# Patient Record
Sex: MALE | Race: WHITE | NOT HISPANIC OR LATINO | URBAN - METROPOLITAN AREA
[De-identification: names, ages, dates, MRNs, and addresses within clinical notes are randomized per-mention and may not be internally consistent; named-entity substitution may affect disease eponyms.]

---

## 2023-10-10 ENCOUNTER — INPATIENT (INPATIENT)
Facility: HOSPITAL | Age: 78
LOS: 17 days | Discharge: NURSING, REHAB OR LONG-TERM CARE | DRG: 853 | End: 2023-10-28
Attending: INTERNAL MEDICINE | Admitting: INTERNAL MEDICINE
Payer: MEDICARE

## 2023-10-10 VITALS
OXYGEN SATURATION: 95 % | WEIGHT: 169.98 LBS | DIASTOLIC BLOOD PRESSURE: 53 MMHG | SYSTOLIC BLOOD PRESSURE: 104 MMHG | RESPIRATION RATE: 16 BRPM | HEIGHT: 73 IN | HEART RATE: 56 BPM | TEMPERATURE: 101 F

## 2023-10-10 DIAGNOSIS — N39.0 URINARY TRACT INFECTION, SITE NOT SPECIFIED: ICD-10-CM

## 2023-10-10 LAB
ABO RH CONFIRMATION: SIGNIFICANT CHANGE UP
ALBUMIN SERPL ELPH-MCNC: 1.9 G/DL — LOW (ref 3.3–5)
ALP SERPL-CCNC: 88 U/L — SIGNIFICANT CHANGE UP (ref 40–120)
ALT FLD-CCNC: 12 U/L — SIGNIFICANT CHANGE UP (ref 12–78)
ANION GAP SERPL CALC-SCNC: 9 MMOL/L — SIGNIFICANT CHANGE UP (ref 5–17)
ANISOCYTOSIS BLD QL: SIGNIFICANT CHANGE UP
APPEARANCE UR: ABNORMAL
APTT BLD: 30 SEC — SIGNIFICANT CHANGE UP (ref 24.5–35.6)
AST SERPL-CCNC: 18 U/L — SIGNIFICANT CHANGE UP (ref 15–37)
BACTERIA # UR AUTO: ABNORMAL /HPF
BASOPHILS # BLD AUTO: 0 K/UL — SIGNIFICANT CHANGE UP (ref 0–0.2)
BASOPHILS NFR BLD AUTO: 0 % — SIGNIFICANT CHANGE UP (ref 0–2)
BILIRUB SERPL-MCNC: 0.4 MG/DL — SIGNIFICANT CHANGE UP (ref 0.2–1.2)
BILIRUB UR-MCNC: NEGATIVE — SIGNIFICANT CHANGE UP
BLD GP AB SCN SERPL QL: SIGNIFICANT CHANGE UP
BUN SERPL-MCNC: 42 MG/DL — HIGH (ref 7–23)
CALCIUM SERPL-MCNC: 8.5 MG/DL — SIGNIFICANT CHANGE UP (ref 8.5–10.1)
CHLORIDE SERPL-SCNC: 109 MMOL/L — HIGH (ref 96–108)
CK MB CFR SERPL CALC: <1 NG/ML — SIGNIFICANT CHANGE UP (ref 0–3.6)
CO2 SERPL-SCNC: 20 MMOL/L — LOW (ref 22–31)
COLOR SPEC: YELLOW — SIGNIFICANT CHANGE UP
CREAT SERPL-MCNC: 1.2 MG/DL — SIGNIFICANT CHANGE UP (ref 0.5–1.3)
DIFF PNL FLD: ABNORMAL
EGFR: 62 ML/MIN/1.73M2 — SIGNIFICANT CHANGE UP
EOSINOPHIL # BLD AUTO: 0.11 K/UL — SIGNIFICANT CHANGE UP (ref 0–0.5)
EOSINOPHIL NFR BLD AUTO: 1 % — SIGNIFICANT CHANGE UP (ref 0–6)
EPI CELLS # UR: PRESENT
ERYTHROCYTE [SEDIMENTATION RATE] IN BLOOD: 101 MM/HR — HIGH (ref 0–20)
FLUAV AG NPH QL: SIGNIFICANT CHANGE UP
FLUBV AG NPH QL: SIGNIFICANT CHANGE UP
GLUCOSE SERPL-MCNC: 159 MG/DL — HIGH (ref 70–99)
GLUCOSE UR QL: NEGATIVE MG/DL — SIGNIFICANT CHANGE UP
HCT VFR BLD CALC: 21 % — CRITICAL LOW (ref 39–50)
HGB BLD-MCNC: 6.6 G/DL — CRITICAL LOW (ref 13–17)
HYPOCHROMIA BLD QL: SIGNIFICANT CHANGE UP
INR BLD: 1.61 RATIO — HIGH (ref 0.85–1.18)
KETONES UR-MCNC: NEGATIVE MG/DL — SIGNIFICANT CHANGE UP
LACTATE SERPL-SCNC: 1.1 MMOL/L — SIGNIFICANT CHANGE UP (ref 0.7–2)
LEUKOCYTE ESTERASE UR-ACNC: ABNORMAL
LG PLATELETS BLD QL AUTO: SLIGHT — SIGNIFICANT CHANGE UP
LIDOCAIN IGE QN: 8 U/L — LOW (ref 13–75)
LYMPHOCYTES # BLD AUTO: 0.44 K/UL — LOW (ref 1–3.3)
LYMPHOCYTES # BLD AUTO: 4 % — LOW (ref 13–44)
MAGNESIUM SERPL-MCNC: 1.8 MG/DL — SIGNIFICANT CHANGE UP (ref 1.6–2.6)
MANUAL SMEAR VERIFICATION: SIGNIFICANT CHANGE UP
MCHC RBC-ENTMCNC: 25.8 PG — LOW (ref 27–34)
MCHC RBC-ENTMCNC: 31.4 GM/DL — LOW (ref 32–36)
MCV RBC AUTO: 82 FL — SIGNIFICANT CHANGE UP (ref 80–100)
MICROCYTES BLD QL: SIGNIFICANT CHANGE UP
MONOCYTES # BLD AUTO: 0.55 K/UL — SIGNIFICANT CHANGE UP (ref 0–0.9)
MONOCYTES NFR BLD AUTO: 5 % — SIGNIFICANT CHANGE UP (ref 2–14)
NEUTROPHILS # BLD AUTO: 9.97 K/UL — HIGH (ref 1.8–7.4)
NEUTROPHILS NFR BLD AUTO: 88 % — HIGH (ref 43–77)
NEUTS BAND # BLD: 2 % — SIGNIFICANT CHANGE UP (ref 0–8)
NITRITE UR-MCNC: NEGATIVE — SIGNIFICANT CHANGE UP
NRBC # BLD: 0 /100 — SIGNIFICANT CHANGE UP (ref 0–0)
NRBC # BLD: SIGNIFICANT CHANGE UP /100 WBCS (ref 0–0)
NT-PROBNP SERPL-SCNC: 6932 PG/ML — HIGH (ref 0–450)
OB PNL STL: NEGATIVE — SIGNIFICANT CHANGE UP
PH UR: 5.5 — SIGNIFICANT CHANGE UP (ref 5–8)
PLAT MORPH BLD: NORMAL — SIGNIFICANT CHANGE UP
PLATELET # BLD AUTO: 228 K/UL — SIGNIFICANT CHANGE UP (ref 150–400)
POIKILOCYTOSIS BLD QL AUTO: SLIGHT — SIGNIFICANT CHANGE UP
POTASSIUM SERPL-MCNC: 4.5 MMOL/L — SIGNIFICANT CHANGE UP (ref 3.5–5.3)
POTASSIUM SERPL-SCNC: 4.5 MMOL/L — SIGNIFICANT CHANGE UP (ref 3.5–5.3)
PROT SERPL-MCNC: 6.1 G/DL — SIGNIFICANT CHANGE UP (ref 6–8.3)
PROT UR-MCNC: 30 MG/DL
PROTHROM AB SERPL-ACNC: 18.6 SEC — HIGH (ref 9.5–13)
RBC # BLD: 2.56 M/UL — LOW (ref 4.2–5.8)
RBC # FLD: 16.4 % — HIGH (ref 10.3–14.5)
RBC BLD AUTO: ABNORMAL
RBC CASTS # UR COMP ASSIST: 12 /HPF — HIGH (ref 0–4)
RSV RNA NPH QL NAA+NON-PROBE: SIGNIFICANT CHANGE UP
SARS-COV-2 RNA SPEC QL NAA+PROBE: SIGNIFICANT CHANGE UP
SCHISTOCYTES BLD QL AUTO: SLIGHT — SIGNIFICANT CHANGE UP
SODIUM SERPL-SCNC: 138 MMOL/L — SIGNIFICANT CHANGE UP (ref 135–145)
SP GR SPEC: 1.01 — SIGNIFICANT CHANGE UP (ref 1–1.03)
SPHEROCYTES BLD QL SMEAR: SLIGHT — SIGNIFICANT CHANGE UP
TROPONIN I, HIGH SENSITIVITY RESULT: 23.7 NG/L — SIGNIFICANT CHANGE UP
TSH SERPL-MCNC: 1.74 UIU/ML — SIGNIFICANT CHANGE UP (ref 0.36–3.74)
UROBILINOGEN FLD QL: 0.2 MG/DL — SIGNIFICANT CHANGE UP (ref 0.2–1)
WBC # BLD: 11.08 K/UL — HIGH (ref 3.8–10.5)
WBC # FLD AUTO: 11.08 K/UL — HIGH (ref 3.8–10.5)
WBC UR QL: 85 /HPF — HIGH (ref 0–5)

## 2023-10-10 PROCEDURE — 70450 CT HEAD/BRAIN W/O DYE: CPT | Mod: 26,MA

## 2023-10-10 PROCEDURE — 99285 EMERGENCY DEPT VISIT HI MDM: CPT

## 2023-10-10 PROCEDURE — 71250 CT THORAX DX C-: CPT | Mod: 26,MA

## 2023-10-10 PROCEDURE — 71045 X-RAY EXAM CHEST 1 VIEW: CPT | Mod: 26

## 2023-10-10 PROCEDURE — 74176 CT ABD & PELVIS W/O CONTRAST: CPT | Mod: 26,MA

## 2023-10-10 PROCEDURE — 93010 ELECTROCARDIOGRAM REPORT: CPT

## 2023-10-10 PROCEDURE — 93010 ELECTROCARDIOGRAM REPORT: CPT | Mod: 77

## 2023-10-10 PROCEDURE — 73630 X-RAY EXAM OF FOOT: CPT | Mod: 26,50

## 2023-10-10 RX ORDER — SODIUM CHLORIDE 9 MG/ML
1000 INJECTION INTRAMUSCULAR; INTRAVENOUS; SUBCUTANEOUS ONCE
Refills: 0 | Status: COMPLETED | OUTPATIENT
Start: 2023-10-10 | End: 2023-10-10

## 2023-10-10 RX ORDER — PIPERACILLIN AND TAZOBACTAM 4; .5 G/20ML; G/20ML
3.38 INJECTION, POWDER, LYOPHILIZED, FOR SOLUTION INTRAVENOUS ONCE
Refills: 0 | Status: COMPLETED | OUTPATIENT
Start: 2023-10-10 | End: 2023-10-10

## 2023-10-10 RX ORDER — VANCOMYCIN HCL 1 G
1000 VIAL (EA) INTRAVENOUS ONCE
Refills: 0 | Status: COMPLETED | OUTPATIENT
Start: 2023-10-10 | End: 2023-10-10

## 2023-10-10 RX ORDER — PIPERACILLIN AND TAZOBACTAM 4; .5 G/20ML; G/20ML
3.38 INJECTION, POWDER, LYOPHILIZED, FOR SOLUTION INTRAVENOUS ONCE
Refills: 0 | Status: COMPLETED | OUTPATIENT
Start: 2023-10-11 | End: 2023-10-11

## 2023-10-10 RX ORDER — PANTOPRAZOLE SODIUM 20 MG/1
40 TABLET, DELAYED RELEASE ORAL
Refills: 0 | Status: DISCONTINUED | OUTPATIENT
Start: 2023-10-10 | End: 2023-10-19

## 2023-10-10 RX ORDER — ACETAMINOPHEN 500 MG
650 TABLET ORAL EVERY 6 HOURS
Refills: 0 | Status: DISCONTINUED | OUTPATIENT
Start: 2023-10-10 | End: 2023-10-19

## 2023-10-10 RX ORDER — ACETAMINOPHEN 500 MG
1000 TABLET ORAL ONCE
Refills: 0 | Status: COMPLETED | OUTPATIENT
Start: 2023-10-10 | End: 2023-10-10

## 2023-10-10 RX ORDER — PIPERACILLIN AND TAZOBACTAM 4; .5 G/20ML; G/20ML
3.38 INJECTION, POWDER, LYOPHILIZED, FOR SOLUTION INTRAVENOUS EVERY 8 HOURS
Refills: 0 | Status: DISCONTINUED | OUTPATIENT
Start: 2023-10-11 | End: 2023-10-12

## 2023-10-10 RX ADMIN — Medication 1000 MILLIGRAM(S): at 19:13

## 2023-10-10 RX ADMIN — Medication 400 MILLIGRAM(S): at 18:43

## 2023-10-10 RX ADMIN — SODIUM CHLORIDE 1000 MILLILITER(S): 9 INJECTION INTRAMUSCULAR; INTRAVENOUS; SUBCUTANEOUS at 18:45

## 2023-10-10 RX ADMIN — PIPERACILLIN AND TAZOBACTAM 200 GRAM(S): 4; .5 INJECTION, POWDER, LYOPHILIZED, FOR SOLUTION INTRAVENOUS at 20:39

## 2023-10-10 RX ADMIN — Medication 250 MILLIGRAM(S): at 23:08

## 2023-10-10 NOTE — ED ADULT NURSE NOTE - OBJECTIVE STATEMENT
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 son states pt lost about 30 lbs this year. has been incontinent of urine and stool. +nausea/vomiting/diarrhea and loss of appetite for months. reports fever since wednesday

## 2023-10-10 NOTE — ED PROVIDER NOTE - CONSTITUTIONAL, MLM
Patient needs a standing H Pylori order, I see a standing c diff is in there, she also needs a standing h pylori to be retested in a few weeks once she is finished with antibiotics - - -

## 2023-10-10 NOTE — ED PROVIDER NOTE - OBJECTIVE STATEMENT
78-year-old male presents to the hospital by ambulance from home.  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

## 2023-10-10 NOTE — ED ADULT TRIAGE NOTE - TEMPERATURE IN FAHRENHEIT (DEGREES F)
No Response to external Stimuli  No spontaneous respirations  No apical Heart rate  Negative papillary response Pt A/Ox4, no acute distress noted. VSS VSS Pt A/Ox4, c/o wrigors, no acute distress noted nad, vss Pt A/Ox4, wheezing. Pt is "tired"  NAD, VSS, flautus noted, soft bm Pt A/Ox4, lethargic. constipation, lactulose q1h ordered bp = 146/80 101.1

## 2023-10-10 NOTE — ED ADULT TRIAGE NOTE - NS ED NURSE AMBULANCES
[facility]

## 2023-10-10 NOTE — ED ADULT NURSE NOTE - CAS EDP DISCH DISPOSITION ADMI
[facility] [place] [facility]

## 2023-10-10 NOTE — ED PROVIDER NOTE - CONSTITUTIONAL NOURISHMENT, MLM
Took 1800 mL from RMQ of abdomen.  Dark yellow ascites was removed.  No labs requested, therapeutic only.  Patient tolerated well.   THIN

## 2023-10-10 NOTE — ED PROVIDER NOTE - CLINICAL SUMMARY MEDICAL DECISION MAKING FREE TEXT BOX
78-year-old male with fever, generalized weakness, episode of vomiting at home, not eating, send CBC, CMP, lactate level, blood cultures, urine analysis with urine culture, CT head, CT chest, CT abdomen and pelvis, chest x-ray, x-ray bilateral feet, start IV fluids, start broad-spectrum antibiotics, send flu/COVID/RSV swab, admit.

## 2023-10-10 NOTE — ED PROVIDER NOTE - PHYSICAL EXAMINATION
Left lateral foot, unstageable ulcer, 3 cm, blackened base, surrounding erythema    Right heel, stage II ulcer, erythematous base

## 2023-10-10 NOTE — ED ADULT TRIAGE NOTE - CHIEF COMPLAINT QUOTE
c/o not eating, bodyaches, weakness *3 months- also c/o failure to thrive. fever of 101.1 today, patient states he has a wound on the left heel

## 2023-10-10 NOTE — ED ADULT NURSE NOTE - NSFALLRISKINTERV_ED_ALL_ED
Assistance OOB with selected safe patient handling equipment if applicable/Assistance with ambulation/Communicate fall risk and risk factors to all staff, patient, and family/Monitor gait and stability/Provide visual cue: yellow wristband, yellow gown, etc/Reinforce activity limits and safety measures with patient and family/Call bell, personal items and telephone in reach/Instruct patient to call for assistance before getting out of bed/chair/stretcher/Non-slip footwear applied when patient is off stretcher/[place] to call system/Physically safe environment - no spills, clutter or unnecessary equipment/Purposeful Proactive Rounding/Room/bathroom lighting operational, light cord in reach Assistance OOB with selected safe patient handling equipment if applicable/Assistance with ambulation/Communicate fall risk and risk factors to all staff, patient, and family/Monitor gait and stability/Provide visual cue: yellow wristband, yellow gown, etc/Reinforce activity limits and safety measures with patient and family/Call bell, personal items and telephone in reach/Instruct patient to call for assistance before getting out of bed/chair/stretcher/Non-slip footwear applied when patient is off stretcher/[place] to call system/Physically safe environment - no spills, clutter or unnecessary equipment/Purposeful Proactive Rounding/Room/bathroom lighting operational, light cord in reach Assistance OOB with selected safe patient handling equipment if applicable/Assistance with ambulation/Communicate fall risk and risk factors to all staff, patient, and family/Monitor gait and stability/Provide visual cue: yellow wristband, yellow gown, etc/Reinforce activity limits and safety measures with patient and family/Call bell, personal items and telephone in reach/Instruct patient to call for assistance before getting out of bed/chair/stretcher/Non-slip footwear applied when patient is off stretcher/[place] to call system/Physically safe environment - no spills, clutter or unnecessary equipment/Purposeful Proactive Rounding/Room/bathroom lighting operational, light cord in reach

## 2023-10-11 DIAGNOSIS — N39.0 URINARY TRACT INFECTION, SITE NOT SPECIFIED: ICD-10-CM

## 2023-10-11 DIAGNOSIS — D64.9 ANEMIA, UNSPECIFIED: ICD-10-CM

## 2023-10-11 DIAGNOSIS — S91.301A UNSPECIFIED OPEN WOUND, RIGHT FOOT, INITIAL ENCOUNTER: ICD-10-CM

## 2023-10-11 DIAGNOSIS — E11.9 TYPE 2 DIABETES MELLITUS WITHOUT COMPLICATIONS: ICD-10-CM

## 2023-10-11 DIAGNOSIS — I10 ESSENTIAL (PRIMARY) HYPERTENSION: ICD-10-CM

## 2023-10-11 DIAGNOSIS — E11.621 TYPE 2 DIABETES MELLITUS WITH FOOT ULCER: ICD-10-CM

## 2023-10-11 LAB
A1C WITH ESTIMATED AVERAGE GLUCOSE RESULT: 6 % — HIGH (ref 4–5.6)
ANION GAP SERPL CALC-SCNC: 9 MMOL/L — SIGNIFICANT CHANGE UP (ref 5–17)
APPEARANCE UR: ABNORMAL
BILIRUB UR-MCNC: NEGATIVE — SIGNIFICANT CHANGE UP
BLD GP AB SCN SERPL QL: SIGNIFICANT CHANGE UP
BUN SERPL-MCNC: 43 MG/DL — HIGH (ref 7–23)
CALCIUM SERPL-MCNC: 8.4 MG/DL — LOW (ref 8.5–10.1)
CHLORIDE SERPL-SCNC: 109 MMOL/L — HIGH (ref 96–108)
CO2 SERPL-SCNC: 20 MMOL/L — LOW (ref 22–31)
COLOR SPEC: YELLOW — SIGNIFICANT CHANGE UP
CREAT SERPL-MCNC: 1.2 MG/DL — SIGNIFICANT CHANGE UP (ref 0.5–1.3)
CRP SERPL-MCNC: 105 MG/L — HIGH
DIFF PNL FLD: ABNORMAL
EGFR: 62 ML/MIN/1.73M2 — SIGNIFICANT CHANGE UP
ESTIMATED AVERAGE GLUCOSE: 126 MG/DL — HIGH (ref 68–114)
GLUCOSE BLDC GLUCOMTR-MCNC: 192 MG/DL — HIGH (ref 70–99)
GLUCOSE BLDC GLUCOMTR-MCNC: 243 MG/DL — HIGH (ref 70–99)
GLUCOSE BLDC GLUCOMTR-MCNC: 270 MG/DL — HIGH (ref 70–99)
GLUCOSE BLDC GLUCOMTR-MCNC: 276 MG/DL — HIGH (ref 70–99)
GLUCOSE SERPL-MCNC: 186 MG/DL — HIGH (ref 70–99)
GLUCOSE UR QL: NEGATIVE MG/DL — SIGNIFICANT CHANGE UP
GRAM STN FLD: SIGNIFICANT CHANGE UP
HCT VFR BLD CALC: 25.5 % — LOW (ref 39–50)
HGB BLD-MCNC: 7.9 G/DL — LOW (ref 13–17)
KETONES UR-MCNC: ABNORMAL MG/DL
LEUKOCYTE ESTERASE UR-ACNC: ABNORMAL
MCHC RBC-ENTMCNC: 25.6 PG — LOW (ref 27–34)
MCHC RBC-ENTMCNC: 31 GM/DL — LOW (ref 32–36)
MCV RBC AUTO: 82.8 FL — SIGNIFICANT CHANGE UP (ref 80–100)
METHOD TYPE: SIGNIFICANT CHANGE UP
MRSA SPEC QL CULT: SIGNIFICANT CHANGE UP
NITRITE UR-MCNC: NEGATIVE — SIGNIFICANT CHANGE UP
NRBC # BLD: 0 /100 WBCS — SIGNIFICANT CHANGE UP (ref 0–0)
PH UR: 5 — SIGNIFICANT CHANGE UP (ref 5–8)
PLATELET # BLD AUTO: 233 K/UL — SIGNIFICANT CHANGE UP (ref 150–400)
POST UNIT NUMBER: SIGNIFICANT CHANGE UP
POTASSIUM SERPL-MCNC: 4.4 MMOL/L — SIGNIFICANT CHANGE UP (ref 3.5–5.3)
POTASSIUM SERPL-SCNC: 4.4 MMOL/L — SIGNIFICANT CHANGE UP (ref 3.5–5.3)
PROT UR-MCNC: 100 MG/DL
RBC # BLD: 3.08 M/UL — LOW (ref 4.2–5.8)
RBC # FLD: 16.1 % — HIGH (ref 10.3–14.5)
SODIUM SERPL-SCNC: 138 MMOL/L — SIGNIFICANT CHANGE UP (ref 135–145)
SP GR SPEC: 1.02 — SIGNIFICANT CHANGE UP (ref 1–1.03)
SPECIMEN SOURCE: SIGNIFICANT CHANGE UP
UROBILINOGEN FLD QL: 0.2 MG/DL — SIGNIFICANT CHANGE UP (ref 0.2–1)
WBC # BLD: 9.99 K/UL — SIGNIFICANT CHANGE UP (ref 3.8–10.5)
WBC # FLD AUTO: 9.99 K/UL — SIGNIFICANT CHANGE UP (ref 3.8–10.5)

## 2023-10-11 PROCEDURE — 99222 1ST HOSP IP/OBS MODERATE 55: CPT

## 2023-10-11 PROCEDURE — 99221 1ST HOSP IP/OBS SF/LOW 40: CPT

## 2023-10-11 PROCEDURE — 86078 PHYS BLOOD BANK SERV REACTJ: CPT

## 2023-10-11 RX ORDER — INSULIN LISPRO 100/ML
VIAL (ML) SUBCUTANEOUS AT BEDTIME
Refills: 0 | Status: DISCONTINUED | OUTPATIENT
Start: 2023-10-11 | End: 2023-10-15

## 2023-10-11 RX ORDER — INSULIN LISPRO 100/ML
3 VIAL (ML) SUBCUTANEOUS
Refills: 0 | Status: DISCONTINUED | OUTPATIENT
Start: 2023-10-11 | End: 2023-10-15

## 2023-10-11 RX ORDER — INSULIN LISPRO 100/ML
VIAL (ML) SUBCUTANEOUS
Refills: 0 | Status: DISCONTINUED | OUTPATIENT
Start: 2023-10-11 | End: 2023-10-11

## 2023-10-11 RX ORDER — INSULIN GLARGINE 100 [IU]/ML
18 INJECTION, SOLUTION SUBCUTANEOUS AT BEDTIME
Refills: 0 | Status: DISCONTINUED | OUTPATIENT
Start: 2023-10-11 | End: 2023-10-19

## 2023-10-11 RX ORDER — CARVEDILOL PHOSPHATE 80 MG/1
3.12 CAPSULE, EXTENDED RELEASE ORAL EVERY 12 HOURS
Refills: 0 | Status: DISCONTINUED | OUTPATIENT
Start: 2023-10-11 | End: 2023-10-11

## 2023-10-11 RX ORDER — TAMSULOSIN HYDROCHLORIDE 0.4 MG/1
0.4 CAPSULE ORAL AT BEDTIME
Refills: 0 | Status: DISCONTINUED | OUTPATIENT
Start: 2023-10-11 | End: 2023-10-19

## 2023-10-11 RX ORDER — GABAPENTIN 400 MG/1
300 CAPSULE ORAL
Refills: 0 | Status: DISCONTINUED | OUTPATIENT
Start: 2023-10-11 | End: 2023-10-19

## 2023-10-11 RX ORDER — MULTIVIT-MIN/FERROUS GLUCONATE 9 MG/15 ML
1 LIQUID (ML) ORAL DAILY
Refills: 0 | Status: CANCELLED | OUTPATIENT
Start: 2023-10-11 | End: 2023-10-19

## 2023-10-11 RX ORDER — LACTOBACILLUS ACIDOPHILUS 100MM CELL
1 CAPSULE ORAL
Refills: 0 | Status: DISCONTINUED | OUTPATIENT
Start: 2023-10-11 | End: 2023-10-19

## 2023-10-11 RX ORDER — DRONABINOL 2.5 MG
2.5 CAPSULE ORAL
Refills: 0 | Status: DISCONTINUED | OUTPATIENT
Start: 2023-10-11 | End: 2023-10-18

## 2023-10-11 RX ORDER — ASCORBIC ACID 60 MG
500 TABLET,CHEWABLE ORAL
Refills: 0 | Status: CANCELLED | OUTPATIENT
Start: 2023-10-11 | End: 2023-10-19

## 2023-10-11 RX ORDER — SODIUM CHLORIDE 9 MG/ML
1000 INJECTION INTRAMUSCULAR; INTRAVENOUS; SUBCUTANEOUS
Refills: 0 | Status: DISCONTINUED | OUTPATIENT
Start: 2023-10-11 | End: 2023-10-12

## 2023-10-11 RX ORDER — GLUCAGON INJECTION, SOLUTION 0.5 MG/.1ML
1 INJECTION, SOLUTION SUBCUTANEOUS ONCE
Refills: 0 | Status: DISCONTINUED | OUTPATIENT
Start: 2023-10-11 | End: 2023-10-19

## 2023-10-11 RX ORDER — DEXTROSE 50 % IN WATER 50 %
25 SYRINGE (ML) INTRAVENOUS ONCE
Refills: 0 | Status: DISCONTINUED | OUTPATIENT
Start: 2023-10-11 | End: 2023-10-17

## 2023-10-11 RX ORDER — SODIUM CHLORIDE 9 MG/ML
1000 INJECTION INTRAMUSCULAR; INTRAVENOUS; SUBCUTANEOUS ONCE
Refills: 0 | Status: COMPLETED | OUTPATIENT
Start: 2023-10-11 | End: 2023-10-11

## 2023-10-11 RX ORDER — LISINOPRIL 2.5 MG/1
10 TABLET ORAL DAILY
Refills: 0 | Status: DISCONTINUED | OUTPATIENT
Start: 2023-10-11 | End: 2023-10-11

## 2023-10-11 RX ORDER — DEXTROSE 50 % IN WATER 50 %
15 SYRINGE (ML) INTRAVENOUS ONCE
Refills: 0 | Status: DISCONTINUED | OUTPATIENT
Start: 2023-10-11 | End: 2023-10-17

## 2023-10-11 RX ORDER — ASPIRIN/CALCIUM CARB/MAGNESIUM 324 MG
81 TABLET ORAL DAILY
Refills: 0 | Status: DISCONTINUED | OUTPATIENT
Start: 2023-10-11 | End: 2023-10-19

## 2023-10-11 RX ORDER — VANCOMYCIN HCL 1 G
1250 VIAL (EA) INTRAVENOUS EVERY 24 HOURS
Refills: 0 | Status: DISCONTINUED | OUTPATIENT
Start: 2023-10-11 | End: 2023-10-12

## 2023-10-11 RX ORDER — SODIUM CHLORIDE 9 MG/ML
1000 INJECTION, SOLUTION INTRAVENOUS
Refills: 0 | Status: DISCONTINUED | OUTPATIENT
Start: 2023-10-11 | End: 2023-10-19

## 2023-10-11 RX ORDER — DEXTROSE 50 % IN WATER 50 %
12.5 SYRINGE (ML) INTRAVENOUS ONCE
Refills: 0 | Status: DISCONTINUED | OUTPATIENT
Start: 2023-10-11 | End: 2023-10-17

## 2023-10-11 RX ORDER — LOPERAMIDE HCL 2 MG
2 TABLET ORAL THREE TIMES A DAY
Refills: 0 | Status: DISCONTINUED | OUTPATIENT
Start: 2023-10-11 | End: 2023-10-19

## 2023-10-11 RX ORDER — INSULIN LISPRO 100/ML
VIAL (ML) SUBCUTANEOUS
Refills: 0 | Status: DISCONTINUED | OUTPATIENT
Start: 2023-10-11 | End: 2023-10-15

## 2023-10-11 RX ORDER — BETHANECHOL CHLORIDE 25 MG
25 TABLET ORAL
Refills: 0 | Status: DISCONTINUED | OUTPATIENT
Start: 2023-10-11 | End: 2023-10-19

## 2023-10-11 RX ADMIN — SODIUM CHLORIDE 1000 MILLILITER(S): 9 INJECTION INTRAMUSCULAR; INTRAVENOUS; SUBCUTANEOUS at 21:37

## 2023-10-11 RX ADMIN — SODIUM CHLORIDE 60 MILLILITER(S): 9 INJECTION INTRAMUSCULAR; INTRAVENOUS; SUBCUTANEOUS at 22:41

## 2023-10-11 RX ADMIN — TAMSULOSIN HYDROCHLORIDE 0.4 MILLIGRAM(S): 0.4 CAPSULE ORAL at 21:37

## 2023-10-11 RX ADMIN — PANTOPRAZOLE SODIUM 40 MILLIGRAM(S): 20 TABLET, DELAYED RELEASE ORAL at 05:00

## 2023-10-11 RX ADMIN — CARVEDILOL PHOSPHATE 3.12 MILLIGRAM(S): 80 CAPSULE, EXTENDED RELEASE ORAL at 17:09

## 2023-10-11 RX ADMIN — Medication 650 MILLIGRAM(S): at 17:09

## 2023-10-11 RX ADMIN — Medication 2.5 MILLIGRAM(S): at 17:08

## 2023-10-11 RX ADMIN — Medication 1 TABLET(S): at 08:52

## 2023-10-11 RX ADMIN — Medication 650 MILLIGRAM(S): at 04:55

## 2023-10-11 RX ADMIN — Medication 650 MILLIGRAM(S): at 18:09

## 2023-10-11 RX ADMIN — INSULIN GLARGINE 18 UNIT(S): 100 INJECTION, SOLUTION SUBCUTANEOUS at 21:37

## 2023-10-11 RX ADMIN — Medication 6: at 17:54

## 2023-10-11 RX ADMIN — Medication 81 MILLIGRAM(S): at 11:43

## 2023-10-11 RX ADMIN — Medication 1 TABLET(S): at 17:08

## 2023-10-11 RX ADMIN — PIPERACILLIN AND TAZOBACTAM 25 GRAM(S): 4; .5 INJECTION, POWDER, LYOPHILIZED, FOR SOLUTION INTRAVENOUS at 13:44

## 2023-10-11 RX ADMIN — Medication 166.67 MILLIGRAM(S): at 21:37

## 2023-10-11 RX ADMIN — Medication 3 UNIT(S): at 17:53

## 2023-10-11 RX ADMIN — LISINOPRIL 10 MILLIGRAM(S): 2.5 TABLET ORAL at 11:43

## 2023-10-11 RX ADMIN — PIPERACILLIN AND TAZOBACTAM 200 GRAM(S): 4; .5 INJECTION, POWDER, LYOPHILIZED, FOR SOLUTION INTRAVENOUS at 00:30

## 2023-10-11 RX ADMIN — Medication 650 MILLIGRAM(S): at 03:27

## 2023-10-11 RX ADMIN — GABAPENTIN 300 MILLIGRAM(S): 400 CAPSULE ORAL at 17:09

## 2023-10-11 RX ADMIN — PIPERACILLIN AND TAZOBACTAM 25 GRAM(S): 4; .5 INJECTION, POWDER, LYOPHILIZED, FOR SOLUTION INTRAVENOUS at 08:51

## 2023-10-11 RX ADMIN — PIPERACILLIN AND TAZOBACTAM 25 GRAM(S): 4; .5 INJECTION, POWDER, LYOPHILIZED, FOR SOLUTION INTRAVENOUS at 21:37

## 2023-10-11 RX ADMIN — Medication 25 MILLIGRAM(S): at 17:08

## 2023-10-11 NOTE — DIETITIAN INITIAL EVALUATION ADULT - NSFNSPHYEXAMSKINFT_GEN_A_CORE
Pressure Injury 1: Right:, heel, Stage II  Pressure Injury 2: Left:, outer foot, Unstageable  Pressure Injury 3: sacrum, Stage II  Pressure Injury 4: none, none  Pressure Injury 5: none, none  Pressure Injury 6: none, none  Pressure Injury 7: none, none  Pressure Injury 8: none, none  Pressure Injury 9: none, none  Pressure Injury 10: none, none  Pressure Injury 11: none, none Pressure Injury 1: Right:, heel, Stage II  Pressure Injury 2: Left:, outer foot, Unstageable  Pressure Injury 3: sacrum, Stage II

## 2023-10-11 NOTE — CONSULT NOTE ADULT - ASSESSMENT
====================ASSESSMENT ==============    78 M with HTN, HLD, uncontrolled DM, afib s/p pacemaker admitted for UTI.  ICU consulted for hypotension     sepsis   hypotension   anemia     Plan:  ====================== NEUROLOGY=====================  acetaminophen     Tablet .. 650 milliGRAM(s) Oral every 6 hours PRN Temp greater or equal to 38C (100.4F), Moderate Pain (4 - 6)  dronabinol 2.5 milliGRAM(s) Oral two times a day  gabapentin 300 milliGRAM(s) Oral two times a day    neuro decline most likely due to sepsis   cont serial neuro exam   ct head noted     ==================== RESPIRATORY======================    on R/A at present ,  chest xray reviewed  being xray clear , and lung sounds clear .  would hydrate pt for vasodilation due to sepsis     ====================CARDIOVASCULAR==================    hold coreg   ECG reviewed   bp q 4 hr   Vascular follow up - SABRINA ordered   pod follow  up for debridement , and bx.     ===================HEMATOLOGIC/ONC ===================  Monitor H&H   aspirin enteric coated 81 milliGRAM(s) Oral daily    transfuse one more unit prbc now ,   dvt prophylaxis with pas stocking   hold chemical dvt at present   trend h/H   ===================== RENAL =========================  Continue monitoring urine output, I&OS, BUN/Cr   bethanechol 25 milliGRAM(s) Oral two times a day  tamsulosin 0.4 milliGRAM(s) Oral at bedtime    pt diaper soaked on assessment,   trend creatine   correct lytes     ==================== GASTROINTESTINAL===================  dextrose 5%. 1000 milliLiter(s) (50 mL/Hr) IV Continuous <Continuous>  dextrose 5%. 1000 milliLiter(s) (100 mL/Hr) IV Continuous <Continuous>  loperamide 2 milliGRAM(s) Oral three times a day PRN Diarrhea  pantoprazole    Tablet 40 milliGRAM(s) Oral before breakfast  sodium chloride 0.9%. 1000 milliLiter(s) (60 mL/Hr) IV Continuous <Continuous>    GI follow up   cont PPI   would guiacc all stools for anemia   ct abd noted     =======================    ENDOCRINE  =====================  dextrose 50% Injectable 25 Gram(s) IV Push once  dextrose 50% Injectable 12.5 Gram(s) IV Push once  dextrose 50% Injectable 25 Gram(s) IV Push once  dextrose Oral Gel 15 Gram(s) Oral once PRN Blood Glucose LESS THAN 70 milliGRAM(s)/deciliter  glucagon  Injectable 1 milliGRAM(s) IntraMuscular once  insulin glargine Injectable (LANTUS) 18 Unit(s) SubCutaneous at bedtime  insulin lispro (ADMELOG) corrective regimen sliding scale   SubCutaneous three times a day before meals  insulin lispro (ADMELOG) corrective regimen sliding scale   SubCutaneous at bedtime  insulin lispro Injectable (ADMELOG) 3 Unit(s) SubCutaneous three times a day before meals    endo follow up   GLycemic control     ========================INFECTIOUS DISEASE================  piperacillin/tazobactam IVPB.. 3.375 Gram(s) IV Intermittent every 8 hours  vancomycin  IVPB 1250 milliGRAM(s) IV Intermittent every 24 hours    wound care.  pod follow up   ID follow up , cont ABX              Patient requires continuous monitoring with bedside rhythm monitoring, pulse oximetry, ventilator/ bipap  monitoring and intermittent blood gas analysis.    Care plan discussed with ICU care team.    Patient remained critical; required more than usual care; I have spent 35 minutes providing non-routine care, revaluated multiple times during the day.                      ====================ASSESSMENT ==============    78 M with HTN, HLD, uncontrolled DM, afib s/p pacemaker admitted for UTI.  ICU consulted for hypotension     sepsis   hypotension   anemia     Plan:  ====================== NEUROLOGY=====================  acetaminophen     Tablet .. 650 milliGRAM(s) Oral every 6 hours PRN Temp greater or equal to 38C (100.4F), Moderate Pain (4 - 6)  dronabinol 2.5 milliGRAM(s) Oral two times a day  gabapentin 300 milliGRAM(s) Oral two times a day    neuro decline most likely due to sepsis   cont serial neuro exam   ct head noted     ==================== RESPIRATORY======================    on R/A at present ,  chest xray reviewed  being xray clear , and lung sounds clear .  would hydrate pt for vasodilation due to sepsis     ====================CARDIOVASCULAR==================    hold coreg   ECG reviewed   bp q 4 hr   Vascular follow up - SABRINA ordered   pod follow  up for debridement , and bx.     ===================HEMATOLOGIC/ONC ===================  Monitor H&H   aspirin enteric coated 81 milliGRAM(s) Oral daily    transfuse one more unit prbc now ,   dvt prophylaxis with pas stocking   hold chemical dvt at present   trend h/H   ===================== RENAL =========================  Continue monitoring urine output, I&OS, BUN/Cr   bethanechol 25 milliGRAM(s) Oral two times a day  tamsulosin 0.4 milliGRAM(s) Oral at bedtime    pt diaper soaked on assessment,   trend creatine   correct lytes     ==================== GASTROINTESTINAL===================  dextrose 5%. 1000 milliLiter(s) (50 mL/Hr) IV Continuous <Continuous>  dextrose 5%. 1000 milliLiter(s) (100 mL/Hr) IV Continuous <Continuous>  loperamide 2 milliGRAM(s) Oral three times a day PRN Diarrhea  pantoprazole    Tablet 40 milliGRAM(s) Oral before breakfast  sodium chloride 0.9%. 1000 milliLiter(s) (60 mL/Hr) IV Continuous <Continuous>    GI follow up   cont PPI   would guiacc all stools for anemia   ct abd noted     =======================    ENDOCRINE  =====================  dextrose 50% Injectable 25 Gram(s) IV Push once  dextrose 50% Injectable 12.5 Gram(s) IV Push once  dextrose 50% Injectable 25 Gram(s) IV Push once  dextrose Oral Gel 15 Gram(s) Oral once PRN Blood Glucose LESS THAN 70 milliGRAM(s)/deciliter  glucagon  Injectable 1 milliGRAM(s) IntraMuscular once  insulin glargine Injectable (LANTUS) 18 Unit(s) SubCutaneous at bedtime  insulin lispro (ADMELOG) corrective regimen sliding scale   SubCutaneous three times a day before meals  insulin lispro (ADMELOG) corrective regimen sliding scale   SubCutaneous at bedtime  insulin lispro Injectable (ADMELOG) 3 Unit(s) SubCutaneous three times a day before meals    endo follow up   GLycemic control     ========================INFECTIOUS DISEASE================  piperacillin/tazobactam IVPB.. 3.375 Gram(s) IV Intermittent every 8 hours  vancomycin  IVPB 1250 milliGRAM(s) IV Intermittent every 24 hours    wound care.  pod follow up   ID follow up , cont ABX          pt at present does not require ICU upgrade  D/W EICU attending     Patient requires continuous monitoring with bedside rhythm monitoring, pulse oximetry, ventilator/ bipap  monitoring and intermittent blood gas analysis.    Care plan discussed with ICU care team.    Patient remained critical; required more than usual care; I have spent 35 minutes providing non-routine care, revaluated multiple times during the day.

## 2023-10-11 NOTE — CONSULT NOTE ADULT - ASSESSMENT
Physical Exam:   Vital Signs Last 24 Hrs  T(C): 37.8 (11 Oct 2023 11:17), Max: 38.4 (10 Oct 2023 17:47)  T(F): 100 (11 Oct 2023 11:17), Max: 101.1 (10 Oct 2023 17:47)  HR: 79 (11 Oct 2023 11:17) (56 - 79)  BP: 121/59 (11 Oct 2023 11:17) (87/34 - 121/59)  BP(mean): --  RR: 18 (11 Oct 2023 11:17) (16 - 18)  SpO2: 93% (11 Oct 2023 11:17) (93% - 98%)    Parameters below as of 11 Oct 2023 11:17  Patient On (Oxygen Delivery Method): room air             CAPILLARY BLOOD GLUCOSE      POCT Blood Glucose.: 270 mg/dL (11 Oct 2023 11:41)  POCT Blood Glucose.: 192 mg/dL (11 Oct 2023 08:32)  POCT Blood Glucose.: 165 mg/dL (10 Oct 2023 18:06)      Cholesterol, Serum: 113 mg/dL (05.19.21 @ 08:36)     HDL Cholesterol, Serum: 22 mg/dL (05.19.21 @ 08:36)     LDL Cholesterol Calculated: 66 mg/dL (05.19.21 @ 08:36)     DIET: CC  >50%

## 2023-10-11 NOTE — CONSULT NOTE ADULT - SUBJECTIVE AND OBJECTIVE BOX
[facility]  Les Mccray PA-C  [address] [place] [address] [phone]      Chief Complaint:  Patient is a 78y old  Male who presents with a chief complaint of fever and weakness (11 Oct 2023 14:24)    78-year-old male presents to the hospital by ambulance from home.  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 Pt does not go to doctor on regualar basis per son and does phone visits.    Allergies:  No Known Allergies      Medications:  acetaminophen     Tablet .. 650 milliGRAM(s) Oral every 6 hours PRN  aspirin enteric coated 81 milliGRAM(s) Oral daily  bethanechol 25 milliGRAM(s) Oral two times a day  carvedilol 3.125 milliGRAM(s) Oral every 12 hours  dextrose 5%. 1000 milliLiter(s) IV Continuous <Continuous>  dextrose 5%. 1000 milliLiter(s) IV Continuous <Continuous>  dextrose 50% Injectable 25 Gram(s) IV Push once  dextrose 50% Injectable 12.5 Gram(s) IV Push once  dextrose 50% Injectable 25 Gram(s) IV Push once  dextrose Oral Gel 15 Gram(s) Oral once PRN  gabapentin 300 milliGRAM(s) Oral two times a day  glucagon  Injectable 1 milliGRAM(s) IntraMuscular once  insulin glargine Injectable (LANTUS) 18 Unit(s) SubCutaneous at bedtime  insulin lispro (ADMELOG) corrective regimen sliding scale   SubCutaneous three times a day before meals  insulin lispro Injectable (ADMELOG) 3 Unit(s) SubCutaneous three times a day before meals  lactobacillus acidophilus 1 Tablet(s) Oral two times a day with meals  lisinopril 10 milliGRAM(s) Oral daily  pantoprazole    Tablet 40 milliGRAM(s) Oral before breakfast  piperacillin/tazobactam IVPB.. 3.375 Gram(s) IV Intermittent every 8 hours  tamsulosin 0.4 milliGRAM(s) Oral at bedtime      PMHX/PSHX:  Diabetes    Benign essential HTN    Pacemaker    History of BPH    Diabetic foot ulcers        Family history:      Social History:     ROS:     General:  no fevers, chills, night sweats, fatigue,   Eyes:  Good vision, no reported pain  ENT:  No sore throat, pain, runny nose, dysphagia  CV:  No pain, palpitations, hypo/hypertension  Resp:  No dyspnea, cough, tachypnea, wheezing  GI:  No pain, No nausea, No vomiting, No diarrhea, No constipation, No weight loss, No fever, No pruritis, No rectal bleeding, No tarry stools, No dysphagia,  :  No pain, bleeding, incontinence, nocturia  Muscle:  No pain, weakness  Neuro:  No weakness, tingling, memory problems  Psych:  No fatigue, insomnia, mood problems, depression  Endocrine:  No polyuria, polydipsia, cold/heat intolerance  Heme:  No petechiae, ecchymosis, easy bruisability  Skin:  No rash, tattoos, scars, edema      PHYSICAL EXAM:   Vital Signs:  Vital Signs Last 24 Hrs  T(C): 37.8 (11 Oct 2023 11:17), Max: 38.4 (10 Oct 2023 17:47)  T(F): 100 (11 Oct 2023 11:17), Max: 101.1 (10 Oct 2023 17:47)  HR: 79 (11 Oct 2023 11:17) (56 - 79)  BP: 121/59 (11 Oct 2023 11:17) (87/34 - 121/59)  BP(mean): --  RR: 18 (11 Oct 2023 11:17) (16 - 18)  SpO2: 93% (11 Oct 2023 11:17) (93% - 98%)    Parameters below as of 11 Oct 2023 11:17  Patient On (Oxygen Delivery Method): room air      Daily Height in cm: 185.42 (10 Oct 2023 17:47)    Daily Weight in k.2 (11 Oct 2023 02:25)    GENERAL:  Appears stated age,   HEENT:  NC/AT,    CHEST:  Full & symmetric excursion,   HEART:  Regular rhythm  ABDOMEN:  Soft, non-tender, non-distended,   EXTEREMITIES:  no cyanosis,clubbing or edema  SKIN:  No rash  NEURO:  Alert,    LABS:                        7.9    9.99  )-----------( 233      ( 11 Oct 2023 04:09 )             25.5     10-11    138  |  109<H>  |  43<H>  ----------------------------<  186<H>  4.4   |  20<L>  |  1.20    Ca    8.4<L>      11 Oct 2023 04:09  Mg     1.8     10-10    TPro  6.1  /  Alb  1.9<L>  /  TBili  0.4  /  DBili  x   /  AST  18  /  ALT  12  /  AlkPhos  88  10-10    LIVER FUNCTIONS - ( 10 Oct 2023 18:25 )  Alb: 1.9 g/dL / Pro: 6.1 g/dL / ALK PHOS: 88 U/L / ALT: 12 U/L / AST: 18 U/L / GGT: x           PT/INR - ( 10 Oct 2023 18:25 )   PT: 18.6 sec;   INR: 1.61 ratio         PTT - ( 10 Oct 2023 18:25 )  PTT:30.0 sec  Urinalysis Basic - ( 11 Oct 2023 06:00 )    Color: Yellow / Appearance: Turbid / S.018 / pH: x  Gluc: x / Ketone: Trace mg/dL  / Bili: Negative / Urobili: 0.2 mg/dL   Blood: x / Protein: 100 mg/dL / Nitrite: Negative   Leuk Esterase: Large / RBC: Too Numerous to count /HPF / WBC Too Numerous to count /HPF   Sq Epi: x / Non Sq Epi: x / Bacteria: Few /HPF      Amylase Serum--      Lipase serum8       Ammonia--      Imaging:           [facility]  Les cMcray PA-C  [address] [place] [address] [phone]      Chief Complaint:  Patient is a 78y old  Male who presents with a chief complaint of fever and weakness (11 Oct 2023 14:24)    78-year-old male presents to the hospital by ambulance from home.  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 Pt does not go to doctor on regualar basis per son and does phone visits.    Allergies:  No Known Allergies      Medications:  acetaminophen     Tablet .. 650 milliGRAM(s) Oral every 6 hours PRN  aspirin enteric coated 81 milliGRAM(s) Oral daily  bethanechol 25 milliGRAM(s) Oral two times a day  carvedilol 3.125 milliGRAM(s) Oral every 12 hours  dextrose 5%. 1000 milliLiter(s) IV Continuous <Continuous>  dextrose 5%. 1000 milliLiter(s) IV Continuous <Continuous>  dextrose 50% Injectable 25 Gram(s) IV Push once  dextrose 50% Injectable 12.5 Gram(s) IV Push once  dextrose 50% Injectable 25 Gram(s) IV Push once  dextrose Oral Gel 15 Gram(s) Oral once PRN  gabapentin 300 milliGRAM(s) Oral two times a day  glucagon  Injectable 1 milliGRAM(s) IntraMuscular once  insulin glargine Injectable (LANTUS) 18 Unit(s) SubCutaneous at bedtime  insulin lispro (ADMELOG) corrective regimen sliding scale   SubCutaneous three times a day before meals  insulin lispro Injectable (ADMELOG) 3 Unit(s) SubCutaneous three times a day before meals  lactobacillus acidophilus 1 Tablet(s) Oral two times a day with meals  lisinopril 10 milliGRAM(s) Oral daily  pantoprazole    Tablet 40 milliGRAM(s) Oral before breakfast  piperacillin/tazobactam IVPB.. 3.375 Gram(s) IV Intermittent every 8 hours  tamsulosin 0.4 milliGRAM(s) Oral at bedtime      PMHX/PSHX:  Diabetes    Benign essential HTN    Pacemaker    History of BPH    Diabetic foot ulcers        Family history:      Social History:     ROS:     General:  no fevers, chills, night sweats, fatigue,   Eyes:  Good vision, no reported pain  ENT:  No sore throat, pain, runny nose, dysphagia  CV:  No pain, palpitations, hypo/hypertension  Resp:  No dyspnea, cough, tachypnea, wheezing  GI:  No pain, No nausea, No vomiting, No diarrhea, No constipation, No weight loss, No fever, No pruritis, No rectal bleeding, No tarry stools, No dysphagia,  :  No pain, bleeding, incontinence, nocturia  Muscle:  No pain, weakness  Neuro:  No weakness, tingling, memory problems  Psych:  No fatigue, insomnia, mood problems, depression  Endocrine:  No polyuria, polydipsia, cold/heat intolerance  Heme:  No petechiae, ecchymosis, easy bruisability  Skin:  No rash, tattoos, scars, edema      PHYSICAL EXAM:   Vital Signs:  Vital Signs Last 24 Hrs  T(C): 37.8 (11 Oct 2023 11:17), Max: 38.4 (10 Oct 2023 17:47)  T(F): 100 (11 Oct 2023 11:17), Max: 101.1 (10 Oct 2023 17:47)  HR: 79 (11 Oct 2023 11:17) (56 - 79)  BP: 121/59 (11 Oct 2023 11:17) (87/34 - 121/59)  BP(mean): --  RR: 18 (11 Oct 2023 11:17) (16 - 18)  SpO2: 93% (11 Oct 2023 11:17) (93% - 98%)    Parameters below as of 11 Oct 2023 11:17  Patient On (Oxygen Delivery Method): room air      Daily Height in cm: 185.42 (10 Oct 2023 17:47)    Daily Weight in k.2 (11 Oct 2023 02:25)    GENERAL:  Appears stated age,   HEENT:  NC/AT,    CHEST:  Full & symmetric excursion,   HEART:  Regular rhythm  ABDOMEN:  Soft, non-tender, non-distended,   EXTEREMITIES:  no cyanosis,clubbing or edema  SKIN:  No rash  NEURO:  Alert,    LABS:                        7.9    9.99  )-----------( 233      ( 11 Oct 2023 04:09 )             25.5     10-11    138  |  109<H>  |  43<H>  ----------------------------<  186<H>  4.4   |  20<L>  |  1.20    Ca    8.4<L>      11 Oct 2023 04:09  Mg     1.8     10-10    TPro  6.1  /  Alb  1.9<L>  /  TBili  0.4  /  DBili  x   /  AST  18  /  ALT  12  /  AlkPhos  88  10-10    LIVER FUNCTIONS - ( 10 Oct 2023 18:25 )  Alb: 1.9 g/dL / Pro: 6.1 g/dL / ALK PHOS: 88 U/L / ALT: 12 U/L / AST: 18 U/L / GGT: x           PT/INR - ( 10 Oct 2023 18:25 )   PT: 18.6 sec;   INR: 1.61 ratio         PTT - ( 10 Oct 2023 18:25 )  PTT:30.0 sec  Urinalysis Basic - ( 11 Oct 2023 06:00 )    Color: Yellow / Appearance: Turbid / S.018 / pH: x  Gluc: x / Ketone: Trace mg/dL  / Bili: Negative / Urobili: 0.2 mg/dL   Blood: x / Protein: 100 mg/dL / Nitrite: Negative   Leuk Esterase: Large / RBC: Too Numerous to count /HPF / WBC Too Numerous to count /HPF   Sq Epi: x / Non Sq Epi: x / Bacteria: Few /HPF      Amylase Serum--      Lipase serum8       Ammonia--      Imaging:           [facility]  Les Mccray PA-C  [address] [place] [address] [phone]      Chief Complaint:  Patient is a 78y old  Male who presents with a chief complaint of fever and weakness (11 Oct 2023 14:24)    78-year-old male presents to the hospital by ambulance from home.  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 Pt does not go to doctor on regualar basis per son and does phone visits.    Allergies:  No Known Allergies      Medications:  acetaminophen     Tablet .. 650 milliGRAM(s) Oral every 6 hours PRN  aspirin enteric coated 81 milliGRAM(s) Oral daily  bethanechol 25 milliGRAM(s) Oral two times a day  carvedilol 3.125 milliGRAM(s) Oral every 12 hours  dextrose 5%. 1000 milliLiter(s) IV Continuous <Continuous>  dextrose 5%. 1000 milliLiter(s) IV Continuous <Continuous>  dextrose 50% Injectable 25 Gram(s) IV Push once  dextrose 50% Injectable 12.5 Gram(s) IV Push once  dextrose 50% Injectable 25 Gram(s) IV Push once  dextrose Oral Gel 15 Gram(s) Oral once PRN  gabapentin 300 milliGRAM(s) Oral two times a day  glucagon  Injectable 1 milliGRAM(s) IntraMuscular once  insulin glargine Injectable (LANTUS) 18 Unit(s) SubCutaneous at bedtime  insulin lispro (ADMELOG) corrective regimen sliding scale   SubCutaneous three times a day before meals  insulin lispro Injectable (ADMELOG) 3 Unit(s) SubCutaneous three times a day before meals  lactobacillus acidophilus 1 Tablet(s) Oral two times a day with meals  lisinopril 10 milliGRAM(s) Oral daily  pantoprazole    Tablet 40 milliGRAM(s) Oral before breakfast  piperacillin/tazobactam IVPB.. 3.375 Gram(s) IV Intermittent every 8 hours  tamsulosin 0.4 milliGRAM(s) Oral at bedtime      PMHX/PSHX:  Diabetes    Benign essential HTN    Pacemaker    History of BPH    Diabetic foot ulcers        Family history:      Social History:     ROS:     General:  no fevers, chills, night sweats, fatigue,   Eyes:  Good vision, no reported pain  ENT:  No sore throat, pain, runny nose, dysphagia  CV:  No pain, palpitations, hypo/hypertension  Resp:  No dyspnea, cough, tachypnea, wheezing  GI:  No pain, No nausea, No vomiting, No diarrhea, No constipation, No weight loss, No fever, No pruritis, No rectal bleeding, No tarry stools, No dysphagia,  :  No pain, bleeding, incontinence, nocturia  Muscle:  No pain, weakness  Neuro:  No weakness, tingling, memory problems  Psych:  No fatigue, insomnia, mood problems, depression  Endocrine:  No polyuria, polydipsia, cold/heat intolerance  Heme:  No petechiae, ecchymosis, easy bruisability  Skin:  No rash, tattoos, scars, edema      PHYSICAL EXAM:   Vital Signs:  Vital Signs Last 24 Hrs  T(C): 37.8 (11 Oct 2023 11:17), Max: 38.4 (10 Oct 2023 17:47)  T(F): 100 (11 Oct 2023 11:17), Max: 101.1 (10 Oct 2023 17:47)  HR: 79 (11 Oct 2023 11:17) (56 - 79)  BP: 121/59 (11 Oct 2023 11:17) (87/34 - 121/59)  BP(mean): --  RR: 18 (11 Oct 2023 11:17) (16 - 18)  SpO2: 93% (11 Oct 2023 11:17) (93% - 98%)    Parameters below as of 11 Oct 2023 11:17  Patient On (Oxygen Delivery Method): room air      Daily Height in cm: 185.42 (10 Oct 2023 17:47)    Daily Weight in k.2 (11 Oct 2023 02:25)    GENERAL:  Appears stated age,   HEENT:  NC/AT,    CHEST:  Full & symmetric excursion,   HEART:  Regular rhythm  ABDOMEN:  Soft, non-tender, non-distended,   EXTEREMITIES:  no cyanosis,clubbing or edema  SKIN:  No rash  NEURO:  Alert,    LABS:                        7.9    9.99  )-----------( 233      ( 11 Oct 2023 04:09 )             25.5     10-11    138  |  109<H>  |  43<H>  ----------------------------<  186<H>  4.4   |  20<L>  |  1.20    Ca    8.4<L>      11 Oct 2023 04:09  Mg     1.8     10-10    TPro  6.1  /  Alb  1.9<L>  /  TBili  0.4  /  DBili  x   /  AST  18  /  ALT  12  /  AlkPhos  88  10-10    LIVER FUNCTIONS - ( 10 Oct 2023 18:25 )  Alb: 1.9 g/dL / Pro: 6.1 g/dL / ALK PHOS: 88 U/L / ALT: 12 U/L / AST: 18 U/L / GGT: x           PT/INR - ( 10 Oct 2023 18:25 )   PT: 18.6 sec;   INR: 1.61 ratio         PTT - ( 10 Oct 2023 18:25 )  PTT:30.0 sec  Urinalysis Basic - ( 11 Oct 2023 06:00 )    Color: Yellow / Appearance: Turbid / S.018 / pH: x  Gluc: x / Ketone: Trace mg/dL  / Bili: Negative / Urobili: 0.2 mg/dL   Blood: x / Protein: 100 mg/dL / Nitrite: Negative   Leuk Esterase: Large / RBC: Too Numerous to count /HPF / WBC Too Numerous to count /HPF   Sq Epi: x / Non Sq Epi: x / Bacteria: Few /HPF      Amylase Serum--      Lipase serum8       Ammonia--      Imaging:

## 2023-10-11 NOTE — CONSULT NOTE ADULT - SUBJECTIVE AND OBJECTIVE BOX
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 No Endocrine/ PCP at this time looking to find someone to see- there was someone did house calls- son/patient at bedside did not recall his name. Patient was refusing insulin- at home rx humalog 18 units AC and added with 2-4-6 corrective scale and Lantus 24-30 units HS. discussed insulin needs can change with weight loss; past year has lost approx 40 lbs. decrease in appetite. Uses meter at home (refused a CGM) has all supplies needed. States has bleow 70 mg/dL occasionally but not often. diabetes education provided verbally.  Hx ASCVD, CKD, HF    HPI:  78-year-old male presents to the hospital by ambulance from home.  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 Pt does not go to doctor on regualar basis per son and does phone visits.   (11 Oct 2023 07:02)      PAST MEDICAL & SURGICAL HISTORY:  Diabetes      Benign essential HTN      Pacemaker      History of BPH      Diabetic foot ulcers          Allergies    No Known Allergies    Intolerances        MEDICATIONS  (STANDING):  aspirin enteric coated 81 milliGRAM(s) Oral daily  bethanechol 25 milliGRAM(s) Oral two times a day  carvedilol 3.125 milliGRAM(s) Oral every 12 hours  dextrose 5%. 1000 milliLiter(s) (50 mL/Hr) IV Continuous <Continuous>  dextrose 5%. 1000 milliLiter(s) (100 mL/Hr) IV Continuous <Continuous>  dextrose 50% Injectable 25 Gram(s) IV Push once  dextrose 50% Injectable 12.5 Gram(s) IV Push once  dextrose 50% Injectable 25 Gram(s) IV Push once  dronabinol 2.5 milliGRAM(s) Oral two times a day  gabapentin 300 milliGRAM(s) Oral two times a day  glucagon  Injectable 1 milliGRAM(s) IntraMuscular once  insulin glargine Injectable (LANTUS) 18 Unit(s) SubCutaneous at bedtime  insulin lispro (ADMELOG) corrective regimen sliding scale   SubCutaneous three times a day before meals  insulin lispro Injectable (ADMELOG) 3 Unit(s) SubCutaneous three times a day before meals  lactobacillus acidophilus 1 Tablet(s) Oral two times a day with meals  lisinopril 10 milliGRAM(s) Oral daily  pantoprazole    Tablet 40 milliGRAM(s) Oral before breakfast  piperacillin/tazobactam IVPB.. 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 No Endocrine/ PCP at this time looking to find someone to see- there was someone did house calls saw patient at home- son/patient at bedside did not recall his name. Patient was refusing insulin- at home rx humalog 18 units AC and added with 2-4-6 corrective scale and if was not receiving that full dose- refused insulin. also, home regimen Lantus 24-30 units HS. discussed insulin needs can change with weight loss; past year has lost approx 40 lbs. decrease in appetite. Uses meter at home (refused a CGM) has all supplies needed. States has below 70 mg/dL occasionally but not often. diabetes education provided verbally.  Hx ASCVD, CKD, HF    HPI:  78-year-old male presents to the hospital by ambulance from home.  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 Pt does not go to doctor on regualar basis per son and does phone visits.   (11 Oct 2023 07:02)      PAST MEDICAL & SURGICAL HISTORY:  Diabetes      Benign essential HTN      Pacemaker      History of BPH      Diabetic foot ulcers          Allergies    No Known Allergies    Intolerances        MEDICATIONS  (STANDING):  aspirin enteric coated 81 milliGRAM(s) Oral daily  bethanechol 25 milliGRAM(s) Oral two times a day  carvedilol 3.125 milliGRAM(s) Oral every 12 hours  dextrose 5%. 1000 milliLiter(s) (50 mL/Hr) IV Continuous <Continuous>  dextrose 5%. 1000 milliLiter(s) (100 mL/Hr) IV Continuous <Continuous>  dextrose 50% Injectable 25 Gram(s) IV Push once  dextrose 50% Injectable 12.5 Gram(s) IV Push once  dextrose 50% Injectable 25 Gram(s) IV Push once  dronabinol 2.5 milliGRAM(s) Oral two times a day  gabapentin 300 milliGRAM(s) Oral two times a day  glucagon  Injectable 1 milliGRAM(s) IntraMuscular once  insulin glargine Injectable (LANTUS) 18 Unit(s) SubCutaneous at bedtime  insulin lispro (ADMELOG) corrective regimen sliding scale   SubCutaneous three times a day before meals  insulin lispro Injectable (ADMELOG) 3 Unit(s) SubCutaneous three times a day before meals  lactobacillus acidophilus 1 Tablet(s) Oral two times a day with meals  lisinopril 10 milliGRAM(s) Oral daily  pantoprazole    Tablet 40 milliGRAM(s) Oral before breakfast  piperacillin/tazobactam IVPB.. 3.375 Gram(s) IV Intermittent every 8 hours  tamsulosin 0.4 milliGRAM(s) Oral at bedtime

## 2023-10-11 NOTE — CONSULT NOTE ADULT - PROBLEM SELECTOR RECOMMENDATION 9
Type 2 A1c 6.0% adm UTI  Recommend endocrine-[facility] on consult  FU appt: TBA  DSC recommendations: return to home regimen and glucose monitoring  diabetes education provided  Diabetes support info and cell # [phone] given   Goal 100-180 mg/dL; 140-180 mg/dL in critical care areas Type 2 A1c 6.0% adm UTI  Recommend endocrine-[facility] on consult  FU appt: TBA  DSC recommendations: return to home regimen and glucose monitoring  diabetes education provided  Diabetes support info and cell # [phone] given   Goal 100-180 mg/dL; 140-180 mg/dL in critical care areas Type 2 A1c 6.0% adm UTI  Recommend endocrine-[facility] on consult  FU appt: TBA  DSC recommendations: return to home regimen and glucose monitoring  diabetes education provided  Diabetes support info and cell # [phone] given   Goal 100-180 mg/dL; 140-180 mg/dL in critical care areas Type 2 A1c 6.0% adm UTI  Recommend endocrine-[facility] on consult  cont lantus 18 units Hs, Lispro 3 units AC x3 add moderate correction AC and mod HS  FU appt: TBA  DSC recommendations: return to home regimen and glucose monitoring  diabetes education provided  Diabetes support info and cell # [phone] given   Goal 100-180 mg/dL; 140-180 mg/dL in critical care areas Type 2 A1c 6.0% adm UTI  Recommend endocrine-[facility] on consult  cont lantus 18 units Hs, Lispro 3 units AC x3 add moderate correction AC and mod HS  FU appt: TBA  DSC recommendations: return to home regimen and glucose monitoring  diabetes education provided  Diabetes support info and cell # [phone] given   Goal 100-180 mg/dL; 140-180 mg/dL in critical care areas Type 2 A1c 6.0% adm UTI  Recommend endocrine-[facility] on consult  cont lantus 18 units Hs, Lispro 3 units AC x3 add moderate correction AC and mod HS  FU appt: TBA  DSC recommendations: return to home regimen and glucose monitoring  diabetes education provided  Diabetes support info and cell # [phone] given   Goal 100-180 mg/dL; 140-180 mg/dL in critical care areas

## 2023-10-11 NOTE — CONSULT NOTE ADULT - ASSESSMENT
INCOMPLETE NOTE.  Documentation in Progress  PT SEEN AND EVALUATED.   FULL/ADDITIONAL RECOMMENDATIONS TO FOLLOW   ***************************************************************      [ASSESSMENT and  PLAN]    Anemia due to chronic disease  Prostate cancer  Failure to thrive  Fever  Chronic wounds    78-year-old Danish male presents to the hospital with failure to thrive, fever and weight loss of 40 to 50 pounds.  Decreased p.o. intake for the last month.  History of chronic wounds.    Anemia likely secondary to chronic disease  May have blood loss from chronic wounds    Transfuse as needed evaluate for cause for anemia.    Evaluate for reversible treatable causes for Weight loss.      RECOMMENDATIONS      Transfuse PRBC as clinically indicated.   Transfuse PRBC if Hgb <7.0 or if symptomatic.   Follow CBC    Check Anemia studies.      Ferritin, Iron studies     B12, Folate     ESR, CRP    DVT Prophylaxis  SQ Lovenox or SQ heparin      Patient with flat affect.  Has limited understanding of situation.  Further recommendations pending clinical course.    Discussed with  xxxxxxx.    Thank you for consulting us.     > xxxxxx minutes spent in direct patient care, examining and counseling patient,  reviewing  the notes, lab data/ imaging , discussion with multidisciplinary team.      INCOMPLETE NOTE.  Documentation in Progress  PT SEEN AND EVALUATED.   FULL/ADDITIONAL RECOMMENDATIONS TO FOLLOW   ***************************************************************      [ASSESSMENT and  PLAN]    Anemia due to chronic disease  Prostate cancer  Failure to thrive  Fever  Chronic wounds    78-year-old Amharic male presents to the hospital with failure to thrive, fever and weight loss of 40 to 50 pounds.  Decreased p.o. intake for the last month.  History of chronic wounds.    Anemia likely secondary to chronic disease  May have blood loss from chronic wounds    Transfuse as needed evaluate for cause for anemia.    Evaluate for reversible treatable causes for Weight loss.      RECOMMENDATIONS      Transfuse PRBC as clinically indicated.   Transfuse PRBC if Hgb <7.0 or if symptomatic.   Follow CBC    Check Anemia studies.      Ferritin, Iron studies     B12, Folate     ESR, CRP    DVT Prophylaxis  SQ Lovenox or SQ heparin      Patient with flat affect.  Has limited understanding of situation.  Further recommendations pending clinical course.    Discussed with  xxxxxxx.    Thank you for consulting us.     > xxxxxx minutes spent in direct patient care, examining and counseling patient,  reviewing  the notes, lab data/ imaging , discussion with multidisciplinary team.      INCOMPLETE NOTE.  Documentation in Progress  PT SEEN AND EVALUATED.   FULL/ADDITIONAL RECOMMENDATIONS TO FOLLOW   ***************************************************************      [ASSESSMENT and  PLAN]    Anemia due to chronic disease  Prostate cancer  Failure to thrive  Fever  Chronic wounds    78-year-old Chinese male presents to the hospital with failure to thrive, fever and weight loss of 40 to 50 pounds.  Decreased p.o. intake for the last month.  History of chronic wounds.    Anemia likely secondary to chronic disease  May have blood loss from chronic wounds    Transfuse as needed evaluate for cause for anemia.    Evaluate for reversible treatable causes for Weight loss.      RECOMMENDATIONS      Transfuse PRBC as clinically indicated.   Transfuse PRBC if Hgb <7.0 or if symptomatic.   Follow CBC    Check Anemia studies.      Ferritin, Iron studies     B12, Folate     ESR, CRP    DVT Prophylaxis  SQ Lovenox or SQ heparin      Patient with flat affect.  Has limited understanding of situation.  Further recommendations pending clinical course.    Discussed with  xxxxxxx.    Thank you for consulting us.     > xxxxxx minutes spent in direct patient care, examining and counseling patient,  reviewing  the notes, lab data/ imaging , discussion with multidisciplinary team.      [ASSESSMENT and  PLAN]  D63.8    Anemia due to chronic disease  C61        Prostate cancer  R62.51    Failure to thrive  R50.9      Fever  L89.609   pressure ulcer of heel, Chronic wounds    78-year-old Occitan male presents to the hospital with failure to thrive, fever and weight loss of 40 to 50 pounds.  Decreased p.o. intake for the last month.  History of chronic wounds.  Additional admitting diagnoses UTI, sepsis, hypotension, diabetic foot ulcer, anemia 6.6 g/dL    Anemia likely secondary to chronic disease  May have blood loss from chronic wounds     Transfuse as needed evaluate for cause for anemia.     Evaluate for reversible treatable causes for Weight loss.  CT Scan CAP: No overt masses.  No adenopathy.  Patient with poor performance status        UTI  History of enlarged prostate  History of prostate cancer.  Unspecified details.    RECOMMENDATIONS    Consider GI evaluation for weight loss.    Transfuse PRBC as clinically indicated.   Transfuse PRBC if Hgb <7.0 or if symptomatic.   Follow CBC    Check Anemia studies.      Ferritin, Iron studies     B12, Folate     ESR, CRP    Check PSA    DVT Prophylaxis  SQ Lovenox or SQ heparin    Patient with flat affect.  Has limited understanding of situation.  Further recommendations pending clinical course.      Thank you for consulting us.      [ASSESSMENT and  PLAN]  D63.8    Anemia due to chronic disease  C61        Prostate cancer  R62.51    Failure to thrive  R50.9      Fever  L89.609   pressure ulcer of heel, Chronic wounds    78-year-old Greek male presents to the hospital with failure to thrive, fever and weight loss of 40 to 50 pounds.  Decreased p.o. intake for the last month.  History of chronic wounds.  Additional admitting diagnoses UTI, sepsis, hypotension, diabetic foot ulcer, anemia 6.6 g/dL    Anemia likely secondary to chronic disease  May have blood loss from chronic wounds     Transfuse as needed evaluate for cause for anemia.     Evaluate for reversible treatable causes for Weight loss.  CT Scan CAP: No overt masses.  No adenopathy.  Patient with poor performance status        UTI  History of enlarged prostate  History of prostate cancer.  Unspecified details.    RECOMMENDATIONS    Consider GI evaluation for weight loss.    Transfuse PRBC as clinically indicated.   Transfuse PRBC if Hgb <7.0 or if symptomatic.   Follow CBC    Check Anemia studies.      Ferritin, Iron studies     B12, Folate     ESR, CRP    Check PSA    DVT Prophylaxis  SQ Lovenox or SQ heparin    Patient with flat affect.  Has limited understanding of situation.  Further recommendations pending clinical course.      Thank you for consulting us.      [ASSESSMENT and  PLAN]  D63.8    Anemia due to chronic disease  C61        Prostate cancer  R62.51    Failure to thrive  R50.9      Fever  L89.609   pressure ulcer of heel, Chronic wounds    78-year-old Turkish male presents to the hospital with failure to thrive, fever and weight loss of 40 to 50 pounds.  Decreased p.o. intake for the last month.  History of chronic wounds.  Additional admitting diagnoses UTI, sepsis, hypotension, diabetic foot ulcer, anemia 6.6 g/dL    Anemia likely secondary to chronic disease  May have blood loss from chronic wounds     Transfuse as needed evaluate for cause for anemia.     Evaluate for reversible treatable causes for Weight loss.  CT Scan CAP: No overt masses.  No adenopathy.  Patient with poor performance status        UTI  History of enlarged prostate  History of prostate cancer.  Unspecified details.    RECOMMENDATIONS    Consider GI evaluation for weight loss.    Transfuse PRBC as clinically indicated.   Transfuse PRBC if Hgb <7.0 or if symptomatic.   Follow CBC    Check Anemia studies.      Ferritin, Iron studies     B12, Folate     ESR, CRP    Check PSA    DVT Prophylaxis  SQ Lovenox or SQ heparin    Patient with flat affect.  Has limited understanding of situation.  Further recommendations pending clinical course.      Thank you for consulting us.

## 2023-10-11 NOTE — PHARMACOTHERAPY INTERVENTION NOTE - COMMENTS
Recommended to order a vancomycin trough before the second dose of the new order in order to assist with vancomycin pharmacokinetic monitoring.    Sohail Ramirez, PharmD, [facility]  Clinical Pharmacy Specialist, Infectious Diseases  Tele-Antimicrobial Stewardship Program (Tele-ASP)  Tele-ASP Phone: [phone]   Recommended to order a vancomycin trough before the second dose of the new order in order to assist with vancomycin pharmacokinetic monitoring.    Sohail Ramirez, PharmD, [facility]  Clinical Pharmacy Specialist, Infectious Diseases  Tele-Antimicrobial Stewardship Program (Tele-ASP)  Tele-ASP Phone: [phone]   Recommended to order a vancomycin trough before the second dose of the new order in order to assist with vancomycin pharmacokinetic monitoring.    Sohail Ramirez, PharmD, [facility]  Clinical Pharmacy Specialist, Infectious Diseases  Tele-Antimicrobial Stewardship Program (Tele-ASP)  Tele-ASP Phone: [phone]

## 2023-10-11 NOTE — CHART NOTE - NSCHARTNOTEFT_GEN_A_CORE
Called by RN as patient with temperature 100.1F and completed 1u PRBC transfusion ~ 5 minutes prior to noted temperature. Other VS acceptable. Pt seen and examined at bedside. Patient is able to provide medical history and states he has had chills for 1 year. Reports that he uses multiple blankets at home with similar chills that he currently endorses. Denies subjectively feeling febrile, headaches, dizziness, chest pain, palpitations or shortness of breath at this time. Exam notable for comfortable appearing male resting in bed, +rigors (at baseline per pt). No acc muscle use. Lungs clear to auscultation throughout. Heart regular rate rhythm. Cannot r/o transfusion rxn given timing of 100.1F temperature immediately following completion of transfusion, though could also be secondary to urinary infection / symptoms which were present on admission. Will send transfusion labs, including UA. Hold off on CXR given no hypoxia or abnormalities on pulm exam at this time. Tylenol for temperature. RN to call with any concerns. Called by RN as patient with temperature 100.1F and completed 1u PRBC transfusion ~ 5 minutes prior to noted temperature. Other VS acceptable. Pt seen and examined at bedside. Patient is able to provide medical history and states he has had chills for 1 year. Also reports recorded oral temperatures 100+ taken by wife twice daily when patient at home. Reports that he uses multiple blankets at home with similar chills that he currently endorses. Denies subjectively feeling febrile, headaches, dizziness, chest pain, palpitations or shortness of breath at this time. Exam notable for comfortable appearing male resting in bed, +rigors (at baseline per pt). No acc muscle use. Lungs clear to auscultation throughout. Heart regular rate rhythm. Cannot r/o transfusion rxn given timing of 100.1F temperature immediately following completion of transfusion, though could also be secondary to urinary infection / symptoms which were present on admission. Will send transfusion labs, including UA. Hold off on CXR given no hypoxia or abnormalities on pulm exam at this time. Tylenol for temperature. RN to call with any concerns.

## 2023-10-11 NOTE — DIETITIAN INITIAL EVALUATION ADULT - ORAL INTAKE PTA/DIET HISTORY
Pt reports decreased appetite/intake. Not eating/drinking x 1 month per H&P. Pt reports consuming 1 meal/day. Reports no taste, no appetite. Has consumed glucerna shake in the past.  Pt reports decreased appetite/intake. Not eating/drinking x 1 month per H&P. Pt reports consuming 1 meal/day. Reports no taste, no appetite. Has consumed glucerna shake in the past. Spoke with son this afternoon, confirms pt has been eating much less over the past 6 months. Some days pt will only consume 1 glucerna shake. Other days will consume up to two meals. Son reports difficulty caring for patient at home.

## 2023-10-11 NOTE — CONSULT NOTE ADULT - SUBJECTIVE AND OBJECTIVE BOX
SURGERY PA CONSULT NOTE:    CHIEF COMPLAINT:  Patient is a 78y old  Male who presents with a chief complaint of fever and weakness (11 Oct 2023 16:55)      HPI FROM ED:  HPI:  78-year-old male presents to the hospital by ambulance from home.  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 Pt does not go to doctor on regualar basis per son and does phone visits.   (11 Oct 2023 07:02)    HPI:   Patient has had  deterioration of health over the past 6-7 years. The right foot wound was seen first failed graft per son. The left wound presented within the past year. Son states that patient has pacemaker for afib and is on ASA 81mg with plavix stopped 3 months ago. Patient is bedridden but not contracted.  Patient denies rest pain, ischemic changes to lower extremities, chest pain, SOB.    PAST MEDICAL HISTORY:  PAST MEDICAL & SURGICAL HISTORY:  Diabetes  Benign essential HTN  Pacemaker  History of BPH  Diabetic foot ulcers      PAST SURGICAL HISTORY:  R heel partial calcanectomy    REVIEW OF SYSTEMS:  General/Constitutional: No acute distress, no headache, weakness, fevers, or chills   Respiratory: No dyspnea on room air  Cardiac: Denies chest pain, palpitations. Pacemaker in place  Abdomen: Soft, nontender  Vascular:  RIGHT- Ulceration noted to the right heel with fibrogranular s/p partial R calcanectomy. Multiphasic PT and AT (mid-shin). No dopperable DP appreciated  LEFT- lateral heel eschar with purulent drainage. Multiphasic DP and PT  Extremities: 2/5 muscle strength. Extremities able to be passively moved without pain.  Genitourinary: Urinary incontinence      MEDICATIONS:  Home Medications:  Aspir 81 oral delayed release tablet: 1 tab(s) orally once a day (11 Oct 2023 07:13)  bethanechol 25 mg oral tablet: 1 tab(s) orally 2 times a day (11 Oct 2023 07:14)  Coreg 3.125 mg oral tablet: 1 tab(s) orally 2 times a day (11 Oct 2023 07:13)  Flomax 0.4 mg oral capsule: 1 cap(s) orally once a day (at bedtime) (11 Oct 2023 07:14)  gabapentin 300 mg oral capsule: 1 cap(s) orally 2 times a day (11 Oct 2023 07:13)  Lantus 100 units/mL subcutaneous solution: 18 unit(s) subcutaneous once a day (at bedtime) (11 Oct 2023 07:15)  ramipril 2.5 mg oral capsule: 1 cap(s) orally once a day (11 Oct 2023 07:12)    MEDICATIONS  (STANDING):  aspirin enteric coated 81 milliGRAM(s) Oral daily  bethanechol 25 milliGRAM(s) Oral two times a day  carvedilol 3.125 milliGRAM(s) Oral every 12 hours  dextrose 5%. 1000 milliLiter(s) (50 mL/Hr) IV Continuous <Continuous>  dextrose 5%. 1000 milliLiter(s) (100 mL/Hr) IV Continuous <Continuous>  dextrose 50% Injectable 25 Gram(s) IV Push once  dextrose 50% Injectable 25 Gram(s) IV Push once  dextrose 50% Injectable 12.5 Gram(s) IV Push once  dronabinol 2.5 milliGRAM(s) Oral two times a day  gabapentin 300 milliGRAM(s) Oral two times a day  glucagon  Injectable 1 milliGRAM(s) IntraMuscular once  insulin glargine Injectable (LANTUS) 18 Unit(s) SubCutaneous at bedtime  insulin lispro (ADMELOG) corrective regimen sliding scale   SubCutaneous three times a day before meals  insulin lispro (ADMELOG) corrective regimen sliding scale   SubCutaneous at bedtime  insulin lispro Injectable (ADMELOG) 3 Unit(s) SubCutaneous three times a day before meals  lactobacillus acidophilus 1 Tablet(s) Oral two times a day with meals  lisinopril 10 milliGRAM(s) Oral daily  pantoprazole    Tablet 40 milliGRAM(s) Oral before breakfast  piperacillin/tazobactam IVPB.. 3.375 Gram(s) IV Intermittent every 8 hours  tamsulosin 0.4 milliGRAM(s) Oral at bedtime  vancomycin  IVPB 1250 milliGRAM(s) IV Intermittent every 24 hours    MEDICATIONS  (PRN):  acetaminophen     Tablet .. 650 milliGRAM(s) Oral every 6 hours PRN Temp greater or equal to 38C (100.4F), Moderate Pain (4 - 6)  dextrose Oral Gel 15 Gram(s) Oral once PRN Blood Glucose LESS THAN 70 milliGRAM(s)/deciliter  loperamide 2 milliGRAM(s) Oral three times a day PRN Diarrhea      ALLERGIES:  Allergies    No Known Allergies    Intolerances        SOCIAL HISTORY:  Social History:  lives at home with wife  bedbound (11 Oct 2023 07:02)    FAMILY HISTORY:  FAMILY HISTORY:      VITAL SIGNS:  Vital Signs Last 24 Hrs  T(C): 39.1 (11 Oct 2023 17:03), Max: 39.1 (11 Oct 2023 17:03)  T(F): 102.4 (11 Oct 2023 17:03), Max: 102.4 (11 Oct 2023 17:03)  HR: 72 (11 Oct 2023 17:) (56 - 79)  BP: 110/60 (11 Oct 2023 17:03) (87/34 - 121/59)  BP(mean): --  RR: 18 (11 Oct 2023 17:) (16 - 18)  SpO2: 94% (11 Oct 2023 17:) (93% - 98%)    Parameters below as of 11 Oct 2023 17:03  Patient On (Oxygen Delivery Method): room air        PHYSICAL EXAM:  General: No acute distress, appears comfortable, well nourished, well-groomed, appears stated age  Head, Eyes, Ears, Nose, Throat: Normal cephalic/atraumatic, anicteric, conjunctiva-non injected and moist, vision grossly intact, hearing grossly intact, no nasal discharge, ears and nose symmetrical and atraumatic.  Nasal, oral, and oropharyngeal mucosa pink moist with no evidence of ulceration  Neck: Supple, carotids have good upstroke, trachea in the midline, without JVD or thyromegaly  Lymphatic: No evidence of masses or lymphadenopathy in the head, neck, trunk, axillary, inguinal, or supraclavicular regions  Chest: Lungs are clear to P&A, no wheezing, no rales, no ronchi, with good inspiratory effort  Heart: Heart rhythm regular, no murmurs  Abdomen: Soft, non tender, good bowel sounds present in all four quadrants.  No guarding, rebound, and no peritoneal signs.  No evidence of hepatosplenomegaly.  No evidence of abdominal wall hernias.  Inguinal regions are unremarkable with no evidence of hernias.   Extremity: No swelling, or open sores, no gross deformities,  good range of motion, 2+ peripheral pulses bilat UE/LE, no edema,  negative Milton's sign, no lymphadenopathy  Neuro: Alert and oriented x3, motor and sensory intact  Psychiatric: Awake , alert, oriented x3 with an appropriate affect.   Skin: Good color, turgor, texture with no gross lesions, no eruptions, no rashes, no subcutaneous nodules and normal temperature.     LABS:                        7.9    9.99  )-----------( 233      ( 11 Oct 2023 04:09 )             25.5     10-11    138  |  109<H>  |  43<H>  ----------------------------<  186<H>  4.4   |  20<L>  |  1.20    Ca    8.4<L>      11 Oct 2023 04:09  Mg     1.8     10-10    TPro  6.1  /  Alb  1.9<L>  /  TBili  0.4  /  DBili  x   /  AST  18  /  ALT  12  /  AlkPhos  88  10-10    PT/INR - ( 10 Oct 2023 18:25 )   PT: 18.6 sec;   INR: 1.61 ratio         PTT - ( 10 Oct 2023 18:25 )  PTT:30.0 sec  Urinalysis Basic - ( 11 Oct 2023 06:00 )    Color: Yellow / Appearance: Turbid / S.018 / pH: x  Gluc: x / Ketone: Trace mg/dL  / Bili: Negative / Urobili: 0.2 mg/dL   Blood: x / Protein: 100 mg/dL / Nitrite: Negative   Leuk Esterase: Large / RBC: Too Numerous to count /HPF / WBC Too Numerous to count /HPF   Sq Epi: x / Non Sq Epi: x / Bacteria: Few /HPF      LIVER FUNCTIONS - ( 10 Oct 2023 18:25 )  Alb: 1.9 g/dL / Pro: 6.1 g/dL / ALK PHOS: 88 U/L / ALT: 12 U/L / AST: 18 U/L / GGT: x             Culture - Blood (collected 10 Oct 2023 18:25)  Source: .Blood Blood-Peripheral  Gram Stain (11 Oct 2023 15:28):    Growth in aerobic bottle: Gram Positive Cocci in Clusters  Preliminary Report (11 Oct 2023 15:28):    Growth in aerobic bottle: Gram Positive Cocci in Clusters    Culture - Blood (collected 10 Oct 2023 18:25)  Source: .Blood Blood-Peripheral  Gram Stain (11 Oct 2023 15:29):    Growth in aerobic bottle: Gram Positive Cocci in Clusters    Growth in anaerobic bottle: Gram Positive Cocci in Clusters  Preliminary Report (11 Oct 2023 15:29):    Growth in aerobic bottle: Gram Positive Cocci in Clusters    Growth in anaerobic bottle: Gram Positive Cocci in Clusters    Direct identification is available within approximately 3-5    hours either by Blood Panel Multiplexed PCR or Direct    MALDI-TOF. Details: [email or web address]  Organism: Blood Culture PCR (11 Oct 2023 16:30)  Organism: Blood Culture PCR (11 Oct 2023 16:30)        RADIOLOGY & ADDITIONAL STUDIES:  XR FOOT COMP MIN 3 VIEWS  PROCEDURE DATE: 10/10/2023    INTERPRETATION: Clinical history: 78-year-old male, failure to thrive.  Three views of the left foot without comparison.    FINDINGS: Diffuse demineralization with no fracture, dislocation, gross cortical destruction or soft tissue emphysema.    IMPRESSION:  No acute radiographic findings, podiatry consultation  ASSESSMENT:  78 M with HTN, HLD, uncontrolled DM, afib s/p pacemaker admitted for UTI. Vascular surgery was consulted for nonpalpable pulses and chronic bilateral foot wounds.   On physical examination, Left pedal pulses were dopplerable and Right AT/PT were dopplerable    PLAN:   SURGERY PA CONSULT NOTE:    CHIEF COMPLAINT:  Patient is a 78y old  Male who presents with a chief complaint of fever and weakness (11 Oct 2023 16:55)      HPI FROM ED:  HPI:  78-year-old male presents to the hospital by ambulance from home.  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 Pt does not go to doctor on regualar basis per son and does phone visits.   (11 Oct 2023 07:02)    HPI:   Patient has had  deterioration of health over the past 6-7 years. The right foot wound was seen first failed graft per son. The left wound presented within the past year. Son states that patient has pacemaker for afib and is on ASA 81mg with plavix stopped 3 months ago. Patient is bedridden but not contracted.  Patient denies rest pain, ischemic changes to lower extremities, chest pain, SOB.    PAST MEDICAL HISTORY:  PAST MEDICAL & SURGICAL HISTORY:  Diabetes  Benign essential HTN  Pacemaker  History of BPH  Diabetic foot ulcers      PAST SURGICAL HISTORY:  R heel partial calcanectomy    REVIEW OF SYSTEMS:  General/Constitutional: No acute distress, no headache, weakness, fevers, or chills   Respiratory: No dyspnea on room air  Cardiac: Denies chest pain, palpitations. Pacemaker in place  Abdomen: Soft, nontender  Vascular:  RIGHT- Ulceration noted to the right heel with fibrogranular s/p partial R calcanectomy. Multiphasic PT and AT (mid-shin). No dopperable DP appreciated  LEFT- lateral heel eschar with purulent drainage. Multiphasic DP and PT  Extremities: 2/5 muscle strength. Extremities able to be passively moved without pain.  Genitourinary: Urinary incontinence      MEDICATIONS:  Home Medications:  Aspir 81 oral delayed release tablet: 1 tab(s) orally once a day (11 Oct 2023 07:13)  bethanechol 25 mg oral tablet: 1 tab(s) orally 2 times a day (11 Oct 2023 07:14)  Coreg 3.125 mg oral tablet: 1 tab(s) orally 2 times a day (11 Oct 2023 07:13)  Flomax 0.4 mg oral capsule: 1 cap(s) orally once a day (at bedtime) (11 Oct 2023 07:14)  gabapentin 300 mg oral capsule: 1 cap(s) orally 2 times a day (11 Oct 2023 07:13)  Lantus 100 units/mL subcutaneous solution: 18 unit(s) subcutaneous once a day (at bedtime) (11 Oct 2023 07:15)  ramipril 2.5 mg oral capsule: 1 cap(s) orally once a day (11 Oct 2023 07:12)    MEDICATIONS  (STANDING):  aspirin enteric coated 81 milliGRAM(s) Oral daily  bethanechol 25 milliGRAM(s) Oral two times a day  carvedilol 3.125 milliGRAM(s) Oral every 12 hours  dextrose 5%. 1000 milliLiter(s) (50 mL/Hr) IV Continuous <Continuous>  dextrose 5%. 1000 milliLiter(s) (100 mL/Hr) IV Continuous <Continuous>  dextrose 50% Injectable 25 Gram(s) IV Push once  dextrose 50% Injectable 25 Gram(s) IV Push once  dextrose 50% Injectable 12.5 Gram(s) IV Push once  dronabinol 2.5 milliGRAM(s) Oral two times a day  gabapentin 300 milliGRAM(s) Oral two times a day  glucagon  Injectable 1 milliGRAM(s) IntraMuscular once  insulin glargine Injectable (LANTUS) 18 Unit(s) SubCutaneous at bedtime  insulin lispro (ADMELOG) corrective regimen sliding scale   SubCutaneous three times a day before meals  insulin lispro (ADMELOG) corrective regimen sliding scale   SubCutaneous at bedtime  insulin lispro Injectable (ADMELOG) 3 Unit(s) SubCutaneous three times a day before meals  lactobacillus acidophilus 1 Tablet(s) Oral two times a day with meals  lisinopril 10 milliGRAM(s) Oral daily  pantoprazole    Tablet 40 milliGRAM(s) Oral before breakfast  piperacillin/tazobactam IVPB.. 3.375 Gram(s) IV Intermittent every 8 hours  tamsulosin 0.4 milliGRAM(s) Oral at bedtime  vancomycin  IVPB 1250 milliGRAM(s) IV Intermittent every 24 hours    MEDICATIONS  (PRN):  acetaminophen     Tablet .. 650 milliGRAM(s) Oral every 6 hours PRN Temp greater or equal to 38C (100.4F), Moderate Pain (4 - 6)  dextrose Oral Gel 15 Gram(s) Oral once PRN Blood Glucose LESS THAN 70 milliGRAM(s)/deciliter  loperamide 2 milliGRAM(s) Oral three times a day PRN Diarrhea      ALLERGIES:  Allergies    No Known Allergies    Intolerances        SOCIAL HISTORY:  Social History:  lives at home with wife  bedbound (11 Oct 2023 07:02)    FAMILY HISTORY:  FAMILY HISTORY:      VITAL SIGNS:  Vital Signs Last 24 Hrs  T(C): 39.1 (11 Oct 2023 17:03), Max: 39.1 (11 Oct 2023 17:03)  T(F): 102.4 (11 Oct 2023 17:03), Max: 102.4 (11 Oct 2023 17:03)  HR: 72 (11 Oct 2023 17:) (56 - 79)  BP: 110/60 (11 Oct 2023 17:03) (87/34 - 121/59)  BP(mean): --  RR: 18 (11 Oct 2023 17:) (16 - 18)  SpO2: 94% (11 Oct 2023 17:) (93% - 98%)    Parameters below as of 11 Oct 2023 17:03  Patient On (Oxygen Delivery Method): room air        PHYSICAL EXAM:  General: No acute distress, appears comfortable, well nourished, well-groomed, appears stated age  Head, Eyes, Ears, Nose, Throat: Normal cephalic/atraumatic, anicteric, conjunctiva-non injected and moist, vision grossly intact, hearing grossly intact, no nasal discharge, ears and nose symmetrical and atraumatic.  Nasal, oral, and oropharyngeal mucosa pink moist with no evidence of ulceration  Neck: Supple, carotids have good upstroke, trachea in the midline, without JVD or thyromegaly  Lymphatic: No evidence of masses or lymphadenopathy in the head, neck, trunk, axillary, inguinal, or supraclavicular regions  Chest: Lungs are clear to P&A, no wheezing, no rales, no ronchi, with good inspiratory effort  Heart: Heart rhythm regular, no murmurs  Abdomen: Soft, non tender, good bowel sounds present in all four quadrants.  No guarding, rebound, and no peritoneal signs.  No evidence of hepatosplenomegaly.  No evidence of abdominal wall hernias.  Inguinal regions are unremarkable with no evidence of hernias.   Extremity: No swelling, or open sores, no gross deformities,  good range of motion, 2+ peripheral pulses bilat UE/LE, no edema,  negative Milton's sign, no lymphadenopathy  Neuro: Alert and oriented x3, motor and sensory intact  Psychiatric: Awake , alert, oriented x3 with an appropriate affect.   Skin: Good color, turgor, texture with no gross lesions, no eruptions, no rashes, no subcutaneous nodules and normal temperature.     LABS:                        7.9    9.99  )-----------( 233      ( 11 Oct 2023 04:09 )             25.5     10-11    138  |  109<H>  |  43<H>  ----------------------------<  186<H>  4.4   |  20<L>  |  1.20    Ca    8.4<L>      11 Oct 2023 04:09  Mg     1.8     10-10    TPro  6.1  /  Alb  1.9<L>  /  TBili  0.4  /  DBili  x   /  AST  18  /  ALT  12  /  AlkPhos  88  10-10    PT/INR - ( 10 Oct 2023 18:25 )   PT: 18.6 sec;   INR: 1.61 ratio         PTT - ( 10 Oct 2023 18:25 )  PTT:30.0 sec  Urinalysis Basic - ( 11 Oct 2023 06:00 )    Color: Yellow / Appearance: Turbid / S.018 / pH: x  Gluc: x / Ketone: Trace mg/dL  / Bili: Negative / Urobili: 0.2 mg/dL   Blood: x / Protein: 100 mg/dL / Nitrite: Negative   Leuk Esterase: Large / RBC: Too Numerous to count /HPF / WBC Too Numerous to count /HPF   Sq Epi: x / Non Sq Epi: x / Bacteria: Few /HPF      LIVER FUNCTIONS - ( 10 Oct 2023 18:25 )  Alb: 1.9 g/dL / Pro: 6.1 g/dL / ALK PHOS: 88 U/L / ALT: 12 U/L / AST: 18 U/L / GGT: x             Culture - Blood (collected 10 Oct 2023 18:25)  Source: .Blood Blood-Peripheral  Gram Stain (11 Oct 2023 15:28):    Growth in aerobic bottle: Gram Positive Cocci in Clusters  Preliminary Report (11 Oct 2023 15:28):    Growth in aerobic bottle: Gram Positive Cocci in Clusters    Culture - Blood (collected 10 Oct 2023 18:25)  Source: .Blood Blood-Peripheral  Gram Stain (11 Oct 2023 15:29):    Growth in aerobic bottle: Gram Positive Cocci in Clusters    Growth in anaerobic bottle: Gram Positive Cocci in Clusters  Preliminary Report (11 Oct 2023 15:29):    Growth in aerobic bottle: Gram Positive Cocci in Clusters    Growth in anaerobic bottle: Gram Positive Cocci in Clusters    Direct identification is available within approximately 3-5    hours either by Blood Panel Multiplexed PCR or Direct    MALDI-TOF. Details: [email or web address]  Organism: Blood Culture PCR (11 Oct 2023 16:30)  Organism: Blood Culture PCR (11 Oct 2023 16:30)        RADIOLOGY & ADDITIONAL STUDIES:  XR FOOT COMP MIN 3 VIEWS  PROCEDURE DATE: 10/10/2023    INTERPRETATION: Clinical history: 78-year-old male, failure to thrive.  Three views of the left foot without comparison.    FINDINGS: Diffuse demineralization with no fracture, dislocation, gross cortical destruction or soft tissue emphysema.    IMPRESSION:  No acute radiographic findings, podiatry consultation  ASSESSMENT:  78 M with HTN, HLD, uncontrolled DM, afib s/p pacemaker admitted for UTI. Vascular surgery was consulted for nonpalpable pulses and chronic bilateral foot wounds.   On physical examination, Left pedal pulses were dopplerable and Right AT/PT were dopplerable    PLAN:   SURGERY PA CONSULT NOTE:    CHIEF COMPLAINT:  Patient is a 78y old  Male who presents with a chief complaint of fever and weakness (11 Oct 2023 16:55)      HPI FROM ED:  HPI:  78-year-old male presents to the hospital by ambulance from home.  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 Pt does not go to doctor on regualar basis per son and does phone visits.   (11 Oct 2023 07:02)    HPI:   Patient has had  deterioration of health over the past 6-7 years. The right foot wound was seen first failed graft per son. The left wound presented within the past year. Son states that patient has pacemaker for afib and is on ASA 81mg with plavix stopped 3 months ago. Patient is bedridden but not contracted.  Patient denies rest pain, ischemic changes to lower extremities, chest pain, SOB.    PAST MEDICAL HISTORY:  PAST MEDICAL & SURGICAL HISTORY:  Diabetes  Benign essential HTN  Pacemaker  History of BPH  Diabetic foot ulcers      PAST SURGICAL HISTORY:  R heel partial calcanectomy    REVIEW OF SYSTEMS:  General/Constitutional: No acute distress, no headache, weakness, fevers, or chills   Respiratory: No dyspnea on room air  Cardiac: Denies chest pain, palpitations. Pacemaker in place  Abdomen: Soft, nontender  Vascular:  RIGHT- Ulceration noted to the right heel with fibrogranular s/p partial R calcanectomy. Multiphasic PT and AT (mid-shin). No dopperable DP appreciated  LEFT- lateral heel eschar with purulent drainage. Multiphasic DP and PT  Extremities: 2/5 muscle strength. Extremities able to be passively moved without pain.  Genitourinary: Urinary incontinence      MEDICATIONS:  Home Medications:  Aspir 81 oral delayed release tablet: 1 tab(s) orally once a day (11 Oct 2023 07:13)  bethanechol 25 mg oral tablet: 1 tab(s) orally 2 times a day (11 Oct 2023 07:14)  Coreg 3.125 mg oral tablet: 1 tab(s) orally 2 times a day (11 Oct 2023 07:13)  Flomax 0.4 mg oral capsule: 1 cap(s) orally once a day (at bedtime) (11 Oct 2023 07:14)  gabapentin 300 mg oral capsule: 1 cap(s) orally 2 times a day (11 Oct 2023 07:13)  Lantus 100 units/mL subcutaneous solution: 18 unit(s) subcutaneous once a day (at bedtime) (11 Oct 2023 07:15)  ramipril 2.5 mg oral capsule: 1 cap(s) orally once a day (11 Oct 2023 07:12)    MEDICATIONS  (STANDING):  aspirin enteric coated 81 milliGRAM(s) Oral daily  bethanechol 25 milliGRAM(s) Oral two times a day  carvedilol 3.125 milliGRAM(s) Oral every 12 hours  dextrose 5%. 1000 milliLiter(s) (50 mL/Hr) IV Continuous <Continuous>  dextrose 5%. 1000 milliLiter(s) (100 mL/Hr) IV Continuous <Continuous>  dextrose 50% Injectable 25 Gram(s) IV Push once  dextrose 50% Injectable 25 Gram(s) IV Push once  dextrose 50% Injectable 12.5 Gram(s) IV Push once  dronabinol 2.5 milliGRAM(s) Oral two times a day  gabapentin 300 milliGRAM(s) Oral two times a day  glucagon  Injectable 1 milliGRAM(s) IntraMuscular once  insulin glargine Injectable (LANTUS) 18 Unit(s) SubCutaneous at bedtime  insulin lispro (ADMELOG) corrective regimen sliding scale   SubCutaneous three times a day before meals  insulin lispro (ADMELOG) corrective regimen sliding scale   SubCutaneous at bedtime  insulin lispro Injectable (ADMELOG) 3 Unit(s) SubCutaneous three times a day before meals  lactobacillus acidophilus 1 Tablet(s) Oral two times a day with meals  lisinopril 10 milliGRAM(s) Oral daily  pantoprazole    Tablet 40 milliGRAM(s) Oral before breakfast  piperacillin/tazobactam IVPB.. 3.375 Gram(s) IV Intermittent every 8 hours  tamsulosin 0.4 milliGRAM(s) Oral at bedtime  vancomycin  IVPB 1250 milliGRAM(s) IV Intermittent every 24 hours    MEDICATIONS  (PRN):  acetaminophen     Tablet .. 650 milliGRAM(s) Oral every 6 hours PRN Temp greater or equal to 38C (100.4F), Moderate Pain (4 - 6)  dextrose Oral Gel 15 Gram(s) Oral once PRN Blood Glucose LESS THAN 70 milliGRAM(s)/deciliter  loperamide 2 milliGRAM(s) Oral three times a day PRN Diarrhea      ALLERGIES:  Allergies    No Known Allergies    Intolerances        SOCIAL HISTORY:  Social History:  lives at home with wife  bedbound (11 Oct 2023 07:02)    FAMILY HISTORY:  FAMILY HISTORY:      VITAL SIGNS:  Vital Signs Last 24 Hrs  T(C): 39.1 (11 Oct 2023 17:03), Max: 39.1 (11 Oct 2023 17:03)  T(F): 102.4 (11 Oct 2023 17:03), Max: 102.4 (11 Oct 2023 17:03)  HR: 72 (11 Oct 2023 17:) (56 - 79)  BP: 110/60 (11 Oct 2023 17:03) (87/34 - 121/59)  BP(mean): --  RR: 18 (11 Oct 2023 17:) (16 - 18)  SpO2: 94% (11 Oct 2023 17:) (93% - 98%)    Parameters below as of 11 Oct 2023 17:03  Patient On (Oxygen Delivery Method): room air        PHYSICAL EXAM:  General: No acute distress, appears comfortable, well nourished, well-groomed, appears stated age  Head, Eyes, Ears, Nose, Throat: Normal cephalic/atraumatic, anicteric, conjunctiva-non injected and moist, vision grossly intact, hearing grossly intact, no nasal discharge, ears and nose symmetrical and atraumatic.  Nasal, oral, and oropharyngeal mucosa pink moist with no evidence of ulceration  Neck: Supple, carotids have good upstroke, trachea in the midline, without JVD or thyromegaly  Lymphatic: No evidence of masses or lymphadenopathy in the head, neck, trunk, axillary, inguinal, or supraclavicular regions  Chest: Lungs are clear to P&A, no wheezing, no rales, no ronchi, with good inspiratory effort  Heart: Heart rhythm regular, no murmurs  Abdomen: Soft, non tender, good bowel sounds present in all four quadrants.  No guarding, rebound, and no peritoneal signs.  No evidence of hepatosplenomegaly.  No evidence of abdominal wall hernias.  Inguinal regions are unremarkable with no evidence of hernias.   Extremity: No swelling, or open sores, no gross deformities,  good range of motion, 2+ peripheral pulses bilat UE/LE, no edema,  negative Milton's sign, no lymphadenopathy  Neuro: Alert and oriented x3, motor and sensory intact  Psychiatric: Awake , alert, oriented x3 with an appropriate affect.   Skin: Good color, turgor, texture with no gross lesions, no eruptions, no rashes, no subcutaneous nodules and normal temperature.     LABS:                        7.9    9.99  )-----------( 233      ( 11 Oct 2023 04:09 )             25.5     10-11    138  |  109<H>  |  43<H>  ----------------------------<  186<H>  4.4   |  20<L>  |  1.20    Ca    8.4<L>      11 Oct 2023 04:09  Mg     1.8     10-10    TPro  6.1  /  Alb  1.9<L>  /  TBili  0.4  /  DBili  x   /  AST  18  /  ALT  12  /  AlkPhos  88  10-10    PT/INR - ( 10 Oct 2023 18:25 )   PT: 18.6 sec;   INR: 1.61 ratio         PTT - ( 10 Oct 2023 18:25 )  PTT:30.0 sec  Urinalysis Basic - ( 11 Oct 2023 06:00 )    Color: Yellow / Appearance: Turbid / S.018 / pH: x  Gluc: x / Ketone: Trace mg/dL  / Bili: Negative / Urobili: 0.2 mg/dL   Blood: x / Protein: 100 mg/dL / Nitrite: Negative   Leuk Esterase: Large / RBC: Too Numerous to count /HPF / WBC Too Numerous to count /HPF   Sq Epi: x / Non Sq Epi: x / Bacteria: Few /HPF      LIVER FUNCTIONS - ( 10 Oct 2023 18:25 )  Alb: 1.9 g/dL / Pro: 6.1 g/dL / ALK PHOS: 88 U/L / ALT: 12 U/L / AST: 18 U/L / GGT: x             Culture - Blood (collected 10 Oct 2023 18:25)  Source: .Blood Blood-Peripheral  Gram Stain (11 Oct 2023 15:28):    Growth in aerobic bottle: Gram Positive Cocci in Clusters  Preliminary Report (11 Oct 2023 15:28):    Growth in aerobic bottle: Gram Positive Cocci in Clusters    Culture - Blood (collected 10 Oct 2023 18:25)  Source: .Blood Blood-Peripheral  Gram Stain (11 Oct 2023 15:29):    Growth in aerobic bottle: Gram Positive Cocci in Clusters    Growth in anaerobic bottle: Gram Positive Cocci in Clusters  Preliminary Report (11 Oct 2023 15:29):    Growth in aerobic bottle: Gram Positive Cocci in Clusters    Growth in anaerobic bottle: Gram Positive Cocci in Clusters    Direct identification is available within approximately 3-5    hours either by Blood Panel Multiplexed PCR or Direct    MALDI-TOF. Details: [email or web address]  Organism: Blood Culture PCR (11 Oct 2023 16:30)  Organism: Blood Culture PCR (11 Oct 2023 16:30)        RADIOLOGY & ADDITIONAL STUDIES:  XR FOOT COMP MIN 3 VIEWS  PROCEDURE DATE: 10/10/2023    INTERPRETATION: Clinical history: 78-year-old male, failure to thrive.  Three views of the left foot without comparison.    FINDINGS: Diffuse demineralization with no fracture, dislocation, gross cortical destruction or soft tissue emphysema.    IMPRESSION:  No acute radiographic findings, podiatry consultation  ASSESSMENT:  78 M with HTN, HLD, uncontrolled DM, afib s/p pacemaker admitted for UTI. Vascular surgery was consulted for nonpalpable pulses and chronic bilateral foot wounds.   On physical examination, Left pedal pulses were dopplerable and Right AT/PT were dopplerable    PLAN:   SURGERY PA CONSULT NOTE:    CHIEF COMPLAINT:  Patient is a 78y old  Male who presents with a chief complaint of fever and weakness (11 Oct 2023 16:55)      HPI FROM ED:  HPI:  78-year-old male presents to the hospital by ambulance from home.  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 Pt does not go to doctor on regualar basis per son and does phone visits.   (11 Oct 2023 07:02)    HPI:   Patient has had  deterioration of health over the past 6-7 years. The right foot wound was seen first failed graft per son. The left wound presented within the past year. Son states that patient has pacemaker for afib and is on ASA 81mg with plavix stopped 3 months ago. Patient is bedridden but not contracted.  Patient denies rest pain, ischemic changes to lower extremities, chest pain, SOB.    PAST MEDICAL HISTORY:  PAST MEDICAL & SURGICAL HISTORY:  Diabetes  Benign essential HTN  Pacemaker  History of BPH  Diabetic foot ulcers      PAST SURGICAL HISTORY:  R heel partial calcanectomy    REVIEW OF SYSTEMS:  General/Constitutional: No acute distress, no headache, weakness, fevers, or chills   Respiratory: No dyspnea on room air  Cardiac: Denies chest pain, palpitations. Pacemaker in place  Abdomen: Soft, nontender  Vascular:  RIGHT- Ulceration noted to the right heel with fibrogranular s/p partial R calcanectomy. Multiphasic PT and AT (mid-shin). No dopperable DP appreciated  LEFT- lateral heel eschar with purulent drainage. Multiphasic DP and PT  Extremities: 2/5 muscle strength. Extremities able to be passively moved without pain.  Genitourinary: Urinary incontinence      MEDICATIONS:  Home Medications:  Aspir 81 oral delayed release tablet: 1 tab(s) orally once a day (11 Oct 2023 07:13)  bethanechol 25 mg oral tablet: 1 tab(s) orally 2 times a day (11 Oct 2023 07:14)  Coreg 3.125 mg oral tablet: 1 tab(s) orally 2 times a day (11 Oct 2023 07:13)  Flomax 0.4 mg oral capsule: 1 cap(s) orally once a day (at bedtime) (11 Oct 2023 07:14)  gabapentin 300 mg oral capsule: 1 cap(s) orally 2 times a day (11 Oct 2023 07:13)  Lantus 100 units/mL subcutaneous solution: 18 unit(s) subcutaneous once a day (at bedtime) (11 Oct 2023 07:15)  ramipril 2.5 mg oral capsule: 1 cap(s) orally once a day (11 Oct 2023 07:12)    MEDICATIONS  (STANDING):  aspirin enteric coated 81 milliGRAM(s) Oral daily  bethanechol 25 milliGRAM(s) Oral two times a day  carvedilol 3.125 milliGRAM(s) Oral every 12 hours  dextrose 5%. 1000 milliLiter(s) (50 mL/Hr) IV Continuous <Continuous>  dextrose 5%. 1000 milliLiter(s) (100 mL/Hr) IV Continuous <Continuous>  dextrose 50% Injectable 25 Gram(s) IV Push once  dextrose 50% Injectable 25 Gram(s) IV Push once  dextrose 50% Injectable 12.5 Gram(s) IV Push once  dronabinol 2.5 milliGRAM(s) Oral two times a day  gabapentin 300 milliGRAM(s) Oral two times a day  glucagon  Injectable 1 milliGRAM(s) IntraMuscular once  insulin glargine Injectable (LANTUS) 18 Unit(s) SubCutaneous at bedtime  insulin lispro (ADMELOG) corrective regimen sliding scale   SubCutaneous three times a day before meals  insulin lispro (ADMELOG) corrective regimen sliding scale   SubCutaneous at bedtime  insulin lispro Injectable (ADMELOG) 3 Unit(s) SubCutaneous three times a day before meals  lactobacillus acidophilus 1 Tablet(s) Oral two times a day with meals  lisinopril 10 milliGRAM(s) Oral daily  pantoprazole    Tablet 40 milliGRAM(s) Oral before breakfast  piperacillin/tazobactam IVPB.. 3.375 Gram(s) IV Intermittent every 8 hours  tamsulosin 0.4 milliGRAM(s) Oral at bedtime  vancomycin  IVPB 1250 milliGRAM(s) IV Intermittent every 24 hours    MEDICATIONS  (PRN):  acetaminophen     Tablet .. 650 milliGRAM(s) Oral every 6 hours PRN Temp greater or equal to 38C (100.4F), Moderate Pain (4 - 6)  dextrose Oral Gel 15 Gram(s) Oral once PRN Blood Glucose LESS THAN 70 milliGRAM(s)/deciliter  loperamide 2 milliGRAM(s) Oral three times a day PRN Diarrhea      ALLERGIES:  Allergies    No Known Allergies    Intolerances        SOCIAL HISTORY:  Social History:  lives at home with wife  bedbound (11 Oct 2023 07:02)    FAMILY HISTORY:  FAMILY HISTORY:      VITAL SIGNS:  Vital Signs Last 24 Hrs  T(C): 39.1 (11 Oct 2023 17:03), Max: 39.1 (11 Oct 2023 17:03)  T(F): 102.4 (11 Oct 2023 17:03), Max: 102.4 (11 Oct 2023 17:03)  HR: 72 (11 Oct 2023 17:) (56 - 79)  BP: 110/60 (11 Oct 2023 17:03) (87/34 - 121/59)  BP(mean): --  RR: 18 (11 Oct 2023 17:) (16 - 18)  SpO2: 94% (11 Oct 2023 17:) (93% - 98%)    Parameters below as of 11 Oct 2023 17:03  Patient On (Oxygen Delivery Method): room air        PHYSICAL EXAM:  General: No acute distress, appears comfortable, well nourished, well-groomed, appears stated age  Head, Eyes, Ears, Nose, Throat: Normal cephalic/atraumatic, anicteric, conjunctiva-non injected and moist, vision grossly intact, hearing grossly intact, no nasal discharge, ears and nose symmetrical and atraumatic.  Nasal, oral, and oropharyngeal mucosa pink moist with no evidence of ulceration  Neck: Supple, carotids have good upstroke, trachea in the midline, without JVD or thyromegaly  Lymphatic: No evidence of masses or lymphadenopathy in the head, neck, trunk, axillary, inguinal, or supraclavicular regions  Chest: Lungs are clear to P&A, no wheezing, no rales, no ronchi, with good inspiratory effort  Heart: Heart rhythm regular, no murmurs  Abdomen: Soft, non tender, good bowel sounds present in all four quadrants.  No guarding, rebound, and no peritoneal signs.  No evidence of hepatosplenomegaly.  No evidence of abdominal wall hernias.  Inguinal regions are unremarkable with no evidence of hernias.   Extremity: No swelling, or open sores, no gross deformities,  good range of motion, 2+ peripheral pulses bilat UE/LE, no edema,  negative Milton's sign, no lymphadenopathy  Neuro: Alert and oriented x3, motor and sensory intact  Psychiatric: Awake , alert, oriented x3 with an appropriate affect.   Skin: Good color, turgor, texture with no gross lesions, no eruptions, no rashes, no subcutaneous nodules and normal temperature.     LABS:                        7.9    9.99  )-----------( 233      ( 11 Oct 2023 04:09 )             25.5     10-11    138  |  109<H>  |  43<H>  ----------------------------<  186<H>  4.4   |  20<L>  |  1.20    Ca    8.4<L>      11 Oct 2023 04:09  Mg     1.8     10-10    TPro  6.1  /  Alb  1.9<L>  /  TBili  0.4  /  DBili  x   /  AST  18  /  ALT  12  /  AlkPhos  88  10-10    PT/INR - ( 10 Oct 2023 18:25 )   PT: 18.6 sec;   INR: 1.61 ratio         PTT - ( 10 Oct 2023 18:25 )  PTT:30.0 sec  Urinalysis Basic - ( 11 Oct 2023 06:00 )    Color: Yellow / Appearance: Turbid / S.018 / pH: x  Gluc: x / Ketone: Trace mg/dL  / Bili: Negative / Urobili: 0.2 mg/dL   Blood: x / Protein: 100 mg/dL / Nitrite: Negative   Leuk Esterase: Large / RBC: Too Numerous to count /HPF / WBC Too Numerous to count /HPF   Sq Epi: x / Non Sq Epi: x / Bacteria: Few /HPF      LIVER FUNCTIONS - ( 10 Oct 2023 18:25 )  Alb: 1.9 g/dL / Pro: 6.1 g/dL / ALK PHOS: 88 U/L / ALT: 12 U/L / AST: 18 U/L / GGT: x             Culture - Blood (collected 10 Oct 2023 18:25)  Source: .Blood Blood-Peripheral  Gram Stain (11 Oct 2023 15:28):    Growth in aerobic bottle: Gram Positive Cocci in Clusters  Preliminary Report (11 Oct 2023 15:28):    Growth in aerobic bottle: Gram Positive Cocci in Clusters    Culture - Blood (collected 10 Oct 2023 18:25)  Source: .Blood Blood-Peripheral  Gram Stain (11 Oct 2023 15:29):    Growth in aerobic bottle: Gram Positive Cocci in Clusters    Growth in anaerobic bottle: Gram Positive Cocci in Clusters  Preliminary Report (11 Oct 2023 15:29):    Growth in aerobic bottle: Gram Positive Cocci in Clusters    Growth in anaerobic bottle: Gram Positive Cocci in Clusters    Direct identification is available within approximately 3-5    hours either by Blood Panel Multiplexed PCR or Direct    MALDI-TOF. Details: [email or web address]  Organism: Blood Culture PCR (11 Oct 2023 16:30)  Organism: Blood Culture PCR (11 Oct 2023 16:30)        RADIOLOGY & ADDITIONAL STUDIES:  XR FOOT COMP MIN 3 VIEWS  PROCEDURE DATE: 10/10/2023    INTERPRETATION: Clinical history: 78-year-old male, failure to thrive.  Three views of the left foot without comparison.    FINDINGS: Diffuse demineralization with no fracture, dislocation, gross cortical destruction or soft tissue emphysema.    IMPRESSION:  No acute radiographic findings, podiatry consultation  ASSESSMENT:  78 M with HTN, HLD, uncontrolled DM, afib s/p pacemaker admitted for UTI. Vascular surgery was consulted for nonpalpable pulses and chronic bilateral foot wounds.   On physical examination, Left pedal pulses were dopplerable and Right AT/PT were dopplerable. Patient is bacteremia with GPC and has UTI treated with vancomycin and zosyn.    PLAN:   SURGERY PA CONSULT NOTE:    CHIEF COMPLAINT:  Patient is a 78y old  Male who presents with a chief complaint of fever and weakness (11 Oct 2023 16:55)      HPI FROM ED:  HPI:  78-year-old male presents to the hospital by ambulance from home.  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 Pt does not go to doctor on regualar basis per son and does phone visits.   (11 Oct 2023 07:02)    HPI:   Patient has had  deterioration of health over the past 6-7 years. The right foot wound was seen first failed graft per son. The left wound presented within the past year. Son states that patient has pacemaker for afib and is on ASA 81mg with plavix stopped 3 months ago. Patient is bedridden but not contracted.  Patient denies rest pain, ischemic changes to lower extremities, chest pain, SOB.    PAST MEDICAL HISTORY:  PAST MEDICAL & SURGICAL HISTORY:  Diabetes  Benign essential HTN  Pacemaker  History of BPH  Diabetic foot ulcers      PAST SURGICAL HISTORY:  R heel partial calcanectomy    REVIEW OF SYSTEMS:  General/Constitutional: No acute distress, no headache, weakness, fevers, or chills   Respiratory: No dyspnea on room air  Cardiac: Denies chest pain, palpitations. Pacemaker in place  Abdomen: Soft, nontender  Vascular:  RIGHT- Ulceration noted to the right heel with fibrogranular s/p partial R calcanectomy. Multiphasic PT and AT (mid-shin). No dopperable DP appreciated  LEFT- lateral heel eschar with purulent drainage. Multiphasic DP and PT  Extremities: 2/5 muscle strength. Extremities able to be passively moved without pain.  Genitourinary: Urinary incontinence      MEDICATIONS:  Home Medications:  Aspir 81 oral delayed release tablet: 1 tab(s) orally once a day (11 Oct 2023 07:13)  bethanechol 25 mg oral tablet: 1 tab(s) orally 2 times a day (11 Oct 2023 07:14)  Coreg 3.125 mg oral tablet: 1 tab(s) orally 2 times a day (11 Oct 2023 07:13)  Flomax 0.4 mg oral capsule: 1 cap(s) orally once a day (at bedtime) (11 Oct 2023 07:14)  gabapentin 300 mg oral capsule: 1 cap(s) orally 2 times a day (11 Oct 2023 07:13)  Lantus 100 units/mL subcutaneous solution: 18 unit(s) subcutaneous once a day (at bedtime) (11 Oct 2023 07:15)  ramipril 2.5 mg oral capsule: 1 cap(s) orally once a day (11 Oct 2023 07:12)    MEDICATIONS  (STANDING):  aspirin enteric coated 81 milliGRAM(s) Oral daily  bethanechol 25 milliGRAM(s) Oral two times a day  carvedilol 3.125 milliGRAM(s) Oral every 12 hours  dextrose 5%. 1000 milliLiter(s) (50 mL/Hr) IV Continuous <Continuous>  dextrose 5%. 1000 milliLiter(s) (100 mL/Hr) IV Continuous <Continuous>  dextrose 50% Injectable 25 Gram(s) IV Push once  dextrose 50% Injectable 25 Gram(s) IV Push once  dextrose 50% Injectable 12.5 Gram(s) IV Push once  dronabinol 2.5 milliGRAM(s) Oral two times a day  gabapentin 300 milliGRAM(s) Oral two times a day  glucagon  Injectable 1 milliGRAM(s) IntraMuscular once  insulin glargine Injectable (LANTUS) 18 Unit(s) SubCutaneous at bedtime  insulin lispro (ADMELOG) corrective regimen sliding scale   SubCutaneous three times a day before meals  insulin lispro (ADMELOG) corrective regimen sliding scale   SubCutaneous at bedtime  insulin lispro Injectable (ADMELOG) 3 Unit(s) SubCutaneous three times a day before meals  lactobacillus acidophilus 1 Tablet(s) Oral two times a day with meals  lisinopril 10 milliGRAM(s) Oral daily  pantoprazole    Tablet 40 milliGRAM(s) Oral before breakfast  piperacillin/tazobactam IVPB.. 3.375 Gram(s) IV Intermittent every 8 hours  tamsulosin 0.4 milliGRAM(s) Oral at bedtime  vancomycin  IVPB 1250 milliGRAM(s) IV Intermittent every 24 hours    MEDICATIONS  (PRN):  acetaminophen     Tablet .. 650 milliGRAM(s) Oral every 6 hours PRN Temp greater or equal to 38C (100.4F), Moderate Pain (4 - 6)  dextrose Oral Gel 15 Gram(s) Oral once PRN Blood Glucose LESS THAN 70 milliGRAM(s)/deciliter  loperamide 2 milliGRAM(s) Oral three times a day PRN Diarrhea      ALLERGIES:  Allergies    No Known Allergies    Intolerances        SOCIAL HISTORY:  Social History:  lives at home with wife  bedbound (11 Oct 2023 07:02)    FAMILY HISTORY:  FAMILY HISTORY:      VITAL SIGNS:  Vital Signs Last 24 Hrs  T(C): 39.1 (11 Oct 2023 17:03), Max: 39.1 (11 Oct 2023 17:03)  T(F): 102.4 (11 Oct 2023 17:03), Max: 102.4 (11 Oct 2023 17:03)  HR: 72 (11 Oct 2023 17:) (56 - 79)  BP: 110/60 (11 Oct 2023 17:03) (87/34 - 121/59)  BP(mean): --  RR: 18 (11 Oct 2023 17:) (16 - 18)  SpO2: 94% (11 Oct 2023 17:) (93% - 98%)    Parameters below as of 11 Oct 2023 17:03  Patient On (Oxygen Delivery Method): room air        PHYSICAL EXAM:  General: No acute distress, appears comfortable, well nourished, well-groomed, appears stated age  Head, Eyes, Ears, Nose, Throat: Normal cephalic/atraumatic, anicteric, conjunctiva-non injected and moist, vision grossly intact, hearing grossly intact, no nasal discharge, ears and nose symmetrical and atraumatic.  Nasal, oral, and oropharyngeal mucosa pink moist with no evidence of ulceration  Neck: Supple, carotids have good upstroke, trachea in the midline, without JVD or thyromegaly  Lymphatic: No evidence of masses or lymphadenopathy in the head, neck, trunk, axillary, inguinal, or supraclavicular regions  Chest: Lungs are clear to P&A, no wheezing, no rales, no ronchi, with good inspiratory effort  Heart: Heart rhythm regular, no murmurs  Abdomen: Soft, non tender, good bowel sounds present in all four quadrants.  No guarding, rebound, and no peritoneal signs.  No evidence of hepatosplenomegaly.  No evidence of abdominal wall hernias.  Inguinal regions are unremarkable with no evidence of hernias.   Extremity: No swelling, or open sores, no gross deformities,  good range of motion, 2+ peripheral pulses bilat UE/LE, no edema,  negative Milton's sign, no lymphadenopathy  Neuro: Alert and oriented x3, motor and sensory intact  Psychiatric: Awake , alert, oriented x3 with an appropriate affect.   Skin: Good color, turgor, texture with no gross lesions, no eruptions, no rashes, no subcutaneous nodules and normal temperature.     LABS:                        7.9    9.99  )-----------( 233      ( 11 Oct 2023 04:09 )             25.5     10-11    138  |  109<H>  |  43<H>  ----------------------------<  186<H>  4.4   |  20<L>  |  1.20    Ca    8.4<L>      11 Oct 2023 04:09  Mg     1.8     10-10    TPro  6.1  /  Alb  1.9<L>  /  TBili  0.4  /  DBili  x   /  AST  18  /  ALT  12  /  AlkPhos  88  10-10    PT/INR - ( 10 Oct 2023 18:25 )   PT: 18.6 sec;   INR: 1.61 ratio         PTT - ( 10 Oct 2023 18:25 )  PTT:30.0 sec  Urinalysis Basic - ( 11 Oct 2023 06:00 )    Color: Yellow / Appearance: Turbid / S.018 / pH: x  Gluc: x / Ketone: Trace mg/dL  / Bili: Negative / Urobili: 0.2 mg/dL   Blood: x / Protein: 100 mg/dL / Nitrite: Negative   Leuk Esterase: Large / RBC: Too Numerous to count /HPF / WBC Too Numerous to count /HPF   Sq Epi: x / Non Sq Epi: x / Bacteria: Few /HPF      LIVER FUNCTIONS - ( 10 Oct 2023 18:25 )  Alb: 1.9 g/dL / Pro: 6.1 g/dL / ALK PHOS: 88 U/L / ALT: 12 U/L / AST: 18 U/L / GGT: x             Culture - Blood (collected 10 Oct 2023 18:25)  Source: .Blood Blood-Peripheral  Gram Stain (11 Oct 2023 15:28):    Growth in aerobic bottle: Gram Positive Cocci in Clusters  Preliminary Report (11 Oct 2023 15:28):    Growth in aerobic bottle: Gram Positive Cocci in Clusters    Culture - Blood (collected 10 Oct 2023 18:25)  Source: .Blood Blood-Peripheral  Gram Stain (11 Oct 2023 15:29):    Growth in aerobic bottle: Gram Positive Cocci in Clusters    Growth in anaerobic bottle: Gram Positive Cocci in Clusters  Preliminary Report (11 Oct 2023 15:29):    Growth in aerobic bottle: Gram Positive Cocci in Clusters    Growth in anaerobic bottle: Gram Positive Cocci in Clusters    Direct identification is available within approximately 3-5    hours either by Blood Panel Multiplexed PCR or Direct    MALDI-TOF. Details: [email or web address]  Organism: Blood Culture PCR (11 Oct 2023 16:30)  Organism: Blood Culture PCR (11 Oct 2023 16:30)        RADIOLOGY & ADDITIONAL STUDIES:  XR FOOT COMP MIN 3 VIEWS  PROCEDURE DATE: 10/10/2023    INTERPRETATION: Clinical history: 78-year-old male, failure to thrive.  Three views of the left foot without comparison.    FINDINGS: Diffuse demineralization with no fracture, dislocation, gross cortical destruction or soft tissue emphysema.    IMPRESSION:  No acute radiographic findings, podiatry consultation  ASSESSMENT:  78 M with HTN, HLD, uncontrolled DM, afib s/p pacemaker admitted for UTI. Vascular surgery was consulted for nonpalpable pulses and chronic bilateral foot wounds.   On physical examination, Left pedal pulses were dopplerable and Right AT/PT were dopplerable. Patient is bacteremia with GPC and has UTI treated with vancomycin and zosyn.    PLAN:   SURGERY PA CONSULT NOTE:    CHIEF COMPLAINT:  Patient is a 78y old  Male who presents with a chief complaint of fever and weakness (11 Oct 2023 16:55)      HPI FROM ED:  HPI:  78-year-old male presents to the hospital by ambulance from home.  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 Pt does not go to doctor on regualar basis per son and does phone visits.   (11 Oct 2023 07:02)    HPI:   Patient has had  deterioration of health over the past 6-7 years. The right foot wound was seen first failed graft per son. The left wound presented within the past year. Son states that patient has pacemaker for afib and is on ASA 81mg with plavix stopped 3 months ago. Patient is bedridden but not contracted.  Patient denies rest pain, ischemic changes to lower extremities, chest pain, SOB.    PAST MEDICAL HISTORY:  PAST MEDICAL & SURGICAL HISTORY:  Diabetes  Benign essential HTN  Pacemaker  History of BPH  Diabetic foot ulcers      PAST SURGICAL HISTORY:  R heel partial calcanectomy    REVIEW OF SYSTEMS:  General/Constitutional: No acute distress, no headache, weakness, fevers, or chills   Respiratory: No dyspnea on room air  Cardiac: Denies chest pain, palpitations. Pacemaker in place  Abdomen: Soft, nontender  Vascular:  RIGHT- Ulceration noted to the right heel with fibrogranular s/p partial R calcanectomy. Multiphasic PT and AT (mid-shin). No dopperable DP appreciated  LEFT- lateral heel eschar with purulent drainage. Multiphasic DP and PT  Extremities: 2/5 muscle strength. Extremities able to be passively moved without pain.  Genitourinary: Urinary incontinence      MEDICATIONS:  Home Medications:  Aspir 81 oral delayed release tablet: 1 tab(s) orally once a day (11 Oct 2023 07:13)  bethanechol 25 mg oral tablet: 1 tab(s) orally 2 times a day (11 Oct 2023 07:14)  Coreg 3.125 mg oral tablet: 1 tab(s) orally 2 times a day (11 Oct 2023 07:13)  Flomax 0.4 mg oral capsule: 1 cap(s) orally once a day (at bedtime) (11 Oct 2023 07:14)  gabapentin 300 mg oral capsule: 1 cap(s) orally 2 times a day (11 Oct 2023 07:13)  Lantus 100 units/mL subcutaneous solution: 18 unit(s) subcutaneous once a day (at bedtime) (11 Oct 2023 07:15)  ramipril 2.5 mg oral capsule: 1 cap(s) orally once a day (11 Oct 2023 07:12)    MEDICATIONS  (STANDING):  aspirin enteric coated 81 milliGRAM(s) Oral daily  bethanechol 25 milliGRAM(s) Oral two times a day  carvedilol 3.125 milliGRAM(s) Oral every 12 hours  dextrose 5%. 1000 milliLiter(s) (50 mL/Hr) IV Continuous <Continuous>  dextrose 5%. 1000 milliLiter(s) (100 mL/Hr) IV Continuous <Continuous>  dextrose 50% Injectable 25 Gram(s) IV Push once  dextrose 50% Injectable 25 Gram(s) IV Push once  dextrose 50% Injectable 12.5 Gram(s) IV Push once  dronabinol 2.5 milliGRAM(s) Oral two times a day  gabapentin 300 milliGRAM(s) Oral two times a day  glucagon  Injectable 1 milliGRAM(s) IntraMuscular once  insulin glargine Injectable (LANTUS) 18 Unit(s) SubCutaneous at bedtime  insulin lispro (ADMELOG) corrective regimen sliding scale   SubCutaneous three times a day before meals  insulin lispro (ADMELOG) corrective regimen sliding scale   SubCutaneous at bedtime  insulin lispro Injectable (ADMELOG) 3 Unit(s) SubCutaneous three times a day before meals  lactobacillus acidophilus 1 Tablet(s) Oral two times a day with meals  lisinopril 10 milliGRAM(s) Oral daily  pantoprazole    Tablet 40 milliGRAM(s) Oral before breakfast  piperacillin/tazobactam IVPB.. 3.375 Gram(s) IV Intermittent every 8 hours  tamsulosin 0.4 milliGRAM(s) Oral at bedtime  vancomycin  IVPB 1250 milliGRAM(s) IV Intermittent every 24 hours    MEDICATIONS  (PRN):  acetaminophen     Tablet .. 650 milliGRAM(s) Oral every 6 hours PRN Temp greater or equal to 38C (100.4F), Moderate Pain (4 - 6)  dextrose Oral Gel 15 Gram(s) Oral once PRN Blood Glucose LESS THAN 70 milliGRAM(s)/deciliter  loperamide 2 milliGRAM(s) Oral three times a day PRN Diarrhea      ALLERGIES:  Allergies    No Known Allergies    Intolerances        SOCIAL HISTORY:  Social History:  lives at home with wife  bedbound (11 Oct 2023 07:02)    FAMILY HISTORY:  FAMILY HISTORY:      VITAL SIGNS:  Vital Signs Last 24 Hrs  T(C): 39.1 (11 Oct 2023 17:03), Max: 39.1 (11 Oct 2023 17:03)  T(F): 102.4 (11 Oct 2023 17:03), Max: 102.4 (11 Oct 2023 17:03)  HR: 72 (11 Oct 2023 17:) (56 - 79)  BP: 110/60 (11 Oct 2023 17:03) (87/34 - 121/59)  BP(mean): --  RR: 18 (11 Oct 2023 17:) (16 - 18)  SpO2: 94% (11 Oct 2023 17:) (93% - 98%)    Parameters below as of 11 Oct 2023 17:03  Patient On (Oxygen Delivery Method): room air        PHYSICAL EXAM:  General: No acute distress, appears comfortable, well nourished, well-groomed, appears stated age  Head, Eyes, Ears, Nose, Throat: Normal cephalic/atraumatic, anicteric, conjunctiva-non injected and moist, vision grossly intact, hearing grossly intact, no nasal discharge, ears and nose symmetrical and atraumatic.  Nasal, oral, and oropharyngeal mucosa pink moist with no evidence of ulceration  Neck: Supple, carotids have good upstroke, trachea in the midline, without JVD or thyromegaly  Lymphatic: No evidence of masses or lymphadenopathy in the head, neck, trunk, axillary, inguinal, or supraclavicular regions  Chest: Lungs are clear to P&A, no wheezing, no rales, no ronchi, with good inspiratory effort  Heart: Heart rhythm regular, no murmurs  Abdomen: Soft, non tender, good bowel sounds present in all four quadrants.  No guarding, rebound, and no peritoneal signs.  No evidence of hepatosplenomegaly.  No evidence of abdominal wall hernias.  Inguinal regions are unremarkable with no evidence of hernias.   Extremity: No swelling, or open sores, no gross deformities,  good range of motion, 2+ peripheral pulses bilat UE/LE, no edema,  negative Milton's sign, no lymphadenopathy  Neuro: Alert and oriented x3, motor and sensory intact  Psychiatric: Awake , alert, oriented x3 with an appropriate affect.   Skin: Good color, turgor, texture with no gross lesions, no eruptions, no rashes, no subcutaneous nodules and normal temperature.     LABS:                        7.9    9.99  )-----------( 233      ( 11 Oct 2023 04:09 )             25.5     10-11    138  |  109<H>  |  43<H>  ----------------------------<  186<H>  4.4   |  20<L>  |  1.20    Ca    8.4<L>      11 Oct 2023 04:09  Mg     1.8     10-10    TPro  6.1  /  Alb  1.9<L>  /  TBili  0.4  /  DBili  x   /  AST  18  /  ALT  12  /  AlkPhos  88  10-10    PT/INR - ( 10 Oct 2023 18:25 )   PT: 18.6 sec;   INR: 1.61 ratio         PTT - ( 10 Oct 2023 18:25 )  PTT:30.0 sec  Urinalysis Basic - ( 11 Oct 2023 06:00 )    Color: Yellow / Appearance: Turbid / S.018 / pH: x  Gluc: x / Ketone: Trace mg/dL  / Bili: Negative / Urobili: 0.2 mg/dL   Blood: x / Protein: 100 mg/dL / Nitrite: Negative   Leuk Esterase: Large / RBC: Too Numerous to count /HPF / WBC Too Numerous to count /HPF   Sq Epi: x / Non Sq Epi: x / Bacteria: Few /HPF      LIVER FUNCTIONS - ( 10 Oct 2023 18:25 )  Alb: 1.9 g/dL / Pro: 6.1 g/dL / ALK PHOS: 88 U/L / ALT: 12 U/L / AST: 18 U/L / GGT: x             Culture - Blood (collected 10 Oct 2023 18:25)  Source: .Blood Blood-Peripheral  Gram Stain (11 Oct 2023 15:28):    Growth in aerobic bottle: Gram Positive Cocci in Clusters  Preliminary Report (11 Oct 2023 15:28):    Growth in aerobic bottle: Gram Positive Cocci in Clusters    Culture - Blood (collected 10 Oct 2023 18:25)  Source: .Blood Blood-Peripheral  Gram Stain (11 Oct 2023 15:29):    Growth in aerobic bottle: Gram Positive Cocci in Clusters    Growth in anaerobic bottle: Gram Positive Cocci in Clusters  Preliminary Report (11 Oct 2023 15:29):    Growth in aerobic bottle: Gram Positive Cocci in Clusters    Growth in anaerobic bottle: Gram Positive Cocci in Clusters    Direct identification is available within approximately 3-5    hours either by Blood Panel Multiplexed PCR or Direct    MALDI-TOF. Details: [email or web address]  Organism: Blood Culture PCR (11 Oct 2023 16:30)  Organism: Blood Culture PCR (11 Oct 2023 16:30)        RADIOLOGY & ADDITIONAL STUDIES:  XR FOOT COMP MIN 3 VIEWS  PROCEDURE DATE: 10/10/2023    INTERPRETATION: Clinical history: 78-year-old male, failure to thrive.  Three views of the left foot without comparison.    FINDINGS: Diffuse demineralization with no fracture, dislocation, gross cortical destruction or soft tissue emphysema.    IMPRESSION:  No acute radiographic findings, podiatry consultation  ASSESSMENT:  78 M with HTN, HLD, uncontrolled DM, afib s/p pacemaker admitted for UTI. Vascular surgery was consulted for nonpalpable pulses and chronic bilateral foot wounds.   On physical examination, Left pedal pulses were dopplerable and Right AT/PT were dopplerable. Patient is bacteremia with GPC and has UTI treated with vancomycin and zosyn.    PLAN:   SURGERY PA CONSULT NOTE:    CHIEF COMPLAINT:  Patient is a 78y old  Male who presents with a chief complaint of fever and weakness (11 Oct 2023 16:55)      HPI FROM ED:  78-year-old male presents to the hospital by ambulance from home.  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 Pt does not go to doctor on regualar basis per son and does phone visits.   (11 Oct 2023 07:02)    HPI:   Patient has had  deterioration of health over the past 6-7 years. The right foot wound appeared first and has failed graft now s/p partial calcanectomy. The left wound presented within the past year and has been producing pustular drainage. Patient is bedridden but not contracted.  Patient denies rest pain, ischemic changes to lower extremities, chest pain, SOB.    PAST MEDICAL HISTORY:  PAST MEDICAL & SURGICAL HISTORY:  Diabetes  Benign essential HTN  Pacemaker  History of BPH  Diabetic foot ulcers      PAST SURGICAL HISTORY:  R heel partial calcanectomy    REVIEW OF SYSTEMS:  General/Constitutional: No acute distress, no headache, weakness, fevers, or chills.  Respiratory: No dyspnea on room air  Cardiac: Denies chest pain, palpitations. Pacemaker in place  Abdomen: Soft, nontender  Vascular:  RIGHT- Ulceration noted to the right heel with fibrogranular s/p partial R calcanectomy. Multiphasic PT and AT (mid-shin). No dopperable DP appreciated  LEFT- lateral heel eschar with purulent drainage. Multiphasic DP and PT  Extremities: 2/5 muscle strength. Extremities able to be passively moved without pain.  Genitourinary: Urinary incontinence      MEDICATIONS:  Home Medications:  Aspir 81 oral delayed release tablet: 1 tab(s) orally once a day (11 Oct 2023 07:13)  bethanechol 25 mg oral tablet: 1 tab(s) orally 2 times a day (11 Oct 2023 07:14)  Coreg 3.125 mg oral tablet: 1 tab(s) orally 2 times a day (11 Oct 2023 07:13)  Flomax 0.4 mg oral capsule: 1 cap(s) orally once a day (at bedtime) (11 Oct 2023 07:14)  gabapentin 300 mg oral capsule: 1 cap(s) orally 2 times a day (11 Oct 2023 07:13)  Lantus 100 units/mL subcutaneous solution: 18 unit(s) subcutaneous once a day (at bedtime) (11 Oct 2023 07:15)  ramipril 2.5 mg oral capsule: 1 cap(s) orally once a day (11 Oct 2023 07:12)    MEDICATIONS  (STANDING):  aspirin enteric coated 81 milliGRAM(s) Oral daily  bethanechol 25 milliGRAM(s) Oral two times a day  carvedilol 3.125 milliGRAM(s) Oral every 12 hours  dextrose 5%. 1000 milliLiter(s) (50 mL/Hr) IV Continuous <Continuous>  dextrose 5%. 1000 milliLiter(s) (100 mL/Hr) IV Continuous <Continuous>  dextrose 50% Injectable 25 Gram(s) IV Push once  dextrose 50% Injectable 25 Gram(s) IV Push once  dextrose 50% Injectable 12.5 Gram(s) IV Push once  dronabinol 2.5 milliGRAM(s) Oral two times a day  gabapentin 300 milliGRAM(s) Oral two times a day  glucagon  Injectable 1 milliGRAM(s) IntraMuscular once  insulin glargine Injectable (LANTUS) 18 Unit(s) SubCutaneous at bedtime  insulin lispro (ADMELOG) corrective regimen sliding scale   SubCutaneous three times a day before meals  insulin lispro (ADMELOG) corrective regimen sliding scale   SubCutaneous at bedtime  insulin lispro Injectable (ADMELOG) 3 Unit(s) SubCutaneous three times a day before meals  lactobacillus acidophilus 1 Tablet(s) Oral two times a day with meals  lisinopril 10 milliGRAM(s) Oral daily  pantoprazole    Tablet 40 milliGRAM(s) Oral before breakfast  piperacillin/tazobactam IVPB.. 3.375 Gram(s) IV Intermittent every 8 hours  tamsulosin 0.4 milliGRAM(s) Oral at bedtime  vancomycin  IVPB 1250 milliGRAM(s) IV Intermittent every 24 hours    MEDICATIONS  (PRN):  acetaminophen     Tablet .. 650 milliGRAM(s) Oral every 6 hours PRN Temp greater or equal to 38C (100.4F), Moderate Pain (4 - 6)  dextrose Oral Gel 15 Gram(s) Oral once PRN Blood Glucose LESS THAN 70 milliGRAM(s)/deciliter  loperamide 2 milliGRAM(s) Oral three times a day PRN Diarrhea      ALLERGIES:  Allergies    No Known Allergies    Intolerances        SOCIAL HISTORY:  Social History:  lives at home with wife  bedbound (11 Oct 2023 07:02)    FAMILY HISTORY:  FAMILY HISTORY:      VITAL SIGNS:  Vital Signs Last 24 Hrs  T(C): 39.1 (11 Oct 2023 17:03), Max: 39.1 (11 Oct 2023 17:03)  T(F): 102.4 (11 Oct 2023 17:03), Max: 102.4 (11 Oct 2023 17:03)  HR: 72 (11 Oct 2023 17:03) (56 - 79)  BP: 110/60 (11 Oct 2023 17:03) (87/34 - 121/59)  BP(mean): --  RR: 18 (11 Oct 2023 17:03) (16 - 18)  SpO2: 94% (11 Oct 2023 17:03) (93% - 98%)    Parameters below as of 11 Oct 2023 17:03  Patient On (Oxygen Delivery Method): room air        PHYSICAL EXAM:  General: No acute distress, appears comfortable, well nourished, well-groomed, appears stated age  Head, Eyes, Ears, Nose, Throat: Normal cephalic/atraumatic, anicteric, conjunctiva-non injected and moist, vision grossly intact, hearing grossly intact, no nasal discharge, ears and nose symmetrical and atraumatic.  Nasal, oral, and oropharyngeal mucosa pink moist with no evidence of ulceration  Neck: Supple, carotids have good upstroke, trachea in the midline, without JVD or thyromegaly  Lymphatic: No evidence of masses or lymphadenopathy in the head, neck, trunk, axillary, inguinal, or supraclavicular regions  Chest: Lungs are clear to P&A, no wheezing, no rales, no ronchi, with good inspiratory effort  Heart: Heart rhythm regular, no murmurs  Abdomen: Soft, non tender, good bowel sounds present in all four quadrants.  No guarding, rebound, and no peritoneal signs.  No evidence of hepatosplenomegaly.  No evidence of abdominal wall hernias.  Inguinal regions are unremarkable with no evidence of hernias.   Extremity: No swelling, or open sores, no gross deformities,  good range of motion, 2+ peripheral pulses bilat UE/LE, no edema,  negative Milton's sign, no lymphadenopathy  Neuro: Alert and oriented x3, motor and sensory intact  Psychiatric: Awake , alert, oriented x3 with an appropriate affect.   Skin: Good color, turgor, texture with no gross lesions, no eruptions, no rashes, no subcutaneous nodules and normal temperature.     LABS:                        7.9    9.99  )-----------( 233      ( 11 Oct 2023 04:09 )             25.5     10-11    138  |  109<H>  |  43<H>  ----------------------------<  186<H>  4.4   |  20<L>  |  1.20    Ca    8.4<L>      11 Oct 2023 04:09  Mg     1.8     10-10    TPro  6.1  /  Alb  1.9<L>  /  TBili  0.4  /  DBili  x   /  AST  18  /  ALT  12  /  AlkPhos  88  10-10    PT/INR - ( 10 Oct 2023 18:25 )   PT: 18.6 sec;   INR: 1.61 ratio         PTT - ( 10 Oct 2023 18:25 )  PTT:30.0 sec  Urinalysis Basic - ( 11 Oct 2023 06:00 )    Color: Yellow / Appearance: Turbid / S.018 / pH: x  Gluc: x / Ketone: Trace mg/dL  / Bili: Negative / Urobili: 0.2 mg/dL   Blood: x / Protein: 100 mg/dL / Nitrite: Negative   Leuk Esterase: Large / RBC: Too Numerous to count /HPF / WBC Too Numerous to count /HPF   Sq Epi: x / Non Sq Epi: x / Bacteria: Few /HPF      LIVER FUNCTIONS - ( 10 Oct 2023 18:25 )  Alb: 1.9 g/dL / Pro: 6.1 g/dL / ALK PHOS: 88 U/L / ALT: 12 U/L / AST: 18 U/L / GGT: x             Culture - Blood (collected 10 Oct 2023 18:25)  Source: .Blood Blood-Peripheral  Gram Stain (11 Oct 2023 15:28):    Growth in aerobic bottle: Gram Positive Cocci in Clusters  Preliminary Report (11 Oct 2023 15:28):    Growth in aerobic bottle: Gram Positive Cocci in Clusters    Culture - Blood (collected 10 Oct 2023 18:25)  Source: .Blood Blood-Peripheral  Gram Stain (11 Oct 2023 15:29):    Growth in aerobic bottle: Gram Positive Cocci in Clusters    Growth in anaerobic bottle: Gram Positive Cocci in Clusters  Preliminary Report (11 Oct 2023 15:29):    Growth in aerobic bottle: Gram Positive Cocci in Clusters    Growth in anaerobic bottle: Gram Positive Cocci in Clusters    Direct identification is available within approximately 3-5    hours either by Blood Panel Multiplexed PCR or Direct    MALDI-TOF. Details: [email or web address]  Organism: Blood Culture PCR (11 Oct 2023 16:30)  Organism: Blood Culture PCR (11 Oct 2023 16:30)        RADIOLOGY & ADDITIONAL STUDIES:  XR FOOT COMP MIN 3 VIEWS  PROCEDURE DATE: 10/10/2023    INTERPRETATION: Clinical history: 78-year-old male, failure to thrive.  Three views of the left foot without comparison.    FINDINGS: Diffuse demineralization with no fracture, dislocation, gross cortical destruction or soft tissue emphysema.    IMPRESSION:  No acute radiographic findings, podiatry consultation    ASSESSMENT:  78 M with HTN, HLD, uncontrolled DM, afib s/p pacemaker admitted for UTI. Vascular surgery was consulted for nonpalpable pulses and chronic bilateral foot wounds.   On physical examination, Left pedal pulses were dopplerable and Right AT/PT were dopplerable. Patient is bacteremia with GPC and has UTI treated with vancomycin and zosyn.   Per podiatry, patient may required surgical intervention with debridement of left foot wound and possible bone biopsy b\vs resection of the bone pending bone scan.    PLAN:  - Please obtain ABIs and DBIs  - Patient is cleared for debridement and bone biopsy but please wait for vascular studies before continuing with debridement  - Remainder of wound care and care per primary team. SURGERY PA CONSULT NOTE:    CHIEF COMPLAINT:  Patient is a 78y old  Male who presents with a chief complaint of fever and weakness (11 Oct 2023 16:55)      HPI FROM ED:  78-year-old male presents to the hospital by ambulance from home.  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 Pt does not go to doctor on regualar basis per son and does phone visits.   (11 Oct 2023 07:02)    HPI:   Patient has had  deterioration of health over the past 6-7 years. The right foot wound appeared first and has failed graft now s/p partial calcanectomy. The left wound presented within the past year and has been producing pustular drainage. Patient is bedridden but not contracted.  Patient denies rest pain, ischemic changes to lower extremities, chest pain, SOB.    PAST MEDICAL HISTORY:  PAST MEDICAL & SURGICAL HISTORY:  Diabetes  Benign essential HTN  Pacemaker  History of BPH  Diabetic foot ulcers      PAST SURGICAL HISTORY:  R heel partial calcanectomy    REVIEW OF SYSTEMS:  General/Constitutional: No acute distress, no headache, weakness, fevers, or chills.  Respiratory: No dyspnea on room air  Cardiac: Denies chest pain, palpitations. Pacemaker in place  Abdomen: Soft, nontender  Vascular:  RIGHT- Ulceration noted to the right heel with fibrogranular s/p partial R calcanectomy. Multiphasic PT and AT (mid-shin). No dopperable DP appreciated  LEFT- lateral heel eschar with purulent drainage. Multiphasic DP and PT  Extremities: 2/5 muscle strength. Extremities able to be passively moved without pain.  Genitourinary: Urinary incontinence      MEDICATIONS:  Home Medications:  Aspir 81 oral delayed release tablet: 1 tab(s) orally once a day (11 Oct 2023 07:13)  bethanechol 25 mg oral tablet: 1 tab(s) orally 2 times a day (11 Oct 2023 07:14)  Coreg 3.125 mg oral tablet: 1 tab(s) orally 2 times a day (11 Oct 2023 07:13)  Flomax 0.4 mg oral capsule: 1 cap(s) orally once a day (at bedtime) (11 Oct 2023 07:14)  gabapentin 300 mg oral capsule: 1 cap(s) orally 2 times a day (11 Oct 2023 07:13)  Lantus 100 units/mL subcutaneous solution: 18 unit(s) subcutaneous once a day (at bedtime) (11 Oct 2023 07:15)  ramipril 2.5 mg oral capsule: 1 cap(s) orally once a day (11 Oct 2023 07:12)    MEDICATIONS  (STANDING):  aspirin enteric coated 81 milliGRAM(s) Oral daily  bethanechol 25 milliGRAM(s) Oral two times a day  carvedilol 3.125 milliGRAM(s) Oral every 12 hours  dextrose 5%. 1000 milliLiter(s) (50 mL/Hr) IV Continuous <Continuous>  dextrose 5%. 1000 milliLiter(s) (100 mL/Hr) IV Continuous <Continuous>  dextrose 50% Injectable 25 Gram(s) IV Push once  dextrose 50% Injectable 25 Gram(s) IV Push once  dextrose 50% Injectable 12.5 Gram(s) IV Push once  dronabinol 2.5 milliGRAM(s) Oral two times a day  gabapentin 300 milliGRAM(s) Oral two times a day  glucagon  Injectable 1 milliGRAM(s) IntraMuscular once  insulin glargine Injectable (LANTUS) 18 Unit(s) SubCutaneous at bedtime  insulin lispro (ADMELOG) corrective regimen sliding scale   SubCutaneous three times a day before meals  insulin lispro (ADMELOG) corrective regimen sliding scale   SubCutaneous at bedtime  insulin lispro Injectable (ADMELOG) 3 Unit(s) SubCutaneous three times a day before meals  lactobacillus acidophilus 1 Tablet(s) Oral two times a day with meals  lisinopril 10 milliGRAM(s) Oral daily  pantoprazole    Tablet 40 milliGRAM(s) Oral before breakfast  piperacillin/tazobactam IVPB.. 3.375 Gram(s) IV Intermittent every 8 hours  tamsulosin 0.4 milliGRAM(s) Oral at bedtime  vancomycin  IVPB 1250 milliGRAM(s) IV Intermittent every 24 hours    MEDICATIONS  (PRN):  acetaminophen     Tablet .. 650 milliGRAM(s) Oral every 6 hours PRN Temp greater or equal to 38C (100.4F), Moderate Pain (4 - 6)  dextrose Oral Gel 15 Gram(s) Oral once PRN Blood Glucose LESS THAN 70 milliGRAM(s)/deciliter  loperamide 2 milliGRAM(s) Oral three times a day PRN Diarrhea      ALLERGIES:  Allergies    No Known Allergies    Intolerances        SOCIAL HISTORY:  Social History:  lives at home with wife  bedbound (11 Oct 2023 07:02)    FAMILY HISTORY:  FAMILY HISTORY:      VITAL SIGNS:  Vital Signs Last 24 Hrs  T(C): 39.1 (11 Oct 2023 17:03), Max: 39.1 (11 Oct 2023 17:03)  T(F): 102.4 (11 Oct 2023 17:03), Max: 102.4 (11 Oct 2023 17:03)  HR: 72 (11 Oct 2023 17:03) (56 - 79)  BP: 110/60 (11 Oct 2023 17:03) (87/34 - 121/59)  BP(mean): --  RR: 18 (11 Oct 2023 17:03) (16 - 18)  SpO2: 94% (11 Oct 2023 17:03) (93% - 98%)    Parameters below as of 11 Oct 2023 17:03  Patient On (Oxygen Delivery Method): room air        PHYSICAL EXAM:  General: No acute distress, appears comfortable, well nourished, well-groomed, appears stated age  Head, Eyes, Ears, Nose, Throat: Normal cephalic/atraumatic, anicteric, conjunctiva-non injected and moist, vision grossly intact, hearing grossly intact, no nasal discharge, ears and nose symmetrical and atraumatic.  Nasal, oral, and oropharyngeal mucosa pink moist with no evidence of ulceration  Neck: Supple, carotids have good upstroke, trachea in the midline, without JVD or thyromegaly  Lymphatic: No evidence of masses or lymphadenopathy in the head, neck, trunk, axillary, inguinal, or supraclavicular regions  Chest: Lungs are clear to P&A, no wheezing, no rales, no ronchi, with good inspiratory effort  Heart: Heart rhythm regular, no murmurs  Abdomen: Soft, non tender, good bowel sounds present in all four quadrants.  No guarding, rebound, and no peritoneal signs.  No evidence of hepatosplenomegaly.  No evidence of abdominal wall hernias.  Inguinal regions are unremarkable with no evidence of hernias.   Extremity: No swelling, or open sores, no gross deformities,  good range of motion, 2+ peripheral pulses bilat UE/LE, no edema,  negative Milton's sign, no lymphadenopathy  Neuro: Alert and oriented x3, motor and sensory intact  Psychiatric: Awake , alert, oriented x3 with an appropriate affect.   Skin: Good color, turgor, texture with no gross lesions, no eruptions, no rashes, no subcutaneous nodules and normal temperature.     LABS:                        7.9    9.99  )-----------( 233      ( 11 Oct 2023 04:09 )             25.5     10-11    138  |  109<H>  |  43<H>  ----------------------------<  186<H>  4.4   |  20<L>  |  1.20    Ca    8.4<L>      11 Oct 2023 04:09  Mg     1.8     10-10    TPro  6.1  /  Alb  1.9<L>  /  TBili  0.4  /  DBili  x   /  AST  18  /  ALT  12  /  AlkPhos  88  10-10    PT/INR - ( 10 Oct 2023 18:25 )   PT: 18.6 sec;   INR: 1.61 ratio         PTT - ( 10 Oct 2023 18:25 )  PTT:30.0 sec  Urinalysis Basic - ( 11 Oct 2023 06:00 )    Color: Yellow / Appearance: Turbid / S.018 / pH: x  Gluc: x / Ketone: Trace mg/dL  / Bili: Negative / Urobili: 0.2 mg/dL   Blood: x / Protein: 100 mg/dL / Nitrite: Negative   Leuk Esterase: Large / RBC: Too Numerous to count /HPF / WBC Too Numerous to count /HPF   Sq Epi: x / Non Sq Epi: x / Bacteria: Few /HPF      LIVER FUNCTIONS - ( 10 Oct 2023 18:25 )  Alb: 1.9 g/dL / Pro: 6.1 g/dL / ALK PHOS: 88 U/L / ALT: 12 U/L / AST: 18 U/L / GGT: x             Culture - Blood (collected 10 Oct 2023 18:25)  Source: .Blood Blood-Peripheral  Gram Stain (11 Oct 2023 15:28):    Growth in aerobic bottle: Gram Positive Cocci in Clusters  Preliminary Report (11 Oct 2023 15:28):    Growth in aerobic bottle: Gram Positive Cocci in Clusters    Culture - Blood (collected 10 Oct 2023 18:25)  Source: .Blood Blood-Peripheral  Gram Stain (11 Oct 2023 15:29):    Growth in aerobic bottle: Gram Positive Cocci in Clusters    Growth in anaerobic bottle: Gram Positive Cocci in Clusters  Preliminary Report (11 Oct 2023 15:29):    Growth in aerobic bottle: Gram Positive Cocci in Clusters    Growth in anaerobic bottle: Gram Positive Cocci in Clusters    Direct identification is available within approximately 3-5    hours either by Blood Panel Multiplexed PCR or Direct    MALDI-TOF. Details: [email or web address]  Organism: Blood Culture PCR (11 Oct 2023 16:30)  Organism: Blood Culture PCR (11 Oct 2023 16:30)        RADIOLOGY & ADDITIONAL STUDIES:  XR FOOT COMP MIN 3 VIEWS  PROCEDURE DATE: 10/10/2023    INTERPRETATION: Clinical history: 78-year-old male, failure to thrive.  Three views of the left foot without comparison.    FINDINGS: Diffuse demineralization with no fracture, dislocation, gross cortical destruction or soft tissue emphysema.    IMPRESSION:  No acute radiographic findings, podiatry consultation    ASSESSMENT:  78 M with HTN, HLD, uncontrolled DM, afib s/p pacemaker admitted for UTI. Vascular surgery was consulted for nonpalpable pulses and chronic bilateral foot wounds.   On physical examination, Left pedal pulses were dopplerable and Right AT/PT were dopplerable. Patient is bacteremia with GPC and has UTI treated with vancomycin and zosyn.   Per podiatry, patient may required surgical intervention with debridement of left foot wound and possible bone biopsy b\vs resection of the bone pending bone scan.    PLAN:  - Please obtain ABIs and DBIs  - Patient is cleared for debridement and bone biopsy but please wait for vascular studies before continuing with debridement  - Remainder of wound care and care per primary team. SURGERY PA CONSULT NOTE:    CHIEF COMPLAINT:  Patient is a 78y old  Male who presents with a chief complaint of fever and weakness (11 Oct 2023 16:55)      HPI FROM ED:  78-year-old male presents to the hospital by ambulance from home.  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 Pt does not go to doctor on regualar basis per son and does phone visits.   (11 Oct 2023 07:02)    HPI:   Patient has had  deterioration of health over the past 6-7 years. The right foot wound appeared first and has failed graft now s/p partial calcanectomy. The left wound presented within the past year and has been producing pustular drainage. Patient is bedridden but not contracted.  Patient denies rest pain, ischemic changes to lower extremities, chest pain, SOB.    PAST MEDICAL HISTORY:  PAST MEDICAL & SURGICAL HISTORY:  Diabetes  Benign essential HTN  Pacemaker  History of BPH  Diabetic foot ulcers      PAST SURGICAL HISTORY:  R heel partial calcanectomy    REVIEW OF SYSTEMS:  General/Constitutional: No acute distress, no headache, weakness, fevers, or chills.  Respiratory: No dyspnea on room air  Cardiac: Denies chest pain, palpitations. Pacemaker in place  Abdomen: Soft, nontender  Vascular:  RIGHT- Ulceration noted to the right heel with fibrogranular s/p partial R calcanectomy. Multiphasic PT and AT (mid-shin). No dopperable DP appreciated  LEFT- lateral heel eschar with purulent drainage. Multiphasic DP and PT  Extremities: 2/5 muscle strength. Extremities able to be passively moved without pain.  Genitourinary: Urinary incontinence      MEDICATIONS:  Home Medications:  Aspir 81 oral delayed release tablet: 1 tab(s) orally once a day (11 Oct 2023 07:13)  bethanechol 25 mg oral tablet: 1 tab(s) orally 2 times a day (11 Oct 2023 07:14)  Coreg 3.125 mg oral tablet: 1 tab(s) orally 2 times a day (11 Oct 2023 07:13)  Flomax 0.4 mg oral capsule: 1 cap(s) orally once a day (at bedtime) (11 Oct 2023 07:14)  gabapentin 300 mg oral capsule: 1 cap(s) orally 2 times a day (11 Oct 2023 07:13)  Lantus 100 units/mL subcutaneous solution: 18 unit(s) subcutaneous once a day (at bedtime) (11 Oct 2023 07:15)  ramipril 2.5 mg oral capsule: 1 cap(s) orally once a day (11 Oct 2023 07:12)    MEDICATIONS  (STANDING):  aspirin enteric coated 81 milliGRAM(s) Oral daily  bethanechol 25 milliGRAM(s) Oral two times a day  carvedilol 3.125 milliGRAM(s) Oral every 12 hours  dextrose 5%. 1000 milliLiter(s) (50 mL/Hr) IV Continuous <Continuous>  dextrose 5%. 1000 milliLiter(s) (100 mL/Hr) IV Continuous <Continuous>  dextrose 50% Injectable 25 Gram(s) IV Push once  dextrose 50% Injectable 25 Gram(s) IV Push once  dextrose 50% Injectable 12.5 Gram(s) IV Push once  dronabinol 2.5 milliGRAM(s) Oral two times a day  gabapentin 300 milliGRAM(s) Oral two times a day  glucagon  Injectable 1 milliGRAM(s) IntraMuscular once  insulin glargine Injectable (LANTUS) 18 Unit(s) SubCutaneous at bedtime  insulin lispro (ADMELOG) corrective regimen sliding scale   SubCutaneous three times a day before meals  insulin lispro (ADMELOG) corrective regimen sliding scale   SubCutaneous at bedtime  insulin lispro Injectable (ADMELOG) 3 Unit(s) SubCutaneous three times a day before meals  lactobacillus acidophilus 1 Tablet(s) Oral two times a day with meals  lisinopril 10 milliGRAM(s) Oral daily  pantoprazole    Tablet 40 milliGRAM(s) Oral before breakfast  piperacillin/tazobactam IVPB.. 3.375 Gram(s) IV Intermittent every 8 hours  tamsulosin 0.4 milliGRAM(s) Oral at bedtime  vancomycin  IVPB 1250 milliGRAM(s) IV Intermittent every 24 hours    MEDICATIONS  (PRN):  acetaminophen     Tablet .. 650 milliGRAM(s) Oral every 6 hours PRN Temp greater or equal to 38C (100.4F), Moderate Pain (4 - 6)  dextrose Oral Gel 15 Gram(s) Oral once PRN Blood Glucose LESS THAN 70 milliGRAM(s)/deciliter  loperamide 2 milliGRAM(s) Oral three times a day PRN Diarrhea      ALLERGIES:  Allergies    No Known Allergies    Intolerances        SOCIAL HISTORY:  Social History:  lives at home with wife  bedbound (11 Oct 2023 07:02)    FAMILY HISTORY:  FAMILY HISTORY:      VITAL SIGNS:  Vital Signs Last 24 Hrs  T(C): 39.1 (11 Oct 2023 17:03), Max: 39.1 (11 Oct 2023 17:03)  T(F): 102.4 (11 Oct 2023 17:03), Max: 102.4 (11 Oct 2023 17:03)  HR: 72 (11 Oct 2023 17:03) (56 - 79)  BP: 110/60 (11 Oct 2023 17:03) (87/34 - 121/59)  BP(mean): --  RR: 18 (11 Oct 2023 17:03) (16 - 18)  SpO2: 94% (11 Oct 2023 17:03) (93% - 98%)    Parameters below as of 11 Oct 2023 17:03  Patient On (Oxygen Delivery Method): room air        PHYSICAL EXAM:  General: No acute distress, appears comfortable, well nourished, well-groomed, appears stated age  Head, Eyes, Ears, Nose, Throat: Normal cephalic/atraumatic, anicteric, conjunctiva-non injected and moist, vision grossly intact, hearing grossly intact, no nasal discharge, ears and nose symmetrical and atraumatic.  Nasal, oral, and oropharyngeal mucosa pink moist with no evidence of ulceration  Neck: Supple, carotids have good upstroke, trachea in the midline, without JVD or thyromegaly  Lymphatic: No evidence of masses or lymphadenopathy in the head, neck, trunk, axillary, inguinal, or supraclavicular regions  Chest: Lungs are clear to P&A, no wheezing, no rales, no ronchi, with good inspiratory effort  Heart: Heart rhythm regular, no murmurs  Abdomen: Soft, non tender, good bowel sounds present in all four quadrants.  No guarding, rebound, and no peritoneal signs.  No evidence of hepatosplenomegaly.  No evidence of abdominal wall hernias.  Inguinal regions are unremarkable with no evidence of hernias.   Extremity: No swelling, or open sores, no gross deformities,  good range of motion, 2+ peripheral pulses bilat UE/LE, no edema,  negative Milton's sign, no lymphadenopathy  Neuro: Alert and oriented x3, motor and sensory intact  Psychiatric: Awake , alert, oriented x3 with an appropriate affect.   Skin: Good color, turgor, texture with no gross lesions, no eruptions, no rashes, no subcutaneous nodules and normal temperature.     LABS:                        7.9    9.99  )-----------( 233      ( 11 Oct 2023 04:09 )             25.5     10-11    138  |  109<H>  |  43<H>  ----------------------------<  186<H>  4.4   |  20<L>  |  1.20    Ca    8.4<L>      11 Oct 2023 04:09  Mg     1.8     10-10    TPro  6.1  /  Alb  1.9<L>  /  TBili  0.4  /  DBili  x   /  AST  18  /  ALT  12  /  AlkPhos  88  10-10    PT/INR - ( 10 Oct 2023 18:25 )   PT: 18.6 sec;   INR: 1.61 ratio         PTT - ( 10 Oct 2023 18:25 )  PTT:30.0 sec  Urinalysis Basic - ( 11 Oct 2023 06:00 )    Color: Yellow / Appearance: Turbid / S.018 / pH: x  Gluc: x / Ketone: Trace mg/dL  / Bili: Negative / Urobili: 0.2 mg/dL   Blood: x / Protein: 100 mg/dL / Nitrite: Negative   Leuk Esterase: Large / RBC: Too Numerous to count /HPF / WBC Too Numerous to count /HPF   Sq Epi: x / Non Sq Epi: x / Bacteria: Few /HPF      LIVER FUNCTIONS - ( 10 Oct 2023 18:25 )  Alb: 1.9 g/dL / Pro: 6.1 g/dL / ALK PHOS: 88 U/L / ALT: 12 U/L / AST: 18 U/L / GGT: x             Culture - Blood (collected 10 Oct 2023 18:25)  Source: .Blood Blood-Peripheral  Gram Stain (11 Oct 2023 15:28):    Growth in aerobic bottle: Gram Positive Cocci in Clusters  Preliminary Report (11 Oct 2023 15:28):    Growth in aerobic bottle: Gram Positive Cocci in Clusters    Culture - Blood (collected 10 Oct 2023 18:25)  Source: .Blood Blood-Peripheral  Gram Stain (11 Oct 2023 15:29):    Growth in aerobic bottle: Gram Positive Cocci in Clusters    Growth in anaerobic bottle: Gram Positive Cocci in Clusters  Preliminary Report (11 Oct 2023 15:29):    Growth in aerobic bottle: Gram Positive Cocci in Clusters    Growth in anaerobic bottle: Gram Positive Cocci in Clusters    Direct identification is available within approximately 3-5    hours either by Blood Panel Multiplexed PCR or Direct    MALDI-TOF. Details: [email or web address]  Organism: Blood Culture PCR (11 Oct 2023 16:30)  Organism: Blood Culture PCR (11 Oct 2023 16:30)        RADIOLOGY & ADDITIONAL STUDIES:  XR FOOT COMP MIN 3 VIEWS  PROCEDURE DATE: 10/10/2023    INTERPRETATION: Clinical history: 78-year-old male, failure to thrive.  Three views of the left foot without comparison.    FINDINGS: Diffuse demineralization with no fracture, dislocation, gross cortical destruction or soft tissue emphysema.    IMPRESSION:  No acute radiographic findings, podiatry consultation    ASSESSMENT:  78 M with HTN, HLD, uncontrolled DM, afib s/p pacemaker admitted for UTI. Vascular surgery was consulted for nonpalpable pulses and chronic bilateral foot wounds.   On physical examination, Left pedal pulses were dopplerable and Right AT/PT were dopplerable. Patient is bacteremia with GPC and has UTI treated with vancomycin and zosyn.   Per podiatry, patient may required surgical intervention with debridement of left foot wound and possible bone biopsy b\vs resection of the bone pending bone scan.    PLAN:  - Please obtain ABIs and DBIs  - Patient is cleared for debridement and bone biopsy but please wait for vascular studies before continuing with debridement  - Remainder of wound care and care per primary team.

## 2023-10-11 NOTE — DIETITIAN INITIAL EVALUATION ADULT - PERTINENT LABORATORY DATA
----- Message from Taye Ramirez sent at 10/6/2022  4:31 PM CDT -----  Contact: Pt  .Type:  Needs Medical Advice    Who Called: Pt  Would the patient rather a call back or a response via [facility]? Call  Best Call Back Number: [phone]  Additional Information:   Pt wants to be contacted by  only.        
Patient states you gave her a paper she misplace. She states she need a letter stating she can bring her scooter in her home to give to her landlord.  
10-11    138  |  109<H>  |  43<H>  ----------------------------<  186<H>  4.4   |  20<L>  |  1.20    Ca    8.4<L>      11 Oct 2023 04:09  Mg     1.8     10-10    TPro  6.1  /  Alb  1.9<L>  /  TBili  0.4  /  DBili  x   /  AST  18  /  ALT  12  /  AlkPhos  88  10-10  POCT Blood Glucose.: 270 mg/dL (10-11-23 @ 11:41)  A1C with Estimated Average Glucose Result: 6.0 % (10-11-23 @ 04:09)

## 2023-10-11 NOTE — DIETITIAN INITIAL EVALUATION ADULT - PROBLEM/PLAN-3
Addended by: RITA RAMOS on: 4/28/2023 11:12 AM     Modules accepted: Orders    
DISPLAY PLAN FREE TEXT

## 2023-10-11 NOTE — DIETITIAN INITIAL EVALUATION ADULT - PERTINENT MEDS FT
MEDICATIONS  (STANDING):  aspirin enteric coated 81 milliGRAM(s) Oral daily  bethanechol 25 milliGRAM(s) Oral two times a day  carvedilol 3.125 milliGRAM(s) Oral every 12 hours  dextrose 5%. 1000 milliLiter(s) (100 mL/Hr) IV Continuous <Continuous>  dextrose 5%. 1000 milliLiter(s) (50 mL/Hr) IV Continuous <Continuous>  dextrose 50% Injectable 25 Gram(s) IV Push once  dextrose 50% Injectable 25 Gram(s) IV Push once  dextrose 50% Injectable 12.5 Gram(s) IV Push once  gabapentin 300 milliGRAM(s) Oral two times a day  glucagon  Injectable 1 milliGRAM(s) IntraMuscular once  insulin glargine Injectable (LANTUS) 18 Unit(s) SubCutaneous at bedtime  insulin lispro (ADMELOG) corrective regimen sliding scale   SubCutaneous three times a day before meals  insulin lispro Injectable (ADMELOG) 3 Unit(s) SubCutaneous three times a day before meals  lactobacillus acidophilus 1 Tablet(s) Oral two times a day with meals  lisinopril 10 milliGRAM(s) Oral daily  pantoprazole    Tablet 40 milliGRAM(s) Oral before breakfast  piperacillin/tazobactam IVPB.. 3.375 Gram(s) IV Intermittent every 8 hours  tamsulosin 0.4 milliGRAM(s) Oral at bedtime    MEDICATIONS  (PRN):  acetaminophen     Tablet .. 650 milliGRAM(s) Oral every 6 hours PRN Temp greater or equal to 38C (100.4F), Moderate Pain (4 - 6)  dextrose Oral Gel 15 Gram(s) Oral once PRN Blood Glucose LESS THAN 70 milliGRAM(s)/deciliter

## 2023-10-11 NOTE — CONSULT NOTE ADULT - ASSESSMENT
[facility]  Chart Reviewed-Full Consult to follow for any immediate concerns please fell free to contact us directly at  [phone] and have us paged or text my cell # [phone]  Cecilio Burnett MD PhD   [facility]  Chart Reviewed-Full Consult to follow for any immediate concerns please fell free to contact us directly at  [phone] and have us paged or text my cell # [phone]  Cecilio Burnett MD PhD   [facility]  Chart Reviewed-Full Consult to follow for any immediate concerns please fell free to contact us directly at  [phone] and have us paged or text my cell # [phone]  Cecilio Burnett MD PhD   78-year-old male presents to the hospital by ambulance from home.  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 Pt does not go to doctor on regular basis per son and does phone visits. Son reports years of struggling with foot infections with black areas of gangrene and their struggling to keep his feet but now feeling that keeping him alive is more important.    RECOMMENDATIONS  Very concerning for osteomyelitis so recommend MRI of both feet/lower legs  -continue zosyn (started 10/10) for now  -will order nares MRSA screen to help guide need for MRSA coverage  -anticipate that surgery will be required so consider involving vascular for possible BKAs based on imaging    Thank you for consulting us and involving us in the management of this most interesting and challenging case.  We will follow along in the care of this patient. Please call us at [phone] or text me directly on my cell# at [phone] with any concerns.    Starting tomorrow Dr Rosa Maria Wilkinson will be assuming care of this patient so please contact her with any questions, concerns or new micro data.   78-year-old male presents to the hospital by ambulance from home.  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 Pt does not go to doctor on regular basis per son and does phone visits. Son reports years of struggling with foot infections with black areas of gangrene and their struggling to keep his feet but now feeling that keeping him alive is more important.    RECOMMENDATIONS  Very concerning for osteomyelitis so recommend MRI of both feet/lower legs  -continue zosyn (started 10/10) for now  -will order nares MRSA screen to help guide need for MRSA coverage  -anticipate that surgery will be required so consider involving vascular for possible BKAs based on imaging    Thank you for consulting us and involving us in the management of this most interesting and challenging case.  We will follow along in the care of this patient. Please call us at [phone] or text me directly on my cell# at [phone] with any concerns.    Starting tomorrow Dr Rosa Maria Wilkinson will be assuming care of this patient so please contact her with any questions, concerns or new micro data.   78-year-old male presents to the hospital by ambulance from home.  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 Pt does not go to doctor on regular basis per son and does phone visits. Son reports years of struggling with foot infections with black areas of gangrene and their struggling to keep his feet but now feeling that keeping him alive is more important.    RECOMMENDATIONS  Very concerning for osteomyelitis so recommend MRI of both feet/lower legs  -continue zosyn (started 10/10) for now  -will order nares MRSA screen to help guide need for MRSA coverage  -anticipate that surgery will be required so consider involving vascular for possible BKAs based on imaging    Thank you for consulting us and involving us in the management of this most interesting and challenging case.  We will follow along in the care of this patient. Please call us at [phone] or text me directly on my cell# at [phone] with any concerns.    Starting tomorrow Dr Rosa Maria Wilkinson will be assuming care of this patient so please contact her with any questions, concerns or new micro data.

## 2023-10-11 NOTE — CONSULT NOTE ADULT - NS ATTEND AMEND GEN_ALL_CORE FT
Agree with the above. OK to undergo bone bx. Brenda wait for ABIs for further recommendations prior to definitive podiatric intervention.

## 2023-10-11 NOTE — PATIENT PROFILE ADULT - FALL HARM RISK - HARM RISK INTERVENTIONS
Assistance with ambulation/Assistance OOB with selected safe patient handling equipment/Communicate Risk of Fall with Harm to all staff/Discuss with provider need for PT consult/Monitor gait and stability/Provide patient with walking aids - walker, cane, crutches/Reinforce activity limits and safety measures with patient and family/Tailored Fall Risk Interventions/Visual Cue: Yellow wristband and red socks/Bed in lowest position, wheels locked, appropriate side rails in place/Call bell, personal items and telephone in reach/Instruct patient to call for assistance before getting out of bed or chair/Non-slip footwear when patient is out of bed/[place] to call system/Physically safe environment - no spills, clutter or unnecessary equipment/Purposeful Proactive Rounding/Room/bathroom lighting operational, light cord in reach Assistance with ambulation/Assistance OOB with selected safe patient handling equipment/Communicate Risk of Fall with Harm to all staff/Discuss with provider need for PT consult/Monitor gait and stability/Provide patient with walking aids - walker, cane, crutches/Reinforce activity limits and safety measures with patient and family/Tailored Fall Risk Interventions/Visual Cue: Yellow wristband and red socks/Bed in lowest position, wheels locked, appropriate side rails in place/Call bell, personal items and telephone in reach/Instruct patient to call for assistance before getting out of bed or chair/Non-slip footwear when patient is out of bed/[place] to call system/Physically safe environment - no spills, clutter or unnecessary equipment/Purposeful Proactive Rounding/Room/bathroom lighting operational, light cord in reach Assistance with ambulation/Assistance OOB with selected safe patient handling equipment/Communicate Risk of Fall with Harm to all staff/Discuss with provider need for PT consult/Monitor gait and stability/Provide patient with walking aids - walker, cane, crutches/Reinforce activity limits and safety measures with patient and family/Tailored Fall Risk Interventions/Visual Cue: Yellow wristband and red socks/Bed in lowest position, wheels locked, appropriate side rails in place/Call bell, personal items and telephone in reach/Instruct patient to call for assistance before getting out of bed or chair/Non-slip footwear when patient is out of bed/[place] to call system/Physically safe environment - no spills, clutter or unnecessary equipment/Purposeful Proactive Rounding/Room/bathroom lighting operational, light cord in reach

## 2023-10-11 NOTE — DIETITIAN INITIAL EVALUATION ADULT - NS FNS DIET ORDER
Diet, Soft and Bite Sized:   Consistent Carbohydrate {No Snacks}  DASH/TLC {Sodium & Cholesterol Restricted} (10-10-23 @ 22:44)

## 2023-10-11 NOTE — CONSULT NOTE ADULT - ASSESSMENT
anemia  FTT    no overt gi bleeding  Ct findings of the esophagus likely represent dysmotility  monitor GI fn  if diarrhea persist can do imodium prn  start marinol bid for ftt  d/w son at bedside

## 2023-10-11 NOTE — DIETITIAN NUTRITION RISK NOTIFICATION - TREATMENT: THE FOLLOWING DIET HAS BEEN RECOMMENDED
Diet, Soft and Bite Sized:   Consistent Carbohydrate {No Snacks}  DASH/TLC {Sodium & Cholesterol Restricted} (10-10-23 @ 22:44) [Active]

## 2023-10-11 NOTE — DIETITIAN INITIAL EVALUATION ADULT - ADD RECOMMEND
1) Continue current diet as ordered; honor food preferences as able to optimize po intake/tolerance  2) Recommend Glucerna shake BID (220kcal, 10gm protein per 8 fl oz serving)  3) Recommend MVI daily and Vit C 500mg BID  4) Monitor po intake, diet tolerance, weight trends, labs, GI function, skin integrity

## 2023-10-11 NOTE — H&P ADULT - PROBLEM SELECTOR PLAN 1
multiple chronic old heel ulcers likely from hx of dm  podiatry eval  id eval  continue local wound care  iv abx  further recs pending poidatry eval

## 2023-10-11 NOTE — CONSULT NOTE ADULT - PROBLEM SELECTOR RECOMMENDATION 9
Patient was seen and evaluated   Obtained wound culture of the left lateral foot wound, noted with seropurulent drainage   Applied Aquacel and sterile dressing   Will order bone scan to assess for left foot OM   Recommend vascular consult for non palpable pulses and vascular calcifications to the foot   Patient will possibly need sugical intervention with debridement of left foot wound and possible bone biopsy b\vs resection of the bone pending bone scan

## 2023-10-11 NOTE — CONSULT NOTE ADULT - PROBLEM SELECTOR RECOMMENDATION 2
Right heel wound with fibrogranualr tissue   Applied Aquacel and sterile dressing   Patient has hx of partial calcanectomy about 5 years ago and not healed since then   Patient states he is non ambulatory    Will follow bone scan to assess OM to the right heel

## 2023-10-11 NOTE — PROVIDER CONTACT NOTE (EICU) - RECOMMENDATIONS
- agree with continuation of management in non-ICU settings for now  - maintain MAP >65, SBP>90; hold BP meds (on Coreg and Lisinopril)  - consider trops and EKG, obtain TTE  - IV hydration; monitor for signs of fluid overload and I/Os  - case discussed with onsite MICU team

## 2023-10-11 NOTE — PROGRESS NOTE ADULT - SUBJECTIVE AND OBJECTIVE BOX
Culture - Blood (10.10.23 @ 18:25)    -  Methicillin resistant Staphylococcus aureus (MRSA): Detec   Gram Stain:   Growth in aerobic bottle: Gram Positive Cocci in Clusters  Growth in anaerobic bottle: Gram Positive Cocci in Clusters   Specimen Source: .Blood Blood-Peripheral   Organism: Blood Culture PCR   Culture Results:   Growth in aerobic bottle: Gram Positive Cocci in Clusters  Growth in anaerobic bottle: Gram Positive Cocci in Clusters  Direct identification is available within approximately 3-5  hours either by Blood Panel Multiplexed PCR or Direct  MALDI-TOF. Details: [email or web address]   Organism Identification: Blood Culture PCR   Method Type: PCR     Culture - Blood (10.10.23 @ 18:25)    -  Methicillin resistant Staphylococcus aureus (MRSA): Detec   Gram Stain:   Growth in aerobic bottle: Gram Positive Cocci in Clusters  Growth in anaerobic bottle: Gram Positive Cocci in Clusters   Specimen Source: .Blood Blood-Peripheral   Organism: Blood Culture PCR   Culture Results:   Growth in aerobic bottle: Gram Positive Cocci in Clusters  Growth in anaerobic bottle: Gram Positive Cocci in Clusters  Direct identification is available within approximately 3-5  hours either by Blood Panel Multiplexed PCR or Direct  MALDI-TOF. Details: [email or web address]   Organism Identification: Blood Culture PCR   Method Type: PCR     Culture - Blood (10.10.23 @ 18:25)    -  Methicillin resistant Staphylococcus aureus (MRSA): Detec   Gram Stain:   Growth in aerobic bottle: Gram Positive Cocci in Clusters  Growth in anaerobic bottle: Gram Positive Cocci in Clusters   Specimen Source: .Blood Blood-Peripheral   Organism: Blood Culture PCR   Culture Results:   Growth in aerobic bottle: Gram Positive Cocci in Clusters  Growth in anaerobic bottle: Gram Positive Cocci in Clusters  Direct identification is available within approximately 3-5  hours either by Blood Panel Multiplexed PCR or Direct  MALDI-TOF. Details: [email or web address]   Organism Identification: Blood Culture PCR   Method Type: PCR

## 2023-10-11 NOTE — DIETITIAN INITIAL EVALUATION ADULT - SIGNS/SYMPTOMS
as evidenced by <75% of EER for >1 month, >20% wt loss x 1 year, NFPE findings- mod muscle/fat loss. as evidenced by multiple pressure injuries.

## 2023-10-11 NOTE — CARE COORDINATION ASSESSMENT. - NSCAREPROVIDERS_GEN_ALL_CORE_FT
CARE PROVIDERS:  Accepting Physician: Pastora Hraris  Access Services: Mireya Aburto  Admitting: Pastora Harris  Attending: Pastora Harris  Case Management: Tapan Keith  Case Management: Ben Courtney  Consultant: Hon Akhil  Consultant: Lauren Urbano  Consultant: Jones hCance  Consultant: Cecilio Burnett  Consultant: Weil, Patricia  Consultant: Aide Mccray  Covering Team: Candace Hope  Covering Team: Papo Morrow  ED Attending: Armani Mcknight  ED Nurse: Mello Aguero  HIM/Billing & Coding: Odalis Larsen  Nurse: Stormy Ferrer  Nurse: Torie Branham  Ordered: Doctor, Unknown  Ordered: ADM, User  Override: Torie Branham  Override: zohra Garner  Override: Swetha Bean  PCA/Nursing Assistant: Ange Turner  PCA/Nursing Assistant: Irma Andrade  Primary Team: Ladan Martinez  Quality Review: Jocelin Victoria  Registered Dietitian: Marilyn Canseco  Registered Dietitian: Liliane Mckeon  : Marybel Harkins   CARE PROVIDERS:  Accepting Physician: Pastora Harris  Access Services: Mireya Aburto  Admitting: Pastora Harris  Attending: Pastora Harris  Case Management: Tapan Keith  Case Management: Ben Courtney  Consultant: Hon Akhil  Consultant: Lauren Urbano  Consultant: Jones Chance  Consultant: Cecilio Burnett  Consultant: Weil, Patricia  Consultant: Aide Mccray  Covering Team: Candace Hope  Covering Team: Papo Morrow  ED Attending: Armani Mcknight  ED Nurse: Mello Aguero  HIM/Billing & Coding: Odalis Larsen  Nurse: Stormy Ferrer  Nurse: Torie Branham  Ordered: Doctor, Unknown  Ordered: ADM, User  Override: Torie Branham  Override: zohra Garner  Override: Swetha Bean  PCA/Nursing Assistant: Ange Turner  PCA/Nursing Assistant: Irma Andrade  Primary Team: Ladan Martinez  Quality Review: Jocelin Victoria  Registered Dietitian: Marilyn Canseco  Registered Dietitian: Liliane Mckeon  : Marybel Harkins

## 2023-10-11 NOTE — CONSULT NOTE ADULT - SUBJECTIVE AND OBJECTIVE BOX
78-year-old male presents to the hospital by ambulance from home.  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    pt now admitted with leyid heel wounds, + Blood cultures , now with hypotension     Events last 24 hours: *** blood culture sent 10/10 , with GPC   pt on ACE/ Coreg     PAST MEDICAL & SURGICAL HISTORY:  Diabetes      Benign essential HTN      Pacemaker      History of BPH      Diabetic foot ulcers        Allergies    No Known Allergies    Intolerances      FAMILY HISTORY:      Review of Systems:  CONSTITUTIONAL: No fever, chills, or fatigue  EYES: No eye pain, visual disturbances, or discharge  ENMT:  No difficulty hearing, tinnitus, vertigo; No sinus or throat pain  NECK: No pain or stiffness  RESPIRATORY: No cough, wheezing, chills or hemoptysis; No shortness of breath  CARDIOVASCULAR: No chest pain, palpitations, dizziness, or leg swelling  GASTROINTESTINAL: No abdominal or epigastric pain. No nausea, vomiting, or hematemesis; No diarrhea or constipation. No melena or hematochezia.  GENITOURINARY: No dysuria, frequency, hematuria, or incontinence  NEUROLOGICAL: No headaches, memory loss, loss of strength, numbness, or tremors  SKIN: No itching, burning, rashes, or lesions   MUSCULOSKELETAL: No joint pain or swelling; No muscle, back, or extremity pain  PSYCHIATRIC: No depression, anxiety, mood swings, or difficulty sleeping      Medications:  piperacillin/tazobactam IVPB.. 3.375 Gram(s) IV Intermittent every 8 hours  vancomycin  IVPB 1250 milliGRAM(s) IV Intermittent every 24 hours        acetaminophen     Tablet .. 650 milliGRAM(s) Oral every 6 hours PRN  dronabinol 2.5 milliGRAM(s) Oral two times a day  gabapentin 300 milliGRAM(s) Oral two times a day      aspirin enteric coated 81 milliGRAM(s) Oral daily    loperamide 2 milliGRAM(s) Oral three times a day PRN  pantoprazole    Tablet 40 milliGRAM(s) Oral before breakfast    bethanechol 25 milliGRAM(s) Oral two times a day  tamsulosin 0.4 milliGRAM(s) Oral at bedtime    dextrose 50% Injectable 25 Gram(s) IV Push once  dextrose 50% Injectable 12.5 Gram(s) IV Push once  dextrose 50% Injectable 25 Gram(s) IV Push once  dextrose Oral Gel 15 Gram(s) Oral once PRN  glucagon  Injectable 1 milliGRAM(s) IntraMuscular once  insulin glargine Injectable (LANTUS) 18 Unit(s) SubCutaneous at bedtime  insulin lispro (ADMELOG) corrective regimen sliding scale   SubCutaneous three times a day before meals  insulin lispro (ADMELOG) corrective regimen sliding scale   SubCutaneous at bedtime  insulin lispro Injectable (ADMELOG) 3 Unit(s) SubCutaneous three times a day before meals    dextrose 5%. 1000 milliLiter(s) IV Continuous <Continuous>  dextrose 5%. 1000 milliLiter(s) IV Continuous <Continuous>  sodium chloride 0.9%. 1000 milliLiter(s) IV Continuous <Continuous>        lactobacillus acidophilus 1 Tablet(s) Oral two times a day with meals          ICU Vital Signs Last 24 Hrs  T(C): 36.7 (11 Oct 2023 20:54), Max: 39.1 (11 Oct 2023 17:03)  T(F): 98 (11 Oct 2023 20:54), Max: 102.4 (11 Oct 2023 17:03)  HR: 60 (11 Oct 2023 20:54) (59 - 79)  BP: 92/51 (11 Oct 2023 21:20) (78/38 - 121/59)  BP(mean): --  ABP: --  ABP(mean): --  RR: 18 (11 Oct 2023 20:54) (16 - 18)  SpO2: 95% (11 Oct 2023 20:54) (93% - 98%)    O2 Parameters below as of 11 Oct 2023 20:54  Patient On (Oxygen Delivery Method): room air          Vital Signs Last 24 Hrs  T(C): 36.7 (11 Oct 2023 20:54), Max: 39.1 (11 Oct 2023 17:03)  T(F): 98 (11 Oct 2023 20:54), Max: 102.4 (11 Oct 2023 17:03)  HR: 60 (11 Oct 2023 20:54) (59 - 79)  BP: 92/51 (11 Oct 2023 21:20) (78/38 - 121/59)  BP(mean): --  RR: 18 (11 Oct 2023 20:54) (16 - 18)  SpO2: 95% (11 Oct 2023 20:54) (93% - 98%)    Parameters below as of 11 Oct 2023 20:54  Patient On (Oxygen Delivery Method): room air            I&O's Detail    10 Oct 2023 07:01  -  11 Oct 2023 07:00  --------------------------------------------------------  IN:    Oral Fluid: 60 mL  Total IN: 60 mL    OUT:    Voided (mL): 200 mL  Total OUT: 200 mL    Total NET: -140 mL            LABS:                        7.9    9.99  )-----------( 233      ( 11 Oct 2023 04:09 )             25.5     10-11    138  |  109<H>  |  43<H>  ----------------------------<  186<H>  4.4   |  20<L>  |  1.20    Ca    8.4<L>      11 Oct 2023 04:09  Mg     1.8     10-10    TPro  6.1  /  Alb  1.9<L>  /  TBili  0.4  /  DBili  x   /  AST  18  /  ALT  12  /  AlkPhos  88  10-10      CARDIAC MARKERS ( 10 Oct 2023 18:25 )  x     / x     / x     / x     / <1.0 ng/mL      CAPILLARY BLOOD GLUCOSE      POCT Blood Glucose.: 243 mg/dL (11 Oct 2023 21:20)    PT/INR - ( 10 Oct 2023 18:25 )   PT: 18.6 sec;   INR: 1.61 ratio         PTT - ( 10 Oct 2023 18:25 )  PTT:30.0 sec  Urinalysis Basic - ( 11 Oct 2023 06:00 )    Color: Yellow / Appearance: Turbid / S.018 / pH: x  Gluc: x / Ketone: Trace mg/dL  / Bili: Negative / Urobili: 0.2 mg/dL   Blood: x / Protein: 100 mg/dL / Nitrite: Negative   Leuk Esterase: Large / RBC: Too Numerous to count /HPF / WBC Too Numerous to count /HPF   Sq Epi: x / Non Sq Epi: x / Bacteria: Few /HPF      CULTURES:  Culture Results:   Growth in aerobic bottle: Gram Positive Cocci in Clusters  Growth in anaerobic bottle: Gram Positive Cocci in Clusters  Direct identification is available within approximately 3-5  hours either by Blood Panel Multiplexed PCR or Direct  MALDI-TOF. Details: [email or web address] (10-10 @ 18:25)  Culture Results:   Growth in aerobic bottle: Gram Positive Cocci in Clusters  Growth in anaerobic bottle: Gram Positive Cocci in Clusters (10-10 @ 18:25)      Physical Examination:    General: No acute distress.  Alert, oriented, interactive, nonfocal    HEENT: Pupils equal, reactive to light.  Symmetric.    PULM: Clear to auscultation bilaterally, no significant sputum production    CVS: Regular rate and rhythm, no murmurs, rubs, or gallops    ABD: Soft, nondistended, nontender, normoactive bowel sounds, no masses    EXT: No edema, nontender    SKIN: Warm and well perfused, no rashes noted.    RADIOLOGY: ***    CRITICAL CARE TIME SPENT: ***    78-year-old male presents to the hospital by ambulance from home.  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    pt now admitted with leydi heel wounds, + Blood cultures , now with hypotension     Events last 24 hours: *** blood culture sent 10/10 , with GPC   pt on ACE/ Coreg     PAST MEDICAL & SURGICAL HISTORY:  Diabetes      Benign essential HTN      Pacemaker      History of BPH      Diabetic foot ulcers        Allergies    No Known Allergies    Intolerances      FAMILY HISTORY:      Review of Systems:  CONSTITUTIONAL: No fever, chills, or fatigue  EYES: No eye pain, visual disturbances, or discharge  ENMT:  No difficulty hearing, tinnitus, vertigo; No sinus or throat pain  NECK: No pain or stiffness  RESPIRATORY: No cough, wheezing, chills or hemoptysis; No shortness of breath  CARDIOVASCULAR: No chest pain, palpitations, dizziness, or leg swelling  GASTROINTESTINAL: No abdominal or epigastric pain. No nausea, vomiting, or hematemesis; No diarrhea or constipation. No melena or hematochezia.  GENITOURINARY: No dysuria, frequency, hematuria, or incontinence  NEUROLOGICAL: No headaches, memory loss, loss of strength, numbness, or tremors  SKIN: No itching, burning, rashes, or lesions   MUSCULOSKELETAL: No joint pain or swelling; No muscle, back, or extremity pain  PSYCHIATRIC: No depression, anxiety, mood swings, or difficulty sleeping      Medications:  piperacillin/tazobactam IVPB.. 3.375 Gram(s) IV Intermittent every 8 hours  vancomycin  IVPB 1250 milliGRAM(s) IV Intermittent every 24 hours        acetaminophen     Tablet .. 650 milliGRAM(s) Oral every 6 hours PRN  dronabinol 2.5 milliGRAM(s) Oral two times a day  gabapentin 300 milliGRAM(s) Oral two times a day      aspirin enteric coated 81 milliGRAM(s) Oral daily    loperamide 2 milliGRAM(s) Oral three times a day PRN  pantoprazole    Tablet 40 milliGRAM(s) Oral before breakfast    bethanechol 25 milliGRAM(s) Oral two times a day  tamsulosin 0.4 milliGRAM(s) Oral at bedtime    dextrose 50% Injectable 25 Gram(s) IV Push once  dextrose 50% Injectable 12.5 Gram(s) IV Push once  dextrose 50% Injectable 25 Gram(s) IV Push once  dextrose Oral Gel 15 Gram(s) Oral once PRN  glucagon  Injectable 1 milliGRAM(s) IntraMuscular once  insulin glargine Injectable (LANTUS) 18 Unit(s) SubCutaneous at bedtime  insulin lispro (ADMELOG) corrective regimen sliding scale   SubCutaneous three times a day before meals  insulin lispro (ADMELOG) corrective regimen sliding scale   SubCutaneous at bedtime  insulin lispro Injectable (ADMELOG) 3 Unit(s) SubCutaneous three times a day before meals    dextrose 5%. 1000 milliLiter(s) IV Continuous <Continuous>  dextrose 5%. 1000 milliLiter(s) IV Continuous <Continuous>  sodium chloride 0.9%. 1000 milliLiter(s) IV Continuous <Continuous>        lactobacillus acidophilus 1 Tablet(s) Oral two times a day with meals          ICU Vital Signs Last 24 Hrs  T(C): 36.7 (11 Oct 2023 20:54), Max: 39.1 (11 Oct 2023 17:03)  T(F): 98 (11 Oct 2023 20:54), Max: 102.4 (11 Oct 2023 17:03)  HR: 60 (11 Oct 2023 20:54) (59 - 79)  BP: 92/51 (11 Oct 2023 21:20) (78/38 - 121/59)  BP(mean): --  ABP: --  ABP(mean): --  RR: 18 (11 Oct 2023 20:54) (16 - 18)  SpO2: 95% (11 Oct 2023 20:54) (93% - 98%)    O2 Parameters below as of 11 Oct 2023 20:54  Patient On (Oxygen Delivery Method): room air          Vital Signs Last 24 Hrs  T(C): 36.7 (11 Oct 2023 20:54), Max: 39.1 (11 Oct 2023 17:03)  T(F): 98 (11 Oct 2023 20:54), Max: 102.4 (11 Oct 2023 17:03)  HR: 60 (11 Oct 2023 20:54) (59 - 79)  BP: 92/51 (11 Oct 2023 21:20) (78/38 - 121/59)  BP(mean): --  RR: 18 (11 Oct 2023 20:54) (16 - 18)  SpO2: 95% (11 Oct 2023 20:54) (93% - 98%)    Parameters below as of 11 Oct 2023 20:54  Patient On (Oxygen Delivery Method): room air            I&O's Detail    10 Oct 2023 07:01  -  11 Oct 2023 07:00  --------------------------------------------------------  IN:    Oral Fluid: 60 mL  Total IN: 60 mL    OUT:    Voided (mL): 200 mL  Total OUT: 200 mL    Total NET: -140 mL            LABS:                        7.9    9.99  )-----------( 233      ( 11 Oct 2023 04:09 )             25.5     10-11    138  |  109<H>  |  43<H>  ----------------------------<  186<H>  4.4   |  20<L>  |  1.20    Ca    8.4<L>      11 Oct 2023 04:09  Mg     1.8     10-10    TPro  6.1  /  Alb  1.9<L>  /  TBili  0.4  /  DBili  x   /  AST  18  /  ALT  12  /  AlkPhos  88  10-10      CARDIAC MARKERS ( 10 Oct 2023 18:25 )  x     / x     / x     / x     / <1.0 ng/mL      CAPILLARY BLOOD GLUCOSE      POCT Blood Glucose.: 243 mg/dL (11 Oct 2023 21:20)    PT/INR - ( 10 Oct 2023 18:25 )   PT: 18.6 sec;   INR: 1.61 ratio         PTT - ( 10 Oct 2023 18:25 )  PTT:30.0 sec  Urinalysis Basic - ( 11 Oct 2023 06:00 )    Color: Yellow / Appearance: Turbid / S.018 / pH: x  Gluc: x / Ketone: Trace mg/dL  / Bili: Negative / Urobili: 0.2 mg/dL   Blood: x / Protein: 100 mg/dL / Nitrite: Negative   Leuk Esterase: Large / RBC: Too Numerous to count /HPF / WBC Too Numerous to count /HPF   Sq Epi: x / Non Sq Epi: x / Bacteria: Few /HPF      CULTURES:  Culture Results:   Growth in aerobic bottle: Gram Positive Cocci in Clusters  Growth in anaerobic bottle: Gram Positive Cocci in Clusters  Direct identification is available within approximately 3-5  hours either by Blood Panel Multiplexed PCR or Direct  MALDI-TOF. Details: [email or web address] (10-10 @ 18:25)  Culture Results:   Growth in aerobic bottle: Gram Positive Cocci in Clusters  Growth in anaerobic bottle: Gram Positive Cocci in Clusters (10-10 @ 18:25)      Physical Examination:    General: No acute distress.  Alert, oriented, interactive, nonfocal    HEENT: Pupils equal, reactive to light.  Symmetric.    PULM: Clear to auscultation bilaterally, no significant sputum production    CVS: Regular rate and rhythm, no murmurs, rubs, or gallops    ABD: Soft, nondistended, nontender, normoactive bowel sounds, no masses    EXT: No edema, nontender    SKIN: Warm and well perfused, no rashes noted.    RADIOLOGY: ***    CRITICAL CARE TIME SPENT: ***    78-year-old male presents to the hospital by ambulance from home.  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    pt now admitted with leydi heel wounds, + Blood cultures , now with hypotension     Events last 24 hours: *** blood culture sent 10/10 , with GPC   pt on ACE/ Coreg     PAST MEDICAL & SURGICAL HISTORY:  Diabetes      Benign essential HTN      Pacemaker      History of BPH      Diabetic foot ulcers        Allergies    No Known Allergies    Intolerances      FAMILY HISTORY:      Review of Systems:  CONSTITUTIONAL: No fever, chills, or fatigue  EYES: No eye pain, visual disturbances, or discharge  ENMT:  No difficulty hearing, tinnitus, vertigo; No sinus or throat pain  NECK: No pain or stiffness  RESPIRATORY: No cough, wheezing, chills or hemoptysis; No shortness of breath  CARDIOVASCULAR: No chest pain, palpitations, dizziness, or leg swelling  GASTROINTESTINAL: No abdominal or epigastric pain. No nausea, vomiting, or hematemesis; No diarrhea or constipation. No melena or hematochezia.  GENITOURINARY: No dysuria, frequency, hematuria, or incontinence  NEUROLOGICAL: No headaches, memory loss, loss of strength, numbness, or tremors  SKIN: No itching, burning, rashes, or lesions   MUSCULOSKELETAL: No joint pain or swelling; No muscle, back, or extremity pain  PSYCHIATRIC: No depression, anxiety, mood swings, or difficulty sleeping      Medications:  piperacillin/tazobactam IVPB.. 3.375 Gram(s) IV Intermittent every 8 hours  vancomycin  IVPB 1250 milliGRAM(s) IV Intermittent every 24 hours        acetaminophen     Tablet .. 650 milliGRAM(s) Oral every 6 hours PRN  dronabinol 2.5 milliGRAM(s) Oral two times a day  gabapentin 300 milliGRAM(s) Oral two times a day      aspirin enteric coated 81 milliGRAM(s) Oral daily    loperamide 2 milliGRAM(s) Oral three times a day PRN  pantoprazole    Tablet 40 milliGRAM(s) Oral before breakfast    bethanechol 25 milliGRAM(s) Oral two times a day  tamsulosin 0.4 milliGRAM(s) Oral at bedtime    dextrose 50% Injectable 25 Gram(s) IV Push once  dextrose 50% Injectable 12.5 Gram(s) IV Push once  dextrose 50% Injectable 25 Gram(s) IV Push once  dextrose Oral Gel 15 Gram(s) Oral once PRN  glucagon  Injectable 1 milliGRAM(s) IntraMuscular once  insulin glargine Injectable (LANTUS) 18 Unit(s) SubCutaneous at bedtime  insulin lispro (ADMELOG) corrective regimen sliding scale   SubCutaneous three times a day before meals  insulin lispro (ADMELOG) corrective regimen sliding scale   SubCutaneous at bedtime  insulin lispro Injectable (ADMELOG) 3 Unit(s) SubCutaneous three times a day before meals    dextrose 5%. 1000 milliLiter(s) IV Continuous <Continuous>  dextrose 5%. 1000 milliLiter(s) IV Continuous <Continuous>  sodium chloride 0.9%. 1000 milliLiter(s) IV Continuous <Continuous>        lactobacillus acidophilus 1 Tablet(s) Oral two times a day with meals          ICU Vital Signs Last 24 Hrs  T(C): 36.7 (11 Oct 2023 20:54), Max: 39.1 (11 Oct 2023 17:03)  T(F): 98 (11 Oct 2023 20:54), Max: 102.4 (11 Oct 2023 17:03)  HR: 60 (11 Oct 2023 20:54) (59 - 79)  BP: 92/51 (11 Oct 2023 21:20) (78/38 - 121/59)  BP(mean): --  ABP: --  ABP(mean): --  RR: 18 (11 Oct 2023 20:54) (16 - 18)  SpO2: 95% (11 Oct 2023 20:54) (93% - 98%)    O2 Parameters below as of 11 Oct 2023 20:54  Patient On (Oxygen Delivery Method): room air          Vital Signs Last 24 Hrs  T(C): 36.7 (11 Oct 2023 20:54), Max: 39.1 (11 Oct 2023 17:03)  T(F): 98 (11 Oct 2023 20:54), Max: 102.4 (11 Oct 2023 17:03)  HR: 60 (11 Oct 2023 20:54) (59 - 79)  BP: 92/51 (11 Oct 2023 21:20) (78/38 - 121/59)  BP(mean): --  RR: 18 (11 Oct 2023 20:54) (16 - 18)  SpO2: 95% (11 Oct 2023 20:54) (93% - 98%)    Parameters below as of 11 Oct 2023 20:54  Patient On (Oxygen Delivery Method): room air            I&O's Detail    10 Oct 2023 07:01  -  11 Oct 2023 07:00  --------------------------------------------------------  IN:    Oral Fluid: 60 mL  Total IN: 60 mL    OUT:    Voided (mL): 200 mL  Total OUT: 200 mL    Total NET: -140 mL            LABS:                        7.9    9.99  )-----------( 233      ( 11 Oct 2023 04:09 )             25.5     10-11    138  |  109<H>  |  43<H>  ----------------------------<  186<H>  4.4   |  20<L>  |  1.20    Ca    8.4<L>      11 Oct 2023 04:09  Mg     1.8     10-10    TPro  6.1  /  Alb  1.9<L>  /  TBili  0.4  /  DBili  x   /  AST  18  /  ALT  12  /  AlkPhos  88  10-10      CARDIAC MARKERS ( 10 Oct 2023 18:25 )  x     / x     / x     / x     / <1.0 ng/mL      CAPILLARY BLOOD GLUCOSE      POCT Blood Glucose.: 243 mg/dL (11 Oct 2023 21:20)    PT/INR - ( 10 Oct 2023 18:25 )   PT: 18.6 sec;   INR: 1.61 ratio         PTT - ( 10 Oct 2023 18:25 )  PTT:30.0 sec  Urinalysis Basic - ( 11 Oct 2023 06:00 )    Color: Yellow / Appearance: Turbid / S.018 / pH: x  Gluc: x / Ketone: Trace mg/dL  / Bili: Negative / Urobili: 0.2 mg/dL   Blood: x / Protein: 100 mg/dL / Nitrite: Negative   Leuk Esterase: Large / RBC: Too Numerous to count /HPF / WBC Too Numerous to count /HPF   Sq Epi: x / Non Sq Epi: x / Bacteria: Few /HPF      CULTURES:  Culture Results:   Growth in aerobic bottle: Gram Positive Cocci in Clusters  Growth in anaerobic bottle: Gram Positive Cocci in Clusters  Direct identification is available within approximately 3-5  hours either by Blood Panel Multiplexed PCR or Direct  MALDI-TOF. Details: [email or web address] (10-10 @ 18:25)  Culture Results:   Growth in aerobic bottle: Gram Positive Cocci in Clusters  Growth in anaerobic bottle: Gram Positive Cocci in Clusters (10-10 @ 18:25)      Physical Examination:    General: No acute distress.  Alert, oriented, interactive, nonfocal    HEENT: Pupils equal, reactive to light.  Symmetric.    PULM: Clear to auscultation bilaterally, no significant sputum production    CVS: Regular rate and rhythm, no murmurs, rubs, or gallops    ABD: Soft, nondistended, nontender, normoactive bowel sounds, no masses    EXT: No edema, nontender    SKIN: Warm and well perfused, no rashes noted.    RADIOLOGY: ***    CRITICAL CARE TIME SPENT: ***    78-year-old male presents to the hospital by ambulance from home.  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    pt now admitted with leydi heel wounds, + Blood cultures , now with hypotension     Events last 24 hours: *** blood culture sent 10/10 , with GPC , + UA  pt on ACE/ Coreg -   seen by vascular sx/ ID today , started on vanco, zosyn for coverage      PAST MEDICAL & SURGICAL HISTORY:  Diabetes      Benign essential HTN      Pacemaker      History of BPH      Diabetic foot ulcers        Allergies    No Known Allergies    Intolerances      FAMILY HISTORY:      Review of Systems:  unable to assess     Medications:  piperacillin/tazobactam IVPB.. 3.375 Gram(s) IV Intermittent every 8 hours  vancomycin  IVPB 1250 milliGRAM(s) IV Intermittent every 24 hours        acetaminophen     Tablet .. 650 milliGRAM(s) Oral every 6 hours PRN  dronabinol 2.5 milliGRAM(s) Oral two times a day  gabapentin 300 milliGRAM(s) Oral two times a day      aspirin enteric coated 81 milliGRAM(s) Oral daily    loperamide 2 milliGRAM(s) Oral three times a day PRN  pantoprazole    Tablet 40 milliGRAM(s) Oral before breakfast    bethanechol 25 milliGRAM(s) Oral two times a day  tamsulosin 0.4 milliGRAM(s) Oral at bedtime    dextrose 50% Injectable 25 Gram(s) IV Push once  dextrose 50% Injectable 12.5 Gram(s) IV Push once  dextrose 50% Injectable 25 Gram(s) IV Push once  dextrose Oral Gel 15 Gram(s) Oral once PRN  glucagon  Injectable 1 milliGRAM(s) IntraMuscular once  insulin glargine Injectable (LANTUS) 18 Unit(s) SubCutaneous at bedtime  insulin lispro (ADMELOG) corrective regimen sliding scale   SubCutaneous three times a day before meals  insulin lispro (ADMELOG) corrective regimen sliding scale   SubCutaneous at bedtime  insulin lispro Injectable (ADMELOG) 3 Unit(s) SubCutaneous three times a day before meals    dextrose 5%. 1000 milliLiter(s) IV Continuous <Continuous>  dextrose 5%. 1000 milliLiter(s) IV Continuous <Continuous>  sodium chloride 0.9%. 1000 milliLiter(s) IV Continuous <Continuous>        lactobacillus acidophilus 1 Tablet(s) Oral two times a day with meals          ICU Vital Signs Last 24 Hrs  T(C): 36.7 (11 Oct 2023 20:54), Max: 39.1 (11 Oct 2023 17:03)  T(F): 98 (11 Oct 2023 20:54), Max: 102.4 (11 Oct 2023 17:03)  HR: 60 (11 Oct 2023 20:54) (59 - 79)  BP: 92/51 (11 Oct 2023 21:20) (78/38 - 121/59)  BP(mean): --  ABP: --  ABP(mean): --  RR: 18 (11 Oct 2023 20:54) (16 - 18)  SpO2: 95% (11 Oct 2023 20:54) (93% - 98%)    O2 Parameters below as of 11 Oct 2023 20:54  Patient On (Oxygen Delivery Method): room air          Vital Signs Last 24 Hrs  T(C): 36.7 (11 Oct 2023 20:54), Max: 39.1 (11 Oct 2023 17:03)  T(F): 98 (11 Oct 2023 20:54), Max: 102.4 (11 Oct 2023 17:03)  HR: 60 (11 Oct 2023 20:54) (59 - 79)  BP: 92/51 (11 Oct 2023 21:20) (78/38 - 121/59)  BP(mean): --  RR: 18 (11 Oct 2023 20:54) (16 - 18)  SpO2: 95% (11 Oct 2023 20:54) (93% - 98%)    Parameters below as of 11 Oct 2023 20:54  Patient On (Oxygen Delivery Method): room air            I&O's Detail    10 Oct 2023 07:01  -  11 Oct 2023 07:00  --------------------------------------------------------  IN:    Oral Fluid: 60 mL  Total IN: 60 mL    OUT:    Voided (mL): 200 mL  Total OUT: 200 mL    Total NET: -140 mL            LABS:                        7.9    9.99  )-----------( 233      ( 11 Oct 2023 04:09 )             25.5     1011    138  |  109<H>  |  43<H>  ----------------------------<  186<H>  4.4   |  20<L>  |  1.20    Ca    8.4<L>      11 Oct 2023 04:09  Mg     1.8     10-10    TPro  6.1  /  Alb  1.9<L>  /  TBili  0.4  /  DBili  x   /  AST  18  /  ALT  12  /  AlkPhos  88  10-10      CARDIAC MARKERS ( 10 Oct 2023 18:25 )  x     / x     / x     / x     / <1.0 ng/mL      CAPILLARY BLOOD GLUCOSE      POCT Blood Glucose.: 243 mg/dL (11 Oct 2023 21:20)    PT/INR - ( 10 Oct 2023 18:25 )   PT: 18.6 sec;   INR: 1.61 ratio         PTT - ( 10 Oct 2023 18:25 )  PTT:30.0 sec  Urinalysis Basic - ( 11 Oct 2023 06:00 )    Color: Yellow / Appearance: Turbid / S.018 / pH: x  Gluc: x / Ketone: Trace mg/dL  / Bili: Negative / Urobili: 0.2 mg/dL   Blood: x / Protein: 100 mg/dL / Nitrite: Negative   Leuk Esterase: Large / RBC: Too Numerous to count /HPF / WBC Too Numerous to count /HPF   Sq Epi: x / Non Sq Epi: x / Bacteria: Few /HPF      CULTURES:  Culture Results:   Growth in aerobic bottle: Gram Positive Cocci in Clusters  Growth in anaerobic bottle: Gram Positive Cocci in Clusters  Direct identification is available within approximately 3-5  hours either by Blood Panel Multiplexed PCR or Direct  MALDI-TOF. Details: [email or web address] (10-10 @ 18:25)  Culture Results:   Growth in aerobic bottle: Gram Positive Cocci in Clusters  Growth in anaerobic bottle: Gram Positive Cocci in Clusters (10-10 @ 18:25)      Physical Examination:    General: No acute distress.  Alert, oriented, interactive, nonfocal    HEENT: Pupils equal, reactive to light.  Symmetric.    PULM: Clear to auscultation bilaterally, no significant sputum production    CVS: Regular rate and rhythm, no murmurs, rubs, or gallops    ABD: Soft, nondistended, nontender, normoactive bowel sounds, no masses    EXT: No edema, nontender    SKIN: Warm and well perfused, no rashes noted.    RADIOLOGY: ***   CT head : ct< from: CT Head No Cont (10.10.23 @ 19:03) >    IMPRESSION:  No evidence of acute transcortical infarct, hydrocephalus, acute   intracranial hemorrhage, or mass effect.    --- End of Report ---    < end of copied text >      CT chest: ct< from: CT Chest No Cont (10.10.23 @ 19:03) >  IMPRESSION: Small right and minimal left pleural effusion.  No acute pathology in the abdomen or pelvis  Mild esophageal distention is a risk factor for aspiration  Cholelithiasis  Nonspecific diffuse bladder wall thickening  Findings suggesting anemia    --- End of Report ---    < end of copied text >       78-year-old male presents to the hospital by ambulance from home.  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    pt now admitted with leydi heel wounds, + Blood cultures , now with hypotension     Events last 24 hours: *** blood culture sent 10/10 , with GPC , + UA  pt on ACE/ Coreg -   seen by vascular sx/ ID today , started on vanco, zosyn for coverage      PAST MEDICAL & SURGICAL HISTORY:  Diabetes      Benign essential HTN      Pacemaker      History of BPH      Diabetic foot ulcers        Allergies    No Known Allergies    Intolerances      FAMILY HISTORY:      Review of Systems:  unable to assess     Medications:  piperacillin/tazobactam IVPB.. 3.375 Gram(s) IV Intermittent every 8 hours  vancomycin  IVPB 1250 milliGRAM(s) IV Intermittent every 24 hours        acetaminophen     Tablet .. 650 milliGRAM(s) Oral every 6 hours PRN  dronabinol 2.5 milliGRAM(s) Oral two times a day  gabapentin 300 milliGRAM(s) Oral two times a day      aspirin enteric coated 81 milliGRAM(s) Oral daily    loperamide 2 milliGRAM(s) Oral three times a day PRN  pantoprazole    Tablet 40 milliGRAM(s) Oral before breakfast    bethanechol 25 milliGRAM(s) Oral two times a day  tamsulosin 0.4 milliGRAM(s) Oral at bedtime    dextrose 50% Injectable 25 Gram(s) IV Push once  dextrose 50% Injectable 12.5 Gram(s) IV Push once  dextrose 50% Injectable 25 Gram(s) IV Push once  dextrose Oral Gel 15 Gram(s) Oral once PRN  glucagon  Injectable 1 milliGRAM(s) IntraMuscular once  insulin glargine Injectable (LANTUS) 18 Unit(s) SubCutaneous at bedtime  insulin lispro (ADMELOG) corrective regimen sliding scale   SubCutaneous three times a day before meals  insulin lispro (ADMELOG) corrective regimen sliding scale   SubCutaneous at bedtime  insulin lispro Injectable (ADMELOG) 3 Unit(s) SubCutaneous three times a day before meals    dextrose 5%. 1000 milliLiter(s) IV Continuous <Continuous>  dextrose 5%. 1000 milliLiter(s) IV Continuous <Continuous>  sodium chloride 0.9%. 1000 milliLiter(s) IV Continuous <Continuous>        lactobacillus acidophilus 1 Tablet(s) Oral two times a day with meals          ICU Vital Signs Last 24 Hrs  T(C): 36.7 (11 Oct 2023 20:54), Max: 39.1 (11 Oct 2023 17:03)  T(F): 98 (11 Oct 2023 20:54), Max: 102.4 (11 Oct 2023 17:03)  HR: 60 (11 Oct 2023 20:54) (59 - 79)  BP: 92/51 (11 Oct 2023 21:20) (78/38 - 121/59)  BP(mean): --  ABP: --  ABP(mean): --  RR: 18 (11 Oct 2023 20:54) (16 - 18)  SpO2: 95% (11 Oct 2023 20:54) (93% - 98%)    O2 Parameters below as of 11 Oct 2023 20:54  Patient On (Oxygen Delivery Method): room air          Vital Signs Last 24 Hrs  T(C): 36.7 (11 Oct 2023 20:54), Max: 39.1 (11 Oct 2023 17:03)  T(F): 98 (11 Oct 2023 20:54), Max: 102.4 (11 Oct 2023 17:03)  HR: 60 (11 Oct 2023 20:54) (59 - 79)  BP: 92/51 (11 Oct 2023 21:20) (78/38 - 121/59)  BP(mean): --  RR: 18 (11 Oct 2023 20:54) (16 - 18)  SpO2: 95% (11 Oct 2023 20:54) (93% - 98%)    Parameters below as of 11 Oct 2023 20:54  Patient On (Oxygen Delivery Method): room air            I&O's Detail    10 Oct 2023 07:01  -  11 Oct 2023 07:00  --------------------------------------------------------  IN:    Oral Fluid: 60 mL  Total IN: 60 mL    OUT:    Voided (mL): 200 mL  Total OUT: 200 mL    Total NET: -140 mL            LABS:                        7.9    9.99  )-----------( 233      ( 11 Oct 2023 04:09 )             25.5     1011    138  |  109<H>  |  43<H>  ----------------------------<  186<H>  4.4   |  20<L>  |  1.20    Ca    8.4<L>      11 Oct 2023 04:09  Mg     1.8     10-10    TPro  6.1  /  Alb  1.9<L>  /  TBili  0.4  /  DBili  x   /  AST  18  /  ALT  12  /  AlkPhos  88  10-10      CARDIAC MARKERS ( 10 Oct 2023 18:25 )  x     / x     / x     / x     / <1.0 ng/mL      CAPILLARY BLOOD GLUCOSE      POCT Blood Glucose.: 243 mg/dL (11 Oct 2023 21:20)    PT/INR - ( 10 Oct 2023 18:25 )   PT: 18.6 sec;   INR: 1.61 ratio         PTT - ( 10 Oct 2023 18:25 )  PTT:30.0 sec  Urinalysis Basic - ( 11 Oct 2023 06:00 )    Color: Yellow / Appearance: Turbid / S.018 / pH: x  Gluc: x / Ketone: Trace mg/dL  / Bili: Negative / Urobili: 0.2 mg/dL   Blood: x / Protein: 100 mg/dL / Nitrite: Negative   Leuk Esterase: Large / RBC: Too Numerous to count /HPF / WBC Too Numerous to count /HPF   Sq Epi: x / Non Sq Epi: x / Bacteria: Few /HPF      CULTURES:  Culture Results:   Growth in aerobic bottle: Gram Positive Cocci in Clusters  Growth in anaerobic bottle: Gram Positive Cocci in Clusters  Direct identification is available within approximately 3-5  hours either by Blood Panel Multiplexed PCR or Direct  MALDI-TOF. Details: [email or web address] (10-10 @ 18:25)  Culture Results:   Growth in aerobic bottle: Gram Positive Cocci in Clusters  Growth in anaerobic bottle: Gram Positive Cocci in Clusters (10-10 @ 18:25)      Physical Examination:    General: No acute distress.  Alert, oriented, interactive, nonfocal    HEENT: Pupils equal, reactive to light.  Symmetric.    PULM: Clear to auscultation bilaterally, no significant sputum production    CVS: Regular rate and rhythm, no murmurs, rubs, or gallops    ABD: Soft, nondistended, nontender, normoactive bowel sounds, no masses    EXT: No edema, nontender    SKIN: Warm and well perfused, no rashes noted.    RADIOLOGY: ***   CT head : ct< from: CT Head No Cont (10.10.23 @ 19:03) >    IMPRESSION:  No evidence of acute transcortical infarct, hydrocephalus, acute   intracranial hemorrhage, or mass effect.    --- End of Report ---    < end of copied text >      CT chest: ct< from: CT Chest No Cont (10.10.23 @ 19:03) >  IMPRESSION: Small right and minimal left pleural effusion.  No acute pathology in the abdomen or pelvis  Mild esophageal distention is a risk factor for aspiration  Cholelithiasis  Nonspecific diffuse bladder wall thickening  Findings suggesting anemia    --- End of Report ---    < end of copied text >       78-year-old male presents to the hospital by ambulance from home.  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    pt now admitted with leydi heel wounds, + Blood cultures , now with hypotension     Events last 24 hours: *** blood culture sent 10/10 , with GPC , + UA  pt on ACE/ Coreg -   seen by vascular sx/ ID today , started on vanco, zosyn for coverage      PAST MEDICAL & SURGICAL HISTORY:  Diabetes      Benign essential HTN      Pacemaker      History of BPH      Diabetic foot ulcers        Allergies    No Known Allergies    Intolerances      FAMILY HISTORY:      Review of Systems:  unable to assess     Medications:  piperacillin/tazobactam IVPB.. 3.375 Gram(s) IV Intermittent every 8 hours  vancomycin  IVPB 1250 milliGRAM(s) IV Intermittent every 24 hours        acetaminophen     Tablet .. 650 milliGRAM(s) Oral every 6 hours PRN  dronabinol 2.5 milliGRAM(s) Oral two times a day  gabapentin 300 milliGRAM(s) Oral two times a day      aspirin enteric coated 81 milliGRAM(s) Oral daily    loperamide 2 milliGRAM(s) Oral three times a day PRN  pantoprazole    Tablet 40 milliGRAM(s) Oral before breakfast    bethanechol 25 milliGRAM(s) Oral two times a day  tamsulosin 0.4 milliGRAM(s) Oral at bedtime    dextrose 50% Injectable 25 Gram(s) IV Push once  dextrose 50% Injectable 12.5 Gram(s) IV Push once  dextrose 50% Injectable 25 Gram(s) IV Push once  dextrose Oral Gel 15 Gram(s) Oral once PRN  glucagon  Injectable 1 milliGRAM(s) IntraMuscular once  insulin glargine Injectable (LANTUS) 18 Unit(s) SubCutaneous at bedtime  insulin lispro (ADMELOG) corrective regimen sliding scale   SubCutaneous three times a day before meals  insulin lispro (ADMELOG) corrective regimen sliding scale   SubCutaneous at bedtime  insulin lispro Injectable (ADMELOG) 3 Unit(s) SubCutaneous three times a day before meals    dextrose 5%. 1000 milliLiter(s) IV Continuous <Continuous>  dextrose 5%. 1000 milliLiter(s) IV Continuous <Continuous>  sodium chloride 0.9%. 1000 milliLiter(s) IV Continuous <Continuous>        lactobacillus acidophilus 1 Tablet(s) Oral two times a day with meals          ICU Vital Signs Last 24 Hrs  T(C): 36.7 (11 Oct 2023 20:54), Max: 39.1 (11 Oct 2023 17:03)  T(F): 98 (11 Oct 2023 20:54), Max: 102.4 (11 Oct 2023 17:03)  HR: 60 (11 Oct 2023 20:54) (59 - 79)  BP: 92/51 (11 Oct 2023 21:20) (78/38 - 121/59)  BP(mean): --  ABP: --  ABP(mean): --  RR: 18 (11 Oct 2023 20:54) (16 - 18)  SpO2: 95% (11 Oct 2023 20:54) (93% - 98%)    O2 Parameters below as of 11 Oct 2023 20:54  Patient On (Oxygen Delivery Method): room air          Vital Signs Last 24 Hrs  T(C): 36.7 (11 Oct 2023 20:54), Max: 39.1 (11 Oct 2023 17:03)  T(F): 98 (11 Oct 2023 20:54), Max: 102.4 (11 Oct 2023 17:03)  HR: 60 (11 Oct 2023 20:54) (59 - 79)  BP: 92/51 (11 Oct 2023 21:20) (78/38 - 121/59)  BP(mean): --  RR: 18 (11 Oct 2023 20:54) (16 - 18)  SpO2: 95% (11 Oct 2023 20:54) (93% - 98%)    Parameters below as of 11 Oct 2023 20:54  Patient On (Oxygen Delivery Method): room air            I&O's Detail    10 Oct 2023 07:01  -  11 Oct 2023 07:00  --------------------------------------------------------  IN:    Oral Fluid: 60 mL  Total IN: 60 mL    OUT:    Voided (mL): 200 mL  Total OUT: 200 mL    Total NET: -140 mL            LABS:                        7.9    9.99  )-----------( 233      ( 11 Oct 2023 04:09 )             25.5     1011    138  |  109<H>  |  43<H>  ----------------------------<  186<H>  4.4   |  20<L>  |  1.20    Ca    8.4<L>      11 Oct 2023 04:09  Mg     1.8     10-10    TPro  6.1  /  Alb  1.9<L>  /  TBili  0.4  /  DBili  x   /  AST  18  /  ALT  12  /  AlkPhos  88  10-10      CARDIAC MARKERS ( 10 Oct 2023 18:25 )  x     / x     / x     / x     / <1.0 ng/mL      CAPILLARY BLOOD GLUCOSE      POCT Blood Glucose.: 243 mg/dL (11 Oct 2023 21:20)    PT/INR - ( 10 Oct 2023 18:25 )   PT: 18.6 sec;   INR: 1.61 ratio         PTT - ( 10 Oct 2023 18:25 )  PTT:30.0 sec  Urinalysis Basic - ( 11 Oct 2023 06:00 )    Color: Yellow / Appearance: Turbid / S.018 / pH: x  Gluc: x / Ketone: Trace mg/dL  / Bili: Negative / Urobili: 0.2 mg/dL   Blood: x / Protein: 100 mg/dL / Nitrite: Negative   Leuk Esterase: Large / RBC: Too Numerous to count /HPF / WBC Too Numerous to count /HPF   Sq Epi: x / Non Sq Epi: x / Bacteria: Few /HPF      CULTURES:  Culture Results:   Growth in aerobic bottle: Gram Positive Cocci in Clusters  Growth in anaerobic bottle: Gram Positive Cocci in Clusters  Direct identification is available within approximately 3-5  hours either by Blood Panel Multiplexed PCR or Direct  MALDI-TOF. Details: [email or web address] (10-10 @ 18:25)  Culture Results:   Growth in aerobic bottle: Gram Positive Cocci in Clusters  Growth in anaerobic bottle: Gram Positive Cocci in Clusters (10-10 @ 18:25)      Physical Examination:    General: No acute distress.  Alert, oriented, interactive, nonfocal    HEENT: Pupils equal, reactive to light.  Symmetric.    PULM: Clear to auscultation bilaterally, no significant sputum production    CVS: Regular rate and rhythm, no murmurs, rubs, or gallops    ABD: Soft, nondistended, nontender, normoactive bowel sounds, no masses    EXT: No edema, nontender    SKIN: Warm and well perfused, no rashes noted.    RADIOLOGY: ***   CT head : ct< from: CT Head No Cont (10.10.23 @ 19:03) >    IMPRESSION:  No evidence of acute transcortical infarct, hydrocephalus, acute   intracranial hemorrhage, or mass effect.    --- End of Report ---    < end of copied text >      CT chest: ct< from: CT Chest No Cont (10.10.23 @ 19:03) >  IMPRESSION: Small right and minimal left pleural effusion.  No acute pathology in the abdomen or pelvis  Mild esophageal distention is a risk factor for aspiration  Cholelithiasis  Nonspecific diffuse bladder wall thickening  Findings suggesting anemia    --- End of Report ---    < end of copied text >

## 2023-10-11 NOTE — H&P ADULT - NSICDXPASTMEDICALHX_GEN_ALL_CORE_FT
PAST MEDICAL HISTORY:  Benign essential HTN     Diabetes     Diabetic foot ulcers     History of BPH     Pacemaker

## 2023-10-11 NOTE — CARE COORDINATION ASSESSMENT. - PRO ARRIVE FROM
[facility] Hot/other (specify) [facility] Hot/other (specify) [facility] Hot/other (specify)

## 2023-10-11 NOTE — CHART NOTE - NSCHARTNOTEFT_GEN_A_CORE
Called by RN as patient hypotensive SBP 78. Patient seen at bedside. Repeat BP 78/40. Pt lethargic appearing but A&Ox3 on my encounter. Likely volume depleted, will give NS 1L bolus for hypotension in setting of sepsis secondary to urinary tract infection. Will also order 1u pRBC given hg 7.9 in AM. Pt received 1u pRBC last evening with appropriate rise in hg. RN to call with any concerns. Called by RN as patient hypotensive SBP 78. Patient seen at bedside. Repeat BP 78/40. Pt lethargic appearing but A&Ox3 on my encounter. Likely volume depleted, will give NS 1L bolus for hypotension in setting of sepsis secondary to infected wounds / urinary tract infection. Will also order 1u pRBC given hg 7.9 in AM. Pt received 1u pRBC last evening with appropriate rise in hg. RN to call with any concerns. Called by RN as patient hypotensive SBP 78. Patient seen and examined at bedside. Patient appears lethargic, answering some questions but intermittently will not respond to questions. Patient is alert to person but unable to answer to place, time. I repeated blood pressure at bedside after patient had received ~ 100cc NS out of NS 500cc bag, BP is 78/40 manually L arm. Exam notable for diaphoretic, lethargic and pale-appearing male, arousable and responding to simple commands. Heart regular rate, rhythm. Lungs no acc muscle use, dec effort, abd soft, does not grimace to pain. LE with bilateral wounds, dressing C/D/I LLE. Neuro exam notable for alert, oriented x1 to person. Patient admitted with sepsis secondary to wound infections and urinary tract infections. Suspect patient likely volume depleted, will give NS 1L bolus for hypotension in setting of sepsis secondary to infected wounds / urinary tract infection. Will also order 1u pRBC given hg 7.9 in AM. Pt received 1u pRBC last evening with appropriate rise in hg. Repeat CBC with AM labs. RN to call with any concerns.     I consulted ICU for evaluation of the patient given hypotension in the setting of septic shock.      Addendum: Patient SBP 92 on repeat check. Called by RN as patient hypotensive SBP 78. Patient seen and examined at bedside. Patient appears lethargic, answering some questions but intermittently will not respond to questions. Patient is alert to person but unable to answer to place, time. I repeated blood pressure at bedside after patient had received ~ 100cc NS out of NS 500cc bag, BP is 78/40 manually L arm. Exam notable for diaphoretic, lethargic and pale-appearing male, arousable and responding to simple commands. Heart regular rate, rhythm. Lungs no acc muscle use, dec effort, abd soft, does not grimace to pain. LE with bilateral wounds, dressing C/D/I LLE. Neuro exam notable for alert, oriented x1 to person. Patient admitted with sepsis secondary to wound infections and urinary tract infections. Suspect patient likely volume depleted, will give NS 1L bolus for hypotension in setting of sepsis secondary to infected wounds / urinary tract infection. Will also order 1u pRBC given hg 7.9 in AM. Pt received 1u pRBC last evening with appropriate rise in hg. Repeat CBC with AM labs. RN to call with any concerns.     I consulted ICU for evaluation of the patient given hypotension in the setting of septic shock.      Addendum: Patient SBP 92 on repeat check. Will start NS mIVF @ 50cc x 12 hrs

## 2023-10-11 NOTE — CONSULT NOTE ADULT - SUBJECTIVE AND OBJECTIVE BOX
INCOMPLETE NOTE.  Documentation in Progress  PT SEEN AND EVALUATED.   FULL/ADDITIONAL RECOMMENDATIONS TO FOLLOW   ***************************************************************    Patient is a 78y old  Male who presents with a chief complaint of fever and weakness (11 Oct 2023 22:04)      HPI:  78-year-old male presents to the hospital by ambulance from home.  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 Pt does not go to doctor on regualar basis per son and does phone visits.   (11 Oct 2023 07:02)      PAST MEDICAL & SURGICAL HISTORY:  Diabetes      Benign essential HTN      Pacemaker      History of BPH      Diabetic foot ulcers         HEALTH ISSUES - PROBLEM Dx:  Diabetes    Diabetic foot ulcers    Benign essential HTN    Anemia, unspecified    Acute UTI    Diabetic ulcer of left foot    Non-healing wound of right heel    Type 2 diabetes mellitus          Urinary tract infection [N39.0]    Diabetes [E11.9]    Benign essential HTN [I10]    Pacemaker [Z95.0]    History of BPH [Z87.438]    Diabetic foot ulcers [E11.621]    Anemia of chronic disease [D63.8]      FAMILY HISTORY:      [SOCIAL HISTORY: ]     smoking:  none currently     EtOH:  none currently     illicit drugs:  none currently     occupation:  Retired     marital status:       Other:       [ALLERGIES/INTOLERANCES:]  Allergies    No Known Allergies    Intolerances        [MEDICATIONS]  MEDICATIONS  (STANDING):  aspirin enteric coated 81 milliGRAM(s) Oral daily  bethanechol 25 milliGRAM(s) Oral two times a day  dextrose 5%. 1000 milliLiter(s) (50 mL/Hr) IV Continuous <Continuous>  dextrose 5%. 1000 milliLiter(s) (100 mL/Hr) IV Continuous <Continuous>  dextrose 50% Injectable 25 Gram(s) IV Push once  dextrose 50% Injectable 12.5 Gram(s) IV Push once  dextrose 50% Injectable 25 Gram(s) IV Push once  dronabinol 2.5 milliGRAM(s) Oral two times a day  gabapentin 300 milliGRAM(s) Oral two times a day  glucagon  Injectable 1 milliGRAM(s) IntraMuscular once  insulin glargine Injectable (LANTUS) 18 Unit(s) SubCutaneous at bedtime  insulin lispro (ADMELOG) corrective regimen sliding scale   SubCutaneous three times a day before meals  insulin lispro (ADMELOG) corrective regimen sliding scale   SubCutaneous at bedtime  insulin lispro Injectable (ADMELOG) 3 Unit(s) SubCutaneous three times a day before meals  lactobacillus acidophilus 1 Tablet(s) Oral two times a day with meals  pantoprazole    Tablet 40 milliGRAM(s) Oral before breakfast  piperacillin/tazobactam IVPB.. 3.375 Gram(s) IV Intermittent every 8 hours  sodium chloride 0.9%. 1000 milliLiter(s) (60 mL/Hr) IV Continuous <Continuous>  tamsulosin 0.4 milliGRAM(s) Oral at bedtime  vancomycin  IVPB 1250 milliGRAM(s) IV Intermittent every 24 hours    MEDICATIONS  (PRN):  acetaminophen     Tablet .. 650 milliGRAM(s) Oral every 6 hours PRN Temp greater or equal to 38C (100.4F), Moderate Pain (4 - 6)  dextrose Oral Gel 15 Gram(s) Oral once PRN Blood Glucose LESS THAN 70 milliGRAM(s)/deciliter  loperamide 2 milliGRAM(s) Oral three times a day PRN Diarrhea      [REVIEW OF SYSTEMS: ]  CONSTITUTIONAL: normal, no fever, no shakes, no chills   EYES: No eye pain, no visual disturbances, no discharge  ENMT:  no discharge  NECK: No pain, no stiffness  BREASTS: No pain, no masses, no nipple discharge  RESPIRATORY: No cough, no wheezing, no chills, no hemoptysis; No shortness of breath  CARDIOVASCULAR: No chest pain, no palpitations, no dizziness, no leg swelling  GASTROINTESTINAL: No abdominal, no epigastric pain. No nausea, no vomiting, no hematemesis; No diarrhea , no constipation. No melena, no hematochezia.  GENITOURINARY: No dysuria, no frequency, no hematuria, no incontinence  NEUROLOGICAL: No headaches, no memory loss, no loss of strength, no numbness, no tremors  SKIN: No itching, no burning, no rashes, no lesions   LYMPH NODES: No enlarged glands  ENDOCRINE: No heat or cold intolerance; No hair loss  MUSCULOSKELETAL: No joint pain or swelling; No muscle, no back, no extremity pain  PSYCHIATRIC: No depression, no anxiety, no mood swings, no difficulty sleeping  HEME/LYMPH: No easy bruising, no bleeding gums    [VITALS SIGNS 24hrs]  Vital Signs Last 24 Hrs  T(C): 36.7 (11 Oct 2023 20:54), Max: 39.1 (11 Oct 2023 17:03)  T(F): 98 (11 Oct 2023 20:54), Max: 102.4 (11 Oct 2023 17:03)  HR: 60 (11 Oct 2023 20:54) (59 - 79)  BP: 92/51 (11 Oct 2023 21:20) (78/38 - 121/59)  BP(mean): --  RR: 18 (11 Oct 2023 20:54) (16 - 18)  SpO2: 95% (11 Oct 2023 20:54) (93% - 98%)    Parameters below as of 11 Oct 2023 20:54  Patient On (Oxygen Delivery Method): room air      Daily     Daily Weight in k.2 (11 Oct 2023 02:25)    I&O's Summary    10 Oct 2023 07:01  -  11 Oct 2023 07:00  --------------------------------------------------------  IN: 60 mL / OUT: 200 mL / NET: -140 mL        [PHYSICAL EXAM]  GEN:   HEENT: normocephalic and atraumatic. EOMI. PERRL.    NECK: Supple.  No lymphadenopathy   LUNGS: Clear to auscultation.  HEART: S1S2 Regular rate and rhythm, no MRG  ABDOMEN: Soft, nontender, and nondistended.  Positive bowel sounds.    : No CVA tenderness  EXTREMITIES: Without edema.  NEUROLOGIC: grossly intact.  PSYCHIATRIC: Appropriate affect .  SKIN: No rash     [LABS: ]                        7.9    9.99  )-----------( 233      ( 11 Oct 2023 04:09 )             25.5     CBC Full  -  ( 11 Oct 2023 04:09 )  WBC Count : 9.99 K/uL  RBC Count : 3.08 M/uL  Hemoglobin : 7.9 g/dL  Hematocrit : 25.5 %  Platelet Count - Automated : 233 K/uL  Mean Cell Volume : 82.8 fl  Mean Cell Hemoglobin : 25.6 pg  Mean Cell Hemoglobin Concentration : 31.0 gm/dL  Auto Neutrophil # : x  Auto Lymphocyte # : x  Auto Monocyte # : x  Auto Eosinophil # : x  Auto Basophil # : x  Auto Neutrophil % : x  Auto Lymphocyte % : x  Auto Monocyte % : x  Auto Eosinophil % : x  Auto Basophil % : x    10-11    138  |  109<H>  |  43<H>  ----------------------------<  186<H>  4.4   |  20<L>  |  1.20    Ca    8.4<L>      11 Oct 2023 04:09  Mg     1.8     10-10    TPro  6.1  /  Alb  1.9<L>  /  TBili  0.4  /  DBili  x   /  AST  18  /  ALT  12  /  AlkPhos  88  10-10    PT/INR - ( 10 Oct 2023 18:25 )   PT: 18.6 sec;   INR: 1.61 ratio         PTT - ( 10 Oct 2023 18:25 )  PTT:30.0 sec  LIVER FUNCTIONS - ( 10 Oct 2023 18:25 )  Alb: 1.9 g/dL / Pro: 6.1 g/dL / ALK PHOS: 88 U/L / ALT: 12 U/L / AST: 18 U/L / GGT: x           CARDIAC MARKERS ( 10 Oct 2023 18:25 )  x     / x     / x     / x     / <1.0 ng/mL      Urinalysis Basic - ( 11 Oct 2023 06:00 )    Color: Yellow / Appearance: Turbid / S.018 / pH: x  Gluc: x / Ketone: Trace mg/dL  / Bili: Negative / Urobili: 0.2 mg/dL   Blood: x / Protein: 100 mg/dL / Nitrite: Negative   Leuk Esterase: Large / RBC: Too Numerous to count /HPF / WBC Too Numerous to count /HPF   Sq Epi: x / Non Sq Epi: x / Bacteria: Few /HPF          CBC TREND (5 Days)  WBC Count: 9.99 K/uL (10-11 @ 04:09)  WBC Count: 11.08 K/uL (10-10 @ 18:25)    Hemoglobin: 7.9 g/dL (10-11 @ 04:09)  Hemoglobin: 6.6 g/dL (10-10 @ 18:25)    Hematocrit: 25.5 % (10-11 @ 04:09)  Hematocrit: 21.0 % (10-10 @ 18:25)    Platelet Count - Automated: 233 K/uL (10-11 @ 04:09)  Platelet Count - Automated: 228 K/uL (10-10 @ 18:25)                   Sedimentation Rate, Erythrocyte: 101 mm/hr (10-10 @ 18:25)                           [MICROBIOLOGY /  VIROLOGY:]        Culture - Blood (collected 10 Oct 2023 18:25)  Source: .Blood Blood-Peripheral  Gram Stain (11 Oct 2023 21:56):    Growth in aerobic bottle: Gram Positive Cocci in Clusters    Growth in anaerobic bottle: Gram Positive Cocci in Clusters  Preliminary Report (11 Oct 2023 21:56):    Growth in aerobic bottle: Gram Positive Cocci in Clusters    Growth in anaerobic bottle: Gram Positive Cocci in Clusters    Culture - Blood (collected 10 Oct 2023 18:25)  Source: .Blood Blood-Peripheral  Gram Stain (11 Oct 2023 15:29):    Growth in aerobic bottle: Gram Positive Cocci in Clusters    Growth in anaerobic bottle: Gram Positive Cocci in Clusters  Preliminary Report (11 Oct 2023 15:29):    Growth in aerobic bottle: Gram Positive Cocci in Clusters    Growth in anaerobic bottle: Gram Positive Cocci in Clusters    Direct identification is available within approximately 3-5    hours either by Blood Panel Multiplexed PCR or Direct    MALDI-TOF. Details: [email or web address]  Organism: Blood Culture PCR (11 Oct 2023 16:30)  Organism: Blood Culture PCR (11 Oct 2023 16:30)        [PATHOLOGY]       [RADIOLOGY & ADDITIONAL STUDIES:]     CT Chest No Cont:   ACC: [id number] EXAM:  CT CHEST   ORDERED BY: NEEL RADFORD     ACC: [id number] EXAM:  CT ABDOMEN AND PELVIS   ORDERED BY: NEEL RADFORD     PROCEDURE DATE:  10/10/2023          INTERPRETATION:  CLINICAL INFORMATION: Nausea vomiting and fever. Anorexia    COMPARISON: None.    CONTRAST/COMPLICATIONS:  IV Contrast: NONE  Oral Contrast: NONE  Complications: None reported at time of study completion    PROCEDURE:  CT of the Chest, Abdomen and Pelvis was performed.  Sagittal and coronal reformats were performed.    FINDINGS:  CHEST:  LUNGS AND LARGE AIRWAYS: Patent central airways. No pulmonary nodules.  PLEURA: Small right and minimal left pleural effusion  VESSELS: Atherosclerotic changes of the aorta and coronary arteries.  HEART: Heart size is normal. Minimal pericardial effusion. Pacemaker   leads. Decreased blood pool density in the ventricles suggesting anemia.   Mitral annular calcification  MEDIASTINUM AND IVELISSE: No lymphadenopathy. The esophagus is mildly   distended and filled with air and a small amount of debris  CHEST WALL AND LOWER NECK: Pacemaker battery left chest wall    ABDOMEN AND PELVIS:  LIVER: Within normal limits.  BILE DUCTS: Normal caliber.  GALLBLADDER: Cholelithiasis.  SPLEEN: Within normal limits.  PANCREAS: Atrophic.  ADRENALS: Within normal limits.  KIDNEYS/URETERS: Within normal limits.    BLADDER: Mild diffuse nonspecific ladder wall thickening  REPRODUCTIVE ORGANS: Prostate within normal limits.    BOWEL: No bowel obstruction. Appendix is not visualized. No evidence of   inflammation in the pericecal region. There is colonic diverticulosis   without diverticulitis  PERITONEUM: No ascites.  VESSELS: Atherosclerotic changes.  RETROPERITONEUM/LYMPH NODES: No lymphadenopathy.  ABDOMINAL WALL: Within normal limits.  BONES: Degenerative changes.. ORIF of the left hip with 3 screws seen    IMPRESSION: Small right and minimal left pleural effusion.  No acute pathology in the abdomen or pelvis  Mild esophageal distention is a risk factor for aspiration  Cholelithiasis  Nonspecific diffuse bladder wall thickening  Findings suggesting anemia    --- End of Report ---            SIDRA WALDEN MD; Attending Radiologist  This document has been electronically signed. Oct 10 2023  7:35PM (10-10-23 @ 19:03)  CT Abdomen and Pelvis No Cont:   ACC: [id number] EXAM:  CT CHEST   ORDERED BY: NEEL RADFORD     ACC: [id number] EXAM:  CT ABDOMEN AND PELVIS   ORDERED BY: NEEL RADFORD     PROCEDURE DATE:  10/10/2023          INTERPRETATION:  CLINICAL INFORMATION: Nausea vomiting and fever. Anorexia    COMPARISON: None.    CONTRAST/COMPLICATIONS:  IV Contrast: NONE  Oral Contrast: NONE  Complications: None reported at time of study completion    PROCEDURE:  CT of the Chest, Abdomen and Pelvis was performed.  Sagittal and coronal reformats were performed.    FINDINGS:  CHEST:  LUNGS AND LARGE AIRWAYS: Patent central airways. No pulmonary nodules.  PLEURA: Small right and minimal left pleural effusion  VESSELS: Atherosclerotic changes of the aorta and coronary arteries.  HEART: Heart size is normal. Minimal pericardial effusion. Pacemaker   leads. Decreased blood pool density in the ventricles suggesting anemia.   Mitral annular calcification  MEDIASTINUM AND IVELISSE: No lymphadenopathy. The esophagus is mildly   distended and filled with air and a small amount of debris  CHEST WALL AND LOWER NECK: Pacemaker battery left chest wall    ABDOMEN AND PELVIS:  LIVER: Within normal limits.  BILE DUCTS: Normal caliber.  GALLBLADDER: Cholelithiasis.  SPLEEN: Within normal limits.  PANCREAS: Atrophic.  ADRENALS: Within normal limits.  KIDNEYS/URETERS: Within normal limits.    BLADDER: Mild diffuse nonspecific ladder wall thickening  REPRODUCTIVE ORGANS: Prostate within normal limits.    BOWEL: No bowel obstruction. Appendix is not visualized. No evidence of   inflammation in the pericecal region. There is colonic diverticulosis   without diverticulitis  PERITONEUM: No ascites.  VESSELS: Atherosclerotic changes.  RETROPERITONEUM/LYMPH NODES: No lymphadenopathy.  ABDOMINAL WALL: Within normal limits.  BONES: Degenerative changes.. ORIF of the left hip with 3 screws seen    IMPRESSION: Small right and minimal left pleural effusion.  No acute pathology in the abdomen or pelvis  Mild esophageal distention is a risk factor for aspiration  Cholelithiasis  Nonspecific diffuse bladder wall thickening  Findings suggesting anemia    --- End of Report ---            SIDRA WALDEN MD; Attending Radiologist  This document has been electronically signed. Oct 10 2023  7:35PM (10-10-23 @ 19:03)     INCOMPLETE NOTE.  Documentation in Progress  PT SEEN AND EVALUATED.   FULL/ADDITIONAL RECOMMENDATIONS TO FOLLOW   ***************************************************************    Patient is a 78y old  Male who presents with a chief complaint of fever and weakness (11 Oct 2023 22:04)      HPI:  78-year-old male presents to the hospital by ambulance from home.  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 Pt does not go to doctor on regualar basis per son and does phone visits.   (11 Oct 2023 07:02)      PAST MEDICAL & SURGICAL HISTORY:  Diabetes      Benign essential HTN      Pacemaker      History of BPH      Diabetic foot ulcers         HEALTH ISSUES - PROBLEM Dx:  Diabetes    Diabetic foot ulcers    Benign essential HTN    Anemia, unspecified    Acute UTI    Diabetic ulcer of left foot    Non-healing wound of right heel    Type 2 diabetes mellitus          Urinary tract infection [N39.0]    Diabetes [E11.9]    Benign essential HTN [I10]    Pacemaker [Z95.0]    History of BPH [Z87.438]    Diabetic foot ulcers [E11.621]    Anemia of chronic disease [D63.8]      FAMILY HISTORY:      [SOCIAL HISTORY: ]     smoking:  none currently     EtOH:  none currently     illicit drugs:  none currently     occupation:  Retired     marital status:       Other:       [ALLERGIES/INTOLERANCES:]  Allergies    No Known Allergies    Intolerances        [MEDICATIONS]  MEDICATIONS  (STANDING):  aspirin enteric coated 81 milliGRAM(s) Oral daily  bethanechol 25 milliGRAM(s) Oral two times a day  dextrose 5%. 1000 milliLiter(s) (50 mL/Hr) IV Continuous <Continuous>  dextrose 5%. 1000 milliLiter(s) (100 mL/Hr) IV Continuous <Continuous>  dextrose 50% Injectable 25 Gram(s) IV Push once  dextrose 50% Injectable 12.5 Gram(s) IV Push once  dextrose 50% Injectable 25 Gram(s) IV Push once  dronabinol 2.5 milliGRAM(s) Oral two times a day  gabapentin 300 milliGRAM(s) Oral two times a day  glucagon  Injectable 1 milliGRAM(s) IntraMuscular once  insulin glargine Injectable (LANTUS) 18 Unit(s) SubCutaneous at bedtime  insulin lispro (ADMELOG) corrective regimen sliding scale   SubCutaneous three times a day before meals  insulin lispro (ADMELOG) corrective regimen sliding scale   SubCutaneous at bedtime  insulin lispro Injectable (ADMELOG) 3 Unit(s) SubCutaneous three times a day before meals  lactobacillus acidophilus 1 Tablet(s) Oral two times a day with meals  pantoprazole    Tablet 40 milliGRAM(s) Oral before breakfast  piperacillin/tazobactam IVPB.. 3.375 Gram(s) IV Intermittent every 8 hours  sodium chloride 0.9%. 1000 milliLiter(s) (60 mL/Hr) IV Continuous <Continuous>  tamsulosin 0.4 milliGRAM(s) Oral at bedtime  vancomycin  IVPB 1250 milliGRAM(s) IV Intermittent every 24 hours    MEDICATIONS  (PRN):  acetaminophen     Tablet .. 650 milliGRAM(s) Oral every 6 hours PRN Temp greater or equal to 38C (100.4F), Moderate Pain (4 - 6)  dextrose Oral Gel 15 Gram(s) Oral once PRN Blood Glucose LESS THAN 70 milliGRAM(s)/deciliter  loperamide 2 milliGRAM(s) Oral three times a day PRN Diarrhea      [REVIEW OF SYSTEMS: ]  CONSTITUTIONAL: normal, no fever, no shakes, no chills   EYES: No eye pain, no visual disturbances, no discharge  ENMT:  no discharge  NECK: No pain, no stiffness  BREASTS: No pain, no masses, no nipple discharge  RESPIRATORY: No cough, no wheezing, no chills, no hemoptysis; No shortness of breath  CARDIOVASCULAR: No chest pain, no palpitations, no dizziness, no leg swelling  GASTROINTESTINAL: No abdominal, no epigastric pain. No nausea, no vomiting, no hematemesis; No diarrhea , no constipation. No melena, no hematochezia.  GENITOURINARY: No dysuria, no frequency, no hematuria, no incontinence  NEUROLOGICAL: No headaches, no memory loss, no loss of strength, no numbness, no tremors  SKIN: No itching, no burning, no rashes, no lesions   LYMPH NODES: No enlarged glands  ENDOCRINE: No heat or cold intolerance; No hair loss  MUSCULOSKELETAL: No joint pain or swelling; No muscle, no back, no extremity pain  PSYCHIATRIC: No depression, no anxiety, no mood swings, no difficulty sleeping  HEME/LYMPH: No easy bruising, no bleeding gums    [VITALS SIGNS 24hrs]  Vital Signs Last 24 Hrs  T(C): 36.7 (11 Oct 2023 20:54), Max: 39.1 (11 Oct 2023 17:03)  T(F): 98 (11 Oct 2023 20:54), Max: 102.4 (11 Oct 2023 17:03)  HR: 60 (11 Oct 2023 20:54) (59 - 79)  BP: 92/51 (11 Oct 2023 21:20) (78/38 - 121/59)  BP(mean): --  RR: 18 (11 Oct 2023 20:54) (16 - 18)  SpO2: 95% (11 Oct 2023 20:54) (93% - 98%)    Parameters below as of 11 Oct 2023 20:54  Patient On (Oxygen Delivery Method): room air      Daily     Daily Weight in k.2 (11 Oct 2023 02:25)    I&O's Summary    10 Oct 2023 07:01  -  11 Oct 2023 07:00  --------------------------------------------------------  IN: 60 mL / OUT: 200 mL / NET: -140 mL        [PHYSICAL EXAM]  GEN:   HEENT: normocephalic and atraumatic. EOMI. PERRL.    NECK: Supple.  No lymphadenopathy   LUNGS: Clear to auscultation.  HEART: S1S2 Regular rate and rhythm, no MRG  ABDOMEN: Soft, nontender, and nondistended.  Positive bowel sounds.    : No CVA tenderness  EXTREMITIES: Without edema.  NEUROLOGIC: grossly intact.  PSYCHIATRIC: Appropriate affect .  SKIN: No rash     [LABS: ]                        7.9    9.99  )-----------( 233      ( 11 Oct 2023 04:09 )             25.5     CBC Full  -  ( 11 Oct 2023 04:09 )  WBC Count : 9.99 K/uL  RBC Count : 3.08 M/uL  Hemoglobin : 7.9 g/dL  Hematocrit : 25.5 %  Platelet Count - Automated : 233 K/uL  Mean Cell Volume : 82.8 fl  Mean Cell Hemoglobin : 25.6 pg  Mean Cell Hemoglobin Concentration : 31.0 gm/dL  Auto Neutrophil # : x  Auto Lymphocyte # : x  Auto Monocyte # : x  Auto Eosinophil # : x  Auto Basophil # : x  Auto Neutrophil % : x  Auto Lymphocyte % : x  Auto Monocyte % : x  Auto Eosinophil % : x  Auto Basophil % : x    10-11    138  |  109<H>  |  43<H>  ----------------------------<  186<H>  4.4   |  20<L>  |  1.20    Ca    8.4<L>      11 Oct 2023 04:09  Mg     1.8     10-10    TPro  6.1  /  Alb  1.9<L>  /  TBili  0.4  /  DBili  x   /  AST  18  /  ALT  12  /  AlkPhos  88  10-10    PT/INR - ( 10 Oct 2023 18:25 )   PT: 18.6 sec;   INR: 1.61 ratio         PTT - ( 10 Oct 2023 18:25 )  PTT:30.0 sec  LIVER FUNCTIONS - ( 10 Oct 2023 18:25 )  Alb: 1.9 g/dL / Pro: 6.1 g/dL / ALK PHOS: 88 U/L / ALT: 12 U/L / AST: 18 U/L / GGT: x           CARDIAC MARKERS ( 10 Oct 2023 18:25 )  x     / x     / x     / x     / <1.0 ng/mL      Urinalysis Basic - ( 11 Oct 2023 06:00 )    Color: Yellow / Appearance: Turbid / S.018 / pH: x  Gluc: x / Ketone: Trace mg/dL  / Bili: Negative / Urobili: 0.2 mg/dL   Blood: x / Protein: 100 mg/dL / Nitrite: Negative   Leuk Esterase: Large / RBC: Too Numerous to count /HPF / WBC Too Numerous to count /HPF   Sq Epi: x / Non Sq Epi: x / Bacteria: Few /HPF          CBC TREND (5 Days)  WBC Count: 9.99 K/uL (10-11 @ 04:09)  WBC Count: 11.08 K/uL (10-10 @ 18:25)    Hemoglobin: 7.9 g/dL (10-11 @ 04:09)  Hemoglobin: 6.6 g/dL (10-10 @ 18:25)    Hematocrit: 25.5 % (10-11 @ 04:09)  Hematocrit: 21.0 % (10-10 @ 18:25)    Platelet Count - Automated: 233 K/uL (10-11 @ 04:09)  Platelet Count - Automated: 228 K/uL (10-10 @ 18:25)                   Sedimentation Rate, Erythrocyte: 101 mm/hr (10-10 @ 18:25)                           [MICROBIOLOGY /  VIROLOGY:]        Culture - Blood (collected 10 Oct 2023 18:25)  Source: .Blood Blood-Peripheral  Gram Stain (11 Oct 2023 21:56):    Growth in aerobic bottle: Gram Positive Cocci in Clusters    Growth in anaerobic bottle: Gram Positive Cocci in Clusters  Preliminary Report (11 Oct 2023 21:56):    Growth in aerobic bottle: Gram Positive Cocci in Clusters    Growth in anaerobic bottle: Gram Positive Cocci in Clusters    Culture - Blood (collected 10 Oct 2023 18:25)  Source: .Blood Blood-Peripheral  Gram Stain (11 Oct 2023 15:29):    Growth in aerobic bottle: Gram Positive Cocci in Clusters    Growth in anaerobic bottle: Gram Positive Cocci in Clusters  Preliminary Report (11 Oct 2023 15:29):    Growth in aerobic bottle: Gram Positive Cocci in Clusters    Growth in anaerobic bottle: Gram Positive Cocci in Clusters    Direct identification is available within approximately 3-5    hours either by Blood Panel Multiplexed PCR or Direct    MALDI-TOF. Details: [email or web address]  Organism: Blood Culture PCR (11 Oct 2023 16:30)  Organism: Blood Culture PCR (11 Oct 2023 16:30)        [PATHOLOGY]       [RADIOLOGY & ADDITIONAL STUDIES:]     CT Chest No Cont:   ACC: [id number] EXAM:  CT CHEST   ORDERED BY: NEEL RADFORD     ACC: [id number] EXAM:  CT ABDOMEN AND PELVIS   ORDERED BY: NEEL RADFORD     PROCEDURE DATE:  10/10/2023          INTERPRETATION:  CLINICAL INFORMATION: Nausea vomiting and fever. Anorexia    COMPARISON: None.    CONTRAST/COMPLICATIONS:  IV Contrast: NONE  Oral Contrast: NONE  Complications: None reported at time of study completion    PROCEDURE:  CT of the Chest, Abdomen and Pelvis was performed.  Sagittal and coronal reformats were performed.    FINDINGS:  CHEST:  LUNGS AND LARGE AIRWAYS: Patent central airways. No pulmonary nodules.  PLEURA: Small right and minimal left pleural effusion  VESSELS: Atherosclerotic changes of the aorta and coronary arteries.  HEART: Heart size is normal. Minimal pericardial effusion. Pacemaker   leads. Decreased blood pool density in the ventricles suggesting anemia.   Mitral annular calcification  MEDIASTINUM AND IVELISSE: No lymphadenopathy. The esophagus is mildly   distended and filled with air and a small amount of debris  CHEST WALL AND LOWER NECK: Pacemaker battery left chest wall    ABDOMEN AND PELVIS:  LIVER: Within normal limits.  BILE DUCTS: Normal caliber.  GALLBLADDER: Cholelithiasis.  SPLEEN: Within normal limits.  PANCREAS: Atrophic.  ADRENALS: Within normal limits.  KIDNEYS/URETERS: Within normal limits.    BLADDER: Mild diffuse nonspecific ladder wall thickening  REPRODUCTIVE ORGANS: Prostate within normal limits.    BOWEL: No bowel obstruction. Appendix is not visualized. No evidence of   inflammation in the pericecal region. There is colonic diverticulosis   without diverticulitis  PERITONEUM: No ascites.  VESSELS: Atherosclerotic changes.  RETROPERITONEUM/LYMPH NODES: No lymphadenopathy.  ABDOMINAL WALL: Within normal limits.  BONES: Degenerative changes.. ORIF of the left hip with 3 screws seen    IMPRESSION: Small right and minimal left pleural effusion.  No acute pathology in the abdomen or pelvis  Mild esophageal distention is a risk factor for aspiration  Cholelithiasis  Nonspecific diffuse bladder wall thickening  Findings suggesting anemia    --- End of Report ---            SIDRA WALDEN MD; Attending Radiologist  This document has been electronically signed. Oct 10 2023  7:35PM (10-10-23 @ 19:03)  CT Abdomen and Pelvis No Cont:   ACC: [id number] EXAM:  CT CHEST   ORDERED BY: NEEL RADFORD     ACC: [id number] EXAM:  CT ABDOMEN AND PELVIS   ORDERED BY: NEEL RADFORD     PROCEDURE DATE:  10/10/2023          INTERPRETATION:  CLINICAL INFORMATION: Nausea vomiting and fever. Anorexia    COMPARISON: None.    CONTRAST/COMPLICATIONS:  IV Contrast: NONE  Oral Contrast: NONE  Complications: None reported at time of study completion    PROCEDURE:  CT of the Chest, Abdomen and Pelvis was performed.  Sagittal and coronal reformats were performed.    FINDINGS:  CHEST:  LUNGS AND LARGE AIRWAYS: Patent central airways. No pulmonary nodules.  PLEURA: Small right and minimal left pleural effusion  VESSELS: Atherosclerotic changes of the aorta and coronary arteries.  HEART: Heart size is normal. Minimal pericardial effusion. Pacemaker   leads. Decreased blood pool density in the ventricles suggesting anemia.   Mitral annular calcification  MEDIASTINUM AND IVELISSE: No lymphadenopathy. The esophagus is mildly   distended and filled with air and a small amount of debris  CHEST WALL AND LOWER NECK: Pacemaker battery left chest wall    ABDOMEN AND PELVIS:  LIVER: Within normal limits.  BILE DUCTS: Normal caliber.  GALLBLADDER: Cholelithiasis.  SPLEEN: Within normal limits.  PANCREAS: Atrophic.  ADRENALS: Within normal limits.  KIDNEYS/URETERS: Within normal limits.    BLADDER: Mild diffuse nonspecific ladder wall thickening  REPRODUCTIVE ORGANS: Prostate within normal limits.    BOWEL: No bowel obstruction. Appendix is not visualized. No evidence of   inflammation in the pericecal region. There is colonic diverticulosis   without diverticulitis  PERITONEUM: No ascites.  VESSELS: Atherosclerotic changes.  RETROPERITONEUM/LYMPH NODES: No lymphadenopathy.  ABDOMINAL WALL: Within normal limits.  BONES: Degenerative changes.. ORIF of the left hip with 3 screws seen    IMPRESSION: Small right and minimal left pleural effusion.  No acute pathology in the abdomen or pelvis  Mild esophageal distention is a risk factor for aspiration  Cholelithiasis  Nonspecific diffuse bladder wall thickening  Findings suggesting anemia    --- End of Report ---            SIDRA WALDEN MD; Attending Radiologist  This document has been electronically signed. Oct 10 2023  7:35PM (10-10-23 @ 19:03)     INCOMPLETE NOTE.  Documentation in Progress  PT SEEN AND EVALUATED.   FULL/ADDITIONAL RECOMMENDATIONS TO FOLLOW   ***************************************************************    Patient is a 78y old  Male who presents with a chief complaint of fever and weakness (11 Oct 2023 22:04)      HPI:  78-year-old male presents to the hospital by ambulance from home.  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 Pt does not go to doctor on regualar basis per son and does phone visits.   (11 Oct 2023 07:02)      PAST MEDICAL & SURGICAL HISTORY:  Diabetes      Benign essential HTN      Pacemaker      History of BPH      Diabetic foot ulcers         HEALTH ISSUES - PROBLEM Dx:  Diabetes    Diabetic foot ulcers    Benign essential HTN    Anemia, unspecified    Acute UTI    Diabetic ulcer of left foot    Non-healing wound of right heel    Type 2 diabetes mellitus          Urinary tract infection [N39.0]    Diabetes [E11.9]    Benign essential HTN [I10]    Pacemaker [Z95.0]    History of BPH [Z87.438]    Diabetic foot ulcers [E11.621]    Anemia of chronic disease [D63.8]      FAMILY HISTORY:      [SOCIAL HISTORY: ]     smoking:  none currently     EtOH:  none currently     illicit drugs:  none currently     occupation:  Retired     marital status:       Other:       [ALLERGIES/INTOLERANCES:]  Allergies    No Known Allergies    Intolerances        [MEDICATIONS]  MEDICATIONS  (STANDING):  aspirin enteric coated 81 milliGRAM(s) Oral daily  bethanechol 25 milliGRAM(s) Oral two times a day  dextrose 5%. 1000 milliLiter(s) (50 mL/Hr) IV Continuous <Continuous>  dextrose 5%. 1000 milliLiter(s) (100 mL/Hr) IV Continuous <Continuous>  dextrose 50% Injectable 25 Gram(s) IV Push once  dextrose 50% Injectable 12.5 Gram(s) IV Push once  dextrose 50% Injectable 25 Gram(s) IV Push once  dronabinol 2.5 milliGRAM(s) Oral two times a day  gabapentin 300 milliGRAM(s) Oral two times a day  glucagon  Injectable 1 milliGRAM(s) IntraMuscular once  insulin glargine Injectable (LANTUS) 18 Unit(s) SubCutaneous at bedtime  insulin lispro (ADMELOG) corrective regimen sliding scale   SubCutaneous three times a day before meals  insulin lispro (ADMELOG) corrective regimen sliding scale   SubCutaneous at bedtime  insulin lispro Injectable (ADMELOG) 3 Unit(s) SubCutaneous three times a day before meals  lactobacillus acidophilus 1 Tablet(s) Oral two times a day with meals  pantoprazole    Tablet 40 milliGRAM(s) Oral before breakfast  piperacillin/tazobactam IVPB.. 3.375 Gram(s) IV Intermittent every 8 hours  sodium chloride 0.9%. 1000 milliLiter(s) (60 mL/Hr) IV Continuous <Continuous>  tamsulosin 0.4 milliGRAM(s) Oral at bedtime  vancomycin  IVPB 1250 milliGRAM(s) IV Intermittent every 24 hours    MEDICATIONS  (PRN):  acetaminophen     Tablet .. 650 milliGRAM(s) Oral every 6 hours PRN Temp greater or equal to 38C (100.4F), Moderate Pain (4 - 6)  dextrose Oral Gel 15 Gram(s) Oral once PRN Blood Glucose LESS THAN 70 milliGRAM(s)/deciliter  loperamide 2 milliGRAM(s) Oral three times a day PRN Diarrhea      [REVIEW OF SYSTEMS: ]  CONSTITUTIONAL: normal, no fever, no shakes, no chills   EYES: No eye pain, no visual disturbances, no discharge  ENMT:  no discharge  NECK: No pain, no stiffness  BREASTS: No pain, no masses, no nipple discharge  RESPIRATORY: No cough, no wheezing, no chills, no hemoptysis; No shortness of breath  CARDIOVASCULAR: No chest pain, no palpitations, no dizziness, no leg swelling  GASTROINTESTINAL: No abdominal, no epigastric pain. No nausea, no vomiting, no hematemesis; No diarrhea , no constipation. No melena, no hematochezia.  GENITOURINARY: No dysuria, no frequency, no hematuria, no incontinence  NEUROLOGICAL: No headaches, no memory loss, no loss of strength, no numbness, no tremors  SKIN: No itching, no burning, no rashes, no lesions   LYMPH NODES: No enlarged glands  ENDOCRINE: No heat or cold intolerance; No hair loss  MUSCULOSKELETAL: No joint pain or swelling; No muscle, no back, no extremity pain  PSYCHIATRIC: No depression, no anxiety, no mood swings, no difficulty sleeping  HEME/LYMPH: No easy bruising, no bleeding gums    [VITALS SIGNS 24hrs]  Vital Signs Last 24 Hrs  T(C): 36.7 (11 Oct 2023 20:54), Max: 39.1 (11 Oct 2023 17:03)  T(F): 98 (11 Oct 2023 20:54), Max: 102.4 (11 Oct 2023 17:03)  HR: 60 (11 Oct 2023 20:54) (59 - 79)  BP: 92/51 (11 Oct 2023 21:20) (78/38 - 121/59)  BP(mean): --  RR: 18 (11 Oct 2023 20:54) (16 - 18)  SpO2: 95% (11 Oct 2023 20:54) (93% - 98%)    Parameters below as of 11 Oct 2023 20:54  Patient On (Oxygen Delivery Method): room air      Daily     Daily Weight in k.2 (11 Oct 2023 02:25)    I&O's Summary    10 Oct 2023 07:01  -  11 Oct 2023 07:00  --------------------------------------------------------  IN: 60 mL / OUT: 200 mL / NET: -140 mL        [PHYSICAL EXAM]  GEN:   HEENT: normocephalic and atraumatic. EOMI. PERRL.    NECK: Supple.  No lymphadenopathy   LUNGS: Clear to auscultation.  HEART: S1S2 Regular rate and rhythm, no MRG  ABDOMEN: Soft, nontender, and nondistended.  Positive bowel sounds.    : No CVA tenderness  EXTREMITIES: Without edema.  NEUROLOGIC: grossly intact.  PSYCHIATRIC: Appropriate affect .  SKIN: No rash     [LABS: ]                        7.9    9.99  )-----------( 233      ( 11 Oct 2023 04:09 )             25.5     CBC Full  -  ( 11 Oct 2023 04:09 )  WBC Count : 9.99 K/uL  RBC Count : 3.08 M/uL  Hemoglobin : 7.9 g/dL  Hematocrit : 25.5 %  Platelet Count - Automated : 233 K/uL  Mean Cell Volume : 82.8 fl  Mean Cell Hemoglobin : 25.6 pg  Mean Cell Hemoglobin Concentration : 31.0 gm/dL  Auto Neutrophil # : x  Auto Lymphocyte # : x  Auto Monocyte # : x  Auto Eosinophil # : x  Auto Basophil # : x  Auto Neutrophil % : x  Auto Lymphocyte % : x  Auto Monocyte % : x  Auto Eosinophil % : x  Auto Basophil % : x    10-11    138  |  109<H>  |  43<H>  ----------------------------<  186<H>  4.4   |  20<L>  |  1.20    Ca    8.4<L>      11 Oct 2023 04:09  Mg     1.8     10-10    TPro  6.1  /  Alb  1.9<L>  /  TBili  0.4  /  DBili  x   /  AST  18  /  ALT  12  /  AlkPhos  88  10-10    PT/INR - ( 10 Oct 2023 18:25 )   PT: 18.6 sec;   INR: 1.61 ratio         PTT - ( 10 Oct 2023 18:25 )  PTT:30.0 sec  LIVER FUNCTIONS - ( 10 Oct 2023 18:25 )  Alb: 1.9 g/dL / Pro: 6.1 g/dL / ALK PHOS: 88 U/L / ALT: 12 U/L / AST: 18 U/L / GGT: x           CARDIAC MARKERS ( 10 Oct 2023 18:25 )  x     / x     / x     / x     / <1.0 ng/mL      Urinalysis Basic - ( 11 Oct 2023 06:00 )    Color: Yellow / Appearance: Turbid / S.018 / pH: x  Gluc: x / Ketone: Trace mg/dL  / Bili: Negative / Urobili: 0.2 mg/dL   Blood: x / Protein: 100 mg/dL / Nitrite: Negative   Leuk Esterase: Large / RBC: Too Numerous to count /HPF / WBC Too Numerous to count /HPF   Sq Epi: x / Non Sq Epi: x / Bacteria: Few /HPF          CBC TREND (5 Days)  WBC Count: 9.99 K/uL (10-11 @ 04:09)  WBC Count: 11.08 K/uL (10-10 @ 18:25)    Hemoglobin: 7.9 g/dL (10-11 @ 04:09)  Hemoglobin: 6.6 g/dL (10-10 @ 18:25)    Hematocrit: 25.5 % (10-11 @ 04:09)  Hematocrit: 21.0 % (10-10 @ 18:25)    Platelet Count - Automated: 233 K/uL (10-11 @ 04:09)  Platelet Count - Automated: 228 K/uL (10-10 @ 18:25)                   Sedimentation Rate, Erythrocyte: 101 mm/hr (10-10 @ 18:25)                           [MICROBIOLOGY /  VIROLOGY:]        Culture - Blood (collected 10 Oct 2023 18:25)  Source: .Blood Blood-Peripheral  Gram Stain (11 Oct 2023 21:56):    Growth in aerobic bottle: Gram Positive Cocci in Clusters    Growth in anaerobic bottle: Gram Positive Cocci in Clusters  Preliminary Report (11 Oct 2023 21:56):    Growth in aerobic bottle: Gram Positive Cocci in Clusters    Growth in anaerobic bottle: Gram Positive Cocci in Clusters    Culture - Blood (collected 10 Oct 2023 18:25)  Source: .Blood Blood-Peripheral  Gram Stain (11 Oct 2023 15:29):    Growth in aerobic bottle: Gram Positive Cocci in Clusters    Growth in anaerobic bottle: Gram Positive Cocci in Clusters  Preliminary Report (11 Oct 2023 15:29):    Growth in aerobic bottle: Gram Positive Cocci in Clusters    Growth in anaerobic bottle: Gram Positive Cocci in Clusters    Direct identification is available within approximately 3-5    hours either by Blood Panel Multiplexed PCR or Direct    MALDI-TOF. Details: [email or web address]  Organism: Blood Culture PCR (11 Oct 2023 16:30)  Organism: Blood Culture PCR (11 Oct 2023 16:30)        [PATHOLOGY]       [RADIOLOGY & ADDITIONAL STUDIES:]     CT Chest No Cont:   ACC: [id number] EXAM:  CT CHEST   ORDERED BY: NEEL RADFORD     ACC: [id number] EXAM:  CT ABDOMEN AND PELVIS   ORDERED BY: NEEL RADFORD     PROCEDURE DATE:  10/10/2023          INTERPRETATION:  CLINICAL INFORMATION: Nausea vomiting and fever. Anorexia    COMPARISON: None.    CONTRAST/COMPLICATIONS:  IV Contrast: NONE  Oral Contrast: NONE  Complications: None reported at time of study completion    PROCEDURE:  CT of the Chest, Abdomen and Pelvis was performed.  Sagittal and coronal reformats were performed.    FINDINGS:  CHEST:  LUNGS AND LARGE AIRWAYS: Patent central airways. No pulmonary nodules.  PLEURA: Small right and minimal left pleural effusion  VESSELS: Atherosclerotic changes of the aorta and coronary arteries.  HEART: Heart size is normal. Minimal pericardial effusion. Pacemaker   leads. Decreased blood pool density in the ventricles suggesting anemia.   Mitral annular calcification  MEDIASTINUM AND IVELISSE: No lymphadenopathy. The esophagus is mildly   distended and filled with air and a small amount of debris  CHEST WALL AND LOWER NECK: Pacemaker battery left chest wall    ABDOMEN AND PELVIS:  LIVER: Within normal limits.  BILE DUCTS: Normal caliber.  GALLBLADDER: Cholelithiasis.  SPLEEN: Within normal limits.  PANCREAS: Atrophic.  ADRENALS: Within normal limits.  KIDNEYS/URETERS: Within normal limits.    BLADDER: Mild diffuse nonspecific ladder wall thickening  REPRODUCTIVE ORGANS: Prostate within normal limits.    BOWEL: No bowel obstruction. Appendix is not visualized. No evidence of   inflammation in the pericecal region. There is colonic diverticulosis   without diverticulitis  PERITONEUM: No ascites.  VESSELS: Atherosclerotic changes.  RETROPERITONEUM/LYMPH NODES: No lymphadenopathy.  ABDOMINAL WALL: Within normal limits.  BONES: Degenerative changes.. ORIF of the left hip with 3 screws seen    IMPRESSION: Small right and minimal left pleural effusion.  No acute pathology in the abdomen or pelvis  Mild esophageal distention is a risk factor for aspiration  Cholelithiasis  Nonspecific diffuse bladder wall thickening  Findings suggesting anemia    --- End of Report ---            SIDRA WALDEN MD; Attending Radiologist  This document has been electronically signed. Oct 10 2023  7:35PM (10-10-23 @ 19:03)  CT Abdomen and Pelvis No Cont:   ACC: [id number] EXAM:  CT CHEST   ORDERED BY: NEEL RADFORD     ACC: [id number] EXAM:  CT ABDOMEN AND PELVIS   ORDERED BY: NEEL RADFORD     PROCEDURE DATE:  10/10/2023          INTERPRETATION:  CLINICAL INFORMATION: Nausea vomiting and fever. Anorexia    COMPARISON: None.    CONTRAST/COMPLICATIONS:  IV Contrast: NONE  Oral Contrast: NONE  Complications: None reported at time of study completion    PROCEDURE:  CT of the Chest, Abdomen and Pelvis was performed.  Sagittal and coronal reformats were performed.    FINDINGS:  CHEST:  LUNGS AND LARGE AIRWAYS: Patent central airways. No pulmonary nodules.  PLEURA: Small right and minimal left pleural effusion  VESSELS: Atherosclerotic changes of the aorta and coronary arteries.  HEART: Heart size is normal. Minimal pericardial effusion. Pacemaker   leads. Decreased blood pool density in the ventricles suggesting anemia.   Mitral annular calcification  MEDIASTINUM AND IVELISSE: No lymphadenopathy. The esophagus is mildly   distended and filled with air and a small amount of debris  CHEST WALL AND LOWER NECK: Pacemaker battery left chest wall    ABDOMEN AND PELVIS:  LIVER: Within normal limits.  BILE DUCTS: Normal caliber.  GALLBLADDER: Cholelithiasis.  SPLEEN: Within normal limits.  PANCREAS: Atrophic.  ADRENALS: Within normal limits.  KIDNEYS/URETERS: Within normal limits.    BLADDER: Mild diffuse nonspecific ladder wall thickening  REPRODUCTIVE ORGANS: Prostate within normal limits.    BOWEL: No bowel obstruction. Appendix is not visualized. No evidence of   inflammation in the pericecal region. There is colonic diverticulosis   without diverticulitis  PERITONEUM: No ascites.  VESSELS: Atherosclerotic changes.  RETROPERITONEUM/LYMPH NODES: No lymphadenopathy.  ABDOMINAL WALL: Within normal limits.  BONES: Degenerative changes.. ORIF of the left hip with 3 screws seen    IMPRESSION: Small right and minimal left pleural effusion.  No acute pathology in the abdomen or pelvis  Mild esophageal distention is a risk factor for aspiration  Cholelithiasis  Nonspecific diffuse bladder wall thickening  Findings suggesting anemia    --- End of Report ---            SIDRA WALDEN MD; Attending Radiologist  This document has been electronically signed. Oct 10 2023  7:35PM (10-10-23 @ 19:03)     Patient is a 78y old  Male who presents with a chief complaint of fever and weakness (11 Oct 2023 22:04)      HPI:  78-year-old male presents to the hospital by ambulance from home.  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 Pt does not go to doctor on regualar basis per son and does phone visits.   (11 Oct 2023 07:02)      PAST MEDICAL & SURGICAL HISTORY:  Diabetes  Benign essential HTN  Pacemaker  History of BPH  Diabetic foot ulcers     HEALTH ISSUES - PROBLEM Dx:  Diabetes  Diabetic foot ulcers  Benign essential HTN  Anemia, unspecified  Acute UTI  Diabetic ulcer of left foot  Non-healing wound of right heel  Type 2 diabetes mellitus    Urinary tract infection [N39.0]  Diabetes [E11.9]  Benign essential HTN [I10]  Pacemaker [Z95.0]  History of BPH [Z87.438]  Diabetic foot ulcers [E11.621]  Anemia of chronic disease [D63.8]      FAMILY HISTORY:      [SOCIAL HISTORY: ]     smoking:  none currently, Denies     EtOH:  none currently, Denies     illicit drugs:  none currently, Denies     occupation:  Retired     marital status:       Other:       [ALLERGIES/INTOLERANCES:]  Allergies      No Known Allergies  Intolerances      [MEDICATIONS]  MEDICATIONS  (STANDING):  aspirin enteric coated 81 milliGRAM(s) Oral daily  bethanechol 25 milliGRAM(s) Oral two times a day  dextrose 5%. 1000 milliLiter(s) (50 mL/Hr) IV Continuous <Continuous>  dextrose 5%. 1000 milliLiter(s) (100 mL/Hr) IV Continuous <Continuous>  dextrose 50% Injectable 25 Gram(s) IV Push once  dextrose 50% Injectable 12.5 Gram(s) IV Push once  dextrose 50% Injectable 25 Gram(s) IV Push once  dronabinol 2.5 milliGRAM(s) Oral two times a day  gabapentin 300 milliGRAM(s) Oral two times a day  glucagon  Injectable 1 milliGRAM(s) IntraMuscular once  insulin glargine Injectable (LANTUS) 18 Unit(s) SubCutaneous at bedtime  insulin lispro (ADMELOG) corrective regimen sliding scale   SubCutaneous three times a day before meals  insulin lispro (ADMELOG) corrective regimen sliding scale   SubCutaneous at bedtime  insulin lispro Injectable (ADMELOG) 3 Unit(s) SubCutaneous three times a day before meals  lactobacillus acidophilus 1 Tablet(s) Oral two times a day with meals  pantoprazole    Tablet 40 milliGRAM(s) Oral before breakfast  piperacillin/tazobactam IVPB.. 3.375 Gram(s) IV Intermittent every 8 hours  sodium chloride 0.9%. 1000 milliLiter(s) (60 mL/Hr) IV Continuous <Continuous>  tamsulosin 0.4 milliGRAM(s) Oral at bedtime  vancomycin  IVPB 1250 milliGRAM(s) IV Intermittent every 24 hours      MEDICATIONS  (PRN):  acetaminophen     Tablet .. 650 milliGRAM(s) Oral every 6 hours PRN Temp greater or equal to 38C (100.4F), Moderate Pain (4 - 6)  dextrose Oral Gel 15 Gram(s) Oral once PRN Blood Glucose LESS THAN 70 milliGRAM(s)/deciliter  loperamide 2 milliGRAM(s) Oral three times a day PRN Diarrhea      [REVIEW OF SYSTEMS: ]  Unreliable historian  CONSTITUTIONAL: + Weight loss, +fever, no shakes, no chills   EYES: No eye pain, no visual disturbances, no discharge  ENMT:  no discharge  NECK: No pain, no stiffness  BREASTS: No pain, no masses, no nipple discharge  RESPIRATORY: No cough, no wheezing, no chills, no hemoptysis; No shortness of breath  CARDIOVASCULAR: No chest pain, no palpitations, no dizziness, no leg swelling  GASTROINTESTINAL: No abdominal, no epigastric pain. No nausea, no vomiting, no hematemesis; No diarrhea , no constipation. No melena, no hematochezia.  GENITOURINARY: No dysuria, no frequency, no hematuria, no incontinence  NEUROLOGICAL: No headaches, no memory loss, no loss of strength, no numbness, no tremors  SKIN: No itching, no burning, no rashes, no lesions   LYMPH NODES: No enlarged glands  ENDOCRINE: No heat or cold intolerance; No hair loss  MUSCULOSKELETAL: No joint pain or swelling; No muscle, no back, no extremity pain  PSYCHIATRIC: No depression, no anxiety, no mood swings, no difficulty sleeping  HEME/LYMPH: No easy bruising, no bleeding gums      [VITALS SIGNS 24hrs]  Vital Signs Last 24 Hrs  T(C): 36.7 (11 Oct 2023 20:54), Max: 39.1 (11 Oct 2023 17:03)  T(F): 98 (11 Oct 2023 20:54), Max: 102.4 (11 Oct 2023 17:03)  HR: 60 (11 Oct 2023 20:54) (59 - 79)  BP: 92/51 (11 Oct 2023 21:20) (78/38 - 121/59)  BP(mean): --  RR: 18 (11 Oct 2023 20:54) (16 - 18)  SpO2: 95% (11 Oct 2023 20:54) (93% - 98%)    Parameters below as of 11 Oct 2023 20:54  Patient On (Oxygen Delivery Method): room air    Daily     Daily Weight in k.2 (11 Oct 2023 02:25)    I&O's Summary    10 Oct 2023 07:01  -  11 Oct 2023 07:00  --------------------------------------------------------  IN: 60 mL / OUT: 200 mL / NET: -140 mL      [PHYSICAL EXAM]  GEN: Chronically ill looking, flat affect. Pale looking  HEENT: normocephalic and atraumatic. EOMI. PERRL.    NECK: Supple.  No lymphadenopathy   LUNGS: Clear to auscultation.  HEART: S1S2 Regular rate and rhythm, no MRG  ABDOMEN: Soft, nontender, and nondistended.  Positive bowel sounds.    : No CVA tenderness  EXTREMITIES: Without edema.  NEUROLOGIC: grossly intact.  PSYCHIATRIC: Flat/Blunted affect .  SKIN: No rash     [LABS: ]                        7.9    9.99  )-----------( 233      ( 11 Oct 2023 04:09 )             25.5     CBC Full  -  ( 11 Oct 2023 04:09 )  WBC Count : 9.99 K/uL  RBC Count : 3.08 M/uL  Hemoglobin : 7.9 g/dL  Hematocrit : 25.5 %  Platelet Count - Automated : 233 K/uL  Mean Cell Volume : 82.8 fl  Mean Cell Hemoglobin : 25.6 pg  Mean Cell Hemoglobin Concentration : 31.0 gm/dL  Auto Neutrophil # : x  Auto Lymphocyte # : x  Auto Monocyte # : x  Auto Eosinophil # : x  Auto Basophil # : x  Auto Neutrophil % : x  Auto Lymphocyte % : x  Auto Monocyte % : x  Auto Eosinophil % : x  Auto Basophil % : x    10-11    138  |  109<H>  |  43<H>  ----------------------------<  186<H>  4.4   |  20<L>  |  1.20    Ca    8.4<L>      11 Oct 2023 04:09  Mg     1.8     10-10    TPro  6.1  /  Alb  1.9<L>  /  TBili  0.4  /  DBili  x   /  AST  18  /  ALT  12  /  AlkPhos  88  10-10    PT/INR - ( 10 Oct 2023 18:25 )   PT: 18.6 sec;   INR: 1.61 ratio         PTT - ( 10 Oct 2023 18: )  PTT:30.0 sec  LIVER FUNCTIONS - ( 10 Oct 2023 18:25 )  Alb: 1.9 g/dL / Pro: 6.1 g/dL / ALK PHOS: 88 U/L / ALT: 12 U/L / AST: 18 U/L / GGT: x           CARDIAC MARKERS ( 10 Oct 2023 18:25 )  x     / x     / x     / x     / <1.0 ng/mL      Urinalysis Basic - ( 11 Oct 2023 06:00 )    Color: Yellow / Appearance: Turbid / S.018 / pH: x  Gluc: x / Ketone: Trace mg/dL  / Bili: Negative / Urobili: 0.2 mg/dL   Blood: x / Protein: 100 mg/dL / Nitrite: Negative   Leuk Esterase: Large / RBC: Too Numerous to count /HPF / WBC Too Numerous to count /HPF   Sq Epi: x / Non Sq Epi: x / Bacteria: Few /HPF      CBC TREND (5 Days)  WBC Count: 9.99 K/uL (10-11 @ 04:09)  WBC Count: 11.08 K/uL (10-10 @ 18:25)    Hemoglobin: 7.9 g/dL (10-11 @ 04:09)  Hemoglobin: 6.6 g/dL (10-10 @ 18:25)    Hematocrit: 25.5 % (10-11 @ 04:09)  Hematocrit: 21.0 % (10-10 @ 18:25)    Platelet Count - Automated: 233 K/uL (10-11 @ 04:09)  Platelet Count - Automated: 228 K/uL (10-10 @ 18:25)       Sedimentation Rate, Erythrocyte: 101 mm/hr (10-10 @ 18:25)      [MICROBIOLOGY /  VIROLOGY:]    Culture - Blood (collected 10 Oct 2023 18:25)  Source: .Blood Blood-Peripheral  Gram Stain (11 Oct 2023 21:56):    Growth in aerobic bottle: Gram Positive Cocci in Clusters    Growth in anaerobic bottle: Gram Positive Cocci in Clusters  Preliminary Report (11 Oct 2023 21:56):    Growth in aerobic bottle: Gram Positive Cocci in Clusters    Growth in anaerobic bottle: Gram Positive Cocci in Clusters    Culture - Blood (collected 10 Oct 2023 18:25)  Source: .Blood Blood-Peripheral  Gram Stain (11 Oct 2023 15:29):    Growth in aerobic bottle: Gram Positive Cocci in Clusters    Growth in anaerobic bottle: Gram Positive Cocci in Clusters  Preliminary Report (11 Oct 2023 15:29):    Growth in aerobic bottle: Gram Positive Cocci in Clusters    Growth in anaerobic bottle: Gram Positive Cocci in Clusters    Direct identification is available within approximately 3-5    hours either by Blood Panel Multiplexed PCR or Direct    MALDI-TOF. Details: [email or web address]  Organism: Blood Culture PCR (11 Oct 2023 16:30)  Organism: Blood Culture PCR (11 Oct 2023 16:30)      [PATHOLOGY]       [RADIOLOGY & ADDITIONAL STUDIES:]     CT Chest No Cont:   ACC: [id number] EXAM:  CT CHEST   ORDERED BY: NEEL RADFORD   ACC: [id number] EXAM:  CT ABDOMEN AND PELVIS   ORDERED BY: NEEL RADFORD   PROCEDURE DATE:  10/10/2023    INTERPRETATION:  CLINICAL INFORMATION: Nausea vomiting and fever. Anorexia  COMPARISON: None.  PROCEDURE:  CT of the Chest, Abdomen and Pelvis was performed.  Sagittal and coronal reformats were performed.    FINDINGS:  CHEST:  LUNGS AND LARGE AIRWAYS: Patent central airways. No pulmonary nodules.  PLEURA: Small right and minimal left pleural effusion  VESSELS: Atherosclerotic changes of the aorta and coronary arteries.  HEART: Heart size is normal. Minimal pericardial effusion. Pacemaker leads. Decreased blood pool density in the ventricles suggesting anemia. Mitral annular calcification  MEDIASTINUM AND IVELISSE: No lymphadenopathy. The esophagus is mildly distended and filled with air and a small amount of debris  CHEST WALL AND LOWER NECK: Pacemaker battery left chest wall    ABDOMEN AND PELVIS:  LIVER: Within normal limits.  BILE DUCTS: Normal caliber.  GALLBLADDER: Cholelithiasis.  SPLEEN: Within normal limits.  PANCREAS: Atrophic.  ADRENALS: Within normal limits.  KIDNEYS/URETERS: Within normal limits.  BLADDER: Mild diffuse nonspecific bladder wall thickening  REPRODUCTIVE ORGANS: Prostate within normal limits.  BOWEL: No bowel obstruction. Appendix is not visualized. No evidence of inflammation in the pericecal region. There is colonic diverticulosis without diverticulitis  PERITONEUM: No ascites.  VESSELS: Atherosclerotic changes.  RETROPERITONEUM/LYMPH NODES: No lymphadenopathy.  ABDOMINAL WALL: Within normal limits.  BONES: Degenerative changes.. ORIF of the left hip with 3 screws seen    IMPRESSION: Small right and minimal left pleural effusion.  No acute pathology in the abdomen or pelvis  Mild esophageal distention is a risk factor for aspiration  Cholelithiasis  Nonspecific diffuse bladder wall thickening  Findings suggesting anemia  --- End of Report ---  SIDRA WALDEN MD; Attending Radiologist  This document has been electronically signed. Oct 10 2023  7:35PM (10-10-23 @ 19:03)    )     Patient is a 78y old  Male who presents with a chief complaint of fever and weakness (11 Oct 2023 22:04)      HPI:  78-year-old male presents to the hospital by ambulance from home.  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 Pt does not go to doctor on regualar basis per son and does phone visits.   (11 Oct 2023 07:02)      PAST MEDICAL & SURGICAL HISTORY:  Diabetes  Benign essential HTN  Pacemaker  History of BPH  Diabetic foot ulcers     HEALTH ISSUES - PROBLEM Dx:  Diabetes  Diabetic foot ulcers  Benign essential HTN  Anemia, unspecified  Acute UTI  Diabetic ulcer of left foot  Non-healing wound of right heel  Type 2 diabetes mellitus    Urinary tract infection [N39.0]  Diabetes [E11.9]  Benign essential HTN [I10]  Pacemaker [Z95.0]  History of BPH [Z87.438]  Diabetic foot ulcers [E11.621]  Anemia of chronic disease [D63.8]      FAMILY HISTORY:      [SOCIAL HISTORY: ]     smoking:  none currently, Denies     EtOH:  none currently, Denies     illicit drugs:  none currently, Denies     occupation:  Retired     marital status:       Other:       [ALLERGIES/INTOLERANCES:]  Allergies      No Known Allergies  Intolerances      [MEDICATIONS]  MEDICATIONS  (STANDING):  aspirin enteric coated 81 milliGRAM(s) Oral daily  bethanechol 25 milliGRAM(s) Oral two times a day  dextrose 5%. 1000 milliLiter(s) (50 mL/Hr) IV Continuous <Continuous>  dextrose 5%. 1000 milliLiter(s) (100 mL/Hr) IV Continuous <Continuous>  dextrose 50% Injectable 25 Gram(s) IV Push once  dextrose 50% Injectable 12.5 Gram(s) IV Push once  dextrose 50% Injectable 25 Gram(s) IV Push once  dronabinol 2.5 milliGRAM(s) Oral two times a day  gabapentin 300 milliGRAM(s) Oral two times a day  glucagon  Injectable 1 milliGRAM(s) IntraMuscular once  insulin glargine Injectable (LANTUS) 18 Unit(s) SubCutaneous at bedtime  insulin lispro (ADMELOG) corrective regimen sliding scale   SubCutaneous three times a day before meals  insulin lispro (ADMELOG) corrective regimen sliding scale   SubCutaneous at bedtime  insulin lispro Injectable (ADMELOG) 3 Unit(s) SubCutaneous three times a day before meals  lactobacillus acidophilus 1 Tablet(s) Oral two times a day with meals  pantoprazole    Tablet 40 milliGRAM(s) Oral before breakfast  piperacillin/tazobactam IVPB.. 3.375 Gram(s) IV Intermittent every 8 hours  sodium chloride 0.9%. 1000 milliLiter(s) (60 mL/Hr) IV Continuous <Continuous>  tamsulosin 0.4 milliGRAM(s) Oral at bedtime  vancomycin  IVPB 1250 milliGRAM(s) IV Intermittent every 24 hours      MEDICATIONS  (PRN):  acetaminophen     Tablet .. 650 milliGRAM(s) Oral every 6 hours PRN Temp greater or equal to 38C (100.4F), Moderate Pain (4 - 6)  dextrose Oral Gel 15 Gram(s) Oral once PRN Blood Glucose LESS THAN 70 milliGRAM(s)/deciliter  loperamide 2 milliGRAM(s) Oral three times a day PRN Diarrhea      [REVIEW OF SYSTEMS: ]  Unreliable historian  CONSTITUTIONAL: + Weight loss, +fever, no shakes, no chills   EYES: No eye pain, no visual disturbances, no discharge  ENMT:  no discharge  NECK: No pain, no stiffness  BREASTS: No pain, no masses, no nipple discharge  RESPIRATORY: No cough, no wheezing, no chills, no hemoptysis; No shortness of breath  CARDIOVASCULAR: No chest pain, no palpitations, no dizziness, no leg swelling  GASTROINTESTINAL: No abdominal, no epigastric pain. No nausea, no vomiting, no hematemesis; No diarrhea , no constipation. No melena, no hematochezia.  GENITOURINARY: No dysuria, no frequency, no hematuria, no incontinence  NEUROLOGICAL: No headaches, no memory loss, no loss of strength, no numbness, no tremors  SKIN: No itching, no burning, no rashes, no lesions   LYMPH NODES: No enlarged glands  ENDOCRINE: No heat or cold intolerance; No hair loss  MUSCULOSKELETAL: No joint pain or swelling; No muscle, no back, no extremity pain  PSYCHIATRIC: No depression, no anxiety, no mood swings, no difficulty sleeping  HEME/LYMPH: No easy bruising, no bleeding gums      [VITALS SIGNS 24hrs]  Vital Signs Last 24 Hrs  T(C): 36.7 (11 Oct 2023 20:54), Max: 39.1 (11 Oct 2023 17:03)  T(F): 98 (11 Oct 2023 20:54), Max: 102.4 (11 Oct 2023 17:03)  HR: 60 (11 Oct 2023 20:54) (59 - 79)  BP: 92/51 (11 Oct 2023 21:20) (78/38 - 121/59)  BP(mean): --  RR: 18 (11 Oct 2023 20:54) (16 - 18)  SpO2: 95% (11 Oct 2023 20:54) (93% - 98%)    Parameters below as of 11 Oct 2023 20:54  Patient On (Oxygen Delivery Method): room air    Daily     Daily Weight in k.2 (11 Oct 2023 02:25)    I&O's Summary    10 Oct 2023 07:01  -  11 Oct 2023 07:00  --------------------------------------------------------  IN: 60 mL / OUT: 200 mL / NET: -140 mL      [PHYSICAL EXAM]  GEN: Chronically ill looking, flat affect. Pale looking  HEENT: normocephalic and atraumatic. EOMI. PERRL.    NECK: Supple.  No lymphadenopathy   LUNGS: Clear to auscultation.  HEART: S1S2 Regular rate and rhythm, no MRG  ABDOMEN: Soft, nontender, and nondistended.  Positive bowel sounds.    : No CVA tenderness  EXTREMITIES: Without edema.  NEUROLOGIC: grossly intact.  PSYCHIATRIC: Flat/Blunted affect .  SKIN: No rash     [LABS: ]                        7.9    9.99  )-----------( 233      ( 11 Oct 2023 04:09 )             25.5     CBC Full  -  ( 11 Oct 2023 04:09 )  WBC Count : 9.99 K/uL  RBC Count : 3.08 M/uL  Hemoglobin : 7.9 g/dL  Hematocrit : 25.5 %  Platelet Count - Automated : 233 K/uL  Mean Cell Volume : 82.8 fl  Mean Cell Hemoglobin : 25.6 pg  Mean Cell Hemoglobin Concentration : 31.0 gm/dL  Auto Neutrophil # : x  Auto Lymphocyte # : x  Auto Monocyte # : x  Auto Eosinophil # : x  Auto Basophil # : x  Auto Neutrophil % : x  Auto Lymphocyte % : x  Auto Monocyte % : x  Auto Eosinophil % : x  Auto Basophil % : x    10-11    138  |  109<H>  |  43<H>  ----------------------------<  186<H>  4.4   |  20<L>  |  1.20    Ca    8.4<L>      11 Oct 2023 04:09  Mg     1.8     10-10    TPro  6.1  /  Alb  1.9<L>  /  TBili  0.4  /  DBili  x   /  AST  18  /  ALT  12  /  AlkPhos  88  10-10    PT/INR - ( 10 Oct 2023 18:25 )   PT: 18.6 sec;   INR: 1.61 ratio         PTT - ( 10 Oct 2023 18: )  PTT:30.0 sec  LIVER FUNCTIONS - ( 10 Oct 2023 18:25 )  Alb: 1.9 g/dL / Pro: 6.1 g/dL / ALK PHOS: 88 U/L / ALT: 12 U/L / AST: 18 U/L / GGT: x           CARDIAC MARKERS ( 10 Oct 2023 18:25 )  x     / x     / x     / x     / <1.0 ng/mL      Urinalysis Basic - ( 11 Oct 2023 06:00 )    Color: Yellow / Appearance: Turbid / S.018 / pH: x  Gluc: x / Ketone: Trace mg/dL  / Bili: Negative / Urobili: 0.2 mg/dL   Blood: x / Protein: 100 mg/dL / Nitrite: Negative   Leuk Esterase: Large / RBC: Too Numerous to count /HPF / WBC Too Numerous to count /HPF   Sq Epi: x / Non Sq Epi: x / Bacteria: Few /HPF      CBC TREND (5 Days)  WBC Count: 9.99 K/uL (10-11 @ 04:09)  WBC Count: 11.08 K/uL (10-10 @ 18:25)    Hemoglobin: 7.9 g/dL (10-11 @ 04:09)  Hemoglobin: 6.6 g/dL (10-10 @ 18:25)    Hematocrit: 25.5 % (10-11 @ 04:09)  Hematocrit: 21.0 % (10-10 @ 18:25)    Platelet Count - Automated: 233 K/uL (10-11 @ 04:09)  Platelet Count - Automated: 228 K/uL (10-10 @ 18:25)       Sedimentation Rate, Erythrocyte: 101 mm/hr (10-10 @ 18:25)      [MICROBIOLOGY /  VIROLOGY:]    Culture - Blood (collected 10 Oct 2023 18:25)  Source: .Blood Blood-Peripheral  Gram Stain (11 Oct 2023 21:56):    Growth in aerobic bottle: Gram Positive Cocci in Clusters    Growth in anaerobic bottle: Gram Positive Cocci in Clusters  Preliminary Report (11 Oct 2023 21:56):    Growth in aerobic bottle: Gram Positive Cocci in Clusters    Growth in anaerobic bottle: Gram Positive Cocci in Clusters    Culture - Blood (collected 10 Oct 2023 18:25)  Source: .Blood Blood-Peripheral  Gram Stain (11 Oct 2023 15:29):    Growth in aerobic bottle: Gram Positive Cocci in Clusters    Growth in anaerobic bottle: Gram Positive Cocci in Clusters  Preliminary Report (11 Oct 2023 15:29):    Growth in aerobic bottle: Gram Positive Cocci in Clusters    Growth in anaerobic bottle: Gram Positive Cocci in Clusters    Direct identification is available within approximately 3-5    hours either by Blood Panel Multiplexed PCR or Direct    MALDI-TOF. Details: [email or web address]  Organism: Blood Culture PCR (11 Oct 2023 16:30)  Organism: Blood Culture PCR (11 Oct 2023 16:30)      [PATHOLOGY]       [RADIOLOGY & ADDITIONAL STUDIES:]     CT Chest No Cont:   ACC: [id number] EXAM:  CT CHEST   ORDERED BY: NEEL RADFORD   ACC: [id number] EXAM:  CT ABDOMEN AND PELVIS   ORDERED BY: NEEL RADFORD   PROCEDURE DATE:  10/10/2023    INTERPRETATION:  CLINICAL INFORMATION: Nausea vomiting and fever. Anorexia  COMPARISON: None.  PROCEDURE:  CT of the Chest, Abdomen and Pelvis was performed.  Sagittal and coronal reformats were performed.    FINDINGS:  CHEST:  LUNGS AND LARGE AIRWAYS: Patent central airways. No pulmonary nodules.  PLEURA: Small right and minimal left pleural effusion  VESSELS: Atherosclerotic changes of the aorta and coronary arteries.  HEART: Heart size is normal. Minimal pericardial effusion. Pacemaker leads. Decreased blood pool density in the ventricles suggesting anemia. Mitral annular calcification  MEDIASTINUM AND IVELISSE: No lymphadenopathy. The esophagus is mildly distended and filled with air and a small amount of debris  CHEST WALL AND LOWER NECK: Pacemaker battery left chest wall    ABDOMEN AND PELVIS:  LIVER: Within normal limits.  BILE DUCTS: Normal caliber.  GALLBLADDER: Cholelithiasis.  SPLEEN: Within normal limits.  PANCREAS: Atrophic.  ADRENALS: Within normal limits.  KIDNEYS/URETERS: Within normal limits.  BLADDER: Mild diffuse nonspecific bladder wall thickening  REPRODUCTIVE ORGANS: Prostate within normal limits.  BOWEL: No bowel obstruction. Appendix is not visualized. No evidence of inflammation in the pericecal region. There is colonic diverticulosis without diverticulitis  PERITONEUM: No ascites.  VESSELS: Atherosclerotic changes.  RETROPERITONEUM/LYMPH NODES: No lymphadenopathy.  ABDOMINAL WALL: Within normal limits.  BONES: Degenerative changes.. ORIF of the left hip with 3 screws seen    IMPRESSION: Small right and minimal left pleural effusion.  No acute pathology in the abdomen or pelvis  Mild esophageal distention is a risk factor for aspiration  Cholelithiasis  Nonspecific diffuse bladder wall thickening  Findings suggesting anemia  --- End of Report ---  SIDRA WALDEN MD; Attending Radiologist  This document has been electronically signed. Oct 10 2023  7:35PM (10-10-23 @ 19:03)    )     Patient is a 78y old  Male who presents with a chief complaint of fever and weakness (11 Oct 2023 22:04)      HPI:  78-year-old male presents to the hospital by ambulance from home.  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 Pt does not go to doctor on regualar basis per son and does phone visits.   (11 Oct 2023 07:02)      PAST MEDICAL & SURGICAL HISTORY:  Diabetes  Benign essential HTN  Pacemaker  History of BPH  Diabetic foot ulcers     HEALTH ISSUES - PROBLEM Dx:  Diabetes  Diabetic foot ulcers  Benign essential HTN  Anemia, unspecified  Acute UTI  Diabetic ulcer of left foot  Non-healing wound of right heel  Type 2 diabetes mellitus    Urinary tract infection [N39.0]  Diabetes [E11.9]  Benign essential HTN [I10]  Pacemaker [Z95.0]  History of BPH [Z87.438]  Diabetic foot ulcers [E11.621]  Anemia of chronic disease [D63.8]      FAMILY HISTORY:      [SOCIAL HISTORY: ]     smoking:  none currently, Denies     EtOH:  none currently, Denies     illicit drugs:  none currently, Denies     occupation:  Retired     marital status:       Other:       [ALLERGIES/INTOLERANCES:]  Allergies      No Known Allergies  Intolerances      [MEDICATIONS]  MEDICATIONS  (STANDING):  aspirin enteric coated 81 milliGRAM(s) Oral daily  bethanechol 25 milliGRAM(s) Oral two times a day  dextrose 5%. 1000 milliLiter(s) (50 mL/Hr) IV Continuous <Continuous>  dextrose 5%. 1000 milliLiter(s) (100 mL/Hr) IV Continuous <Continuous>  dextrose 50% Injectable 25 Gram(s) IV Push once  dextrose 50% Injectable 12.5 Gram(s) IV Push once  dextrose 50% Injectable 25 Gram(s) IV Push once  dronabinol 2.5 milliGRAM(s) Oral two times a day  gabapentin 300 milliGRAM(s) Oral two times a day  glucagon  Injectable 1 milliGRAM(s) IntraMuscular once  insulin glargine Injectable (LANTUS) 18 Unit(s) SubCutaneous at bedtime  insulin lispro (ADMELOG) corrective regimen sliding scale   SubCutaneous three times a day before meals  insulin lispro (ADMELOG) corrective regimen sliding scale   SubCutaneous at bedtime  insulin lispro Injectable (ADMELOG) 3 Unit(s) SubCutaneous three times a day before meals  lactobacillus acidophilus 1 Tablet(s) Oral two times a day with meals  pantoprazole    Tablet 40 milliGRAM(s) Oral before breakfast  piperacillin/tazobactam IVPB.. 3.375 Gram(s) IV Intermittent every 8 hours  sodium chloride 0.9%. 1000 milliLiter(s) (60 mL/Hr) IV Continuous <Continuous>  tamsulosin 0.4 milliGRAM(s) Oral at bedtime  vancomycin  IVPB 1250 milliGRAM(s) IV Intermittent every 24 hours      MEDICATIONS  (PRN):  acetaminophen     Tablet .. 650 milliGRAM(s) Oral every 6 hours PRN Temp greater or equal to 38C (100.4F), Moderate Pain (4 - 6)  dextrose Oral Gel 15 Gram(s) Oral once PRN Blood Glucose LESS THAN 70 milliGRAM(s)/deciliter  loperamide 2 milliGRAM(s) Oral three times a day PRN Diarrhea      [REVIEW OF SYSTEMS: ]  Unreliable historian  CONSTITUTIONAL: + Weight loss, +fever, no shakes, no chills   EYES: No eye pain, no visual disturbances, no discharge  ENMT:  no discharge  NECK: No pain, no stiffness  BREASTS: No pain, no masses, no nipple discharge  RESPIRATORY: No cough, no wheezing, no chills, no hemoptysis; No shortness of breath  CARDIOVASCULAR: No chest pain, no palpitations, no dizziness, no leg swelling  GASTROINTESTINAL: No abdominal, no epigastric pain. No nausea, no vomiting, no hematemesis; No diarrhea , no constipation. No melena, no hematochezia.  GENITOURINARY: No dysuria, no frequency, no hematuria, no incontinence  NEUROLOGICAL: No headaches, no memory loss, no loss of strength, no numbness, no tremors  SKIN: No itching, no burning, no rashes, no lesions   LYMPH NODES: No enlarged glands  ENDOCRINE: No heat or cold intolerance; No hair loss  MUSCULOSKELETAL: No joint pain or swelling; No muscle, no back, no extremity pain  PSYCHIATRIC: No depression, no anxiety, no mood swings, no difficulty sleeping  HEME/LYMPH: No easy bruising, no bleeding gums      [VITALS SIGNS 24hrs]  Vital Signs Last 24 Hrs  T(C): 36.7 (11 Oct 2023 20:54), Max: 39.1 (11 Oct 2023 17:03)  T(F): 98 (11 Oct 2023 20:54), Max: 102.4 (11 Oct 2023 17:03)  HR: 60 (11 Oct 2023 20:54) (59 - 79)  BP: 92/51 (11 Oct 2023 21:20) (78/38 - 121/59)  BP(mean): --  RR: 18 (11 Oct 2023 20:54) (16 - 18)  SpO2: 95% (11 Oct 2023 20:54) (93% - 98%)    Parameters below as of 11 Oct 2023 20:54  Patient On (Oxygen Delivery Method): room air    Daily     Daily Weight in k.2 (11 Oct 2023 02:25)    I&O's Summary    10 Oct 2023 07:01  -  11 Oct 2023 07:00  --------------------------------------------------------  IN: 60 mL / OUT: 200 mL / NET: -140 mL      [PHYSICAL EXAM]  GEN: Chronically ill looking, flat affect. Pale looking  HEENT: normocephalic and atraumatic. EOMI. PERRL.    NECK: Supple.  No lymphadenopathy   LUNGS: Clear to auscultation.  HEART: S1S2 Regular rate and rhythm, no MRG  ABDOMEN: Soft, nontender, and nondistended.  Positive bowel sounds.    : No CVA tenderness  EXTREMITIES: Without edema.  NEUROLOGIC: grossly intact.  PSYCHIATRIC: Flat/Blunted affect .  SKIN: No rash     [LABS: ]                        7.9    9.99  )-----------( 233      ( 11 Oct 2023 04:09 )             25.5     CBC Full  -  ( 11 Oct 2023 04:09 )  WBC Count : 9.99 K/uL  RBC Count : 3.08 M/uL  Hemoglobin : 7.9 g/dL  Hematocrit : 25.5 %  Platelet Count - Automated : 233 K/uL  Mean Cell Volume : 82.8 fl  Mean Cell Hemoglobin : 25.6 pg  Mean Cell Hemoglobin Concentration : 31.0 gm/dL  Auto Neutrophil # : x  Auto Lymphocyte # : x  Auto Monocyte # : x  Auto Eosinophil # : x  Auto Basophil # : x  Auto Neutrophil % : x  Auto Lymphocyte % : x  Auto Monocyte % : x  Auto Eosinophil % : x  Auto Basophil % : x    10-11    138  |  109<H>  |  43<H>  ----------------------------<  186<H>  4.4   |  20<L>  |  1.20    Ca    8.4<L>      11 Oct 2023 04:09  Mg     1.8     10-10    TPro  6.1  /  Alb  1.9<L>  /  TBili  0.4  /  DBili  x   /  AST  18  /  ALT  12  /  AlkPhos  88  10-10    PT/INR - ( 10 Oct 2023 18:25 )   PT: 18.6 sec;   INR: 1.61 ratio         PTT - ( 10 Oct 2023 18: )  PTT:30.0 sec  LIVER FUNCTIONS - ( 10 Oct 2023 18:25 )  Alb: 1.9 g/dL / Pro: 6.1 g/dL / ALK PHOS: 88 U/L / ALT: 12 U/L / AST: 18 U/L / GGT: x           CARDIAC MARKERS ( 10 Oct 2023 18:25 )  x     / x     / x     / x     / <1.0 ng/mL      Urinalysis Basic - ( 11 Oct 2023 06:00 )    Color: Yellow / Appearance: Turbid / S.018 / pH: x  Gluc: x / Ketone: Trace mg/dL  / Bili: Negative / Urobili: 0.2 mg/dL   Blood: x / Protein: 100 mg/dL / Nitrite: Negative   Leuk Esterase: Large / RBC: Too Numerous to count /HPF / WBC Too Numerous to count /HPF   Sq Epi: x / Non Sq Epi: x / Bacteria: Few /HPF      CBC TREND (5 Days)  WBC Count: 9.99 K/uL (10-11 @ 04:09)  WBC Count: 11.08 K/uL (10-10 @ 18:25)    Hemoglobin: 7.9 g/dL (10-11 @ 04:09)  Hemoglobin: 6.6 g/dL (10-10 @ 18:25)    Hematocrit: 25.5 % (10-11 @ 04:09)  Hematocrit: 21.0 % (10-10 @ 18:25)    Platelet Count - Automated: 233 K/uL (10-11 @ 04:09)  Platelet Count - Automated: 228 K/uL (10-10 @ 18:25)       Sedimentation Rate, Erythrocyte: 101 mm/hr (10-10 @ 18:25)      [MICROBIOLOGY /  VIROLOGY:]    Culture - Blood (collected 10 Oct 2023 18:25)  Source: .Blood Blood-Peripheral  Gram Stain (11 Oct 2023 21:56):    Growth in aerobic bottle: Gram Positive Cocci in Clusters    Growth in anaerobic bottle: Gram Positive Cocci in Clusters  Preliminary Report (11 Oct 2023 21:56):    Growth in aerobic bottle: Gram Positive Cocci in Clusters    Growth in anaerobic bottle: Gram Positive Cocci in Clusters    Culture - Blood (collected 10 Oct 2023 18:25)  Source: .Blood Blood-Peripheral  Gram Stain (11 Oct 2023 15:29):    Growth in aerobic bottle: Gram Positive Cocci in Clusters    Growth in anaerobic bottle: Gram Positive Cocci in Clusters  Preliminary Report (11 Oct 2023 15:29):    Growth in aerobic bottle: Gram Positive Cocci in Clusters    Growth in anaerobic bottle: Gram Positive Cocci in Clusters    Direct identification is available within approximately 3-5    hours either by Blood Panel Multiplexed PCR or Direct    MALDI-TOF. Details: [email or web address]  Organism: Blood Culture PCR (11 Oct 2023 16:30)  Organism: Blood Culture PCR (11 Oct 2023 16:30)      [PATHOLOGY]       [RADIOLOGY & ADDITIONAL STUDIES:]     CT Chest No Cont:   ACC: [id number] EXAM:  CT CHEST   ORDERED BY: NEEL RADFORD   ACC: [id number] EXAM:  CT ABDOMEN AND PELVIS   ORDERED BY: NEEL RADFORD   PROCEDURE DATE:  10/10/2023    INTERPRETATION:  CLINICAL INFORMATION: Nausea vomiting and fever. Anorexia  COMPARISON: None.  PROCEDURE:  CT of the Chest, Abdomen and Pelvis was performed.  Sagittal and coronal reformats were performed.    FINDINGS:  CHEST:  LUNGS AND LARGE AIRWAYS: Patent central airways. No pulmonary nodules.  PLEURA: Small right and minimal left pleural effusion  VESSELS: Atherosclerotic changes of the aorta and coronary arteries.  HEART: Heart size is normal. Minimal pericardial effusion. Pacemaker leads. Decreased blood pool density in the ventricles suggesting anemia. Mitral annular calcification  MEDIASTINUM AND IVELISSE: No lymphadenopathy. The esophagus is mildly distended and filled with air and a small amount of debris  CHEST WALL AND LOWER NECK: Pacemaker battery left chest wall    ABDOMEN AND PELVIS:  LIVER: Within normal limits.  BILE DUCTS: Normal caliber.  GALLBLADDER: Cholelithiasis.  SPLEEN: Within normal limits.  PANCREAS: Atrophic.  ADRENALS: Within normal limits.  KIDNEYS/URETERS: Within normal limits.  BLADDER: Mild diffuse nonspecific bladder wall thickening  REPRODUCTIVE ORGANS: Prostate within normal limits.  BOWEL: No bowel obstruction. Appendix is not visualized. No evidence of inflammation in the pericecal region. There is colonic diverticulosis without diverticulitis  PERITONEUM: No ascites.  VESSELS: Atherosclerotic changes.  RETROPERITONEUM/LYMPH NODES: No lymphadenopathy.  ABDOMINAL WALL: Within normal limits.  BONES: Degenerative changes.. ORIF of the left hip with 3 screws seen    IMPRESSION: Small right and minimal left pleural effusion.  No acute pathology in the abdomen or pelvis  Mild esophageal distention is a risk factor for aspiration  Cholelithiasis  Nonspecific diffuse bladder wall thickening  Findings suggesting anemia  --- End of Report ---  SIDRA WALDEN MD; Attending Radiologist  This document has been electronically signed. Oct 10 2023  7:35PM (10-10-23 @ 19:03)    )

## 2023-10-11 NOTE — PROGRESS NOTE ADULT - ASSESSMENT
78-year-old male presents to the hospital by ambulance from home.  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 Pt does not go to doctor on regular basis per son and does phone visits. Son reports years of struggling with foot infections with black areas of gangrene and their struggling to keep his feet but now feeling that keeping him alive is more important.  Culture - Blood (10.10.23 @ 18:25)    -  Methicillin resistant Staphylococcus aureus (MRSA): Detec    RECOMMENDATIONS  Very concerning for osteomyelitis so recommend MRI of both feet/lower legs  w bacteremia repeat blood cultures ordered for 10/12 am  -continue zosyn (started 10/10) for now  -Vanco started 10/11 dosing per pharmacy protocol  -anticipate that surgery will be required so consider involving vascular for possible BKAs based on imaging    Thank you for consulting us and involving us in the management of this most interesting and challenging case.  We will follow along in the care of this patient. Please call us at [phone] or text me directly on my cell# at [phone] with any concerns.    Starting tomorrow Dr Rosa Maria Wilkinson will be assuming care of this patient so please contact her with any questions, concerns or new micro data.   78-year-old male presents to the hospital by ambulance from home.  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 Pt does not go to doctor on regular basis per son and does phone visits. Son reports years of struggling with foot infections with black areas of gangrene and their struggling to keep his feet but now feeling that keeping him alive is more important.  Culture - Blood (10.10.23 @ 18:25)    -  Methicillin resistant Staphylococcus aureus (MRSA): Detec    RECOMMENDATIONS  Very concerning for osteomyelitis so recommend MRI of both feet/lower legs  w bacteremia repeat blood cultures ordered for 10/12 am  -continue zosyn (started 10/10) for now  -Vanco started 10/11 dosing per pharmacy protocol  -anticipate that surgery will be required so consider involving vascular for possible BKAs based on imaging    Thank you for consulting us and involving us in the management of this most interesting and challenging case.  We will follow along in the care of this patient. Please call us at [phone] or text me directly on my cell# at [phone] with any concerns.    Starting tomorrow Dr Rosa Maria Wilkinson will be assuming care of this patient so please contact her with any questions, concerns or new micro data.   78-year-old male presents to the hospital by ambulance from home.  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 Pt does not go to doctor on regular basis per son and does phone visits. Son reports years of struggling with foot infections with black areas of gangrene and their struggling to keep his feet but now feeling that keeping him alive is more important.  Culture - Blood (10.10.23 @ 18:25)    -  Methicillin resistant Staphylococcus aureus (MRSA): Detec    RECOMMENDATIONS  Very concerning for osteomyelitis so recommend MRI of both feet/lower legs  w bacteremia repeat blood cultures ordered for 10/12 am  -continue zosyn (started 10/10) for now  -Vanco started 10/11 dosing per pharmacy protocol  -anticipate that surgery will be required so consider involving vascular for possible BKAs based on imaging    Thank you for consulting us and involving us in the management of this most interesting and challenging case.  We will follow along in the care of this patient. Please call us at [phone] or text me directly on my cell# at [phone] with any concerns.    Starting tomorrow Dr Rosa Maria Wilkinson will be assuming care of this patient so please contact her with any questions, concerns or new micro data.

## 2023-10-11 NOTE — CARE COORDINATION ASSESSMENT. - OTHER PERTINENT REFERRAL INFORMATION
CM met with patient and son Nico at bedside to explain role and transitions of care. Patient lives with spouse in [facility] in [place] on the first floor. Son Nico and wife are the caregiver for the patient.  He has been bedbound for approximate 2.5 years. No prior home care or DMEs. Patient will needs an ambulance to return to the hotel.  Unclear needs at this time.  Resource folder left at bedside.  All questions answered to the best of my abilities.  CM remains available throughout the hospital stay.   CM met with patient and son Nico at bedside to explain role and transitions of care. Patient lives with spouse in [facility] in [place] on the first floor. Son Nico and wife are the caregiver for the patient.  He has been bedbound for approximate 2.5 years. No prior home care or DMEs. Patient will needs an ambulance to return to the hotel.  Unclear needs at this time.  Resource folder left at bedside.  All questions answered to the best of my abilities.  CM remains available throughout the hospital stay.   CM met with patient and son Nico at bedside to explain role and transitions of care. Patient lives with spouse in [facility] in [place] on the first floor. Son Nico and wife are the caregiver for the patient.  He has been bedbound for approximate 2.5 years. No prior home care or DMEs. Patient will needs an ambulance to return to the hotel.  Unclear needs at this time.  Resource folder left at bedside.  All questions answered to the best of my abilities.  CM remains available throughout the hospital stay.

## 2023-10-11 NOTE — PHARMACOTHERAPY INTERVENTION NOTE - COMMENTS
Recommending to initiate vancomycin since the 10/10 blood culture grew MRSA.    Sohail Ramirez, PharmD, [facility]  Clinical Pharmacy Specialist, Infectious Diseases  Tele-Antimicrobial Stewardship Program (Tele-ASP)  Tele-ASP Phone: [phone]   Recommending to initiate vancomycin since the 10/10 blood culture grew MRSA.    Sohail Ramirez, PharmD, [facility]  Clinical Pharmacy Specialist, Infectious Diseases  Tele-Antimicrobial Stewardship Program (Tele-ASP)  Tele-ASP Phone: [phone]   Recommending to initiate vancomycin since the 10/10 blood culture grew MRSA.    Sohail Ramriez, PharmD, [facility]  Clinical Pharmacy Specialist, Infectious Diseases  Tele-Antimicrobial Stewardship Program (Tele-ASP)  Tele-ASP Phone: [phone]   Recommended to initiate vancomycin 1250 mg IV q24h (which predicts a steady state AUC of 440 mg/L*h, as per PrecisePK calculations) since the 10/10 blood culture grew MRSA.    Sohail Ramirez, PharmD, [facility]  Clinical Pharmacy Specialist, Infectious Diseases  Tele-Antimicrobial Stewardship Program (Tele-ASP)  Tele-ASP Phone: [phone]   Recommended to initiate vancomycin 1250 mg IV q24h (which predicts a steady state AUC of 440 mg/L*h, as per PrecisePK calculations) since the 10/10 blood culture grew MRSA.    Sohail Ramirez, PharmD, [facility]  Clinical Pharmacy Specialist, Infectious Diseases  Tele-Antimicrobial Stewardship Program (Tele-ASP)  Tele-ASP Phone: [phone]   Recommended to initiate vancomycin 1250 mg IV q24h (which predicts a steady state AUC of 440 mg/L*h, as per PrecisePK calculations) since the 10/10 blood culture grew MRSA.    Sohail Ramirez, PharmD, [facility]  Clinical Pharmacy Specialist, Infectious Diseases  Tele-Antimicrobial Stewardship Program (Tele-ASP)  Tele-ASP Phone: [phone]

## 2023-10-11 NOTE — CONSULT NOTE ADULT - CONSULT REASON
Anemia D63.8    Anemia due to chronic disease  C61        Prostate cancer  R62.51    Failure to thrive  R50.9      Fever  L89.609   pressure ulcer of heel, Chronic wounds

## 2023-10-11 NOTE — CONSULT NOTE ADULT - SUBJECTIVE AND OBJECTIVE BOX
HPI:  78-year-old male presents to the hospital by ambulance from home.  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 Pt does not go to doctor on regualar basis per son and does phone visits.   (11 Oct 2023 07:02)      78y year old Male seen at [facility] 3W[facility] 365 D1 for Right heel wound and left lateral foot wound. Patient was resting in bed comfortably. Patient relates he has been non ambulatory since the past 5 years. Patient was being treated for the wounds by Dr. Chavez who did the right foot surgery and removed part of the heel bone. for gangrene and bone infection. Patient states the wound on the left started about a year and half ago. Patient is not completely sure of the history of the wounds and states to speak with his son ( Nico) regarding more medical history   Denies any fever, chills, nausea, vomiting, chest pain, shortness of breath, or calf pain at this time.    REVIEW OF SYSTEMS    PAST MEDICAL & SURGICAL HISTORY:  Diabetes      Benign essential HTN      Pacemaker      History of BPH      Diabetic foot ulcers          Allergies    No Known Allergies    Intolerances        MEDICATIONS  (STANDING):  aspirin enteric coated 81 milliGRAM(s) Oral daily  bethanechol 25 milliGRAM(s) Oral two times a day  carvedilol 3.125 milliGRAM(s) Oral every 12 hours  dextrose 5%. 1000 milliLiter(s) (50 mL/Hr) IV Continuous <Continuous>  dextrose 5%. 1000 milliLiter(s) (100 mL/Hr) IV Continuous <Continuous>  dextrose 50% Injectable 25 Gram(s) IV Push once  dextrose 50% Injectable 12.5 Gram(s) IV Push once  dextrose 50% Injectable 25 Gram(s) IV Push once  gabapentin 300 milliGRAM(s) Oral two times a day  glucagon  Injectable 1 milliGRAM(s) IntraMuscular once  insulin glargine Injectable (LANTUS) 18 Unit(s) SubCutaneous at bedtime  insulin lispro (ADMELOG) corrective regimen sliding scale   SubCutaneous three times a day before meals  insulin lispro Injectable (ADMELOG) 3 Unit(s) SubCutaneous three times a day before meals  lactobacillus acidophilus 1 Tablet(s) Oral two times a day with meals  lisinopril 10 milliGRAM(s) Oral daily  pantoprazole    Tablet 40 milliGRAM(s) Oral before breakfast  piperacillin/tazobactam IVPB.. 3.375 Gram(s) IV Intermittent every 8 hours  tamsulosin 0.4 milliGRAM(s) Oral at bedtime    MEDICATIONS  (PRN):  acetaminophen     Tablet .. 650 milliGRAM(s) Oral every 6 hours PRN Temp greater or equal to 38C (100.4F), Moderate Pain (4 - 6)  dextrose Oral Gel 15 Gram(s) Oral once PRN Blood Glucose LESS THAN 70 milliGRAM(s)/deciliter      Social History:  lives at home with wife  bedbound (11 Oct 2023 07:02)      FAMILY HISTORY:      Vital Signs Last 24 Hrs  T(C): 37.8 (11 Oct 2023 11:17), Max: 38.4 (10 Oct 2023 17:47)  T(F): 100 (11 Oct 2023 11:17), Max: 101.1 (10 Oct 2023 17:47)  HR: 79 (11 Oct 2023 11:17) (56 - 79)  BP: 121/59 (11 Oct 2023 11:17) (87/34 - 121/59)  BP(mean): --  RR: 18 (11 Oct 2023 11:17) (16 - 18)  SpO2: 93% (11 Oct 2023 11:17) (93% - 98%)    Parameters below as of 11 Oct 2023 11:17  Patient On (Oxygen Delivery Method): room air        PHYSICAL EXAM:  Vascular: DP & PT non  palpable bilaterally, Capillary refill delayed to the digits  Digital hair absent bilaterally  Neurological: Light touch sensation diminished  bilaterally  Musculoskeletal: 2/5  strength in all quadrants bilaterally, s/p right partial calcanectomy   Dermatological: 6.0cm x 4.0cm  x 1.0cm ulceration noted to the right heel with fibrogranular  tissue  no probe to bone, no periwound erythema, no fluctuance, no malodor, no proximal streaking at this time  4.0cm x 2.0cm x down to bone with necortic tissue and purulent drainage, periwound erythema and edema, no proximal streaking    CBC Full  -  ( 11 Oct 2023 04:09 )  WBC Count : 9.99 K/uL  RBC Count : 3.08 M/uL  Hemoglobin : 7.9 g/dL  Hematocrit : 25.5 %  Platelet Count - Automated : 233 K/uL  Mean Cell Volume : 82.8 fl  Mean Cell Hemoglobin : 25.6 pg  Mean Cell Hemoglobin Concentration : 31.0 gm/dL  Auto Neutrophil # : x  Auto Lymphocyte # : x  Auto Monocyte # : x  Auto Eosinophil # : x  Auto Basophil # : x  Auto Neutrophil % : x  Auto Lymphocyte % : x  Auto Monocyte % : x  Auto Eosinophil % : x  Auto Basophil % : x    10-11    138  |  109<H>  |  43<H>  ----------------------------<  186<H>  4.4   |  20<L>  |  1.20    Ca    8.4<L>      11 Oct 2023 04:09  Mg     1.8     10-10    TPro  6.1  /  Alb  1.9<L>  /  TBili  0.4  /  DBili  x   /  AST  18  /  ALT  12  /  AlkPhos  88  10-10                          7.9    9.99  )-----------( 233      ( 11 Oct 2023 04:09 )             25.5       PT/INR - ( 10 Oct 2023 18:25 )   PT: 18.6 sec;   INR: 1.61 ratio         PTT - ( 10 Oct 2023 18:25 )  PTT:30.0 sec        Imaging: ----------Pending official read   ashwin erosions noted to the left 5th metatarsal base and partial calcanectomy with vascular calcifications noted to the right foot  HPI:  78-year-old male presents to the hospital by ambulance from home.  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 Pt does not go to doctor on regualar basis per son and does phone visits.   (11 Oct 2023 07:02)      78y year old Male seen at [facility] 3[facility] 365 D1 for Right heel wound and left lateral foot wound. Patient was resting in bed comfortably. Patient relates he has been non ambulatory since the past 5 years. Patient was being treated for the wounds by Dr. Chavez who did the right foot surgery and removed part of the heel bone. for gangrene and bone infection. Patient states the wound on the left started about a year and half ago. Patient is not completely sure of the history of the wounds and states to speak with his son ( Nico) regarding more medical history   Denies any fever, chills, nausea, vomiting, chest pain, shortness of breath, or calf pain at this time.    REVIEW OF SYSTEMS    PAST MEDICAL & SURGICAL HISTORY:  Diabetes      Benign essential HTN      Pacemaker      History of BPH      Diabetic foot ulcers          Allergies    No Known Allergies    Intolerances        MEDICATIONS  (STANDING):  aspirin enteric coated 81 milliGRAM(s) Oral daily  bethanechol 25 milliGRAM(s) Oral two times a day  carvedilol 3.125 milliGRAM(s) Oral every 12 hours  dextrose 5%. 1000 milliLiter(s) (50 mL/Hr) IV Continuous <Continuous>  dextrose 5%. 1000 milliLiter(s) (100 mL/Hr) IV Continuous <Continuous>  dextrose 50% Injectable 25 Gram(s) IV Push once  dextrose 50% Injectable 12.5 Gram(s) IV Push once  dextrose 50% Injectable 25 Gram(s) IV Push once  gabapentin 300 milliGRAM(s) Oral two times a day  glucagon  Injectable 1 milliGRAM(s) IntraMuscular once  insulin glargine Injectable (LANTUS) 18 Unit(s) SubCutaneous at bedtime  insulin lispro (ADMELOG) corrective regimen sliding scale   SubCutaneous three times a day before meals  insulin lispro Injectable (ADMELOG) 3 Unit(s) SubCutaneous three times a day before meals  lactobacillus acidophilus 1 Tablet(s) Oral two times a day with meals  lisinopril 10 milliGRAM(s) Oral daily  pantoprazole    Tablet 40 milliGRAM(s) Oral before breakfast  piperacillin/tazobactam IVPB.. 3.375 Gram(s) IV Intermittent every 8 hours  tamsulosin 0.4 milliGRAM(s) Oral at bedtime    MEDICATIONS  (PRN):  acetaminophen     Tablet .. 650 milliGRAM(s) Oral every 6 hours PRN Temp greater or equal to 38C (100.4F), Moderate Pain (4 - 6)  dextrose Oral Gel 15 Gram(s) Oral once PRN Blood Glucose LESS THAN 70 milliGRAM(s)/deciliter      Social History:  lives at home with wife  bedbound (11 Oct 2023 07:02)      FAMILY HISTORY:      Vital Signs Last 24 Hrs  T(C): 37.8 (11 Oct 2023 11:17), Max: 38.4 (10 Oct 2023 17:47)  T(F): 100 (11 Oct 2023 11:17), Max: 101.1 (10 Oct 2023 17:47)  HR: 79 (11 Oct 2023 11:17) (56 - 79)  BP: 121/59 (11 Oct 2023 11:17) (87/34 - 121/59)  BP(mean): --  RR: 18 (11 Oct 2023 11:17) (16 - 18)  SpO2: 93% (11 Oct 2023 11:17) (93% - 98%)    Parameters below as of 11 Oct 2023 11:17  Patient On (Oxygen Delivery Method): room air        PHYSICAL EXAM:  Vascular: DP & PT non  palpable bilaterally, Capillary refill delayed to the digits  Digital hair absent bilaterally  Neurological: Light touch sensation diminished  bilaterally  Musculoskeletal: 2/5  strength in all quadrants bilaterally, s/p right partial calcanectomy   Dermatological: 6.0cm x 4.0cm  x 1.0cm ulceration noted to the right heel with fibrogranular  tissue  no probe to bone, no periwound erythema, no fluctuance, no malodor, no proximal streaking at this time  4.0cm x 2.0cm x down to bone with necortic tissue and purulent drainage, periwound erythema and edema, no proximal streaking    CBC Full  -  ( 11 Oct 2023 04:09 )  WBC Count : 9.99 K/uL  RBC Count : 3.08 M/uL  Hemoglobin : 7.9 g/dL  Hematocrit : 25.5 %  Platelet Count - Automated : 233 K/uL  Mean Cell Volume : 82.8 fl  Mean Cell Hemoglobin : 25.6 pg  Mean Cell Hemoglobin Concentration : 31.0 gm/dL  Auto Neutrophil # : x  Auto Lymphocyte # : x  Auto Monocyte # : x  Auto Eosinophil # : x  Auto Basophil # : x  Auto Neutrophil % : x  Auto Lymphocyte % : x  Auto Monocyte % : x  Auto Eosinophil % : x  Auto Basophil % : x    10-11    138  |  109<H>  |  43<H>  ----------------------------<  186<H>  4.4   |  20<L>  |  1.20    Ca    8.4<L>      11 Oct 2023 04:09  Mg     1.8     10-10    TPro  6.1  /  Alb  1.9<L>  /  TBili  0.4  /  DBili  x   /  AST  18  /  ALT  12  /  AlkPhos  88  10-10                          7.9    9.99  )-----------( 233      ( 11 Oct 2023 04:09 )             25.5       PT/INR - ( 10 Oct 2023 18:25 )   PT: 18.6 sec;   INR: 1.61 ratio         PTT - ( 10 Oct 2023 18:25 )  PTT:30.0 sec        Imaging: ----------Pending official read   ashwin erosions noted to the left 5th metatarsal base and partial calcanectomy with vascular calcifications noted to the right foot  HPI:  78-year-old male presents to the hospital by ambulance from home.  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 Pt does not go to doctor on regualar basis per son and does phone visits.   (11 Oct 2023 07:02)      78y year old Male seen at [facility] 365 D1 for Right heel wound and left lateral foot wound. Patient was resting in bed comfortably. Patient relates he has been non ambulatory since the past 5 years. Patient was being treated for the wounds by Dr. Chavez who did the right foot surgery and removed part of the heel bone. for gangrene and bone infection. Patient states the wound on the left started about a year and half ago. Patient is not completely sure of the history of the wounds and states to speak with his son ( Nico) regarding more medical history   Denies any fever, chills, nausea, vomiting, chest pain, shortness of breath, or calf pain at this time.    REVIEW OF SYSTEMS    PAST MEDICAL & SURGICAL HISTORY:  Diabetes      Benign essential HTN      Pacemaker      History of BPH      Diabetic foot ulcers          Allergies    No Known Allergies    Intolerances        MEDICATIONS  (STANDING):  aspirin enteric coated 81 milliGRAM(s) Oral daily  bethanechol 25 milliGRAM(s) Oral two times a day  carvedilol 3.125 milliGRAM(s) Oral every 12 hours  dextrose 5%. 1000 milliLiter(s) (50 mL/Hr) IV Continuous <Continuous>  dextrose 5%. 1000 milliLiter(s) (100 mL/Hr) IV Continuous <Continuous>  dextrose 50% Injectable 25 Gram(s) IV Push once  dextrose 50% Injectable 12.5 Gram(s) IV Push once  dextrose 50% Injectable 25 Gram(s) IV Push once  gabapentin 300 milliGRAM(s) Oral two times a day  glucagon  Injectable 1 milliGRAM(s) IntraMuscular once  insulin glargine Injectable (LANTUS) 18 Unit(s) SubCutaneous at bedtime  insulin lispro (ADMELOG) corrective regimen sliding scale   SubCutaneous three times a day before meals  insulin lispro Injectable (ADMELOG) 3 Unit(s) SubCutaneous three times a day before meals  lactobacillus acidophilus 1 Tablet(s) Oral two times a day with meals  lisinopril 10 milliGRAM(s) Oral daily  pantoprazole    Tablet 40 milliGRAM(s) Oral before breakfast  piperacillin/tazobactam IVPB.. 3.375 Gram(s) IV Intermittent every 8 hours  tamsulosin 0.4 milliGRAM(s) Oral at bedtime    MEDICATIONS  (PRN):  acetaminophen     Tablet .. 650 milliGRAM(s) Oral every 6 hours PRN Temp greater or equal to 38C (100.4F), Moderate Pain (4 - 6)  dextrose Oral Gel 15 Gram(s) Oral once PRN Blood Glucose LESS THAN 70 milliGRAM(s)/deciliter      Social History:  lives at home with wife  bedbound (11 Oct 2023 07:02)      FAMILY HISTORY:      Vital Signs Last 24 Hrs  T(C): 37.8 (11 Oct 2023 11:17), Max: 38.4 (10 Oct 2023 17:47)  T(F): 100 (11 Oct 2023 11:17), Max: 101.1 (10 Oct 2023 17:47)  HR: 79 (11 Oct 2023 11:17) (56 - 79)  BP: 121/59 (11 Oct 2023 11:17) (87/34 - 121/59)  BP(mean): --  RR: 18 (11 Oct 2023 11:17) (16 - 18)  SpO2: 93% (11 Oct 2023 11:17) (93% - 98%)    Parameters below as of 11 Oct 2023 11:17  Patient On (Oxygen Delivery Method): room air        PHYSICAL EXAM:  Vascular: DP & PT non  palpable bilaterally, Capillary refill delayed to the digits  Digital hair absent bilaterally  Neurological: Light touch sensation diminished  bilaterally  Musculoskeletal: 2/5  strength in all quadrants bilaterally, s/p right partial calcanectomy   Dermatological: 6.0cm x 4.0cm  x 1.0cm ulceration noted to the right heel with fibrogranular  tissue  no probe to bone, no periwound erythema, no fluctuance, no malodor, no proximal streaking at this time  4.0cm x 2.0cm x down to bone with necortic tissue and purulent drainage, periwound erythema and edema, no proximal streaking    CBC Full  -  ( 11 Oct 2023 04:09 )  WBC Count : 9.99 K/uL  RBC Count : 3.08 M/uL  Hemoglobin : 7.9 g/dL  Hematocrit : 25.5 %  Platelet Count - Automated : 233 K/uL  Mean Cell Volume : 82.8 fl  Mean Cell Hemoglobin : 25.6 pg  Mean Cell Hemoglobin Concentration : 31.0 gm/dL  Auto Neutrophil # : x  Auto Lymphocyte # : x  Auto Monocyte # : x  Auto Eosinophil # : x  Auto Basophil # : x  Auto Neutrophil % : x  Auto Lymphocyte % : x  Auto Monocyte % : x  Auto Eosinophil % : x  Auto Basophil % : x    10-11    138  |  109<H>  |  43<H>  ----------------------------<  186<H>  4.4   |  20<L>  |  1.20    Ca    8.4<L>      11 Oct 2023 04:09  Mg     1.8     10-10    TPro  6.1  /  Alb  1.9<L>  /  TBili  0.4  /  DBili  x   /  AST  18  /  ALT  12  /  AlkPhos  88  10-10                          7.9    9.99  )-----------( 233      ( 11 Oct 2023 04:09 )             25.5       PT/INR - ( 10 Oct 2023 18:25 )   PT: 18.6 sec;   INR: 1.61 ratio         PTT - ( 10 Oct 2023 18:25 )  PTT:30.0 sec        Imaging: ----------Pending official read   ashwin erosions noted to the left 5th metatarsal base and partial calcanectomy with vascular calcifications noted to the right foot

## 2023-10-11 NOTE — CONSULT NOTE ADULT - SUBJECTIVE AND OBJECTIVE BOX
[facility] DIV[facility] of INFECTIOUS DISEASE  Cecilio Burnett MD PhD, Gloria Hager MD, Rosa Maria Wilkinson MD, Tomy Larsen MD, Anshu Kilpatrick MD  and providing coverage with Amandeep Landaverde MD  Providing Infectious Disease Consultations at [facility], [facility], [place], [place], [facility], [facility]'s    Office# [phone] to schedule follow up appointments  Answering Service for urgent calls or New Consults [phone]  Cell# to text for urgent issues Cecilio Burnett [phone]     HPI:  78-year-old male presents to the hospital by ambulance from home.  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 Pt does not go to doctor on regular basis per son and does phone visits. Son reports years of struggling with foot infections with black areas of gangrene and their struggling to keep his feet but now feeling that keeping him alive is more important.      PAST MEDICAL & SURGICAL HISTORY:  Diabetes      Benign essential HTN      Pacemaker      History of BPH      Diabetic foot ulcers          Antimicrobials  piperacillin/tazobactam IVPB.. 3.375 Gram(s) IV Intermittent every 8 hours      Immunological      Other  acetaminophen     Tablet .. 650 milliGRAM(s) Oral every 6 hours PRN  aspirin enteric coated 81 milliGRAM(s) Oral daily  bethanechol 25 milliGRAM(s) Oral two times a day  carvedilol 3.125 milliGRAM(s) Oral every 12 hours  dextrose 5%. 1000 milliLiter(s) IV Continuous <Continuous>  dextrose 5%. 1000 milliLiter(s) IV Continuous <Continuous>  dextrose 50% Injectable 25 Gram(s) IV Push once  dextrose 50% Injectable 12.5 Gram(s) IV Push once  dextrose 50% Injectable 25 Gram(s) IV Push once  dextrose Oral Gel 15 Gram(s) Oral once PRN  gabapentin 300 milliGRAM(s) Oral two times a day  glucagon  Injectable 1 milliGRAM(s) IntraMuscular once  insulin glargine Injectable (LANTUS) 18 Unit(s) SubCutaneous at bedtime  insulin lispro (ADMELOG) corrective regimen sliding scale   SubCutaneous three times a day before meals  insulin lispro Injectable (ADMELOG) 3 Unit(s) SubCutaneous three times a day before meals  lactobacillus acidophilus 1 Tablet(s) Oral two times a day with meals  lisinopril 10 milliGRAM(s) Oral daily  pantoprazole    Tablet 40 milliGRAM(s) Oral before breakfast  tamsulosin 0.4 milliGRAM(s) Oral at bedtime      Allergies    No Known Allergies    Intolerances        SOCIAL HISTORY:  lives at home with wife  bedbound (11 Oct 2023 07:02)      FAMILY HISTORY:      ROS:    limited due to cognitive status    Vital Signs Last 24 Hrs  T(C): 37.8 (11 Oct 2023 11:17), Max: 38.4 (10 Oct 2023 17:47)  T(F): 100 (11 Oct 2023 11:17), Max: 101.1 (10 Oct 2023 17:47)  HR: 79 (11 Oct 2023 11:17) (56 - 79)  BP: 121/59 (11 Oct 2023 11:17) (87/34 - 121/59)  BP(mean): --  RR: 18 (11 Oct 2023 11:17) (16 - 18)  SpO2: 93% (11 Oct 2023 11:17) (93% - 98%)    Parameters below as of 11 Oct 2023 11:17  Patient On (Oxygen Delivery Method): room air        PE:  In no distress  HEENT:  NC, PERRL, sclerae anicteric, conjunctivae clear, EOMI.  Sinuses nontender, no nasal exudate.  No buccal or pharyngeal lesions, erythema or exudate  Neck:  Supple, no adenopathy  Lungs:  No accessory muscle use, bilaterally clear to auscultation  Cor:  distant  Abd:  Symmetric, normoactive BS.  Soft, nontender, no masses, guarding or rebound.  Liver and spleen not enlarged  Extrem:  No cyanosis or edema, contracted, both feet are wrapped  Skin:  No rashes.  Neuro: limited but alert, verbal        LABS:                        7.9    9.99  )-----------( 233      ( 11 Oct 2023 04:09 )             25.5       WBC Count: 9.99 K/uL (10-11-23 @ 04:09)  WBC Count: 11.08 K/uL (10-10-23 @ 18:25)      10-11    138  |  109<H>  |  43<H>  ----------------------------<  186<H>  4.4   |  20<L>  |  1.20    Ca    8.4<L>      11 Oct 2023 04:09  Mg     1.8     10-10    TPro  6.1  /  Alb  1.9<L>  /  TBili  0.4  /  DBili  x   /  AST  18  /  ALT  12  /  AlkPhos  88  10-10      Creatinine: 1.20 mg/dL (10-11-23 @ 04:09)  Creatinine: 1.20 mg/dL (10-10-23 @ 18:25)      Urinalysis Basic - ( 11 Oct 2023 06:00 )    Color: Yellow / Appearance: Turbid / S.018 / pH: x  Gluc: x / Ketone: Trace mg/dL  / Bili: Negative / Urobili: 0.2 mg/dL   Blood: x / Protein: 100 mg/dL / Nitrite: Negative   Leuk Esterase: Large / RBC: Too Numerous to count /HPF / WBC Too Numerous to count /HPF   Sq Epi: x / Non Sq Epi: x / Bacteria: Few /HPF        MICROBIOLOGY:      RADIOLOGY & ADDITIONAL STUDIES:    --< from: CT Abdomen and Pelvis No Cont (10.10.23 @ 19:03) >    ACC: [id number] EXAM:  CT CHEST   ORDERED BY: NEEL RADFORD     ACC: [id number] EXAM:  CT ABDOMEN AND PELVIS   ORDERED BY: NEEL RADFORD     PROCEDURE DATE:  10/10/2023          INTERPRETATION:  CLINICAL INFORMATION: Nausea vomiting and fever. Anorexia    COMPARISON: None.    CONTRAST/COMPLICATIONS:  IV Contrast: NONE  Oral Contrast: NONE  Complications: None reported at time of study completion    PROCEDURE:  CT of the Chest, Abdomen and Pelvis was performed.  Sagittal and coronal reformats were performed.    FINDINGS:  CHEST:  LUNGS AND LARGE AIRWAYS: Patent central airways. No pulmonary nodules.  PLEURA: Small right and minimal left pleural effusion  VESSELS: Atherosclerotic changes of the aorta and coronary arteries.  HEART: Heart size is normal. Minimal pericardial effusion. Pacemaker   leads. Decreased blood pool density in the ventricles suggesting anemia.   Mitral annular calcification  MEDIASTINUM AND IVELISSE: No lymphadenopathy. The esophagus is mildly   distended and filled with air and a small amount of debris  CHEST WALL AND LOWER NECK: Pacemaker battery left chest wall    ABDOMEN AND PELVIS:  LIVER: Within normal limits.  BILE DUCTS: Normal caliber.  GALLBLADDER: Cholelithiasis.  SPLEEN: Within normal limits.  PANCREAS: Atrophic.  ADRENALS: Within normal limits.  KIDNEYS/URETERS: Within normal limits.    BLADDER: Mild diffuse nonspecific ladder wall thickening  REPRODUCTIVE ORGANS: Prostate within normal limits.    BOWEL: No bowel obstruction. Appendix is not visualized. No evidence of   inflammation in the pericecal region. There is colonic diverticulosis   without diverticulitis  PERITONEUM: No ascites.  VESSELS: Atherosclerotic changes.  RETROPERITONEUM/LYMPH NODES: No lymphadenopathy.  ABDOMINAL WALL: Within normal limits.  BONES: Degenerative changes.. ORIF of the left hip with 3 screws seen    IMPRESSION: Small right and minimal left pleural effusion.  No acute pathology in the abdomen or pelvis  Mild esophageal distention is a risk factor for aspiration  Cholelithiasis  Nonspecific diffuse bladder wall thickening  Findings suggesting anemia   [facility] DIV[facility] of INFECTIOUS DISEASE  Cecilio Burnett MD PhD, Gloria Hager MD, Rosa Maria Wilkinson MD, Tomy Larsen MD, Anshu Kilpatrick MD  and providing coverage with Amandeep Landaverde MD  Providing Infectious Disease Consultations at [facility], [facility], [place], [place], [facility], [facility]'s    Office# [phone] to schedule follow up appointments  Answering Service for urgent calls or New Consults [phone]  Cell# to text for urgent issues Cecilio Burnett [phone]     HPI:  78-year-old male presents to the hospital by ambulance from home.  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 Pt does not go to doctor on regular basis per son and does phone visits. Son reports years of struggling with foot infections with black areas of gangrene and their struggling to keep his feet but now feeling that keeping him alive is more important.      PAST MEDICAL & SURGICAL HISTORY:  Diabetes      Benign essential HTN      Pacemaker      History of BPH      Diabetic foot ulcers          Antimicrobials  piperacillin/tazobactam IVPB.. 3.375 Gram(s) IV Intermittent every 8 hours      Immunological      Other  acetaminophen     Tablet .. 650 milliGRAM(s) Oral every 6 hours PRN  aspirin enteric coated 81 milliGRAM(s) Oral daily  bethanechol 25 milliGRAM(s) Oral two times a day  carvedilol 3.125 milliGRAM(s) Oral every 12 hours  dextrose 5%. 1000 milliLiter(s) IV Continuous <Continuous>  dextrose 5%. 1000 milliLiter(s) IV Continuous <Continuous>  dextrose 50% Injectable 25 Gram(s) IV Push once  dextrose 50% Injectable 12.5 Gram(s) IV Push once  dextrose 50% Injectable 25 Gram(s) IV Push once  dextrose Oral Gel 15 Gram(s) Oral once PRN  gabapentin 300 milliGRAM(s) Oral two times a day  glucagon  Injectable 1 milliGRAM(s) IntraMuscular once  insulin glargine Injectable (LANTUS) 18 Unit(s) SubCutaneous at bedtime  insulin lispro (ADMELOG) corrective regimen sliding scale   SubCutaneous three times a day before meals  insulin lispro Injectable (ADMELOG) 3 Unit(s) SubCutaneous three times a day before meals  lactobacillus acidophilus 1 Tablet(s) Oral two times a day with meals  lisinopril 10 milliGRAM(s) Oral daily  pantoprazole    Tablet 40 milliGRAM(s) Oral before breakfast  tamsulosin 0.4 milliGRAM(s) Oral at bedtime      Allergies    No Known Allergies    Intolerances        SOCIAL HISTORY:  lives at home with wife  bedbound (11 Oct 2023 07:02)      FAMILY HISTORY:      ROS:    limited due to cognitive status    Vital Signs Last 24 Hrs  T(C): 37.8 (11 Oct 2023 11:17), Max: 38.4 (10 Oct 2023 17:47)  T(F): 100 (11 Oct 2023 11:17), Max: 101.1 (10 Oct 2023 17:47)  HR: 79 (11 Oct 2023 11:17) (56 - 79)  BP: 121/59 (11 Oct 2023 11:17) (87/34 - 121/59)  BP(mean): --  RR: 18 (11 Oct 2023 11:17) (16 - 18)  SpO2: 93% (11 Oct 2023 11:17) (93% - 98%)    Parameters below as of 11 Oct 2023 11:17  Patient On (Oxygen Delivery Method): room air        PE:  In no distress  HEENT:  NC, PERRL, sclerae anicteric, conjunctivae clear, EOMI.  Sinuses nontender, no nasal exudate.  No buccal or pharyngeal lesions, erythema or exudate  Neck:  Supple, no adenopathy  Lungs:  No accessory muscle use, bilaterally clear to auscultation  Cor:  distant  Abd:  Symmetric, normoactive BS.  Soft, nontender, no masses, guarding or rebound.  Liver and spleen not enlarged  Extrem:  No cyanosis or edema, contracted, both feet are wrapped  Skin:  No rashes.  Neuro: limited but alert, verbal        LABS:                        7.9    9.99  )-----------( 233      ( 11 Oct 2023 04:09 )             25.5       WBC Count: 9.99 K/uL (10-11-23 @ 04:09)  WBC Count: 11.08 K/uL (10-10-23 @ 18:25)      10-11    138  |  109<H>  |  43<H>  ----------------------------<  186<H>  4.4   |  20<L>  |  1.20    Ca    8.4<L>      11 Oct 2023 04:09  Mg     1.8     10-10    TPro  6.1  /  Alb  1.9<L>  /  TBili  0.4  /  DBili  x   /  AST  18  /  ALT  12  /  AlkPhos  88  10-10      Creatinine: 1.20 mg/dL (10-11-23 @ 04:09)  Creatinine: 1.20 mg/dL (10-10-23 @ 18:25)      Urinalysis Basic - ( 11 Oct 2023 06:00 )    Color: Yellow / Appearance: Turbid / S.018 / pH: x  Gluc: x / Ketone: Trace mg/dL  / Bili: Negative / Urobili: 0.2 mg/dL   Blood: x / Protein: 100 mg/dL / Nitrite: Negative   Leuk Esterase: Large / RBC: Too Numerous to count /HPF / WBC Too Numerous to count /HPF   Sq Epi: x / Non Sq Epi: x / Bacteria: Few /HPF        MICROBIOLOGY:      RADIOLOGY & ADDITIONAL STUDIES:    --< from: CT Abdomen and Pelvis No Cont (10.10.23 @ 19:03) >    ACC: [id number] EXAM:  CT CHEST   ORDERED BY: NEEL RADFORD     ACC: [id number] EXAM:  CT ABDOMEN AND PELVIS   ORDERED BY: NEEL RADFORD     PROCEDURE DATE:  10/10/2023          INTERPRETATION:  CLINICAL INFORMATION: Nausea vomiting and fever. Anorexia    COMPARISON: None.    CONTRAST/COMPLICATIONS:  IV Contrast: NONE  Oral Contrast: NONE  Complications: None reported at time of study completion    PROCEDURE:  CT of the Chest, Abdomen and Pelvis was performed.  Sagittal and coronal reformats were performed.    FINDINGS:  CHEST:  LUNGS AND LARGE AIRWAYS: Patent central airways. No pulmonary nodules.  PLEURA: Small right and minimal left pleural effusion  VESSELS: Atherosclerotic changes of the aorta and coronary arteries.  HEART: Heart size is normal. Minimal pericardial effusion. Pacemaker   leads. Decreased blood pool density in the ventricles suggesting anemia.   Mitral annular calcification  MEDIASTINUM AND IVELISSE: No lymphadenopathy. The esophagus is mildly   distended and filled with air and a small amount of debris  CHEST WALL AND LOWER NECK: Pacemaker battery left chest wall    ABDOMEN AND PELVIS:  LIVER: Within normal limits.  BILE DUCTS: Normal caliber.  GALLBLADDER: Cholelithiasis.  SPLEEN: Within normal limits.  PANCREAS: Atrophic.  ADRENALS: Within normal limits.  KIDNEYS/URETERS: Within normal limits.    BLADDER: Mild diffuse nonspecific ladder wall thickening  REPRODUCTIVE ORGANS: Prostate within normal limits.    BOWEL: No bowel obstruction. Appendix is not visualized. No evidence of   inflammation in the pericecal region. There is colonic diverticulosis   without diverticulitis  PERITONEUM: No ascites.  VESSELS: Atherosclerotic changes.  RETROPERITONEUM/LYMPH NODES: No lymphadenopathy.  ABDOMINAL WALL: Within normal limits.  BONES: Degenerative changes.. ORIF of the left hip with 3 screws seen    IMPRESSION: Small right and minimal left pleural effusion.  No acute pathology in the abdomen or pelvis  Mild esophageal distention is a risk factor for aspiration  Cholelithiasis  Nonspecific diffuse bladder wall thickening  Findings suggesting anemia   [facility] DIV[facility] of INFECTIOUS DISEASE  Cecilio Burnett MD PhD, Gloria Hager MD, Rosa Maria Wilkinson MD, Tomy Larsen MD, Anshu Kilpatrick MD  and providing coverage with Amandeep Landaverde MD  Providing Infectious Disease Consultations at [facility], [facility], [place], [place], [facility], [facility]'s    Office# [phone] to schedule follow up appointments  Answering Service for urgent calls or New Consults [phone]  Cell# to text for urgent issues Cecilio Burnett [phone]     HPI:  78-year-old male presents to the hospital by ambulance from home.  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 Pt does not go to doctor on regular basis per son and does phone visits. Son reports years of struggling with foot infections with black areas of gangrene and their struggling to keep his feet but now feeling that keeping him alive is more important.      PAST MEDICAL & SURGICAL HISTORY:  Diabetes      Benign essential HTN      Pacemaker      History of BPH      Diabetic foot ulcers          Antimicrobials  piperacillin/tazobactam IVPB.. 3.375 Gram(s) IV Intermittent every 8 hours      Immunological      Other  acetaminophen     Tablet .. 650 milliGRAM(s) Oral every 6 hours PRN  aspirin enteric coated 81 milliGRAM(s) Oral daily  bethanechol 25 milliGRAM(s) Oral two times a day  carvedilol 3.125 milliGRAM(s) Oral every 12 hours  dextrose 5%. 1000 milliLiter(s) IV Continuous <Continuous>  dextrose 5%. 1000 milliLiter(s) IV Continuous <Continuous>  dextrose 50% Injectable 25 Gram(s) IV Push once  dextrose 50% Injectable 12.5 Gram(s) IV Push once  dextrose 50% Injectable 25 Gram(s) IV Push once  dextrose Oral Gel 15 Gram(s) Oral once PRN  gabapentin 300 milliGRAM(s) Oral two times a day  glucagon  Injectable 1 milliGRAM(s) IntraMuscular once  insulin glargine Injectable (LANTUS) 18 Unit(s) SubCutaneous at bedtime  insulin lispro (ADMELOG) corrective regimen sliding scale   SubCutaneous three times a day before meals  insulin lispro Injectable (ADMELOG) 3 Unit(s) SubCutaneous three times a day before meals  lactobacillus acidophilus 1 Tablet(s) Oral two times a day with meals  lisinopril 10 milliGRAM(s) Oral daily  pantoprazole    Tablet 40 milliGRAM(s) Oral before breakfast  tamsulosin 0.4 milliGRAM(s) Oral at bedtime      Allergies    No Known Allergies    Intolerances        SOCIAL HISTORY:  lives at home with wife  bedbound (11 Oct 2023 07:02)      FAMILY HISTORY:      ROS:    limited due to cognitive status    Vital Signs Last 24 Hrs  T(C): 37.8 (11 Oct 2023 11:17), Max: 38.4 (10 Oct 2023 17:47)  T(F): 100 (11 Oct 2023 11:17), Max: 101.1 (10 Oct 2023 17:47)  HR: 79 (11 Oct 2023 11:17) (56 - 79)  BP: 121/59 (11 Oct 2023 11:17) (87/34 - 121/59)  BP(mean): --  RR: 18 (11 Oct 2023 11:17) (16 - 18)  SpO2: 93% (11 Oct 2023 11:17) (93% - 98%)    Parameters below as of 11 Oct 2023 11:17  Patient On (Oxygen Delivery Method): room air        PE:  In no distress  HEENT:  NC, PERRL, sclerae anicteric, conjunctivae clear, EOMI.  Sinuses nontender, no nasal exudate.  No buccal or pharyngeal lesions, erythema or exudate  Neck:  Supple, no adenopathy  Lungs:  No accessory muscle use, bilaterally clear to auscultation  Cor:  distant  Abd:  Symmetric, normoactive BS.  Soft, nontender, no masses, guarding or rebound.  Liver and spleen not enlarged  Extrem:  No cyanosis or edema, contracted, both feet are wrapped  Skin:  No rashes.  Neuro: limited but alert, verbal        LABS:                        7.9    9.99  )-----------( 233      ( 11 Oct 2023 04:09 )             25.5       WBC Count: 9.99 K/uL (10-11-23 @ 04:09)  WBC Count: 11.08 K/uL (10-10-23 @ 18:25)      10-11    138  |  109<H>  |  43<H>  ----------------------------<  186<H>  4.4   |  20<L>  |  1.20    Ca    8.4<L>      11 Oct 2023 04:09  Mg     1.8     10-10    TPro  6.1  /  Alb  1.9<L>  /  TBili  0.4  /  DBili  x   /  AST  18  /  ALT  12  /  AlkPhos  88  10-10      Creatinine: 1.20 mg/dL (10-11-23 @ 04:09)  Creatinine: 1.20 mg/dL (10-10-23 @ 18:25)      Urinalysis Basic - ( 11 Oct 2023 06:00 )    Color: Yellow / Appearance: Turbid / S.018 / pH: x  Gluc: x / Ketone: Trace mg/dL  / Bili: Negative / Urobili: 0.2 mg/dL   Blood: x / Protein: 100 mg/dL / Nitrite: Negative   Leuk Esterase: Large / RBC: Too Numerous to count /HPF / WBC Too Numerous to count /HPF   Sq Epi: x / Non Sq Epi: x / Bacteria: Few /HPF        MICROBIOLOGY:      RADIOLOGY & ADDITIONAL STUDIES:    --< from: CT Abdomen and Pelvis No Cont (10.10.23 @ 19:03) >    ACC: [id number] EXAM:  CT CHEST   ORDERED BY: NEEL RADFORD     ACC: [id number] EXAM:  CT ABDOMEN AND PELVIS   ORDERED BY: NEEL RADFORD     PROCEDURE DATE:  10/10/2023          INTERPRETATION:  CLINICAL INFORMATION: Nausea vomiting and fever. Anorexia    COMPARISON: None.    CONTRAST/COMPLICATIONS:  IV Contrast: NONE  Oral Contrast: NONE  Complications: None reported at time of study completion    PROCEDURE:  CT of the Chest, Abdomen and Pelvis was performed.  Sagittal and coronal reformats were performed.    FINDINGS:  CHEST:  LUNGS AND LARGE AIRWAYS: Patent central airways. No pulmonary nodules.  PLEURA: Small right and minimal left pleural effusion  VESSELS: Atherosclerotic changes of the aorta and coronary arteries.  HEART: Heart size is normal. Minimal pericardial effusion. Pacemaker   leads. Decreased blood pool density in the ventricles suggesting anemia.   Mitral annular calcification  MEDIASTINUM AND IVELISSE: No lymphadenopathy. The esophagus is mildly   distended and filled with air and a small amount of debris  CHEST WALL AND LOWER NECK: Pacemaker battery left chest wall    ABDOMEN AND PELVIS:  LIVER: Within normal limits.  BILE DUCTS: Normal caliber.  GALLBLADDER: Cholelithiasis.  SPLEEN: Within normal limits.  PANCREAS: Atrophic.  ADRENALS: Within normal limits.  KIDNEYS/URETERS: Within normal limits.    BLADDER: Mild diffuse nonspecific ladder wall thickening  REPRODUCTIVE ORGANS: Prostate within normal limits.    BOWEL: No bowel obstruction. Appendix is not visualized. No evidence of   inflammation in the pericecal region. There is colonic diverticulosis   without diverticulitis  PERITONEUM: No ascites.  VESSELS: Atherosclerotic changes.  RETROPERITONEUM/LYMPH NODES: No lymphadenopathy.  ABDOMINAL WALL: Within normal limits.  BONES: Degenerative changes.. ORIF of the left hip with 3 screws seen    IMPRESSION: Small right and minimal left pleural effusion.  No acute pathology in the abdomen or pelvis  Mild esophageal distention is a risk factor for aspiration  Cholelithiasis  Nonspecific diffuse bladder wall thickening  Findings suggesting anemia

## 2023-10-11 NOTE — H&P ADULT - HISTORY OF PRESENT ILLNESS
78-year-old male presents to the hospital by ambulance from home.  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 Pt does not go to doctor on regualar basis per son and does phone visits.

## 2023-10-11 NOTE — H&P ADULT - PROBLEM/PLAN-5
DISPLAY PLAN FREE TEXT Protopic Counseling: Patient may experience a mild burning sensation during topical application. Protopic is not approved in children less than 2 years of age. There have been case reports of hematologic and skin malignancies in patients using topical calcineurin inhibitors although causality is questionable.

## 2023-10-11 NOTE — DIETITIAN INITIAL EVALUATION ADULT - ETIOLOGY
related to increased demand for nutrient (protein, micronutrients) related to decreased ability to consume sufficient energy

## 2023-10-11 NOTE — PATIENT CHOICE NOTE. - NSPTCHOICESTATE_GEN_ALL_CORE
I have met with the patient and/or caregiver to discuss discharge goals and treatment plan. Patient and/or caregiver also provided with instructions on accessing the CMS Compare websites for additional information related to Post Acute Provider quality and resource use measures to assist them in evaluation of the providers and in selecting their post-acute provider of choice. Patient and caregiver were informed of the facilities that are owned and/or operated by [facility]. I have discussed with the patient the availability of in-network facilities and providers. Patient and caregiver provided with a list of post-acute providers whose services are appropriate to the discharge plans and patient needs.     For patient requiring durable medical equipment, patient and/or caregiver were informed that they have the right to request who provides the required equipment. I have met with the patient and/or caregiver to discuss discharge goals and treatment plan. Patient and/or caregiver also provided with instructions on accessing the CMS Compare websites for additional information related to Post Acute Provider quality and resource use measures to assist them in evaluation of the providers and in selecting their post-acute provider of choice. Patient and caregiver were informed of the facilities that are owned and/or operated by [facility]. I have discussed with the patient the availability of in-network facilities and providers. Patient and caregiver provided with a list of post-acute providers whose services are appropriate to the discharge plans and patient needs.     For patient requiring durable medical equipment, patient and/or caregiver were informed that they have the right to request who provides the required equipment. I have met with the patient and/or caregiver to discuss discharge goals and treatment plan. Patient and/or caregiver also provided with instructions on accessing the CMS Compare websites for additional information related to Post Acute Provider quality and resource use measures to assist them in evaluation of the providers and in selecting their post-acute provider of choice. Patient and caregiver were informed of the facilities that are owned and/or operated by [facility]. I have discussed with the patient the availability of in-network facilities and providers. Patient and caregiver provided with a list of post-acute providers whose services are appropriate to the discharge plans and patient needs.     For patient requiring durable medical equipment, patient and/or caregiver were informed that they have the right to request who provides the required equipment.

## 2023-10-11 NOTE — CONSULT NOTE ADULT - CONSULT REASON
78y A1C with Estimated Average Glucose Result: 6.0 % (10-11-23 @ 04:09)   diabetes mellitus uncontrolled type 2

## 2023-10-11 NOTE — CARE COORDINATION ASSESSMENT. - NSPASTMEDSURGHISTORY_GEN_ALL_CORE_FT
PAST MEDICAL & SURGICAL HISTORY:  Diabetic foot ulcers      History of BPH      Pacemaker      Benign essential HTN      Diabetes

## 2023-10-11 NOTE — DIETITIAN INITIAL EVALUATION ADULT - OTHER INFO
Pt is a "78-year-old male presents to the hospital by ambulance from home. 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 Pt does not go to doctor on regular basis per son and does phone visits."    Visited pt at bedside this am. Pt alert/forgetful at times during visit today. Reports fair intake of breakfast meal; consumed oatmeal and yogurt. Denies chewing/swallowing difficulties. No food allergies. Denies N/V/D/C. No BMs thus far. CBW on admission 170#. Wt loss of 40-50# over the past year noted in H&P.   Pt is a "78-year-old male presents to the hospital by ambulance from home. 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 Pt does not go to doctor on regular basis per son and does phone visits."    Visited pt at bedside this am. Pt alert/forgetful at times during visit today. Reports fair intake of breakfast meal; consumed oatmeal and yogurt. Wears dentures; needs soft foods per son. No food allergies. Denies N/V/D/C. No BMs thus far. CBW on admission 170#. Wt loss of 40-50# over the past year noted in H&P. Son reports pt's UBW of ~240#. Son unaware of recent weight. Son states pt appears to have lost a lot of weight. 2+ BL foot/ankle edema noted. Skin: stage II R heel, unstageable to L outer foot, stage II sacrum. Pt currently on soft/bite size, consistent carbohydrate, DASH/TLC diet. Agreeable to receive Glucerna Shake BID. Food preferences obtained to optimize po intake/tolerance.

## 2023-10-11 NOTE — PROVIDER CONTACT NOTE (EICU) - ASSESSMENT
77 yo M with MRSA bacteremia likely due to LE infection/gangrene/suspected OM, also UTI. On Vanco and Zosyn. Vascular involved, possible debridement.   PMH of chronic BL LE wounds, HTN, DM, BPH, PPM in place; worsening function status lately - currently, bedbound. Anemia, likely due to critical illness, ACD.  Episode of hypotension earlier today that responded to fluid bolus. 79 yo M with MRSA bacteremia likely due to LE infection/gangrene/suspected OM, also UTI. On Vanco and Zosyn. Vascular involved, possible debridement.   PMH of chronic BL LE wounds, HTN, DM, BPH, PPM in place; worsening function status lately - currently, bedbound. Anemia, likely due to critical illness, ACD.  Episode of hypotension earlier today that responded to fluid bolus.

## 2023-10-12 DIAGNOSIS — E11.621 TYPE 2 DIABETES MELLITUS WITH FOOT ULCER: ICD-10-CM

## 2023-10-12 DIAGNOSIS — A41.9 SEPSIS, UNSPECIFIED ORGANISM: ICD-10-CM

## 2023-10-12 DIAGNOSIS — A49.9 BACTERIAL INFECTION, UNSPECIFIED: ICD-10-CM

## 2023-10-12 DIAGNOSIS — S91.301A UNSPECIFIED OPEN WOUND, RIGHT FOOT, INITIAL ENCOUNTER: ICD-10-CM

## 2023-10-12 DIAGNOSIS — R79.89 OTHER SPECIFIED ABNORMAL FINDINGS OF BLOOD CHEMISTRY: ICD-10-CM

## 2023-10-12 LAB
A1C WITH ESTIMATED AVERAGE GLUCOSE RESULT: 6.1 % — HIGH (ref 4–5.6)
ANION GAP SERPL CALC-SCNC: 6 MMOL/L — SIGNIFICANT CHANGE UP (ref 5–17)
BUN SERPL-MCNC: 42 MG/DL — HIGH (ref 7–23)
CALCIUM SERPL-MCNC: 8.1 MG/DL — LOW (ref 8.5–10.1)
CHLORIDE SERPL-SCNC: 111 MMOL/L — HIGH (ref 96–108)
CK MB BLD-MCNC: 3.8 % — HIGH (ref 0–3.5)
CK MB BLD-MCNC: 4.2 % — HIGH (ref 0–3.5)
CK MB CFR SERPL CALC: 3.4 NG/ML — SIGNIFICANT CHANGE UP (ref 0–3.6)
CK MB CFR SERPL CALC: 4.1 NG/ML — HIGH (ref 0–3.6)
CK SERPL-CCNC: 90 U/L — SIGNIFICANT CHANGE UP (ref 26–308)
CK SERPL-CCNC: 98 U/L — SIGNIFICANT CHANGE UP (ref 26–308)
CO2 SERPL-SCNC: 22 MMOL/L — SIGNIFICANT CHANGE UP (ref 22–31)
CREAT SERPL-MCNC: 1.1 MG/DL — SIGNIFICANT CHANGE UP (ref 0.5–1.3)
EGFR: 69 ML/MIN/1.73M2 — SIGNIFICANT CHANGE UP
ESTIMATED AVERAGE GLUCOSE: 128 MG/DL — HIGH (ref 68–114)
GLUCOSE BLDC GLUCOMTR-MCNC: 156 MG/DL — HIGH (ref 70–99)
GLUCOSE BLDC GLUCOMTR-MCNC: 162 MG/DL — HIGH (ref 70–99)
GLUCOSE BLDC GLUCOMTR-MCNC: 187 MG/DL — HIGH (ref 70–99)
GLUCOSE BLDC GLUCOMTR-MCNC: 215 MG/DL — HIGH (ref 70–99)
GLUCOSE SERPL-MCNC: 216 MG/DL — HIGH (ref 70–99)
HCT VFR BLD CALC: 30.3 % — LOW (ref 39–50)
HCV AB S/CO SERPL IA: 0.13 S/CO — SIGNIFICANT CHANGE UP (ref 0–0.99)
HCV AB SERPL-IMP: SIGNIFICANT CHANGE UP
HGB BLD-MCNC: 9.6 G/DL — LOW (ref 13–17)
MAGNESIUM SERPL-MCNC: 1.8 MG/DL — SIGNIFICANT CHANGE UP (ref 1.6–2.6)
MCHC RBC-ENTMCNC: 25.7 PG — LOW (ref 27–34)
MCHC RBC-ENTMCNC: 31.7 GM/DL — LOW (ref 32–36)
MCV RBC AUTO: 81.2 FL — SIGNIFICANT CHANGE UP (ref 80–100)
NRBC # BLD: 0 /100 WBCS — SIGNIFICANT CHANGE UP (ref 0–0)
PHOSPHATE SERPL-MCNC: 3 MG/DL — SIGNIFICANT CHANGE UP (ref 2.5–4.5)
PLATELET # BLD AUTO: 279 K/UL — SIGNIFICANT CHANGE UP (ref 150–400)
POTASSIUM SERPL-MCNC: 4.2 MMOL/L — SIGNIFICANT CHANGE UP (ref 3.5–5.3)
POTASSIUM SERPL-SCNC: 4.2 MMOL/L — SIGNIFICANT CHANGE UP (ref 3.5–5.3)
RBC # BLD: 3.23 M/UL — LOW (ref 4.2–5.8)
RBC # BLD: 3.73 M/UL — LOW (ref 4.2–5.8)
RBC # FLD: 15.9 % — HIGH (ref 10.3–14.5)
RETICS #: 24.9 K/UL — LOW (ref 25–125)
RETICS/RBC NFR: 0.8 % — SIGNIFICANT CHANGE UP (ref 0.5–2.5)
SODIUM SERPL-SCNC: 139 MMOL/L — SIGNIFICANT CHANGE UP (ref 135–145)
TROPONIN I, HIGH SENSITIVITY RESULT: 1608.1 NG/L — HIGH
TROPONIN I, HIGH SENSITIVITY RESULT: 1894.6 NG/L — HIGH
VANCOMYCIN TROUGH SERPL-MCNC: 6.4 UG/ML — LOW (ref 10–20)
WBC # BLD: 7.26 K/UL — SIGNIFICANT CHANGE UP (ref 3.8–10.5)
WBC # FLD AUTO: 7.26 K/UL — SIGNIFICANT CHANGE UP (ref 3.8–10.5)

## 2023-10-12 PROCEDURE — 99232 SBSQ HOSP IP/OBS MODERATE 35: CPT | Mod: 57

## 2023-10-12 PROCEDURE — 71045 X-RAY EXAM CHEST 1 VIEW: CPT | Mod: 26

## 2023-10-12 PROCEDURE — 99291 CRITICAL CARE FIRST HOUR: CPT

## 2023-10-12 PROCEDURE — 93010 ELECTROCARDIOGRAM REPORT: CPT

## 2023-10-12 RX ORDER — SODIUM CHLORIDE 9 MG/ML
1000 INJECTION INTRAMUSCULAR; INTRAVENOUS; SUBCUTANEOUS ONCE
Refills: 0 | Status: COMPLETED | OUTPATIENT
Start: 2023-10-12 | End: 2023-10-12

## 2023-10-12 RX ORDER — MIDODRINE HYDROCHLORIDE 2.5 MG/1
10 TABLET ORAL THREE TIMES A DAY
Refills: 0 | Status: DISCONTINUED | OUTPATIENT
Start: 2023-10-12 | End: 2023-10-19

## 2023-10-12 RX ORDER — CEFEPIME 1 G/1
2000 INJECTION, POWDER, FOR SOLUTION INTRAMUSCULAR; INTRAVENOUS EVERY 8 HOURS
Refills: 0 | Status: COMPLETED | OUTPATIENT
Start: 2023-10-12 | End: 2023-10-18

## 2023-10-12 RX ORDER — MIDODRINE HYDROCHLORIDE 2.5 MG/1
20 TABLET ORAL ONCE
Refills: 0 | Status: COMPLETED | OUTPATIENT
Start: 2023-10-12 | End: 2023-10-12

## 2023-10-12 RX ORDER — ALBUMIN HUMAN 25 %
250 VIAL (ML) INTRAVENOUS ONCE
Refills: 0 | Status: COMPLETED | OUTPATIENT
Start: 2023-10-12 | End: 2023-10-12

## 2023-10-12 RX ORDER — VANCOMYCIN HCL 1 G
1500 VIAL (EA) INTRAVENOUS EVERY 24 HOURS
Refills: 0 | Status: DISCONTINUED | OUTPATIENT
Start: 2023-10-13 | End: 2023-10-16

## 2023-10-12 RX ORDER — SODIUM CHLORIDE 9 MG/ML
1000 INJECTION, SOLUTION INTRAVENOUS
Refills: 0 | Status: DISCONTINUED | OUTPATIENT
Start: 2023-10-12 | End: 2023-10-26

## 2023-10-12 RX ADMIN — MIDODRINE HYDROCHLORIDE 10 MILLIGRAM(S): 2.5 TABLET ORAL at 17:25

## 2023-10-12 RX ADMIN — Medication 4: at 08:47

## 2023-10-12 RX ADMIN — Medication 3 UNIT(S): at 17:27

## 2023-10-12 RX ADMIN — SODIUM CHLORIDE 1000 MILLILITER(S): 9 INJECTION INTRAMUSCULAR; INTRAVENOUS; SUBCUTANEOUS at 17:27

## 2023-10-12 RX ADMIN — Medication 2.5 MILLIGRAM(S): at 17:26

## 2023-10-12 RX ADMIN — Medication 2: at 13:53

## 2023-10-12 RX ADMIN — Medication 3 UNIT(S): at 08:47

## 2023-10-12 RX ADMIN — Medication 81 MILLIGRAM(S): at 13:55

## 2023-10-12 RX ADMIN — GABAPENTIN 300 MILLIGRAM(S): 400 CAPSULE ORAL at 17:25

## 2023-10-12 RX ADMIN — Medication 1 TABLET(S): at 17:26

## 2023-10-12 RX ADMIN — TAMSULOSIN HYDROCHLORIDE 0.4 MILLIGRAM(S): 0.4 CAPSULE ORAL at 21:23

## 2023-10-12 RX ADMIN — Medication 25 MILLIGRAM(S): at 17:25

## 2023-10-12 RX ADMIN — Medication 3 UNIT(S): at 13:54

## 2023-10-12 RX ADMIN — PIPERACILLIN AND TAZOBACTAM 25 GRAM(S): 4; .5 INJECTION, POWDER, LYOPHILIZED, FOR SOLUTION INTRAVENOUS at 13:55

## 2023-10-12 RX ADMIN — Medication 125 MILLILITER(S): at 11:39

## 2023-10-12 RX ADMIN — CEFEPIME 100 MILLIGRAM(S): 1 INJECTION, POWDER, FOR SOLUTION INTRAMUSCULAR; INTRAVENOUS at 22:03

## 2023-10-12 RX ADMIN — Medication 166.67 MILLIGRAM(S): at 22:44

## 2023-10-12 RX ADMIN — PANTOPRAZOLE SODIUM 40 MILLIGRAM(S): 20 TABLET, DELAYED RELEASE ORAL at 05:24

## 2023-10-12 RX ADMIN — INSULIN GLARGINE 18 UNIT(S): 100 INJECTION, SOLUTION SUBCUTANEOUS at 22:03

## 2023-10-12 RX ADMIN — MIDODRINE HYDROCHLORIDE 20 MILLIGRAM(S): 2.5 TABLET ORAL at 13:54

## 2023-10-12 RX ADMIN — SODIUM CHLORIDE 100 MILLILITER(S): 9 INJECTION, SOLUTION INTRAVENOUS at 17:27

## 2023-10-12 RX ADMIN — Medication 1 TABLET(S): at 08:42

## 2023-10-12 RX ADMIN — Medication 2: at 17:26

## 2023-10-12 RX ADMIN — PIPERACILLIN AND TAZOBACTAM 25 GRAM(S): 4; .5 INJECTION, POWDER, LYOPHILIZED, FOR SOLUTION INTRAVENOUS at 05:22

## 2023-10-12 RX ADMIN — SODIUM CHLORIDE 1000 MILLILITER(S): 9 INJECTION INTRAMUSCULAR; INTRAVENOUS; SUBCUTANEOUS at 10:04

## 2023-10-12 RX ADMIN — GABAPENTIN 300 MILLIGRAM(S): 400 CAPSULE ORAL at 05:21

## 2023-10-12 RX ADMIN — Medication 2.5 MILLIGRAM(S): at 05:21

## 2023-10-12 RX ADMIN — Medication 25 MILLIGRAM(S): at 05:22

## 2023-10-12 NOTE — PROGRESS NOTE ADULT - ASSESSMENT
[ASSESSMENT and  PLAN]  D63.8    Anemia due to chronic disease  C61        Prostate cancer  R62.51    Failure to thrive  R50.9      Fever  L89.609   pressure ulcer of heel, Chronic wounds    78-year-old Indonesian male presents to the hospital with failure to thrive, fever and weight loss of 40 to 50 pounds.  Decreased p.o. intake for the last month.  History of chronic wounds.  Additional admitting diagnoses UTI, sepsis, hypotension, diabetic foot ulcer, anemia 6.6 g/dL    Anemia likely secondary to chronic disease related t owounds and ?osteomyelitis  May have blood loss from chronic wounds     Transfuse as needed evaluate for cause for anemia.     Evaluate for reversible treatable causes for Weight loss.  CT Scan CAP: No overt masses.  No adenopathy.  Patient with poor performance status        UTI  History of enlarged prostate  History of prostate cancer.  Unspecified details.    RECOMMENDATIONS    Consider GI evaluation for weight loss.    Transfuse PRBC as clinically indicated.   Transfuse PRBC if Hgb <7.0 or if symptomatic.   Follow CBC    Check Anemia studies.      Ferritin, Iron studies     B12, Folate     ESR, CRP    Check PSA    DVT Prophylaxis  SQ Lovenox or SQ heparin    Patient with flat affect.  Has limited understanding of situation.  Further recommendations pending clinical course.  cont ID evaluation to rule osteomyelitis     [ASSESSMENT and  PLAN]  D63.8    Anemia due to chronic disease  C61        Prostate cancer  R62.51    Failure to thrive  R50.9      Fever  L89.609   pressure ulcer of heel, Chronic wounds    78-year-old Latvian male presents to the hospital with failure to thrive, fever and weight loss of 40 to 50 pounds.  Decreased p.o. intake for the last month.  History of chronic wounds.  Additional admitting diagnoses UTI, sepsis, hypotension, diabetic foot ulcer, anemia 6.6 g/dL    Anemia likely secondary to chronic disease related t owounds and ?osteomyelitis  May have blood loss from chronic wounds     Transfuse as needed evaluate for cause for anemia.     Evaluate for reversible treatable causes for Weight loss.  CT Scan CAP: No overt masses.  No adenopathy.  Patient with poor performance status        UTI  History of enlarged prostate  History of prostate cancer.  Unspecified details.    RECOMMENDATIONS    Consider GI evaluation for weight loss.    Transfuse PRBC as clinically indicated.   Transfuse PRBC if Hgb <7.0 or if symptomatic.   Follow CBC    Check Anemia studies.      Ferritin, Iron studies     B12, Folate     ESR, CRP    Check PSA    DVT Prophylaxis  SQ Lovenox or SQ heparin    Patient with flat affect.  Has limited understanding of situation.  Further recommendations pending clinical course.  cont ID evaluation to rule osteomyelitis     [ASSESSMENT and  PLAN]  D63.8    Anemia due to chronic disease  C61        Prostate cancer  R62.51    Failure to thrive  R50.9      Fever  L89.609   pressure ulcer of heel, Chronic wounds    78-year-old Maori male presents to the hospital with failure to thrive, fever and weight loss of 40 to 50 pounds.  Decreased p.o. intake for the last month.  History of chronic wounds.  Additional admitting diagnoses UTI, sepsis, hypotension, diabetic foot ulcer, anemia 6.6 g/dL    Anemia likely secondary to chronic disease related t owounds and ?osteomyelitis  May have blood loss from chronic wounds     Transfuse as needed evaluate for cause for anemia.     Evaluate for reversible treatable causes for Weight loss.  CT Scan CAP: No overt masses.  No adenopathy.  Patient with poor performance status        UTI  History of enlarged prostate  History of prostate cancer.  Unspecified details.    RECOMMENDATIONS    Consider GI evaluation for weight loss.    Transfuse PRBC as clinically indicated.   Transfuse PRBC if Hgb <7.0 or if symptomatic.   Follow CBC    Check Anemia studies.      Ferritin, Iron studies     B12, Folate     ESR, CRP    Check PSA    DVT Prophylaxis  SQ Lovenox or SQ heparin    Patient with flat affect.  Has limited understanding of situation.  Further recommendations pending clinical course.  cont ID evaluation to rule osteomyelitis

## 2023-10-12 NOTE — PROGRESS NOTE ADULT - PROBLEM SELECTOR PLAN 1
pt with peristant hypotension brenda from sepsis  iv bolus and maintence ivf  icu reeval called  supportive care pt with peristant hypotension likley from sepsis  iv bolus and maintence ivf  discussed with icu - recommends albumin   supportive care

## 2023-10-12 NOTE — CONSULT NOTE ADULT - ATTENDING COMMENTS
78 y.o. M with PMHx of MI x2, HTN, HLD, uncontrolled T2DM, afib s/p PPM, admitted for UTI, diabetic foot ulcers, and sepsis with hypotension.     Found to be in septic shock and bacteremic, blood culture growing S aureus, sensitives not yet returned.   Hypotensive on the floor this AM, manual BP in the 60's systolic on multiple checks.   Troponin elevation in the setting of demand 2/2 infection, has already peaked no need to repeat. EKG without evidence of ischemia though is AV paced.   Place on high intensity statin. Continue ASA  ?history of AF, though not on AC, will need further history as his CHADSVASc is markedly elevated.     Would hold all anti-hypertensives at this time  Broad spectrum abx  ICU called to the bedside, needs stat Cordis or CVC placed for IVF+/- pressors  Will need GISSELL given S aureus bacteremia with device in place, unknown date of implantation. Once stabilized from a septic shock standpoint, will need to be transferred to [place]. 78 y.o. M with PMHx of MI x2, HTN, HLD, uncontrolled T2DM, afib s/p PPM, admitted for UTI, diabetic foot ulcers, and sepsis with hypotension.     Found to be in septic shock and bacteremic, blood culture growing S aureus, sensitives not yet returned.   Hypotensive on the floor this AM, manual BP in the 60's systolic on multiple checks.   Troponin elevation in the setting of demand 2/2 infection, has already peaked no need to repeat. EKG without evidence of ischemia though is AV paced.   Place on high intensity statin. Continue ASA  ?history of AF, though not on AC, will need further history as his CHADSVASc is markedly elevated.     Would hold all anti-hypertensives at this time  Broad spectrum abx  ICU called to the bedside, needs stat Cordis or CVC placed for IVF+/- pressors  Will need GISSELL given S aureus bacteremia with device in place, unknown date of implantation. Once stabilized from a septic shock standpoint, will need to be transferred to [place]. 78 y.o. M with PMHx of MI x2, HTN, HLD, uncontrolled T2DM, afib s/p PPM, admitted for UTI, diabetic foot ulcers, and sepsis with hypotension.     Found to be in septic shock and bacteremic, blood culture growing S aureus, sensitives not yet returned.   Hypotensive on the floor this AM, manual BP in the 60's systolic on multiple checks.   Troponin elevation in the setting of demand 2/2 infection, has already peaked no need to repeat. EKG without evidence of ischemia though is AV paced.   Place on high intensity statin. Continue ASA  ?history of AF, though not on AC, will need further history as his CHADSVASc is markedly elevated.     Would hold all anti-hypertensives at this time  Broad spectrum abx  ICU called to the bedside, needs stat Cordis or CVC placed for IVF+/- pressors  Will need GISSELL given S aureus bacteremia with device in place, unknown date of implantation. Once stabilized from a septic shock standpoint, will need to be transferred to [place].

## 2023-10-12 NOTE — PROGRESS NOTE ADULT - PROBLEM SELECTOR PLAN 1
Patient was seen and evaluated   F/u  wound culture of the left lateral foot wound, noted with seropurulent drainage   Applied silver alginate  and sterile dressing   Bone scan was canceled today secondary to patient being hypotensive, will have to reorder once patient is more stable   Vascular recommendations appreciated - will wait for any surgical intervention pending vascular clearance   Patient will need surgical intervention with debridement of left foot wound and  bone biopsy vs resection of the bone pending bone scan abd vascular clearance   Will continue local wound care and offloading  Discussed current status and treatment plan with the patient's son. Answered all questions.

## 2023-10-12 NOTE — CONSULT NOTE ADULT - SUBJECTIVE AND OBJECTIVE BOX
CHARTING IN PROGRESS ****      [facility]         Natasha Villegas, Sara, Aria, Chava Del Valle Savella        [phone] (office)    HPI:  78 y.o. M with PMHx of MI x2, HTN, HLD, uncontrolled T2DM, afib s/p PPM, admitted for UTI, diabetic foot ulcers, and sepsis with hypotension.     78-year-old male presents to the hospital by ambulance from home.  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 Pt does not go to doctor on regualar basis per son and does phone visits.   (11 Oct 2023 07:02)      PAST MEDICAL & SURGICAL HISTORY:  Diabetes      Benign essential HTN      Pacemaker      History of BPH      Diabetic foot ulcers          SOCIAL HISTORY: No active tobacco, alcohol or illicit drug use    FAMILY HISTORY:      Home Medications:  Aspir 81 oral delayed release tablet: 1 tab(s) orally once a day (11 Oct 2023 07:13)  bethanechol 25 mg oral tablet: 1 tab(s) orally 2 times a day (11 Oct 2023 07:14)  Coreg 3.125 mg oral tablet: 1 tab(s) orally 2 times a day (11 Oct 2023 07:13)  Flomax 0.4 mg oral capsule: 1 cap(s) orally once a day (at bedtime) (11 Oct 2023 07:14)  gabapentin 300 mg oral capsule: 1 cap(s) orally 2 times a day (11 Oct 2023 07:13)  Lantus 100 units/mL subcutaneous solution: 18 unit(s) subcutaneous once a day (at bedtime) (11 Oct 2023 07:15)  ramipril 2.5 mg oral capsule: 1 cap(s) orally once a day (11 Oct 2023 07:12)      MEDICATIONS  (STANDING):  aspirin enteric coated 81 milliGRAM(s) Oral daily  bethanechol 25 milliGRAM(s) Oral two times a day  dextrose 5%. 1000 milliLiter(s) (50 mL/Hr) IV Continuous <Continuous>  dextrose 5%. 1000 milliLiter(s) (100 mL/Hr) IV Continuous <Continuous>  dextrose 50% Injectable 25 Gram(s) IV Push once  dextrose 50% Injectable 12.5 Gram(s) IV Push once  dextrose 50% Injectable 25 Gram(s) IV Push once  dronabinol 2.5 milliGRAM(s) Oral two times a day  gabapentin 300 milliGRAM(s) Oral two times a day  glucagon  Injectable 1 milliGRAM(s) IntraMuscular once  insulin glargine Injectable (LANTUS) 18 Unit(s) SubCutaneous at bedtime  insulin lispro (ADMELOG) corrective regimen sliding scale   SubCutaneous three times a day before meals  insulin lispro (ADMELOG) corrective regimen sliding scale   SubCutaneous at bedtime  insulin lispro Injectable (ADMELOG) 3 Unit(s) SubCutaneous three times a day before meals  lactobacillus acidophilus 1 Tablet(s) Oral two times a day with meals  midodrine. 20 milliGRAM(s) Oral once  midodrine. 10 milliGRAM(s) Oral three times a day  pantoprazole    Tablet 40 milliGRAM(s) Oral before breakfast  piperacillin/tazobactam IVPB.. 3.375 Gram(s) IV Intermittent every 8 hours  tamsulosin 0.4 milliGRAM(s) Oral at bedtime  vancomycin  IVPB 1250 milliGRAM(s) IV Intermittent every 24 hours    MEDICATIONS  (PRN):  acetaminophen     Tablet .. 650 milliGRAM(s) Oral every 6 hours PRN Temp greater or equal to 38C (100.4F), Moderate Pain (4 - 6)  dextrose Oral Gel 15 Gram(s) Oral once PRN Blood Glucose LESS THAN 70 milliGRAM(s)/deciliter  loperamide 2 milliGRAM(s) Oral three times a day PRN Diarrhea      Allergies    No Known Allergies    Intolerances        REVIEW OF SYSTEMS: Negative except as per HPI.    VITAL SIGNS:   Vital Signs Last 24 Hrs  T(C): 37.8 (12 Oct 2023 12:19), Max: 39.1 (11 Oct 2023 17:03)  T(F): 100 (12 Oct 2023 12:19), Max: 102.4 (11 Oct 2023 17:03)  HR: 61 (12 Oct 2023 12:19) (59 - 72)  BP: 88/42 (12 Oct 2023 12:19) (64/26 - 110/60)  BP(mean): --  RR: 17 (12 Oct 2023 12:19) (17 - 20)  SpO2: 99% (12 Oct 2023 12:19) (94% - 99%)    Parameters below as of 12 Oct 2023 12:19  Patient On (Oxygen Delivery Method): room air        I&O's Summary      PHYSICAL EXAM:  Constitutional: NAD, well-developed  HEENT NC/AT, moist mucous membranes  Pulmonary: Non-labored, breath sounds are clear bilaterally, no wheezing, rales or rhonchi  Cardiovascular: +S1, S2, RRR, no murmur  Gastrointestinal: Soft, nontender, nondistended, normoactive bowel sounds  Extremities: No peripheral edema   Neurological: Alert, strength and sensitivity are grossly intact  Skin: No obvious lesions/rashes  Psych: Mood & affect appropriate    LABS: All Labs Reviewed:                        9.6    7.26  )-----------( 279      ( 12 Oct 2023 05:30 )             30.3                         7.9    9.99  )-----------( 233      ( 11 Oct 2023 04:09 )             25.5                         6.6    11.08 )-----------( 228      ( 10 Oct 2023 18:25 )             21.0     12 Oct 2023 05:30    139    |  111    |  42     ----------------------------<  216    4.2     |  22     |  1.10   11 Oct 2023 04:09    138    |  109    |  43     ----------------------------<  186    4.4     |  20     |  1.20   10 Oct 2023 18:25    138    |  109    |  42     ----------------------------<  159    4.5     |  20     |  1.20     Ca    8.1        12 Oct 2023 05:30  Ca    8.4        11 Oct 2023 04:09  Ca    8.5        10 Oct 2023 18:25  Phos  3.0       12 Oct 2023 05:30  Mg     1.8       12 Oct 2023 05:30  Mg     1.8       10 Oct 2023 18:25    TPro  6.1    /  Alb  1.9    /  TBili  0.4    /  DBili  x      /  AST  18     /  ALT  12     /  AlkPhos  88     10 Oct 2023 18:25    PT/INR - ( 10 Oct 2023 18:25 )   PT: 18.6 sec;   INR: 1.61 ratio         PTT - ( 10 Oct 2023 18:25 )  PTT:30.0 sec  CARDIAC MARKERS ( 12 Oct 2023 05:30 )  x     / x     / 98 U/L / x     / 4.1 ng/mL  CARDIAC MARKERS ( 12 Oct 2023 00:54 )  x     / x     / 90 U/L / x     / 3.4 ng/mL  CARDIAC MARKERS ( 10 Oct 2023 18:25 )  x     / x     / x     / x     / <1.0 ng/mL      Blood Culture: Organism --  Gram Stain Blood -- Gram Stain --  Specimen Source Catheterized Catheterized  Culture-Blood --    Organism Blood Culture PCR  Gram Stain Blood -- Gram Stain   Growth in aerobic bottle: Gram Positive Cocci in Clusters  Growth in anaerobic bottle: Gram Positive Cocci in Clusters  Specimen Source .Blood Blood-Peripheral  Culture-Blood --        10-10 @ 18:25  TSH: 1.74      EKG:    RADIOLOGY:    CXR:   CHARTING IN PROGRESS ****      [facility]         Natasha Villegas, Sara, Aria, Chava Del Valle Savella        [phone] (office)    HPI:  78 y.o. M with PMHx of MI x2, HTN, HLD, uncontrolled T2DM, afib s/p PPM, admitted for UTI, diabetic foot ulcers, and sepsis with hypotension.     78-year-old male presents to the hospital by ambulance from home.  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 Pt does not go to doctor on regualar basis per son and does phone visits.   (11 Oct 2023 07:02)      PAST MEDICAL & SURGICAL HISTORY:  Diabetes      Benign essential HTN      Pacemaker      History of BPH      Diabetic foot ulcers          SOCIAL HISTORY: No active tobacco, alcohol or illicit drug use    FAMILY HISTORY:      Home Medications:  Aspir 81 oral delayed release tablet: 1 tab(s) orally once a day (11 Oct 2023 07:13)  bethanechol 25 mg oral tablet: 1 tab(s) orally 2 times a day (11 Oct 2023 07:14)  Coreg 3.125 mg oral tablet: 1 tab(s) orally 2 times a day (11 Oct 2023 07:13)  Flomax 0.4 mg oral capsule: 1 cap(s) orally once a day (at bedtime) (11 Oct 2023 07:14)  gabapentin 300 mg oral capsule: 1 cap(s) orally 2 times a day (11 Oct 2023 07:13)  Lantus 100 units/mL subcutaneous solution: 18 unit(s) subcutaneous once a day (at bedtime) (11 Oct 2023 07:15)  ramipril 2.5 mg oral capsule: 1 cap(s) orally once a day (11 Oct 2023 07:12)      MEDICATIONS  (STANDING):  aspirin enteric coated 81 milliGRAM(s) Oral daily  bethanechol 25 milliGRAM(s) Oral two times a day  dextrose 5%. 1000 milliLiter(s) (50 mL/Hr) IV Continuous <Continuous>  dextrose 5%. 1000 milliLiter(s) (100 mL/Hr) IV Continuous <Continuous>  dextrose 50% Injectable 25 Gram(s) IV Push once  dextrose 50% Injectable 12.5 Gram(s) IV Push once  dextrose 50% Injectable 25 Gram(s) IV Push once  dronabinol 2.5 milliGRAM(s) Oral two times a day  gabapentin 300 milliGRAM(s) Oral two times a day  glucagon  Injectable 1 milliGRAM(s) IntraMuscular once  insulin glargine Injectable (LANTUS) 18 Unit(s) SubCutaneous at bedtime  insulin lispro (ADMELOG) corrective regimen sliding scale   SubCutaneous three times a day before meals  insulin lispro (ADMELOG) corrective regimen sliding scale   SubCutaneous at bedtime  insulin lispro Injectable (ADMELOG) 3 Unit(s) SubCutaneous three times a day before meals  lactobacillus acidophilus 1 Tablet(s) Oral two times a day with meals  midodrine. 20 milliGRAM(s) Oral once  midodrine. 10 milliGRAM(s) Oral three times a day  pantoprazole    Tablet 40 milliGRAM(s) Oral before breakfast  piperacillin/tazobactam IVPB.. 3.375 Gram(s) IV Intermittent every 8 hours  tamsulosin 0.4 milliGRAM(s) Oral at bedtime  vancomycin  IVPB 1250 milliGRAM(s) IV Intermittent every 24 hours    MEDICATIONS  (PRN):  acetaminophen     Tablet .. 650 milliGRAM(s) Oral every 6 hours PRN Temp greater or equal to 38C (100.4F), Moderate Pain (4 - 6)  dextrose Oral Gel 15 Gram(s) Oral once PRN Blood Glucose LESS THAN 70 milliGRAM(s)/deciliter  loperamide 2 milliGRAM(s) Oral three times a day PRN Diarrhea      Allergies    No Known Allergies    Intolerances        REVIEW OF SYSTEMS: Negative except as per HPI.    VITAL SIGNS:   Vital Signs Last 24 Hrs  T(C): 37.8 (12 Oct 2023 12:19), Max: 39.1 (11 Oct 2023 17:03)  T(F): 100 (12 Oct 2023 12:19), Max: 102.4 (11 Oct 2023 17:03)  HR: 61 (12 Oct 2023 12:19) (59 - 72)  BP: 88/42 (12 Oct 2023 12:19) (64/26 - 110/60)  BP(mean): --  RR: 17 (12 Oct 2023 12:19) (17 - 20)  SpO2: 99% (12 Oct 2023 12:19) (94% - 99%)    Parameters below as of 12 Oct 2023 12:19  Patient On (Oxygen Delivery Method): room air        I&O's Summary      PHYSICAL EXAM:  Constitutional: NAD, well-developed  HEENT NC/AT, moist mucous membranes  Pulmonary: Non-labored, breath sounds are clear bilaterally, no wheezing, rales or rhonchi  Cardiovascular: +S1, S2, RRR, no murmur  Gastrointestinal: Soft, nontender, nondistended, normoactive bowel sounds  Extremities: No peripheral edema   Neurological: Alert, strength and sensitivity are grossly intact  Skin: No obvious lesions/rashes  Psych: Mood & affect appropriate    LABS: All Labs Reviewed:                        9.6    7.26  )-----------( 279      ( 12 Oct 2023 05:30 )             30.3                         7.9    9.99  )-----------( 233      ( 11 Oct 2023 04:09 )             25.5                         6.6    11.08 )-----------( 228      ( 10 Oct 2023 18:25 )             21.0     12 Oct 2023 05:30    139    |  111    |  42     ----------------------------<  216    4.2     |  22     |  1.10   11 Oct 2023 04:09    138    |  109    |  43     ----------------------------<  186    4.4     |  20     |  1.20   10 Oct 2023 18:25    138    |  109    |  42     ----------------------------<  159    4.5     |  20     |  1.20     Ca    8.1        12 Oct 2023 05:30  Ca    8.4        11 Oct 2023 04:09  Ca    8.5        10 Oct 2023 18:25  Phos  3.0       12 Oct 2023 05:30  Mg     1.8       12 Oct 2023 05:30  Mg     1.8       10 Oct 2023 18:25    TPro  6.1    /  Alb  1.9    /  TBili  0.4    /  DBili  x      /  AST  18     /  ALT  12     /  AlkPhos  88     10 Oct 2023 18:25    PT/INR - ( 10 Oct 2023 18:25 )   PT: 18.6 sec;   INR: 1.61 ratio         PTT - ( 10 Oct 2023 18:25 )  PTT:30.0 sec  CARDIAC MARKERS ( 12 Oct 2023 05:30 )  x     / x     / 98 U/L / x     / 4.1 ng/mL  CARDIAC MARKERS ( 12 Oct 2023 00:54 )  x     / x     / 90 U/L / x     / 3.4 ng/mL  CARDIAC MARKERS ( 10 Oct 2023 18:25 )  x     / x     / x     / x     / <1.0 ng/mL      Blood Culture: Organism --  Gram Stain Blood -- Gram Stain --  Specimen Source Catheterized Catheterized  Culture-Blood --    Organism Blood Culture PCR  Gram Stain Blood -- Gram Stain   Growth in aerobic bottle: Gram Positive Cocci in Clusters  Growth in anaerobic bottle: Gram Positive Cocci in Clusters  Specimen Source .Blood Blood-Peripheral  Culture-Blood --        10-10 @ 18:25  TSH: 1.74      EKG:    RADIOLOGY:    CXR:   CHARTING IN PROGRESS ****      [facility]         Natasha Villegas, Sara, Aria, Chava Del Valle Savella        [phone] (office)    HPI:  78 y.o. M with PMHx of MI x2, HTN, HLD, uncontrolled T2DM, afib s/p PPM, admitted for UTI, diabetic foot ulcers, and sepsis with hypotension.     78-year-old male presents to the hospital by ambulance from home.  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 Pt does not go to doctor on regualar basis per son and does phone visits.   (11 Oct 2023 07:02)      PAST MEDICAL & SURGICAL HISTORY:  Diabetes      Benign essential HTN      Pacemaker      History of BPH      Diabetic foot ulcers          SOCIAL HISTORY: No active tobacco, alcohol or illicit drug use    FAMILY HISTORY:      Home Medications:  Aspir 81 oral delayed release tablet: 1 tab(s) orally once a day (11 Oct 2023 07:13)  bethanechol 25 mg oral tablet: 1 tab(s) orally 2 times a day (11 Oct 2023 07:14)  Coreg 3.125 mg oral tablet: 1 tab(s) orally 2 times a day (11 Oct 2023 07:13)  Flomax 0.4 mg oral capsule: 1 cap(s) orally once a day (at bedtime) (11 Oct 2023 07:14)  gabapentin 300 mg oral capsule: 1 cap(s) orally 2 times a day (11 Oct 2023 07:13)  Lantus 100 units/mL subcutaneous solution: 18 unit(s) subcutaneous once a day (at bedtime) (11 Oct 2023 07:15)  ramipril 2.5 mg oral capsule: 1 cap(s) orally once a day (11 Oct 2023 07:12)      MEDICATIONS  (STANDING):  aspirin enteric coated 81 milliGRAM(s) Oral daily  bethanechol 25 milliGRAM(s) Oral two times a day  dextrose 5%. 1000 milliLiter(s) (50 mL/Hr) IV Continuous <Continuous>  dextrose 5%. 1000 milliLiter(s) (100 mL/Hr) IV Continuous <Continuous>  dextrose 50% Injectable 25 Gram(s) IV Push once  dextrose 50% Injectable 12.5 Gram(s) IV Push once  dextrose 50% Injectable 25 Gram(s) IV Push once  dronabinol 2.5 milliGRAM(s) Oral two times a day  gabapentin 300 milliGRAM(s) Oral two times a day  glucagon  Injectable 1 milliGRAM(s) IntraMuscular once  insulin glargine Injectable (LANTUS) 18 Unit(s) SubCutaneous at bedtime  insulin lispro (ADMELOG) corrective regimen sliding scale   SubCutaneous three times a day before meals  insulin lispro (ADMELOG) corrective regimen sliding scale   SubCutaneous at bedtime  insulin lispro Injectable (ADMELOG) 3 Unit(s) SubCutaneous three times a day before meals  lactobacillus acidophilus 1 Tablet(s) Oral two times a day with meals  midodrine. 20 milliGRAM(s) Oral once  midodrine. 10 milliGRAM(s) Oral three times a day  pantoprazole    Tablet 40 milliGRAM(s) Oral before breakfast  piperacillin/tazobactam IVPB.. 3.375 Gram(s) IV Intermittent every 8 hours  tamsulosin 0.4 milliGRAM(s) Oral at bedtime  vancomycin  IVPB 1250 milliGRAM(s) IV Intermittent every 24 hours    MEDICATIONS  (PRN):  acetaminophen     Tablet .. 650 milliGRAM(s) Oral every 6 hours PRN Temp greater or equal to 38C (100.4F), Moderate Pain (4 - 6)  dextrose Oral Gel 15 Gram(s) Oral once PRN Blood Glucose LESS THAN 70 milliGRAM(s)/deciliter  loperamide 2 milliGRAM(s) Oral three times a day PRN Diarrhea      Allergies    No Known Allergies    Intolerances        REVIEW OF SYSTEMS: Negative except as per HPI.    VITAL SIGNS:   Vital Signs Last 24 Hrs  T(C): 37.8 (12 Oct 2023 12:19), Max: 39.1 (11 Oct 2023 17:03)  T(F): 100 (12 Oct 2023 12:19), Max: 102.4 (11 Oct 2023 17:03)  HR: 61 (12 Oct 2023 12:19) (59 - 72)  BP: 88/42 (12 Oct 2023 12:19) (64/26 - 110/60)  BP(mean): --  RR: 17 (12 Oct 2023 12:19) (17 - 20)  SpO2: 99% (12 Oct 2023 12:19) (94% - 99%)    Parameters below as of 12 Oct 2023 12:19  Patient On (Oxygen Delivery Method): room air        I&O's Summary      PHYSICAL EXAM:  Constitutional: NAD, well-developed  HEENT NC/AT, moist mucous membranes  Pulmonary: Non-labored, breath sounds are clear bilaterally, no wheezing, rales or rhonchi  Cardiovascular: +S1, S2, RRR, no murmur  Gastrointestinal: Soft, nontender, nondistended, normoactive bowel sounds  Extremities: No peripheral edema   Neurological: Alert, strength and sensitivity are grossly intact  Skin: No obvious lesions/rashes  Psych: Mood & affect appropriate    LABS: All Labs Reviewed:                        9.6    7.26  )-----------( 279      ( 12 Oct 2023 05:30 )             30.3                         7.9    9.99  )-----------( 233      ( 11 Oct 2023 04:09 )             25.5                         6.6    11.08 )-----------( 228      ( 10 Oct 2023 18:25 )             21.0     12 Oct 2023 05:30    139    |  111    |  42     ----------------------------<  216    4.2     |  22     |  1.10   11 Oct 2023 04:09    138    |  109    |  43     ----------------------------<  186    4.4     |  20     |  1.20   10 Oct 2023 18:25    138    |  109    |  42     ----------------------------<  159    4.5     |  20     |  1.20     Ca    8.1        12 Oct 2023 05:30  Ca    8.4        11 Oct 2023 04:09  Ca    8.5        10 Oct 2023 18:25  Phos  3.0       12 Oct 2023 05:30  Mg     1.8       12 Oct 2023 05:30  Mg     1.8       10 Oct 2023 18:25    TPro  6.1    /  Alb  1.9    /  TBili  0.4    /  DBili  x      /  AST  18     /  ALT  12     /  AlkPhos  88     10 Oct 2023 18:25    PT/INR - ( 10 Oct 2023 18:25 )   PT: 18.6 sec;   INR: 1.61 ratio         PTT - ( 10 Oct 2023 18:25 )  PTT:30.0 sec  CARDIAC MARKERS ( 12 Oct 2023 05:30 )  x     / x     / 98 U/L / x     / 4.1 ng/mL  CARDIAC MARKERS ( 12 Oct 2023 00:54 )  x     / x     / 90 U/L / x     / 3.4 ng/mL  CARDIAC MARKERS ( 10 Oct 2023 18:25 )  x     / x     / x     / x     / <1.0 ng/mL      Blood Culture: Organism --  Gram Stain Blood -- Gram Stain --  Specimen Source Catheterized Catheterized  Culture-Blood --    Organism Blood Culture PCR  Gram Stain Blood -- Gram Stain   Growth in aerobic bottle: Gram Positive Cocci in Clusters  Growth in anaerobic bottle: Gram Positive Cocci in Clusters  Specimen Source .Blood Blood-Peripheral  Culture-Blood --        10-10 @ 18:25  TSH: 1.74      EKG:    RADIOLOGY:    CXR:    [facility]         Natasha Villegas, Sara, Aria, Eligio, Mina Larsen        [phone] (office)    HPI:  78 y.o. M with PMHx of MI x2, HTN, HLD, uncontrolled T2DM, afib s/p PPM, admitted for UTI, diabetic foot ulcers, and sepsis with hypotension. Patient was seen and examined at bedside. NAD. Reports that he is feeling better than when he was admitted. Denies chest pain, palpitations, SOB, lightheadedness, nausea and vomiting. Patient reports having MI x2 in the past without PCI or CABG due to being unstable. Per chart review, patient has lost 40 to 50 pounds over the past year and has decreased PO intake. 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 Overnight, patient was hypotensive, diaphoretic, lethargic and pale, was given IV fluids and responded appropriately. Patient downgraded from ICU this morning.       PAST MEDICAL & SURGICAL HISTORY:  Diabetes  Benign essential HTN  Pacemaker  History of BPH  Diabetic foot ulcers    SOCIAL HISTORY: No active tobacco, alcohol or illicit drug use      Home Medications:  Aspir 81 oral delayed release tablet: 1 tab(s) orally once a day (11 Oct 2023 07:13)  bethanechol 25 mg oral tablet: 1 tab(s) orally 2 times a day (11 Oct 2023 07:14)  Coreg 3.125 mg oral tablet: 1 tab(s) orally 2 times a day (11 Oct 2023 07:13)  Flomax 0.4 mg oral capsule: 1 cap(s) orally once a day (at bedtime) (11 Oct 2023 07:14)  gabapentin 300 mg oral capsule: 1 cap(s) orally 2 times a day (11 Oct 2023 07:13)  Lantus 100 units/mL subcutaneous solution: 18 unit(s) subcutaneous once a day (at bedtime) (11 Oct 2023 07:15)  ramipril 2.5 mg oral capsule: 1 cap(s) orally once a day (11 Oct 2023 07:12)      MEDICATIONS  (STANDING):  aspirin enteric coated 81 milliGRAM(s) Oral daily  bethanechol 25 milliGRAM(s) Oral two times a day  dextrose 5%. 1000 milliLiter(s) (50 mL/Hr) IV Continuous <Continuous>  dextrose 5%. 1000 milliLiter(s) (100 mL/Hr) IV Continuous <Continuous>  dextrose 50% Injectable 25 Gram(s) IV Push once  dextrose 50% Injectable 12.5 Gram(s) IV Push once  dextrose 50% Injectable 25 Gram(s) IV Push once  dronabinol 2.5 milliGRAM(s) Oral two times a day  gabapentin 300 milliGRAM(s) Oral two times a day  glucagon  Injectable 1 milliGRAM(s) IntraMuscular once  insulin glargine Injectable (LANTUS) 18 Unit(s) SubCutaneous at bedtime  insulin lispro (ADMELOG) corrective regimen sliding scale   SubCutaneous three times a day before meals  insulin lispro (ADMELOG) corrective regimen sliding scale   SubCutaneous at bedtime  insulin lispro Injectable (ADMELOG) 3 Unit(s) SubCutaneous three times a day before meals  lactobacillus acidophilus 1 Tablet(s) Oral two times a day with meals  midodrine. 20 milliGRAM(s) Oral once  midodrine. 10 milliGRAM(s) Oral three times a day  pantoprazole    Tablet 40 milliGRAM(s) Oral before breakfast  piperacillin/tazobactam IVPB.. 3.375 Gram(s) IV Intermittent every 8 hours  tamsulosin 0.4 milliGRAM(s) Oral at bedtime  vancomycin  IVPB 1250 milliGRAM(s) IV Intermittent every 24 hours    MEDICATIONS  (PRN):  acetaminophen     Tablet .. 650 milliGRAM(s) Oral every 6 hours PRN Temp greater or equal to 38C (100.4F), Moderate Pain (4 - 6)  dextrose Oral Gel 15 Gram(s) Oral once PRN Blood Glucose LESS THAN 70 milliGRAM(s)/deciliter  loperamide 2 milliGRAM(s) Oral three times a day PRN Diarrhea      Allergies    No Known Allergies    Intolerances        REVIEW OF SYSTEMS: Negative except as per HPI.    VITAL SIGNS:   Vital Signs Last 24 Hrs  T(C): 37.8 (12 Oct 2023 12:19), Max: 39.1 (11 Oct 2023 17:03)  T(F): 100 (12 Oct 2023 12:19), Max: 102.4 (11 Oct 2023 17:03)  HR: 61 (12 Oct 2023 12:19) (59 - 72)  BP: 88/42 (12 Oct 2023 12:19) (64/26 - 110/60)  BP(mean): --  RR: 17 (12 Oct 2023 12:19) (17 - 20)  SpO2: 99% (12 Oct 2023 12:19) (94% - 99%)    Parameters below as of 12 Oct 2023 12:19  Patient On (Oxygen Delivery Method): room air        I&O's Summary      PHYSICAL EXAM:  Constitutional: NAD, well-developed  HEENT NC/AT, moist mucous membranes  Pulmonary: Non-labored, breath sounds are clear bilaterally, no wheezing, rales or rhonchi  Cardiovascular: +S1, S2, RRR, no murmur  Gastrointestinal: Soft, nontender, nondistended, normoactive bowel sounds  Extremities: No peripheral edema   Neurological: Alert, strength and sensitivity are grossly intact  Skin: No obvious lesions/rashes  Psych: Mood & affect appropriate    LABS: All Labs Reviewed:                        9.6    7.26  )-----------( 279      ( 12 Oct 2023 05:30 )             30.3                         7.9    9.99  )-----------( 233      ( 11 Oct 2023 04:09 )             25.5                         6.6    11.08 )-----------( 228      ( 10 Oct 2023 18:25 )             21.0     12 Oct 2023 05:30    139    |  111    |  42     ----------------------------<  216    4.2     |  22     |  1.10   11 Oct 2023 04:09    138    |  109    |  43     ----------------------------<  186    4.4     |  20     |  1.20   10 Oct 2023 18:25    138    |  109    |  42     ----------------------------<  159    4.5     |  20     |  1.20     Ca    8.1        12 Oct 2023 05:30  Ca    8.4        11 Oct 2023 04:09  Ca    8.5        10 Oct 2023 18:25  Phos  3.0       12 Oct 2023 05:30  Mg     1.8       12 Oct 2023 05:30  Mg     1.8       10 Oct 2023 18:25    TPro  6.1    /  Alb  1.9    /  TBili  0.4    /  DBili  x      /  AST  18     /  ALT  12     /  AlkPhos  88     10 Oct 2023 18:25    PT/INR - ( 10 Oct 2023 18:25 )   PT: 18.6 sec;   INR: 1.61 ratio         PTT - ( 10 Oct 2023 18:25 )  PTT:30.0 sec  CARDIAC MARKERS ( 12 Oct 2023 05:30 )  x     / x     / 98 U/L / x     / 4.1 ng/mL  CARDIAC MARKERS ( 12 Oct 2023 00:54 )  x     / x     / 90 U/L / x     / 3.4 ng/mL  CARDIAC MARKERS ( 10 Oct 2023 18:25 )  x     / x     / x     / x     / <1.0 ng/mL      Blood Culture: Organism --  Gram Stain Blood -- Gram Stain --  Specimen Source Catheterized Catheterized  Culture-Blood --    Organism Blood Culture PCR  Gram Stain Blood -- Gram Stain   Growth in aerobic bottle: Gram Positive Cocci in Clusters  Growth in anaerobic bottle: Gram Positive Cocci in Clusters  Specimen Source .Blood Blood-Peripheral  Culture-Blood --        10-10 @ 18:25  TSH: 1.74    [facility]         Natasha Villegas, Sara, Aria, Eligio, Mina Larsen        [phone] (office)    HPI:  78 y.o. M with PMHx of MI x2, HTN, HLD, uncontrolled T2DM, afib s/p PPM, admitted for UTI, diabetic foot ulcers, and sepsis with hypotension. Patient was seen and examined at bedside. NAD. Reports that he is feeling better than when he was admitted. Denies chest pain, palpitations, SOB, lightheadedness, nausea and vomiting. Patient reports having MI x2 in the past without PCI or CABG due to being unstable. Per chart review, patient has lost 40 to 50 pounds over the past year and has decreased PO intake. 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 Overnight, patient was hypotensive, diaphoretic, lethargic and pale, was given IV fluids and responded appropriately. Patient downgraded from ICU this morning.       PAST MEDICAL & SURGICAL HISTORY:  Diabetes  Benign essential HTN  Pacemaker  History of BPH  Diabetic foot ulcers    SOCIAL HISTORY: No active tobacco, alcohol or illicit drug use      Home Medications:  Aspir 81 oral delayed release tablet: 1 tab(s) orally once a day (11 Oct 2023 07:13)  bethanechol 25 mg oral tablet: 1 tab(s) orally 2 times a day (11 Oct 2023 07:14)  Coreg 3.125 mg oral tablet: 1 tab(s) orally 2 times a day (11 Oct 2023 07:13)  Flomax 0.4 mg oral capsule: 1 cap(s) orally once a day (at bedtime) (11 Oct 2023 07:14)  gabapentin 300 mg oral capsule: 1 cap(s) orally 2 times a day (11 Oct 2023 07:13)  Lantus 100 units/mL subcutaneous solution: 18 unit(s) subcutaneous once a day (at bedtime) (11 Oct 2023 07:15)  ramipril 2.5 mg oral capsule: 1 cap(s) orally once a day (11 Oct 2023 07:12)      MEDICATIONS  (STANDING):  aspirin enteric coated 81 milliGRAM(s) Oral daily  bethanechol 25 milliGRAM(s) Oral two times a day  dextrose 5%. 1000 milliLiter(s) (50 mL/Hr) IV Continuous <Continuous>  dextrose 5%. 1000 milliLiter(s) (100 mL/Hr) IV Continuous <Continuous>  dextrose 50% Injectable 25 Gram(s) IV Push once  dextrose 50% Injectable 12.5 Gram(s) IV Push once  dextrose 50% Injectable 25 Gram(s) IV Push once  dronabinol 2.5 milliGRAM(s) Oral two times a day  gabapentin 300 milliGRAM(s) Oral two times a day  glucagon  Injectable 1 milliGRAM(s) IntraMuscular once  insulin glargine Injectable (LANTUS) 18 Unit(s) SubCutaneous at bedtime  insulin lispro (ADMELOG) corrective regimen sliding scale   SubCutaneous three times a day before meals  insulin lispro (ADMELOG) corrective regimen sliding scale   SubCutaneous at bedtime  insulin lispro Injectable (ADMELOG) 3 Unit(s) SubCutaneous three times a day before meals  lactobacillus acidophilus 1 Tablet(s) Oral two times a day with meals  midodrine. 20 milliGRAM(s) Oral once  midodrine. 10 milliGRAM(s) Oral three times a day  pantoprazole    Tablet 40 milliGRAM(s) Oral before breakfast  piperacillin/tazobactam IVPB.. 3.375 Gram(s) IV Intermittent every 8 hours  tamsulosin 0.4 milliGRAM(s) Oral at bedtime  vancomycin  IVPB 1250 milliGRAM(s) IV Intermittent every 24 hours    MEDICATIONS  (PRN):  acetaminophen     Tablet .. 650 milliGRAM(s) Oral every 6 hours PRN Temp greater or equal to 38C (100.4F), Moderate Pain (4 - 6)  dextrose Oral Gel 15 Gram(s) Oral once PRN Blood Glucose LESS THAN 70 milliGRAM(s)/deciliter  loperamide 2 milliGRAM(s) Oral three times a day PRN Diarrhea      Allergies    No Known Allergies    Intolerances        REVIEW OF SYSTEMS: Negative except as per HPI.    VITAL SIGNS:   Vital Signs Last 24 Hrs  T(C): 37.8 (12 Oct 2023 12:19), Max: 39.1 (11 Oct 2023 17:03)  T(F): 100 (12 Oct 2023 12:19), Max: 102.4 (11 Oct 2023 17:03)  HR: 61 (12 Oct 2023 12:19) (59 - 72)  BP: 88/42 (12 Oct 2023 12:19) (64/26 - 110/60)  BP(mean): --  RR: 17 (12 Oct 2023 12:19) (17 - 20)  SpO2: 99% (12 Oct 2023 12:19) (94% - 99%)    Parameters below as of 12 Oct 2023 12:19  Patient On (Oxygen Delivery Method): room air        I&O's Summary      PHYSICAL EXAM:  Constitutional: NAD, well-developed  HEENT NC/AT, moist mucous membranes  Pulmonary: Non-labored, breath sounds are clear bilaterally, no wheezing, rales or rhonchi  Cardiovascular: +S1, S2, RRR, no murmur  Gastrointestinal: Soft, nontender, nondistended, normoactive bowel sounds  Extremities: No peripheral edema   Neurological: Alert, strength and sensitivity are grossly intact  Skin: No obvious lesions/rashes  Psych: Mood & affect appropriate    LABS: All Labs Reviewed:                        9.6    7.26  )-----------( 279      ( 12 Oct 2023 05:30 )             30.3                         7.9    9.99  )-----------( 233      ( 11 Oct 2023 04:09 )             25.5                         6.6    11.08 )-----------( 228      ( 10 Oct 2023 18:25 )             21.0     12 Oct 2023 05:30    139    |  111    |  42     ----------------------------<  216    4.2     |  22     |  1.10   11 Oct 2023 04:09    138    |  109    |  43     ----------------------------<  186    4.4     |  20     |  1.20   10 Oct 2023 18:25    138    |  109    |  42     ----------------------------<  159    4.5     |  20     |  1.20     Ca    8.1        12 Oct 2023 05:30  Ca    8.4        11 Oct 2023 04:09  Ca    8.5        10 Oct 2023 18:25  Phos  3.0       12 Oct 2023 05:30  Mg     1.8       12 Oct 2023 05:30  Mg     1.8       10 Oct 2023 18:25    TPro  6.1    /  Alb  1.9    /  TBili  0.4    /  DBili  x      /  AST  18     /  ALT  12     /  AlkPhos  88     10 Oct 2023 18:25    PT/INR - ( 10 Oct 2023 18:25 )   PT: 18.6 sec;   INR: 1.61 ratio         PTT - ( 10 Oct 2023 18:25 )  PTT:30.0 sec  CARDIAC MARKERS ( 12 Oct 2023 05:30 )  x     / x     / 98 U/L / x     / 4.1 ng/mL  CARDIAC MARKERS ( 12 Oct 2023 00:54 )  x     / x     / 90 U/L / x     / 3.4 ng/mL  CARDIAC MARKERS ( 10 Oct 2023 18:25 )  x     / x     / x     / x     / <1.0 ng/mL      Blood Culture: Organism --  Gram Stain Blood -- Gram Stain --  Specimen Source Catheterized Catheterized  Culture-Blood --    Organism Blood Culture PCR  Gram Stain Blood -- Gram Stain   Growth in aerobic bottle: Gram Positive Cocci in Clusters  Growth in anaerobic bottle: Gram Positive Cocci in Clusters  Specimen Source .Blood Blood-Peripheral  Culture-Blood --        10-10 @ 18:25  TSH: 1.74    [facility]         Natasha Villegas, Sara, Aria, Eligio, Mina Larsen        [phone] (office)    HPI:  78 y.o. M with PMHx of MI x2, HTN, HLD, uncontrolled T2DM, afib s/p PPM, admitted for UTI, diabetic foot ulcers, and sepsis with hypotension. Patient was seen and examined at bedside. NAD. Reports that he is feeling better than when he was admitted. Denies chest pain, palpitations, SOB, lightheadedness, nausea and vomiting. Patient reports having MI x2 in the past without PCI or CABG due to being unstable. Per chart review, patient has lost 40 to 50 pounds over the past year and has decreased PO intake. 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 Overnight, patient was hypotensive, diaphoretic, lethargic and pale, was given IV fluids and responded appropriately. Patient downgraded from ICU this morning.       PAST MEDICAL & SURGICAL HISTORY:  Diabetes  Benign essential HTN  Pacemaker  History of BPH  Diabetic foot ulcers    SOCIAL HISTORY: No active tobacco, alcohol or illicit drug use      Home Medications:  Aspir 81 oral delayed release tablet: 1 tab(s) orally once a day (11 Oct 2023 07:13)  bethanechol 25 mg oral tablet: 1 tab(s) orally 2 times a day (11 Oct 2023 07:14)  Coreg 3.125 mg oral tablet: 1 tab(s) orally 2 times a day (11 Oct 2023 07:13)  Flomax 0.4 mg oral capsule: 1 cap(s) orally once a day (at bedtime) (11 Oct 2023 07:14)  gabapentin 300 mg oral capsule: 1 cap(s) orally 2 times a day (11 Oct 2023 07:13)  Lantus 100 units/mL subcutaneous solution: 18 unit(s) subcutaneous once a day (at bedtime) (11 Oct 2023 07:15)  ramipril 2.5 mg oral capsule: 1 cap(s) orally once a day (11 Oct 2023 07:12)      MEDICATIONS  (STANDING):  aspirin enteric coated 81 milliGRAM(s) Oral daily  bethanechol 25 milliGRAM(s) Oral two times a day  dextrose 5%. 1000 milliLiter(s) (50 mL/Hr) IV Continuous <Continuous>  dextrose 5%. 1000 milliLiter(s) (100 mL/Hr) IV Continuous <Continuous>  dextrose 50% Injectable 25 Gram(s) IV Push once  dextrose 50% Injectable 12.5 Gram(s) IV Push once  dextrose 50% Injectable 25 Gram(s) IV Push once  dronabinol 2.5 milliGRAM(s) Oral two times a day  gabapentin 300 milliGRAM(s) Oral two times a day  glucagon  Injectable 1 milliGRAM(s) IntraMuscular once  insulin glargine Injectable (LANTUS) 18 Unit(s) SubCutaneous at bedtime  insulin lispro (ADMELOG) corrective regimen sliding scale   SubCutaneous three times a day before meals  insulin lispro (ADMELOG) corrective regimen sliding scale   SubCutaneous at bedtime  insulin lispro Injectable (ADMELOG) 3 Unit(s) SubCutaneous three times a day before meals  lactobacillus acidophilus 1 Tablet(s) Oral two times a day with meals  midodrine. 20 milliGRAM(s) Oral once  midodrine. 10 milliGRAM(s) Oral three times a day  pantoprazole    Tablet 40 milliGRAM(s) Oral before breakfast  piperacillin/tazobactam IVPB.. 3.375 Gram(s) IV Intermittent every 8 hours  tamsulosin 0.4 milliGRAM(s) Oral at bedtime  vancomycin  IVPB 1250 milliGRAM(s) IV Intermittent every 24 hours    MEDICATIONS  (PRN):  acetaminophen     Tablet .. 650 milliGRAM(s) Oral every 6 hours PRN Temp greater or equal to 38C (100.4F), Moderate Pain (4 - 6)  dextrose Oral Gel 15 Gram(s) Oral once PRN Blood Glucose LESS THAN 70 milliGRAM(s)/deciliter  loperamide 2 milliGRAM(s) Oral three times a day PRN Diarrhea      Allergies    No Known Allergies    Intolerances        REVIEW OF SYSTEMS: Negative except as per HPI.    VITAL SIGNS:   Vital Signs Last 24 Hrs  T(C): 37.8 (12 Oct 2023 12:19), Max: 39.1 (11 Oct 2023 17:03)  T(F): 100 (12 Oct 2023 12:19), Max: 102.4 (11 Oct 2023 17:03)  HR: 61 (12 Oct 2023 12:19) (59 - 72)  BP: 88/42 (12 Oct 2023 12:19) (64/26 - 110/60)  BP(mean): --  RR: 17 (12 Oct 2023 12:19) (17 - 20)  SpO2: 99% (12 Oct 2023 12:19) (94% - 99%)    Parameters below as of 12 Oct 2023 12:19  Patient On (Oxygen Delivery Method): room air        I&O's Summary      PHYSICAL EXAM:  Constitutional: NAD, well-developed  HEENT NC/AT, moist mucous membranes  Pulmonary: Non-labored, breath sounds are clear bilaterally, no wheezing, rales or rhonchi  Cardiovascular: +S1, S2, RRR, no murmur  Gastrointestinal: Soft, nontender, nondistended, normoactive bowel sounds  Extremities: No peripheral edema   Neurological: Alert, strength and sensitivity are grossly intact  Skin: No obvious lesions/rashes  Psych: Mood & affect appropriate    LABS: All Labs Reviewed:                        9.6    7.26  )-----------( 279      ( 12 Oct 2023 05:30 )             30.3                         7.9    9.99  )-----------( 233      ( 11 Oct 2023 04:09 )             25.5                         6.6    11.08 )-----------( 228      ( 10 Oct 2023 18:25 )             21.0     12 Oct 2023 05:30    139    |  111    |  42     ----------------------------<  216    4.2     |  22     |  1.10   11 Oct 2023 04:09    138    |  109    |  43     ----------------------------<  186    4.4     |  20     |  1.20   10 Oct 2023 18:25    138    |  109    |  42     ----------------------------<  159    4.5     |  20     |  1.20     Ca    8.1        12 Oct 2023 05:30  Ca    8.4        11 Oct 2023 04:09  Ca    8.5        10 Oct 2023 18:25  Phos  3.0       12 Oct 2023 05:30  Mg     1.8       12 Oct 2023 05:30  Mg     1.8       10 Oct 2023 18:25    TPro  6.1    /  Alb  1.9    /  TBili  0.4    /  DBili  x      /  AST  18     /  ALT  12     /  AlkPhos  88     10 Oct 2023 18:25    PT/INR - ( 10 Oct 2023 18:25 )   PT: 18.6 sec;   INR: 1.61 ratio         PTT - ( 10 Oct 2023 18:25 )  PTT:30.0 sec  CARDIAC MARKERS ( 12 Oct 2023 05:30 )  x     / x     / 98 U/L / x     / 4.1 ng/mL  CARDIAC MARKERS ( 12 Oct 2023 00:54 )  x     / x     / 90 U/L / x     / 3.4 ng/mL  CARDIAC MARKERS ( 10 Oct 2023 18:25 )  x     / x     / x     / x     / <1.0 ng/mL      Blood Culture: Organism --  Gram Stain Blood -- Gram Stain --  Specimen Source Catheterized Catheterized  Culture-Blood --    Organism Blood Culture PCR  Gram Stain Blood -- Gram Stain   Growth in aerobic bottle: Gram Positive Cocci in Clusters  Growth in anaerobic bottle: Gram Positive Cocci in Clusters  Specimen Source .Blood Blood-Peripheral  Culture-Blood --        10-10 @ 18:25  TSH: 1.74    [facility]         Natasha Villegas, Sara, Aria, Eligio, Mina Larsen        [phone] (office)    HPI:  78 y.o. M with PMHx of MI x2, HTN, HLD, uncontrolled T2DM, afib s/p PPM, admitted for UTI, diabetic foot ulcers, and sepsis with hypotension. Patient was seen and examined at bedside. NAD. Reports that he is feeling better than when he was admitted. Denies chest pain, palpitations, SOB, lightheadedness, nausea and vomiting. Patient reports having MI x2 in the past without PCI or CABG due to being unstable. Per chart review, patient has lost 40 to 50 pounds over the past year and has decreased PO intake. 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 Overnight, patient was hypotensive, diaphoretic, lethargic and pale, was given IV fluids and responded appropriately. Patient downgraded from ICU this morning.       PAST MEDICAL & SURGICAL HISTORY:  Diabetes  Benign essential HTN  Pacemaker  History of BPH  Diabetic foot ulcers    SOCIAL HISTORY: No active tobacco, alcohol or illicit drug use      Home Medications:  Aspir 81 oral delayed release tablet: 1 tab(s) orally once a day (11 Oct 2023 07:13)  bethanechol 25 mg oral tablet: 1 tab(s) orally 2 times a day (11 Oct 2023 07:14)  Coreg 3.125 mg oral tablet: 1 tab(s) orally 2 times a day (11 Oct 2023 07:13)  Flomax 0.4 mg oral capsule: 1 cap(s) orally once a day (at bedtime) (11 Oct 2023 07:14)  gabapentin 300 mg oral capsule: 1 cap(s) orally 2 times a day (11 Oct 2023 07:13)  Lantus 100 units/mL subcutaneous solution: 18 unit(s) subcutaneous once a day (at bedtime) (11 Oct 2023 07:15)  ramipril 2.5 mg oral capsule: 1 cap(s) orally once a day (11 Oct 2023 07:12)      MEDICATIONS  (STANDING):  aspirin enteric coated 81 milliGRAM(s) Oral daily  bethanechol 25 milliGRAM(s) Oral two times a day  dextrose 5%. 1000 milliLiter(s) (50 mL/Hr) IV Continuous <Continuous>  dextrose 5%. 1000 milliLiter(s) (100 mL/Hr) IV Continuous <Continuous>  dextrose 50% Injectable 25 Gram(s) IV Push once  dextrose 50% Injectable 12.5 Gram(s) IV Push once  dextrose 50% Injectable 25 Gram(s) IV Push once  dronabinol 2.5 milliGRAM(s) Oral two times a day  gabapentin 300 milliGRAM(s) Oral two times a day  glucagon  Injectable 1 milliGRAM(s) IntraMuscular once  insulin glargine Injectable (LANTUS) 18 Unit(s) SubCutaneous at bedtime  insulin lispro (ADMELOG) corrective regimen sliding scale   SubCutaneous three times a day before meals  insulin lispro (ADMELOG) corrective regimen sliding scale   SubCutaneous at bedtime  insulin lispro Injectable (ADMELOG) 3 Unit(s) SubCutaneous three times a day before meals  lactobacillus acidophilus 1 Tablet(s) Oral two times a day with meals  midodrine. 20 milliGRAM(s) Oral once  midodrine. 10 milliGRAM(s) Oral three times a day  pantoprazole    Tablet 40 milliGRAM(s) Oral before breakfast  piperacillin/tazobactam IVPB.. 3.375 Gram(s) IV Intermittent every 8 hours  tamsulosin 0.4 milliGRAM(s) Oral at bedtime  vancomycin  IVPB 1250 milliGRAM(s) IV Intermittent every 24 hours    MEDICATIONS  (PRN):  acetaminophen     Tablet .. 650 milliGRAM(s) Oral every 6 hours PRN Temp greater or equal to 38C (100.4F), Moderate Pain (4 - 6)  dextrose Oral Gel 15 Gram(s) Oral once PRN Blood Glucose LESS THAN 70 milliGRAM(s)/deciliter  loperamide 2 milliGRAM(s) Oral three times a day PRN Diarrhea      Allergies    No Known Allergies    Intolerances        REVIEW OF SYSTEMS: Negative except as per HPI.    VITAL SIGNS:   Vital Signs Last 24 Hrs  T(C): 37.8 (12 Oct 2023 12:19), Max: 39.1 (11 Oct 2023 17:03)  T(F): 100 (12 Oct 2023 12:19), Max: 102.4 (11 Oct 2023 17:03)  HR: 61 (12 Oct 2023 12:19) (59 - 72)  BP: 88/42 (12 Oct 2023 12:19) (64/26 - 110/60)  BP(mean): --  RR: 17 (12 Oct 2023 12:19) (17 - 20)  SpO2: 99% (12 Oct 2023 12:19) (94% - 99%)    Parameters below as of 12 Oct 2023 12:19  Patient On (Oxygen Delivery Method): room air        I&O's Summary      PHYSICAL EXAM:  Constitutional: NAD, well-developed  HEENT NC/AT, moist mucous membranes  Pulmonary: Non-labored, breath sounds are clear bilaterally, no wheezing, rales or rhonchi  Cardiovascular: +S1, S2, RRR, no murmur  Gastrointestinal: Soft, nontender, nondistended, normoactive bowel sounds  Extremities: No peripheral edema   Neurological: Alert, strength and sensitivity are grossly intact  Skin: No obvious lesions/rashes  Psych: Mood & affect appropriate    LABS: All Labs Reviewed:                        9.6    7.26  )-----------( 279      ( 12 Oct 2023 05:30 )             30.3                         7.9    9.99  )-----------( 233      ( 11 Oct 2023 04:09 )             25.5                         6.6    11.08 )-----------( 228      ( 10 Oct 2023 18:25 )             21.0     12 Oct 2023 05:30    139    |  111    |  42     ----------------------------<  216    4.2     |  22     |  1.10   11 Oct 2023 04:09    138    |  109    |  43     ----------------------------<  186    4.4     |  20     |  1.20   10 Oct 2023 18:25    138    |  109    |  42     ----------------------------<  159    4.5     |  20     |  1.20     Ca    8.1        12 Oct 2023 05:30  Ca    8.4        11 Oct 2023 04:09  Ca    8.5        10 Oct 2023 18:25  Phos  3.0       12 Oct 2023 05:30  Mg     1.8       12 Oct 2023 05:30  Mg     1.8       10 Oct 2023 18:25    TPro  6.1    /  Alb  1.9    /  TBili  0.4    /  DBili  x      /  AST  18     /  ALT  12     /  AlkPhos  88     10 Oct 2023 18:25    PT/INR - ( 10 Oct 2023 18:25 )   PT: 18.6 sec;   INR: 1.61 ratio         PTT - ( 10 Oct 2023 18:25 )  PTT:30.0 sec  CARDIAC MARKERS ( 12 Oct 2023 05:30 )  x     / x     / 98 U/L / x     / 4.1 ng/mL  CARDIAC MARKERS ( 12 Oct 2023 00:54 )  x     / x     / 90 U/L / x     / 3.4 ng/mL  CARDIAC MARKERS ( 10 Oct 2023 18:25 )  x     / x     / x     / x     / <1.0 ng/mL      Blood Culture: Organism --  Gram Stain Blood -- Gram Stain --  Specimen Source Catheterized Catheterized  Culture-Blood --    Organism Blood Culture PCR  Gram Stain Blood -- Gram Stain   Growth in aerobic bottle: Gram Positive Cocci in Clusters  Growth in anaerobic bottle: Gram Positive Cocci in Clusters  Specimen Source .Blood Blood-Peripheral  Culture-Blood --        10-10 @ 18:25  TSH: 1.74      [facility]         Natasha Villegas, Sara, Aria, Eligio, Mina Larsen        [phone] (office)    HPI:  78 y.o. M with PMHx of MI x2, HTN, HLD, uncontrolled T2DM, afib s/p PPM, admitted for UTI, diabetic foot ulcers, and sepsis with hypotension. Patient was seen and examined at bedside. NAD. Reports that he is feeling better than when he was admitted. Denies chest pain, palpitations, SOB, lightheadedness, nausea and vomiting. Patient reports having MI x2 in the past without PCI or CABG due to being unstable. Per chart review, patient has lost 40 to 50 pounds over the past year and has decreased PO intake. 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 Overnight, patient was hypotensive, diaphoretic, lethargic and pale, was given IV fluids and responded appropriately. Patient downgraded from ICU this morning.       PAST MEDICAL & SURGICAL HISTORY:  Diabetes  Benign essential HTN  Pacemaker  History of BPH  Diabetic foot ulcers    SOCIAL HISTORY: No active tobacco, alcohol or illicit drug use      Home Medications:  Aspir 81 oral delayed release tablet: 1 tab(s) orally once a day (11 Oct 2023 07:13)  bethanechol 25 mg oral tablet: 1 tab(s) orally 2 times a day (11 Oct 2023 07:14)  Coreg 3.125 mg oral tablet: 1 tab(s) orally 2 times a day (11 Oct 2023 07:13)  Flomax 0.4 mg oral capsule: 1 cap(s) orally once a day (at bedtime) (11 Oct 2023 07:14)  gabapentin 300 mg oral capsule: 1 cap(s) orally 2 times a day (11 Oct 2023 07:13)  Lantus 100 units/mL subcutaneous solution: 18 unit(s) subcutaneous once a day (at bedtime) (11 Oct 2023 07:15)  ramipril 2.5 mg oral capsule: 1 cap(s) orally once a day (11 Oct 2023 07:12)      MEDICATIONS  (STANDING):  aspirin enteric coated 81 milliGRAM(s) Oral daily  bethanechol 25 milliGRAM(s) Oral two times a day  dextrose 5%. 1000 milliLiter(s) (50 mL/Hr) IV Continuous <Continuous>  dextrose 5%. 1000 milliLiter(s) (100 mL/Hr) IV Continuous <Continuous>  dextrose 50% Injectable 25 Gram(s) IV Push once  dextrose 50% Injectable 12.5 Gram(s) IV Push once  dextrose 50% Injectable 25 Gram(s) IV Push once  dronabinol 2.5 milliGRAM(s) Oral two times a day  gabapentin 300 milliGRAM(s) Oral two times a day  glucagon  Injectable 1 milliGRAM(s) IntraMuscular once  insulin glargine Injectable (LANTUS) 18 Unit(s) SubCutaneous at bedtime  insulin lispro (ADMELOG) corrective regimen sliding scale   SubCutaneous three times a day before meals  insulin lispro (ADMELOG) corrective regimen sliding scale   SubCutaneous at bedtime  insulin lispro Injectable (ADMELOG) 3 Unit(s) SubCutaneous three times a day before meals  lactobacillus acidophilus 1 Tablet(s) Oral two times a day with meals  midodrine. 20 milliGRAM(s) Oral once  midodrine. 10 milliGRAM(s) Oral three times a day  pantoprazole    Tablet 40 milliGRAM(s) Oral before breakfast  piperacillin/tazobactam IVPB.. 3.375 Gram(s) IV Intermittent every 8 hours  tamsulosin 0.4 milliGRAM(s) Oral at bedtime  vancomycin  IVPB 1250 milliGRAM(s) IV Intermittent every 24 hours    MEDICATIONS  (PRN):  acetaminophen     Tablet .. 650 milliGRAM(s) Oral every 6 hours PRN Temp greater or equal to 38C (100.4F), Moderate Pain (4 - 6)  dextrose Oral Gel 15 Gram(s) Oral once PRN Blood Glucose LESS THAN 70 milliGRAM(s)/deciliter  loperamide 2 milliGRAM(s) Oral three times a day PRN Diarrhea      Allergies    No Known Allergies    Intolerances        REVIEW OF SYSTEMS: Negative except as per HPI.    VITAL SIGNS:   Vital Signs Last 24 Hrs  T(C): 37.8 (12 Oct 2023 12:19), Max: 39.1 (11 Oct 2023 17:03)  T(F): 100 (12 Oct 2023 12:19), Max: 102.4 (11 Oct 2023 17:03)  HR: 61 (12 Oct 2023 12:19) (59 - 72)  BP: 88/42 (12 Oct 2023 12:19) (64/26 - 110/60)  BP(mean): --  RR: 17 (12 Oct 2023 12:19) (17 - 20)  SpO2: 99% (12 Oct 2023 12:19) (94% - 99%)    Parameters below as of 12 Oct 2023 12:19  Patient On (Oxygen Delivery Method): room air        I&O's Summary      PHYSICAL EXAM:  Constitutional: NAD, well-developed  HEENT NC/AT, moist mucous membranes  Pulmonary: Non-labored, breath sounds are clear bilaterally, no wheezing, rales or rhonchi  Cardiovascular: +S1, S2, RRR, no murmur  Gastrointestinal: Soft, nontender, nondistended, normoactive bowel sounds  Extremities: No peripheral edema   Neurological: Alert, strength and sensitivity are grossly intact  Skin: No obvious lesions/rashes  Psych: Mood & affect appropriate    LABS: All Labs Reviewed:                        9.6    7.26  )-----------( 279      ( 12 Oct 2023 05:30 )             30.3                         7.9    9.99  )-----------( 233      ( 11 Oct 2023 04:09 )             25.5                         6.6    11.08 )-----------( 228      ( 10 Oct 2023 18:25 )             21.0     12 Oct 2023 05:30    139    |  111    |  42     ----------------------------<  216    4.2     |  22     |  1.10   11 Oct 2023 04:09    138    |  109    |  43     ----------------------------<  186    4.4     |  20     |  1.20   10 Oct 2023 18:25    138    |  109    |  42     ----------------------------<  159    4.5     |  20     |  1.20     Ca    8.1        12 Oct 2023 05:30  Ca    8.4        11 Oct 2023 04:09  Ca    8.5        10 Oct 2023 18:25  Phos  3.0       12 Oct 2023 05:30  Mg     1.8       12 Oct 2023 05:30  Mg     1.8       10 Oct 2023 18:25    TPro  6.1    /  Alb  1.9    /  TBili  0.4    /  DBili  x      /  AST  18     /  ALT  12     /  AlkPhos  88     10 Oct 2023 18:25    PT/INR - ( 10 Oct 2023 18:25 )   PT: 18.6 sec;   INR: 1.61 ratio         PTT - ( 10 Oct 2023 18:25 )  PTT:30.0 sec  CARDIAC MARKERS ( 12 Oct 2023 05:30 )  x     / x     / 98 U/L / x     / 4.1 ng/mL  CARDIAC MARKERS ( 12 Oct 2023 00:54 )  x     / x     / 90 U/L / x     / 3.4 ng/mL  CARDIAC MARKERS ( 10 Oct 2023 18:25 )  x     / x     / x     / x     / <1.0 ng/mL      Blood Culture: Organism --  Gram Stain Blood -- Gram Stain --  Specimen Source Catheterized Catheterized  Culture-Blood --    Organism Blood Culture PCR  Gram Stain Blood -- Gram Stain   Growth in aerobic bottle: Gram Positive Cocci in Clusters  Growth in anaerobic bottle: Gram Positive Cocci in Clusters  Specimen Source .Blood Blood-Peripheral  Culture-Blood --        10-10 @ 18:25  TSH: 1.74      [facility]         Natasha Villegas, Sara, Aria, Eligio, Mina Larsen        [phone] (office)    HPI:  78 y.o. M with PMHx of MI x2, HTN, HLD, uncontrolled T2DM, afib s/p PPM, admitted for UTI, diabetic foot ulcers, and sepsis with hypotension. Patient was seen and examined at bedside. NAD. Reports that he is feeling better than when he was admitted. Denies chest pain, palpitations, SOB, lightheadedness, nausea and vomiting. Patient reports having MI x2 in the past without PCI or CABG due to being unstable. Per chart review, patient has lost 40 to 50 pounds over the past year and has decreased PO intake. 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 Overnight, patient was hypotensive, diaphoretic, lethargic and pale, was given IV fluids and responded appropriately. Patient downgraded from ICU this morning.       PAST MEDICAL & SURGICAL HISTORY:  Diabetes  Benign essential HTN  Pacemaker  History of BPH  Diabetic foot ulcers    SOCIAL HISTORY: No active tobacco, alcohol or illicit drug use      Home Medications:  Aspir 81 oral delayed release tablet: 1 tab(s) orally once a day (11 Oct 2023 07:13)  bethanechol 25 mg oral tablet: 1 tab(s) orally 2 times a day (11 Oct 2023 07:14)  Coreg 3.125 mg oral tablet: 1 tab(s) orally 2 times a day (11 Oct 2023 07:13)  Flomax 0.4 mg oral capsule: 1 cap(s) orally once a day (at bedtime) (11 Oct 2023 07:14)  gabapentin 300 mg oral capsule: 1 cap(s) orally 2 times a day (11 Oct 2023 07:13)  Lantus 100 units/mL subcutaneous solution: 18 unit(s) subcutaneous once a day (at bedtime) (11 Oct 2023 07:15)  ramipril 2.5 mg oral capsule: 1 cap(s) orally once a day (11 Oct 2023 07:12)      MEDICATIONS  (STANDING):  aspirin enteric coated 81 milliGRAM(s) Oral daily  bethanechol 25 milliGRAM(s) Oral two times a day  dextrose 5%. 1000 milliLiter(s) (50 mL/Hr) IV Continuous <Continuous>  dextrose 5%. 1000 milliLiter(s) (100 mL/Hr) IV Continuous <Continuous>  dextrose 50% Injectable 25 Gram(s) IV Push once  dextrose 50% Injectable 12.5 Gram(s) IV Push once  dextrose 50% Injectable 25 Gram(s) IV Push once  dronabinol 2.5 milliGRAM(s) Oral two times a day  gabapentin 300 milliGRAM(s) Oral two times a day  glucagon  Injectable 1 milliGRAM(s) IntraMuscular once  insulin glargine Injectable (LANTUS) 18 Unit(s) SubCutaneous at bedtime  insulin lispro (ADMELOG) corrective regimen sliding scale   SubCutaneous three times a day before meals  insulin lispro (ADMELOG) corrective regimen sliding scale   SubCutaneous at bedtime  insulin lispro Injectable (ADMELOG) 3 Unit(s) SubCutaneous three times a day before meals  lactobacillus acidophilus 1 Tablet(s) Oral two times a day with meals  midodrine. 20 milliGRAM(s) Oral once  midodrine. 10 milliGRAM(s) Oral three times a day  pantoprazole    Tablet 40 milliGRAM(s) Oral before breakfast  piperacillin/tazobactam IVPB.. 3.375 Gram(s) IV Intermittent every 8 hours  tamsulosin 0.4 milliGRAM(s) Oral at bedtime  vancomycin  IVPB 1250 milliGRAM(s) IV Intermittent every 24 hours    MEDICATIONS  (PRN):  acetaminophen     Tablet .. 650 milliGRAM(s) Oral every 6 hours PRN Temp greater or equal to 38C (100.4F), Moderate Pain (4 - 6)  dextrose Oral Gel 15 Gram(s) Oral once PRN Blood Glucose LESS THAN 70 milliGRAM(s)/deciliter  loperamide 2 milliGRAM(s) Oral three times a day PRN Diarrhea      Allergies    No Known Allergies    Intolerances        REVIEW OF SYSTEMS: Negative except as per HPI.    VITAL SIGNS:   Vital Signs Last 24 Hrs  T(C): 37.8 (12 Oct 2023 12:19), Max: 39.1 (11 Oct 2023 17:03)  T(F): 100 (12 Oct 2023 12:19), Max: 102.4 (11 Oct 2023 17:03)  HR: 61 (12 Oct 2023 12:19) (59 - 72)  BP: 88/42 (12 Oct 2023 12:19) (64/26 - 110/60)  BP(mean): --  RR: 17 (12 Oct 2023 12:19) (17 - 20)  SpO2: 99% (12 Oct 2023 12:19) (94% - 99%)    Parameters below as of 12 Oct 2023 12:19  Patient On (Oxygen Delivery Method): room air        I&O's Summary      PHYSICAL EXAM:  Constitutional: NAD, well-developed  HEENT NC/AT, moist mucous membranes  Pulmonary: Non-labored, breath sounds are clear bilaterally, no wheezing, rales or rhonchi  Cardiovascular: +S1, S2, RRR, no murmur  Gastrointestinal: Soft, nontender, nondistended, normoactive bowel sounds  Extremities: No peripheral edema   Neurological: Alert, strength and sensitivity are grossly intact  Skin: No obvious lesions/rashes  Psych: Mood & affect appropriate    LABS: All Labs Reviewed:                        9.6    7.26  )-----------( 279      ( 12 Oct 2023 05:30 )             30.3                         7.9    9.99  )-----------( 233      ( 11 Oct 2023 04:09 )             25.5                         6.6    11.08 )-----------( 228      ( 10 Oct 2023 18:25 )             21.0     12 Oct 2023 05:30    139    |  111    |  42     ----------------------------<  216    4.2     |  22     |  1.10   11 Oct 2023 04:09    138    |  109    |  43     ----------------------------<  186    4.4     |  20     |  1.20   10 Oct 2023 18:25    138    |  109    |  42     ----------------------------<  159    4.5     |  20     |  1.20     Ca    8.1        12 Oct 2023 05:30  Ca    8.4        11 Oct 2023 04:09  Ca    8.5        10 Oct 2023 18:25  Phos  3.0       12 Oct 2023 05:30  Mg     1.8       12 Oct 2023 05:30  Mg     1.8       10 Oct 2023 18:25    TPro  6.1    /  Alb  1.9    /  TBili  0.4    /  DBili  x      /  AST  18     /  ALT  12     /  AlkPhos  88     10 Oct 2023 18:25    PT/INR - ( 10 Oct 2023 18:25 )   PT: 18.6 sec;   INR: 1.61 ratio         PTT - ( 10 Oct 2023 18:25 )  PTT:30.0 sec  CARDIAC MARKERS ( 12 Oct 2023 05:30 )  x     / x     / 98 U/L / x     / 4.1 ng/mL  CARDIAC MARKERS ( 12 Oct 2023 00:54 )  x     / x     / 90 U/L / x     / 3.4 ng/mL  CARDIAC MARKERS ( 10 Oct 2023 18:25 )  x     / x     / x     / x     / <1.0 ng/mL      Blood Culture: Organism --  Gram Stain Blood -- Gram Stain --  Specimen Source Catheterized Catheterized  Culture-Blood --    Organism Blood Culture PCR  Gram Stain Blood -- Gram Stain   Growth in aerobic bottle: Gram Positive Cocci in Clusters  Growth in anaerobic bottle: Gram Positive Cocci in Clusters  Specimen Source .Blood Blood-Peripheral  Culture-Blood --        10-10 @ 18:25  TSH: 1.74

## 2023-10-12 NOTE — PROGRESS NOTE ADULT - SUBJECTIVE AND OBJECTIVE BOX
78y year old Male seen at [facility] 3WES 365 D1 for for Right heel wound and left lateral foot wound. Patient was in bed. Patient was in bone scan today and became hypotensive and so had to send the patient back to the floor. Patient was awake. Denies any fever, chills, nausea, vomiting, chest pain, shortness of breath, or calf pain at this time.    Allergies    No Known Allergies    Intolerances        MEDICATIONS  (STANDING):  aspirin enteric coated 81 milliGRAM(s) Oral daily  bethanechol 25 milliGRAM(s) Oral two times a day  cefepime   IVPB 2000 milliGRAM(s) IV Intermittent every 8 hours  dextrose 5%. 1000 milliLiter(s) (100 mL/Hr) IV Continuous <Continuous>  dextrose 5%. 1000 milliLiter(s) (50 mL/Hr) IV Continuous <Continuous>  dextrose 50% Injectable 25 Gram(s) IV Push once  dextrose 50% Injectable 25 Gram(s) IV Push once  dextrose 50% Injectable 12.5 Gram(s) IV Push once  dronabinol 2.5 milliGRAM(s) Oral two times a day  gabapentin 300 milliGRAM(s) Oral two times a day  glucagon  Injectable 1 milliGRAM(s) IntraMuscular once  insulin glargine Injectable (LANTUS) 18 Unit(s) SubCutaneous at bedtime  insulin lispro (ADMELOG) corrective regimen sliding scale   SubCutaneous three times a day before meals  insulin lispro (ADMELOG) corrective regimen sliding scale   SubCutaneous at bedtime  insulin lispro Injectable (ADMELOG) 3 Unit(s) SubCutaneous three times a day before meals  lactated ringers. 1000 milliLiter(s) (100 mL/Hr) IV Continuous <Continuous>  lactobacillus acidophilus 1 Tablet(s) Oral two times a day with meals  midodrine. 10 milliGRAM(s) Oral three times a day  pantoprazole    Tablet 40 milliGRAM(s) Oral before breakfast  tamsulosin 0.4 milliGRAM(s) Oral at bedtime  vancomycin  IVPB 1250 milliGRAM(s) IV Intermittent every 24 hours    MEDICATIONS  (PRN):  acetaminophen     Tablet .. 650 milliGRAM(s) Oral every 6 hours PRN Temp greater or equal to 38C (100.4F), Moderate Pain (4 - 6)  dextrose Oral Gel 15 Gram(s) Oral once PRN Blood Glucose LESS THAN 70 milliGRAM(s)/deciliter  loperamide 2 milliGRAM(s) Oral three times a day PRN Diarrhea      Vital Signs Last 24 Hrs  T(C): 37.8 (12 Oct 2023 12:19), Max: 37.8 (12 Oct 2023 12:19)  T(F): 100 (12 Oct 2023 12:19), Max: 100 (12 Oct 2023 12:19)  HR: 60 (12 Oct 2023 14:38) (59 - 61)  BP: 74/30 (12 Oct 2023 14:38) (64/24 - 94/60)  BP(mean): --  RR: 18 (12 Oct 2023 14:38) (17 - 20)  SpO2: 99% (12 Oct 2023 14:38) (95% - 99%)    Parameters below as of 12 Oct 2023 14:38  Patient On (Oxygen Delivery Method): room air        PHYSICAL EXAM:  Vascular: DP & PT non  palpable bilaterally, Capillary refill delayed to the digits  Digital hair absent bilaterally  Neurological: Light touch sensation diminished  bilaterally  Musculoskeletal: 2/5  strength in all quadrants bilaterally, s/p right partial calcanectomy   Dermatological: 6.0cm x 4.0cm  x 1.0cm ulceration noted to the right heel with fibrogranular  tissue  no probe to bone, no periwound erythema, no fluctuance, no malodor, no proximal streaking at this time  4.0cm x 2.0cm x down to bone with necortic tissue and purulent drainage, periwound erythema and edema, no proximal streaking    CBC Full  -  ( 12 Oct 2023 05:30 )  WBC Count : 7.26 K/uL  RBC Count : 3.73 M/uL  Hemoglobin : 9.6 g/dL  Hematocrit : 30.3 %  Platelet Count - Automated : 279 K/uL  Mean Cell Volume : 81.2 fl  Mean Cell Hemoglobin : 25.7 pg  Mean Cell Hemoglobin Concentration : 31.7 gm/dL  Auto Neutrophil # : x  Auto Lymphocyte # : x  Auto Monocyte # : x  Auto Eosinophil # : x  Auto Basophil # : x  Auto Neutrophil % : x  Auto Lymphocyte % : x  Auto Monocyte % : x  Auto Eosinophil % : x  Auto Basophil % : x    10-12    139  |  111<H>  |  42<H>  ----------------------------<  216<H>  4.2   |  22  |  1.10    Ca    8.1<L>      12 Oct 2023 05:30  Phos  3.0     10-12  Mg     1.8     10-12    TPro  6.1  /  Alb  1.9<L>  /  TBili  0.4  /  DBili  x   /  AST  18  /  ALT  12  /  AlkPhos  88  10-10                          9.6    7.26  )-----------( 279      ( 12 Oct 2023 05:30 )             30.3       PT/INR - ( 10 Oct 2023 18:25 )   PT: 18.6 sec;   INR: 1.61 ratio         PTT - ( 10 Oct 2023 18:25 )  PTT:30.0 sec      Culture - Urine (collected 10 Oct 2023 21:00)  Source: Catheterized Catheterized  Preliminary Report (12 Oct 2023 16:41):    10,000 - 49,000 CFU/mL Pseudomonas aeruginosa    Culture - Blood (collected 10 Oct 2023 18:25)  Source: .Blood Blood-Peripheral  Gram Stain (11 Oct 2023 21:56):    Growth in aerobic bottle: Gram Positive Cocci in Clusters    Growth in anaerobic bottle: Gram Positive Cocci in Clusters  Preliminary Report (12 Oct 2023 11:56):    Growth in anaerobic bottle: Staphylococcus aureus    See previous culture [id number]    Culture - Blood (collected 10 Oct 2023 18:25)  Source: .Blood Blood-Peripheral  Gram Stain (11 Oct 2023 15:29):    Growth in aerobic bottle: Gram Positive Cocci in Clusters    Growth in anaerobic bottle: Gram Positive Cocci in Clusters  Preliminary Report (12 Oct 2023 11:56):    Growth in aerobic and anaerobic bottles: Staphylococcus aureus    Direct identification is available within approximately 3-5    hours either by Blood Panel Multiplexed PCR or Direct    MALDI-TOF. Details: [email or web address]  Organism: Blood Culture PCR (11 Oct 2023 16:30)  Organism: Blood Culture PCR (11 Oct 2023 16:30)        Imaging: ----------   78y year old Male seen at [facility] 3WES 365 D1 for for Right heel wound and left lateral foot wound. Patient was in bed. Patient was in bone scan today and became hypotensive and so had to send the patient back to the floor. Patient was awake. Denies any fever, chills, nausea, vomiting, chest pain, shortness of breath, or calf pain at this time.    Allergies    No Known Allergies    Intolerances        MEDICATIONS  (STANDING):  aspirin enteric coated 81 milliGRAM(s) Oral daily  bethanechol 25 milliGRAM(s) Oral two times a day  cefepime   IVPB 2000 milliGRAM(s) IV Intermittent every 8 hours  dextrose 5%. 1000 milliLiter(s) (100 mL/Hr) IV Continuous <Continuous>  dextrose 5%. 1000 milliLiter(s) (50 mL/Hr) IV Continuous <Continuous>  dextrose 50% Injectable 25 Gram(s) IV Push once  dextrose 50% Injectable 25 Gram(s) IV Push once  dextrose 50% Injectable 12.5 Gram(s) IV Push once  dronabinol 2.5 milliGRAM(s) Oral two times a day  gabapentin 300 milliGRAM(s) Oral two times a day  glucagon  Injectable 1 milliGRAM(s) IntraMuscular once  insulin glargine Injectable (LANTUS) 18 Unit(s) SubCutaneous at bedtime  insulin lispro (ADMELOG) corrective regimen sliding scale   SubCutaneous three times a day before meals  insulin lispro (ADMELOG) corrective regimen sliding scale   SubCutaneous at bedtime  insulin lispro Injectable (ADMELOG) 3 Unit(s) SubCutaneous three times a day before meals  lactated ringers. 1000 milliLiter(s) (100 mL/Hr) IV Continuous <Continuous>  lactobacillus acidophilus 1 Tablet(s) Oral two times a day with meals  midodrine. 10 milliGRAM(s) Oral three times a day  pantoprazole    Tablet 40 milliGRAM(s) Oral before breakfast  tamsulosin 0.4 milliGRAM(s) Oral at bedtime  vancomycin  IVPB 1250 milliGRAM(s) IV Intermittent every 24 hours    MEDICATIONS  (PRN):  acetaminophen     Tablet .. 650 milliGRAM(s) Oral every 6 hours PRN Temp greater or equal to 38C (100.4F), Moderate Pain (4 - 6)  dextrose Oral Gel 15 Gram(s) Oral once PRN Blood Glucose LESS THAN 70 milliGRAM(s)/deciliter  loperamide 2 milliGRAM(s) Oral three times a day PRN Diarrhea      Vital Signs Last 24 Hrs  T(C): 37.8 (12 Oct 2023 12:19), Max: 37.8 (12 Oct 2023 12:19)  T(F): 100 (12 Oct 2023 12:19), Max: 100 (12 Oct 2023 12:19)  HR: 60 (12 Oct 2023 14:38) (59 - 61)  BP: 74/30 (12 Oct 2023 14:38) (64/24 - 94/60)  BP(mean): --  RR: 18 (12 Oct 2023 14:38) (17 - 20)  SpO2: 99% (12 Oct 2023 14:38) (95% - 99%)    Parameters below as of 12 Oct 2023 14:38  Patient On (Oxygen Delivery Method): room air        PHYSICAL EXAM:  Vascular: DP & PT non  palpable bilaterally, Capillary refill delayed to the digits  Digital hair absent bilaterally  Neurological: Light touch sensation diminished  bilaterally  Musculoskeletal: 2/5  strength in all quadrants bilaterally, s/p right partial calcanectomy   Dermatological: 6.0cm x 4.0cm  x 1.0cm ulceration noted to the right heel with fibrogranular  tissue  no probe to bone, no periwound erythema, no fluctuance, no malodor, no proximal streaking at this time  4.0cm x 2.0cm x down to bone with necortic tissue and purulent drainage, periwound erythema and edema, no proximal streaking    CBC Full  -  ( 12 Oct 2023 05:30 )  WBC Count : 7.26 K/uL  RBC Count : 3.73 M/uL  Hemoglobin : 9.6 g/dL  Hematocrit : 30.3 %  Platelet Count - Automated : 279 K/uL  Mean Cell Volume : 81.2 fl  Mean Cell Hemoglobin : 25.7 pg  Mean Cell Hemoglobin Concentration : 31.7 gm/dL  Auto Neutrophil # : x  Auto Lymphocyte # : x  Auto Monocyte # : x  Auto Eosinophil # : x  Auto Basophil # : x  Auto Neutrophil % : x  Auto Lymphocyte % : x  Auto Monocyte % : x  Auto Eosinophil % : x  Auto Basophil % : x    10-12    139  |  111<H>  |  42<H>  ----------------------------<  216<H>  4.2   |  22  |  1.10    Ca    8.1<L>      12 Oct 2023 05:30  Phos  3.0     10-12  Mg     1.8     10-12    TPro  6.1  /  Alb  1.9<L>  /  TBili  0.4  /  DBili  x   /  AST  18  /  ALT  12  /  AlkPhos  88  10-10                          9.6    7.26  )-----------( 279      ( 12 Oct 2023 05:30 )             30.3       PT/INR - ( 10 Oct 2023 18:25 )   PT: 18.6 sec;   INR: 1.61 ratio         PTT - ( 10 Oct 2023 18:25 )  PTT:30.0 sec      Culture - Urine (collected 10 Oct 2023 21:00)  Source: Catheterized Catheterized  Preliminary Report (12 Oct 2023 16:41):    10,000 - 49,000 CFU/mL Pseudomonas aeruginosa    Culture - Blood (collected 10 Oct 2023 18:25)  Source: .Blood Blood-Peripheral  Gram Stain (11 Oct 2023 21:56):    Growth in aerobic bottle: Gram Positive Cocci in Clusters    Growth in anaerobic bottle: Gram Positive Cocci in Clusters  Preliminary Report (12 Oct 2023 11:56):    Growth in anaerobic bottle: Staphylococcus aureus    See previous culture [id number]    Culture - Blood (collected 10 Oct 2023 18:25)  Source: .Blood Blood-Peripheral  Gram Stain (11 Oct 2023 15:29):    Growth in aerobic bottle: Gram Positive Cocci in Clusters    Growth in anaerobic bottle: Gram Positive Cocci in Clusters  Preliminary Report (12 Oct 2023 11:56):    Growth in aerobic and anaerobic bottles: Staphylococcus aureus    Direct identification is available within approximately 3-5    hours either by Blood Panel Multiplexed PCR or Direct    MALDI-TOF. Details: [email or web address]  Organism: Blood Culture PCR (11 Oct 2023 16:30)  Organism: Blood Culture PCR (11 Oct 2023 16:30)        Imaging: ----------   78y year old Male seen at [facility] 3WES 365 D1 for for Right heel wound and left lateral foot wound. Patient was in bed. Patient was in bone scan today and became hypotensive and so had to send the patient back to the floor. Patient was awake. Denies any fever, chills, nausea, vomiting, chest pain, shortness of breath, or calf pain at this time.    Allergies    No Known Allergies    Intolerances        MEDICATIONS  (STANDING):  aspirin enteric coated 81 milliGRAM(s) Oral daily  bethanechol 25 milliGRAM(s) Oral two times a day  cefepime   IVPB 2000 milliGRAM(s) IV Intermittent every 8 hours  dextrose 5%. 1000 milliLiter(s) (100 mL/Hr) IV Continuous <Continuous>  dextrose 5%. 1000 milliLiter(s) (50 mL/Hr) IV Continuous <Continuous>  dextrose 50% Injectable 25 Gram(s) IV Push once  dextrose 50% Injectable 25 Gram(s) IV Push once  dextrose 50% Injectable 12.5 Gram(s) IV Push once  dronabinol 2.5 milliGRAM(s) Oral two times a day  gabapentin 300 milliGRAM(s) Oral two times a day  glucagon  Injectable 1 milliGRAM(s) IntraMuscular once  insulin glargine Injectable (LANTUS) 18 Unit(s) SubCutaneous at bedtime  insulin lispro (ADMELOG) corrective regimen sliding scale   SubCutaneous three times a day before meals  insulin lispro (ADMELOG) corrective regimen sliding scale   SubCutaneous at bedtime  insulin lispro Injectable (ADMELOG) 3 Unit(s) SubCutaneous three times a day before meals  lactated ringers. 1000 milliLiter(s) (100 mL/Hr) IV Continuous <Continuous>  lactobacillus acidophilus 1 Tablet(s) Oral two times a day with meals  midodrine. 10 milliGRAM(s) Oral three times a day  pantoprazole    Tablet 40 milliGRAM(s) Oral before breakfast  tamsulosin 0.4 milliGRAM(s) Oral at bedtime  vancomycin  IVPB 1250 milliGRAM(s) IV Intermittent every 24 hours    MEDICATIONS  (PRN):  acetaminophen     Tablet .. 650 milliGRAM(s) Oral every 6 hours PRN Temp greater or equal to 38C (100.4F), Moderate Pain (4 - 6)  dextrose Oral Gel 15 Gram(s) Oral once PRN Blood Glucose LESS THAN 70 milliGRAM(s)/deciliter  loperamide 2 milliGRAM(s) Oral three times a day PRN Diarrhea      Vital Signs Last 24 Hrs  T(C): 37.8 (12 Oct 2023 12:19), Max: 37.8 (12 Oct 2023 12:19)  T(F): 100 (12 Oct 2023 12:19), Max: 100 (12 Oct 2023 12:19)  HR: 60 (12 Oct 2023 14:38) (59 - 61)  BP: 74/30 (12 Oct 2023 14:38) (64/24 - 94/60)  BP(mean): --  RR: 18 (12 Oct 2023 14:38) (17 - 20)  SpO2: 99% (12 Oct 2023 14:38) (95% - 99%)    Parameters below as of 12 Oct 2023 14:38  Patient On (Oxygen Delivery Method): room air        PHYSICAL EXAM:  Vascular: DP & PT non  palpable bilaterally, Capillary refill delayed to the digits  Digital hair absent bilaterally  Neurological: Light touch sensation diminished  bilaterally  Musculoskeletal: 2/5  strength in all quadrants bilaterally, s/p right partial calcanectomy   Dermatological: 6.0cm x 4.0cm  x 1.0cm ulceration noted to the right heel with fibrogranular  tissue  no probe to bone, no periwound erythema, no fluctuance, no malodor, no proximal streaking at this time  4.0cm x 2.0cm x down to bone with necortic tissue and purulent drainage, periwound erythema and edema, no proximal streaking    CBC Full  -  ( 12 Oct 2023 05:30 )  WBC Count : 7.26 K/uL  RBC Count : 3.73 M/uL  Hemoglobin : 9.6 g/dL  Hematocrit : 30.3 %  Platelet Count - Automated : 279 K/uL  Mean Cell Volume : 81.2 fl  Mean Cell Hemoglobin : 25.7 pg  Mean Cell Hemoglobin Concentration : 31.7 gm/dL  Auto Neutrophil # : x  Auto Lymphocyte # : x  Auto Monocyte # : x  Auto Eosinophil # : x  Auto Basophil # : x  Auto Neutrophil % : x  Auto Lymphocyte % : x  Auto Monocyte % : x  Auto Eosinophil % : x  Auto Basophil % : x    10-12    139  |  111<H>  |  42<H>  ----------------------------<  216<H>  4.2   |  22  |  1.10    Ca    8.1<L>      12 Oct 2023 05:30  Phos  3.0     10-12  Mg     1.8     10-12    TPro  6.1  /  Alb  1.9<L>  /  TBili  0.4  /  DBili  x   /  AST  18  /  ALT  12  /  AlkPhos  88  10-10                          9.6    7.26  )-----------( 279      ( 12 Oct 2023 05:30 )             30.3       PT/INR - ( 10 Oct 2023 18:25 )   PT: 18.6 sec;   INR: 1.61 ratio         PTT - ( 10 Oct 2023 18:25 )  PTT:30.0 sec      Culture - Urine (collected 10 Oct 2023 21:00)  Source: Catheterized Catheterized  Preliminary Report (12 Oct 2023 16:41):    10,000 - 49,000 CFU/mL Pseudomonas aeruginosa    Culture - Blood (collected 10 Oct 2023 18:25)  Source: .Blood Blood-Peripheral  Gram Stain (11 Oct 2023 21:56):    Growth in aerobic bottle: Gram Positive Cocci in Clusters    Growth in anaerobic bottle: Gram Positive Cocci in Clusters  Preliminary Report (12 Oct 2023 11:56):    Growth in anaerobic bottle: Staphylococcus aureus    See previous culture [id number]    Culture - Blood (collected 10 Oct 2023 18:25)  Source: .Blood Blood-Peripheral  Gram Stain (11 Oct 2023 15:29):    Growth in aerobic bottle: Gram Positive Cocci in Clusters    Growth in anaerobic bottle: Gram Positive Cocci in Clusters  Preliminary Report (12 Oct 2023 11:56):    Growth in aerobic and anaerobic bottles: Staphylococcus aureus    Direct identification is available within approximately 3-5    hours either by Blood Panel Multiplexed PCR or Direct    MALDI-TOF. Details: [email or web address]  Organism: Blood Culture PCR (11 Oct 2023 16:30)  Organism: Blood Culture PCR (11 Oct 2023 16:30)        Imaging: ----------

## 2023-10-12 NOTE — PROGRESS NOTE ADULT - PROBLEM SELECTOR PLAN 2
id noted  rpt blood cultures pending  iv zosyn and vanco  nuclear bone scan to r/om  podiatry fu id noted  rpt blood cultures pending  iv zosyn and vanco  nuclear bone scan to r/o om  podiatry fu

## 2023-10-12 NOTE — PROGRESS NOTE ADULT - ASSESSMENT
anemia  FTT    no overt gi bleeding  Ct findings of the esophagus likely represent dysmotility  monitor GI fn  if diarrhea persist can do imodium prn  start marinol bid for ftt    I reviewed the overnight course of events on the unit, re-confirming the patient history. I discussed the care with the patient and their family  Differential diagnosis and plan of care discussed with patient after the evaluation  40 minutes spent on total encounter of which more than fifty percent of the encounter was spent counseling and/or coordinating care by the attending physician.  Advanced care planning was discussed with patient and family.  Advanced care planning forms were reviewed and discussed.  Risks, benefits and alternatives of gastroenterologic procedures were discussed in detail and all questions were answered.

## 2023-10-12 NOTE — PROGRESS NOTE ADULT - ASSESSMENT
78-year-old male presents to the hospital by ambulance from home.  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 Pt does not go to doctor on regular basis per son and does phone visits. Son reports years of struggling with foot infections with black areas of gangrene and their struggling to keep his feet but now feeling that keeping him alive is more important.  Culture - Blood (10.10.23 @ 18:25)    -  Methicillin resistant Staphylococcus aureus (MRSA): Detec    RECOMMENDATIONS  Very concerning for osteomyelitis so recommend MRI of both feet/lower legs w bacteremia   repeat blood cultures pending  Continue zosyn (started 10/10) pending final cx  C/w Vanco started 10/11 dosing per pharmacy protocol  Anticipate that surgery will be required so consider involving vascular for possible BKAs based on imaging    Infectious Diseases will continue to follow. Please call with any questions.   Rosa Maria Wilkinson M.D.  [facility] Division of Infectious Diseases [phone]  For after 5 P.M. and weekends, please call [phone]     78-year-old male presents to the hospital by ambulance from home.  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 Pt does not go to doctor on regular basis per son and does phone visits. Son reports years of struggling with foot infections with black areas of gangrene and their struggling to keep his feet but now feeling that keeping him alive is more important.  Culture - Blood (10.10.23 @ 18:25)    -  Methicillin resistant Staphylococcus aureus (MRSA): Detec    RECOMMENDATIONS  Very concerning for osteomyelitis so recommend MRI of both feet/lower legs w bacteremia   repeat blood cultures pending  Continue zosyn (started 10/10) pending final cx  C/w Vanco started 10/11 dosing per pharmacy protocol  Anticipate that surgery will be required so consider involving vascular for possible BKAs based on imaging    Infectious Diseases will continue to follow. Please call with any questions.   Rosa Maria Wilkinson M.D.  [facility] [phone]  For after 5 P.M. and weekends, please call [phone]     78-year-old male presents to the hospital by ambulance from home.  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 Pt does not go to doctor on regular basis per son and does phone visits. Son reports years of struggling with foot infections with black areas of gangrene and their struggling to keep his feet but now feeling that keeping him alive is more important.  Culture - Blood (10.10.23 @ 18:25)    -  Methicillin resistant Staphylococcus aureus (MRSA): Detec    RECOMMENDATIONS  Very concerning for osteomyelitis so recommend MRI of both feet/lower legs w bacteremia   repeat blood cultures pending  Continue zosyn (started 10/10) pending final cx  C/w Vanco started 10/11 dosing per pharmacy protocol  Anticipate that surgery will be required so consider involving vascular for possible BKAs based on imaging    Infectious Diseases will continue to follow. Please call with any questions.   Rosa Maria Wilkinson M.D.  [facility] Infectious [facility] [phone]  For after 5 P.M. and weekends, please call [phone]

## 2023-10-12 NOTE — CONSULT NOTE ADULT - ASSESSMENT
78 y.o. M with PMHx of MI x2, HTN, HLD, uncontrolled T2DM, afib s/p PPM, admitted for UTI, diabetic foot ulcers, and sepsis with hypotension.     Hypotension   - Likely 2/2 to septic shock and will need central line for aggressive fluid resuscitation   - EKG shows dual paced AV rhythm with occasional PVCs   - f/u TTE  - Patient bacteremic and will require transfer for [facility] for further workup   - Hold home HTN medications in the setting of hypotension.     Elevated troponin   - Trops 1894.6 --> 1608.1 likely d/t demand ischemia in the setting of sepsis.   - Start atorvastatin 80mg qd     - Other cardiovascular workup will depend on clinical course.  - All other workup per primary team.  - Will continue to follow.

## 2023-10-12 NOTE — PHARMACOTHERAPY INTERVENTION NOTE - COMMENTS
Recommended to adjust vancomycin dose from 1250 mg IV q24h to 1500 mg IV q24h since the vancomycin level today, 10/12 was 6.4 mg/L. As per PrecisePK calculations, current vancomycin AUC/KARTHIK is 396.20 mg/L*h, which is less than the therapeutic range of 400 - 600 mg/L*h. Increasing the dose is predicted to yield an AUC/KARTHIK of 475.44 mg/L*h.    Sohail Ramirez, PharmD, BCIDP  Clinical Pharmacy Specialist, Infectious Diseases  Tele-Antimicrobial Stewardship Program (Tele-ASP)  Tele-ASP Phone: [phone]   Recommended to adjust vancomycin dose from 1250 mg IV q24h to 1500 mg IV q24h since the vancomycin level today, 10/12 was 6.4 mg/L. As per PrecisePK calculations, current vancomycin AUC/KARTHIK is 396.20 mg/L*h, which is less than the therapeutic range of 400 - 600 mg/L*h. Increasing the dose is predicted to yield an AUC/KARTHIK of 475.44 mg/L*h.    Sohail Ramirez, PharmD, BCIDP  Clinical Pharmacy Specialist, Infectious Diseases  Tele-Antimicrobial Stewardship Program (Tele-ASP)  Tele-ASP Phone: [phone]   Recommended to adjust vancomycin dose from 1250 mg IV q24h to 1500 mg IV q24h since the vancomycin level today, 10/12 was 6.4 mg/L. As per PrecisePK calculations, current vancomycin AUC/KARTHIK is 396.20 mg/L*h, which is less than the therapeutic range of 400 - 600 mg/L*h. Increasing the dose is predicted to yield an AUC/KARTHIK of 475.44 mg/L*h.    Sohail Ramirez, PharmD, BCIDP  Clinical Pharmacy Specialist, Infectious Diseases  Tele-Antimicrobial Stewardship Program (Tele-ASP)  Tele-ASP Phone: [phone]   Recommended to adjust vancomycin dose from 1250 mg IV q24h to 1500 mg IV q24h since the vancomycin level today, 10/12 was 6.4 mg/L. As per PrecisePK calculations, the steady state vancomycin AUC/KARTHIK is 396.20 mg/L*h on the former dose, which is less than the therapeutic range of 400 - 600 mg/L*h. Increasing the dose is predicted to yield an AUC/KARTHIK of 475.44 mg/L*h.    Sohail Ramirez, PharmD, BCIDP  Clinical Pharmacy Specialist, Infectious Diseases  Tele-Antimicrobial Stewardship Program (Tele-ASP)  Tele-ASP Phone: [phone]   Recommended to adjust vancomycin dose from 1250 mg IV q24h to 1500 mg IV q24h since the vancomycin level today, 10/12 was 6.4 mg/L. As per PrecisePK calculations, the steady state vancomycin AUC/KARTHIK is 396.20 mg/L*h on the former dose, which is less than the therapeutic range of 400 - 600 mg/L*h. Increasing the dose is predicted to yield an AUC/KARTHIK of 475.44 mg/L*h.    Sohail Ramirez, PharmD, BCIDP  Clinical Pharmacy Specialist, Infectious Diseases  Tele-Antimicrobial Stewardship Program (Tele-ASP)  Tele-ASP Phone: [phone]   Recommended to adjust vancomycin dose from 1250 mg IV q24h to 1500 mg IV q24h since the vancomycin level today, 10/12 was 6.4 mg/L. As per PrecisePK calculations, the steady state vancomycin AUC/KARTHIK is 396.20 mg/L*h on the former dose, which is less than the therapeutic range of 400 - 600 mg/L*h. Increasing the dose is predicted to yield an AUC/KARTHIK of 475.44 mg/L*h.    Sohail Ramirez, PharmD, BCIDP  Clinical Pharmacy Specialist, Infectious Diseases  Tele-Antimicrobial Stewardship Program (Tele-ASP)  Tele-ASP Phone: [phone]

## 2023-10-12 NOTE — PROGRESS NOTE ADULT - SUBJECTIVE AND OBJECTIVE BOX
[facility], Division of Infectious Diseases  GLYNN Oakley Y. Patel, S. Shah, G. [facility]  [phone]    Name: HEMA LAZCANO  Age: 78y  Gender: Male  MRN: [id number]    Interval History:  Patient seen and examined at bedside   No acute overnight events.   Notes reviewed    Antibiotics:  piperacillin/tazobactam IVPB.. 3.375 Gram(s) IV Intermittent every 8 hours  vancomycin  IVPB 1250 milliGRAM(s) IV Intermittent every 24 hours      Medications:  acetaminophen     Tablet .. 650 milliGRAM(s) Oral every 6 hours PRN  aspirin enteric coated 81 milliGRAM(s) Oral daily  bethanechol 25 milliGRAM(s) Oral two times a day  dextrose 5%. 1000 milliLiter(s) IV Continuous <Continuous>  dextrose 5%. 1000 milliLiter(s) IV Continuous <Continuous>  dextrose 50% Injectable 25 Gram(s) IV Push once  dextrose 50% Injectable 12.5 Gram(s) IV Push once  dextrose 50% Injectable 25 Gram(s) IV Push once  dextrose Oral Gel 15 Gram(s) Oral once PRN  dronabinol 2.5 milliGRAM(s) Oral two times a day  gabapentin 300 milliGRAM(s) Oral two times a day  glucagon  Injectable 1 milliGRAM(s) IntraMuscular once  insulin glargine Injectable (LANTUS) 18 Unit(s) SubCutaneous at bedtime  insulin lispro (ADMELOG) corrective regimen sliding scale   SubCutaneous three times a day before meals  insulin lispro (ADMELOG) corrective regimen sliding scale   SubCutaneous at bedtime  insulin lispro Injectable (ADMELOG) 3 Unit(s) SubCutaneous three times a day before meals  lactobacillus acidophilus 1 Tablet(s) Oral two times a day with meals  loperamide 2 milliGRAM(s) Oral three times a day PRN  pantoprazole    Tablet 40 milliGRAM(s) Oral before breakfast  piperacillin/tazobactam IVPB.. 3.375 Gram(s) IV Intermittent every 8 hours  tamsulosin 0.4 milliGRAM(s) Oral at bedtime  vancomycin  IVPB 1250 milliGRAM(s) IV Intermittent every 24 hours      Review of Systems:  A 10-point review of systems was obtained.   Review of systems otherwise negative except as previously noted.    Allergies: No Known Allergies    For details regarding the patient's past medical history, social history, family history, and other miscellaneous elements, please refer the initial infectious diseases consultation and/or the admitting history and physical examination for this admission.    Objective:  Vitals:   T(C): 36.9 (10-12-23 @ 09:35), Max: 39.1 (10-11-23 @ 17:03)  HR: 60 (10-12-23 @ 11:13) (59 - 72)  BP: 64/28 (10-12-23 @ 11:13) (64/26 - 110/60)  RR: 18 (10-12-23 @ 06:13) (18 - 20)  SpO2: 97% (10-12-23 @ 06:13) (94% - 97%)    Physical Examination:  General: no acute distress  HEENT: NC/AT, EOMI,   Cardio: S1, S2 heard, RRR, no murmurs  Resp: decreased breath sounds   Abd: soft, NT, ND,  Ext: b/l feet in dressing  Skin: warm, dry, no visible rash      Laboratory Studies:  CBC:                       9.6    7.26  )-----------( 279      ( 12 Oct 2023 05:30 )             30.3     CMP: 10-12    139  |  111<H>  |  42<H>  ----------------------------<  216<H>  4.2   |  22  |  1.10    Ca    8.1<L>      12 Oct 2023 05:30  Phos  3.0     10-12  Mg     1.8     10-12    TPro  6.1  /  Alb  1.9<L>  /  TBili  0.4  /  DBili  x   /  AST  18  /  ALT  12  /  AlkPhos  88  10-10    LIVER FUNCTIONS - ( 10 Oct 2023 18:25 )  Alb: 1.9 g/dL / Pro: 6.1 g/dL / ALK PHOS: 88 U/L / ALT: 12 U/L / AST: 18 U/L / GGT: x           Urinalysis Basic - ( 12 Oct 2023 05:30 )    Color: x / Appearance: x / SG: x / pH: x  Gluc: 216 mg/dL / Ketone: x  / Bili: x / Urobili: x   Blood: x / Protein: x / Nitrite: x   Leuk Esterase: x / RBC: x / WBC x   Sq Epi: x / Non Sq Epi: x / Bacteria: x        Microbiology: reviewed    Culture - Urine (collected 10-10-23 @ 21:00)  Source: Catheterized Catheterized  Preliminary Report (10-12-23 @ 07:49):    Culture in progress    Culture - Blood (collected 10-10-23 @ 18:25)  Source: .Blood Blood-Peripheral  Gram Stain (10-11-23 @ 21:56):    Growth in aerobic bottle: Gram Positive Cocci in Clusters    Growth in anaerobic bottle: Gram Positive Cocci in Clusters  Preliminary Report (10-12-23 @ 11:56):    Growth in anaerobic bottle: Staphylococcus aureus    See previous culture [id number]    Culture - Blood (collected 10-10-23 @ 18:25)  Source: .Blood Blood-Peripheral  Gram Stain (10-11-23 @ 15:29):    Growth in aerobic bottle: Gram Positive Cocci in Clusters    Growth in anaerobic bottle: Gram Positive Cocci in Clusters  Preliminary Report (10-12-23 @ 11:56):    Growth in aerobic and anaerobic bottles: Staphylococcus aureus    Direct identification is available within approximately 3-5    hours either by Blood Panel Multiplexed PCR or Direct    MALDI-TOF. Details: [email or web address]  Organism: Blood Culture PCR (10-11-23 @ 16:30)  Organism: Blood Culture PCR (10-11-23 @ 16:30)      Method Type: PCR      -  Methicillin resistant Staphylococcus aureus (MRSA): Detec          Radiology: reviewed       [facility], Division of Infectious Diseases  GLYNN Oakley Y. Patel, S. Shah, G. [facility]  [phone]    Name: HEMA LAZCANO  Age: 78y  Gender: Male  MRN: [id number]    Interval History:  Patient seen and examined at bedside   No acute overnight events.   Notes reviewed    Antibiotics:  piperacillin/tazobactam IVPB.. 3.375 Gram(s) IV Intermittent every 8 hours  vancomycin  IVPB 1250 milliGRAM(s) IV Intermittent every 24 hours      Medications:  acetaminophen     Tablet .. 650 milliGRAM(s) Oral every 6 hours PRN  aspirin enteric coated 81 milliGRAM(s) Oral daily  bethanechol 25 milliGRAM(s) Oral two times a day  dextrose 5%. 1000 milliLiter(s) IV Continuous <Continuous>  dextrose 5%. 1000 milliLiter(s) IV Continuous <Continuous>  dextrose 50% Injectable 25 Gram(s) IV Push once  dextrose 50% Injectable 12.5 Gram(s) IV Push once  dextrose 50% Injectable 25 Gram(s) IV Push once  dextrose Oral Gel 15 Gram(s) Oral once PRN  dronabinol 2.5 milliGRAM(s) Oral two times a day  gabapentin 300 milliGRAM(s) Oral two times a day  glucagon  Injectable 1 milliGRAM(s) IntraMuscular once  insulin glargine Injectable (LANTUS) 18 Unit(s) SubCutaneous at bedtime  insulin lispro (ADMELOG) corrective regimen sliding scale   SubCutaneous three times a day before meals  insulin lispro (ADMELOG) corrective regimen sliding scale   SubCutaneous at bedtime  insulin lispro Injectable (ADMELOG) 3 Unit(s) SubCutaneous three times a day before meals  lactobacillus acidophilus 1 Tablet(s) Oral two times a day with meals  loperamide 2 milliGRAM(s) Oral three times a day PRN  pantoprazole    Tablet 40 milliGRAM(s) Oral before breakfast  piperacillin/tazobactam IVPB.. 3.375 Gram(s) IV Intermittent every 8 hours  tamsulosin 0.4 milliGRAM(s) Oral at bedtime  vancomycin  IVPB 1250 milliGRAM(s) IV Intermittent every 24 hours      Review of Systems:  A 10-point review of systems was obtained.   Review of systems otherwise negative except as previously noted.    Allergies: No Known Allergies    For details regarding the patient's past medical history, social history, family history, and other miscellaneous elements, please refer the initial infectious diseases consultation and/or the admitting history and physical examination for this admission.    Objective:  Vitals:   T(C): 36.9 (10-12-23 @ 09:35), Max: 39.1 (10-11-23 @ 17:03)  HR: 60 (10-12-23 @ 11:13) (59 - 72)  BP: 64/28 (10-12-23 @ 11:13) (64/26 - 110/60)  RR: 18 (10-12-23 @ 06:13) (18 - 20)  SpO2: 97% (10-12-23 @ 06:13) (94% - 97%)    Physical Examination:  General: no acute distress  HEENT: NC/AT, EOMI,   Cardio: S1, S2 heard, RRR, no murmurs  Resp: decreased breath sounds   Abd: soft, NT, ND,  Ext: b/l feet in dressing  Skin: warm, dry, no visible rash      Laboratory Studies:  CBC:                       9.6    7.26  )-----------( 279      ( 12 Oct 2023 05:30 )             30.3     CMP: 10-12    139  |  111<H>  |  42<H>  ----------------------------<  216<H>  4.2   |  22  |  1.10    Ca    8.1<L>      12 Oct 2023 05:30  Phos  3.0     10-12  Mg     1.8     10-12    TPro  6.1  /  Alb  1.9<L>  /  TBili  0.4  /  DBili  x   /  AST  18  /  ALT  12  /  AlkPhos  88  10-10    LIVER FUNCTIONS - ( 10 Oct 2023 18:25 )  Alb: 1.9 g/dL / Pro: 6.1 g/dL / ALK PHOS: 88 U/L / ALT: 12 U/L / AST: 18 U/L / GGT: x           Urinalysis Basic - ( 12 Oct 2023 05:30 )    Color: x / Appearance: x / SG: x / pH: x  Gluc: 216 mg/dL / Ketone: x  / Bili: x / Urobili: x   Blood: x / Protein: x / Nitrite: x   Leuk Esterase: x / RBC: x / WBC x   Sq Epi: x / Non Sq Epi: x / Bacteria: x        Microbiology: reviewed    Culture - Urine (collected 10-10-23 @ 21:00)  Source: Catheterized Catheterized  Preliminary Report (10-12-23 @ 07:49):    Culture in progress    Culture - Blood (collected 10-10-23 @ 18:25)  Source: .Blood Blood-Peripheral  Gram Stain (10-11-23 @ 21:56):    Growth in aerobic bottle: Gram Positive Cocci in Clusters    Growth in anaerobic bottle: Gram Positive Cocci in Clusters  Preliminary Report (10-12-23 @ 11:56):    Growth in anaerobic bottle: Staphylococcus aureus    See previous culture [id number]    Culture - Blood (collected 10-10-23 @ 18:25)  Source: .Blood Blood-Peripheral  Gram Stain (10-11-23 @ 15:29):    Growth in aerobic bottle: Gram Positive Cocci in Clusters    Growth in anaerobic bottle: Gram Positive Cocci in Clusters  Preliminary Report (10-12-23 @ 11:56):    Growth in aerobic and anaerobic bottles: Staphylococcus aureus    Direct identification is available within approximately 3-5    hours either by Blood Panel Multiplexed PCR or Direct    MALDI-TOF. Details: [email or web address]  Organism: Blood Culture PCR (10-11-23 @ 16:30)  Organism: Blood Culture PCR (10-11-23 @ 16:30)      Method Type: PCR      -  Methicillin resistant Staphylococcus aureus (MRSA): Detec          Radiology: reviewed       [facility], Division of Infectious Diseases  GLYNN Oakley Y. Patel, S. Shah, G. [facility]  [phone]    Name: HEMA LAZCANO  Age: 78y  Gender: Male  MRN: [id number]    Interval History:  Patient seen and examined at bedside   No acute overnight events.   Notes reviewed    Antibiotics:  piperacillin/tazobactam IVPB.. 3.375 Gram(s) IV Intermittent every 8 hours  vancomycin  IVPB 1250 milliGRAM(s) IV Intermittent every 24 hours      Medications:  acetaminophen     Tablet .. 650 milliGRAM(s) Oral every 6 hours PRN  aspirin enteric coated 81 milliGRAM(s) Oral daily  bethanechol 25 milliGRAM(s) Oral two times a day  dextrose 5%. 1000 milliLiter(s) IV Continuous <Continuous>  dextrose 5%. 1000 milliLiter(s) IV Continuous <Continuous>  dextrose 50% Injectable 25 Gram(s) IV Push once  dextrose 50% Injectable 12.5 Gram(s) IV Push once  dextrose 50% Injectable 25 Gram(s) IV Push once  dextrose Oral Gel 15 Gram(s) Oral once PRN  dronabinol 2.5 milliGRAM(s) Oral two times a day  gabapentin 300 milliGRAM(s) Oral two times a day  glucagon  Injectable 1 milliGRAM(s) IntraMuscular once  insulin glargine Injectable (LANTUS) 18 Unit(s) SubCutaneous at bedtime  insulin lispro (ADMELOG) corrective regimen sliding scale   SubCutaneous three times a day before meals  insulin lispro (ADMELOG) corrective regimen sliding scale   SubCutaneous at bedtime  insulin lispro Injectable (ADMELOG) 3 Unit(s) SubCutaneous three times a day before meals  lactobacillus acidophilus 1 Tablet(s) Oral two times a day with meals  loperamide 2 milliGRAM(s) Oral three times a day PRN  pantoprazole    Tablet 40 milliGRAM(s) Oral before breakfast  piperacillin/tazobactam IVPB.. 3.375 Gram(s) IV Intermittent every 8 hours  tamsulosin 0.4 milliGRAM(s) Oral at bedtime  vancomycin  IVPB 1250 milliGRAM(s) IV Intermittent every 24 hours      Review of Systems:  A 10-point review of systems was obtained.   Review of systems otherwise negative except as previously noted.    Allergies: No Known Allergies    For details regarding the patient's past medical history, social history, family history, and other miscellaneous elements, please refer the initial infectious diseases consultation and/or the admitting history and physical examination for this admission.    Objective:  Vitals:   T(C): 36.9 (10-12-23 @ 09:35), Max: 39.1 (10-11-23 @ 17:03)  HR: 60 (10-12-23 @ 11:13) (59 - 72)  BP: 64/28 (10-12-23 @ 11:13) (64/26 - 110/60)  RR: 18 (10-12-23 @ 06:13) (18 - 20)  SpO2: 97% (10-12-23 @ 06:13) (94% - 97%)    Physical Examination:  General: no acute distress  HEENT: NC/AT, EOMI,   Cardio: S1, S2 heard, RRR, no murmurs  Resp: decreased breath sounds   Abd: soft, NT, ND,  Ext: b/l feet in dressing  Skin: warm, dry, no visible rash      Laboratory Studies:  CBC:                       9.6    7.26  )-----------( 279      ( 12 Oct 2023 05:30 )             30.3     CMP: 10-12    139  |  111<H>  |  42<H>  ----------------------------<  216<H>  4.2   |  22  |  1.10    Ca    8.1<L>      12 Oct 2023 05:30  Phos  3.0     10-12  Mg     1.8     10-12    TPro  6.1  /  Alb  1.9<L>  /  TBili  0.4  /  DBili  x   /  AST  18  /  ALT  12  /  AlkPhos  88  10-10    LIVER FUNCTIONS - ( 10 Oct 2023 18:25 )  Alb: 1.9 g/dL / Pro: 6.1 g/dL / ALK PHOS: 88 U/L / ALT: 12 U/L / AST: 18 U/L / GGT: x           Urinalysis Basic - ( 12 Oct 2023 05:30 )    Color: x / Appearance: x / SG: x / pH: x  Gluc: 216 mg/dL / Ketone: x  / Bili: x / Urobili: x   Blood: x / Protein: x / Nitrite: x   Leuk Esterase: x / RBC: x / WBC x   Sq Epi: x / Non Sq Epi: x / Bacteria: x        Microbiology: reviewed    Culture - Urine (collected 10-10-23 @ 21:00)  Source: Catheterized Catheterized  Preliminary Report (10-12-23 @ 07:49):    Culture in progress    Culture - Blood (collected 10-10-23 @ 18:25)  Source: .Blood Blood-Peripheral  Gram Stain (10-11-23 @ 21:56):    Growth in aerobic bottle: Gram Positive Cocci in Clusters    Growth in anaerobic bottle: Gram Positive Cocci in Clusters  Preliminary Report (10-12-23 @ 11:56):    Growth in anaerobic bottle: Staphylococcus aureus    See previous culture [id number]    Culture - Blood (collected 10-10-23 @ 18:25)  Source: .Blood Blood-Peripheral  Gram Stain (10-11-23 @ 15:29):    Growth in aerobic bottle: Gram Positive Cocci in Clusters    Growth in anaerobic bottle: Gram Positive Cocci in Clusters  Preliminary Report (10-12-23 @ 11:56):    Growth in aerobic and anaerobic bottles: Staphylococcus aureus    Direct identification is available within approximately 3-5    hours either by Blood Panel Multiplexed PCR or Direct    MALDI-TOF. Details: [email or web address]  Organism: Blood Culture PCR (10-11-23 @ 16:30)  Organism: Blood Culture PCR (10-11-23 @ 16:30)      Method Type: PCR      -  Methicillin resistant Staphylococcus aureus (MRSA): Detec          Radiology: reviewed

## 2023-10-12 NOTE — PROGRESS NOTE ADULT - SUBJECTIVE AND OBJECTIVE BOX
Interval History:  having central line placed    Chart reviewed and events noted;   Overnight events:    MEDICATIONS  (STANDING):  aspirin enteric coated 81 milliGRAM(s) Oral daily  bethanechol 25 milliGRAM(s) Oral two times a day  cefepime   IVPB 2000 milliGRAM(s) IV Intermittent every 8 hours  dextrose 5%. 1000 milliLiter(s) (50 mL/Hr) IV Continuous <Continuous>  dextrose 5%. 1000 milliLiter(s) (100 mL/Hr) IV Continuous <Continuous>  dextrose 50% Injectable 25 Gram(s) IV Push once  dextrose 50% Injectable 12.5 Gram(s) IV Push once  dextrose 50% Injectable 25 Gram(s) IV Push once  dronabinol 2.5 milliGRAM(s) Oral two times a day  gabapentin 300 milliGRAM(s) Oral two times a day  glucagon  Injectable 1 milliGRAM(s) IntraMuscular once  insulin glargine Injectable (LANTUS) 18 Unit(s) SubCutaneous at bedtime  insulin lispro (ADMELOG) corrective regimen sliding scale   SubCutaneous at bedtime  insulin lispro (ADMELOG) corrective regimen sliding scale   SubCutaneous three times a day before meals  insulin lispro Injectable (ADMELOG) 3 Unit(s) SubCutaneous three times a day before meals  lactated ringers. 1000 milliLiter(s) (100 mL/Hr) IV Continuous <Continuous>  lactobacillus acidophilus 1 Tablet(s) Oral two times a day with meals  midodrine. 10 milliGRAM(s) Oral three times a day  pantoprazole    Tablet 40 milliGRAM(s) Oral before breakfast  tamsulosin 0.4 milliGRAM(s) Oral at bedtime  vancomycin  IVPB 1250 milliGRAM(s) IV Intermittent every 24 hours    MEDICATIONS  (PRN):  acetaminophen     Tablet .. 650 milliGRAM(s) Oral every 6 hours PRN Temp greater or equal to 38C (100.4F), Moderate Pain (4 - 6)  dextrose Oral Gel 15 Gram(s) Oral once PRN Blood Glucose LESS THAN 70 milliGRAM(s)/deciliter  loperamide 2 milliGRAM(s) Oral three times a day PRN Diarrhea      Vital Signs Last 24 Hrs  T(C): 37.8 (12 Oct 2023 12:19), Max: 37.8 (12 Oct 2023 12:19)  T(F): 100 (12 Oct 2023 12:19), Max: 100 (12 Oct 2023 12:19)  HR: 59 (12 Oct 2023 17:02) (59 - 61)  BP: 104/55 (12 Oct 2023 17:02) (64/24 - 104/55)  BP(mean): --  RR: 18 (12 Oct 2023 17:02) (17 - 20)  SpO2: 99% (12 Oct 2023 17:02) (95% - 99%)    Parameters below as of 12 Oct 2023 17:02  Patient On (Oxygen Delivery Method): room air        PHYSICAL EXAM  General: adult in NAD, chronically ill appearing  HEENT: clear oropharynx, anicteric sclera, pink conjunctivae  Neck: supple  CV: normal S1S2 with no murmur rubs or gallops  Lungs: clear to auscultation, no wheezes, no rhales  Abdomen: soft non-tender non-distended, no hepato/splenomegaly  Ext: no clubbing cyanosis or edema  Skin: no rashes and no petichiae. bandages not removed  Neuro: alert and oriented X3 no focal deficits      LABS:  CBC Full  -  ( 12 Oct 2023 05:30 )  WBC Count : 7.26 K/uL  RBC Count : 3.73 M/uL  Hemoglobin : 9.6 g/dL  Hematocrit : 30.3 %  Platelet Count - Automated : 279 K/uL  Mean Cell Volume : 81.2 fl  Mean Cell Hemoglobin : 25.7 pg  Mean Cell Hemoglobin Concentration : 31.7 gm/dL  Auto Neutrophil # : x  Auto Lymphocyte # : x  Auto Monocyte # : x  Auto Eosinophil # : x  Auto Basophil # : x  Auto Neutrophil % : x  Auto Lymphocyte % : x  Auto Monocyte % : x  Auto Eosinophil % : x  Auto Basophil % : x    10-12    139  |  111<H>  |  42<H>  ----------------------------<  216<H>  4.2   |  22  |  1.10    Ca    8.1<L>      12 Oct 2023 05:30  Phos  3.0     10-12  Mg     1.8     10-12          fe studies      WBC trend  7.26 K/uL (10-12-23 @ 05:30)  9.99 K/uL (10-11-23 @ 04:09)  11.08 K/uL (10-10-23 @ 18:25)      Hgb trend  9.6 g/dL (10-12-23 @ 05:30)  7.9 g/dL (10-11-23 @ 04:09)  6.6 g/dL (10-10-23 @ 18:25)      plt trend  279 K/uL (10-12-23 @ 05:30)  233 K/uL (10-11-23 @ 04:09)  228 K/uL (10-10-23 @ 18:25)        RADIOLOGY & ADDITIONAL STUDIES:

## 2023-10-12 NOTE — CONSULT NOTE ADULT - CRITICAL CARE ATTENDING COMMENT
Upon my evaluation, this patient is at high risk for imminent or life threatening deterioration due to septic shock and other active medical issues which require my direct attention, intervention, and personal management.  I have personally spent >30 minutes  of critical care time exclusive of time spent on separate billing procedures. This includes review of laboratory data, radiology results, discussion with primary team\patient, and monitoring for potential decompensation. Interventions were performed as documented above.

## 2023-10-12 NOTE — CHART NOTE - NSCHARTNOTEFT_GEN_A_CORE
Contacted by clinical pharmacy specialist, infectious disease, for vancomycin dosing modification given vanco trough level low with low predicted AUC. Vancomycin dosing modified to 1500mg q24hrs. Renal function noted. See pharmacy intervention note for more details.

## 2023-10-12 NOTE — PROGRESS NOTE ADULT - SUBJECTIVE AND OBJECTIVE BOX
[facility]  Les Mccray PA-C  [address] [place] [address]  [phone]      INTERVAL HPI/OVERNIGHT EVENTS:  Pt s/e  Tolerating diet  Otherwise no new GI events    MEDICATIONS  (STANDING):  aspirin enteric coated 81 milliGRAM(s) Oral daily  bethanechol 25 milliGRAM(s) Oral two times a day  dextrose 5%. 1000 milliLiter(s) (50 mL/Hr) IV Continuous <Continuous>  dextrose 5%. 1000 milliLiter(s) (100 mL/Hr) IV Continuous <Continuous>  dextrose 50% Injectable 25 Gram(s) IV Push once  dextrose 50% Injectable 12.5 Gram(s) IV Push once  dextrose 50% Injectable 25 Gram(s) IV Push once  dronabinol 2.5 milliGRAM(s) Oral two times a day  gabapentin 300 milliGRAM(s) Oral two times a day  glucagon  Injectable 1 milliGRAM(s) IntraMuscular once  insulin glargine Injectable (LANTUS) 18 Unit(s) SubCutaneous at bedtime  insulin lispro (ADMELOG) corrective regimen sliding scale   SubCutaneous three times a day before meals  insulin lispro (ADMELOG) corrective regimen sliding scale   SubCutaneous at bedtime  insulin lispro Injectable (ADMELOG) 3 Unit(s) SubCutaneous three times a day before meals  lactobacillus acidophilus 1 Tablet(s) Oral two times a day with meals  pantoprazole    Tablet 40 milliGRAM(s) Oral before breakfast  piperacillin/tazobactam IVPB.. 3.375 Gram(s) IV Intermittent every 8 hours  tamsulosin 0.4 milliGRAM(s) Oral at bedtime  vancomycin  IVPB 1250 milliGRAM(s) IV Intermittent every 24 hours    MEDICATIONS  (PRN):  acetaminophen     Tablet .. 650 milliGRAM(s) Oral every 6 hours PRN Temp greater or equal to 38C (100.4F), Moderate Pain (4 - 6)  dextrose Oral Gel 15 Gram(s) Oral once PRN Blood Glucose LESS THAN 70 milliGRAM(s)/deciliter  loperamide 2 milliGRAM(s) Oral three times a day PRN Diarrhea      Allergies  No Known Allergies      PHYSICAL EXAM:   Vital Signs:  Vital Signs Last 24 Hrs  T(C): 36.9 (12 Oct 2023 09:35), Max: 39.1 (11 Oct 2023 17:03)  T(F): 98.5 (12 Oct 2023 09:35), Max: 102.4 (11 Oct 2023 17:03)  HR: 60 (12 Oct 2023 11:13) (59 - 72)  BP: 64/28 (12 Oct 2023 11:13) (64/26 - 110/60)  BP(mean): --  RR: 18 (12 Oct 2023 06:13) (18 - 20)  SpO2: 97% (12 Oct 2023 06:13) (94% - 97%)    Parameters below as of 12 Oct 2023 06:13  Patient On (Oxygen Delivery Method): room air      GENERAL:  Appears stated age  HEENT:  NC/AT  CHEST:  Full & symmetric excursion  HEART:  Regular rhythm  ABDOMEN:  Soft, non-tender, non-distended  EXTEREMITIES:  no cyanosis  SKIN:  No rash  NEURO:  Alert      LABS:                        9.6    7.26  )-----------( 279      ( 12 Oct 2023 05:30 )             30.3     10-12    139  |  111<H>  |  42<H>  ----------------------------<  216<H>  4.2   |  22  |  1.10    Ca    8.1<L>      12 Oct 2023 05:30  Phos  3.0     10-12  Mg     1.8     10-12    TPro  6.1  /  Alb  1.9<L>  /  TBili  0.4  /  DBili  x   /  AST  18  /  ALT  12  /  AlkPhos  88  10-10    PT/INR - ( 10 Oct 2023 18:25 )   PT: 18.6 sec;   INR: 1.61 ratio         PTT - ( 10 Oct 2023 18:25 )  PTT:30.0 sec  Urinalysis Basic - ( 12 Oct 2023 05:30 )    Color: x / Appearance: x / SG: x / pH: x  Gluc: 216 mg/dL / Ketone: x  / Bili: x / Urobili: x   Blood: x / Protein: x / Nitrite: x   Leuk Esterase: x / RBC: x / WBC x   Sq Epi: x / Non Sq Epi: x / Bacteria: x   [facility]  Les Mccray PA-C  [address] [place] [address]  [phone]      INTERVAL HPI/OVERNIGHT EVENTS:  Pt s/e  Tolerating diet  Otherwise no new GI events    MEDICATIONS  (STANDING):  aspirin enteric coated 81 milliGRAM(s) Oral daily  bethanechol 25 milliGRAM(s) Oral two times a day  dextrose 5%. 1000 milliLiter(s) (50 mL/Hr) IV Continuous <Continuous>  dextrose 5%. 1000 milliLiter(s) (100 mL/Hr) IV Continuous <Continuous>  dextrose 50% Injectable 25 Gram(s) IV Push once  dextrose 50% Injectable 12.5 Gram(s) IV Push once  dextrose 50% Injectable 25 Gram(s) IV Push once  dronabinol 2.5 milliGRAM(s) Oral two times a day  gabapentin 300 milliGRAM(s) Oral two times a day  glucagon  Injectable 1 milliGRAM(s) IntraMuscular once  insulin glargine Injectable (LANTUS) 18 Unit(s) SubCutaneous at bedtime  insulin lispro (ADMELOG) corrective regimen sliding scale   SubCutaneous three times a day before meals  insulin lispro (ADMELOG) corrective regimen sliding scale   SubCutaneous at bedtime  insulin lispro Injectable (ADMELOG) 3 Unit(s) SubCutaneous three times a day before meals  lactobacillus acidophilus 1 Tablet(s) Oral two times a day with meals  pantoprazole    Tablet 40 milliGRAM(s) Oral before breakfast  piperacillin/tazobactam IVPB.. 3.375 Gram(s) IV Intermittent every 8 hours  tamsulosin 0.4 milliGRAM(s) Oral at bedtime  vancomycin  IVPB 1250 milliGRAM(s) IV Intermittent every 24 hours    MEDICATIONS  (PRN):  acetaminophen     Tablet .. 650 milliGRAM(s) Oral every 6 hours PRN Temp greater or equal to 38C (100.4F), Moderate Pain (4 - 6)  dextrose Oral Gel 15 Gram(s) Oral once PRN Blood Glucose LESS THAN 70 milliGRAM(s)/deciliter  loperamide 2 milliGRAM(s) Oral three times a day PRN Diarrhea      Allergies  No Known Allergies      PHYSICAL EXAM:   Vital Signs:  Vital Signs Last 24 Hrs  T(C): 36.9 (12 Oct 2023 09:35), Max: 39.1 (11 Oct 2023 17:03)  T(F): 98.5 (12 Oct 2023 09:35), Max: 102.4 (11 Oct 2023 17:03)  HR: 60 (12 Oct 2023 11:13) (59 - 72)  BP: 64/28 (12 Oct 2023 11:13) (64/26 - 110/60)  BP(mean): --  RR: 18 (12 Oct 2023 06:13) (18 - 20)  SpO2: 97% (12 Oct 2023 06:13) (94% - 97%)    Parameters below as of 12 Oct 2023 06:13  Patient On (Oxygen Delivery Method): room air      GENERAL:  Appears stated age  HEENT:  NC/AT  CHEST:  Full & symmetric excursion  HEART:  Regular rhythm  ABDOMEN:  Soft, non-tender, non-distended  EXTEREMITIES:  no cyanosis  SKIN:  No rash  NEURO:  Alert      LABS:                        9.6    7.26  )-----------( 279      ( 12 Oct 2023 05:30 )             30.3     10-12    139  |  111<H>  |  42<H>  ----------------------------<  216<H>  4.2   |  22  |  1.10    Ca    8.1<L>      12 Oct 2023 05:30  Phos  3.0     10-12  Mg     1.8     10-12    TPro  6.1  /  Alb  1.9<L>  /  TBili  0.4  /  DBili  x   /  AST  18  /  ALT  12  /  AlkPhos  88  10-10    PT/INR - ( 10 Oct 2023 18:25 )   PT: 18.6 sec;   INR: 1.61 ratio         PTT - ( 10 Oct 2023 18:25 )  PTT:30.0 sec  Urinalysis Basic - ( 12 Oct 2023 05:30 )    Color: x / Appearance: x / SG: x / pH: x  Gluc: 216 mg/dL / Ketone: x  / Bili: x / Urobili: x   Blood: x / Protein: x / Nitrite: x   Leuk Esterase: x / RBC: x / WBC x   Sq Epi: x / Non Sq Epi: x / Bacteria: x   [facility]  Les Mccray PA-C  [address] [place] [address]  [phone]      INTERVAL HPI/OVERNIGHT EVENTS:  Pt s/e  Tolerating diet  Otherwise no new GI events    MEDICATIONS  (STANDING):  aspirin enteric coated 81 milliGRAM(s) Oral daily  bethanechol 25 milliGRAM(s) Oral two times a day  dextrose 5%. 1000 milliLiter(s) (50 mL/Hr) IV Continuous <Continuous>  dextrose 5%. 1000 milliLiter(s) (100 mL/Hr) IV Continuous <Continuous>  dextrose 50% Injectable 25 Gram(s) IV Push once  dextrose 50% Injectable 12.5 Gram(s) IV Push once  dextrose 50% Injectable 25 Gram(s) IV Push once  dronabinol 2.5 milliGRAM(s) Oral two times a day  gabapentin 300 milliGRAM(s) Oral two times a day  glucagon  Injectable 1 milliGRAM(s) IntraMuscular once  insulin glargine Injectable (LANTUS) 18 Unit(s) SubCutaneous at bedtime  insulin lispro (ADMELOG) corrective regimen sliding scale   SubCutaneous three times a day before meals  insulin lispro (ADMELOG) corrective regimen sliding scale   SubCutaneous at bedtime  insulin lispro Injectable (ADMELOG) 3 Unit(s) SubCutaneous three times a day before meals  lactobacillus acidophilus 1 Tablet(s) Oral two times a day with meals  pantoprazole    Tablet 40 milliGRAM(s) Oral before breakfast  piperacillin/tazobactam IVPB.. 3.375 Gram(s) IV Intermittent every 8 hours  tamsulosin 0.4 milliGRAM(s) Oral at bedtime  vancomycin  IVPB 1250 milliGRAM(s) IV Intermittent every 24 hours    MEDICATIONS  (PRN):  acetaminophen     Tablet .. 650 milliGRAM(s) Oral every 6 hours PRN Temp greater or equal to 38C (100.4F), Moderate Pain (4 - 6)  dextrose Oral Gel 15 Gram(s) Oral once PRN Blood Glucose LESS THAN 70 milliGRAM(s)/deciliter  loperamide 2 milliGRAM(s) Oral three times a day PRN Diarrhea      Allergies  No Known Allergies      PHYSICAL EXAM:   Vital Signs:  Vital Signs Last 24 Hrs  T(C): 36.9 (12 Oct 2023 09:35), Max: 39.1 (11 Oct 2023 17:03)  T(F): 98.5 (12 Oct 2023 09:35), Max: 102.4 (11 Oct 2023 17:03)  HR: 60 (12 Oct 2023 11:13) (59 - 72)  BP: 64/28 (12 Oct 2023 11:13) (64/26 - 110/60)  BP(mean): --  RR: 18 (12 Oct 2023 06:13) (18 - 20)  SpO2: 97% (12 Oct 2023 06:13) (94% - 97%)    Parameters below as of 12 Oct 2023 06:13  Patient On (Oxygen Delivery Method): room air      GENERAL:  Appears stated age  HEENT:  NC/AT  CHEST:  Full & symmetric excursion  HEART:  Regular rhythm  ABDOMEN:  Soft, non-tender, non-distended  EXTEREMITIES:  no cyanosis  SKIN:  No rash  NEURO:  Alert      LABS:                        9.6    7.26  )-----------( 279      ( 12 Oct 2023 05:30 )             30.3     10-12    139  |  111<H>  |  42<H>  ----------------------------<  216<H>  4.2   |  22  |  1.10    Ca    8.1<L>      12 Oct 2023 05:30  Phos  3.0     10-12  Mg     1.8     10-12    TPro  6.1  /  Alb  1.9<L>  /  TBili  0.4  /  DBili  x   /  AST  18  /  ALT  12  /  AlkPhos  88  10-10    PT/INR - ( 10 Oct 2023 18:25 )   PT: 18.6 sec;   INR: 1.61 ratio         PTT - ( 10 Oct 2023 18:25 )  PTT:30.0 sec  Urinalysis Basic - ( 12 Oct 2023 05:30 )    Color: x / Appearance: x / SG: x / pH: x  Gluc: 216 mg/dL / Ketone: x  / Bili: x / Urobili: x   Blood: x / Protein: x / Nitrite: x   Leuk Esterase: x / RBC: x / WBC x   Sq Epi: x / Non Sq Epi: x / Bacteria: x

## 2023-10-12 NOTE — PROCEDURE NOTE - ADDITIONAL PROCEDURE DETAILS
78 yr old male with PMHx HTN, DM, BPH, PPM, bedbound, recent 40to50 lbs wt lost, with sepsis secondary to methicillin resistant MRSA, requiring emergent venous access for IVF, medication

## 2023-10-12 NOTE — CONSULT NOTE ADULT - SUBJECTIVE AND OBJECTIVE BOX
CHIEF COMPLAINT:    HPI:      78 yr old male with PMHx DM on insulin therapy, PPM, BPH, chronic bilat heel wounds, s/p ORIF of Lt Hip bedbound, 40 to 50 lbs. wt loss over past month secondary to poor p.o intake who originally presented to E.D. evening of 10/10/23 from home after developing fever with Tmax of 38.3, burning dysuria. Pt found to have ARMANI with sCr of 1.1-1.2 (unknown baseline), Acute blood loss anemia with initial Hgb of 6.6 (now improved to 9.6), NAGMA with serum HCO3 of 20 (now improved).  Pt received CT A/P/C/H 10/10/23 that demonstrated small Rt, minimal Lt pleural effusions, no acute abd pathology, cholelithiasis, no acute cerebral changes. Course c/b findings of Methicillin resistant MRSA bacteremia (10/10/23), UTI, Urine Cx pending results (10/10/23), +UA with large leuk-est, neg nitrates, few bacteria (10/11/23) placed on  zosyn/vanco therapy, received 2 units PRBC total (last tx MN 10/12/23). Course further c/b development of HYPOtn with SBP of 64/28 - 92/57, initially responsive to IVF therapy. In addition pt found to have eleveated hsTrops of 1896->1608, felt to be due to demand ischemia, loss of IV access requiring placement of Rt IJ TLC by ICU.       Consult called for HYPOtn secondary to hypovolemia vs sepsis    As pt AOX3, SPO2 of 98% on Rm Air, SBP of 90/56, responsive to IVF therapy, currently no need for transfer to ICU. Please reconsult if we can be of further assistance with this patient      PAST MEDICAL & SURGICAL HISTORY:  Diabetes      Benign essential HTN      Pacemaker      History of BPH      Diabetic foot ulcers          FAMILY HISTORY:      SOCIAL HISTORY:  Smoking: [ ] Never Smoked [ ] Former Smoker (__ packs x ___ years) [ ] Current Smoker  (__ packs x ___ years)  Substance Use: [ ] Never Used [ ] Used ____  EtOH Use:  Marital Status: [ ] Single [ ]  [ ]  [ ]   Sexual History:   Occupation:  Recent Travel:  Country of Birth:  Advance Directives:    Allergies    No Known Allergies    Intolerances        HOME MEDICATIONS:    REVIEW OF SYSTEMS:    CONSTITUTIONAL:          + weakness, without fevers or chills  EYES/ENT:           No visual changes;  No vertigo or throat pain   NECK:            No pain or stiffness  RESPIRATORY:            No cough, wheezing, hemoptysis; No shortness of breath  CARDIOVASCULAR:            No chest pain or palpitations  GASTROINTESTINAL:           No abdominal or epigastric pain. No nausea, vomiting, or hematemesis; No diarrhea or constipation. No melena or hematochezia.  GENITOURINARY:            No dysuria, frequency or hematuria  NEUROLOGICAL:            No numbness or weakness  SKIN:            No pruritis , rashes            OBJECTIVE:  ICU Vital Signs Last 24 Hrs  T(C): 37.8 (12 Oct 2023 12:19), Max: 39.1 (11 Oct 2023 17:03)  T(F): 100 (12 Oct 2023 12:19), Max: 102.4 (11 Oct 2023 17:03)  HR: 61 (12 Oct 2023 12:19) (59 - 72)  BP: 88/42 (12 Oct 2023 12:19) (64/26 - 110/60)  BP(mean): --  ABP: --  ABP(mean): --  RR: 17 (12 Oct 2023 12:19) (17 - 20)  SpO2: 99% (12 Oct 2023 12:19) (94% - 99%)    O2 Parameters below as of 12 Oct 2023 12:19  Patient On (Oxygen Delivery Method): room air      VITAL SIGNS AT TIME OF CONSULT  BP: 90/56  ; HR: 64   ; RR: 22   ; SPO2: 99% (Rm Air)  ; Temp:     CAPILLARY BLOOD GLUCOSE      POCT Blood Glucose.: 162 mg/dL (12 Oct 2023 13:13)      PHYSICAL EXAM:  Constitutional: NAD, well-developed  HEENT NC/AT, moist mucous membranes  Pulmonary: Non-labored, breath sounds are clear bilaterally, no wheezing, rales or rhonchi  Cardiovascular: +S1, S2, RRR, I/VI sys murmur, peripheral pulses radial 1+/1+, DP/PT 1+/1+, digits cool to touch, with good cap refill < 3sec  Gastrointestinal: Soft, nontender, nondistended, normoactive bowel sounds  Extremities: No peripheral edema   Neurological: Alert, strength and sensitivity are grossly intact  Skin: No obvious lesions/rashes  Psych: Mood & affect appropriate  POCUS with good LVFx, IVC 1.5, slight variable       HOSPITAL MEDICATIONS:  MEDICATIONS  (STANDING):  aspirin enteric coated 81 milliGRAM(s) Oral daily  bethanechol 25 milliGRAM(s) Oral two times a day  dextrose 5%. 1000 milliLiter(s) (50 mL/Hr) IV Continuous <Continuous>  dextrose 5%. 1000 milliLiter(s) (100 mL/Hr) IV Continuous <Continuous>  dextrose 50% Injectable 25 Gram(s) IV Push once  dextrose 50% Injectable 12.5 Gram(s) IV Push once  dextrose 50% Injectable 25 Gram(s) IV Push once  dronabinol 2.5 milliGRAM(s) Oral two times a day  gabapentin 300 milliGRAM(s) Oral two times a day  glucagon  Injectable 1 milliGRAM(s) IntraMuscular once  insulin glargine Injectable (LANTUS) 18 Unit(s) SubCutaneous at bedtime  insulin lispro (ADMELOG) corrective regimen sliding scale   SubCutaneous three times a day before meals  insulin lispro (ADMELOG) corrective regimen sliding scale   SubCutaneous at bedtime  insulin lispro Injectable (ADMELOG) 3 Unit(s) SubCutaneous three times a day before meals  lactobacillus acidophilus 1 Tablet(s) Oral two times a day with meals  midodrine. 20 milliGRAM(s) Oral once  midodrine. 10 milliGRAM(s) Oral three times a day  pantoprazole    Tablet 40 milliGRAM(s) Oral before breakfast  piperacillin/tazobactam IVPB.. 3.375 Gram(s) IV Intermittent every 8 hours  tamsulosin 0.4 milliGRAM(s) Oral at bedtime  vancomycin  IVPB 1250 milliGRAM(s) IV Intermittent every 24 hours    MEDICATIONS  (PRN):  acetaminophen     Tablet .. 650 milliGRAM(s) Oral every 6 hours PRN Temp greater or equal to 38C (100.4F), Moderate Pain (4 - 6)  dextrose Oral Gel 15 Gram(s) Oral once PRN Blood Glucose LESS THAN 70 milliGRAM(s)/deciliter  loperamide 2 milliGRAM(s) Oral three times a day PRN Diarrhea      LABS:                        9.6    7.26  )-----------( 279      ( 12 Oct 2023 05:30 )             30.3     Hgb Trend: 9.6<--, 7.9<--, 6.6<--  10-12    139  |  111<H>  |  42<H>  ----------------------------<  216<H>  4.2   |  22  |  1.10    Ca    8.1<L>      12 Oct 2023 05:30  Phos  3.0     10-12  Mg     1.8     10-12    TPro  6.1  /  Alb  1.9<L>  /  TBili  0.4  /  DBili  x   /  AST  18  /  ALT  12  /  AlkPhos  88  10-10    Creatinine Trend: 1.10<--, 1.20<--, 1.20<--  PT/INR - ( 10 Oct 2023 18:25 )   PT: 18.6 sec;   INR: 1.61 ratio         PTT - ( 10 Oct 2023 18:25 )  PTT:30.0 sec  Urinalysis Basic - ( 12 Oct 2023 05:30 )    Color: x / Appearance: x / SG: x / pH: x  Gluc: 216 mg/dL / Ketone: x  / Bili: x / Urobili: x   Blood: x / Protein: x / Nitrite: x   Leuk Esterase: x / RBC: x / WBC x   Sq Epi: x / Non Sq Epi: x / Bacteria: x            MICROBIOLOGY:     RADIOLOGY:  [ ] Reviewed and interpreted by me    EKG:        Kye ANP-BC (ext. [id number])           CHIEF COMPLAINT:    HPI:      78 yr old male with PMHx DM on insulin therapy, PPM, BPH, chronic bilat heel wounds, s/p ORIF of Lt Hip bedbound, 40 to 50 lbs. wt loss over past month secondary to poor p.o intake who originally presented to E.D. evening of 10/10/23 from home after developing fever with Tmax of 38.3, burning dysuria. Pt found to have ARMANI with sCr of 1.1-1.2 (unknown baseline), Acute blood loss anemia with initial Hgb of 6.6 (now improved to 9.6), NAGMA with serum HCO3 of 20 (now improved).  Pt received CT A/P/C/H 10/10/23 that demonstrated small Rt, minimal Lt pleural effusions, no acute abd pathology, cholelithiasis, no acute cerebral changes. Course c/b findings of Methicillin resistant MRSA bacteremia (10/10/23), UTI, Urine Cx pending results (10/10/23), +UA with large leuk-est, neg nitrates, few bacteria (10/11/23) placed on  zosyn/vanco therapy, received 2 units PRBC total (last tx MN 10/12/23). Course further c/b development of HYPOtn with SBP of 64/28 - 92/57, initially responsive to IVF therapy. In addition pt found to have eleveated hsTrops of 1896->1608, felt to be due to demand ischemia, loss of IV access requiring placement of Rt IJ TLC by ICU.       Consult called for HYPOtn secondary to hypovolemia vs sepsis    As pt AOX3, SPO2 of 98% on Rm Air, SBP of 90/56, responsive to IVF therapy, currently no need for transfer to ICU. Please reconsult if we can be of further assistance with this patient      PAST MEDICAL & SURGICAL HISTORY:  Diabetes      Benign essential HTN      Pacemaker      History of BPH      Diabetic foot ulcers          FAMILY HISTORY:      SOCIAL HISTORY:  Smoking: [ ] Never Smoked [ ] Former Smoker (__ packs x ___ years) [ ] Current Smoker  (__ packs x ___ years)  Substance Use: [ ] Never Used [ ] Used ____  EtOH Use:  Marital Status: [ ] Single [ ]  [ ]  [ ]   Sexual History:   Occupation:  Recent Travel:  Country of Birth:  Advance Directives:    Allergies    No Known Allergies    Intolerances        HOME MEDICATIONS:    REVIEW OF SYSTEMS:    CONSTITUTIONAL:          + weakness, without fevers or chills  EYES/ENT:           No visual changes;  No vertigo or throat pain   NECK:            No pain or stiffness  RESPIRATORY:            No cough, wheezing, hemoptysis; No shortness of breath  CARDIOVASCULAR:            No chest pain or palpitations  GASTROINTESTINAL:           No abdominal or epigastric pain. No nausea, vomiting, or hematemesis; No diarrhea or constipation. No melena or hematochezia.  GENITOURINARY:            No dysuria, frequency or hematuria  NEUROLOGICAL:            No numbness or weakness  SKIN:            No pruritis , rashes            OBJECTIVE:  ICU Vital Signs Last 24 Hrs  T(C): 37.8 (12 Oct 2023 12:19), Max: 39.1 (11 Oct 2023 17:03)  T(F): 100 (12 Oct 2023 12:19), Max: 102.4 (11 Oct 2023 17:03)  HR: 61 (12 Oct 2023 12:19) (59 - 72)  BP: 88/42 (12 Oct 2023 12:19) (64/26 - 110/60)  BP(mean): --  ABP: --  ABP(mean): --  RR: 17 (12 Oct 2023 12:19) (17 - 20)  SpO2: 99% (12 Oct 2023 12:19) (94% - 99%)    O2 Parameters below as of 12 Oct 2023 12:19  Patient On (Oxygen Delivery Method): room air      VITAL SIGNS AT TIME OF CONSULT  BP: 90/56  ; HR: 64   ; RR: 22   ; SPO2: 99% (Rm Air)  ; Temp:     CAPILLARY BLOOD GLUCOSE      POCT Blood Glucose.: 162 mg/dL (12 Oct 2023 13:13)      PHYSICAL EXAM:  Constitutional: NAD, well-developed  HEENT NC/AT, moist mucous membranes  Pulmonary: Non-labored, breath sounds are clear bilaterally, no wheezing, rales or rhonchi  Cardiovascular: +S1, S2, RRR, I/VI sys murmur, peripheral pulses radial 1+/1+, DP/PT 1+/1+, digits cool to touch, with good cap refill < 3sec  Gastrointestinal: Soft, nontender, nondistended, normoactive bowel sounds  Extremities: No peripheral edema   Neurological: Alert, strength and sensitivity are grossly intact  Skin: No obvious lesions/rashes  Psych: Mood & affect appropriate  POCUS with good LVFx, IVC 1.5, slight variable       HOSPITAL MEDICATIONS:  MEDICATIONS  (STANDING):  aspirin enteric coated 81 milliGRAM(s) Oral daily  bethanechol 25 milliGRAM(s) Oral two times a day  dextrose 5%. 1000 milliLiter(s) (50 mL/Hr) IV Continuous <Continuous>  dextrose 5%. 1000 milliLiter(s) (100 mL/Hr) IV Continuous <Continuous>  dextrose 50% Injectable 25 Gram(s) IV Push once  dextrose 50% Injectable 12.5 Gram(s) IV Push once  dextrose 50% Injectable 25 Gram(s) IV Push once  dronabinol 2.5 milliGRAM(s) Oral two times a day  gabapentin 300 milliGRAM(s) Oral two times a day  glucagon  Injectable 1 milliGRAM(s) IntraMuscular once  insulin glargine Injectable (LANTUS) 18 Unit(s) SubCutaneous at bedtime  insulin lispro (ADMELOG) corrective regimen sliding scale   SubCutaneous three times a day before meals  insulin lispro (ADMELOG) corrective regimen sliding scale   SubCutaneous at bedtime  insulin lispro Injectable (ADMELOG) 3 Unit(s) SubCutaneous three times a day before meals  lactobacillus acidophilus 1 Tablet(s) Oral two times a day with meals  midodrine. 20 milliGRAM(s) Oral once  midodrine. 10 milliGRAM(s) Oral three times a day  pantoprazole    Tablet 40 milliGRAM(s) Oral before breakfast  piperacillin/tazobactam IVPB.. 3.375 Gram(s) IV Intermittent every 8 hours  tamsulosin 0.4 milliGRAM(s) Oral at bedtime  vancomycin  IVPB 1250 milliGRAM(s) IV Intermittent every 24 hours    MEDICATIONS  (PRN):  acetaminophen     Tablet .. 650 milliGRAM(s) Oral every 6 hours PRN Temp greater or equal to 38C (100.4F), Moderate Pain (4 - 6)  dextrose Oral Gel 15 Gram(s) Oral once PRN Blood Glucose LESS THAN 70 milliGRAM(s)/deciliter  loperamide 2 milliGRAM(s) Oral three times a day PRN Diarrhea      LABS:                        9.6    7.26  )-----------( 279      ( 12 Oct 2023 05:30 )             30.3     Hgb Trend: 9.6<--, 7.9<--, 6.6<--  10-12    139  |  111<H>  |  42<H>  ----------------------------<  216<H>  4.2   |  22  |  1.10    Ca    8.1<L>      12 Oct 2023 05:30  Phos  3.0     10-12  Mg     1.8     10-12    TPro  6.1  /  Alb  1.9<L>  /  TBili  0.4  /  DBili  x   /  AST  18  /  ALT  12  /  AlkPhos  88  10-10    Creatinine Trend: 1.10<--, 1.20<--, 1.20<--  PT/INR - ( 10 Oct 2023 18:25 )   PT: 18.6 sec;   INR: 1.61 ratio         PTT - ( 10 Oct 2023 18:25 )  PTT:30.0 sec  Urinalysis Basic - ( 12 Oct 2023 05:30 )    Color: x / Appearance: x / SG: x / pH: x  Gluc: 216 mg/dL / Ketone: x  / Bili: x / Urobili: x   Blood: x / Protein: x / Nitrite: x   Leuk Esterase: x / RBC: x / WBC x   Sq Epi: x / Non Sq Epi: x / Bacteria: x            MICROBIOLOGY:     RADIOLOGY:  [ ] Reviewed and interpreted by me    EKG:        Kye ANP-BC (ext. [id number])           CHIEF COMPLAINT:    HPI:      78 yr old male with PMHx DM on insulin therapy, PPM, BPH, chronic bilat heel wounds, s/p ORIF of Lt Hip bedbound, 40 to 50 lbs. wt loss over past month secondary to poor p.o intake who originally presented to E.D. evening of 10/10/23 from home after developing fever with Tmax of 38.3, burning dysuria. Pt found to have ARMANI with sCr of 1.1-1.2 (unknown baseline), Acute blood loss anemia with initial Hgb of 6.6 (now improved to 9.6), NAGMA with serum HCO3 of 20 (now improved).  Pt received CT A/P/C/H 10/10/23 that demonstrated small Rt, minimal Lt pleural effusions, no acute abd pathology, cholelithiasis, no acute cerebral changes. Course c/b findings of Methicillin resistant MRSA bacteremia (10/10/23), UTI, Urine Cx pending results (10/10/23), +UA with large leuk-est, neg nitrates, few bacteria (10/11/23) placed on  zosyn/vanco therapy, received 2 units PRBC total (last tx MN 10/12/23). Course further c/b development of HYPOtn with SBP of 64/28 - 92/57, initially responsive to IVF therapy. In addition pt found to have eleveated hsTrops of 1896->1608, felt to be due to demand ischemia, loss of IV access requiring placement of Rt IJ TLC by ICU.       Consult called for HYPOtn secondary to hypovolemia vs sepsis    As pt AOX3, SPO2 of 98% on Rm Air, SBP of 90/56, responsive to IVF therapy, currently no need for transfer to ICU. Please reconsult if we can be of further assistance with this patient      PAST MEDICAL & SURGICAL HISTORY:  Diabetes      Benign essential HTN      Pacemaker      History of BPH      Diabetic foot ulcers          FAMILY HISTORY:      SOCIAL HISTORY:  Smoking: [ ] Never Smoked [ ] Former Smoker (__ packs x ___ years) [ ] Current Smoker  (__ packs x ___ years)  Substance Use: [ ] Never Used [ ] Used ____  EtOH Use:  Marital Status: [ ] Single [ ]  [ ]  [ ]   Sexual History:   Occupation:  Recent Travel:  Country of Birth:  Advance Directives:    Allergies    No Known Allergies    Intolerances        HOME MEDICATIONS:    REVIEW OF SYSTEMS:    CONSTITUTIONAL:          + weakness, without fevers or chills  EYES/ENT:           No visual changes;  No vertigo or throat pain   NECK:            No pain or stiffness  RESPIRATORY:            No cough, wheezing, hemoptysis; No shortness of breath  CARDIOVASCULAR:            No chest pain or palpitations  GASTROINTESTINAL:           No abdominal or epigastric pain. No nausea, vomiting, or hematemesis; No diarrhea or constipation. No melena or hematochezia.  GENITOURINARY:            No dysuria, frequency or hematuria  NEUROLOGICAL:            No numbness or weakness  SKIN:            No pruritis , rashes            OBJECTIVE:  ICU Vital Signs Last 24 Hrs  T(C): 37.8 (12 Oct 2023 12:19), Max: 39.1 (11 Oct 2023 17:03)  T(F): 100 (12 Oct 2023 12:19), Max: 102.4 (11 Oct 2023 17:03)  HR: 61 (12 Oct 2023 12:19) (59 - 72)  BP: 88/42 (12 Oct 2023 12:19) (64/26 - 110/60)  BP(mean): --  ABP: --  ABP(mean): --  RR: 17 (12 Oct 2023 12:19) (17 - 20)  SpO2: 99% (12 Oct 2023 12:19) (94% - 99%)    O2 Parameters below as of 12 Oct 2023 12:19  Patient On (Oxygen Delivery Method): room air      VITAL SIGNS AT TIME OF CONSULT  BP: 90/56  ; HR: 64   ; RR: 22   ; SPO2: 99% (Rm Air)  ; Temp:     CAPILLARY BLOOD GLUCOSE      POCT Blood Glucose.: 162 mg/dL (12 Oct 2023 13:13)      PHYSICAL EXAM:  Constitutional: NAD, well-developed  HEENT NC/AT, moist mucous membranes  Pulmonary: Non-labored, breath sounds are clear bilaterally, no wheezing, rales or rhonchi  Cardiovascular: +S1, S2, RRR, I/VI sys murmur, peripheral pulses radial 1+/1+, DP/PT 1+/1+, digits cool to touch, with good cap refill < 3sec  Gastrointestinal: Soft, nontender, nondistended, normoactive bowel sounds  Extremities: No peripheral edema   Neurological: Alert, strength and sensitivity are grossly intact  Skin: No obvious lesions/rashes  Psych: Mood & affect appropriate  POCUS with good LVFx, IVC 1.5, slight variable       HOSPITAL MEDICATIONS:  MEDICATIONS  (STANDING):  aspirin enteric coated 81 milliGRAM(s) Oral daily  bethanechol 25 milliGRAM(s) Oral two times a day  dextrose 5%. 1000 milliLiter(s) (50 mL/Hr) IV Continuous <Continuous>  dextrose 5%. 1000 milliLiter(s) (100 mL/Hr) IV Continuous <Continuous>  dextrose 50% Injectable 25 Gram(s) IV Push once  dextrose 50% Injectable 12.5 Gram(s) IV Push once  dextrose 50% Injectable 25 Gram(s) IV Push once  dronabinol 2.5 milliGRAM(s) Oral two times a day  gabapentin 300 milliGRAM(s) Oral two times a day  glucagon  Injectable 1 milliGRAM(s) IntraMuscular once  insulin glargine Injectable (LANTUS) 18 Unit(s) SubCutaneous at bedtime  insulin lispro (ADMELOG) corrective regimen sliding scale   SubCutaneous three times a day before meals  insulin lispro (ADMELOG) corrective regimen sliding scale   SubCutaneous at bedtime  insulin lispro Injectable (ADMELOG) 3 Unit(s) SubCutaneous three times a day before meals  lactobacillus acidophilus 1 Tablet(s) Oral two times a day with meals  midodrine. 20 milliGRAM(s) Oral once  midodrine. 10 milliGRAM(s) Oral three times a day  pantoprazole    Tablet 40 milliGRAM(s) Oral before breakfast  piperacillin/tazobactam IVPB.. 3.375 Gram(s) IV Intermittent every 8 hours  tamsulosin 0.4 milliGRAM(s) Oral at bedtime  vancomycin  IVPB 1250 milliGRAM(s) IV Intermittent every 24 hours    MEDICATIONS  (PRN):  acetaminophen     Tablet .. 650 milliGRAM(s) Oral every 6 hours PRN Temp greater or equal to 38C (100.4F), Moderate Pain (4 - 6)  dextrose Oral Gel 15 Gram(s) Oral once PRN Blood Glucose LESS THAN 70 milliGRAM(s)/deciliter  loperamide 2 milliGRAM(s) Oral three times a day PRN Diarrhea      LABS:                        9.6    7.26  )-----------( 279      ( 12 Oct 2023 05:30 )             30.3     Hgb Trend: 9.6<--, 7.9<--, 6.6<--  10-12    139  |  111<H>  |  42<H>  ----------------------------<  216<H>  4.2   |  22  |  1.10    Ca    8.1<L>      12 Oct 2023 05:30  Phos  3.0     10-12  Mg     1.8     10-12    TPro  6.1  /  Alb  1.9<L>  /  TBili  0.4  /  DBili  x   /  AST  18  /  ALT  12  /  AlkPhos  88  10-10    Creatinine Trend: 1.10<--, 1.20<--, 1.20<--  PT/INR - ( 10 Oct 2023 18:25 )   PT: 18.6 sec;   INR: 1.61 ratio         PTT - ( 10 Oct 2023 18:25 )  PTT:30.0 sec  Urinalysis Basic - ( 12 Oct 2023 05:30 )    Color: x / Appearance: x / SG: x / pH: x  Gluc: 216 mg/dL / Ketone: x  / Bili: x / Urobili: x   Blood: x / Protein: x / Nitrite: x   Leuk Esterase: x / RBC: x / WBC x   Sq Epi: x / Non Sq Epi: x / Bacteria: x            MICROBIOLOGY:     RADIOLOGY:  [ ] Reviewed and interpreted by me    EKG:        Kye ANP-BC (ext. [id number])           CHIEF COMPLAINT:    HPI:      78 yr old male with PMHx DM on insulin therapy, PPM, BPH, chronic bilat heel wounds, s/p ORIF of Lt Hip bedbound, 40 to 50 lbs. wt loss over past month secondary to poor p.o intake who originally presented to E.D. evening of 10/10/23 from home after developing fever with Tmax of 38.3, burning dysuria. Pt found to have ARMANI with sCr of 1.1-1.2 (unknown baseline), Acute blood loss anemia with initial Hgb of 6.6 (now improved to 9.6), NAGMA with serum HCO3 of 20 (now improved).  Pt received CT A/P/C/H 10/10/23 that demonstrated small Rt, minimal Lt pleural effusions, no acute abd pathology, cholelithiasis, no acute cerebral changes. Course c/b findings of Methicillin resistant MRSA bacteremia (10/10/23), UTI, Urine Cx pending results (10/10/23), +UA with large leuk-est, neg nitrates, few bacteria (10/11/23) placed on  zosyn/vanco therapy, received 2 units PRBC total (last tx MN 10/12/23). Course further c/b development of HYPOtn with SBP of 64/28 - 92/57, initially responsive to IVF therapy. In addition pt found to have eleveated hsTrops of 1896->1608, felt to be due to demand ischemia, loss of IV access requiring placement of Rt IJ TLC by ICU.       Consult called for HYPOtn secondary to hypovolemia vs sepsis    As pt AOX3, SPO2 of 98% on Rm Air, SBP of 90/56, responsive to IVF therapy, currently no need for transfer to ICU. Please reconsult if we can be of further assistance with this patient    [id number] ADD: Pt re assessed for HYPOtn. Blood pressure obtained from Rt Lower Extremity of 104/55, HR 62, RR 22 POCUS demonstrated A line predominant with few focal B lines in lower lung fields. slight/mild diminished LVFx, RV<LV, IVC 1.6 with slight variability. Able to lay flat without SOB, utilizing Rm Air.       Currently without need for ICU transfer. Please re-consult if we can be of further assistance wit this patient    PAST MEDICAL & SURGICAL HISTORY:  Diabetes      Benign essential HTN      Pacemaker      History of BPH      Diabetic foot ulcers          FAMILY HISTORY:      SOCIAL HISTORY:  Smoking: [ ] Never Smoked [ ] Former Smoker (__ packs x ___ years) [ ] Current Smoker  (__ packs x ___ years)  Substance Use: [ ] Never Used [ ] Used ____  EtOH Use:  Marital Status: [ ] Single [ ]  [ ]  [ ]   Sexual History:   Occupation:  Recent Travel:  Country of Birth:  Advance Directives:    Allergies    No Known Allergies    Intolerances        HOME MEDICATIONS:    REVIEW OF SYSTEMS:    CONSTITUTIONAL:          + weakness, without fevers or chills  EYES/ENT:           No visual changes;  No vertigo or throat pain   NECK:            No pain or stiffness  RESPIRATORY:            No cough, wheezing, hemoptysis; No shortness of breath  CARDIOVASCULAR:            No chest pain or palpitations  GASTROINTESTINAL:           No abdominal or epigastric pain. No nausea, vomiting, or hematemesis; No diarrhea or constipation. No melena or hematochezia.  GENITOURINARY:            No dysuria, frequency or hematuria  NEUROLOGICAL:            No numbness or weakness  SKIN:            No pruritis , rashes            OBJECTIVE:  ICU Vital Signs Last 24 Hrs  T(C): 37.8 (12 Oct 2023 12:19), Max: 39.1 (11 Oct 2023 17:03)  T(F): 100 (12 Oct 2023 12:19), Max: 102.4 (11 Oct 2023 17:03)  HR: 61 (12 Oct 2023 12:19) (59 - 72)  BP: 88/42 (12 Oct 2023 12:19) (64/26 - 110/60)  BP(mean): --  ABP: --  ABP(mean): --  RR: 17 (12 Oct 2023 12:19) (17 - 20)  SpO2: 99% (12 Oct 2023 12:19) (94% - 99%)    O2 Parameters below as of 12 Oct 2023 12:19  Patient On (Oxygen Delivery Method): room air      VITAL SIGNS AT TIME OF CONSULT  BP: 90/56  ; HR: 64   ; RR: 22   ; SPO2: 99% (Rm Air)  ; Temp:     CAPILLARY BLOOD GLUCOSE      POCT Blood Glucose.: 162 mg/dL (12 Oct 2023 13:13)      PHYSICAL EXAM:  Constitutional: NAD, well-developed  HEENT NC/AT, moist mucous membranes  Pulmonary: Non-labored, breath sounds are clear bilaterally, no wheezing, rales or rhonchi  Cardiovascular: +S1, S2, RRR, I/VI sys murmur, peripheral pulses radial 1+/1+, DP/PT 1+/1+, digits cool to touch, with good cap refill < 3sec  Gastrointestinal: Soft, nontender, nondistended, normoactive bowel sounds  Extremities: No peripheral edema   Neurological: Alert, strength and sensitivity are grossly intact  Skin: No obvious lesions/rashes  Psych: Mood & affect appropriate  POCUS with good LVFx, IVC 1.5, slight variable       HOSPITAL MEDICATIONS:  MEDICATIONS  (STANDING):  aspirin enteric coated 81 milliGRAM(s) Oral daily  bethanechol 25 milliGRAM(s) Oral two times a day  dextrose 5%. 1000 milliLiter(s) (50 mL/Hr) IV Continuous <Continuous>  dextrose 5%. 1000 milliLiter(s) (100 mL/Hr) IV Continuous <Continuous>  dextrose 50% Injectable 25 Gram(s) IV Push once  dextrose 50% Injectable 12.5 Gram(s) IV Push once  dextrose 50% Injectable 25 Gram(s) IV Push once  dronabinol 2.5 milliGRAM(s) Oral two times a day  gabapentin 300 milliGRAM(s) Oral two times a day  glucagon  Injectable 1 milliGRAM(s) IntraMuscular once  insulin glargine Injectable (LANTUS) 18 Unit(s) SubCutaneous at bedtime  insulin lispro (ADMELOG) corrective regimen sliding scale   SubCutaneous three times a day before meals  insulin lispro (ADMELOG) corrective regimen sliding scale   SubCutaneous at bedtime  insulin lispro Injectable (ADMELOG) 3 Unit(s) SubCutaneous three times a day before meals  lactobacillus acidophilus 1 Tablet(s) Oral two times a day with meals  midodrine. 20 milliGRAM(s) Oral once  midodrine. 10 milliGRAM(s) Oral three times a day  pantoprazole    Tablet 40 milliGRAM(s) Oral before breakfast  piperacillin/tazobactam IVPB.. 3.375 Gram(s) IV Intermittent every 8 hours  tamsulosin 0.4 milliGRAM(s) Oral at bedtime  vancomycin  IVPB 1250 milliGRAM(s) IV Intermittent every 24 hours    MEDICATIONS  (PRN):  acetaminophen     Tablet .. 650 milliGRAM(s) Oral every 6 hours PRN Temp greater or equal to 38C (100.4F), Moderate Pain (4 - 6)  dextrose Oral Gel 15 Gram(s) Oral once PRN Blood Glucose LESS THAN 70 milliGRAM(s)/deciliter  loperamide 2 milliGRAM(s) Oral three times a day PRN Diarrhea      LABS:                        9.6    7.26  )-----------( 279      ( 12 Oct 2023 05:30 )             30.3     Hgb Trend: 9.6<--, 7.9<--, 6.6<--  10-12    139  |  111<H>  |  42<H>  ----------------------------<  216<H>  4.2   |  22  |  1.10    Ca    8.1<L>      12 Oct 2023 05:30  Phos  3.0     10-12  Mg     1.8     10-12    TPro  6.1  /  Alb  1.9<L>  /  TBili  0.4  /  DBili  x   /  AST  18  /  ALT  12  /  AlkPhos  88  10-10    Creatinine Trend: 1.10<--, 1.20<--, 1.20<--  PT/INR - ( 10 Oct 2023 18:25 )   PT: 18.6 sec;   INR: 1.61 ratio         PTT - ( 10 Oct 2023 18:25 )  PTT:30.0 sec  Urinalysis Basic - ( 12 Oct 2023 05:30 )    Color: x / Appearance: x / SG: x / pH: x  Gluc: 216 mg/dL / Ketone: x  / Bili: x / Urobili: x   Blood: x / Protein: x / Nitrite: x   Leuk Esterase: x / RBC: x / WBC x   Sq Epi: x / Non Sq Epi: x / Bacteria: x            MICROBIOLOGY:     RADIOLOGY:  [ ] Reviewed and interpreted by me    EKG:        Kye ANP-BC (ext. [id number])           CHIEF COMPLAINT:    HPI:      78 yr old male with PMHx DM on insulin therapy, PPM, BPH, chronic bilat heel wounds, s/p ORIF of Lt Hip bedbound, 40 to 50 lbs. wt loss over past month secondary to poor p.o intake who originally presented to E.D. evening of 10/10/23 from home after developing fever with Tmax of 38.3, burning dysuria. Pt found to have ARMANI with sCr of 1.1-1.2 (unknown baseline), Acute blood loss anemia with initial Hgb of 6.6 (now improved to 9.6), NAGMA with serum HCO3 of 20 (now improved).  Pt received CT A/P/C/H 10/10/23 that demonstrated small Rt, minimal Lt pleural effusions, no acute abd pathology, cholelithiasis, no acute cerebral changes. Course c/b findings of Methicillin resistant MRSA bacteremia (10/10/23), UTI, Urine Cx pending results (10/10/23), +UA with large leuk-est, neg nitrates, few bacteria (10/11/23) placed on  zosyn/vanco therapy, received 2 units PRBC total (last tx MN 10/12/23). Course further c/b development of HYPOtn with SBP of 64/28 - 92/57, initially responsive to IVF therapy. In addition pt found to have eleveated hsTrops of 1896->1608, felt to be due to demand ischemia, loss of IV access requiring placement of Rt IJ TLC by ICU.       Consult called for HYPOtn secondary to hypovolemia vs sepsis    As pt AOX3, SPO2 of 98% on Rm Air, SBP of 90/56, responsive to IVF therapy, currently no need for transfer to ICU. Please reconsult if we can be of further assistance with this patient    3500 ADD: Pt re assessed for HYPOtn. Blood pressure obtained from Rt Lower Extremity of 104/55, HR 62, RR 22 POCUS demonstrated A line predominant with few focal B lines in lower lung fields. slight/mild diminished LVFx, RV<LV, IVC 1.6 with slight variability. Able to lay flat without SOB, utilizing Rm Air.       Currently without need for ICU transfer. Please re-consult if we can be of further assistance wit this patient    PAST MEDICAL & SURGICAL HISTORY:  Diabetes      Benign essential HTN      Pacemaker      History of BPH      Diabetic foot ulcers          FAMILY HISTORY:      SOCIAL HISTORY:  Smoking: [ ] Never Smoked [ ] Former Smoker (__ packs x ___ years) [ ] Current Smoker  (__ packs x ___ years)  Substance Use: [ ] Never Used [ ] Used ____  EtOH Use:  Marital Status: [ ] Single [ ]  [ ]  [ ]   Sexual History:   Occupation:  Recent Travel:  Country of Birth:  Advance Directives:    Allergies    No Known Allergies    Intolerances        HOME MEDICATIONS:    REVIEW OF SYSTEMS:    CONSTITUTIONAL:          + weakness, without fevers or chills  EYES/ENT:           No visual changes;  No vertigo or throat pain   NECK:            No pain or stiffness  RESPIRATORY:            No cough, wheezing, hemoptysis; No shortness of breath  CARDIOVASCULAR:            No chest pain or palpitations  GASTROINTESTINAL:           No abdominal or epigastric pain. No nausea, vomiting, or hematemesis; No diarrhea or constipation. No melena or hematochezia.  GENITOURINARY:            No dysuria, frequency or hematuria  NEUROLOGICAL:            No numbness or weakness  SKIN:            No pruritis , rashes            OBJECTIVE:  ICU Vital Signs Last 24 Hrs  T(C): 37.8 (12 Oct 2023 12:19), Max: 39.1 (11 Oct 2023 17:03)  T(F): 100 (12 Oct 2023 12:19), Max: 102.4 (11 Oct 2023 17:03)  HR: 61 (12 Oct 2023 12:19) (59 - 72)  BP: 88/42 (12 Oct 2023 12:19) (64/26 - 110/60)  BP(mean): --  ABP: --  ABP(mean): --  RR: 17 (12 Oct 2023 12:19) (17 - 20)  SpO2: 99% (12 Oct 2023 12:19) (94% - 99%)    O2 Parameters below as of 12 Oct 2023 12:19  Patient On (Oxygen Delivery Method): room air      VITAL SIGNS AT TIME OF CONSULT  BP: 90/56  ; HR: 64   ; RR: 22   ; SPO2: 99% (Rm Air)  ; Temp:     CAPILLARY BLOOD GLUCOSE      POCT Blood Glucose.: 162 mg/dL (12 Oct 2023 13:13)      PHYSICAL EXAM:  Constitutional: NAD, well-developed  HEENT NC/AT, moist mucous membranes  Pulmonary: Non-labored, breath sounds are clear bilaterally, no wheezing, rales or rhonchi  Cardiovascular: +S1, S2, RRR, I/VI sys murmur, peripheral pulses radial 1+/1+, DP/PT 1+/1+, digits cool to touch, with good cap refill < 3sec  Gastrointestinal: Soft, nontender, nondistended, normoactive bowel sounds  Extremities: No peripheral edema   Neurological: Alert, strength and sensitivity are grossly intact  Skin: No obvious lesions/rashes  Psych: Mood & affect appropriate  POCUS with good LVFx, IVC 1.5, slight variable       HOSPITAL MEDICATIONS:  MEDICATIONS  (STANDING):  aspirin enteric coated 81 milliGRAM(s) Oral daily  bethanechol 25 milliGRAM(s) Oral two times a day  dextrose 5%. 1000 milliLiter(s) (50 mL/Hr) IV Continuous <Continuous>  dextrose 5%. 1000 milliLiter(s) (100 mL/Hr) IV Continuous <Continuous>  dextrose 50% Injectable 25 Gram(s) IV Push once  dextrose 50% Injectable 12.5 Gram(s) IV Push once  dextrose 50% Injectable 25 Gram(s) IV Push once  dronabinol 2.5 milliGRAM(s) Oral two times a day  gabapentin 300 milliGRAM(s) Oral two times a day  glucagon  Injectable 1 milliGRAM(s) IntraMuscular once  insulin glargine Injectable (LANTUS) 18 Unit(s) SubCutaneous at bedtime  insulin lispro (ADMELOG) corrective regimen sliding scale   SubCutaneous three times a day before meals  insulin lispro (ADMELOG) corrective regimen sliding scale   SubCutaneous at bedtime  insulin lispro Injectable (ADMELOG) 3 Unit(s) SubCutaneous three times a day before meals  lactobacillus acidophilus 1 Tablet(s) Oral two times a day with meals  midodrine. 20 milliGRAM(s) Oral once  midodrine. 10 milliGRAM(s) Oral three times a day  pantoprazole    Tablet 40 milliGRAM(s) Oral before breakfast  piperacillin/tazobactam IVPB.. 3.375 Gram(s) IV Intermittent every 8 hours  tamsulosin 0.4 milliGRAM(s) Oral at bedtime  vancomycin  IVPB 1250 milliGRAM(s) IV Intermittent every 24 hours    MEDICATIONS  (PRN):  acetaminophen     Tablet .. 650 milliGRAM(s) Oral every 6 hours PRN Temp greater or equal to 38C (100.4F), Moderate Pain (4 - 6)  dextrose Oral Gel 15 Gram(s) Oral once PRN Blood Glucose LESS THAN 70 milliGRAM(s)/deciliter  loperamide 2 milliGRAM(s) Oral three times a day PRN Diarrhea      LABS:                        9.6    7.26  )-----------( 279      ( 12 Oct 2023 05:30 )             30.3     Hgb Trend: 9.6<--, 7.9<--, 6.6<--  10-12    139  |  111<H>  |  42<H>  ----------------------------<  216<H>  4.2   |  22  |  1.10    Ca    8.1<L>      12 Oct 2023 05:30  Phos  3.0     10-12  Mg     1.8     10-12    TPro  6.1  /  Alb  1.9<L>  /  TBili  0.4  /  DBili  x   /  AST  18  /  ALT  12  /  AlkPhos  88  10-10    Creatinine Trend: 1.10<--, 1.20<--, 1.20<--  PT/INR - ( 10 Oct 2023 18:25 )   PT: 18.6 sec;   INR: 1.61 ratio         PTT - ( 10 Oct 2023 18:25 )  PTT:30.0 sec  Urinalysis Basic - ( 12 Oct 2023 05:30 )    Color: x / Appearance: x / SG: x / pH: x  Gluc: 216 mg/dL / Ketone: x  / Bili: x / Urobili: x   Blood: x / Protein: x / Nitrite: x   Leuk Esterase: x / RBC: x / WBC x   Sq Epi: x / Non Sq Epi: x / Bacteria: x            MICROBIOLOGY:     RADIOLOGY:  [ ] Reviewed and interpreted by me    EKG:        Kye ANP-BC (ext. [id number])           CHIEF COMPLAINT:    HPI:      78 yr old male with PMHx DM on insulin therapy, PPM, BPH, chronic bilat heel wounds, s/p ORIF of Lt Hip bedbound, 40 to 50 lbs. wt loss over past month secondary to poor p.o intake who originally presented to E.D. evening of 10/10/23 from home after developing fever with Tmax of 38.3, burning dysuria. Pt found to have ARMANI with sCr of 1.1-1.2 (unknown baseline), Acute blood loss anemia with initial Hgb of 6.6 (now improved to 9.6), NAGMA with serum HCO3 of 20 (now improved).  Pt received CT A/P/C/H 10/10/23 that demonstrated small Rt, minimal Lt pleural effusions, no acute abd pathology, cholelithiasis, no acute cerebral changes. Course c/b findings of Methicillin resistant MRSA bacteremia (10/10/23), UTI, Urine Cx pending results (10/10/23), +UA with large leuk-est, neg nitrates, few bacteria (10/11/23) placed on  zosyn/vanco therapy, received 2 units PRBC total (last tx MN 10/12/23). Course further c/b development of HYPOtn with SBP of 64/28 - 92/57, initially responsive to IVF therapy. In addition pt found to have eleveated hsTrops of 1896->1608, felt to be due to demand ischemia, loss of IV access requiring placement of Rt IJ TLC by ICU.       Consult called for HYPOtn secondary to hypovolemia vs sepsis    As pt AOX3, SPO2 of 98% on Rm Air, SBP of 90/56, responsive to IVF therapy, currently no need for transfer to ICU. Please reconsult if we can be of further assistance with this patient    7260 ADD: Pt re assessed for HYPOtn. Blood pressure obtained from Rt Lower Extremity of 104/55, HR 62, RR 22 POCUS demonstrated A line predominant with few focal B lines in lower lung fields. slight/mild diminished LVFx, RV<LV, IVC 1.6 with slight variability. Able to lay flat without SOB, utilizing Rm Air.       Currently without need for ICU transfer. Please re-consult if we can be of further assistance wit this patient    PAST MEDICAL & SURGICAL HISTORY:  Diabetes      Benign essential HTN      Pacemaker      History of BPH      Diabetic foot ulcers          FAMILY HISTORY:      SOCIAL HISTORY:  Smoking: [ ] Never Smoked [ ] Former Smoker (__ packs x ___ years) [ ] Current Smoker  (__ packs x ___ years)  Substance Use: [ ] Never Used [ ] Used ____  EtOH Use:  Marital Status: [ ] Single [ ]  [ ]  [ ]   Sexual History:   Occupation:  Recent Travel:  Country of Birth:  Advance Directives:    Allergies    No Known Allergies    Intolerances        HOME MEDICATIONS:    REVIEW OF SYSTEMS:    CONSTITUTIONAL:          + weakness, without fevers or chills  EYES/ENT:           No visual changes;  No vertigo or throat pain   NECK:            No pain or stiffness  RESPIRATORY:            No cough, wheezing, hemoptysis; No shortness of breath  CARDIOVASCULAR:            No chest pain or palpitations  GASTROINTESTINAL:           No abdominal or epigastric pain. No nausea, vomiting, or hematemesis; No diarrhea or constipation. No melena or hematochezia.  GENITOURINARY:            No dysuria, frequency or hematuria  NEUROLOGICAL:            No numbness or weakness  SKIN:            No pruritis , rashes            OBJECTIVE:  ICU Vital Signs Last 24 Hrs  T(C): 37.8 (12 Oct 2023 12:19), Max: 39.1 (11 Oct 2023 17:03)  T(F): 100 (12 Oct 2023 12:19), Max: 102.4 (11 Oct 2023 17:03)  HR: 61 (12 Oct 2023 12:19) (59 - 72)  BP: 88/42 (12 Oct 2023 12:19) (64/26 - 110/60)  BP(mean): --  ABP: --  ABP(mean): --  RR: 17 (12 Oct 2023 12:19) (17 - 20)  SpO2: 99% (12 Oct 2023 12:19) (94% - 99%)    O2 Parameters below as of 12 Oct 2023 12:19  Patient On (Oxygen Delivery Method): room air      VITAL SIGNS AT TIME OF CONSULT  BP: 90/56  ; HR: 64   ; RR: 22   ; SPO2: 99% (Rm Air)  ; Temp:     CAPILLARY BLOOD GLUCOSE      POCT Blood Glucose.: 162 mg/dL (12 Oct 2023 13:13)      PHYSICAL EXAM:  Constitutional: NAD, well-developed  HEENT NC/AT, moist mucous membranes  Pulmonary: Non-labored, breath sounds are clear bilaterally, no wheezing, rales or rhonchi  Cardiovascular: +S1, S2, RRR, I/VI sys murmur, peripheral pulses radial 1+/1+, DP/PT 1+/1+, digits cool to touch, with good cap refill < 3sec  Gastrointestinal: Soft, nontender, nondistended, normoactive bowel sounds  Extremities: No peripheral edema   Neurological: Alert, strength and sensitivity are grossly intact  Skin: No obvious lesions/rashes  Psych: Mood & affect appropriate  POCUS with good LVFx, IVC 1.5, slight variable       HOSPITAL MEDICATIONS:  MEDICATIONS  (STANDING):  aspirin enteric coated 81 milliGRAM(s) Oral daily  bethanechol 25 milliGRAM(s) Oral two times a day  dextrose 5%. 1000 milliLiter(s) (50 mL/Hr) IV Continuous <Continuous>  dextrose 5%. 1000 milliLiter(s) (100 mL/Hr) IV Continuous <Continuous>  dextrose 50% Injectable 25 Gram(s) IV Push once  dextrose 50% Injectable 12.5 Gram(s) IV Push once  dextrose 50% Injectable 25 Gram(s) IV Push once  dronabinol 2.5 milliGRAM(s) Oral two times a day  gabapentin 300 milliGRAM(s) Oral two times a day  glucagon  Injectable 1 milliGRAM(s) IntraMuscular once  insulin glargine Injectable (LANTUS) 18 Unit(s) SubCutaneous at bedtime  insulin lispro (ADMELOG) corrective regimen sliding scale   SubCutaneous three times a day before meals  insulin lispro (ADMELOG) corrective regimen sliding scale   SubCutaneous at bedtime  insulin lispro Injectable (ADMELOG) 3 Unit(s) SubCutaneous three times a day before meals  lactobacillus acidophilus 1 Tablet(s) Oral two times a day with meals  midodrine. 20 milliGRAM(s) Oral once  midodrine. 10 milliGRAM(s) Oral three times a day  pantoprazole    Tablet 40 milliGRAM(s) Oral before breakfast  piperacillin/tazobactam IVPB.. 3.375 Gram(s) IV Intermittent every 8 hours  tamsulosin 0.4 milliGRAM(s) Oral at bedtime  vancomycin  IVPB 1250 milliGRAM(s) IV Intermittent every 24 hours    MEDICATIONS  (PRN):  acetaminophen     Tablet .. 650 milliGRAM(s) Oral every 6 hours PRN Temp greater or equal to 38C (100.4F), Moderate Pain (4 - 6)  dextrose Oral Gel 15 Gram(s) Oral once PRN Blood Glucose LESS THAN 70 milliGRAM(s)/deciliter  loperamide 2 milliGRAM(s) Oral three times a day PRN Diarrhea      LABS:                        9.6    7.26  )-----------( 279      ( 12 Oct 2023 05:30 )             30.3     Hgb Trend: 9.6<--, 7.9<--, 6.6<--  10-12    139  |  111<H>  |  42<H>  ----------------------------<  216<H>  4.2   |  22  |  1.10    Ca    8.1<L>      12 Oct 2023 05:30  Phos  3.0     10-12  Mg     1.8     10-12    TPro  6.1  /  Alb  1.9<L>  /  TBili  0.4  /  DBili  x   /  AST  18  /  ALT  12  /  AlkPhos  88  10-10    Creatinine Trend: 1.10<--, 1.20<--, 1.20<--  PT/INR - ( 10 Oct 2023 18:25 )   PT: 18.6 sec;   INR: 1.61 ratio         PTT - ( 10 Oct 2023 18:25 )  PTT:30.0 sec  Urinalysis Basic - ( 12 Oct 2023 05:30 )    Color: x / Appearance: x / SG: x / pH: x  Gluc: 216 mg/dL / Ketone: x  / Bili: x / Urobili: x   Blood: x / Protein: x / Nitrite: x   Leuk Esterase: x / RBC: x / WBC x   Sq Epi: x / Non Sq Epi: x / Bacteria: x            MICROBIOLOGY:     RADIOLOGY:  [ ] Reviewed and interpreted by me    EKG:        Kye ANP-BC (ext. [id number])

## 2023-10-12 NOTE — PROCEDURE NOTE - NSPOSTPRCRAD_GEN_A_CORE
Guidewire ascertained in Rt IJ via POCUS,  Catheter filtered over guidewire, Guidewire removed in entirety, Lung sliding ascertained via POCUS/central line located in the

## 2023-10-12 NOTE — PROGRESS NOTE ADULT - SUBJECTIVE AND OBJECTIVE BOX
Patient is a 78y old  Male who presents with a chief complaint of fever and weakness (11 Oct 2023 22:13)      INTERVAL HPI/OVERNIGHT EVENTS: events noted, pt episodes of hyypotension with no other symptoms, repsonds to fluid bolus, downgraded by icu team    MEDICATIONS  (STANDING):  aspirin enteric coated 81 milliGRAM(s) Oral daily  bethanechol 25 milliGRAM(s) Oral two times a day  dextrose 5%. 1000 milliLiter(s) (100 mL/Hr) IV Continuous <Continuous>  dextrose 5%. 1000 milliLiter(s) (50 mL/Hr) IV Continuous <Continuous>  dextrose 50% Injectable 25 Gram(s) IV Push once  dextrose 50% Injectable 12.5 Gram(s) IV Push once  dextrose 50% Injectable 25 Gram(s) IV Push once  dronabinol 2.5 milliGRAM(s) Oral two times a day  gabapentin 300 milliGRAM(s) Oral two times a day  glucagon  Injectable 1 milliGRAM(s) IntraMuscular once  insulin glargine Injectable (LANTUS) 18 Unit(s) SubCutaneous at bedtime  insulin lispro (ADMELOG) corrective regimen sliding scale   SubCutaneous three times a day before meals  insulin lispro (ADMELOG) corrective regimen sliding scale   SubCutaneous at bedtime  insulin lispro Injectable (ADMELOG) 3 Unit(s) SubCutaneous three times a day before meals  lactobacillus acidophilus 1 Tablet(s) Oral two times a day with meals  pantoprazole    Tablet 40 milliGRAM(s) Oral before breakfast  piperacillin/tazobactam IVPB.. 3.375 Gram(s) IV Intermittent every 8 hours  sodium chloride 0.9% Bolus 1000 milliLiter(s) IV Bolus once  sodium chloride 0.9%. 1000 milliLiter(s) (60 mL/Hr) IV Continuous <Continuous>  tamsulosin 0.4 milliGRAM(s) Oral at bedtime  vancomycin  IVPB 1250 milliGRAM(s) IV Intermittent every 24 hours    MEDICATIONS  (PRN):  acetaminophen     Tablet .. 650 milliGRAM(s) Oral every 6 hours PRN Temp greater or equal to 38C (100.4F), Moderate Pain (4 - 6)  dextrose Oral Gel 15 Gram(s) Oral once PRN Blood Glucose LESS THAN 70 milliGRAM(s)/deciliter  loperamide 2 milliGRAM(s) Oral three times a day PRN Diarrhea      Allergies    No Known Allergies    Intolerances        REVIEW OF SYSTEMS:  CONSTITUTIONAL: low grade fever  EYES: No eye pain, visual disturbances  ENMT:  No difficulty hearing, tinnitus, vertigo; No sinus or throat pain  NECK: No pain or stiffness  RESPIRATORY: No cough, wheezing, chills or hemoptysis; No shortness of breath  CARDIOVASCULAR: No chest pain, palpitations, dizziness  GASTROINTESTINAL: No abdominal or epigastric pain. No nausea, vomiting, or hematemesis; No diarrhea or constipation. No melena or hematochezia.  GENITOURINARY: No dysuria, frequency, hematuria, or incontinence  NEUROLOGICAL: No headaches, memory loss, loss of strength, numbness, or tremors  SKIN: No itching, burning  LYMPH NODES: No enlarged glands  MUSCULOSKELETAL: No joint pain or swelling; No muscle, back, or extremity pain  PSYCHIATRIC: No depression, mood swings  HEME/LYMPH: No easy bruising, or bleeding gums  ALLERGY AND IMMUNOLOGIC: No hives    Vital Signs Last 24 Hrs  T(C): 36.3 (12 Oct 2023 06:13), Max: 39.1 (11 Oct 2023 17:03)  T(F): 97.4 (12 Oct 2023 06:13), Max: 102.4 (11 Oct 2023 17:03)  HR: 60 (12 Oct 2023 06:13) (60 - 79)  BP: 91/56 (12 Oct 2023 06:13) (78/38 - 121/59)  BP(mean): --  RR: 18 (12 Oct 2023 06:13) (18 - 20)  SpO2: 97% (12 Oct 2023 06:13) (93% - 97%)    Parameters below as of 12 Oct 2023 06:13  Patient On (Oxygen Delivery Method): room air        PHYSICAL EXAM:  GENERAL: NAD, well-groomed, well-developed  HEAD:  Atraumatic, Normocephalic  EYES: EOMI, PERRLA, conjunctiva and sclera clear  ENMT: No tonsillar erythema, exudates, or enlargement   NECK: Supple, No JVD  NERVOUS SYSTEM:  Alert & Oriented X3, Good concentration  CHEST/LUNG: Clear to auscultation bilaterally; No rales, rhonchi, wheezing  HEART: Regular rate and rhythm  ABDOMEN: Soft, Nontender, Nondistended; Bowel sounds present  EXTREMITIES:  2+ Peripheral Pulses, b/l feet in clean dressing   LYMPH: No lymphadenopathy noted  SKIN: No rashes     LABS:                        9.6    7.26  )-----------( 279      ( 12 Oct 2023 05:30 )             30.3     12 Oct 2023 05:30    139    |  111    |  42     ----------------------------<  216    4.2     |  22     |  1.10     Ca    8.1        12 Oct 2023 05:30  Phos  3.0       12 Oct 2023 05:30  Mg     1.8       12 Oct 2023 05:30      PT/INR - ( 10 Oct 2023 18:25 )   PT: 18.6 sec;   INR: 1.61 ratio         PTT - ( 10 Oct 2023 18:25 )  PTT:30.0 sec  Urinalysis Basic - ( 12 Oct 2023 05:30 )    Color: x / Appearance: x / SG: x / pH: x  Gluc: 216 mg/dL / Ketone: x  / Bili: x / Urobili: x   Blood: x / Protein: x / Nitrite: x   Leuk Esterase: x / RBC: x / WBC x   Sq Epi: x / Non Sq Epi: x / Bacteria: x      CAPILLARY BLOOD GLUCOSE      POCT Blood Glucose.: 215 mg/dL (12 Oct 2023 08:14)  POCT Blood Glucose.: 243 mg/dL (11 Oct 2023 21:20)  POCT Blood Glucose.: 276 mg/dL (11 Oct 2023 17:31)  POCT Blood Glucose.: 270 mg/dL (11 Oct 2023 11:41)    blood culture --   Culture in progress   10-10 @ 21:00    blood culture --   Growth in aerobic bottle: Gram Positive Cocci in Clusters  Growth in anaerobic bottle: Gram Positive Cocci in Clusters  Direct identification is available within approximately 3-5  hours either by Blood Panel Multiplexed PCR or Direct  MALDI-TOF. Details: [email or web address]   10-10 @ 18:25      urine culture --  10-10 @ 21:00  results   Culture in progress 10-10 @ 21:00  urine culture --  10-10 @ 18:25  results   Growth in aerobic bottle: Gram Positive Cocci in Clusters  Growth in anaerobic bottle: Gram Positive Cocci in Clusters  Direct identification is available within approximately 3-5  hours either by Blood Panel Multiplexed PCR or Direct  MALDI-TOF. Details: [email or web address] 10-10 @ 18:25    wound with gram statin --    10-10 @ 21:00  organism  --   10-10 @ 21:00  specimen source Catheterized Catheterized  10-10 @ 21:00  wound with gram statin --    10-10 @ 18:25  organism  Blood Culture PCR   10-10 @ 18:25  specimen source .Blood Blood-Peripheral  10-10 @ 18:25      RADIOLOGY & ADDITIONAL TESTS:      Consultant(s) Notes Reviewed:  [x ] YES  [ ] NO    Care Discussed with Consultants/Other Providers [x ] YES  [ ] NO    Advanced care planning discussed with patient and family, advanced care planning forms reviewed, discussed, and completed.  20 minutes spent.   Patient is a 78y old  Male who presents with a chief complaint of fever and weakness (11 Oct 2023 22:13)      INTERVAL HPI/OVERNIGHT EVENTS: events noted, pt episodes of hyypotension with no other symptoms, repsonds to fluid bolus, downgraded by icu team    MEDICATIONS  (STANDING):  aspirin enteric coated 81 milliGRAM(s) Oral daily  bethanechol 25 milliGRAM(s) Oral two times a day  dextrose 5%. 1000 milliLiter(s) (100 mL/Hr) IV Continuous <Continuous>  dextrose 5%. 1000 milliLiter(s) (50 mL/Hr) IV Continuous <Continuous>  dextrose 50% Injectable 25 Gram(s) IV Push once  dextrose 50% Injectable 12.5 Gram(s) IV Push once  dextrose 50% Injectable 25 Gram(s) IV Push once  dronabinol 2.5 milliGRAM(s) Oral two times a day  gabapentin 300 milliGRAM(s) Oral two times a day  glucagon  Injectable 1 milliGRAM(s) IntraMuscular once  insulin glargine Injectable (LANTUS) 18 Unit(s) SubCutaneous at bedtime  insulin lispro (ADMELOG) corrective regimen sliding scale   SubCutaneous three times a day before meals  insulin lispro (ADMELOG) corrective regimen sliding scale   SubCutaneous at bedtime  insulin lispro Injectable (ADMELOG) 3 Unit(s) SubCutaneous three times a day before meals  lactobacillus acidophilus 1 Tablet(s) Oral two times a day with meals  pantoprazole    Tablet 40 milliGRAM(s) Oral before breakfast  piperacillin/tazobactam IVPB.. 3.375 Gram(s) IV Intermittent every 8 hours  sodium chloride 0.9% Bolus 1000 milliLiter(s) IV Bolus once  sodium chloride 0.9%. 1000 milliLiter(s) (60 mL/Hr) IV Continuous <Continuous>  tamsulosin 0.4 milliGRAM(s) Oral at bedtime  vancomycin  IVPB 1250 milliGRAM(s) IV Intermittent every 24 hours    MEDICATIONS  (PRN):  acetaminophen     Tablet .. 650 milliGRAM(s) Oral every 6 hours PRN Temp greater or equal to 38C (100.4F), Moderate Pain (4 - 6)  dextrose Oral Gel 15 Gram(s) Oral once PRN Blood Glucose LESS THAN 70 milliGRAM(s)/deciliter  loperamide 2 milliGRAM(s) Oral three times a day PRN Diarrhea      Allergies    No Known Allergies    Intolerances        REVIEW OF SYSTEMS:  CONSTITUTIONAL: low grade fever  EYES: No eye pain, visual disturbances  ENMT:  No difficulty hearing, tinnitus, vertigo; No sinus or throat pain  NECK: No pain or stiffness  RESPIRATORY: No cough, wheezing, chills or hemoptysis; No shortness of breath  CARDIOVASCULAR: No chest pain, palpitations, dizziness  GASTROINTESTINAL: No abdominal or epigastric pain. No nausea, vomiting, or hematemesis; No diarrhea or constipation. No melena or hematochezia.  GENITOURINARY: No dysuria, frequency, hematuria, or incontinence  NEUROLOGICAL: No headaches, memory loss, loss of strength, numbness, or tremors  SKIN: No itching, burning  LYMPH NODES: No enlarged glands  MUSCULOSKELETAL: No joint pain or swelling; No muscle, back, or extremity pain  PSYCHIATRIC: No depression, mood swings  HEME/LYMPH: No easy bruising, or bleeding gums  ALLERGY AND IMMUNOLOGIC: No hives    Vital Signs Last 24 Hrs  T(C): 36.3 (12 Oct 2023 06:13), Max: 39.1 (11 Oct 2023 17:03)  T(F): 97.4 (12 Oct 2023 06:13), Max: 102.4 (11 Oct 2023 17:03)  HR: 60 (12 Oct 2023 06:13) (60 - 79)  BP: 91/56 (12 Oct 2023 06:13) (78/38 - 121/59)  BP(mean): --  RR: 18 (12 Oct 2023 06:13) (18 - 20)  SpO2: 97% (12 Oct 2023 06:13) (93% - 97%)    Parameters below as of 12 Oct 2023 06:13  Patient On (Oxygen Delivery Method): room air        PHYSICAL EXAM:  GENERAL: NAD, well-groomed, well-developed  HEAD:  Atraumatic, Normocephalic  EYES: EOMI, PERRLA, conjunctiva and sclera clear  ENMT: No tonsillar erythema, exudates, or enlargement   NECK: Supple, No JVD  NERVOUS SYSTEM:  Alert & Oriented X3, Good concentration  CHEST/LUNG: Clear to auscultation bilaterally; No rales, rhonchi, wheezing  HEART: Regular rate and rhythm  ABDOMEN: Soft, Nontender, Nondistended; Bowel sounds present  EXTREMITIES:  2+ Peripheral Pulses, b/l feet in clean dressing   LYMPH: No lymphadenopathy noted  SKIN: No rashes     LABS:                        9.6    7.26  )-----------( 279      ( 12 Oct 2023 05:30 )             30.3     12 Oct 2023 05:30    139    |  111    |  42     ----------------------------<  216    4.2     |  22     |  1.10     Ca    8.1        12 Oct 2023 05:30  Phos  3.0       12 Oct 2023 05:30  Mg     1.8       12 Oct 2023 05:30      PT/INR - ( 10 Oct 2023 18:25 )   PT: 18.6 sec;   INR: 1.61 ratio         PTT - ( 10 Oct 2023 18:25 )  PTT:30.0 sec  Urinalysis Basic - ( 12 Oct 2023 05:30 )    Color: x / Appearance: x / SG: x / pH: x  Gluc: 216 mg/dL / Ketone: x  / Bili: x / Urobili: x   Blood: x / Protein: x / Nitrite: x   Leuk Esterase: x / RBC: x / WBC x   Sq Epi: x / Non Sq Epi: x / Bacteria: x      CAPILLARY BLOOD GLUCOSE      POCT Blood Glucose.: 215 mg/dL (12 Oct 2023 08:14)  POCT Blood Glucose.: 243 mg/dL (11 Oct 2023 21:20)  POCT Blood Glucose.: 276 mg/dL (11 Oct 2023 17:31)  POCT Blood Glucose.: 270 mg/dL (11 Oct 2023 11:41)    blood culture --   Culture in progress   10-10 @ 21:00    blood culture --   Growth in aerobic bottle: Gram Positive Cocci in Clusters  Growth in anaerobic bottle: Gram Positive Cocci in Clusters  Direct identification is available within approximately 3-5  hours either by Blood Panel Multiplexed PCR or Direct  MALDI-TOF. Details: [email or web address]   10-10 @ 18:25      urine culture --  10-10 @ 21:00  results   Culture in progress 10-10 @ 21:00  urine culture --  10-10 @ 18:25  results   Growth in aerobic bottle: Gram Positive Cocci in Clusters  Growth in anaerobic bottle: Gram Positive Cocci in Clusters  Direct identification is available within approximately 3-5  hours either by Blood Panel Multiplexed PCR or Direct  MALDI-TOF. Details: [email or web address] 10-10 @ 18:25    wound with gram statin --    10-10 @ 21:00  organism  --   10-10 @ 21:00  specimen source Catheterized Catheterized  10-10 @ 21:00  wound with gram statin --    10-10 @ 18:25  organism  Blood Culture PCR   10-10 @ 18:25  specimen source .Blood Blood-Peripheral  10-10 @ 18:25      RADIOLOGY & ADDITIONAL TESTS:      Consultant(s) Notes Reviewed:  [x ] YES  [ ] NO    Care Discussed with Consultants/Other Providers [x ] YES  [ ] NO    Advanced care planning discussed with patient and family, advanced care planning forms reviewed, discussed, and completed.  20 minutes spent.   Patient is a 78y old  Male who presents with a chief complaint of fever and weakness (11 Oct 2023 22:13)      INTERVAL HPI/OVERNIGHT EVENTS: events noted, pt episodes of hyypotension with no other symptoms, repsonds to fluid bolus, downgraded by icu team    MEDICATIONS  (STANDING):  aspirin enteric coated 81 milliGRAM(s) Oral daily  bethanechol 25 milliGRAM(s) Oral two times a day  dextrose 5%. 1000 milliLiter(s) (100 mL/Hr) IV Continuous <Continuous>  dextrose 5%. 1000 milliLiter(s) (50 mL/Hr) IV Continuous <Continuous>  dextrose 50% Injectable 25 Gram(s) IV Push once  dextrose 50% Injectable 12.5 Gram(s) IV Push once  dextrose 50% Injectable 25 Gram(s) IV Push once  dronabinol 2.5 milliGRAM(s) Oral two times a day  gabapentin 300 milliGRAM(s) Oral two times a day  glucagon  Injectable 1 milliGRAM(s) IntraMuscular once  insulin glargine Injectable (LANTUS) 18 Unit(s) SubCutaneous at bedtime  insulin lispro (ADMELOG) corrective regimen sliding scale   SubCutaneous three times a day before meals  insulin lispro (ADMELOG) corrective regimen sliding scale   SubCutaneous at bedtime  insulin lispro Injectable (ADMELOG) 3 Unit(s) SubCutaneous three times a day before meals  lactobacillus acidophilus 1 Tablet(s) Oral two times a day with meals  pantoprazole    Tablet 40 milliGRAM(s) Oral before breakfast  piperacillin/tazobactam IVPB.. 3.375 Gram(s) IV Intermittent every 8 hours  sodium chloride 0.9% Bolus 1000 milliLiter(s) IV Bolus once  sodium chloride 0.9%. 1000 milliLiter(s) (60 mL/Hr) IV Continuous <Continuous>  tamsulosin 0.4 milliGRAM(s) Oral at bedtime  vancomycin  IVPB 1250 milliGRAM(s) IV Intermittent every 24 hours    MEDICATIONS  (PRN):  acetaminophen     Tablet .. 650 milliGRAM(s) Oral every 6 hours PRN Temp greater or equal to 38C (100.4F), Moderate Pain (4 - 6)  dextrose Oral Gel 15 Gram(s) Oral once PRN Blood Glucose LESS THAN 70 milliGRAM(s)/deciliter  loperamide 2 milliGRAM(s) Oral three times a day PRN Diarrhea      Allergies    No Known Allergies    Intolerances        REVIEW OF SYSTEMS:  CONSTITUTIONAL: low grade fever  EYES: No eye pain, visual disturbances  ENMT:  No difficulty hearing, tinnitus, vertigo; No sinus or throat pain  NECK: No pain or stiffness  RESPIRATORY: No cough, wheezing, chills or hemoptysis; No shortness of breath  CARDIOVASCULAR: No chest pain, palpitations, dizziness  GASTROINTESTINAL: No abdominal or epigastric pain. No nausea, vomiting, or hematemesis; No diarrhea or constipation. No melena or hematochezia.  GENITOURINARY: No dysuria, frequency, hematuria, or incontinence  NEUROLOGICAL: No headaches, memory loss, loss of strength, numbness, or tremors  SKIN: No itching, burning  LYMPH NODES: No enlarged glands  MUSCULOSKELETAL: No joint pain or swelling; No muscle, back, or extremity pain  PSYCHIATRIC: No depression, mood swings  HEME/LYMPH: No easy bruising, or bleeding gums  ALLERGY AND IMMUNOLOGIC: No hives    Vital Signs Last 24 Hrs  T(C): 36.3 (12 Oct 2023 06:13), Max: 39.1 (11 Oct 2023 17:03)  T(F): 97.4 (12 Oct 2023 06:13), Max: 102.4 (11 Oct 2023 17:03)  HR: 60 (12 Oct 2023 06:13) (60 - 79)  BP: 91/56 (12 Oct 2023 06:13) (78/38 - 121/59)  BP(mean): --  RR: 18 (12 Oct 2023 06:13) (18 - 20)  SpO2: 97% (12 Oct 2023 06:13) (93% - 97%)    Parameters below as of 12 Oct 2023 06:13  Patient On (Oxygen Delivery Method): room air        PHYSICAL EXAM:  GENERAL: NAD, well-groomed, well-developed  HEAD:  Atraumatic, Normocephalic  EYES: EOMI, PERRLA, conjunctiva and sclera clear  ENMT: No tonsillar erythema, exudates, or enlargement   NECK: Supple, No JVD  NERVOUS SYSTEM:  Alert & Oriented X3, Good concentration  CHEST/LUNG: Clear to auscultation bilaterally; No rales, rhonchi, wheezing  HEART: Regular rate and rhythm  ABDOMEN: Soft, Nontender, Nondistended; Bowel sounds present  EXTREMITIES:  2+ Peripheral Pulses, b/l feet in clean dressing   LYMPH: No lymphadenopathy noted  SKIN: No rashes     LABS:                        9.6    7.26  )-----------( 279      ( 12 Oct 2023 05:30 )             30.3     12 Oct 2023 05:30    139    |  111    |  42     ----------------------------<  216    4.2     |  22     |  1.10     Ca    8.1        12 Oct 2023 05:30  Phos  3.0       12 Oct 2023 05:30  Mg     1.8       12 Oct 2023 05:30      PT/INR - ( 10 Oct 2023 18:25 )   PT: 18.6 sec;   INR: 1.61 ratio         PTT - ( 10 Oct 2023 18:25 )  PTT:30.0 sec  Urinalysis Basic - ( 12 Oct 2023 05:30 )    Color: x / Appearance: x / SG: x / pH: x  Gluc: 216 mg/dL / Ketone: x  / Bili: x / Urobili: x   Blood: x / Protein: x / Nitrite: x   Leuk Esterase: x / RBC: x / WBC x   Sq Epi: x / Non Sq Epi: x / Bacteria: x      CAPILLARY BLOOD GLUCOSE      POCT Blood Glucose.: 215 mg/dL (12 Oct 2023 08:14)  POCT Blood Glucose.: 243 mg/dL (11 Oct 2023 21:20)  POCT Blood Glucose.: 276 mg/dL (11 Oct 2023 17:31)  POCT Blood Glucose.: 270 mg/dL (11 Oct 2023 11:41)    blood culture --   Culture in progress   10-10 @ 21:00    blood culture --   Growth in aerobic bottle: Gram Positive Cocci in Clusters  Growth in anaerobic bottle: Gram Positive Cocci in Clusters  Direct identification is available within approximately 3-5  hours either by Blood Panel Multiplexed PCR or Direct  MALDI-TOF. Details: [email or web address]   10-10 @ 18:25      urine culture --  10-10 @ 21:00  results   Culture in progress 10-10 @ 21:00  urine culture --  10-10 @ 18:25  results   Growth in aerobic bottle: Gram Positive Cocci in Clusters  Growth in anaerobic bottle: Gram Positive Cocci in Clusters  Direct identification is available within approximately 3-5  hours either by Blood Panel Multiplexed PCR or Direct  MALDI-TOF. Details: [email or web address] 10-10 @ 18:25    wound with gram statin --    10-10 @ 21:00  organism  --   10-10 @ 21:00  specimen source Catheterized Catheterized  10-10 @ 21:00  wound with gram statin --    10-10 @ 18:25  organism  Blood Culture PCR   10-10 @ 18:25  specimen source .Blood Blood-Peripheral  10-10 @ 18:25      RADIOLOGY & ADDITIONAL TESTS:      Consultant(s) Notes Reviewed:  [x ] YES  [ ] NO    Care Discussed with Consultants/Other Providers [x ] YES  [ ] NO    Advanced care planning discussed with patient and family, advanced care planning forms reviewed, discussed, and completed.  20 minutes spent.

## 2023-10-12 NOTE — PROGRESS NOTE ADULT - PROBLEM SELECTOR PLAN 2
Right heel wound with fibrogranular tissue   Applied silver nitrate and sterile dressing   Patient has hx of partial calcanectomy about 5 years ago and not healed since then   Patient states he is non ambulatory    Will follow bone scan to assess OM to the right heel.

## 2023-10-12 NOTE — PHARMACOTHERAPY INTERVENTION NOTE - COMMENTS
Patient is a 79 y/o M on zosyn 3.375g q8hrs and vancomycin 1250mg q24h for concern of OM. Discussed w/ ID Dr. Rosa Maria Wilkinson and recommended switching zosyn to cefepime 2g q8hrs to reduce risk of nephrotoxicity. MD agreed and order was entered.  Patient is a 77 y/o M on zosyn 3.375g q8hrs and vancomycin 1250mg q24h for concern of OM. Discussed w/ ID Dr. Rosa Maria Wilkinson and recommended switching zosyn to cefepime 2g q8hrs to reduce risk of nephrotoxicity. MD agreed and order was entered.  Patient is a 79 y/o M on zosyn 3.375g q8hrs and vancomycin 1250mg q24h for MRSA bacteremia and concern of OM. Discussed w/ ID Dr. Rosa Maria Wilkinson and recommended switching zosyn to cefepime 2g q8hrs to reduce risk of nephrotoxicity. MD agreed and order was entered.

## 2023-10-12 NOTE — CONSULT NOTE ADULT - ASSESSMENT
Assessment:     78y old Male with stated hx significant for   Plan:    ####Neuro####  ==    ####Pulm####  ==    ####CV####  ==    ####GI/####  ==    ####ID####  ==    ####FEN/ENDO/HEME####  ==        Critical Care Time: 40minutes   Reviewing data, imaging, discussing with multidisciplinary team, not inclusive of procedures, discussing goals of care with family Assessment:     78y old Male with stated hx significant for DM, PPM,BPH, chronic bilat heel wounds, s/p ORIF Lt hip who presented from home with dysuria, failure to thrive, fever. Found to have + UA, methicillin resistant MRSA bacteremia, acute blood loss anemia, ARMANI. Course c/p HYPOtn responsive to fluid therapy, demand ischemia and loss of IV access      Consult called for HYPOtn secondary to hypovolemia vs sepsis    Plan:    ####Neuro####  ==AOx3  -Neuro checks q 4 hrs and prn for changes  -activity as tolerated  -physical therapy consult when stable    ####Pulm####  ==utilizing Rm Air  -Supplemental O2 prn to maintain SPO2 > 92%  -Bronchodilators q 6 hrs prn for sob/wheezes  -HOB >/= 30 degree angle  -incentive spirometer 10x q 2 hrs    ####CV####  ==Fluid responsive HYPOtn  ==elevated hsTrop (most likely demand ischemia)  -LR @ 100 cc's/hr x 10 hrs  -albumin 5%/250 cc's  -midodrine 20 mg po x 1  -midodrine 10 mg po q 8 hrs (hold for SBP > 120)  -ECG now and q am x3  -Cardiac Enzymes now and q 8 hrs x 3  -consider TTE to eval RVFx/LVFx  -POCUS with good LVFx, IVC 1.5, slight variable     ####GI/####  ==Improving ARMANI  -strict I & O's - keep even  -diet as tolerated  -protonix 40 mg qd    ####ID####  ==Methicillin resistant MRSA  ==+ UA  -being followed by I.D. - f/u recs  -continue zosyn/vanco therapy    ####FEN/ENDO/HEME####  ==DM  ==improved acute blood loss anemia  -obtain CMP/Mg++/PO--4/CBC w diff/PT/PTT/INR q. a.m. and prn  -abg prn  -continue lantus therapy   -POC glucose with ac/qhs ISS - maintain glucose 140-180        Critical Care Time: 40minutes   Reviewing data, imaging, discussing with multidisciplinary team, not inclusive of procedures, discussing goals of care with family

## 2023-10-12 NOTE — PHARMACOTHERAPY INTERVENTION NOTE - COMMENTS
Recommended to order a vancomycin trough before the third dose of new order in order to assist with vancomycin pharmacokinetic monitoring.    Sohail Ramirez, PharmD, BCIDP  Clinical Pharmacy Specialist, Infectious Diseases  Tele-Antimicrobial Stewardship Program (Tele-ASP)  Tele-ASP Phone: [phone]   Recommended to order a vancomycin trough before the third dose of new order in order to assist with vancomycin pharmacokinetic monitoring.    Sohail Ramirez, PharmD, BCIDP  Clinical Pharmacy Specialist, Infectious Diseases  Tele-Antimicrobial Stewardship Program (Tele-ASP)  Tele-ASP Phone: [phone]   Recommended to order a vancomycin trough before the third dose of new order in order to assist with vancomycin pharmacokinetic monitoring.    Sohail Ramirez, PharmD, BCIDP  Clinical Pharmacy Specialist, Infectious Diseases  Tele-Antimicrobial Stewardship Program (Tele-ASP)  Tele-ASP Phone: [phone]

## 2023-10-13 LAB
-  AMIKACIN: SIGNIFICANT CHANGE UP
-  AMPICILLIN/SULBACTAM: SIGNIFICANT CHANGE UP
-  AZTREONAM: SIGNIFICANT CHANGE UP
-  CEFAZOLIN: SIGNIFICANT CHANGE UP
-  CEFEPIME: SIGNIFICANT CHANGE UP
-  CEFTAZIDIME: SIGNIFICANT CHANGE UP
-  CIPROFLOXACIN: SIGNIFICANT CHANGE UP
-  CLINDAMYCIN: SIGNIFICANT CHANGE UP
-  DAPTOMYCIN: SIGNIFICANT CHANGE UP
-  ERYTHROMYCIN: SIGNIFICANT CHANGE UP
-  GENTAMICIN: SIGNIFICANT CHANGE UP
-  IMIPENEM: SIGNIFICANT CHANGE UP
-  LEVOFLOXACIN: SIGNIFICANT CHANGE UP
-  LINEZOLID: SIGNIFICANT CHANGE UP
-  MEROPENEM: SIGNIFICANT CHANGE UP
-  OXACILLIN: SIGNIFICANT CHANGE UP
-  PENICILLIN: SIGNIFICANT CHANGE UP
-  PIPERACILLIN/TAZOBACTAM: SIGNIFICANT CHANGE UP
-  RIFAMPIN: SIGNIFICANT CHANGE UP
-  TETRACYCLINE: SIGNIFICANT CHANGE UP
-  TOBRAMYCIN: SIGNIFICANT CHANGE UP
-  TRIMETHOPRIM/SULFAMETHOXAZOLE: SIGNIFICANT CHANGE UP
-  VANCOMYCIN: SIGNIFICANT CHANGE UP
ANION GAP SERPL CALC-SCNC: 6 MMOL/L — SIGNIFICANT CHANGE UP (ref 5–17)
BUN SERPL-MCNC: 47 MG/DL — HIGH (ref 7–23)
CALCIUM SERPL-MCNC: 8.1 MG/DL — LOW (ref 8.5–10.1)
CHLORIDE SERPL-SCNC: 113 MMOL/L — HIGH (ref 96–108)
CO2 SERPL-SCNC: 19 MMOL/L — LOW (ref 22–31)
CREAT SERPL-MCNC: 1 MG/DL — SIGNIFICANT CHANGE UP (ref 0.5–1.3)
CRP SERPL-MCNC: 47 MG/L — HIGH
CULTURE RESULTS: SIGNIFICANT CHANGE UP
EGFR: 77 ML/MIN/1.73M2 — SIGNIFICANT CHANGE UP
FERRITIN SERPL-MCNC: 269 NG/ML — SIGNIFICANT CHANGE UP (ref 30–400)
FOLATE SERPL-MCNC: 11.9 NG/ML — SIGNIFICANT CHANGE UP
GLUCOSE BLDC GLUCOMTR-MCNC: 134 MG/DL — HIGH (ref 70–99)
GLUCOSE BLDC GLUCOMTR-MCNC: 161 MG/DL — HIGH (ref 70–99)
GLUCOSE BLDC GLUCOMTR-MCNC: 174 MG/DL — HIGH (ref 70–99)
GLUCOSE BLDC GLUCOMTR-MCNC: 189 MG/DL — HIGH (ref 70–99)
GLUCOSE BLDC GLUCOMTR-MCNC: 243 MG/DL — HIGH (ref 70–99)
GLUCOSE SERPL-MCNC: 138 MG/DL — HIGH (ref 70–99)
HCT VFR BLD CALC: 26.5 % — LOW (ref 39–50)
HGB BLD-MCNC: 8.4 G/DL — LOW (ref 13–17)
IRON SATN MFR SERPL: 8 % — LOW (ref 16–55)
IRON SATN MFR SERPL: 9 UG/DL — LOW (ref 45–165)
LDH SERPL L TO P-CCNC: 95 U/L — SIGNIFICANT CHANGE UP (ref 50–242)
MCHC RBC-ENTMCNC: 25.8 PG — LOW (ref 27–34)
MCHC RBC-ENTMCNC: 31.7 GM/DL — LOW (ref 32–36)
MCV RBC AUTO: 81.3 FL — SIGNIFICANT CHANGE UP (ref 80–100)
METHOD TYPE: SIGNIFICANT CHANGE UP
NRBC # BLD: 0 /100 WBCS — SIGNIFICANT CHANGE UP (ref 0–0)
ORGANISM # SPEC MICROSCOPIC CNT: SIGNIFICANT CHANGE UP
PLATELET # BLD AUTO: 224 K/UL — SIGNIFICANT CHANGE UP (ref 150–400)
POTASSIUM SERPL-MCNC: 3.8 MMOL/L — SIGNIFICANT CHANGE UP (ref 3.5–5.3)
POTASSIUM SERPL-SCNC: 3.8 MMOL/L — SIGNIFICANT CHANGE UP (ref 3.5–5.3)
PSA FLD-MCNC: 2.85 NG/ML — SIGNIFICANT CHANGE UP (ref 0–4)
RBC # BLD: 3.26 M/UL — LOW (ref 4.2–5.8)
RBC # FLD: 16.3 % — HIGH (ref 10.3–14.5)
SODIUM SERPL-SCNC: 138 MMOL/L — SIGNIFICANT CHANGE UP (ref 135–145)
SPECIMEN SOURCE: SIGNIFICANT CHANGE UP
TIBC SERPL-MCNC: 123 UG/DL — LOW (ref 220–430)
TRANSFUSION REACTION BLOOD BANK COMMENT: SIGNIFICANT CHANGE UP
TRANSFUSION REACTION PATHOLOGIST COMMENTS: SIGNIFICANT CHANGE UP
TRANSFUSION REACTION PATHOLOGIST INTERPRETATION: SIGNIFICANT CHANGE UP
UIBC SERPL-MCNC: 113 UG/DL — SIGNIFICANT CHANGE UP (ref 110–370)
VIT B12 SERPL-MCNC: 273 PG/ML — SIGNIFICANT CHANGE UP (ref 232–1245)
WBC # BLD: 4.58 K/UL — SIGNIFICANT CHANGE UP (ref 3.8–10.5)
WBC # FLD AUTO: 4.58 K/UL — SIGNIFICANT CHANGE UP (ref 3.8–10.5)

## 2023-10-13 PROCEDURE — 93923 UPR/LXTR ART STDY 3+ LVLS: CPT | Mod: 26

## 2023-10-13 RX ORDER — SODIUM CHLORIDE 9 MG/ML
1000 INJECTION, SOLUTION INTRAVENOUS
Refills: 0 | Status: DISCONTINUED | OUTPATIENT
Start: 2023-10-13 | End: 2023-10-16

## 2023-10-13 RX ORDER — ATORVASTATIN CALCIUM 80 MG/1
80 TABLET, FILM COATED ORAL AT BEDTIME
Refills: 0 | Status: DISCONTINUED | OUTPATIENT
Start: 2023-10-13 | End: 2023-10-19

## 2023-10-13 RX ORDER — SODIUM CHLORIDE 9 MG/ML
1000 INJECTION, SOLUTION INTRAVENOUS
Refills: 0 | Status: DISCONTINUED | OUTPATIENT
Start: 2023-10-13 | End: 2023-10-13

## 2023-10-13 RX ADMIN — CEFEPIME 100 MILLIGRAM(S): 1 INJECTION, POWDER, FOR SOLUTION INTRAMUSCULAR; INTRAVENOUS at 13:04

## 2023-10-13 RX ADMIN — CEFEPIME 100 MILLIGRAM(S): 1 INJECTION, POWDER, FOR SOLUTION INTRAMUSCULAR; INTRAVENOUS at 05:14

## 2023-10-13 RX ADMIN — Medication 2.5 MILLIGRAM(S): at 17:23

## 2023-10-13 RX ADMIN — ATORVASTATIN CALCIUM 80 MILLIGRAM(S): 80 TABLET, FILM COATED ORAL at 22:43

## 2023-10-13 RX ADMIN — Medication 1 TABLET(S): at 17:31

## 2023-10-13 RX ADMIN — Medication 3 UNIT(S): at 17:31

## 2023-10-13 RX ADMIN — Medication 81 MILLIGRAM(S): at 11:17

## 2023-10-13 RX ADMIN — INSULIN GLARGINE 18 UNIT(S): 100 INJECTION, SOLUTION SUBCUTANEOUS at 22:45

## 2023-10-13 RX ADMIN — Medication 650 MILLIGRAM(S): at 20:44

## 2023-10-13 RX ADMIN — GABAPENTIN 300 MILLIGRAM(S): 400 CAPSULE ORAL at 05:14

## 2023-10-13 RX ADMIN — Medication 25 MILLIGRAM(S): at 05:15

## 2023-10-13 RX ADMIN — Medication 1 TABLET(S): at 08:42

## 2023-10-13 RX ADMIN — MIDODRINE HYDROCHLORIDE 10 MILLIGRAM(S): 2.5 TABLET ORAL at 17:22

## 2023-10-13 RX ADMIN — Medication 2.5 MILLIGRAM(S): at 05:14

## 2023-10-13 RX ADMIN — PANTOPRAZOLE SODIUM 40 MILLIGRAM(S): 20 TABLET, DELAYED RELEASE ORAL at 05:15

## 2023-10-13 RX ADMIN — SODIUM CHLORIDE 75 MILLILITER(S): 9 INJECTION, SOLUTION INTRAVENOUS at 23:45

## 2023-10-13 RX ADMIN — Medication 300 MILLIGRAM(S): at 22:46

## 2023-10-13 RX ADMIN — Medication 3 UNIT(S): at 12:54

## 2023-10-13 RX ADMIN — Medication 2: at 17:31

## 2023-10-13 RX ADMIN — SODIUM CHLORIDE 75 MILLILITER(S): 9 INJECTION, SOLUTION INTRAVENOUS at 12:53

## 2023-10-13 RX ADMIN — SODIUM CHLORIDE 55 MILLILITER(S): 9 INJECTION, SOLUTION INTRAVENOUS at 11:17

## 2023-10-13 RX ADMIN — MIDODRINE HYDROCHLORIDE 10 MILLIGRAM(S): 2.5 TABLET ORAL at 11:17

## 2023-10-13 RX ADMIN — Medication 3 UNIT(S): at 08:42

## 2023-10-13 RX ADMIN — Medication 2: at 12:54

## 2023-10-13 RX ADMIN — GABAPENTIN 300 MILLIGRAM(S): 400 CAPSULE ORAL at 17:23

## 2023-10-13 RX ADMIN — Medication 25 MILLIGRAM(S): at 17:22

## 2023-10-13 RX ADMIN — TAMSULOSIN HYDROCHLORIDE 0.4 MILLIGRAM(S): 0.4 CAPSULE ORAL at 22:43

## 2023-10-13 RX ADMIN — MIDODRINE HYDROCHLORIDE 10 MILLIGRAM(S): 2.5 TABLET ORAL at 05:16

## 2023-10-13 RX ADMIN — CEFEPIME 100 MILLIGRAM(S): 1 INJECTION, POWDER, FOR SOLUTION INTRAMUSCULAR; INTRAVENOUS at 22:46

## 2023-10-13 NOTE — PROGRESS NOTE ADULT - ASSESSMENT
78-year-old male presents to the hospital by ambulance from home.  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 Pt does not go to doctor on regular basis per son and does phone visits. Son reports years of struggling with foot infections with black areas of gangrene and their struggling to keep his feet but now feeling that keeping him alive is more important.  Culture - Blood (10.10.23 @ 18:25)    -  Methicillin resistant Staphylococcus aureus (MRSA): Detec  Repeat 10/12 BCx NGTD  UCx + Pseudomonas, susceptibilities pending    RECOMMENDATIONS  Very concerning for osteomyelitis so recommend MRI of both feet/lower legs w bacteremia   C/w Vancomycin (10/11-) dosing per pharmacy protocol  S/p zosyn (10/11-10/12), changed to minimize ARMANI risk  C/w cefepime given UCx findings  Anticipate that surgery will be required so consider involving vascular for possible BKAs based on imaging    Over the weekend Dr. Larsen will be covering for our group. If you have any questions, concerns or new micro data, please reach out to them at [phone].    Rosa Maria Wilkinson M.D.  [facility] Division of Infectious Diseases [phone]  For after 5 P.M. and weekends, please call [phone]   78-year-old male presents to the hospital by ambulance from home.  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 Pt does not go to doctor on regular basis per son and does phone visits. Son reports years of struggling with foot infections with black areas of gangrene and their struggling to keep his feet but now feeling that keeping him alive is more important.  Culture - Blood (10.10.23 @ 18:25)    -  Methicillin resistant Staphylococcus aureus (MRSA): Detec  Repeat 10/12 BCx NGTD  UCx + Pseudomonas, susceptibilities pending    RECOMMENDATIONS  Very concerning for osteomyelitis so recommend MRI of both feet/lower legs w bacteremia   C/w Vancomycin (10/11-) dosing per pharmacy protocol  S/p zosyn (10/11-10/12), changed to minimize ARMANI risk  C/w cefepime given UCx findings  Anticipate that surgery will be required so consider involving vascular for possible BKAs based on imaging    Over the weekend Dr. Larsen will be covering for our group. If you have any questions, concerns or new micro data, please reach out to them at [phone].    Rosa Maria Wilkinson M.D.  [facility] Division of Infectious Diseases [phone]  For after 5 P.M. and weekends, please call [phone]   78-year-old male presents to the hospital by ambulance from home.  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 Pt does not go to doctor on regular basis per son and does phone visits. Son reports years of struggling with foot infections with black areas of gangrene and their struggling to keep his feet but now feeling that keeping him alive is more important.  Culture - Blood (10.10.23 @ 18:25)    -  Methicillin resistant Staphylococcus aureus (MRSA): Detec  Repeat 10/12 BCx NGTD  UCx + Pseudomonas, susceptibilities pending    RECOMMENDATIONS  Very concerning for osteomyelitis so recommend MRI of both feet/lower legs w bacteremia   C/w Vancomycin (10/11-) dosing per pharmacy protocol  S/p zosyn (10/11-10/12), changed to minimize ARMANI risk  C/w cefepime given UCx findings  Anticipate that surgery will be required so consider involving vascular for possible BKAs based on imaging    Over the weekend Dr. Larsen will be covering for our group. If you have any questions, concerns or new micro data, please reach out to them at [phone].    Rosa Maria Wilkinson M.D.  [facility] Division of Infectious Diseases [phone]  For after 5 P.M. and weekends, please call [phone]

## 2023-10-13 NOTE — CONSULT NOTE ADULT - SUBJECTIVE AND OBJECTIVE BOX
[facility] Cardiology    CHIEF COMPLAINT: Patient is a 78y old  Male who presents with a chief complaint of fever and weakness (13 Oct 2023 14:29)      HPI:  78-year-old male PMH HTN, PPM, DM, foot ulcers , presents to the hospital by ambulance from home.  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 Pt does not go to doctor on regualar basis per son and does phone visits.   (11 Oct 2023 07:02)    PAST MEDICAL & SURGICAL HISTORY:  Diabetes      Benign essential HTN      Pacemaker      History of BPH      Diabetic foot ulcers        SOCIAL HISTORY: no tobacco  FAMILY HISTORY:   HTN  MEDICATIONS  (STANDING):  aspirin enteric coated 81 milliGRAM(s) Oral daily  atorvastatin 80 milliGRAM(s) Oral at bedtime  bethanechol 25 milliGRAM(s) Oral two times a day  cefepime   IVPB 2000 milliGRAM(s) IV Intermittent every 8 hours  dextrose 5%. 1000 milliLiter(s) (50 mL/Hr) IV Continuous <Continuous>  dextrose 5%. 1000 milliLiter(s) (100 mL/Hr) IV Continuous <Continuous>  dextrose 50% Injectable 25 Gram(s) IV Push once  dextrose 50% Injectable 12.5 Gram(s) IV Push once  dextrose 50% Injectable 25 Gram(s) IV Push once  dronabinol 2.5 milliGRAM(s) Oral two times a day  gabapentin 300 milliGRAM(s) Oral two times a day  glucagon  Injectable 1 milliGRAM(s) IntraMuscular once  insulin glargine Injectable (LANTUS) 18 Unit(s) SubCutaneous at bedtime  insulin lispro (ADMELOG) corrective regimen sliding scale   SubCutaneous three times a day before meals  insulin lispro (ADMELOG) corrective regimen sliding scale   SubCutaneous at bedtime  insulin lispro Injectable (ADMELOG) 3 Unit(s) SubCutaneous three times a day before meals  lactated ringers. 1000 milliLiter(s) (100 mL/Hr) IV Continuous <Continuous>  lactated ringers. 1000 milliLiter(s) (75 mL/Hr) IV Continuous <Continuous>  lactobacillus acidophilus 1 Tablet(s) Oral two times a day with meals  midodrine. 10 milliGRAM(s) Oral three times a day  pantoprazole    Tablet 40 milliGRAM(s) Oral before breakfast  tamsulosin 0.4 milliGRAM(s) Oral at bedtime  vancomycin  IVPB 1500 milliGRAM(s) IV Intermittent every 24 hours    MEDICATIONS  (PRN):  acetaminophen     Tablet .. 650 milliGRAM(s) Oral every 6 hours PRN Temp greater or equal to 38C (100.4F), Moderate Pain (4 - 6)  dextrose Oral Gel 15 Gram(s) Oral once PRN Blood Glucose LESS THAN 70 milliGRAM(s)/deciliter  loperamide 2 milliGRAM(s) Oral three times a day PRN Diarrhea    Allergies    No Known Allergies    Intolerances        REVIEW OF SYSTEMS:  CONSTITUTIONAL: weakness,  fevers   EYES/ENT: No visual changes  NECK: No pain or stiffness  RESPIRATORY: No shortness of breath  CARDIOVASCULAR: No chest pain or palpitations  GASTROINTESTINAL: No abdominal pain  GENITOURINARY: No hematuria  NEUROLOGICAL: No weakness  SKIN: No rash  All other review of systems is negative unless indicated above    VITAL SIGNS:   Vital Signs Last 24 Hrs  T(C): 36.8 (13 Oct 2023 11:56), Max: 38.2 (12 Oct 2023 19:49)  T(F): 98.2 (13 Oct 2023 11:56), Max: 100.7 (12 Oct 2023 19:49)  HR: 60 (13 Oct 2023 11:56) (59 - 65)  BP: 110/61 (13 Oct 2023 11:56) (98/59 - 114/64)  BP(mean): --  RR: 18 (13 Oct 2023 11:56) (17 - 18)  SpO2: 95% (13 Oct 2023 11:56) (94% - 99%)    Parameters below as of 13 Oct 2023 11:56  Patient On (Oxygen Delivery Method): room air      I&O's Summary    12 Oct 2023 07:01  -  13 Oct 2023 07:00  --------------------------------------------------------  IN: 0 mL / OUT: 500 mL / NET: -500 mL      PHYSICAL EXAM:  Constitutional: NAD  Neurological: Alert and oriented  HEENT: EOMI, no JVD  Cardiovascular: S1 and S2, no murmur  Pulmonary: breath sounds bilaterally  Gastrointestinal: Bowel Sounds present, soft, nontender  Ext: no peripheral edema  Skin: No rashes, No cyanosis.  Psych:  Mood calm    LABS: All Labs Reviewed:                        8.4    4.58  )-----------( 224      ( 13 Oct 2023 08:18 )             26.5     10-13    138  |  113<H>  |  47<H>  ----------------------------<  138<H>  3.8   |  19<L>  |  1.00    Ca    8.1<L>      13 Oct 2023 08:18  Phos  3.0     10-12  Mg     1.8     10-12        CARDIAC MARKERS ( 12 Oct 2023 05:30 )  x     / x     / 98 U/L / x     / 4.1 ng/mL  CARDIAC MARKERS ( 12 Oct 2023 00:54 )  x     / x     / 90 U/L / x     / 3.4 ng/mL      12 Lead ECG:   Ventricular Rate 60 BPM    Atrial Rate 60 BPM    P-R Interval 176 ms    QRS Duration 202 ms    Q-T Interval 502 ms    QTC Calculation(Bazett) 502 ms    R Axis -75 degrees    T Axis 94 degrees    Diagnosis Line AV dual-paced rhythm  Abnormal ECG    Confirmed by Marla Wadsworth ([id number]) on 10/12/2023 3:48:44 PM (10-12-23 @ 01:58)  CT Head No Cont:   ACC: [id number] EXAM:  CT BRAIN   ORDERED BY: NEEL RADFORD     PROCEDURE DATE:  10/10/2023      INTERPRETATION:  CLINICAL INDICATION: Nausea and vomiting    TECHNIQUE: Axial CT scanning of the brain was obtained from the skull   base to thevertex without the administration of intravenous contrast.   Reformatted coronal and sagittal images were subsequently obtained and   reviewed.    COMPARISON: None    FINDINGS:  There is no CT evidence of acute transcortical infarct. Age-related   involutional changes and chronic microvascular ischemic changes.    There is no hydrocephalus, mass effect, or acute intracranial hemorrhage.   No extra-axial collection. Basal cisterns are patent.    The visualized paranasal sinuses and mastoid air cells are clear.    The calvarium is intact.    IMPRESSION:  No evidence of acute transcortical infarct, hydrocephalus, acute   intracranial hemorrhage, or mass effect.    --- End of Report ---      TIFFANIE BLANCO MD; Attending Radiologist  This document has been electronically signed. Oct 10 2023  7:28PM (10-10-23 @ 19:03)  12 Lead ECG:   Ventricular Rate 67 BPM    Atrial Rate 81 BPM    P-R Interval 184 ms    QRS Duration 186 ms    Q-T Interval 452 ms    QTC Calculation(Bazett) 477 ms    R Axis -73 degrees    T Axis 94 degrees    Diagnosis Line AV dual-paced rhythm  Baseline artifact  Abnormal ECG    Confirmed by Marla Wadsworth ([id number]) on 10/12/2023 3:48:38 PM (10-10-23 @ 18:00)  12 Lead ECG:   Ventricular Rate 73 BPM    Atrial Rate 72 BPM    P-R Interval 186 ms    QRS Duration 180 ms    Q-T Interval 454 ms    QTC Calculation(Bazett) 500 ms    P Axis 54 degrees    R Axis -70 degrees    T Axis 90 degrees    Diagnosis Line AV dual-paced rhythm with occasional premature ventricular complexes  Abnormal ECG  No previous ECGs available  Confirmed by Cecilio Knapp MD ([id number]) on 10/11/2023 12:54:00 PM (10-10-23 @ 17:59)    78-year-old male PMH HTN, PPM, DM, foot ulcers , presents to the hospital by ambulance from home.  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 Pt does not go to doctor on regular basis per son and does phone visits.    TTE: CONCLUSIONS:      1. Technically difficult image quality.   2. There is abnormal septal activation, likely from a paced rhythm. There is likely superimposed anterior and septal hypokinesis. Limited views and limited endocardial definition make further interpretation limited.   3. The right ventricle is not well visualized. Normal right ventricular cavity size and reduced systolic function.   4. The left atrium is mildly dilated in size.   5. Right atrium was not well visualized.   6. Mild calcification of the aortic valve leaflets.   7. Tricuspid valve was not well visualized.   8. Aortic valve was not well visualized.    +Bacteremia. Called by Cardio NP for consultation and to arrange GISSELL to R/O Endocarditis or PPM related infection. Order placed.   Cant ASA, Statin  Pt has seen Dave Panchal and [facility] outpt 2021, [phone]  pt poor historian  will follow here       [facility] Cardiology    CHIEF COMPLAINT: Patient is a 78y old  Male who presents with a chief complaint of fever and weakness (13 Oct 2023 14:29)      HPI:  78-year-old male PMH HTN, PPM, DM, foot ulcers , presents to the hospital by ambulance from home.  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 Pt does not go to doctor on regualar basis per son and does phone visits.   (11 Oct 2023 07:02)    PAST MEDICAL & SURGICAL HISTORY:  Diabetes      Benign essential HTN      Pacemaker      History of BPH      Diabetic foot ulcers        SOCIAL HISTORY: no tobacco  FAMILY HISTORY:   HTN  MEDICATIONS  (STANDING):  aspirin enteric coated 81 milliGRAM(s) Oral daily  atorvastatin 80 milliGRAM(s) Oral at bedtime  bethanechol 25 milliGRAM(s) Oral two times a day  cefepime   IVPB 2000 milliGRAM(s) IV Intermittent every 8 hours  dextrose 5%. 1000 milliLiter(s) (50 mL/Hr) IV Continuous <Continuous>  dextrose 5%. 1000 milliLiter(s) (100 mL/Hr) IV Continuous <Continuous>  dextrose 50% Injectable 25 Gram(s) IV Push once  dextrose 50% Injectable 12.5 Gram(s) IV Push once  dextrose 50% Injectable 25 Gram(s) IV Push once  dronabinol 2.5 milliGRAM(s) Oral two times a day  gabapentin 300 milliGRAM(s) Oral two times a day  glucagon  Injectable 1 milliGRAM(s) IntraMuscular once  insulin glargine Injectable (LANTUS) 18 Unit(s) SubCutaneous at bedtime  insulin lispro (ADMELOG) corrective regimen sliding scale   SubCutaneous three times a day before meals  insulin lispro (ADMELOG) corrective regimen sliding scale   SubCutaneous at bedtime  insulin lispro Injectable (ADMELOG) 3 Unit(s) SubCutaneous three times a day before meals  lactated ringers. 1000 milliLiter(s) (100 mL/Hr) IV Continuous <Continuous>  lactated ringers. 1000 milliLiter(s) (75 mL/Hr) IV Continuous <Continuous>  lactobacillus acidophilus 1 Tablet(s) Oral two times a day with meals  midodrine. 10 milliGRAM(s) Oral three times a day  pantoprazole    Tablet 40 milliGRAM(s) Oral before breakfast  tamsulosin 0.4 milliGRAM(s) Oral at bedtime  vancomycin  IVPB 1500 milliGRAM(s) IV Intermittent every 24 hours    MEDICATIONS  (PRN):  acetaminophen     Tablet .. 650 milliGRAM(s) Oral every 6 hours PRN Temp greater or equal to 38C (100.4F), Moderate Pain (4 - 6)  dextrose Oral Gel 15 Gram(s) Oral once PRN Blood Glucose LESS THAN 70 milliGRAM(s)/deciliter  loperamide 2 milliGRAM(s) Oral three times a day PRN Diarrhea    Allergies    No Known Allergies    Intolerances        REVIEW OF SYSTEMS:  CONSTITUTIONAL: weakness,  fevers   EYES/ENT: No visual changes  NECK: No pain or stiffness  RESPIRATORY: No shortness of breath  CARDIOVASCULAR: No chest pain or palpitations  GASTROINTESTINAL: No abdominal pain  GENITOURINARY: No hematuria  NEUROLOGICAL: No weakness  SKIN: No rash  All other review of systems is negative unless indicated above    VITAL SIGNS:   Vital Signs Last 24 Hrs  T(C): 36.8 (13 Oct 2023 11:56), Max: 38.2 (12 Oct 2023 19:49)  T(F): 98.2 (13 Oct 2023 11:56), Max: 100.7 (12 Oct 2023 19:49)  HR: 60 (13 Oct 2023 11:56) (59 - 65)  BP: 110/61 (13 Oct 2023 11:56) (98/59 - 114/64)  BP(mean): --  RR: 18 (13 Oct 2023 11:56) (17 - 18)  SpO2: 95% (13 Oct 2023 11:56) (94% - 99%)    Parameters below as of 13 Oct 2023 11:56  Patient On (Oxygen Delivery Method): room air      I&O's Summary    12 Oct 2023 07:01  -  13 Oct 2023 07:00  --------------------------------------------------------  IN: 0 mL / OUT: 500 mL / NET: -500 mL      PHYSICAL EXAM:  Constitutional: NAD  Neurological: Alert and oriented  HEENT: EOMI, no JVD  Cardiovascular: S1 and S2, no murmur  Pulmonary: breath sounds bilaterally  Gastrointestinal: Bowel Sounds present, soft, nontender  Ext: no peripheral edema  Skin: No rashes, No cyanosis.  Psych:  Mood calm    LABS: All Labs Reviewed:                        8.4    4.58  )-----------( 224      ( 13 Oct 2023 08:18 )             26.5     10-13    138  |  113<H>  |  47<H>  ----------------------------<  138<H>  3.8   |  19<L>  |  1.00    Ca    8.1<L>      13 Oct 2023 08:18  Phos  3.0     10-12  Mg     1.8     10-12        CARDIAC MARKERS ( 12 Oct 2023 05:30 )  x     / x     / 98 U/L / x     / 4.1 ng/mL  CARDIAC MARKERS ( 12 Oct 2023 00:54 )  x     / x     / 90 U/L / x     / 3.4 ng/mL      12 Lead ECG:   Ventricular Rate 60 BPM    Atrial Rate 60 BPM    P-R Interval 176 ms    QRS Duration 202 ms    Q-T Interval 502 ms    QTC Calculation(Bazett) 502 ms    R Axis -75 degrees    T Axis 94 degrees    Diagnosis Line AV dual-paced rhythm  Abnormal ECG    Confirmed by Marla Wadsworth ([id number]) on 10/12/2023 3:48:44 PM (10-12-23 @ 01:58)  CT Head No Cont:   ACC: [id number] EXAM:  CT BRAIN   ORDERED BY: NEEL RADFORD     PROCEDURE DATE:  10/10/2023      INTERPRETATION:  CLINICAL INDICATION: Nausea and vomiting    TECHNIQUE: Axial CT scanning of the brain was obtained from the skull   base to thevertex without the administration of intravenous contrast.   Reformatted coronal and sagittal images were subsequently obtained and   reviewed.    COMPARISON: None    FINDINGS:  There is no CT evidence of acute transcortical infarct. Age-related   involutional changes and chronic microvascular ischemic changes.    There is no hydrocephalus, mass effect, or acute intracranial hemorrhage.   No extra-axial collection. Basal cisterns are patent.    The visualized paranasal sinuses and mastoid air cells are clear.    The calvarium is intact.    IMPRESSION:  No evidence of acute transcortical infarct, hydrocephalus, acute   intracranial hemorrhage, or mass effect.    --- End of Report ---      TIFFANIE BLANCO MD; Attending Radiologist  This document has been electronically signed. Oct 10 2023  7:28PM (10-10-23 @ 19:03)  12 Lead ECG:   Ventricular Rate 67 BPM    Atrial Rate 81 BPM    P-R Interval 184 ms    QRS Duration 186 ms    Q-T Interval 452 ms    QTC Calculation(Bazett) 477 ms    R Axis -73 degrees    T Axis 94 degrees    Diagnosis Line AV dual-paced rhythm  Baseline artifact  Abnormal ECG    Confirmed by Marla Wadsworth ([id number]) on 10/12/2023 3:48:38 PM (10-10-23 @ 18:00)  12 Lead ECG:   Ventricular Rate 73 BPM    Atrial Rate 72 BPM    P-R Interval 186 ms    QRS Duration 180 ms    Q-T Interval 454 ms    QTC Calculation(Bazett) 500 ms    P Axis 54 degrees    R Axis -70 degrees    T Axis 90 degrees    Diagnosis Line AV dual-paced rhythm with occasional premature ventricular complexes  Abnormal ECG  No previous ECGs available  Confirmed by Cecilio Knapp MD ([id number]) on 10/11/2023 12:54:00 PM (10-10-23 @ 17:59)    78-year-old male PMH HTN, PPM, DM, foot ulcers , presents to the hospital by ambulance from home.  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 Pt does not go to doctor on regular basis per son and does phone visits.    TTE: CONCLUSIONS:      1. Technically difficult image quality.   2. There is abnormal septal activation, likely from a paced rhythm. There is likely superimposed anterior and septal hypokinesis. Limited views and limited endocardial definition make further interpretation limited.   3. The right ventricle is not well visualized. Normal right ventricular cavity size and reduced systolic function.   4. The left atrium is mildly dilated in size.   5. Right atrium was not well visualized.   6. Mild calcification of the aortic valve leaflets.   7. Tricuspid valve was not well visualized.   8. Aortic valve was not well visualized.    +Bacteremia. Called by Cardio NP for consultation and to arrange GISSELL to R/O Endocarditis or PPM related infection. Order placed.   Cant ASA, Statin  Pt has seen Dave Panchal and [facility] outpt 2021, [phone]  pt poor historian  will follow here       [facility] Cardiology    CHIEF COMPLAINT: Patient is a 78y old  Male who presents with a chief complaint of fever and weakness (13 Oct 2023 14:29)      HPI:  78-year-old male PMH HTN, PPM, DM, foot ulcers , presents to the hospital by ambulance from home.  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 Pt does not go to doctor on regualar basis per son and does phone visits.   (11 Oct 2023 07:02)    PAST MEDICAL & SURGICAL HISTORY:  Diabetes      Benign essential HTN      Pacemaker      History of BPH      Diabetic foot ulcers        SOCIAL HISTORY: no tobacco  FAMILY HISTORY:   HTN  MEDICATIONS  (STANDING):  aspirin enteric coated 81 milliGRAM(s) Oral daily  atorvastatin 80 milliGRAM(s) Oral at bedtime  bethanechol 25 milliGRAM(s) Oral two times a day  cefepime   IVPB 2000 milliGRAM(s) IV Intermittent every 8 hours  dextrose 5%. 1000 milliLiter(s) (50 mL/Hr) IV Continuous <Continuous>  dextrose 5%. 1000 milliLiter(s) (100 mL/Hr) IV Continuous <Continuous>  dextrose 50% Injectable 25 Gram(s) IV Push once  dextrose 50% Injectable 12.5 Gram(s) IV Push once  dextrose 50% Injectable 25 Gram(s) IV Push once  dronabinol 2.5 milliGRAM(s) Oral two times a day  gabapentin 300 milliGRAM(s) Oral two times a day  glucagon  Injectable 1 milliGRAM(s) IntraMuscular once  insulin glargine Injectable (LANTUS) 18 Unit(s) SubCutaneous at bedtime  insulin lispro (ADMELOG) corrective regimen sliding scale   SubCutaneous three times a day before meals  insulin lispro (ADMELOG) corrective regimen sliding scale   SubCutaneous at bedtime  insulin lispro Injectable (ADMELOG) 3 Unit(s) SubCutaneous three times a day before meals  lactated ringers. 1000 milliLiter(s) (100 mL/Hr) IV Continuous <Continuous>  lactated ringers. 1000 milliLiter(s) (75 mL/Hr) IV Continuous <Continuous>  lactobacillus acidophilus 1 Tablet(s) Oral two times a day with meals  midodrine. 10 milliGRAM(s) Oral three times a day  pantoprazole    Tablet 40 milliGRAM(s) Oral before breakfast  tamsulosin 0.4 milliGRAM(s) Oral at bedtime  vancomycin  IVPB 1500 milliGRAM(s) IV Intermittent every 24 hours    MEDICATIONS  (PRN):  acetaminophen     Tablet .. 650 milliGRAM(s) Oral every 6 hours PRN Temp greater or equal to 38C (100.4F), Moderate Pain (4 - 6)  dextrose Oral Gel 15 Gram(s) Oral once PRN Blood Glucose LESS THAN 70 milliGRAM(s)/deciliter  loperamide 2 milliGRAM(s) Oral three times a day PRN Diarrhea    Allergies    No Known Allergies    Intolerances        REVIEW OF SYSTEMS:  CONSTITUTIONAL: weakness,  fevers   EYES/ENT: No visual changes  NECK: No pain or stiffness  RESPIRATORY: No shortness of breath  CARDIOVASCULAR: No chest pain or palpitations  GASTROINTESTINAL: No abdominal pain  GENITOURINARY: No hematuria  NEUROLOGICAL: No weakness  SKIN: No rash  All other review of systems is negative unless indicated above    VITAL SIGNS:   Vital Signs Last 24 Hrs  T(C): 36.8 (13 Oct 2023 11:56), Max: 38.2 (12 Oct 2023 19:49)  T(F): 98.2 (13 Oct 2023 11:56), Max: 100.7 (12 Oct 2023 19:49)  HR: 60 (13 Oct 2023 11:56) (59 - 65)  BP: 110/61 (13 Oct 2023 11:56) (98/59 - 114/64)  BP(mean): --  RR: 18 (13 Oct 2023 11:56) (17 - 18)  SpO2: 95% (13 Oct 2023 11:56) (94% - 99%)    Parameters below as of 13 Oct 2023 11:56  Patient On (Oxygen Delivery Method): room air      I&O's Summary    12 Oct 2023 07:01  -  13 Oct 2023 07:00  --------------------------------------------------------  IN: 0 mL / OUT: 500 mL / NET: -500 mL      PHYSICAL EXAM:  Constitutional: NAD  Neurological: Alert and oriented  HEENT: EOMI, no JVD  Cardiovascular: S1 and S2, no murmur  Pulmonary: breath sounds bilaterally  Gastrointestinal: Bowel Sounds present, soft, nontender  Ext: no peripheral edema  Skin: No rashes, No cyanosis.  Psych:  Mood calm    LABS: All Labs Reviewed:                        8.4    4.58  )-----------( 224      ( 13 Oct 2023 08:18 )             26.5     10-13    138  |  113<H>  |  47<H>  ----------------------------<  138<H>  3.8   |  19<L>  |  1.00    Ca    8.1<L>      13 Oct 2023 08:18  Phos  3.0     10-12  Mg     1.8     10-12        CARDIAC MARKERS ( 12 Oct 2023 05:30 )  x     / x     / 98 U/L / x     / 4.1 ng/mL  CARDIAC MARKERS ( 12 Oct 2023 00:54 )  x     / x     / 90 U/L / x     / 3.4 ng/mL      12 Lead ECG:   Ventricular Rate 60 BPM    Atrial Rate 60 BPM    P-R Interval 176 ms    QRS Duration 202 ms    Q-T Interval 502 ms    QTC Calculation(Bazett) 502 ms    R Axis -75 degrees    T Axis 94 degrees    Diagnosis Line AV dual-paced rhythm  Abnormal ECG    Confirmed by Marla Wadsworth ([id number]) on 10/12/2023 3:48:44 PM (10-12-23 @ 01:58)  CT Head No Cont:   ACC: [id number] EXAM:  CT BRAIN   ORDERED BY: NEEL RADFORD     PROCEDURE DATE:  10/10/2023      INTERPRETATION:  CLINICAL INDICATION: Nausea and vomiting    TECHNIQUE: Axial CT scanning of the brain was obtained from the skull   base to thevertex without the administration of intravenous contrast.   Reformatted coronal and sagittal images were subsequently obtained and   reviewed.    COMPARISON: None    FINDINGS:  There is no CT evidence of acute transcortical infarct. Age-related   involutional changes and chronic microvascular ischemic changes.    There is no hydrocephalus, mass effect, or acute intracranial hemorrhage.   No extra-axial collection. Basal cisterns are patent.    The visualized paranasal sinuses and mastoid air cells are clear.    The calvarium is intact.    IMPRESSION:  No evidence of acute transcortical infarct, hydrocephalus, acute   intracranial hemorrhage, or mass effect.    --- End of Report ---      TIFFANIE BLANCO MD; Attending Radiologist  This document has been electronically signed. Oct 10 2023  7:28PM (10-10-23 @ 19:03)  12 Lead ECG:   Ventricular Rate 67 BPM    Atrial Rate 81 BPM    P-R Interval 184 ms    QRS Duration 186 ms    Q-T Interval 452 ms    QTC Calculation(Bazett) 477 ms    R Axis -73 degrees    T Axis 94 degrees    Diagnosis Line AV dual-paced rhythm  Baseline artifact  Abnormal ECG    Confirmed by Marla Wadsworth ([id number]) on 10/12/2023 3:48:38 PM (10-10-23 @ 18:00)  12 Lead ECG:   Ventricular Rate 73 BPM    Atrial Rate 72 BPM    P-R Interval 186 ms    QRS Duration 180 ms    Q-T Interval 454 ms    QTC Calculation(Bazett) 500 ms    P Axis 54 degrees    R Axis -70 degrees    T Axis 90 degrees    Diagnosis Line AV dual-paced rhythm with occasional premature ventricular complexes  Abnormal ECG  No previous ECGs available  Confirmed by Cecilio Knapp MD ([id number]) on 10/11/2023 12:54:00 PM (10-10-23 @ 17:59)    78-year-old male PMH HTN, PPM, DM, foot ulcers , presents to the hospital by ambulance from home.  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 Pt does not go to doctor on regular basis per son and does phone visits.    TTE: CONCLUSIONS:      1. Technically difficult image quality.   2. There is abnormal septal activation, likely from a paced rhythm. There is likely superimposed anterior and septal hypokinesis. Limited views and limited endocardial definition make further interpretation limited.   3. The right ventricle is not well visualized. Normal right ventricular cavity size and reduced systolic function.   4. The left atrium is mildly dilated in size.   5. Right atrium was not well visualized.   6. Mild calcification of the aortic valve leaflets.   7. Tricuspid valve was not well visualized.   8. Aortic valve was not well visualized.    +Bacteremia. Called by Cardio NP for consultation and to arrange GISSELL to R/O Endocarditis or PPM related infection. Order placed.   Cant ASA, Statin  Pt has seen Dave Panchal and [facility] out[facility] 2021, [phone]  pt poor historian  will follow here

## 2023-10-13 NOTE — PROGRESS NOTE ADULT - PROBLEM SELECTOR PLAN 2
id noted  rpt blood cultures pending  iv zosyn and vanco  nuclear bone scan to r/o om once stable  fu tte - may need tessie if bacteremia does not clear  podiatry fu

## 2023-10-13 NOTE — PROGRESS NOTE ADULT - SUBJECTIVE AND OBJECTIVE BOX
[facility], Division of Infectious Diseases  GLYNN Oakley Y. Patel, S. Shah, G. [facility]  [phone]    Name: HEMA LAZCANO  Age: 78y  Gender: Male  MRN: [id number]    Interval History:  Patient seen and examined at bedside  No acute overnight events. Afebrile  No complaints  Notes reviewed    Antibiotics:  cefepime   IVPB 2000 milliGRAM(s) IV Intermittent every 8 hours  vancomycin  IVPB 1500 milliGRAM(s) IV Intermittent every 24 hours      Medications:  acetaminophen     Tablet .. 650 milliGRAM(s) Oral every 6 hours PRN  aspirin enteric coated 81 milliGRAM(s) Oral daily  atorvastatin 80 milliGRAM(s) Oral at bedtime  bethanechol 25 milliGRAM(s) Oral two times a day  cefepime   IVPB 2000 milliGRAM(s) IV Intermittent every 8 hours  dextrose 5%. 1000 milliLiter(s) IV Continuous <Continuous>  dextrose 5%. 1000 milliLiter(s) IV Continuous <Continuous>  dextrose 50% Injectable 25 Gram(s) IV Push once  dextrose 50% Injectable 12.5 Gram(s) IV Push once  dextrose 50% Injectable 25 Gram(s) IV Push once  dextrose Oral Gel 15 Gram(s) Oral once PRN  dronabinol 2.5 milliGRAM(s) Oral two times a day  gabapentin 300 milliGRAM(s) Oral two times a day  glucagon  Injectable 1 milliGRAM(s) IntraMuscular once  insulin glargine Injectable (LANTUS) 18 Unit(s) SubCutaneous at bedtime  insulin lispro (ADMELOG) corrective regimen sliding scale   SubCutaneous at bedtime  insulin lispro (ADMELOG) corrective regimen sliding scale   SubCutaneous three times a day before meals  insulin lispro Injectable (ADMELOG) 3 Unit(s) SubCutaneous three times a day before meals  lactated ringers. 1000 milliLiter(s) IV Continuous <Continuous>  lactated ringers. 1000 milliLiter(s) IV Continuous <Continuous>  lactobacillus acidophilus 1 Tablet(s) Oral two times a day with meals  loperamide 2 milliGRAM(s) Oral three times a day PRN  midodrine. 10 milliGRAM(s) Oral three times a day  pantoprazole    Tablet 40 milliGRAM(s) Oral before breakfast  tamsulosin 0.4 milliGRAM(s) Oral at bedtime  vancomycin  IVPB 1500 milliGRAM(s) IV Intermittent every 24 hours      Review of Systems:  A 10-point review of systems was obtained.  Review of systems otherwise negative except as previously noted.    Allergies: No Known Allergies    For details regarding the patient's past medical history, social history, family history, and other miscellaneous elements, please refer the initial infectious diseases consultation and/or the admitting history and physical examination for this admission.    Objective:  Vitals:   T(C): 36.8 (10-13-23 @ 11:56), Max: 38.2 (10-12-23 @ 19:49)  HR: 60 (10-13-23 @ 11:56) (59 - 65)  BP: 110/61 (10-13-23 @ 11:56) (64/24 - 114/64)  RR: 18 (10-13-23 @ 11:56) (17 - 18)  SpO2: 95% (10-13-23 @ 11:56) (94% - 99%)    Physical Examination:  General: no acute distress  HEENT: NC/AT, EOMI  Cardio: S1, S2 heard, RRR, no murmurs  Resp: breath sounds heard bilaterally, no rales, wheezes or rhonchi  Abd: soft, NT, ND  Ext: b/l feet in dressing  Skin: warm, dry, no visible rash      Laboratory Studies:  CBC:                       8.4    4.58  )-----------( 224      ( 13 Oct 2023 08:18 )             26.5     CMP: 10-13    138  |  113<H>  |  47<H>  ----------------------------<  138<H>  3.8   |  19<L>  |  1.00    Ca    8.1<L>      13 Oct 2023 08:18  Phos  3.0     10-12  Mg     1.8     10-12        Urinalysis Basic - ( 13 Oct 2023 08:18 )    Color: x / Appearance: x / SG: x / pH: x  Gluc: 138 mg/dL / Ketone: x  / Bili: x / Urobili: x   Blood: x / Protein: x / Nitrite: x   Leuk Esterase: x / RBC: x / WBC x   Sq Epi: x / Non Sq Epi: x / Bacteria: x        Microbiology: reviewed    Culture - Blood (collected 10-12-23 @ 05:36)  Source: .Blood Blood-Peripheral  Preliminary Report (10-13-23 @ 10:02):    No growth at 24 hours    Culture - Blood (collected 10-12-23 @ 05:30)  Source: .Blood Blood-Venous  Preliminary Report (10-13-23 @ 10:02):    No growth at 24 hours    Culture - Other (collected 10-11-23 @ 13:13)  Source: Wound Wound  Preliminary Report (10-12-23 @ 20:49):    Culture in progress    Culture - Urine (collected 10-10-23 @ 21:00)  Source: Catheterized Catheterized  Preliminary Report (10-12-23 @ 16:41):    10,000 - 49,000 CFU/mL Pseudomonas aeruginosa    Culture - Blood (collected 10-10-23 @ 18:25)  Source: .Blood Blood-Peripheral  Gram Stain (10-11-23 @ 21:56):    Growth in aerobic bottle: Gram Positive Cocci in Clusters    Growth in anaerobic bottle: Gram Positive Cocci in Clusters  Preliminary Report (10-12-23 @ 11:56):    Growth in anaerobic bottle: Staphylococcus aureus    See previous culture [id number]    Culture - Blood (collected 10-10-23 @ 18:25)  Source: .Blood Blood-Peripheral  Gram Stain (10-11-23 @ 15:29):    Growth in aerobic bottle: Gram Positive Cocci in Clusters    Growth in anaerobic bottle: Gram Positive Cocci in Clusters  Final Report (10-13-23 @ 12:50):    Growth in aerobic and anaerobic bottles: Methicillin Resistant    Staphylococcus aureus    Direct identification is available within approximately 3-5    hours either by Blood Panel Multiplexed PCR or Direct    MALDI-TOF. Details: [email or web address]  Organism: Blood Culture PCR  Methicillin resistant Staphylococcus aureus (10-13-23 @ 12:50)  Organism: Methicillin resistant Staphylococcus aureus (10-13-23 @ 12:50)      -  Clindamycin: I 2      -  Oxacillin: R >2      -  Gentamicin: S <=1 Should not be used as monotherapy      -  Daptomycin: S 1      -  Linezolid: S 4      -  Cefazolin: R <=4      -  Vancomycin: S 1      -  Tetracycline: S <=1      Method Type: KARTHIK      -  Ampicillin/Sulbactam: R <=8/4      -  Penicillin: R 2      -  Rifampin: S <=1 Should not be used as monotherapy      -  Erythromycin: R >4      -  Trimethoprim/Sulfamethoxazole: S <=0.5/9.5  Organism: Blood Culture PCR (10-13-23 @ 12:50)      Method Type: PCR      -  Methicillin resistant Staphylococcus aureus (MRSA): Detec          Radiology: reviewed       [facility], Division of Infectious Diseases  GLYNN Oakley Y. Patel, S. Shah, G. [facility]  [phone]    Name: HEMA LAZCANO  Age: 78y  Gender: Male  MRN: [id number]    Interval History:  Patient seen and examined at bedside  No acute overnight events. Afebrile  No complaints  Notes reviewed    Antibiotics:  cefepime   IVPB 2000 milliGRAM(s) IV Intermittent every 8 hours  vancomycin  IVPB 1500 milliGRAM(s) IV Intermittent every 24 hours      Medications:  acetaminophen     Tablet .. 650 milliGRAM(s) Oral every 6 hours PRN  aspirin enteric coated 81 milliGRAM(s) Oral daily  atorvastatin 80 milliGRAM(s) Oral at bedtime  bethanechol 25 milliGRAM(s) Oral two times a day  cefepime   IVPB 2000 milliGRAM(s) IV Intermittent every 8 hours  dextrose 5%. 1000 milliLiter(s) IV Continuous <Continuous>  dextrose 5%. 1000 milliLiter(s) IV Continuous <Continuous>  dextrose 50% Injectable 25 Gram(s) IV Push once  dextrose 50% Injectable 12.5 Gram(s) IV Push once  dextrose 50% Injectable 25 Gram(s) IV Push once  dextrose Oral Gel 15 Gram(s) Oral once PRN  dronabinol 2.5 milliGRAM(s) Oral two times a day  gabapentin 300 milliGRAM(s) Oral two times a day  glucagon  Injectable 1 milliGRAM(s) IntraMuscular once  insulin glargine Injectable (LANTUS) 18 Unit(s) SubCutaneous at bedtime  insulin lispro (ADMELOG) corrective regimen sliding scale   SubCutaneous at bedtime  insulin lispro (ADMELOG) corrective regimen sliding scale   SubCutaneous three times a day before meals  insulin lispro Injectable (ADMELOG) 3 Unit(s) SubCutaneous three times a day before meals  lactated ringers. 1000 milliLiter(s) IV Continuous <Continuous>  lactated ringers. 1000 milliLiter(s) IV Continuous <Continuous>  lactobacillus acidophilus 1 Tablet(s) Oral two times a day with meals  loperamide 2 milliGRAM(s) Oral three times a day PRN  midodrine. 10 milliGRAM(s) Oral three times a day  pantoprazole    Tablet 40 milliGRAM(s) Oral before breakfast  tamsulosin 0.4 milliGRAM(s) Oral at bedtime  vancomycin  IVPB 1500 milliGRAM(s) IV Intermittent every 24 hours      Review of Systems:  A 10-point review of systems was obtained.  Review of systems otherwise negative except as previously noted.    Allergies: No Known Allergies    For details regarding the patient's past medical history, social history, family history, and other miscellaneous elements, please refer the initial infectious diseases consultation and/or the admitting history and physical examination for this admission.    Objective:  Vitals:   T(C): 36.8 (10-13-23 @ 11:56), Max: 38.2 (10-12-23 @ 19:49)  HR: 60 (10-13-23 @ 11:56) (59 - 65)  BP: 110/61 (10-13-23 @ 11:56) (64/24 - 114/64)  RR: 18 (10-13-23 @ 11:56) (17 - 18)  SpO2: 95% (10-13-23 @ 11:56) (94% - 99%)    Physical Examination:  General: no acute distress  HEENT: NC/AT, EOMI  Cardio: S1, S2 heard, RRR, no murmurs  Resp: breath sounds heard bilaterally, no rales, wheezes or rhonchi  Abd: soft, NT, ND  Ext: b/l feet in dressing  Skin: warm, dry, no visible rash      Laboratory Studies:  CBC:                       8.4    4.58  )-----------( 224      ( 13 Oct 2023 08:18 )             26.5     CMP: 10-13    138  |  113<H>  |  47<H>  ----------------------------<  138<H>  3.8   |  19<L>  |  1.00    Ca    8.1<L>      13 Oct 2023 08:18  Phos  3.0     10-12  Mg     1.8     10-12        Urinalysis Basic - ( 13 Oct 2023 08:18 )    Color: x / Appearance: x / SG: x / pH: x  Gluc: 138 mg/dL / Ketone: x  / Bili: x / Urobili: x   Blood: x / Protein: x / Nitrite: x   Leuk Esterase: x / RBC: x / WBC x   Sq Epi: x / Non Sq Epi: x / Bacteria: x        Microbiology: reviewed    Culture - Blood (collected 10-12-23 @ 05:36)  Source: .Blood Blood-Peripheral  Preliminary Report (10-13-23 @ 10:02):    No growth at 24 hours    Culture - Blood (collected 10-12-23 @ 05:30)  Source: .Blood Blood-Venous  Preliminary Report (10-13-23 @ 10:02):    No growth at 24 hours    Culture - Other (collected 10-11-23 @ 13:13)  Source: Wound Wound  Preliminary Report (10-12-23 @ 20:49):    Culture in progress    Culture - Urine (collected 10-10-23 @ 21:00)  Source: Catheterized Catheterized  Preliminary Report (10-12-23 @ 16:41):    10,000 - 49,000 CFU/mL Pseudomonas aeruginosa    Culture - Blood (collected 10-10-23 @ 18:25)  Source: .Blood Blood-Peripheral  Gram Stain (10-11-23 @ 21:56):    Growth in aerobic bottle: Gram Positive Cocci in Clusters    Growth in anaerobic bottle: Gram Positive Cocci in Clusters  Preliminary Report (10-12-23 @ 11:56):    Growth in anaerobic bottle: Staphylococcus aureus    See previous culture [id number]    Culture - Blood (collected 10-10-23 @ 18:25)  Source: .Blood Blood-Peripheral  Gram Stain (10-11-23 @ 15:29):    Growth in aerobic bottle: Gram Positive Cocci in Clusters    Growth in anaerobic bottle: Gram Positive Cocci in Clusters  Final Report (10-13-23 @ 12:50):    Growth in aerobic and anaerobic bottles: Methicillin Resistant    Staphylococcus aureus    Direct identification is available within approximately 3-5    hours either by Blood Panel Multiplexed PCR or Direct    MALDI-TOF. Details: [email or web address]  Organism: Blood Culture PCR  Methicillin resistant Staphylococcus aureus (10-13-23 @ 12:50)  Organism: Methicillin resistant Staphylococcus aureus (10-13-23 @ 12:50)      -  Clindamycin: I 2      -  Oxacillin: R >2      -  Gentamicin: S <=1 Should not be used as monotherapy      -  Daptomycin: S 1      -  Linezolid: S 4      -  Cefazolin: R <=4      -  Vancomycin: S 1      -  Tetracycline: S <=1      Method Type: KARTHIK      -  Ampicillin/Sulbactam: R <=8/4      -  Penicillin: R 2      -  Rifampin: S <=1 Should not be used as monotherapy      -  Erythromycin: R >4      -  Trimethoprim/Sulfamethoxazole: S <=0.5/9.5  Organism: Blood Culture PCR (10-13-23 @ 12:50)      Method Type: PCR      -  Methicillin resistant Staphylococcus aureus (MRSA): Detec          Radiology: reviewed       [facility], Division of Infectious Diseases  GLYNN Oakley Y. Patel, S. Shah, G. [facility]  [phone]    Name: HEMA LAZCANO  Age: 78y  Gender: Male  MRN: [id number]    Interval History:  Patient seen and examined at bedside  No acute overnight events. Afebrile  No complaints  Notes reviewed    Antibiotics:  cefepime   IVPB 2000 milliGRAM(s) IV Intermittent every 8 hours  vancomycin  IVPB 1500 milliGRAM(s) IV Intermittent every 24 hours      Medications:  acetaminophen     Tablet .. 650 milliGRAM(s) Oral every 6 hours PRN  aspirin enteric coated 81 milliGRAM(s) Oral daily  atorvastatin 80 milliGRAM(s) Oral at bedtime  bethanechol 25 milliGRAM(s) Oral two times a day  cefepime   IVPB 2000 milliGRAM(s) IV Intermittent every 8 hours  dextrose 5%. 1000 milliLiter(s) IV Continuous <Continuous>  dextrose 5%. 1000 milliLiter(s) IV Continuous <Continuous>  dextrose 50% Injectable 25 Gram(s) IV Push once  dextrose 50% Injectable 12.5 Gram(s) IV Push once  dextrose 50% Injectable 25 Gram(s) IV Push once  dextrose Oral Gel 15 Gram(s) Oral once PRN  dronabinol 2.5 milliGRAM(s) Oral two times a day  gabapentin 300 milliGRAM(s) Oral two times a day  glucagon  Injectable 1 milliGRAM(s) IntraMuscular once  insulin glargine Injectable (LANTUS) 18 Unit(s) SubCutaneous at bedtime  insulin lispro (ADMELOG) corrective regimen sliding scale   SubCutaneous at bedtime  insulin lispro (ADMELOG) corrective regimen sliding scale   SubCutaneous three times a day before meals  insulin lispro Injectable (ADMELOG) 3 Unit(s) SubCutaneous three times a day before meals  lactated ringers. 1000 milliLiter(s) IV Continuous <Continuous>  lactated ringers. 1000 milliLiter(s) IV Continuous <Continuous>  lactobacillus acidophilus 1 Tablet(s) Oral two times a day with meals  loperamide 2 milliGRAM(s) Oral three times a day PRN  midodrine. 10 milliGRAM(s) Oral three times a day  pantoprazole    Tablet 40 milliGRAM(s) Oral before breakfast  tamsulosin 0.4 milliGRAM(s) Oral at bedtime  vancomycin  IVPB 1500 milliGRAM(s) IV Intermittent every 24 hours      Review of Systems:  A 10-point review of systems was obtained.  Review of systems otherwise negative except as previously noted.    Allergies: No Known Allergies    For details regarding the patient's past medical history, social history, family history, and other miscellaneous elements, please refer the initial infectious diseases consultation and/or the admitting history and physical examination for this admission.    Objective:  Vitals:   T(C): 36.8 (10-13-23 @ 11:56), Max: 38.2 (10-12-23 @ 19:49)  HR: 60 (10-13-23 @ 11:56) (59 - 65)  BP: 110/61 (10-13-23 @ 11:56) (64/24 - 114/64)  RR: 18 (10-13-23 @ 11:56) (17 - 18)  SpO2: 95% (10-13-23 @ 11:56) (94% - 99%)    Physical Examination:  General: no acute distress  HEENT: NC/AT, EOMI  Cardio: S1, S2 heard, RRR, no murmurs  Resp: breath sounds heard bilaterally, no rales, wheezes or rhonchi  Abd: soft, NT, ND  Ext: b/l feet in dressing  Skin: warm, dry, no visible rash      Laboratory Studies:  CBC:                       8.4    4.58  )-----------( 224      ( 13 Oct 2023 08:18 )             26.5     CMP: 10-13    138  |  113<H>  |  47<H>  ----------------------------<  138<H>  3.8   |  19<L>  |  1.00    Ca    8.1<L>      13 Oct 2023 08:18  Phos  3.0     10-12  Mg     1.8     10-12        Urinalysis Basic - ( 13 Oct 2023 08:18 )    Color: x / Appearance: x / SG: x / pH: x  Gluc: 138 mg/dL / Ketone: x  / Bili: x / Urobili: x   Blood: x / Protein: x / Nitrite: x   Leuk Esterase: x / RBC: x / WBC x   Sq Epi: x / Non Sq Epi: x / Bacteria: x        Microbiology: reviewed    Culture - Blood (collected 10-12-23 @ 05:36)  Source: .Blood Blood-Peripheral  Preliminary Report (10-13-23 @ 10:02):    No growth at 24 hours    Culture - Blood (collected 10-12-23 @ 05:30)  Source: .Blood Blood-Venous  Preliminary Report (10-13-23 @ 10:02):    No growth at 24 hours    Culture - Other (collected 10-11-23 @ 13:13)  Source: Wound Wound  Preliminary Report (10-12-23 @ 20:49):    Culture in progress    Culture - Urine (collected 10-10-23 @ 21:00)  Source: Catheterized Catheterized  Preliminary Report (10-12-23 @ 16:41):    10,000 - 49,000 CFU/mL Pseudomonas aeruginosa    Culture - Blood (collected 10-10-23 @ 18:25)  Source: .Blood Blood-Peripheral  Gram Stain (10-11-23 @ 21:56):    Growth in aerobic bottle: Gram Positive Cocci in Clusters    Growth in anaerobic bottle: Gram Positive Cocci in Clusters  Preliminary Report (10-12-23 @ 11:56):    Growth in anaerobic bottle: Staphylococcus aureus    See previous culture [id number]    Culture - Blood (collected 10-10-23 @ 18:25)  Source: .Blood Blood-Peripheral  Gram Stain (10-11-23 @ 15:29):    Growth in aerobic bottle: Gram Positive Cocci in Clusters    Growth in anaerobic bottle: Gram Positive Cocci in Clusters  Final Report (10-13-23 @ 12:50):    Growth in aerobic and anaerobic bottles: Methicillin Resistant    Staphylococcus aureus    Direct identification is available within approximately 3-5    hours either by Blood Panel Multiplexed PCR or Direct    MALDI-TOF. Details: [email or web address]  Organism: Blood Culture PCR  Methicillin resistant Staphylococcus aureus (10-13-23 @ 12:50)  Organism: Methicillin resistant Staphylococcus aureus (10-13-23 @ 12:50)      -  Clindamycin: I 2      -  Oxacillin: R >2      -  Gentamicin: S <=1 Should not be used as monotherapy      -  Daptomycin: S 1      -  Linezolid: S 4      -  Cefazolin: R <=4      -  Vancomycin: S 1      -  Tetracycline: S <=1      Method Type: KARTHIK      -  Ampicillin/Sulbactam: R <=8/4      -  Penicillin: R 2      -  Rifampin: S <=1 Should not be used as monotherapy      -  Erythromycin: R >4      -  Trimethoprim/Sulfamethoxazole: S <=0.5/9.5  Organism: Blood Culture PCR (10-13-23 @ 12:50)      Method Type: PCR      -  Methicillin resistant Staphylococcus aureus (MRSA): Detec          Radiology: reviewed

## 2023-10-13 NOTE — CHART NOTE - NSCHARTNOTEFT_GEN_A_CORE
Pt hypotensive and unstable [id number]  Unable to have any vascular testing performed  Awaiting SABRINA/PVR's  Further vascular recommendations following imaging Pt hypotensive and unstable [id number]  Unable to have any vascular testing performed  Awaiting SABRINA/PVR's  Further vascular recommendations following imaging Pt hypotensive and unstable [id number]/13  Unable to have any vascular testing performed  Awaiting SABRINA/PVR's  Further vascular recommendations following imaging

## 2023-10-13 NOTE — PROGRESS NOTE ADULT - SUBJECTIVE AND OBJECTIVE BOX
Patient is a 78y old  Male who presents with a chief complaint of fever and weakness (12 Oct 2023 19:32)      INTERVAL HPI/OVERNIGHT EVENTS: bp still on low side, urinating well, on ra and no cp or sob, chart noted    MEDICATIONS  (STANDING):  aspirin enteric coated 81 milliGRAM(s) Oral daily  bethanechol 25 milliGRAM(s) Oral two times a day  cefepime   IVPB 2000 milliGRAM(s) IV Intermittent every 8 hours  dextrose 5%. 1000 milliLiter(s) (50 mL/Hr) IV Continuous <Continuous>  dextrose 5%. 1000 milliLiter(s) (100 mL/Hr) IV Continuous <Continuous>  dextrose 50% Injectable 25 Gram(s) IV Push once  dextrose 50% Injectable 12.5 Gram(s) IV Push once  dextrose 50% Injectable 25 Gram(s) IV Push once  dronabinol 2.5 milliGRAM(s) Oral two times a day  gabapentin 300 milliGRAM(s) Oral two times a day  glucagon  Injectable 1 milliGRAM(s) IntraMuscular once  insulin glargine Injectable (LANTUS) 18 Unit(s) SubCutaneous at bedtime  insulin lispro (ADMELOG) corrective regimen sliding scale   SubCutaneous three times a day before meals  insulin lispro (ADMELOG) corrective regimen sliding scale   SubCutaneous at bedtime  insulin lispro Injectable (ADMELOG) 3 Unit(s) SubCutaneous three times a day before meals  lactated ringers. 1000 milliLiter(s) (100 mL/Hr) IV Continuous <Continuous>  lactated ringers. 1000 milliLiter(s) (55 mL/Hr) IV Continuous <Continuous>  lactobacillus acidophilus 1 Tablet(s) Oral two times a day with meals  midodrine. 10 milliGRAM(s) Oral three times a day  pantoprazole    Tablet 40 milliGRAM(s) Oral before breakfast  tamsulosin 0.4 milliGRAM(s) Oral at bedtime  vancomycin  IVPB 1500 milliGRAM(s) IV Intermittent every 24 hours    MEDICATIONS  (PRN):  acetaminophen     Tablet .. 650 milliGRAM(s) Oral every 6 hours PRN Temp greater or equal to 38C (100.4F), Moderate Pain (4 - 6)  dextrose Oral Gel 15 Gram(s) Oral once PRN Blood Glucose LESS THAN 70 milliGRAM(s)/deciliter  loperamide 2 milliGRAM(s) Oral three times a day PRN Diarrhea      Allergies    No Known Allergies    Intolerances        REVIEW OF SYSTEMS:  CONSTITUTIONAL: low grade fever  EYES: No eye pain, visual disturbances  ENMT:  No difficulty hearing, tinnitus, vertigo; No sinus or throat pain  NECK: No pain or stiffness  RESPIRATORY: No cough, wheezing, chills or hemoptysis; No shortness of breath  CARDIOVASCULAR: No chest pain, palpitations, dizziness  GASTROINTESTINAL: No abdominal or epigastric pain. No nausea, vomiting, or hematemesis; No diarrhea or constipation. No melena or hematochezia.  GENITOURINARY: No dysuria, frequency, hematuria, or incontinence  NEUROLOGICAL: No headaches, memory loss, loss of strength, numbness, or tremors  SKIN: No itching, burning  LYMPH NODES: No enlarged glands  MUSCULOSKELETAL: no leg pain  PSYCHIATRIC: No depression, mood swings  HEME/LYMPH: No easy bruising, or bleeding gums  ALLERGY AND IMMUNOLOGIC: No hives    Vital Signs Last 24 Hrs  T(C): 36.9 (13 Oct 2023 05:07), Max: 38.2 (12 Oct 2023 19:49)  T(F): 98.5 (13 Oct 2023 05:07), Max: 100.7 (12 Oct 2023 19:49)  HR: 65 (13 Oct 2023 09:43) (59 - 65)  BP: 98/59 (13 Oct 2023 09:43) (64/24 - 114/64)  BP(mean): --  RR: 17 (13 Oct 2023 05:07) (17 - 18)  SpO2: 94% (13 Oct 2023 05:07) (94% - 99%)    Parameters below as of 13 Oct 2023 05:07  Patient On (Oxygen Delivery Method): room air        PHYSICAL EXAM:  GENERAL: NAD, well-groomed, well-developed  HEAD:  Atraumatic, Normocephalic  EYES: EOMI, PERRLA, conjunctiva and sclera clear  ENMT: No tonsillar erythema, exudates, or enlargement   NECK: Supple, No JVD  NERVOUS SYSTEM:  Alert & Oriented X3, Good concentration  CHEST/LUNG: b/l clear bs, no wheezing  HEART: Regular rate and rhythm  ABDOMEN: Soft, Nontender, Nondistended; Bowel sounds present  EXTREMITIES:  2+ Peripheral Pulses, trace edema   LYMPH: No lymphadenopathy noted  SKIN: No rashes     LABS:                        8.4    4.58  )-----------( 224      ( 13 Oct 2023 08:18 )             26.5     13 Oct 2023 08:18    138    |  113    |  47     ----------------------------<  138    3.8     |  19     |  1.00     Ca    8.1        13 Oct 2023 08:18        Urinalysis Basic - ( 13 Oct 2023 08:18 )    Color: x / Appearance: x / SG: x / pH: x  Gluc: 138 mg/dL / Ketone: x  / Bili: x / Urobili: x   Blood: x / Protein: x / Nitrite: x   Leuk Esterase: x / RBC: x / WBC x   Sq Epi: x / Non Sq Epi: x / Bacteria: x      CAPILLARY BLOOD GLUCOSE      POCT Blood Glucose.: 134 mg/dL (13 Oct 2023 08:19)  POCT Blood Glucose.: 156 mg/dL (12 Oct 2023 21:15)  POCT Blood Glucose.: 187 mg/dL (12 Oct 2023 17:10)  POCT Blood Glucose.: 162 mg/dL (12 Oct 2023 13:13)    blood culture --   No growth at 24 hours   10-12 @ 05:36    blood culture --   No growth at 24 hours   10-12 @ 05:30    blood culture --   Culture in progress   10-11 @ 13:13    blood culture --   10,000 - 49,000 CFU/mL Pseudomonas aeruginosa   10-10 @ 21:00    blood culture --   Growth in aerobic and anaerobic bottles: Staphylococcus aureus  Direct identification is available within approximately 3-5  hours either by Blood Panel Multiplexed PCR or Direct  MALDI-TOF. Details: [email or web address]   10-10 @ 18:25      urine culture --  10-12 @ 05:36  results   No growth at 24 hours 10-12 @ 05:36  urine culture --  10-12 @ 05:30  results   No growth at 24 hours 10-12 @ 05:30  urine culture --  10-11 @ 13:13  results   Culture in progress 10-11 @ 13:13  urine culture --  10-10 @ 21:00  results   10,000 - 49,000 CFU/mL Pseudomonas aeruginosa 10-10 @ 21:00  urine culture --  10-10 @ 18:25  results   Growth in aerobic and anaerobic bottles: Staphylococcus aureus  Direct identification is available within approximately 3-5  hours either by Blood Panel Multiplexed PCR or Direct  MALDI-TOF. Details: [email or web address] 10-10 @ 18:25    wound with gram statin --    10-12 @ 05:36  organism  --   10-12 @ 05:36  specimen source .Blood Blood-Peripheral  10-12 @ 05:36  wound with gram statin --    10-12 @ 05:30  organism  --   10-12 @ 05:30  specimen source .Blood Blood-Venous  10-12 @ 05:30  wound with gram statin --    10-11 @ 13:13  organism  --   10-11 @ 13:13  specimen source Wound Wound  10-11 @ 13:13  wound with gram statin --    10-10 @ 21:00  organism  --   10-10 @ 21:00  specimen source Catheterized Catheterized  10-10 @ 21:00  wound with gram statin --    10-10 @ 18:25  organism  Blood Culture PCR   10-10 @ 18:25  specimen source .Blood Blood-Peripheral  10-10 @ 18:25      RADIOLOGY & ADDITIONAL TESTS:      Consultant(s) Notes Reviewed:  [ x] YES  [ ] NO    Care Discussed with Consultants/Other Providers [ x] YES  [ ] NO    Advanced care planning discussed with patient and family, advanced care planning forms reviewed, discussed, and completed.  20 minutes spent.   Patient is a 78y old  Male who presents with a chief complaint of fever and weakness (12 Oct 2023 19:32)      INTERVAL HPI/OVERNIGHT EVENTS: bp still on low side, urinating well, on ra and no cp or sob, chart noted    MEDICATIONS  (STANDING):  aspirin enteric coated 81 milliGRAM(s) Oral daily  bethanechol 25 milliGRAM(s) Oral two times a day  cefepime   IVPB 2000 milliGRAM(s) IV Intermittent every 8 hours  dextrose 5%. 1000 milliLiter(s) (50 mL/Hr) IV Continuous <Continuous>  dextrose 5%. 1000 milliLiter(s) (100 mL/Hr) IV Continuous <Continuous>  dextrose 50% Injectable 25 Gram(s) IV Push once  dextrose 50% Injectable 12.5 Gram(s) IV Push once  dextrose 50% Injectable 25 Gram(s) IV Push once  dronabinol 2.5 milliGRAM(s) Oral two times a day  gabapentin 300 milliGRAM(s) Oral two times a day  glucagon  Injectable 1 milliGRAM(s) IntraMuscular once  insulin glargine Injectable (LANTUS) 18 Unit(s) SubCutaneous at bedtime  insulin lispro (ADMELOG) corrective regimen sliding scale   SubCutaneous three times a day before meals  insulin lispro (ADMELOG) corrective regimen sliding scale   SubCutaneous at bedtime  insulin lispro Injectable (ADMELOG) 3 Unit(s) SubCutaneous three times a day before meals  lactated ringers. 1000 milliLiter(s) (100 mL/Hr) IV Continuous <Continuous>  lactated ringers. 1000 milliLiter(s) (55 mL/Hr) IV Continuous <Continuous>  lactobacillus acidophilus 1 Tablet(s) Oral two times a day with meals  midodrine. 10 milliGRAM(s) Oral three times a day  pantoprazole    Tablet 40 milliGRAM(s) Oral before breakfast  tamsulosin 0.4 milliGRAM(s) Oral at bedtime  vancomycin  IVPB 1500 milliGRAM(s) IV Intermittent every 24 hours    MEDICATIONS  (PRN):  acetaminophen     Tablet .. 650 milliGRAM(s) Oral every 6 hours PRN Temp greater or equal to 38C (100.4F), Moderate Pain (4 - 6)  dextrose Oral Gel 15 Gram(s) Oral once PRN Blood Glucose LESS THAN 70 milliGRAM(s)/deciliter  loperamide 2 milliGRAM(s) Oral three times a day PRN Diarrhea      Allergies    No Known Allergies    Intolerances        REVIEW OF SYSTEMS:  CONSTITUTIONAL: low grade fever  EYES: No eye pain, visual disturbances  ENMT:  No difficulty hearing, tinnitus, vertigo; No sinus or throat pain  NECK: No pain or stiffness  RESPIRATORY: No cough, wheezing, chills or hemoptysis; No shortness of breath  CARDIOVASCULAR: No chest pain, palpitations, dizziness  GASTROINTESTINAL: No abdominal or epigastric pain. No nausea, vomiting, or hematemesis; No diarrhea or constipation. No melena or hematochezia.  GENITOURINARY: No dysuria, frequency, hematuria, or incontinence  NEUROLOGICAL: No headaches, memory loss, loss of strength, numbness, or tremors  SKIN: No itching, burning  LYMPH NODES: No enlarged glands  MUSCULOSKELETAL: no leg pain  PSYCHIATRIC: No depression, mood swings  HEME/LYMPH: No easy bruising, or bleeding gums  ALLERGY AND IMMUNOLOGIC: No hives    Vital Signs Last 24 Hrs  T(C): 36.9 (13 Oct 2023 05:07), Max: 38.2 (12 Oct 2023 19:49)  T(F): 98.5 (13 Oct 2023 05:07), Max: 100.7 (12 Oct 2023 19:49)  HR: 65 (13 Oct 2023 09:43) (59 - 65)  BP: 98/59 (13 Oct 2023 09:43) (64/24 - 114/64)  BP(mean): --  RR: 17 (13 Oct 2023 05:07) (17 - 18)  SpO2: 94% (13 Oct 2023 05:07) (94% - 99%)    Parameters below as of 13 Oct 2023 05:07  Patient On (Oxygen Delivery Method): room air        PHYSICAL EXAM:  GENERAL: NAD, well-groomed, well-developed  HEAD:  Atraumatic, Normocephalic  EYES: EOMI, PERRLA, conjunctiva and sclera clear  ENMT: No tonsillar erythema, exudates, or enlargement   NECK: Supple, No JVD  NERVOUS SYSTEM:  Alert & Oriented X3, Good concentration  CHEST/LUNG: b/l clear bs, no wheezing  HEART: Regular rate and rhythm  ABDOMEN: Soft, Nontender, Nondistended; Bowel sounds present  EXTREMITIES:  2+ Peripheral Pulses, trace edema   LYMPH: No lymphadenopathy noted  SKIN: No rashes     LABS:                        8.4    4.58  )-----------( 224      ( 13 Oct 2023 08:18 )             26.5     13 Oct 2023 08:18    138    |  113    |  47     ----------------------------<  138    3.8     |  19     |  1.00     Ca    8.1        13 Oct 2023 08:18        Urinalysis Basic - ( 13 Oct 2023 08:18 )    Color: x / Appearance: x / SG: x / pH: x  Gluc: 138 mg/dL / Ketone: x  / Bili: x / Urobili: x   Blood: x / Protein: x / Nitrite: x   Leuk Esterase: x / RBC: x / WBC x   Sq Epi: x / Non Sq Epi: x / Bacteria: x      CAPILLARY BLOOD GLUCOSE      POCT Blood Glucose.: 134 mg/dL (13 Oct 2023 08:19)  POCT Blood Glucose.: 156 mg/dL (12 Oct 2023 21:15)  POCT Blood Glucose.: 187 mg/dL (12 Oct 2023 17:10)  POCT Blood Glucose.: 162 mg/dL (12 Oct 2023 13:13)    blood culture --   No growth at 24 hours   10-12 @ 05:36    blood culture --   No growth at 24 hours   10-12 @ 05:30    blood culture --   Culture in progress   10-11 @ 13:13    blood culture --   10,000 - 49,000 CFU/mL Pseudomonas aeruginosa   10-10 @ 21:00    blood culture --   Growth in aerobic and anaerobic bottles: Staphylococcus aureus  Direct identification is available within approximately 3-5  hours either by Blood Panel Multiplexed PCR or Direct  MALDI-TOF. Details: [email or web address]   10-10 @ 18:25      urine culture --  10-12 @ 05:36  results   No growth at 24 hours 10-12 @ 05:36  urine culture --  10-12 @ 05:30  results   No growth at 24 hours 10-12 @ 05:30  urine culture --  10-11 @ 13:13  results   Culture in progress 10-11 @ 13:13  urine culture --  10-10 @ 21:00  results   10,000 - 49,000 CFU/mL Pseudomonas aeruginosa 10-10 @ 21:00  urine culture --  10-10 @ 18:25  results   Growth in aerobic and anaerobic bottles: Staphylococcus aureus  Direct identification is available within approximately 3-5  hours either by Blood Panel Multiplexed PCR or Direct  MALDI-TOF. Details: [email or web address] 10-10 @ 18:25    wound with gram statin --    10-12 @ 05:36  organism  --   10-12 @ 05:36  specimen source .Blood Blood-Peripheral  10-12 @ 05:36  wound with gram statin --    10-12 @ 05:30  organism  --   10-12 @ 05:30  specimen source .Blood Blood-Venous  10-12 @ 05:30  wound with gram statin --    10-11 @ 13:13  organism  --   10-11 @ 13:13  specimen source Wound Wound  10-11 @ 13:13  wound with gram statin --    10-10 @ 21:00  organism  --   10-10 @ 21:00  specimen source Catheterized Catheterized  10-10 @ 21:00  wound with gram statin --    10-10 @ 18:25  organism  Blood Culture PCR   10-10 @ 18:25  specimen source .Blood Blood-Peripheral  10-10 @ 18:25      RADIOLOGY & ADDITIONAL TESTS:      Consultant(s) Notes Reviewed:  [ x] YES  [ ] NO    Care Discussed with Consultants/Other Providers [ x] YES  [ ] NO    Advanced care planning discussed with patient and family, advanced care planning forms reviewed, discussed, and completed.  20 minutes spent.   Patient is a 78y old  Male who presents with a chief complaint of fever and weakness (12 Oct 2023 19:32)      INTERVAL HPI/OVERNIGHT EVENTS: bp still on low side, urinating well, on ra and no cp or sob, chart noted    MEDICATIONS  (STANDING):  aspirin enteric coated 81 milliGRAM(s) Oral daily  bethanechol 25 milliGRAM(s) Oral two times a day  cefepime   IVPB 2000 milliGRAM(s) IV Intermittent every 8 hours  dextrose 5%. 1000 milliLiter(s) (50 mL/Hr) IV Continuous <Continuous>  dextrose 5%. 1000 milliLiter(s) (100 mL/Hr) IV Continuous <Continuous>  dextrose 50% Injectable 25 Gram(s) IV Push once  dextrose 50% Injectable 12.5 Gram(s) IV Push once  dextrose 50% Injectable 25 Gram(s) IV Push once  dronabinol 2.5 milliGRAM(s) Oral two times a day  gabapentin 300 milliGRAM(s) Oral two times a day  glucagon  Injectable 1 milliGRAM(s) IntraMuscular once  insulin glargine Injectable (LANTUS) 18 Unit(s) SubCutaneous at bedtime  insulin lispro (ADMELOG) corrective regimen sliding scale   SubCutaneous three times a day before meals  insulin lispro (ADMELOG) corrective regimen sliding scale   SubCutaneous at bedtime  insulin lispro Injectable (ADMELOG) 3 Unit(s) SubCutaneous three times a day before meals  lactated ringers. 1000 milliLiter(s) (100 mL/Hr) IV Continuous <Continuous>  lactated ringers. 1000 milliLiter(s) (55 mL/Hr) IV Continuous <Continuous>  lactobacillus acidophilus 1 Tablet(s) Oral two times a day with meals  midodrine. 10 milliGRAM(s) Oral three times a day  pantoprazole    Tablet 40 milliGRAM(s) Oral before breakfast  tamsulosin 0.4 milliGRAM(s) Oral at bedtime  vancomycin  IVPB 1500 milliGRAM(s) IV Intermittent every 24 hours    MEDICATIONS  (PRN):  acetaminophen     Tablet .. 650 milliGRAM(s) Oral every 6 hours PRN Temp greater or equal to 38C (100.4F), Moderate Pain (4 - 6)  dextrose Oral Gel 15 Gram(s) Oral once PRN Blood Glucose LESS THAN 70 milliGRAM(s)/deciliter  loperamide 2 milliGRAM(s) Oral three times a day PRN Diarrhea      Allergies    No Known Allergies    Intolerances        REVIEW OF SYSTEMS:  CONSTITUTIONAL: low grade fever  EYES: No eye pain, visual disturbances  ENMT:  No difficulty hearing, tinnitus, vertigo; No sinus or throat pain  NECK: No pain or stiffness  RESPIRATORY: No cough, wheezing, chills or hemoptysis; No shortness of breath  CARDIOVASCULAR: No chest pain, palpitations, dizziness  GASTROINTESTINAL: No abdominal or epigastric pain. No nausea, vomiting, or hematemesis; No diarrhea or constipation. No melena or hematochezia.  GENITOURINARY: No dysuria, frequency, hematuria, or incontinence  NEUROLOGICAL: No headaches, memory loss, loss of strength, numbness, or tremors  SKIN: No itching, burning  LYMPH NODES: No enlarged glands  MUSCULOSKELETAL: no leg pain  PSYCHIATRIC: No depression, mood swings  HEME/LYMPH: No easy bruising, or bleeding gums  ALLERGY AND IMMUNOLOGIC: No hives    Vital Signs Last 24 Hrs  T(C): 36.9 (13 Oct 2023 05:07), Max: 38.2 (12 Oct 2023 19:49)  T(F): 98.5 (13 Oct 2023 05:07), Max: 100.7 (12 Oct 2023 19:49)  HR: 65 (13 Oct 2023 09:43) (59 - 65)  BP: 98/59 (13 Oct 2023 09:43) (64/24 - 114/64)  BP(mean): --  RR: 17 (13 Oct 2023 05:07) (17 - 18)  SpO2: 94% (13 Oct 2023 05:07) (94% - 99%)    Parameters below as of 13 Oct 2023 05:07  Patient On (Oxygen Delivery Method): room air        PHYSICAL EXAM:  GENERAL: NAD, well-groomed, well-developed  HEAD:  Atraumatic, Normocephalic  EYES: EOMI, PERRLA, conjunctiva and sclera clear  ENMT: No tonsillar erythema, exudates, or enlargement   NECK: Supple, No JVD  NERVOUS SYSTEM:  Alert & Oriented X3, Good concentration  CHEST/LUNG: b/l clear bs, no wheezing  HEART: Regular rate and rhythm  ABDOMEN: Soft, Nontender, Nondistended; Bowel sounds present  EXTREMITIES:  2+ Peripheral Pulses, trace edema   LYMPH: No lymphadenopathy noted  SKIN: No rashes     LABS:                        8.4    4.58  )-----------( 224      ( 13 Oct 2023 08:18 )             26.5     13 Oct 2023 08:18    138    |  113    |  47     ----------------------------<  138    3.8     |  19     |  1.00     Ca    8.1        13 Oct 2023 08:18        Urinalysis Basic - ( 13 Oct 2023 08:18 )    Color: x / Appearance: x / SG: x / pH: x  Gluc: 138 mg/dL / Ketone: x  / Bili: x / Urobili: x   Blood: x / Protein: x / Nitrite: x   Leuk Esterase: x / RBC: x / WBC x   Sq Epi: x / Non Sq Epi: x / Bacteria: x      CAPILLARY BLOOD GLUCOSE      POCT Blood Glucose.: 134 mg/dL (13 Oct 2023 08:19)  POCT Blood Glucose.: 156 mg/dL (12 Oct 2023 21:15)  POCT Blood Glucose.: 187 mg/dL (12 Oct 2023 17:10)  POCT Blood Glucose.: 162 mg/dL (12 Oct 2023 13:13)    blood culture --   No growth at 24 hours   10-12 @ 05:36    blood culture --   No growth at 24 hours   10-12 @ 05:30    blood culture --   Culture in progress   10-11 @ 13:13    blood culture --   10,000 - 49,000 CFU/mL Pseudomonas aeruginosa   10-10 @ 21:00    blood culture --   Growth in aerobic and anaerobic bottles: Staphylococcus aureus  Direct identification is available within approximately 3-5  hours either by Blood Panel Multiplexed PCR or Direct  MALDI-TOF. Details: [email or web address]   10-10 @ 18:25      urine culture --  10-12 @ 05:36  results   No growth at 24 hours 10-12 @ 05:36  urine culture --  10-12 @ 05:30  results   No growth at 24 hours 10-12 @ 05:30  urine culture --  10-11 @ 13:13  results   Culture in progress 10-11 @ 13:13  urine culture --  10-10 @ 21:00  results   10,000 - 49,000 CFU/mL Pseudomonas aeruginosa 10-10 @ 21:00  urine culture --  10-10 @ 18:25  results   Growth in aerobic and anaerobic bottles: Staphylococcus aureus  Direct identification is available within approximately 3-5  hours either by Blood Panel Multiplexed PCR or Direct  MALDI-TOF. Details: [email or web address] 10-10 @ 18:25    wound with gram statin --    10-12 @ 05:36  organism  --   10-12 @ 05:36  specimen source .Blood Blood-Peripheral  10-12 @ 05:36  wound with gram statin --    10-12 @ 05:30  organism  --   10-12 @ 05:30  specimen source .Blood Blood-Venous  10-12 @ 05:30  wound with gram statin --    10-11 @ 13:13  organism  --   10-11 @ 13:13  specimen source Wound Wound  10-11 @ 13:13  wound with gram statin --    10-10 @ 21:00  organism  --   10-10 @ 21:00  specimen source Catheterized Catheterized  10-10 @ 21:00  wound with gram statin --    10-10 @ 18:25  organism  Blood Culture PCR   10-10 @ 18:25  specimen source .Blood Blood-Peripheral  10-10 @ 18:25      RADIOLOGY & ADDITIONAL TESTS:      Consultant(s) Notes Reviewed:  [ x] YES  [ ] NO    Care Discussed with Consultants/Other Providers [ x] YES  [ ] NO    Advanced care planning discussed with patient and family, advanced care planning forms reviewed, discussed, and completed.  20 minutes spent.

## 2023-10-13 NOTE — PROGRESS NOTE ADULT - ASSESSMENT
anemia  FTT    no overt gi bleeding  Ct findings of the esophagus likely represent dysmotility  monitor GI fn  if diarrhea persist can do imodium prn  cotn marinol bid for ftt    I reviewed the overnight course of events on the unit, re-confirming the patient history. I discussed the care with the patient and their family  Differential diagnosis and plan of care discussed with patient after the evaluation  40 minutes spent on total encounter of which more than fifty percent of the encounter was spent counseling and/or coordinating care by the attending physician.  Advanced care planning was discussed with patient and family.  Advanced care planning forms were reviewed and discussed.  Risks, benefits and alternatives of gastroenterologic procedures were discussed in detail and all questions were answered.

## 2023-10-13 NOTE — CHART NOTE - NSCHARTNOTEFT_GEN_A_CORE
Patient states, he follows Dr. Donaldson in [facility] ([place]).  Spoke with Halie who agreed to assume care today, 10/13. Patient states, he follows Dr. Donaldson in [facility] ([place]).  Spoke with Halie who agreed to assume care today, 10/13. Patient states, he follows Dr. Donaldson in [facility] ([place]).  Spoke with Halie who agreed to assume care today, 10/13.

## 2023-10-13 NOTE — PROGRESS NOTE ADULT - ASSESSMENT
[ASSESSMENT and  PLAN]  D63.8    Anemia due to chronic disease  C61        Prostate cancer  R62.51    Failure to thrive  R50.9      Fever  L89.609   pressure ulcer of heel, Chronic wounds    78-year-old [place] male presents to the hospital with failure to thrive, fever and weight loss of 40 to 50 pounds.  Decreased p.o. intake for the last month.  History of chronic wounds.  Additional admitting diagnoses UTI, sepsis, hypotension, diabetic foot ulcer, anemia 6.6 g/dL    Anemia likely secondary to chronic disease related t owounds and ?osteomyelitis  May have blood loss from chronic wounds     Transfuse as needed evaluate for cause for anemia.     Evaluate for reversible treatable causes for Weight loss.  CT Scan CAP: No overt masses.  No adenopathy.  Patient with poor performance status        UTI  History of enlarged prostate  History of prostate cancer.  Unspecified details.    RECOMMENDATIONS    Transfuse PRBC as clinically indicated.   Transfuse PRBC if Hgb <7.0 or if symptomatic.   Follow CBC    fe studies c/w acute chronic disease      patient with flat affect.  Has limited understanding of situation.  Further recommendations pending clinical course.  cont ID evaluation to rule osteomyelitis     [ASSESSMENT and  PLAN]  D63.8    Anemia due to chronic disease  C61        Prostate cancer  R62.51    Failure to thrive  R50.9      Fever  L89.609   pressure ulcer of heel, Chronic wounds    78-year-old [place] male presents to the hospital with failure to thrive, fever and weight loss of 40 to 50 pounds.  Decreased p.o. intake for the last month.  History of chronic wounds.  Additional admitting diagnoses UTI, sepsis, hypotension, diabetic foot ulcer, anemia 6.6 g/dL    Anemia likely secondary to chronic disease related t owounds and ?osteomyelitis  May have blood loss from chronic wounds     Transfuse as needed evaluate for cause for anemia.     Evaluate for reversible treatable causes for Weight loss.  CT Scan CAP: No overt masses.  No adenopathy.  Patient with poor performance status        UTI  History of enlarged prostate  History of prostate cancer.  Unspecified details.    RECOMMENDATIONS    Transfuse PRBC as clinically indicated.   Transfuse PRBC if Hgb <7.0 or if symptomatic.   Follow CBC    fe studies c/w acute chronic disease      patient with flat affect.  Has limited understanding of situation.  Further recommendations pending clinical course.  cont ID evaluation to rule osteomyelitis     [ASSESSMENT and  PLAN]  D63.8    Anemia due to chronic disease  C61        Prostate cancer  R62.51    Failure to thrive  R50.9      Fever  L89.609   pressure ulcer of heel, Chronic wounds    78-year-old [place] male presents to the hospital with failure to thrive, fever and weight loss of 40 to 50 pounds.  Decreased p.o. intake for the last month.  History of chronic wounds.  Additional admitting diagnoses UTI, sepsis, hypotension, diabetic foot ulcer, anemia 6.6 g/dL    Anemia likely secondary to chronic disease related t owounds and ?osteomyelitis  May have blood loss from chronic wounds     Transfuse as needed evaluate for cause for anemia.     Evaluate for reversible treatable causes for Weight loss.  CT Scan CAP: No overt masses.  No adenopathy.  Patient with poor performance status        UTI  History of enlarged prostate  History of prostate cancer.  Unspecified details.    RECOMMENDATIONS    Transfuse PRBC as clinically indicated.   Transfuse PRBC if Hgb <7.0 or if symptomatic.   Follow CBC    fe studies c/w acute chronic disease      patient with flat affect.  Has limited understanding of situation.  Further recommendations pending clinical course.  cont ID evaluation to rule osteomyelitis

## 2023-10-13 NOTE — PROGRESS NOTE ADULT - SUBJECTIVE AND OBJECTIVE BOX
Interval History:  remains weak  Chart reviewed and events noted;   Overnight events:    MEDICATIONS  (STANDING):  aspirin enteric coated 81 milliGRAM(s) Oral daily  atorvastatin 80 milliGRAM(s) Oral at bedtime  bethanechol 25 milliGRAM(s) Oral two times a day  cefepime   IVPB 2000 milliGRAM(s) IV Intermittent every 8 hours  dextrose 5%. 1000 milliLiter(s) (100 mL/Hr) IV Continuous <Continuous>  dextrose 5%. 1000 milliLiter(s) (50 mL/Hr) IV Continuous <Continuous>  dextrose 50% Injectable 25 Gram(s) IV Push once  dextrose 50% Injectable 12.5 Gram(s) IV Push once  dextrose 50% Injectable 25 Gram(s) IV Push once  dronabinol 2.5 milliGRAM(s) Oral two times a day  gabapentin 300 milliGRAM(s) Oral two times a day  glucagon  Injectable 1 milliGRAM(s) IntraMuscular once  insulin glargine Injectable (LANTUS) 18 Unit(s) SubCutaneous at bedtime  insulin lispro (ADMELOG) corrective regimen sliding scale   SubCutaneous three times a day before meals  insulin lispro (ADMELOG) corrective regimen sliding scale   SubCutaneous at bedtime  insulin lispro Injectable (ADMELOG) 3 Unit(s) SubCutaneous three times a day before meals  lactated ringers. 1000 milliLiter(s) (100 mL/Hr) IV Continuous <Continuous>  lactated ringers. 1000 milliLiter(s) (75 mL/Hr) IV Continuous <Continuous>  lactobacillus acidophilus 1 Tablet(s) Oral two times a day with meals  midodrine. 10 milliGRAM(s) Oral three times a day  pantoprazole    Tablet 40 milliGRAM(s) Oral before breakfast  tamsulosin 0.4 milliGRAM(s) Oral at bedtime  vancomycin  IVPB 1500 milliGRAM(s) IV Intermittent every 24 hours    MEDICATIONS  (PRN):  acetaminophen     Tablet .. 650 milliGRAM(s) Oral every 6 hours PRN Temp greater or equal to 38C (100.4F), Moderate Pain (4 - 6)  dextrose Oral Gel 15 Gram(s) Oral once PRN Blood Glucose LESS THAN 70 milliGRAM(s)/deciliter  loperamide 2 milliGRAM(s) Oral three times a day PRN Diarrhea      Vital Signs Last 24 Hrs  T(C): 36.8 (13 Oct 2023 11:56), Max: 38.2 (12 Oct 2023 19:49)  T(F): 98.2 (13 Oct 2023 11:56), Max: 100.7 (12 Oct 2023 19:49)  HR: 60 (13 Oct 2023 11:56) (59 - 65)  BP: 110/61 (13 Oct 2023 11:56) (74/30 - 114/64)  BP(mean): --  RR: 18 (13 Oct 2023 11:56) (17 - 18)  SpO2: 95% (13 Oct 2023 11:56) (94% - 99%)    Parameters below as of 13 Oct 2023 11:56  Patient On (Oxygen Delivery Method): room air        PHYSICAL EXAM  General: adult in NAD, chronically ill appearing  HEENT: clear oropharynx, anicteric sclera, pink conjunctivae  Neck: supple  CV: normal S1S2 with no murmur rubs or gallops  Lungs: clear to auscultation, no wheezes, no rhales  Abdomen: soft non-tender non-distended, no hepato/splenomegaly  Ext: bandages not removed  Skin: no rashes and no petichiae  Neuro: alert and oriented X3 no focal deficits      LABS:  CBC Full  -  ( 13 Oct 2023 08:18 )  WBC Count : 4.58 K/uL  RBC Count : 3.26 M/uL  Hemoglobin : 8.4 g/dL  Hematocrit : 26.5 %  Platelet Count - Automated : 224 K/uL  Mean Cell Volume : 81.3 fl  Mean Cell Hemoglobin : 25.8 pg  Mean Cell Hemoglobin Concentration : 31.7 gm/dL  Auto Neutrophil # : x  Auto Lymphocyte # : x  Auto Monocyte # : x  Auto Eosinophil # : x  Auto Basophil # : x  Auto Neutrophil % : x  Auto Lymphocyte % : x  Auto Monocyte % : x  Auto Eosinophil % : x  Auto Basophil % : x    10-13    138  |  113<H>  |  47<H>  ----------------------------<  138<H>  3.8   |  19<L>  |  1.00    Ca    8.1<L>      13 Oct 2023 08:18  Phos  3.0     10-12  Mg     1.8     10-12          fe studies  Iron - Total Binding Capacity.: 123 ug/dL (10-12 @ 21:30)  Ferritin: 269 ng/mL (10-12 @ 21:30)      WBC trend  4.58 K/uL (10-13-23 @ 08:18)  7.26 K/uL (10-12-23 @ 05:30)  9.99 K/uL (10-11-23 @ 04:09)  11.08 K/uL (10-10-23 @ 18:25)      Hgb trend  8.4 g/dL (10-13-23 @ 08:18)  9.6 g/dL (10-12-23 @ 05:30)  7.9 g/dL (10-11-23 @ 04:09)  6.6 g/dL (10-10-23 @ 18:25)      plt trend  224 K/uL (10-13-23 @ 08:18)  279 K/uL (10-12-23 @ 05:30)  233 K/uL (10-11-23 @ 04:09)  228 K/uL (10-10-23 @ 18:25)        RADIOLOGY & ADDITIONAL STUDIES:

## 2023-10-13 NOTE — PROGRESS NOTE ADULT - PROBLEM SELECTOR PLAN 1
pt with peristant hypotension likley from sepsis- but otherwise mentating well  iv bolus and maintence ivf  discussed with icu - recommends albumin - given, improvement in bp  supportive care  not a candidate for icu as of now

## 2023-10-13 NOTE — PROGRESS NOTE ADULT - SUBJECTIVE AND OBJECTIVE BOX
[facility]  Les Mccray PA-C  [address] [place] [address]  [phone]      INTERVAL HPI/OVERNIGHT EVENTS:  Pt s/e  Tolerating diet  No new GI events    MEDICATIONS  (STANDING):  aspirin enteric coated 81 milliGRAM(s) Oral daily  atorvastatin 80 milliGRAM(s) Oral at bedtime  bethanechol 25 milliGRAM(s) Oral two times a day  cefepime   IVPB 2000 milliGRAM(s) IV Intermittent every 8 hours  dextrose 5%. 1000 milliLiter(s) (100 mL/Hr) IV Continuous <Continuous>  dextrose 5%. 1000 milliLiter(s) (50 mL/Hr) IV Continuous <Continuous>  dextrose 50% Injectable 25 Gram(s) IV Push once  dextrose 50% Injectable 12.5 Gram(s) IV Push once  dextrose 50% Injectable 25 Gram(s) IV Push once  dronabinol 2.5 milliGRAM(s) Oral two times a day  gabapentin 300 milliGRAM(s) Oral two times a day  glucagon  Injectable 1 milliGRAM(s) IntraMuscular once  insulin glargine Injectable (LANTUS) 18 Unit(s) SubCutaneous at bedtime  insulin lispro (ADMELOG) corrective regimen sliding scale   SubCutaneous three times a day before meals  insulin lispro (ADMELOG) corrective regimen sliding scale   SubCutaneous at bedtime  insulin lispro Injectable (ADMELOG) 3 Unit(s) SubCutaneous three times a day before meals  lactated ringers. 1000 milliLiter(s) (100 mL/Hr) IV Continuous <Continuous>  lactated ringers. 1000 milliLiter(s) (75 mL/Hr) IV Continuous <Continuous>  lactobacillus acidophilus 1 Tablet(s) Oral two times a day with meals  midodrine. 10 milliGRAM(s) Oral three times a day  pantoprazole    Tablet 40 milliGRAM(s) Oral before breakfast  tamsulosin 0.4 milliGRAM(s) Oral at bedtime  vancomycin  IVPB 1500 milliGRAM(s) IV Intermittent every 24 hours    MEDICATIONS  (PRN):  acetaminophen     Tablet .. 650 milliGRAM(s) Oral every 6 hours PRN Temp greater or equal to 38C (100.4F), Moderate Pain (4 - 6)  dextrose Oral Gel 15 Gram(s) Oral once PRN Blood Glucose LESS THAN 70 milliGRAM(s)/deciliter  loperamide 2 milliGRAM(s) Oral three times a day PRN Diarrhea      Allergies    No Known Allergies      PHYSICAL EXAM:   Vital Signs:  Vital Signs Last 24 Hrs  T(C): 36.8 (13 Oct 2023 11:56), Max: 38.2 (12 Oct 2023 19:49)  T(F): 98.2 (13 Oct 2023 11:56), Max: 100.7 (12 Oct 2023 19:49)  HR: 65 (13 Oct 2023 09:43) (59 - 65)  BP: 110/61 (13 Oct 2023 11:56) (64/24 - 114/64)  BP(mean): --  RR: 18 (13 Oct 2023 11:56) (17 - 18)  SpO2: 95% (13 Oct 2023 11:56) (94% - 99%)    Parameters below as of 13 Oct 2023 11:56  Patient On (Oxygen Delivery Method): room air      GENERAL:  Appears stated age  HEENT:  NC/AT  CHEST:  Full & symmetric excursion  HEART:  Regular rhythm  ABDOMEN:  Soft, non-tender, non-distended  EXTEREMITIES:  no cyanosis  SKIN:  No rash  NEURO:  Alert      LABS:                        8.4    4.58  )-----------( 224      ( 13 Oct 2023 08:18 )             26.5     10-13    138  |  113<H>  |  47<H>  ----------------------------<  138<H>  3.8   |  19<L>  |  1.00    Ca    8.1<L>      13 Oct 2023 08:18  Phos  3.0     10-12  Mg     1.8     10-12        Urinalysis Basic - ( 13 Oct 2023 08:18 )    Color: x / Appearance: x / SG: x / pH: x  Gluc: 138 mg/dL / Ketone: x  / Bili: x / Urobili: x   Blood: x / Protein: x / Nitrite: x   Leuk Esterase: x / RBC: x / WBC x   Sq Epi: x / Non Sq Epi: x / Bacteria: x   [facility]  Les Mccray PA-C  [address] [place] [address]  [phone]      INTERVAL HPI/OVERNIGHT EVENTS:  Pt s/e  Tolerating diet  No new GI events    MEDICATIONS  (STANDING):  aspirin enteric coated 81 milliGRAM(s) Oral daily  atorvastatin 80 milliGRAM(s) Oral at bedtime  bethanechol 25 milliGRAM(s) Oral two times a day  cefepime   IVPB 2000 milliGRAM(s) IV Intermittent every 8 hours  dextrose 5%. 1000 milliLiter(s) (100 mL/Hr) IV Continuous <Continuous>  dextrose 5%. 1000 milliLiter(s) (50 mL/Hr) IV Continuous <Continuous>  dextrose 50% Injectable 25 Gram(s) IV Push once  dextrose 50% Injectable 12.5 Gram(s) IV Push once  dextrose 50% Injectable 25 Gram(s) IV Push once  dronabinol 2.5 milliGRAM(s) Oral two times a day  gabapentin 300 milliGRAM(s) Oral two times a day  glucagon  Injectable 1 milliGRAM(s) IntraMuscular once  insulin glargine Injectable (LANTUS) 18 Unit(s) SubCutaneous at bedtime  insulin lispro (ADMELOG) corrective regimen sliding scale   SubCutaneous three times a day before meals  insulin lispro (ADMELOG) corrective regimen sliding scale   SubCutaneous at bedtime  insulin lispro Injectable (ADMELOG) 3 Unit(s) SubCutaneous three times a day before meals  lactated ringers. 1000 milliLiter(s) (100 mL/Hr) IV Continuous <Continuous>  lactated ringers. 1000 milliLiter(s) (75 mL/Hr) IV Continuous <Continuous>  lactobacillus acidophilus 1 Tablet(s) Oral two times a day with meals  midodrine. 10 milliGRAM(s) Oral three times a day  pantoprazole    Tablet 40 milliGRAM(s) Oral before breakfast  tamsulosin 0.4 milliGRAM(s) Oral at bedtime  vancomycin  IVPB 1500 milliGRAM(s) IV Intermittent every 24 hours    MEDICATIONS  (PRN):  acetaminophen     Tablet .. 650 milliGRAM(s) Oral every 6 hours PRN Temp greater or equal to 38C (100.4F), Moderate Pain (4 - 6)  dextrose Oral Gel 15 Gram(s) Oral once PRN Blood Glucose LESS THAN 70 milliGRAM(s)/deciliter  loperamide 2 milliGRAM(s) Oral three times a day PRN Diarrhea      Allergies    No Known Allergies      PHYSICAL EXAM:   Vital Signs:  Vital Signs Last 24 Hrs  T(C): 36.8 (13 Oct 2023 11:56), Max: 38.2 (12 Oct 2023 19:49)  T(F): 98.2 (13 Oct 2023 11:56), Max: 100.7 (12 Oct 2023 19:49)  HR: 65 (13 Oct 2023 09:43) (59 - 65)  BP: 110/61 (13 Oct 2023 11:56) (64/24 - 114/64)  BP(mean): --  RR: 18 (13 Oct 2023 11:56) (17 - 18)  SpO2: 95% (13 Oct 2023 11:56) (94% - 99%)    Parameters below as of 13 Oct 2023 11:56  Patient On (Oxygen Delivery Method): room air      GENERAL:  Appears stated age  HEENT:  NC/AT  CHEST:  Full & symmetric excursion  HEART:  Regular rhythm  ABDOMEN:  Soft, non-tender, non-distended  EXTEREMITIES:  no cyanosis  SKIN:  No rash  NEURO:  Alert      LABS:                        8.4    4.58  )-----------( 224      ( 13 Oct 2023 08:18 )             26.5     10-13    138  |  113<H>  |  47<H>  ----------------------------<  138<H>  3.8   |  19<L>  |  1.00    Ca    8.1<L>      13 Oct 2023 08:18  Phos  3.0     10-12  Mg     1.8     10-12        Urinalysis Basic - ( 13 Oct 2023 08:18 )    Color: x / Appearance: x / SG: x / pH: x  Gluc: 138 mg/dL / Ketone: x  / Bili: x / Urobili: x   Blood: x / Protein: x / Nitrite: x   Leuk Esterase: x / RBC: x / WBC x   Sq Epi: x / Non Sq Epi: x / Bacteria: x   [facility]  Les Mccray PA-C  [address] [place] [address]  [phone]      INTERVAL HPI/OVERNIGHT EVENTS:  Pt s/e  Tolerating diet  No new GI events    MEDICATIONS  (STANDING):  aspirin enteric coated 81 milliGRAM(s) Oral daily  atorvastatin 80 milliGRAM(s) Oral at bedtime  bethanechol 25 milliGRAM(s) Oral two times a day  cefepime   IVPB 2000 milliGRAM(s) IV Intermittent every 8 hours  dextrose 5%. 1000 milliLiter(s) (100 mL/Hr) IV Continuous <Continuous>  dextrose 5%. 1000 milliLiter(s) (50 mL/Hr) IV Continuous <Continuous>  dextrose 50% Injectable 25 Gram(s) IV Push once  dextrose 50% Injectable 12.5 Gram(s) IV Push once  dextrose 50% Injectable 25 Gram(s) IV Push once  dronabinol 2.5 milliGRAM(s) Oral two times a day  gabapentin 300 milliGRAM(s) Oral two times a day  glucagon  Injectable 1 milliGRAM(s) IntraMuscular once  insulin glargine Injectable (LANTUS) 18 Unit(s) SubCutaneous at bedtime  insulin lispro (ADMELOG) corrective regimen sliding scale   SubCutaneous three times a day before meals  insulin lispro (ADMELOG) corrective regimen sliding scale   SubCutaneous at bedtime  insulin lispro Injectable (ADMELOG) 3 Unit(s) SubCutaneous three times a day before meals  lactated ringers. 1000 milliLiter(s) (100 mL/Hr) IV Continuous <Continuous>  lactated ringers. 1000 milliLiter(s) (75 mL/Hr) IV Continuous <Continuous>  lactobacillus acidophilus 1 Tablet(s) Oral two times a day with meals  midodrine. 10 milliGRAM(s) Oral three times a day  pantoprazole    Tablet 40 milliGRAM(s) Oral before breakfast  tamsulosin 0.4 milliGRAM(s) Oral at bedtime  vancomycin  IVPB 1500 milliGRAM(s) IV Intermittent every 24 hours    MEDICATIONS  (PRN):  acetaminophen     Tablet .. 650 milliGRAM(s) Oral every 6 hours PRN Temp greater or equal to 38C (100.4F), Moderate Pain (4 - 6)  dextrose Oral Gel 15 Gram(s) Oral once PRN Blood Glucose LESS THAN 70 milliGRAM(s)/deciliter  loperamide 2 milliGRAM(s) Oral three times a day PRN Diarrhea      Allergies    No Known Allergies      PHYSICAL EXAM:   Vital Signs:  Vital Signs Last 24 Hrs  T(C): 36.8 (13 Oct 2023 11:56), Max: 38.2 (12 Oct 2023 19:49)  T(F): 98.2 (13 Oct 2023 11:56), Max: 100.7 (12 Oct 2023 19:49)  HR: 65 (13 Oct 2023 09:43) (59 - 65)  BP: 110/61 (13 Oct 2023 11:56) (64/24 - 114/64)  BP(mean): --  RR: 18 (13 Oct 2023 11:56) (17 - 18)  SpO2: 95% (13 Oct 2023 11:56) (94% - 99%)    Parameters below as of 13 Oct 2023 11:56  Patient On (Oxygen Delivery Method): room air      GENERAL:  Appears stated age  HEENT:  NC/AT  CHEST:  Full & symmetric excursion  HEART:  Regular rhythm  ABDOMEN:  Soft, non-tender, non-distended  EXTEREMITIES:  no cyanosis  SKIN:  No rash  NEURO:  Alert      LABS:                        8.4    4.58  )-----------( 224      ( 13 Oct 2023 08:18 )             26.5     10-13    138  |  113<H>  |  47<H>  ----------------------------<  138<H>  3.8   |  19<L>  |  1.00    Ca    8.1<L>      13 Oct 2023 08:18  Phos  3.0     10-12  Mg     1.8     10-12        Urinalysis Basic - ( 13 Oct 2023 08:18 )    Color: x / Appearance: x / SG: x / pH: x  Gluc: 138 mg/dL / Ketone: x  / Bili: x / Urobili: x   Blood: x / Protein: x / Nitrite: x   Leuk Esterase: x / RBC: x / WBC x   Sq Epi: x / Non Sq Epi: x / Bacteria: x

## 2023-10-13 NOTE — PROVIDER CONTACT NOTE (CRITICAL VALUE NOTIFICATION) - ASSESSMENT
Pt on cefepime and vancomycin
patient on cefepime 2000mg Q8H. and Vancomycin 1500mh Q24H.
pt sleeping

## 2023-10-14 LAB
ANION GAP SERPL CALC-SCNC: 5 MMOL/L — SIGNIFICANT CHANGE UP (ref 5–17)
BUN SERPL-MCNC: 32 MG/DL — HIGH (ref 7–23)
CALCIUM SERPL-MCNC: 8.2 MG/DL — LOW (ref 8.5–10.1)
CHLORIDE SERPL-SCNC: 114 MMOL/L — HIGH (ref 96–108)
CO2 SERPL-SCNC: 22 MMOL/L — SIGNIFICANT CHANGE UP (ref 22–31)
CREAT SERPL-MCNC: 0.88 MG/DL — SIGNIFICANT CHANGE UP (ref 0.5–1.3)
EGFR: 88 ML/MIN/1.73M2 — SIGNIFICANT CHANGE UP
GLUCOSE BLDC GLUCOMTR-MCNC: 156 MG/DL — HIGH (ref 70–99)
GLUCOSE BLDC GLUCOMTR-MCNC: 164 MG/DL — HIGH (ref 70–99)
GLUCOSE BLDC GLUCOMTR-MCNC: 204 MG/DL — HIGH (ref 70–99)
GLUCOSE BLDC GLUCOMTR-MCNC: 249 MG/DL — HIGH (ref 70–99)
GLUCOSE SERPL-MCNC: 237 MG/DL — HIGH (ref 70–99)
HCT VFR BLD CALC: 28.1 % — LOW (ref 39–50)
HGB BLD-MCNC: 8.8 G/DL — LOW (ref 13–17)
MCHC RBC-ENTMCNC: 25.7 PG — LOW (ref 27–34)
MCHC RBC-ENTMCNC: 31.3 GM/DL — LOW (ref 32–36)
MCV RBC AUTO: 82.2 FL — SIGNIFICANT CHANGE UP (ref 80–100)
NRBC # BLD: 0 /100 WBCS — SIGNIFICANT CHANGE UP (ref 0–0)
PLATELET # BLD AUTO: 236 K/UL — SIGNIFICANT CHANGE UP (ref 150–400)
POTASSIUM SERPL-MCNC: 4 MMOL/L — SIGNIFICANT CHANGE UP (ref 3.5–5.3)
POTASSIUM SERPL-SCNC: 4 MMOL/L — SIGNIFICANT CHANGE UP (ref 3.5–5.3)
RBC # BLD: 3.42 M/UL — LOW (ref 4.2–5.8)
RBC # FLD: 16.6 % — HIGH (ref 10.3–14.5)
SODIUM SERPL-SCNC: 141 MMOL/L — SIGNIFICANT CHANGE UP (ref 135–145)
WBC # BLD: 5.23 K/UL — SIGNIFICANT CHANGE UP (ref 3.8–10.5)
WBC # FLD AUTO: 5.23 K/UL — SIGNIFICANT CHANGE UP (ref 3.8–10.5)

## 2023-10-14 RX ADMIN — CEFEPIME 100 MILLIGRAM(S): 1 INJECTION, POWDER, FOR SOLUTION INTRAMUSCULAR; INTRAVENOUS at 22:57

## 2023-10-14 RX ADMIN — MIDODRINE HYDROCHLORIDE 10 MILLIGRAM(S): 2.5 TABLET ORAL at 17:23

## 2023-10-14 RX ADMIN — CEFEPIME 100 MILLIGRAM(S): 1 INJECTION, POWDER, FOR SOLUTION INTRAMUSCULAR; INTRAVENOUS at 12:55

## 2023-10-14 RX ADMIN — INSULIN GLARGINE 18 UNIT(S): 100 INJECTION, SOLUTION SUBCUTANEOUS at 22:58

## 2023-10-14 RX ADMIN — Medication 300 MILLIGRAM(S): at 22:57

## 2023-10-14 RX ADMIN — Medication 4: at 08:49

## 2023-10-14 RX ADMIN — Medication 25 MILLIGRAM(S): at 06:21

## 2023-10-14 RX ADMIN — TAMSULOSIN HYDROCHLORIDE 0.4 MILLIGRAM(S): 0.4 CAPSULE ORAL at 22:57

## 2023-10-14 RX ADMIN — Medication 3 UNIT(S): at 08:49

## 2023-10-14 RX ADMIN — Medication 3 UNIT(S): at 12:53

## 2023-10-14 RX ADMIN — ATORVASTATIN CALCIUM 80 MILLIGRAM(S): 80 TABLET, FILM COATED ORAL at 22:57

## 2023-10-14 RX ADMIN — Medication 2: at 17:25

## 2023-10-14 RX ADMIN — CEFEPIME 100 MILLIGRAM(S): 1 INJECTION, POWDER, FOR SOLUTION INTRAMUSCULAR; INTRAVENOUS at 06:21

## 2023-10-14 RX ADMIN — Medication 4: at 12:53

## 2023-10-14 RX ADMIN — Medication 3 UNIT(S): at 17:25

## 2023-10-14 RX ADMIN — MIDODRINE HYDROCHLORIDE 10 MILLIGRAM(S): 2.5 TABLET ORAL at 12:54

## 2023-10-14 RX ADMIN — PANTOPRAZOLE SODIUM 40 MILLIGRAM(S): 20 TABLET, DELAYED RELEASE ORAL at 06:20

## 2023-10-14 RX ADMIN — Medication 1 TABLET(S): at 08:50

## 2023-10-14 RX ADMIN — Medication 25 MILLIGRAM(S): at 17:24

## 2023-10-14 RX ADMIN — GABAPENTIN 300 MILLIGRAM(S): 400 CAPSULE ORAL at 06:29

## 2023-10-14 RX ADMIN — Medication 2.5 MILLIGRAM(S): at 06:21

## 2023-10-14 RX ADMIN — Medication 81 MILLIGRAM(S): at 12:55

## 2023-10-14 RX ADMIN — Medication 1 TABLET(S): at 17:24

## 2023-10-14 RX ADMIN — Medication 2.5 MILLIGRAM(S): at 17:30

## 2023-10-14 RX ADMIN — MIDODRINE HYDROCHLORIDE 10 MILLIGRAM(S): 2.5 TABLET ORAL at 06:21

## 2023-10-14 RX ADMIN — Medication 650 MILLIGRAM(S): at 03:10

## 2023-10-14 RX ADMIN — GABAPENTIN 300 MILLIGRAM(S): 400 CAPSULE ORAL at 17:30

## 2023-10-14 NOTE — PROGRESS NOTE ADULT - ASSESSMENT
78-year-old male PMH HTN, PPM, DM, foot ulcers , presents to the hospital by ambulance from home.  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 Pt does not go to doctor on regular basis per son and does phone visits.    TTE: CONCLUSIONS:      1. Technically difficult image quality.   2. There is abnormal septal activation, likely from a paced rhythm. There is likely superimposed anterior and septal hypokinesis. Limited views and limited endocardial definition make further interpretation limited.   3. The right ventricle is not well visualized. Normal right ventricular cavity size and reduced systolic function.   4. The left atrium is mildly dilated in size.   5. Right atrium was not well visualized.   6. Mild calcification of the aortic valve leaflets.   7. Tricuspid valve was not well visualized.   8. Aortic valve was not well visualized.    +Bacteremia. Called by Cardio NP for consultation and to arrange GISSELL to R/O Endocarditis or PPM related infection. Order placed.   Cant ASA, Statin  Pt has seen [facility] outpt 2021, [phone]  pt poor historian  will follow here 78-year-old male PMH HTN, PPM, DM, foot ulcers , presents to the hospital by ambulance from home.  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 Pt does not go to doctor on regular basis per son and does phone visits.    TTE: CONCLUSIONS:      1. Technically difficult image quality.   2. There is abnormal septal activation, likely from a paced rhythm. There is likely superimposed anterior and septal hypokinesis. Limited views and limited endocardial definition make further interpretation limited.   3. The right ventricle is not well visualized. Normal right ventricular cavity size and reduced systolic function.   4. The left atrium is mildly dilated in size.   5. Right atrium was not well visualized.   6. Mild calcification of the aortic valve leaflets.   7. Tricuspid valve was not well visualized.   8. Aortic valve was not well visualized.    +Bacteremia. Called by Cardio NP for consultation and to arrange GISSELL to R/O Endocarditis or PPM related infection. Order placed.   Cant ASA, Statin  Pt has seen [facility] out[facility] 2021, [phone]  pt poor historian  will follow here 78-year-old male PMH HTN, PPM, DM, foot ulcers , presents to the hospital by ambulance from home.  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 Pt does not go to doctor on regular basis per son and does phone visits.    TTE: CONCLUSIONS:      1. Technically difficult image quality.   2. There is abnormal septal activation, likely from a paced rhythm. There is likely superimposed anterior and septal hypokinesis. Limited views and limited endocardial definition make further interpretation limited.   3. The right ventricle is not well visualized. Normal right ventricular cavity size and reduced systolic function.   4. The left atrium is mildly dilated in size.   5. Right atrium was not well visualized.   6. Mild calcification of the aortic valve leaflets.   7. Tricuspid valve was not well visualized.   8. Aortic valve was not well visualized.    +Bacteremia. Called by Cardio NP for consultation and to arrange GISSELL to R/O Endocarditis or PPM related infection. Order placed.   Cant ASA, Statin  Pt has seen [facility] outpt 2021, [phone]  pt poor historian  will follow here 78M with PMH HTN, PPM, DM, foot ulcers , presents to the hospital by ambulance from home.  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 Pt does not go to doctor on regular basis per son and does phone visits.    TTE: CONCLUSIONS:      1. Technically difficult image quality.   2. There is abnormal septal activation, likely from a paced rhythm. There is likely superimposed anterior and septal hypokinesis. Limited views and limited endocardial definition make further interpretation limited.   3. The right ventricle is not well visualized. Normal right ventricular cavity size and reduced systolic function.   4. The left atrium is mildly dilated in size.   5. Right atrium was not well visualized.   6. Mild calcification of the aortic valve leaflets.   7. Tricuspid valve was not well visualized.   8. Aortic valve was not well visualized.    +Bacteremia. Called by Cardio NP for consultation and to arrange GISSELL to R/O Endocarditis or PPM related infection. Order placed to be done by Dr. Barrett Monday or Tuesday  Cant ASA, Statin  Pt has seen [facility] out[facility] 2021, [phone]  pt poor historian  will follow here 78M with PMH HTN, PPM, DM, foot ulcers , presents to the hospital by ambulance from home.  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 Pt does not go to doctor on regular basis per son and does phone visits.    TTE: CONCLUSIONS:      1. Technically difficult image quality.   2. There is abnormal septal activation, likely from a paced rhythm. There is likely superimposed anterior and septal hypokinesis. Limited views and limited endocardial definition make further interpretation limited.   3. The right ventricle is not well visualized. Normal right ventricular cavity size and reduced systolic function.   4. The left atrium is mildly dilated in size.   5. Right atrium was not well visualized.   6. Mild calcification of the aortic valve leaflets.   7. Tricuspid valve was not well visualized.   8. Aortic valve was not well visualized.    +Bacteremia. Called by Cardio NP for consultation and to arrange GISSELL to R/O Endocarditis or PPM related infection. Order placed to be done by Dr. Barrett Monday or Tuesday  Cant ASA, Statin  Pt has seen [facility] outpt 2021, [phone]  pt poor historian  will follow here 78M with PMH HTN, PPM, DM, foot ulcers , presents to the hospital by ambulance from home.  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 Pt does not go to doctor on regular basis per son and does phone visits.    TTE: CONCLUSIONS:      1. Technically difficult image quality.   2. There is abnormal septal activation, likely from a paced rhythm. There is likely superimposed anterior and septal hypokinesis. Limited views and limited endocardial definition make further interpretation limited.   3. The right ventricle is not well visualized. Normal right ventricular cavity size and reduced systolic function.   4. The left atrium is mildly dilated in size.   5. Right atrium was not well visualized.   6. Mild calcification of the aortic valve leaflets.   7. Tricuspid valve was not well visualized.   8. Aortic valve was not well visualized.    +Bacteremia. Called by Cardio NP for consultation and to arrange GISSELL to R/O Endocarditis or PPM related infection. Order placed to be done by Dr. Barrett Monday or Tuesday  Cant ASA, Statin  Pt has seen [facility] outpt 2021, [phone]  pt poor historian  will follow here

## 2023-10-14 NOTE — PROGRESS NOTE ADULT - ASSESSMENT
[ASSESSMENT and  PLAN]  D63.8    Anemia due to chronic disease  C61        Prostate cancer  R62.51    Failure to thrive  R50.9      Fever  L89.609   pressure ulcer of heel, Chronic wounds    78-year-old [place] male presents to the hospital with failure to thrive, fever and weight loss of 40 to 50 pounds.  Decreased p.o. intake for the last month.  History of chronic wounds.  Additional admitting diagnoses UTI, sepsis, hypotension, diabetic foot ulcer, anemia 6.6 g/dL    Anemia likely secondary to chronic disease related t owounds and ?osteomyelitis  May have blood loss from chronic wounds     Transfuse as needed evaluate for cause for anemia.     Evaluate for reversible treatable causes for Weight loss.  CT Scan CAP: No overt masses.  No adenopathy.  Patient with poor performance status        UTI  History of enlarged prostate  History of prostate cancer.  Unspecified details.    RECOMMENDATIONS    Transfuse PRBC as clinically indicated.   Transfuse PRBC if Hgb <7.0 or if symptomatic.   Follow CBC    fe studies c/w acute chronic disease      patient with flat affect.  Has limited understanding of situation.    cont ID evaluation to rule osteomyelitis    continue to transfuse patient as indicated  no further heme evaluation required at present  please call if additional evaluation required  discussed with Dr. Harris     [ASSESSMENT and  PLAN]  D63.8    Anemia due to chronic disease  C61        Prostate cancer  R62.51    Failure to thrive  R50.9      Fever  L89.609   pressure ulcer of heel, Chronic wounds    78-year-old [place] male presents to the hospital with failure to thrive, fever and weight loss of 40 to 50 pounds.  Decreased p.o. intake for the last month.  History of chronic wounds.  Additional admitting diagnoses UTI, sepsis, hypotension, diabetic foot ulcer, anemia 6.6 g/dL    Anemia likely secondary to chronic disease related t owounds and ?osteomyelitis  May have blood loss from chronic wounds     Transfuse as needed evaluate for cause for anemia.     Evaluate for reversible treatable causes for Weight loss.  CT Scan CAP: No overt masses.  No adenopathy.  Patient with poor performance status        UTI  History of enlarged prostate  History of prostate cancer.  Unspecified details.    RECOMMENDATIONS    Transfuse PRBC as clinically indicated.   Transfuse PRBC if Hgb <7.0 or if symptomatic.   Follow CBC    fe studies c/w acute chronic disease      patient with flat affect.  Has limited understanding of situation.    cont ID evaluation to rule osteomyelitis    continue to transfuse patient as indicated  no further heme evaluation required at present  please call if additional evaluation required  discussed with Dr. Harris     [ASSESSMENT and  PLAN]  D63.8    Anemia due to chronic disease  C61        Prostate cancer  R62.51    Failure to thrive  R50.9      Fever  L89.609   pressure ulcer of heel, Chronic wounds    78-year-old [place] male presents to the hospital with failure to thrive, fever and weight loss of 40 to 50 pounds.  Decreased p.o. intake for the last month.  History of chronic wounds.  Additional admitting diagnoses UTI, sepsis, hypotension, diabetic foot ulcer, anemia 6.6 g/dL    Anemia likely secondary to chronic disease related t owounds and ?osteomyelitis  May have blood loss from chronic wounds     Transfuse as needed evaluate for cause for anemia.     Evaluate for reversible treatable causes for Weight loss.  CT Scan CAP: No overt masses.  No adenopathy.  Patient with poor performance status        UTI  History of enlarged prostate  History of prostate cancer.  Unspecified details.    RECOMMENDATIONS    Transfuse PRBC as clinically indicated.   Transfuse PRBC if Hgb <7.0 or if symptomatic.   Follow CBC    fe studies c/w acute chronic disease      patient with flat affect.  Has limited understanding of situation.    cont ID evaluation to rule osteomyelitis    continue to transfuse patient as indicated  no further heme evaluation required at present  please call if additional evaluation required  discussed with Dr. Harris

## 2023-10-14 NOTE — PROGRESS NOTE ADULT - SUBJECTIVE AND OBJECTIVE BOX
Interval History:  remains weak  Chart reviewed and events noted;   Overnight events:    MEDICATIONS  (STANDING):  aspirin enteric coated 81 milliGRAM(s) Oral daily  atorvastatin 80 milliGRAM(s) Oral at bedtime  bethanechol 25 milliGRAM(s) Oral two times a day  cefepime   IVPB 2000 milliGRAM(s) IV Intermittent every 8 hours  dextrose 5%. 1000 milliLiter(s) (100 mL/Hr) IV Continuous <Continuous>  dextrose 5%. 1000 milliLiter(s) (50 mL/Hr) IV Continuous <Continuous>  dextrose 50% Injectable 25 Gram(s) IV Push once  dextrose 50% Injectable 12.5 Gram(s) IV Push once  dextrose 50% Injectable 25 Gram(s) IV Push once  dronabinol 2.5 milliGRAM(s) Oral two times a day  gabapentin 300 milliGRAM(s) Oral two times a day  glucagon  Injectable 1 milliGRAM(s) IntraMuscular once  insulin glargine Injectable (LANTUS) 18 Unit(s) SubCutaneous at bedtime  insulin lispro (ADMELOG) corrective regimen sliding scale   SubCutaneous three times a day before meals  insulin lispro (ADMELOG) corrective regimen sliding scale   SubCutaneous at bedtime  insulin lispro Injectable (ADMELOG) 3 Unit(s) SubCutaneous three times a day before meals  lactated ringers. 1000 milliLiter(s) (100 mL/Hr) IV Continuous <Continuous>  lactated ringers. 1000 milliLiter(s) (75 mL/Hr) IV Continuous <Continuous>  lactobacillus acidophilus 1 Tablet(s) Oral two times a day with meals  midodrine. 10 milliGRAM(s) Oral three times a day  pantoprazole    Tablet 40 milliGRAM(s) Oral before breakfast  tamsulosin 0.4 milliGRAM(s) Oral at bedtime  vancomycin  IVPB 1500 milliGRAM(s) IV Intermittent every 24 hours    MEDICATIONS  (PRN):  acetaminophen     Tablet .. 650 milliGRAM(s) Oral every 6 hours PRN Temp greater or equal to 38C (100.4F), Moderate Pain (4 - 6)  dextrose Oral Gel 15 Gram(s) Oral once PRN Blood Glucose LESS THAN 70 milliGRAM(s)/deciliter  loperamide 2 milliGRAM(s) Oral three times a day PRN Diarrhea      Vital Signs Last 24 Hrs  T(C): 36.6 (14 Oct 2023 09:10), Max: 38.4 (13 Oct 2023 20:30)  T(F): 97.8 (14 Oct 2023 09:10), Max: 101.1 (13 Oct 2023 20:30)  HR: 60 (14 Oct 2023 09:10) (60 - 61)  BP: 115/55 (14 Oct 2023 09:10) (98/59 - 119/54)  BP(mean): --  RR: 18 (14 Oct 2023 09:10) (18 - 18)  SpO2: 95% (14 Oct 2023 09:10) (94% - 96%)    Parameters below as of 14 Oct 2023 09:10  Patient On (Oxygen Delivery Method): room air        PHYSICAL EXAM  General: adult in NAD, chronically ill appearing  HEENT: clear oropharynx, anicteric sclera, pink conjunctivae  Neck: supple  CV: normal S1S2 with no murmur rubs or gallops  Lungs: clear to auscultation, no wheezes, no rhales  Abdomen: soft non-tender non-distended, no hepato/splenomegaly  Ext: no clubbing cyanosis or edema  Skin: no rashes and no petichiae  Neuro: alert      LABS:  CBC Full  -  ( 14 Oct 2023 09:45 )  WBC Count : 5.23 K/uL  RBC Count : 3.42 M/uL  Hemoglobin : 8.8 g/dL  Hematocrit : 28.1 %  Platelet Count - Automated : 236 K/uL  Mean Cell Volume : 82.2 fl  Mean Cell Hemoglobin : 25.7 pg  Mean Cell Hemoglobin Concentration : 31.3 gm/dL  Auto Neutrophil # : x  Auto Lymphocyte # : x  Auto Monocyte # : x  Auto Eosinophil # : x  Auto Basophil # : x  Auto Neutrophil % : x  Auto Lymphocyte % : x  Auto Monocyte % : x  Auto Eosinophil % : x  Auto Basophil % : x    10-14    141  |  114<H>  |  32<H>  ----------------------------<  237<H>  4.0   |  22  |  0.88    Ca    8.2<L>      14 Oct 2023 09:45          fe studies  Iron - Total Binding Capacity.: 123 ug/dL (10-12 @ 21:30)  Ferritin: 269 ng/mL (10-12 @ 21:30)      WBC trend  5.23 K/uL (10-14-23 @ 09:45)  4.58 K/uL (10-13-23 @ 08:18)  7.26 K/uL (10-12-23 @ 05:30)      Hgb trend  8.8 g/dL (10-14-23 @ 09:45)  8.4 g/dL (10-13-23 @ 08:18)  9.6 g/dL (10-12-23 @ 05:30)      plt trend  236 K/uL (10-14-23 @ 09:45)  224 K/uL (10-13-23 @ 08:18)  279 K/uL (10-12-23 @ 05:30)        RADIOLOGY & ADDITIONAL STUDIES:

## 2023-10-14 NOTE — PROGRESS NOTE ADULT - SUBJECTIVE AND OBJECTIVE BOX
[facility] Cardiology Progress Note [phone] (Dr. Guillen, Dayan, Halie, Tessa)    CHIEF COMPLAINT: Patient is a 78y old  Male who presents with a chief complaint of fever and weakness (13 Oct 2023 15:40)      Follow Up Today: The patient denies any chest discomfort or shortness of breath.    HPI:  78-year-old male presents to the hospital by ambulance from home.  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 Pt does not go to doctor on regualar basis per son and does phone visits.   (11 Oct 2023 07:02)      PAST MEDICAL & SURGICAL HISTORY:  Diabetes      Benign essential HTN      Pacemaker      History of BPH      Diabetic foot ulcers          MEDICATIONS  (STANDING):  aspirin enteric coated 81 milliGRAM(s) Oral daily  atorvastatin 80 milliGRAM(s) Oral at bedtime  bethanechol 25 milliGRAM(s) Oral two times a day  cefepime   IVPB 2000 milliGRAM(s) IV Intermittent every 8 hours  dextrose 5%. 1000 milliLiter(s) (100 mL/Hr) IV Continuous <Continuous>  dextrose 5%. 1000 milliLiter(s) (50 mL/Hr) IV Continuous <Continuous>  dextrose 50% Injectable 25 Gram(s) IV Push once  dextrose 50% Injectable 25 Gram(s) IV Push once  dextrose 50% Injectable 12.5 Gram(s) IV Push once  dronabinol 2.5 milliGRAM(s) Oral two times a day  gabapentin 300 milliGRAM(s) Oral two times a day  glucagon  Injectable 1 milliGRAM(s) IntraMuscular once  insulin glargine Injectable (LANTUS) 18 Unit(s) SubCutaneous at bedtime  insulin lispro (ADMELOG) corrective regimen sliding scale   SubCutaneous three times a day before meals  insulin lispro (ADMELOG) corrective regimen sliding scale   SubCutaneous at bedtime  insulin lispro Injectable (ADMELOG) 3 Unit(s) SubCutaneous three times a day before meals  lactated ringers. 1000 milliLiter(s) (100 mL/Hr) IV Continuous <Continuous>  lactated ringers. 1000 milliLiter(s) (75 mL/Hr) IV Continuous <Continuous>  lactobacillus acidophilus 1 Tablet(s) Oral two times a day with meals  midodrine. 10 milliGRAM(s) Oral three times a day  pantoprazole    Tablet 40 milliGRAM(s) Oral before breakfast  tamsulosin 0.4 milliGRAM(s) Oral at bedtime  vancomycin  IVPB 1500 milliGRAM(s) IV Intermittent every 24 hours    MEDICATIONS  (PRN):  acetaminophen     Tablet .. 650 milliGRAM(s) Oral every 6 hours PRN Temp greater or equal to 38C (100.4F), Moderate Pain (4 - 6)  dextrose Oral Gel 15 Gram(s) Oral once PRN Blood Glucose LESS THAN 70 milliGRAM(s)/deciliter  loperamide 2 milliGRAM(s) Oral three times a day PRN Diarrhea      Allergies    No Known Allergies    Intolerances        REVIEW OF SYSTEMS:    All other review of systems is negative unless indicated above    Vital Signs Last 24 Hrs  T(C): 37 (14 Oct 2023 05:10), Max: 38.4 (13 Oct 2023 20:30)  T(F): 98.6 (14 Oct 2023 05:10), Max: 101.1 (13 Oct 2023 20:30)  HR: 60 (14 Oct 2023 05:10) (60 - 65)  BP: 119/54 (14 Oct 2023 05:10) (98/59 - 119/54)  BP(mean): --  RR: 18 (14 Oct 2023 05:10) (18 - 18)  SpO2: 96% (14 Oct 2023 05:10) (94% - 96%)    Parameters below as of 14 Oct 2023 05:10  Patient On (Oxygen Delivery Method): room air        I&O's Summary    12 Oct 2023 07:01  -  13 Oct 2023 07:00  --------------------------------------------------------  IN: 0 mL / OUT: 500 mL / NET: -500 mL    13 Oct 2023 07:01  -  14 Oct 2023 06:54  --------------------------------------------------------  IN: 0 mL / OUT: 400 mL / NET: -400 mL        PHYSICAL EXAM:    Constitutional: NAD, awake and alert, well-developed  Eyes:  EOMI,  Pupils round, No oral cyanosis.  HEENT: No exudate or erythema  Pulmonary: Non-labored, breath sounds are clear bilaterally, No wheezing, rales or rhonchi  Cardiovascular: Regular, S1 and S2, No murmurs  Gastrointestinal: Bowel Sounds present, soft, nontender.   Ext: No significant LE edema  Neurological: Alert, no gross focal motor deficits  Skin: No rashes.  Psych:  Mood & affect appropriate    LABS: All Labs Reviewed:                        8.4    4.58  )-----------( 224      ( 13 Oct 2023 08:18 )             26.5                         9.6    7.26  )-----------( 279      ( 12 Oct 2023 05:30 )             30.3     13 Oct 2023 08:18    138    |  113    |  47     ----------------------------<  138    3.8     |  19     |  1.00   12 Oct 2023 05:30    139    |  111    |  42     ----------------------------<  216    4.2     |  22     |  1.10     Ca    8.1        13 Oct 2023 08:18  Ca    8.1        12 Oct 2023 05:30  Phos  3.0       12 Oct 2023 05:30  Mg     1.8       12 Oct 2023 05:30            Blood Culture: Organism --  Gram Stain Blood -- Gram Stain --  Specimen Source .Blood Blood-Peripheral  Culture-Blood --    Organism --  Gram Stain Blood -- Gram Stain --  Specimen Source .Blood Blood-Venous  Culture-Blood --    Organism Methicillin resistant Staphylococcus aureus  Gram Stain Blood -- Gram Stain --  Specimen Source Wound Wound  Culture-Blood --    Organism Pseudomonas aeruginosa  Gram Stain Blood -- Gram Stain --  Specimen Source Catheterized Catheterized  Culture-Blood --    Organism Blood Culture PCR  Gram Stain Blood -- Gram Stain   Growth in aerobic bottle: Gram Positive Cocci in Clusters  Growth in anaerobic bottle: Gram Positive Cocci in Clusters  Specimen Source .Blood Blood-Peripheral  Culture-Blood --            RADIOLOGY/EKG:    Attending Attestation:   50 minutes spent on total encounter; more than 50% of the visit was spent counseling and/or coordinating care by the attending physician.     ASSESSMENT AND PLAN [facility] Cardiology Progress Note [phone] (Dr. Guillen, Dayan, Halie, Tessa)    CHIEF COMPLAINT: Patient is a 78y old  Male who presents with a chief complaint of fever and weakness (13 Oct 2023 15:40)      Follow Up Today: The patient denies any chest discomfort or shortness of breath.    HPI:  78-year-old male presents to the hospital by ambulance from home.  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 Pt does not go to doctor on regualar basis per son and does phone visits.   (11 Oct 2023 07:02)      PAST MEDICAL & SURGICAL HISTORY:  Diabetes      Benign essential HTN      Pacemaker      History of BPH      Diabetic foot ulcers          MEDICATIONS  (STANDING):  aspirin enteric coated 81 milliGRAM(s) Oral daily  atorvastatin 80 milliGRAM(s) Oral at bedtime  bethanechol 25 milliGRAM(s) Oral two times a day  cefepime   IVPB 2000 milliGRAM(s) IV Intermittent every 8 hours  dextrose 5%. 1000 milliLiter(s) (100 mL/Hr) IV Continuous <Continuous>  dextrose 5%. 1000 milliLiter(s) (50 mL/Hr) IV Continuous <Continuous>  dextrose 50% Injectable 25 Gram(s) IV Push once  dextrose 50% Injectable 25 Gram(s) IV Push once  dextrose 50% Injectable 12.5 Gram(s) IV Push once  dronabinol 2.5 milliGRAM(s) Oral two times a day  gabapentin 300 milliGRAM(s) Oral two times a day  glucagon  Injectable 1 milliGRAM(s) IntraMuscular once  insulin glargine Injectable (LANTUS) 18 Unit(s) SubCutaneous at bedtime  insulin lispro (ADMELOG) corrective regimen sliding scale   SubCutaneous three times a day before meals  insulin lispro (ADMELOG) corrective regimen sliding scale   SubCutaneous at bedtime  insulin lispro Injectable (ADMELOG) 3 Unit(s) SubCutaneous three times a day before meals  lactated ringers. 1000 milliLiter(s) (100 mL/Hr) IV Continuous <Continuous>  lactated ringers. 1000 milliLiter(s) (75 mL/Hr) IV Continuous <Continuous>  lactobacillus acidophilus 1 Tablet(s) Oral two times a day with meals  midodrine. 10 milliGRAM(s) Oral three times a day  pantoprazole    Tablet 40 milliGRAM(s) Oral before breakfast  tamsulosin 0.4 milliGRAM(s) Oral at bedtime  vancomycin  IVPB 1500 milliGRAM(s) IV Intermittent every 24 hours    MEDICATIONS  (PRN):  acetaminophen     Tablet .. 650 milliGRAM(s) Oral every 6 hours PRN Temp greater or equal to 38C (100.4F), Moderate Pain (4 - 6)  dextrose Oral Gel 15 Gram(s) Oral once PRN Blood Glucose LESS THAN 70 milliGRAM(s)/deciliter  loperamide 2 milliGRAM(s) Oral three times a day PRN Diarrhea      Allergies    No Known Allergies    Intolerances        REVIEW OF SYSTEMS:    All other review of systems is negative unless indicated above    Vital Signs Last 24 Hrs  T(C): 37 (14 Oct 2023 05:10), Max: 38.4 (13 Oct 2023 20:30)  T(F): 98.6 (14 Oct 2023 05:10), Max: 101.1 (13 Oct 2023 20:30)  HR: 60 (14 Oct 2023 05:10) (60 - 65)  BP: 119/54 (14 Oct 2023 05:10) (98/59 - 119/54)  BP(mean): --  RR: 18 (14 Oct 2023 05:10) (18 - 18)  SpO2: 96% (14 Oct 2023 05:10) (94% - 96%)    Parameters below as of 14 Oct 2023 05:10  Patient On (Oxygen Delivery Method): room air        I&O's Summary    12 Oct 2023 07:01  -  13 Oct 2023 07:00  --------------------------------------------------------  IN: 0 mL / OUT: 500 mL / NET: -500 mL    13 Oct 2023 07:01  -  14 Oct 2023 06:54  --------------------------------------------------------  IN: 0 mL / OUT: 400 mL / NET: -400 mL        PHYSICAL EXAM:    Constitutional: NAD, awake and alert, well-developed  Eyes:  EOMI,  Pupils round, No oral cyanosis.  HEENT: No exudate or erythema  Pulmonary: Non-labored, breath sounds are clear bilaterally, No wheezing, rales or rhonchi  Cardiovascular: Regular, S1 and S2, No murmurs  Gastrointestinal: Bowel Sounds present, soft, nontender.   Ext: No significant LE edema  Neurological: Alert, no gross focal motor deficits  Skin: No rashes.  Psych:  Mood & affect appropriate    LABS: All Labs Reviewed:                        8.4    4.58  )-----------( 224      ( 13 Oct 2023 08:18 )             26.5                         9.6    7.26  )-----------( 279      ( 12 Oct 2023 05:30 )             30.3     13 Oct 2023 08:18    138    |  113    |  47     ----------------------------<  138    3.8     |  19     |  1.00   12 Oct 2023 05:30    139    |  111    |  42     ----------------------------<  216    4.2     |  22     |  1.10     Ca    8.1        13 Oct 2023 08:18  Ca    8.1        12 Oct 2023 05:30  Phos  3.0       12 Oct 2023 05:30  Mg     1.8       12 Oct 2023 05:30            Blood Culture: Organism --  Gram Stain Blood -- Gram Stain --  Specimen Source .Blood Blood-Peripheral  Culture-Blood --    Organism --  Gram Stain Blood -- Gram Stain --  Specimen Source .Blood Blood-Venous  Culture-Blood --    Organism Methicillin resistant Staphylococcus aureus  Gram Stain Blood -- Gram Stain --  Specimen Source Wound Wound  Culture-Blood --    Organism Pseudomonas aeruginosa  Gram Stain Blood -- Gram Stain --  Specimen Source Catheterized Catheterized  Culture-Blood --    Organism Blood Culture PCR  Gram Stain Blood -- Gram Stain   Growth in aerobic bottle: Gram Positive Cocci in Clusters  Growth in anaerobic bottle: Gram Positive Cocci in Clusters  Specimen Source .Blood Blood-Peripheral  Culture-Blood --            RADIOLOGY/EKG:    Attending Attestation:   50 minutes spent on total encounter; more than 50% of the visit was spent counseling and/or coordinating care by the attending physician.     ASSESSMENT AND PLAN [facility] Cardiology Progress Note [phone] (Dr. Guillen, Dayan, Halie, Tessa)    CHIEF COMPLAINT: Patient is a 78y old  Male who presents with a chief complaint of fever and weakness (13 Oct 2023 15:40)      Follow Up Today: The patient denies any chest discomfort or shortness of breath.    HPI:  78-year-old male presents to the hospital by ambulance from home.  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 Pt does not go to doctor on regualar basis per son and does phone visits.   (11 Oct 2023 07:02)      PAST MEDICAL & SURGICAL HISTORY:  Diabetes      Benign essential HTN      Pacemaker      History of BPH      Diabetic foot ulcers          MEDICATIONS  (STANDING):  aspirin enteric coated 81 milliGRAM(s) Oral daily  atorvastatin 80 milliGRAM(s) Oral at bedtime  bethanechol 25 milliGRAM(s) Oral two times a day  cefepime   IVPB 2000 milliGRAM(s) IV Intermittent every 8 hours  dextrose 5%. 1000 milliLiter(s) (100 mL/Hr) IV Continuous <Continuous>  dextrose 5%. 1000 milliLiter(s) (50 mL/Hr) IV Continuous <Continuous>  dextrose 50% Injectable 25 Gram(s) IV Push once  dextrose 50% Injectable 25 Gram(s) IV Push once  dextrose 50% Injectable 12.5 Gram(s) IV Push once  dronabinol 2.5 milliGRAM(s) Oral two times a day  gabapentin 300 milliGRAM(s) Oral two times a day  glucagon  Injectable 1 milliGRAM(s) IntraMuscular once  insulin glargine Injectable (LANTUS) 18 Unit(s) SubCutaneous at bedtime  insulin lispro (ADMELOG) corrective regimen sliding scale   SubCutaneous three times a day before meals  insulin lispro (ADMELOG) corrective regimen sliding scale   SubCutaneous at bedtime  insulin lispro Injectable (ADMELOG) 3 Unit(s) SubCutaneous three times a day before meals  lactated ringers. 1000 milliLiter(s) (100 mL/Hr) IV Continuous <Continuous>  lactated ringers. 1000 milliLiter(s) (75 mL/Hr) IV Continuous <Continuous>  lactobacillus acidophilus 1 Tablet(s) Oral two times a day with meals  midodrine. 10 milliGRAM(s) Oral three times a day  pantoprazole    Tablet 40 milliGRAM(s) Oral before breakfast  tamsulosin 0.4 milliGRAM(s) Oral at bedtime  vancomycin  IVPB 1500 milliGRAM(s) IV Intermittent every 24 hours    MEDICATIONS  (PRN):  acetaminophen     Tablet .. 650 milliGRAM(s) Oral every 6 hours PRN Temp greater or equal to 38C (100.4F), Moderate Pain (4 - 6)  dextrose Oral Gel 15 Gram(s) Oral once PRN Blood Glucose LESS THAN 70 milliGRAM(s)/deciliter  loperamide 2 milliGRAM(s) Oral three times a day PRN Diarrhea      Allergies    No Known Allergies    Intolerances        REVIEW OF SYSTEMS:    All other review of systems is negative unless indicated above    Vital Signs Last 24 Hrs  T(C): 37 (14 Oct 2023 05:10), Max: 38.4 (13 Oct 2023 20:30)  T(F): 98.6 (14 Oct 2023 05:10), Max: 101.1 (13 Oct 2023 20:30)  HR: 60 (14 Oct 2023 05:10) (60 - 65)  BP: 119/54 (14 Oct 2023 05:10) (98/59 - 119/54)  BP(mean): --  RR: 18 (14 Oct 2023 05:10) (18 - 18)  SpO2: 96% (14 Oct 2023 05:10) (94% - 96%)    Parameters below as of 14 Oct 2023 05:10  Patient On (Oxygen Delivery Method): room air        I&O's Summary    12 Oct 2023 07:01  -  13 Oct 2023 07:00  --------------------------------------------------------  IN: 0 mL / OUT: 500 mL / NET: -500 mL    13 Oct 2023 07:01  -  14 Oct 2023 06:54  --------------------------------------------------------  IN: 0 mL / OUT: 400 mL / NET: -400 mL        PHYSICAL EXAM:    Constitutional: NAD, awake and alert, well-developed  Eyes:  EOMI,  Pupils round, No oral cyanosis.  HEENT: No exudate or erythema  Pulmonary: Non-labored, breath sounds are clear bilaterally, No wheezing, rales or rhonchi  Cardiovascular: Regular, S1 and S2, No murmurs  Gastrointestinal: Bowel Sounds present, soft, nontender.   Ext: No significant LE edema  Neurological: Alert, no gross focal motor deficits  Skin: No rashes.  Psych:  Mood & affect appropriate    LABS: All Labs Reviewed:                        8.4    4.58  )-----------( 224      ( 13 Oct 2023 08:18 )             26.5                         9.6    7.26  )-----------( 279      ( 12 Oct 2023 05:30 )             30.3     13 Oct 2023 08:18    138    |  113    |  47     ----------------------------<  138    3.8     |  19     |  1.00   12 Oct 2023 05:30    139    |  111    |  42     ----------------------------<  216    4.2     |  22     |  1.10     Ca    8.1        13 Oct 2023 08:18  Ca    8.1        12 Oct 2023 05:30  Phos  3.0       12 Oct 2023 05:30  Mg     1.8       12 Oct 2023 05:30            Blood Culture: Organism --  Gram Stain Blood -- Gram Stain --  Specimen Source .Blood Blood-Peripheral  Culture-Blood --    Organism --  Gram Stain Blood -- Gram Stain --  Specimen Source .Blood Blood-Venous  Culture-Blood --    Organism Methicillin resistant Staphylococcus aureus  Gram Stain Blood -- Gram Stain --  Specimen Source Wound Wound  Culture-Blood --    Organism Pseudomonas aeruginosa  Gram Stain Blood -- Gram Stain --  Specimen Source Catheterized Catheterized  Culture-Blood --    Organism Blood Culture PCR  Gram Stain Blood -- Gram Stain   Growth in aerobic bottle: Gram Positive Cocci in Clusters  Growth in anaerobic bottle: Gram Positive Cocci in Clusters  Specimen Source .Blood Blood-Peripheral  Culture-Blood --            RADIOLOGY/EKG:    Attending Attestation:   50 minutes spent on total encounter; more than 50% of the visit was spent counseling and/or coordinating care by the attending physician.     ASSESSMENT AND PLAN

## 2023-10-14 NOTE — PROGRESS NOTE ADULT - SUBJECTIVE AND OBJECTIVE BOX
[facility]  GLYNN Oakley Y. Patel, S. Shah, G. Finesse  [phone]  ([phone] - weekdays after 5pm and weekends)    Name: HEMA LAZCANO  Age/Gender: 78y Male  MRN: [id number]    Interval History:  Patient seen and examined this afternoon.  Reports fever and chills last night, T 101.1F.   Denies chest pain, abd pain, n/v/d  Notes reviewed  Allergies: No Known Allergies      Objective:  Vitals:   T(F): 97.8 (10-14-23 @ 09:10), Max: 101.1 (10-13-23 @ 20:30)  HR: 60 (10-14-23 @ 09:10) (60 - 61)  BP: 115/55 (10-14-23 @ 09:10) (98/59 - 119/54)  RR: 18 (10-14-23 @ 09:10) (18 - 18)  SpO2: 95% (10-14-23 @ 09:10) (94% - 96%)  Physical Examination:  General: no acute distress  HEENT: NC/AT, anicteric, neck supple  Respiratory: decreased breath sounds b/l  Cardiovascular: S1 and S2 present, normal rate   Gastrointestinal: soft, nontender, nondistended  Extremities: b/l foot dressings, +edema  Skin: no visible rash    Laboratory Studies:  CBC:                       8.8    5.23  )-----------( 236      ( 14 Oct 2023 09:45 )             28.1     WBC Trend:  5.23 10-14-23 @ 09:45  4.58 10-13-23 @ 08:18  7.26 10-12-23 @ 05:30  9.99 10-11-23 @ 04:09  11.08 10-10-23 @ 18:25    CMP: 10-14    141  |  114<H>  |  32<H>  ----------------------------<  237<H>  4.0   |  22  |  0.88    Ca    8.2<L>      14 Oct 2023 09:45      Creatinine: 0.88 mg/dL (10-14-23 @ 09:45)  Creatinine: 1.00 mg/dL (10-13-23 @ 08:18)  Creatinine: 1.10 mg/dL (10-12-23 @ 05:30)  Creatinine: 1.20 mg/dL (10-11-23 @ 04:09)  Creatinine: 1.20 mg/dL (10-10-23 @ 18:25)    Vancomycin Level, Trough: 6.4 ug/mL (10-12-23 @ 22:20)    Microbiology: reviewed   Culture - Blood (collected 10-12-23 @ 05:36)  Source: .Blood Blood-Peripheral  Preliminary Report (10-14-23 @ 10:01):    No growth at 48 Hours    Culture - Blood (collected 10-12-23 @ 05:30)  Source: .Blood Blood-Venous  Preliminary Report (10-14-23 @ 10:01):    No growth at 48 Hours    Culture - Other (collected 10-11-23 @ 13:13)  Source: Wound Wound  Final Report (10-13-23 @ 17:21):    Numerous Methicillin Resistant Staphylococcus aureus  Organism: Methicillin resistant Staphylococcus aureus (10-13-23 @ 17:21)  Organism: Methicillin resistant Staphylococcus aureus (10-13-23 @ 17:21)      Method Type: KARTHIK      -  Ampicillin/Sulbactam: R <=8/4      -  Cefazolin: R <=4      -  Clindamycin: S <=0.25      -  Daptomycin: S 1      -  Erythromycin: R >4      -  Gentamicin: S 2 Should not be used as monotherapy      -  Linezolid: S 4      -  Oxacillin: R >2      -  Penicillin: R 2      -  Rifampin: S <=1 Should not be used as monotherapy      -  Tetracycline: S <=1      -  Trimethoprim/Sulfamethoxazole: S <=0.5/9.5      -  Vancomycin: S 2    Culture - Urine (collected 10-10-23 @ 21:00)  Source: Catheterized Catheterized  Final Report (10-13-23 @ 17:40):    10,000 - 49,000 CFU/mL Pseudomonas aeruginosa  Organism: Pseudomonas aeruginosa (10-13-23 @ 17:40)  Organism: Pseudomonas aeruginosa (10-13-23 @ 17:40)      Method Type: KARTHIK      -  Amikacin: S <=16      -  Aztreonam: S <=4      -  Cefepime: S <=2      -  Ceftazidime: S <=1      -  Ciprofloxacin: S <=0.25      -  Gentamicin: S <=2      -  Imipenem: S <=1      -  Levofloxacin: S <=0.5      -  Meropenem: S <=1      -  Piperacillin/Tazobactam: S <=8      -  Tobramycin: S <=2    Culture - Blood (collected 10-10-23 @ 18:25)  Source: .Blood Blood-Peripheral  Gram Stain (10-11-23 @ 21:56):    Growth in aerobic bottle: Gram Positive Cocci in Clusters    Growth in anaerobic bottle: Gram Positive Cocci in Clusters  Final Report (10-13-23 @ 18:46):    Growth in aerobic and anaerobic bottles: Methicillin Resistant    Staphylococcus aureus    See previous culture [id number]    Culture - Blood (collected 10-10-23 @ 18:25)  Source: .Blood Blood-Peripheral  Gram Stain (10-11-23 @ 15:29):    Growth in aerobic bottle: Gram Positive Cocci in Clusters    Growth in anaerobic bottle: Gram Positive Cocci in Clusters  Final Report (10-13-23 @ 12:50):    Growth in aerobic and anaerobic bottles: Methicillin Resistant    Staphylococcus aureus    Direct identification is available within approximately 3-5    hours either by Blood Panel Multiplexed PCR or Direct    MALDI-TOF. Details: [email or web address]  Organism: Blood Culture PCR  Methicillin resistant Staphylococcus aureus (10-13-23 @ 12:50)  Organism: Methicillin resistant Staphylococcus aureus (10-13-23 @ 12:50)      Method Type: KARTHIK      -  Ampicillin/Sulbactam: R <=8/4      -  Cefazolin: R <=4      -  Clindamycin: I 2      -  Daptomycin: S 1      -  Erythromycin: R >4      -  Gentamicin: S <=1 Should not be used as monotherapy      -  Linezolid: S 4      -  Oxacillin: R >2      -  Penicillin: R 2      -  Rifampin: S <=1 Should not be used as monotherapy      -  Tetracycline: S <=1      -  Trimethoprim/Sulfamethoxazole: S <=0.5/9.5      -  Vancomycin: S 1  Organism: Blood Culture PCR (10-13-23 @ 12:50)      Method Type: PCR      -  Methicillin resistant Staphylococcus aureus (MRSA): Detec    SARS-CoV-2 Result: NotDetec (10 Oct 2023 18:25)    Radiology: reviewed     Medications:  acetaminophen     Tablet .. 650 milliGRAM(s) Oral every 6 hours PRN  aspirin enteric coated 81 milliGRAM(s) Oral daily  atorvastatin 80 milliGRAM(s) Oral at bedtime  bethanechol 25 milliGRAM(s) Oral two times a day  cefepime   IVPB 2000 milliGRAM(s) IV Intermittent every 8 hours  dextrose 5%. 1000 milliLiter(s) IV Continuous <Continuous>  dextrose 5%. 1000 milliLiter(s) IV Continuous <Continuous>  dextrose 50% Injectable 25 Gram(s) IV Push once  dextrose 50% Injectable 12.5 Gram(s) IV Push once  dextrose 50% Injectable 25 Gram(s) IV Push once  dextrose Oral Gel 15 Gram(s) Oral once PRN  dronabinol 2.5 milliGRAM(s) Oral two times a day  gabapentin 300 milliGRAM(s) Oral two times a day  glucagon  Injectable 1 milliGRAM(s) IntraMuscular once  insulin glargine Injectable (LANTUS) 18 Unit(s) SubCutaneous at bedtime  insulin lispro (ADMELOG) corrective regimen sliding scale   SubCutaneous three times a day before meals  insulin lispro (ADMELOG) corrective regimen sliding scale   SubCutaneous at bedtime  insulin lispro Injectable (ADMELOG) 3 Unit(s) SubCutaneous three times a day before meals  lactated ringers. 1000 milliLiter(s) IV Continuous <Continuous>  lactated ringers. 1000 milliLiter(s) IV Continuous <Continuous>  lactobacillus acidophilus 1 Tablet(s) Oral two times a day with meals  loperamide 2 milliGRAM(s) Oral three times a day PRN  midodrine. 10 milliGRAM(s) Oral three times a day  pantoprazole    Tablet 40 milliGRAM(s) Oral before breakfast  tamsulosin 0.4 milliGRAM(s) Oral at bedtime  vancomycin  IVPB 1500 milliGRAM(s) IV Intermittent every 24 hours    Current Antimicrobials:  cefepime   IVPB 2000 milliGRAM(s) IV Intermittent every 8 hours  vancomycin  IVPB 1500 milliGRAM(s) IV Intermittent every 24 hours    Prior/Completed Antimicrobials:  piperacillin/tazobactam IVPB.  piperacillin/tazobactam IVPB.-  piperacillin/tazobactam IVPB.-  piperacillin/tazobactam IVPB...  vancomycin  IVPB.   [facility]ES  GLYNN Oakley Y. Patel, S. Shah, G. Finesse  [phone]  ([phone] - weekdays after 5pm and weekends)    Name: HEMA LAZCANO  Age/Gender: 78y Male  MRN: [id number]    Interval History:  Patient seen and examined this afternoon.  Reports fever and chills last night, T 101.1F.   Denies chest pain, abd pain, n/v/d  Notes reviewed  Allergies: No Known Allergies      Objective:  Vitals:   T(F): 97.8 (10-14-23 @ 09:10), Max: 101.1 (10-13-23 @ 20:30)  HR: 60 (10-14-23 @ 09:10) (60 - 61)  BP: 115/55 (10-14-23 @ 09:10) (98/59 - 119/54)  RR: 18 (10-14-23 @ 09:10) (18 - 18)  SpO2: 95% (10-14-23 @ 09:10) (94% - 96%)  Physical Examination:  General: no acute distress  HEENT: NC/AT, anicteric, neck supple  Respiratory: decreased breath sounds b/l  Cardiovascular: S1 and S2 present, normal rate   Gastrointestinal: soft, nontender, nondistended  Extremities: b/l foot dressings, +edema  Skin: no visible rash    Laboratory Studies:  CBC:                       8.8    5.23  )-----------( 236      ( 14 Oct 2023 09:45 )             28.1     WBC Trend:  5.23 10-14-23 @ 09:45  4.58 10-13-23 @ 08:18  7.26 10-12-23 @ 05:30  9.99 10-11-23 @ 04:09  11.08 10-10-23 @ 18:25    CMP: 10-14    141  |  114<H>  |  32<H>  ----------------------------<  237<H>  4.0   |  22  |  0.88    Ca    8.2<L>      14 Oct 2023 09:45      Creatinine: 0.88 mg/dL (10-14-23 @ 09:45)  Creatinine: 1.00 mg/dL (10-13-23 @ 08:18)  Creatinine: 1.10 mg/dL (10-12-23 @ 05:30)  Creatinine: 1.20 mg/dL (10-11-23 @ 04:09)  Creatinine: 1.20 mg/dL (10-10-23 @ 18:25)    Vancomycin Level, Trough: 6.4 ug/mL (10-12-23 @ 22:20)    Microbiology: reviewed   Culture - Blood (collected 10-12-23 @ 05:36)  Source: .Blood Blood-Peripheral  Preliminary Report (10-14-23 @ 10:01):    No growth at 48 Hours    Culture - Blood (collected 10-12-23 @ 05:30)  Source: .Blood Blood-Venous  Preliminary Report (10-14-23 @ 10:01):    No growth at 48 Hours    Culture - Other (collected 10-11-23 @ 13:13)  Source: Wound Wound  Final Report (10-13-23 @ 17:21):    Numerous Methicillin Resistant Staphylococcus aureus  Organism: Methicillin resistant Staphylococcus aureus (10-13-23 @ 17:21)  Organism: Methicillin resistant Staphylococcus aureus (10-13-23 @ 17:21)      Method Type: KARTHIK      -  Ampicillin/Sulbactam: R <=8/4      -  Cefazolin: R <=4      -  Clindamycin: S <=0.25      -  Daptomycin: S 1      -  Erythromycin: R >4      -  Gentamicin: S 2 Should not be used as monotherapy      -  Linezolid: S 4      -  Oxacillin: R >2      -  Penicillin: R 2      -  Rifampin: S <=1 Should not be used as monotherapy      -  Tetracycline: S <=1      -  Trimethoprim/Sulfamethoxazole: S <=0.5/9.5      -  Vancomycin: S 2    Culture - Urine (collected 10-10-23 @ 21:00)  Source: Catheterized Catheterized  Final Report (10-13-23 @ 17:40):    10,000 - 49,000 CFU/mL Pseudomonas aeruginosa  Organism: Pseudomonas aeruginosa (10-13-23 @ 17:40)  Organism: Pseudomonas aeruginosa (10-13-23 @ 17:40)      Method Type: KARTHIK      -  Amikacin: S <=16      -  Aztreonam: S <=4      -  Cefepime: S <=2      -  Ceftazidime: S <=1      -  Ciprofloxacin: S <=0.25      -  Gentamicin: S <=2      -  Imipenem: S <=1      -  Levofloxacin: S <=0.5      -  Meropenem: S <=1      -  Piperacillin/Tazobactam: S <=8      -  Tobramycin: S <=2    Culture - Blood (collected 10-10-23 @ 18:25)  Source: .Blood Blood-Peripheral  Gram Stain (10-11-23 @ 21:56):    Growth in aerobic bottle: Gram Positive Cocci in Clusters    Growth in anaerobic bottle: Gram Positive Cocci in Clusters  Final Report (10-13-23 @ 18:46):    Growth in aerobic and anaerobic bottles: Methicillin Resistant    Staphylococcus aureus    See previous culture [id number]    Culture - Blood (collected 10-10-23 @ 18:25)  Source: .Blood Blood-Peripheral  Gram Stain (10-11-23 @ 15:29):    Growth in aerobic bottle: Gram Positive Cocci in Clusters    Growth in anaerobic bottle: Gram Positive Cocci in Clusters  Final Report (10-13-23 @ 12:50):    Growth in aerobic and anaerobic bottles: Methicillin Resistant    Staphylococcus aureus    Direct identification is available within approximately 3-5    hours either by Blood Panel Multiplexed PCR or Direct    MALDI-TOF. Details: [email or web address]  Organism: Blood Culture PCR  Methicillin resistant Staphylococcus aureus (10-13-23 @ 12:50)  Organism: Methicillin resistant Staphylococcus aureus (10-13-23 @ 12:50)      Method Type: KARTHIK      -  Ampicillin/Sulbactam: R <=8/4      -  Cefazolin: R <=4      -  Clindamycin: I 2      -  Daptomycin: S 1      -  Erythromycin: R >4      -  Gentamicin: S <=1 Should not be used as monotherapy      -  Linezolid: S 4      -  Oxacillin: R >2      -  Penicillin: R 2      -  Rifampin: S <=1 Should not be used as monotherapy      -  Tetracycline: S <=1      -  Trimethoprim/Sulfamethoxazole: S <=0.5/9.5      -  Vancomycin: S 1  Organism: Blood Culture PCR (10-13-23 @ 12:50)      Method Type: PCR      -  Methicillin resistant Staphylococcus aureus (MRSA): Detec    SARS-CoV-2 Result: NotDetec (10 Oct 2023 18:25)    Radiology: reviewed     Medications:  acetaminophen     Tablet .. 650 milliGRAM(s) Oral every 6 hours PRN  aspirin enteric coated 81 milliGRAM(s) Oral daily  atorvastatin 80 milliGRAM(s) Oral at bedtime  bethanechol 25 milliGRAM(s) Oral two times a day  cefepime   IVPB 2000 milliGRAM(s) IV Intermittent every 8 hours  dextrose 5%. 1000 milliLiter(s) IV Continuous <Continuous>  dextrose 5%. 1000 milliLiter(s) IV Continuous <Continuous>  dextrose 50% Injectable 25 Gram(s) IV Push once  dextrose 50% Injectable 12.5 Gram(s) IV Push once  dextrose 50% Injectable 25 Gram(s) IV Push once  dextrose Oral Gel 15 Gram(s) Oral once PRN  dronabinol 2.5 milliGRAM(s) Oral two times a day  gabapentin 300 milliGRAM(s) Oral two times a day  glucagon  Injectable 1 milliGRAM(s) IntraMuscular once  insulin glargine Injectable (LANTUS) 18 Unit(s) SubCutaneous at bedtime  insulin lispro (ADMELOG) corrective regimen sliding scale   SubCutaneous three times a day before meals  insulin lispro (ADMELOG) corrective regimen sliding scale   SubCutaneous at bedtime  insulin lispro Injectable (ADMELOG) 3 Unit(s) SubCutaneous three times a day before meals  lactated ringers. 1000 milliLiter(s) IV Continuous <Continuous>  lactated ringers. 1000 milliLiter(s) IV Continuous <Continuous>  lactobacillus acidophilus 1 Tablet(s) Oral two times a day with meals  loperamide 2 milliGRAM(s) Oral three times a day PRN  midodrine. 10 milliGRAM(s) Oral three times a day  pantoprazole    Tablet 40 milliGRAM(s) Oral before breakfast  tamsulosin 0.4 milliGRAM(s) Oral at bedtime  vancomycin  IVPB 1500 milliGRAM(s) IV Intermittent every 24 hours    Current Antimicrobials:  cefepime   IVPB 2000 milliGRAM(s) IV Intermittent every 8 hours  vancomycin  IVPB 1500 milliGRAM(s) IV Intermittent every 24 hours    Prior/Completed Antimicrobials:  piperacillin/tazobactam IVPB.  piperacillin/tazobactam IVPB.-  piperacillin/tazobactam IVPB.-  piperacillin/tazobactam IVPB...  vancomycin  IVPB.   [facility]  GLYNN Oakley Y. Patel, S. Shah, G. Finesse  [phone]  ([phone] - weekdays after 5pm and weekends)    Name: HEMA LAZCANO  Age/Gender: 78y Male  MRN: [id number]    Interval History:  Patient seen and examined this afternoon.  Reports fever and chills last night, T 101.1F.   Denies chest pain, abd pain, n/v/d  Notes reviewed  Allergies: No Known Allergies      Objective:  Vitals:   T(F): 97.8 (10-14-23 @ 09:10), Max: 101.1 (10-13-23 @ 20:30)  HR: 60 (10-14-23 @ 09:10) (60 - 61)  BP: 115/55 (10-14-23 @ 09:10) (98/59 - 119/54)  RR: 18 (10-14-23 @ 09:10) (18 - 18)  SpO2: 95% (10-14-23 @ 09:10) (94% - 96%)  Physical Examination:  General: no acute distress  HEENT: NC/AT, anicteric, neck supple  Respiratory: decreased breath sounds b/l  Cardiovascular: S1 and S2 present, normal rate   Gastrointestinal: soft, nontender, nondistended  Extremities: b/l foot dressings, +edema  Skin: no visible rash    Laboratory Studies:  CBC:                       8.8    5.23  )-----------( 236      ( 14 Oct 2023 09:45 )             28.1     WBC Trend:  5.23 10-14-23 @ 09:45  4.58 10-13-23 @ 08:18  7.26 10-12-23 @ 05:30  9.99 10-11-23 @ 04:09  11.08 10-10-23 @ 18:25    CMP: 10-14    141  |  114<H>  |  32<H>  ----------------------------<  237<H>  4.0   |  22  |  0.88    Ca    8.2<L>      14 Oct 2023 09:45      Creatinine: 0.88 mg/dL (10-14-23 @ 09:45)  Creatinine: 1.00 mg/dL (10-13-23 @ 08:18)  Creatinine: 1.10 mg/dL (10-12-23 @ 05:30)  Creatinine: 1.20 mg/dL (10-11-23 @ 04:09)  Creatinine: 1.20 mg/dL (10-10-23 @ 18:25)    Vancomycin Level, Trough: 6.4 ug/mL (10-12-23 @ 22:20)    Microbiology: reviewed   Culture - Blood (collected 10-12-23 @ 05:36)  Source: .Blood Blood-Peripheral  Preliminary Report (10-14-23 @ 10:01):    No growth at 48 Hours    Culture - Blood (collected 10-12-23 @ 05:30)  Source: .Blood Blood-Venous  Preliminary Report (10-14-23 @ 10:01):    No growth at 48 Hours    Culture - Other (collected 10-11-23 @ 13:13)  Source: Wound Wound  Final Report (10-13-23 @ 17:21):    Numerous Methicillin Resistant Staphylococcus aureus  Organism: Methicillin resistant Staphylococcus aureus (10-13-23 @ 17:21)  Organism: Methicillin resistant Staphylococcus aureus (10-13-23 @ 17:21)      Method Type: KARTHIK      -  Ampicillin/Sulbactam: R <=8/4      -  Cefazolin: R <=4      -  Clindamycin: S <=0.25      -  Daptomycin: S 1      -  Erythromycin: R >4      -  Gentamicin: S 2 Should not be used as monotherapy      -  Linezolid: S 4      -  Oxacillin: R >2      -  Penicillin: R 2      -  Rifampin: S <=1 Should not be used as monotherapy      -  Tetracycline: S <=1      -  Trimethoprim/Sulfamethoxazole: S <=0.5/9.5      -  Vancomycin: S 2    Culture - Urine (collected 10-10-23 @ 21:00)  Source: Catheterized Catheterized  Final Report (10-13-23 @ 17:40):    10,000 - 49,000 CFU/mL Pseudomonas aeruginosa  Organism: Pseudomonas aeruginosa (10-13-23 @ 17:40)  Organism: Pseudomonas aeruginosa (10-13-23 @ 17:40)      Method Type: KARTHIK      -  Amikacin: S <=16      -  Aztreonam: S <=4      -  Cefepime: S <=2      -  Ceftazidime: S <=1      -  Ciprofloxacin: S <=0.25      -  Gentamicin: S <=2      -  Imipenem: S <=1      -  Levofloxacin: S <=0.5      -  Meropenem: S <=1      -  Piperacillin/Tazobactam: S <=8      -  Tobramycin: S <=2    Culture - Blood (collected 10-10-23 @ 18:25)  Source: .Blood Blood-Peripheral  Gram Stain (10-11-23 @ 21:56):    Growth in aerobic bottle: Gram Positive Cocci in Clusters    Growth in anaerobic bottle: Gram Positive Cocci in Clusters  Final Report (10-13-23 @ 18:46):    Growth in aerobic and anaerobic bottles: Methicillin Resistant    Staphylococcus aureus    See previous culture [id number]    Culture - Blood (collected 10-10-23 @ 18:25)  Source: .Blood Blood-Peripheral  Gram Stain (10-11-23 @ 15:29):    Growth in aerobic bottle: Gram Positive Cocci in Clusters    Growth in anaerobic bottle: Gram Positive Cocci in Clusters  Final Report (10-13-23 @ 12:50):    Growth in aerobic and anaerobic bottles: Methicillin Resistant    Staphylococcus aureus    Direct identification is available within approximately 3-5    hours either by Blood Panel Multiplexed PCR or Direct    MALDI-TOF. Details: [email or web address]  Organism: Blood Culture PCR  Methicillin resistant Staphylococcus aureus (10-13-23 @ 12:50)  Organism: Methicillin resistant Staphylococcus aureus (10-13-23 @ 12:50)      Method Type: KARTHIK      -  Ampicillin/Sulbactam: R <=8/4      -  Cefazolin: R <=4      -  Clindamycin: I 2      -  Daptomycin: S 1      -  Erythromycin: R >4      -  Gentamicin: S <=1 Should not be used as monotherapy      -  Linezolid: S 4      -  Oxacillin: R >2      -  Penicillin: R 2      -  Rifampin: S <=1 Should not be used as monotherapy      -  Tetracycline: S <=1      -  Trimethoprim/Sulfamethoxazole: S <=0.5/9.5      -  Vancomycin: S 1  Organism: Blood Culture PCR (10-13-23 @ 12:50)      Method Type: PCR      -  Methicillin resistant Staphylococcus aureus (MRSA): Detec    SARS-CoV-2 Result: NotDetec (10 Oct 2023 18:25)    Radiology: reviewed     Medications:  acetaminophen     Tablet .. 650 milliGRAM(s) Oral every 6 hours PRN  aspirin enteric coated 81 milliGRAM(s) Oral daily  atorvastatin 80 milliGRAM(s) Oral at bedtime  bethanechol 25 milliGRAM(s) Oral two times a day  cefepime   IVPB 2000 milliGRAM(s) IV Intermittent every 8 hours  dextrose 5%. 1000 milliLiter(s) IV Continuous <Continuous>  dextrose 5%. 1000 milliLiter(s) IV Continuous <Continuous>  dextrose 50% Injectable 25 Gram(s) IV Push once  dextrose 50% Injectable 12.5 Gram(s) IV Push once  dextrose 50% Injectable 25 Gram(s) IV Push once  dextrose Oral Gel 15 Gram(s) Oral once PRN  dronabinol 2.5 milliGRAM(s) Oral two times a day  gabapentin 300 milliGRAM(s) Oral two times a day  glucagon  Injectable 1 milliGRAM(s) IntraMuscular once  insulin glargine Injectable (LANTUS) 18 Unit(s) SubCutaneous at bedtime  insulin lispro (ADMELOG) corrective regimen sliding scale   SubCutaneous three times a day before meals  insulin lispro (ADMELOG) corrective regimen sliding scale   SubCutaneous at bedtime  insulin lispro Injectable (ADMELOG) 3 Unit(s) SubCutaneous three times a day before meals  lactated ringers. 1000 milliLiter(s) IV Continuous <Continuous>  lactated ringers. 1000 milliLiter(s) IV Continuous <Continuous>  lactobacillus acidophilus 1 Tablet(s) Oral two times a day with meals  loperamide 2 milliGRAM(s) Oral three times a day PRN  midodrine. 10 milliGRAM(s) Oral three times a day  pantoprazole    Tablet 40 milliGRAM(s) Oral before breakfast  tamsulosin 0.4 milliGRAM(s) Oral at bedtime  vancomycin  IVPB 1500 milliGRAM(s) IV Intermittent every 24 hours    Current Antimicrobials:  cefepime   IVPB 2000 milliGRAM(s) IV Intermittent every 8 hours  vancomycin  IVPB 1500 milliGRAM(s) IV Intermittent every 24 hours    Prior/Completed Antimicrobials:  piperacillin/tazobactam IVPB.  piperacillin/tazobactam IVPB.-  piperacillin/tazobactam IVPB.-  piperacillin/tazobactam IVPB...  vancomycin  IVPB.

## 2023-10-14 NOTE — PROGRESS NOTE ADULT - PROBLEM SELECTOR PLAN 2
id noted  rpt blood cultures neg to date  iv zosyn and vanco  nuclear bone scan to r/o om once stable  tessie early next week  podiatry fu

## 2023-10-14 NOTE — PROGRESS NOTE ADULT - SUBJECTIVE AND OBJECTIVE BOX
[facility]  Les Mccray PA-C  [address] [place] [address]  [phone]      INTERVAL HPI/OVERNIGHT EVENTS:  Pt s/e  Tolerating diet  No new Gi events    MEDICATIONS  (STANDING):  aspirin enteric coated 81 milliGRAM(s) Oral daily  atorvastatin 80 milliGRAM(s) Oral at bedtime  bethanechol 25 milliGRAM(s) Oral two times a day  cefepime   IVPB 2000 milliGRAM(s) IV Intermittent every 8 hours  dextrose 5%. 1000 milliLiter(s) (100 mL/Hr) IV Continuous <Continuous>  dextrose 5%. 1000 milliLiter(s) (50 mL/Hr) IV Continuous <Continuous>  dextrose 50% Injectable 25 Gram(s) IV Push once  dextrose 50% Injectable 12.5 Gram(s) IV Push once  dextrose 50% Injectable 25 Gram(s) IV Push once  dronabinol 2.5 milliGRAM(s) Oral two times a day  gabapentin 300 milliGRAM(s) Oral two times a day  glucagon  Injectable 1 milliGRAM(s) IntraMuscular once  insulin glargine Injectable (LANTUS) 18 Unit(s) SubCutaneous at bedtime  insulin lispro (ADMELOG) corrective regimen sliding scale   SubCutaneous at bedtime  insulin lispro (ADMELOG) corrective regimen sliding scale   SubCutaneous three times a day before meals  insulin lispro Injectable (ADMELOG) 3 Unit(s) SubCutaneous three times a day before meals  lactated ringers. 1000 milliLiter(s) (100 mL/Hr) IV Continuous <Continuous>  lactated ringers. 1000 milliLiter(s) (75 mL/Hr) IV Continuous <Continuous>  lactobacillus acidophilus 1 Tablet(s) Oral two times a day with meals  midodrine. 10 milliGRAM(s) Oral three times a day  pantoprazole    Tablet 40 milliGRAM(s) Oral before breakfast  tamsulosin 0.4 milliGRAM(s) Oral at bedtime  vancomycin  IVPB 1500 milliGRAM(s) IV Intermittent every 24 hours    MEDICATIONS  (PRN):  acetaminophen     Tablet .. 650 milliGRAM(s) Oral every 6 hours PRN Temp greater or equal to 38C (100.4F), Moderate Pain (4 - 6)  dextrose Oral Gel 15 Gram(s) Oral once PRN Blood Glucose LESS THAN 70 milliGRAM(s)/deciliter  loperamide 2 milliGRAM(s) Oral three times a day PRN Diarrhea      Allergies    No Known Allergies        PHYSICAL EXAM:   Vital Signs:  Vital Signs Last 24 Hrs  T(C): 37 (14 Oct 2023 05:10), Max: 38.4 (13 Oct 2023 20:30)  T(F): 98.6 (14 Oct 2023 05:10), Max: 101.1 (13 Oct 2023 20:30)  HR: 60 (14 Oct 2023 05:10) (60 - 61)  BP: 119/54 (14 Oct 2023 05:10) (98/59 - 119/54)  BP(mean): --  RR: 18 (14 Oct 2023 05:10) (18 - 18)  SpO2: 96% (14 Oct 2023 05:10) (94% - 96%)    Parameters below as of 14 Oct 2023 05:10  Patient On (Oxygen Delivery Method): room air      GENERAL:  Appears stated age  HEENT:  NC/AT  CHEST:  Full & symmetric excursion  HEART:  Regular rhythm  ABDOMEN:  Soft, non-tender, non-distended  EXTEREMITIES:  no cyanosis  SKIN:  No rash  NEURO:  Alert      LABS:                        8.8    5.23  )-----------( 236      ( 14 Oct 2023 09:45 )             28.1     10-14    141  |  114<H>  |  32<H>  ----------------------------<  237<H>  4.0   |  22  |  0.88    Ca    8.2<L>      14 Oct 2023 09:45        Urinalysis Basic - ( 14 Oct 2023 09:45 )    Color: x / Appearance: x / SG: x / pH: x  Gluc: 237 mg/dL / Ketone: x  / Bili: x / Urobili: x   Blood: x / Protein: x / Nitrite: x   Leuk Esterase: x / RBC: x / WBC x   Sq Epi: x / Non Sq Epi: x / Bacteria: x     [facility]  Les Mccray PA-C  [address] [place] [address]  [phone]      INTERVAL HPI/OVERNIGHT EVENTS:  Pt s/e  Tolerating diet  No new Gi events    MEDICATIONS  (STANDING):  aspirin enteric coated 81 milliGRAM(s) Oral daily  atorvastatin 80 milliGRAM(s) Oral at bedtime  bethanechol 25 milliGRAM(s) Oral two times a day  cefepime   IVPB 2000 milliGRAM(s) IV Intermittent every 8 hours  dextrose 5%. 1000 milliLiter(s) (100 mL/Hr) IV Continuous <Continuous>  dextrose 5%. 1000 milliLiter(s) (50 mL/Hr) IV Continuous <Continuous>  dextrose 50% Injectable 25 Gram(s) IV Push once  dextrose 50% Injectable 12.5 Gram(s) IV Push once  dextrose 50% Injectable 25 Gram(s) IV Push once  dronabinol 2.5 milliGRAM(s) Oral two times a day  gabapentin 300 milliGRAM(s) Oral two times a day  glucagon  Injectable 1 milliGRAM(s) IntraMuscular once  insulin glargine Injectable (LANTUS) 18 Unit(s) SubCutaneous at bedtime  insulin lispro (ADMELOG) corrective regimen sliding scale   SubCutaneous at bedtime  insulin lispro (ADMELOG) corrective regimen sliding scale   SubCutaneous three times a day before meals  insulin lispro Injectable (ADMELOG) 3 Unit(s) SubCutaneous three times a day before meals  lactated ringers. 1000 milliLiter(s) (100 mL/Hr) IV Continuous <Continuous>  lactated ringers. 1000 milliLiter(s) (75 mL/Hr) IV Continuous <Continuous>  lactobacillus acidophilus 1 Tablet(s) Oral two times a day with meals  midodrine. 10 milliGRAM(s) Oral three times a day  pantoprazole    Tablet 40 milliGRAM(s) Oral before breakfast  tamsulosin 0.4 milliGRAM(s) Oral at bedtime  vancomycin  IVPB 1500 milliGRAM(s) IV Intermittent every 24 hours    MEDICATIONS  (PRN):  acetaminophen     Tablet .. 650 milliGRAM(s) Oral every 6 hours PRN Temp greater or equal to 38C (100.4F), Moderate Pain (4 - 6)  dextrose Oral Gel 15 Gram(s) Oral once PRN Blood Glucose LESS THAN 70 milliGRAM(s)/deciliter  loperamide 2 milliGRAM(s) Oral three times a day PRN Diarrhea      Allergies    No Known Allergies        PHYSICAL EXAM:   Vital Signs:  Vital Signs Last 24 Hrs  T(C): 37 (14 Oct 2023 05:10), Max: 38.4 (13 Oct 2023 20:30)  T(F): 98.6 (14 Oct 2023 05:10), Max: 101.1 (13 Oct 2023 20:30)  HR: 60 (14 Oct 2023 05:10) (60 - 61)  BP: 119/54 (14 Oct 2023 05:10) (98/59 - 119/54)  BP(mean): --  RR: 18 (14 Oct 2023 05:10) (18 - 18)  SpO2: 96% (14 Oct 2023 05:10) (94% - 96%)    Parameters below as of 14 Oct 2023 05:10  Patient On (Oxygen Delivery Method): room air      GENERAL:  Appears stated age  HEENT:  NC/AT  CHEST:  Full & symmetric excursion  HEART:  Regular rhythm  ABDOMEN:  Soft, non-tender, non-distended  EXTEREMITIES:  no cyanosis  SKIN:  No rash  NEURO:  Alert      LABS:                        8.8    5.23  )-----------( 236      ( 14 Oct 2023 09:45 )             28.1     10-14    141  |  114<H>  |  32<H>  ----------------------------<  237<H>  4.0   |  22  |  0.88    Ca    8.2<L>      14 Oct 2023 09:45        Urinalysis Basic - ( 14 Oct 2023 09:45 )    Color: x / Appearance: x / SG: x / pH: x  Gluc: 237 mg/dL / Ketone: x  / Bili: x / Urobili: x   Blood: x / Protein: x / Nitrite: x   Leuk Esterase: x / RBC: x / WBC x   Sq Epi: x / Non Sq Epi: x / Bacteria: x     [facility]  Les Mccray PA-C  [address] [place] [address]  [phone]      INTERVAL HPI/OVERNIGHT EVENTS:  Pt s/e  Tolerating diet  No new Gi events    MEDICATIONS  (STANDING):  aspirin enteric coated 81 milliGRAM(s) Oral daily  atorvastatin 80 milliGRAM(s) Oral at bedtime  bethanechol 25 milliGRAM(s) Oral two times a day  cefepime   IVPB 2000 milliGRAM(s) IV Intermittent every 8 hours  dextrose 5%. 1000 milliLiter(s) (100 mL/Hr) IV Continuous <Continuous>  dextrose 5%. 1000 milliLiter(s) (50 mL/Hr) IV Continuous <Continuous>  dextrose 50% Injectable 25 Gram(s) IV Push once  dextrose 50% Injectable 12.5 Gram(s) IV Push once  dextrose 50% Injectable 25 Gram(s) IV Push once  dronabinol 2.5 milliGRAM(s) Oral two times a day  gabapentin 300 milliGRAM(s) Oral two times a day  glucagon  Injectable 1 milliGRAM(s) IntraMuscular once  insulin glargine Injectable (LANTUS) 18 Unit(s) SubCutaneous at bedtime  insulin lispro (ADMELOG) corrective regimen sliding scale   SubCutaneous at bedtime  insulin lispro (ADMELOG) corrective regimen sliding scale   SubCutaneous three times a day before meals  insulin lispro Injectable (ADMELOG) 3 Unit(s) SubCutaneous three times a day before meals  lactated ringers. 1000 milliLiter(s) (100 mL/Hr) IV Continuous <Continuous>  lactated ringers. 1000 milliLiter(s) (75 mL/Hr) IV Continuous <Continuous>  lactobacillus acidophilus 1 Tablet(s) Oral two times a day with meals  midodrine. 10 milliGRAM(s) Oral three times a day  pantoprazole    Tablet 40 milliGRAM(s) Oral before breakfast  tamsulosin 0.4 milliGRAM(s) Oral at bedtime  vancomycin  IVPB 1500 milliGRAM(s) IV Intermittent every 24 hours    MEDICATIONS  (PRN):  acetaminophen     Tablet .. 650 milliGRAM(s) Oral every 6 hours PRN Temp greater or equal to 38C (100.4F), Moderate Pain (4 - 6)  dextrose Oral Gel 15 Gram(s) Oral once PRN Blood Glucose LESS THAN 70 milliGRAM(s)/deciliter  loperamide 2 milliGRAM(s) Oral three times a day PRN Diarrhea      Allergies    No Known Allergies        PHYSICAL EXAM:   Vital Signs:  Vital Signs Last 24 Hrs  T(C): 37 (14 Oct 2023 05:10), Max: 38.4 (13 Oct 2023 20:30)  T(F): 98.6 (14 Oct 2023 05:10), Max: 101.1 (13 Oct 2023 20:30)  HR: 60 (14 Oct 2023 05:10) (60 - 61)  BP: 119/54 (14 Oct 2023 05:10) (98/59 - 119/54)  BP(mean): --  RR: 18 (14 Oct 2023 05:10) (18 - 18)  SpO2: 96% (14 Oct 2023 05:10) (94% - 96%)    Parameters below as of 14 Oct 2023 05:10  Patient On (Oxygen Delivery Method): room air      GENERAL:  Appears stated age  HEENT:  NC/AT  CHEST:  Full & symmetric excursion  HEART:  Regular rhythm  ABDOMEN:  Soft, non-tender, non-distended  EXTEREMITIES:  no cyanosis  SKIN:  No rash  NEURO:  Alert      LABS:                        8.8    5.23  )-----------( 236      ( 14 Oct 2023 09:45 )             28.1     10-14    141  |  114<H>  |  32<H>  ----------------------------<  237<H>  4.0   |  22  |  0.88    Ca    8.2<L>      14 Oct 2023 09:45        Urinalysis Basic - ( 14 Oct 2023 09:45 )    Color: x / Appearance: x / SG: x / pH: x  Gluc: 237 mg/dL / Ketone: x  / Bili: x / Urobili: x   Blood: x / Protein: x / Nitrite: x   Leuk Esterase: x / RBC: x / WBC x   Sq Epi: x / Non Sq Epi: x / Bacteria: x

## 2023-10-14 NOTE — PROGRESS NOTE ADULT - ASSESSMENT
78-year-old male presents to the hospital by ambulance from home.  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 Pt does not go to doctor on regular basis per son and does phone visits. Son reports years of struggling with foot infections with black areas of gangrene and their struggling to keep his feet but now feeling that keeping him alive is more important.  Culture - Blood (10.10.23 @ 18:25)    -  Methicillin resistant Staphylococcus aureus (MRSA): Detec  Repeat 10/12 BCx NGTD (48h)  Wcx with MRSA, placed on contact isolation   UCx + Pseudomonas, pansensitive including cefepime     RECOMMENDATIONS  Very concerning for osteomyelitis -- NM bone scan ordered and pending  Cardiology rec appreciated - plan for [facility] early this week to rule out IE or PPM involvement   C/w Vancomycin (10/11-) dosing per pharmacy protocol  S/p zosyn (10/11-10/12), changed to minimize ARMANI risk  C/w cefepime given UCx findings  Anticipate that surgery will be required so consider involving vascular for possible BKAs based on imaging  Monitor temps/WBC-- if spikes another fever -- repeat Bcx x2     Tomy Larsen M.D.  [facility], Division of Infectious Diseases  [phone]  After 5pm on weekdays and all day on weekends - please call [phone]   78-year-old male presents to the hospital by ambulance from home.  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 Pt does not go to doctor on regular basis per son and does phone visits. Son reports years of struggling with foot infections with black areas of gangrene and their struggling to keep his feet but now feeling that keeping him alive is more important.  Culture - Blood (10.10.23 @ 18:25)    -  Methicillin resistant Staphylococcus aureus (MRSA): Detec  Repeat 10/12 BCx NGTD (48h)  Wcx with MRSA, placed on contact isolation   UCx + Pseudomonas, pansensitive including cefepime     RECOMMENDATIONS  Very concerning for osteomyelitis -- NM bone scan ordered and pending  Cardiology rec appreciated - plan for [facility] early this week to rule out IE or PPM involvement   C/w Vancomycin (10/11-) dosing per pharmacy protocol  S/p zosyn (10/11-10/12), changed to minimize ARMANI risk  C/w cefepime given UCx findings  Anticipate that surgery will be required so consider involving vascular for possible BKAs based on imaging  Monitor temps/WBC-- if spikes another fever -- repeat Bcx x2     Tomy Larsen M.D.  [facility], Division of Infectious Diseases  [phone]  After 5pm on weekdays and all day on weekends - please call [phone]   78-year-old male presents to the hospital by ambulance from home.  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 Pt does not go to doctor on regular basis per son and does phone visits. Son reports years of struggling with foot infections with black areas of gangrene and their struggling to keep his feet but now feeling that keeping him alive is more important.  Culture - Blood (10.10.23 @ 18:25)    -  Methicillin resistant Staphylococcus aureus (MRSA): Detec  Repeat 10/12 BCx NGTD (48h)  Wcx with MRSA, placed on contact isolation   UCx + Pseudomonas, pansensitive including cefepime     RECOMMENDATIONS  Very concerning for osteomyelitis -- NM bone scan ordered and pending  Cardiology rec appreciated - plan for [facility] early this week to rule out IE or PPM involvement   C/w Vancomycin (10/11-) dosing per pharmacy protocol  S/p zosyn (10/11-10/12), changed to minimize ARMANI risk  C/w cefepime given UCx findings  Anticipate that surgery will be required so consider involving vascular for possible BKAs based on imaging  Monitor temps/WBC-- if spikes another fever -- repeat Bcx x2     Tomy Larsen M.D.  [facility], Division of Infectious Diseases  [phone]  After 5pm on weekdays and all day on weekends - please call [phone]

## 2023-10-14 NOTE — PROGRESS NOTE ADULT - SUBJECTIVE AND OBJECTIVE BOX
Patient is a 78y old  Male who presents with a chief complaint of fever and weakness (14 Oct 2023 06:53)      INTERVAL HPI/OVERNIGHT EVENTS: stable, feels well, chart noted, bp stable    MEDICATIONS  (STANDING):  aspirin enteric coated 81 milliGRAM(s) Oral daily  atorvastatin 80 milliGRAM(s) Oral at bedtime  bethanechol 25 milliGRAM(s) Oral two times a day  cefepime   IVPB 2000 milliGRAM(s) IV Intermittent every 8 hours  dextrose 5%. 1000 milliLiter(s) (50 mL/Hr) IV Continuous <Continuous>  dextrose 5%. 1000 milliLiter(s) (100 mL/Hr) IV Continuous <Continuous>  dextrose 50% Injectable 25 Gram(s) IV Push once  dextrose 50% Injectable 25 Gram(s) IV Push once  dextrose 50% Injectable 12.5 Gram(s) IV Push once  dronabinol 2.5 milliGRAM(s) Oral two times a day  gabapentin 300 milliGRAM(s) Oral two times a day  glucagon  Injectable 1 milliGRAM(s) IntraMuscular once  insulin glargine Injectable (LANTUS) 18 Unit(s) SubCutaneous at bedtime  insulin lispro (ADMELOG) corrective regimen sliding scale   SubCutaneous three times a day before meals  insulin lispro (ADMELOG) corrective regimen sliding scale   SubCutaneous at bedtime  insulin lispro Injectable (ADMELOG) 3 Unit(s) SubCutaneous three times a day before meals  lactated ringers. 1000 milliLiter(s) (100 mL/Hr) IV Continuous <Continuous>  lactated ringers. 1000 milliLiter(s) (75 mL/Hr) IV Continuous <Continuous>  lactobacillus acidophilus 1 Tablet(s) Oral two times a day with meals  midodrine. 10 milliGRAM(s) Oral three times a day  pantoprazole    Tablet 40 milliGRAM(s) Oral before breakfast  tamsulosin 0.4 milliGRAM(s) Oral at bedtime  vancomycin  IVPB 1500 milliGRAM(s) IV Intermittent every 24 hours    MEDICATIONS  (PRN):  acetaminophen     Tablet .. 650 milliGRAM(s) Oral every 6 hours PRN Temp greater or equal to 38C (100.4F), Moderate Pain (4 - 6)  dextrose Oral Gel 15 Gram(s) Oral once PRN Blood Glucose LESS THAN 70 milliGRAM(s)/deciliter  loperamide 2 milliGRAM(s) Oral three times a day PRN Diarrhea      Allergies    No Known Allergies    Intolerances        REVIEW OF SYSTEMS:  CONSTITUTIONAL: fever overnight  EYES: No eye pain, visual disturbances  ENMT:  No difficulty hearing, tinnitus, vertigo; No sinus or throat pain  NECK: No pain or stiffness  RESPIRATORY: No cough, wheezing, chills or hemoptysis; No shortness of breath  CARDIOVASCULAR: No chest pain, palpitations, dizziness  GASTROINTESTINAL: No abdominal or epigastric pain. No nausea, vomiting, or hematemesis; No diarrhea or constipation. No melena or hematochezia.  GENITOURINARY: No dysuria, frequency, hematuria, or incontinence  NEUROLOGICAL: No headaches, memory loss, loss of strength, numbness, or tremors  SKIN: No itching, burning  LYMPH NODES: No enlarged glands  MUSCULOSKELETAL: No joint pain or swelling; No muscle, back, or extremity pain  PSYCHIATRIC: No depression, mood swings  HEME/LYMPH: No easy bruising, or bleeding gums  ALLERGY AND IMMUNOLOGIC: No hives    Vital Signs Last 24 Hrs  T(C): 37 (14 Oct 2023 05:10), Max: 38.4 (13 Oct 2023 20:30)  T(F): 98.6 (14 Oct 2023 05:10), Max: 101.1 (13 Oct 2023 20:30)  HR: 60 (14 Oct 2023 05:10) (60 - 61)  BP: 119/54 (14 Oct 2023 05:10) (98/59 - 119/54)  BP(mean): --  RR: 18 (14 Oct 2023 05:10) (18 - 18)  SpO2: 96% (14 Oct 2023 05:10) (94% - 96%)    Parameters below as of 14 Oct 2023 05:10  Patient On (Oxygen Delivery Method): room air        PHYSICAL EXAM:  GENERAL: NAD, well-groomed, well-developed  HEAD:  Atraumatic, Normocephalic  EYES: EOMI, PERRLA, conjunctiva and sclera clear  ENMT: No tonsillar erythema, exudates, or enlargement   NECK: Supple, No JVD  NERVOUS SYSTEM:  Alert & Oriented X3, Good concentration  CHEST/LUNG: Clear to auscultation bilaterally; No rales, rhonchi, wheezing  HEART: Regular rate and rhythm  ABDOMEN: Soft, Nontender, Nondistended; Bowel sounds present  EXTREMITIES:  2+ Peripheral Pulses   LYMPH: No lymphadenopathy noted  SKIN: No rashes     LABS:                        8.8    5.23  )-----------( 236      ( 14 Oct 2023 09:45 )             28.1     14 Oct 2023 09:45    141    |  114    |  32     ----------------------------<  237    4.0     |  22     |  0.88     Ca    8.2        14 Oct 2023 09:45        Urinalysis Basic - ( 14 Oct 2023 09:45 )    Color: x / Appearance: x / SG: x / pH: x  Gluc: 237 mg/dL / Ketone: x  / Bili: x / Urobili: x   Blood: x / Protein: x / Nitrite: x   Leuk Esterase: x / RBC: x / WBC x   Sq Epi: x / Non Sq Epi: x / Bacteria: x      CAPILLARY BLOOD GLUCOSE      POCT Blood Glucose.: 249 mg/dL (14 Oct 2023 08:13)  POCT Blood Glucose.: 243 mg/dL (13 Oct 2023 21:45)  POCT Blood Glucose.: 161 mg/dL (13 Oct 2023 17:14)  POCT Blood Glucose.: 174 mg/dL (13 Oct 2023 12:52)  POCT Blood Glucose.: 189 mg/dL (13 Oct 2023 11:54)    blood culture --   No growth at 48 Hours   10-12 @ 05:36    blood culture --   No growth at 48 Hours   10-12 @ 05:30    blood culture --   Numerous Methicillin Resistant Staphylococcus aureus   10-11 @ 13:13    blood culture --   10,000 - 49,000 CFU/mL Pseudomonas aeruginosa   10-10 @ 21:00    blood culture --   Growth in aerobic and anaerobic bottles: Methicillin Resistant  Staphylococcus aureus  Direct identification is available within approximately 3-5  hours either by Blood Panel Multiplexed PCR or Direct  MALDI-TOF. Details: [email or web address]   10-10 @ 18:25      urine culture --  10-12 @ 05:36  results   No growth at 48 Hours 10-12 @ 05:36  urine culture --  10-12 @ 05:30  results   No growth at 48 Hours 10-12 @ 05:30  urine culture --  10-11 @ 13:13  results   Numerous Methicillin Resistant Staphylococcus aureus 10-11 @ 13:13  urine culture --  10-10 @ 21:00  results   10,000 - 49,000 CFU/mL Pseudomonas aeruginosa 10-10 @ 21:00  urine culture --  10-10 @ 18:25  results   Growth in aerobic and anaerobic bottles: Methicillin Resistant  Staphylococcus aureus  Direct identification is available within approximately 3-5  hours either by Blood Panel Multiplexed PCR or Direct  MALDI-TOF. Details: [email or web address] 10-10 @ 18:25    wound with gram statin --    10-12 @ 05:36  organism  --   10-12 @ 05:36  specimen source .Blood Blood-Peripheral  10-12 @ 05:36  wound with gram statin --    10-12 @ 05:30  organism  --   10-12 @ 05:30  specimen source .Blood Blood-Venous  10-12 @ 05:30  wound with gram statin --    10-11 @ 13:13  organism  Methicillin resistant Staphylococcus aureus   10-11 @ 13:13  specimen source Wound Wound  10-11 @ 13:13  wound with gram statin --    10-10 @ 21:00  organism  Pseudomonas aeruginosa   10-10 @ 21:00  specimen source Catheterized Catheterized  10-10 @ 21:00  wound with gram statin --    10-10 @ 18:25  organism  Blood Culture PCR   10-10 @ 18:25  specimen source .Blood Blood-Peripheral  10-10 @ 18:25      RADIOLOGY & ADDITIONAL TESTS:      Consultant(s) Notes Reviewed:  [ x] YES  [ ] NO    Care Discussed with Consultants/Other Providers [x ] YES  [ ] NO    Advanced care planning discussed with patient and family, advanced care planning forms reviewed, discussed, and completed.  20 minutes spent.   Patient is a 78y old  Male who presents with a chief complaint of fever and weakness (14 Oct 2023 06:53)      INTERVAL HPI/OVERNIGHT EVENTS: stable, feels well, chart noted, bp stable    MEDICATIONS  (STANDING):  aspirin enteric coated 81 milliGRAM(s) Oral daily  atorvastatin 80 milliGRAM(s) Oral at bedtime  bethanechol 25 milliGRAM(s) Oral two times a day  cefepime   IVPB 2000 milliGRAM(s) IV Intermittent every 8 hours  dextrose 5%. 1000 milliLiter(s) (50 mL/Hr) IV Continuous <Continuous>  dextrose 5%. 1000 milliLiter(s) (100 mL/Hr) IV Continuous <Continuous>  dextrose 50% Injectable 25 Gram(s) IV Push once  dextrose 50% Injectable 25 Gram(s) IV Push once  dextrose 50% Injectable 12.5 Gram(s) IV Push once  dronabinol 2.5 milliGRAM(s) Oral two times a day  gabapentin 300 milliGRAM(s) Oral two times a day  glucagon  Injectable 1 milliGRAM(s) IntraMuscular once  insulin glargine Injectable (LANTUS) 18 Unit(s) SubCutaneous at bedtime  insulin lispro (ADMELOG) corrective regimen sliding scale   SubCutaneous three times a day before meals  insulin lispro (ADMELOG) corrective regimen sliding scale   SubCutaneous at bedtime  insulin lispro Injectable (ADMELOG) 3 Unit(s) SubCutaneous three times a day before meals  lactated ringers. 1000 milliLiter(s) (100 mL/Hr) IV Continuous <Continuous>  lactated ringers. 1000 milliLiter(s) (75 mL/Hr) IV Continuous <Continuous>  lactobacillus acidophilus 1 Tablet(s) Oral two times a day with meals  midodrine. 10 milliGRAM(s) Oral three times a day  pantoprazole    Tablet 40 milliGRAM(s) Oral before breakfast  tamsulosin 0.4 milliGRAM(s) Oral at bedtime  vancomycin  IVPB 1500 milliGRAM(s) IV Intermittent every 24 hours    MEDICATIONS  (PRN):  acetaminophen     Tablet .. 650 milliGRAM(s) Oral every 6 hours PRN Temp greater or equal to 38C (100.4F), Moderate Pain (4 - 6)  dextrose Oral Gel 15 Gram(s) Oral once PRN Blood Glucose LESS THAN 70 milliGRAM(s)/deciliter  loperamide 2 milliGRAM(s) Oral three times a day PRN Diarrhea      Allergies    No Known Allergies    Intolerances        REVIEW OF SYSTEMS:  CONSTITUTIONAL: fever overnight  EYES: No eye pain, visual disturbances  ENMT:  No difficulty hearing, tinnitus, vertigo; No sinus or throat pain  NECK: No pain or stiffness  RESPIRATORY: No cough, wheezing, chills or hemoptysis; No shortness of breath  CARDIOVASCULAR: No chest pain, palpitations, dizziness  GASTROINTESTINAL: No abdominal or epigastric pain. No nausea, vomiting, or hematemesis; No diarrhea or constipation. No melena or hematochezia.  GENITOURINARY: No dysuria, frequency, hematuria, or incontinence  NEUROLOGICAL: No headaches, memory loss, loss of strength, numbness, or tremors  SKIN: No itching, burning  LYMPH NODES: No enlarged glands  MUSCULOSKELETAL: No joint pain or swelling; No muscle, back, or extremity pain  PSYCHIATRIC: No depression, mood swings  HEME/LYMPH: No easy bruising, or bleeding gums  ALLERGY AND IMMUNOLOGIC: No hives    Vital Signs Last 24 Hrs  T(C): 37 (14 Oct 2023 05:10), Max: 38.4 (13 Oct 2023 20:30)  T(F): 98.6 (14 Oct 2023 05:10), Max: 101.1 (13 Oct 2023 20:30)  HR: 60 (14 Oct 2023 05:10) (60 - 61)  BP: 119/54 (14 Oct 2023 05:10) (98/59 - 119/54)  BP(mean): --  RR: 18 (14 Oct 2023 05:10) (18 - 18)  SpO2: 96% (14 Oct 2023 05:10) (94% - 96%)    Parameters below as of 14 Oct 2023 05:10  Patient On (Oxygen Delivery Method): room air        PHYSICAL EXAM:  GENERAL: NAD, well-groomed, well-developed  HEAD:  Atraumatic, Normocephalic  EYES: EOMI, PERRLA, conjunctiva and sclera clear  ENMT: No tonsillar erythema, exudates, or enlargement   NECK: Supple, No JVD  NERVOUS SYSTEM:  Alert & Oriented X3, Good concentration  CHEST/LUNG: Clear to auscultation bilaterally; No rales, rhonchi, wheezing  HEART: Regular rate and rhythm  ABDOMEN: Soft, Nontender, Nondistended; Bowel sounds present  EXTREMITIES:  2+ Peripheral Pulses   LYMPH: No lymphadenopathy noted  SKIN: No rashes     LABS:                        8.8    5.23  )-----------( 236      ( 14 Oct 2023 09:45 )             28.1     14 Oct 2023 09:45    141    |  114    |  32     ----------------------------<  237    4.0     |  22     |  0.88     Ca    8.2        14 Oct 2023 09:45        Urinalysis Basic - ( 14 Oct 2023 09:45 )    Color: x / Appearance: x / SG: x / pH: x  Gluc: 237 mg/dL / Ketone: x  / Bili: x / Urobili: x   Blood: x / Protein: x / Nitrite: x   Leuk Esterase: x / RBC: x / WBC x   Sq Epi: x / Non Sq Epi: x / Bacteria: x      CAPILLARY BLOOD GLUCOSE      POCT Blood Glucose.: 249 mg/dL (14 Oct 2023 08:13)  POCT Blood Glucose.: 243 mg/dL (13 Oct 2023 21:45)  POCT Blood Glucose.: 161 mg/dL (13 Oct 2023 17:14)  POCT Blood Glucose.: 174 mg/dL (13 Oct 2023 12:52)  POCT Blood Glucose.: 189 mg/dL (13 Oct 2023 11:54)    blood culture --   No growth at 48 Hours   10-12 @ 05:36    blood culture --   No growth at 48 Hours   10-12 @ 05:30    blood culture --   Numerous Methicillin Resistant Staphylococcus aureus   10-11 @ 13:13    blood culture --   10,000 - 49,000 CFU/mL Pseudomonas aeruginosa   10-10 @ 21:00    blood culture --   Growth in aerobic and anaerobic bottles: Methicillin Resistant  Staphylococcus aureus  Direct identification is available within approximately 3-5  hours either by Blood Panel Multiplexed PCR or Direct  MALDI-TOF. Details: [email or web address]   10-10 @ 18:25      urine culture --  10-12 @ 05:36  results   No growth at 48 Hours 10-12 @ 05:36  urine culture --  10-12 @ 05:30  results   No growth at 48 Hours 10-12 @ 05:30  urine culture --  10-11 @ 13:13  results   Numerous Methicillin Resistant Staphylococcus aureus 10-11 @ 13:13  urine culture --  10-10 @ 21:00  results   10,000 - 49,000 CFU/mL Pseudomonas aeruginosa 10-10 @ 21:00  urine culture --  10-10 @ 18:25  results   Growth in aerobic and anaerobic bottles: Methicillin Resistant  Staphylococcus aureus  Direct identification is available within approximately 3-5  hours either by Blood Panel Multiplexed PCR or Direct  MALDI-TOF. Details: [email or web address] 10-10 @ 18:25    wound with gram statin --    10-12 @ 05:36  organism  --   10-12 @ 05:36  specimen source .Blood Blood-Peripheral  10-12 @ 05:36  wound with gram statin --    10-12 @ 05:30  organism  --   10-12 @ 05:30  specimen source .Blood Blood-Venous  10-12 @ 05:30  wound with gram statin --    10-11 @ 13:13  organism  Methicillin resistant Staphylococcus aureus   10-11 @ 13:13  specimen source Wound Wound  10-11 @ 13:13  wound with gram statin --    10-10 @ 21:00  organism  Pseudomonas aeruginosa   10-10 @ 21:00  specimen source Catheterized Catheterized  10-10 @ 21:00  wound with gram statin --    10-10 @ 18:25  organism  Blood Culture PCR   10-10 @ 18:25  specimen source .Blood Blood-Peripheral  10-10 @ 18:25      RADIOLOGY & ADDITIONAL TESTS:      Consultant(s) Notes Reviewed:  [ x] YES  [ ] NO    Care Discussed with Consultants/Other Providers [x ] YES  [ ] NO    Advanced care planning discussed with patient and family, advanced care planning forms reviewed, discussed, and completed.  20 minutes spent.   Patient is a 78y old  Male who presents with a chief complaint of fever and weakness (14 Oct 2023 06:53)      INTERVAL HPI/OVERNIGHT EVENTS: stable, feels well, chart noted, bp stable    MEDICATIONS  (STANDING):  aspirin enteric coated 81 milliGRAM(s) Oral daily  atorvastatin 80 milliGRAM(s) Oral at bedtime  bethanechol 25 milliGRAM(s) Oral two times a day  cefepime   IVPB 2000 milliGRAM(s) IV Intermittent every 8 hours  dextrose 5%. 1000 milliLiter(s) (50 mL/Hr) IV Continuous <Continuous>  dextrose 5%. 1000 milliLiter(s) (100 mL/Hr) IV Continuous <Continuous>  dextrose 50% Injectable 25 Gram(s) IV Push once  dextrose 50% Injectable 25 Gram(s) IV Push once  dextrose 50% Injectable 12.5 Gram(s) IV Push once  dronabinol 2.5 milliGRAM(s) Oral two times a day  gabapentin 300 milliGRAM(s) Oral two times a day  glucagon  Injectable 1 milliGRAM(s) IntraMuscular once  insulin glargine Injectable (LANTUS) 18 Unit(s) SubCutaneous at bedtime  insulin lispro (ADMELOG) corrective regimen sliding scale   SubCutaneous three times a day before meals  insulin lispro (ADMELOG) corrective regimen sliding scale   SubCutaneous at bedtime  insulin lispro Injectable (ADMELOG) 3 Unit(s) SubCutaneous three times a day before meals  lactated ringers. 1000 milliLiter(s) (100 mL/Hr) IV Continuous <Continuous>  lactated ringers. 1000 milliLiter(s) (75 mL/Hr) IV Continuous <Continuous>  lactobacillus acidophilus 1 Tablet(s) Oral two times a day with meals  midodrine. 10 milliGRAM(s) Oral three times a day  pantoprazole    Tablet 40 milliGRAM(s) Oral before breakfast  tamsulosin 0.4 milliGRAM(s) Oral at bedtime  vancomycin  IVPB 1500 milliGRAM(s) IV Intermittent every 24 hours    MEDICATIONS  (PRN):  acetaminophen     Tablet .. 650 milliGRAM(s) Oral every 6 hours PRN Temp greater or equal to 38C (100.4F), Moderate Pain (4 - 6)  dextrose Oral Gel 15 Gram(s) Oral once PRN Blood Glucose LESS THAN 70 milliGRAM(s)/deciliter  loperamide 2 milliGRAM(s) Oral three times a day PRN Diarrhea      Allergies    No Known Allergies    Intolerances        REVIEW OF SYSTEMS:  CONSTITUTIONAL: fever overnight  EYES: No eye pain, visual disturbances  ENMT:  No difficulty hearing, tinnitus, vertigo; No sinus or throat pain  NECK: No pain or stiffness  RESPIRATORY: No cough, wheezing, chills or hemoptysis; No shortness of breath  CARDIOVASCULAR: No chest pain, palpitations, dizziness  GASTROINTESTINAL: No abdominal or epigastric pain. No nausea, vomiting, or hematemesis; No diarrhea or constipation. No melena or hematochezia.  GENITOURINARY: No dysuria, frequency, hematuria, or incontinence  NEUROLOGICAL: No headaches, memory loss, loss of strength, numbness, or tremors  SKIN: No itching, burning  LYMPH NODES: No enlarged glands  MUSCULOSKELETAL: No joint pain or swelling; No muscle, back, or extremity pain  PSYCHIATRIC: No depression, mood swings  HEME/LYMPH: No easy bruising, or bleeding gums  ALLERGY AND IMMUNOLOGIC: No hives    Vital Signs Last 24 Hrs  T(C): 37 (14 Oct 2023 05:10), Max: 38.4 (13 Oct 2023 20:30)  T(F): 98.6 (14 Oct 2023 05:10), Max: 101.1 (13 Oct 2023 20:30)  HR: 60 (14 Oct 2023 05:10) (60 - 61)  BP: 119/54 (14 Oct 2023 05:10) (98/59 - 119/54)  BP(mean): --  RR: 18 (14 Oct 2023 05:10) (18 - 18)  SpO2: 96% (14 Oct 2023 05:10) (94% - 96%)    Parameters below as of 14 Oct 2023 05:10  Patient On (Oxygen Delivery Method): room air        PHYSICAL EXAM:  GENERAL: NAD, well-groomed, well-developed  HEAD:  Atraumatic, Normocephalic  EYES: EOMI, PERRLA, conjunctiva and sclera clear  ENMT: No tonsillar erythema, exudates, or enlargement   NECK: Supple, No JVD  NERVOUS SYSTEM:  Alert & Oriented X3, Good concentration  CHEST/LUNG: Clear to auscultation bilaterally; No rales, rhonchi, wheezing  HEART: Regular rate and rhythm  ABDOMEN: Soft, Nontender, Nondistended; Bowel sounds present  EXTREMITIES:  2+ Peripheral Pulses   LYMPH: No lymphadenopathy noted  SKIN: No rashes     LABS:                        8.8    5.23  )-----------( 236      ( 14 Oct 2023 09:45 )             28.1     14 Oct 2023 09:45    141    |  114    |  32     ----------------------------<  237    4.0     |  22     |  0.88     Ca    8.2        14 Oct 2023 09:45        Urinalysis Basic - ( 14 Oct 2023 09:45 )    Color: x / Appearance: x / SG: x / pH: x  Gluc: 237 mg/dL / Ketone: x  / Bili: x / Urobili: x   Blood: x / Protein: x / Nitrite: x   Leuk Esterase: x / RBC: x / WBC x   Sq Epi: x / Non Sq Epi: x / Bacteria: x      CAPILLARY BLOOD GLUCOSE      POCT Blood Glucose.: 249 mg/dL (14 Oct 2023 08:13)  POCT Blood Glucose.: 243 mg/dL (13 Oct 2023 21:45)  POCT Blood Glucose.: 161 mg/dL (13 Oct 2023 17:14)  POCT Blood Glucose.: 174 mg/dL (13 Oct 2023 12:52)  POCT Blood Glucose.: 189 mg/dL (13 Oct 2023 11:54)    blood culture --   No growth at 48 Hours   10-12 @ 05:36    blood culture --   No growth at 48 Hours   10-12 @ 05:30    blood culture --   Numerous Methicillin Resistant Staphylococcus aureus   10-11 @ 13:13    blood culture --   10,000 - 49,000 CFU/mL Pseudomonas aeruginosa   10-10 @ 21:00    blood culture --   Growth in aerobic and anaerobic bottles: Methicillin Resistant  Staphylococcus aureus  Direct identification is available within approximately 3-5  hours either by Blood Panel Multiplexed PCR or Direct  MALDI-TOF. Details: [email or web address]   10-10 @ 18:25      urine culture --  10-12 @ 05:36  results   No growth at 48 Hours 10-12 @ 05:36  urine culture --  10-12 @ 05:30  results   No growth at 48 Hours 10-12 @ 05:30  urine culture --  10-11 @ 13:13  results   Numerous Methicillin Resistant Staphylococcus aureus 10-11 @ 13:13  urine culture --  10-10 @ 21:00  results   10,000 - 49,000 CFU/mL Pseudomonas aeruginosa 10-10 @ 21:00  urine culture --  10-10 @ 18:25  results   Growth in aerobic and anaerobic bottles: Methicillin Resistant  Staphylococcus aureus  Direct identification is available within approximately 3-5  hours either by Blood Panel Multiplexed PCR or Direct  MALDI-TOF. Details: [email or web address] 10-10 @ 18:25    wound with gram statin --    10-12 @ 05:36  organism  --   10-12 @ 05:36  specimen source .Blood Blood-Peripheral  10-12 @ 05:36  wound with gram statin --    10-12 @ 05:30  organism  --   10-12 @ 05:30  specimen source .Blood Blood-Venous  10-12 @ 05:30  wound with gram statin --    10-11 @ 13:13  organism  Methicillin resistant Staphylococcus aureus   10-11 @ 13:13  specimen source Wound Wound  10-11 @ 13:13  wound with gram statin --    10-10 @ 21:00  organism  Pseudomonas aeruginosa   10-10 @ 21:00  specimen source Catheterized Catheterized  10-10 @ 21:00  wound with gram statin --    10-10 @ 18:25  organism  Blood Culture PCR   10-10 @ 18:25  specimen source .Blood Blood-Peripheral  10-10 @ 18:25      RADIOLOGY & ADDITIONAL TESTS:      Consultant(s) Notes Reviewed:  [ x] YES  [ ] NO    Care Discussed with Consultants/Other Providers [x ] YES  [ ] NO    Advanced care planning discussed with patient and family, advanced care planning forms reviewed, discussed, and completed.  20 minutes spent.

## 2023-10-14 NOTE — CHART NOTE - NSCHARTNOTEFT_GEN_A_CORE
Assessment: patient seen for follow up malnutrition .   78y old  Male who presents with a chief complaint of fever and weakness   patient on marinol  patient seen sleeping. per RN patient is total feed.         Factors impacting intake: [ ] none [ ] nausea  [ ] vomiting [ ] diarrhea [ ] constipation  [ ]chewing problems [ ] swallowing issues  [x ] other: poor PO noted    Diet Prescription: soft and bite sized low Na consistent cho glucerna BID  Intake:   1X per flow sheet %   Current Weight: Weight (kg): 77.1 (10-10 @ 17:47)      Pertinent Medications: MEDICATIONS  (STANDING):  aspirin enteric coated 81 milliGRAM(s) Oral daily  atorvastatin 80 milliGRAM(s) Oral at bedtime  bethanechol 25 milliGRAM(s) Oral two times a day  cefepime   IVPB 2000 milliGRAM(s) IV Intermittent every 8 hours  dextrose 5%. 1000 milliLiter(s) (100 mL/Hr) IV Continuous <Continuous>  dextrose 5%. 1000 milliLiter(s) (50 mL/Hr) IV Continuous <Continuous>  dextrose 50% Injectable 25 Gram(s) IV Push once  dextrose 50% Injectable 12.5 Gram(s) IV Push once  dextrose 50% Injectable 25 Gram(s) IV Push once  dronabinol 2.5 milliGRAM(s) Oral two times a day  gabapentin 300 milliGRAM(s) Oral two times a day  glucagon  Injectable 1 milliGRAM(s) IntraMuscular once  insulin glargine Injectable (LANTUS) 18 Unit(s) SubCutaneous at bedtime  insulin lispro (ADMELOG) corrective regimen sliding scale   SubCutaneous three times a day before meals  insulin lispro (ADMELOG) corrective regimen sliding scale   SubCutaneous at bedtime  insulin lispro Injectable (ADMELOG) 3 Unit(s) SubCutaneous three times a day before meals  lactated ringers. 1000 milliLiter(s) (100 mL/Hr) IV Continuous <Continuous>  lactated ringers. 1000 milliLiter(s) (75 mL/Hr) IV Continuous <Continuous>  lactobacillus acidophilus 1 Tablet(s) Oral two times a day with meals  midodrine. 10 milliGRAM(s) Oral three times a day  pantoprazole    Tablet 40 milliGRAM(s) Oral before breakfast  tamsulosin 0.4 milliGRAM(s) Oral at bedtime  vancomycin  IVPB 1500 milliGRAM(s) IV Intermittent every 24 hours    MEDICATIONS  (PRN):  acetaminophen     Tablet .. 650 milliGRAM(s) Oral every 6 hours PRN Temp greater or equal to 38C (100.4F), Moderate Pain (4 - 6)  dextrose Oral Gel 15 Gram(s) Oral once PRN Blood Glucose LESS THAN 70 milliGRAM(s)/deciliter  loperamide 2 milliGRAM(s) Oral three times a day PRN Diarrhea    Pertinent Labs: POCT 249, 243 A1c 6.1%   Skin: sacrum stage 2 right heel satge 2 left outer foot un-stageable     Estimated Needs:   [x ] no change since previous assessment based on # 83.4kgs 25-30kcals/kg 2085-2502kcals and 1.2-1.4gms protein/kg 100-116gms protein  [ ] recalculated:     Previous Nutrition Diagnosis:   [ ] Inadequate Energy Intake [ ]Inadequate Oral Intake [ ] Excessive Energy Intake   [ ] Underweight [ x] Increased Nutrient Needs [ ] Overweight/Obesity   [ ] Altered GI Function [ ] Unintended Weight Loss [ ] Food & Nutrition Related Knowledge Deficit [x ] Malnutrition chronic severe     Nutrition Diagnosis is [x ] ongoing  [ ] resolved [ ] not applicable     New Nutrition Diagnosis: [x ] not applicable       Interventions:   Recommend  [ ] Change Diet To:  [ ] Nutrition Supplement  [ ] Nutrition Support  [x ] Other: continue to provide diet as ordered with supplement, continue marinol, recommend MVI and Vit C 500mg BID, continue to follow PO intake    Monitoring and Evaluation:   [x ] PO intake [ x ] Tolerance to diet prescription [ x ] weights [ x ] labs[ x ] follow up per protocol  [ ] other:

## 2023-10-15 LAB
ANION GAP SERPL CALC-SCNC: 6 MMOL/L — SIGNIFICANT CHANGE UP (ref 5–17)
BUN SERPL-MCNC: 25 MG/DL — HIGH (ref 7–23)
CALCIUM SERPL-MCNC: 8.1 MG/DL — LOW (ref 8.5–10.1)
CHLORIDE SERPL-SCNC: 113 MMOL/L — HIGH (ref 96–108)
CO2 SERPL-SCNC: 22 MMOL/L — SIGNIFICANT CHANGE UP (ref 22–31)
CREAT SERPL-MCNC: 0.82 MG/DL — SIGNIFICANT CHANGE UP (ref 0.5–1.3)
EGFR: 90 ML/MIN/1.73M2 — SIGNIFICANT CHANGE UP
GLUCOSE BLDC GLUCOMTR-MCNC: 150 MG/DL — HIGH (ref 70–99)
GLUCOSE BLDC GLUCOMTR-MCNC: 158 MG/DL — HIGH (ref 70–99)
GLUCOSE BLDC GLUCOMTR-MCNC: 181 MG/DL — HIGH (ref 70–99)
GLUCOSE BLDC GLUCOMTR-MCNC: 182 MG/DL — HIGH (ref 70–99)
GLUCOSE SERPL-MCNC: 195 MG/DL — HIGH (ref 70–99)
HCT VFR BLD CALC: 25.7 % — LOW (ref 39–50)
HGB BLD-MCNC: 8 G/DL — LOW (ref 13–17)
MCHC RBC-ENTMCNC: 25.6 PG — LOW (ref 27–34)
MCHC RBC-ENTMCNC: 31.1 GM/DL — LOW (ref 32–36)
MCV RBC AUTO: 82.4 FL — SIGNIFICANT CHANGE UP (ref 80–100)
NRBC # BLD: 0 /100 WBCS — SIGNIFICANT CHANGE UP (ref 0–0)
PLATELET # BLD AUTO: 252 K/UL — SIGNIFICANT CHANGE UP (ref 150–400)
POTASSIUM SERPL-MCNC: 4.2 MMOL/L — SIGNIFICANT CHANGE UP (ref 3.5–5.3)
POTASSIUM SERPL-SCNC: 4.2 MMOL/L — SIGNIFICANT CHANGE UP (ref 3.5–5.3)
RBC # BLD: 3.12 M/UL — LOW (ref 4.2–5.8)
RBC # FLD: 16.5 % — HIGH (ref 10.3–14.5)
SODIUM SERPL-SCNC: 141 MMOL/L — SIGNIFICANT CHANGE UP (ref 135–145)
VANCOMYCIN TROUGH SERPL-MCNC: 14.8 UG/ML — SIGNIFICANT CHANGE UP (ref 10–20)
WBC # BLD: 6.43 K/UL — SIGNIFICANT CHANGE UP (ref 3.8–10.5)
WBC # FLD AUTO: 6.43 K/UL — SIGNIFICANT CHANGE UP (ref 3.8–10.5)

## 2023-10-15 RX ORDER — INSULIN LISPRO 100/ML
VIAL (ML) SUBCUTANEOUS EVERY 6 HOURS
Refills: 0 | Status: DISCONTINUED | OUTPATIENT
Start: 2023-10-15 | End: 2023-10-17

## 2023-10-15 RX ADMIN — SODIUM CHLORIDE 75 MILLILITER(S): 9 INJECTION, SOLUTION INTRAVENOUS at 13:51

## 2023-10-15 RX ADMIN — Medication 3 UNIT(S): at 17:42

## 2023-10-15 RX ADMIN — Medication 3 UNIT(S): at 12:23

## 2023-10-15 RX ADMIN — ATORVASTATIN CALCIUM 80 MILLIGRAM(S): 80 TABLET, FILM COATED ORAL at 21:20

## 2023-10-15 RX ADMIN — Medication 25 MILLIGRAM(S): at 06:58

## 2023-10-15 RX ADMIN — Medication 1 TABLET(S): at 08:46

## 2023-10-15 RX ADMIN — CEFEPIME 100 MILLIGRAM(S): 1 INJECTION, POWDER, FOR SOLUTION INTRAMUSCULAR; INTRAVENOUS at 21:19

## 2023-10-15 RX ADMIN — GABAPENTIN 300 MILLIGRAM(S): 400 CAPSULE ORAL at 06:41

## 2023-10-15 RX ADMIN — Medication 2: at 08:45

## 2023-10-15 RX ADMIN — Medication 81 MILLIGRAM(S): at 12:24

## 2023-10-15 RX ADMIN — PANTOPRAZOLE SODIUM 40 MILLIGRAM(S): 20 TABLET, DELAYED RELEASE ORAL at 06:41

## 2023-10-15 RX ADMIN — GABAPENTIN 300 MILLIGRAM(S): 400 CAPSULE ORAL at 17:45

## 2023-10-15 RX ADMIN — Medication 2.5 MILLIGRAM(S): at 17:44

## 2023-10-15 RX ADMIN — Medication 2.5 MILLIGRAM(S): at 06:41

## 2023-10-15 RX ADMIN — Medication 2: at 12:24

## 2023-10-15 RX ADMIN — Medication 25 MILLIGRAM(S): at 17:40

## 2023-10-15 RX ADMIN — Medication 3 UNIT(S): at 08:44

## 2023-10-15 RX ADMIN — MIDODRINE HYDROCHLORIDE 10 MILLIGRAM(S): 2.5 TABLET ORAL at 12:23

## 2023-10-15 RX ADMIN — CEFEPIME 100 MILLIGRAM(S): 1 INJECTION, POWDER, FOR SOLUTION INTRAMUSCULAR; INTRAVENOUS at 13:50

## 2023-10-15 RX ADMIN — CEFEPIME 100 MILLIGRAM(S): 1 INJECTION, POWDER, FOR SOLUTION INTRAMUSCULAR; INTRAVENOUS at 06:41

## 2023-10-15 RX ADMIN — TAMSULOSIN HYDROCHLORIDE 0.4 MILLIGRAM(S): 0.4 CAPSULE ORAL at 21:20

## 2023-10-15 RX ADMIN — INSULIN GLARGINE 18 UNIT(S): 100 INJECTION, SOLUTION SUBCUTANEOUS at 21:21

## 2023-10-15 RX ADMIN — MIDODRINE HYDROCHLORIDE 10 MILLIGRAM(S): 2.5 TABLET ORAL at 06:41

## 2023-10-15 RX ADMIN — MIDODRINE HYDROCHLORIDE 10 MILLIGRAM(S): 2.5 TABLET ORAL at 17:42

## 2023-10-15 RX ADMIN — Medication 1 TABLET(S): at 17:40

## 2023-10-15 RX ADMIN — Medication 300 MILLIGRAM(S): at 22:06

## 2023-10-15 NOTE — PROGRESS NOTE ADULT - PROBLEM SELECTOR PLAN 2
id noted  rpt blood cultures neg to date  iv zosyn and vanco  nuclear bone scan to r/o om once stable  tessie this week  podiatry fu

## 2023-10-15 NOTE — PROGRESS NOTE ADULT - PROBLEM SELECTOR PLAN 8
bp on low side  hold bp meds  ivf
bp on low side  hold bp meds  ivf
improving  hold bp meds  ivf
improving  hold bp meds  ivf

## 2023-10-15 NOTE — PROGRESS NOTE ADULT - SUBJECTIVE AND OBJECTIVE BOX
Patient is a 78y old  Male who presents with a chief complaint of fever and weakness (15 Oct 2023 10:59)      INTERVAL HPI/OVERNIGHT EVENTS: no new events    MEDICATIONS  (STANDING):  aspirin enteric coated 81 milliGRAM(s) Oral daily  atorvastatin 80 milliGRAM(s) Oral at bedtime  bethanechol 25 milliGRAM(s) Oral two times a day  cefepime   IVPB 2000 milliGRAM(s) IV Intermittent every 8 hours  dextrose 5%. 1000 milliLiter(s) (100 mL/Hr) IV Continuous <Continuous>  dextrose 5%. 1000 milliLiter(s) (50 mL/Hr) IV Continuous <Continuous>  dextrose 50% Injectable 25 Gram(s) IV Push once  dextrose 50% Injectable 25 Gram(s) IV Push once  dextrose 50% Injectable 12.5 Gram(s) IV Push once  dronabinol 2.5 milliGRAM(s) Oral two times a day  gabapentin 300 milliGRAM(s) Oral two times a day  glucagon  Injectable 1 milliGRAM(s) IntraMuscular once  insulin glargine Injectable (LANTUS) 18 Unit(s) SubCutaneous at bedtime  insulin lispro (ADMELOG) corrective regimen sliding scale   SubCutaneous three times a day before meals  insulin lispro (ADMELOG) corrective regimen sliding scale   SubCutaneous at bedtime  insulin lispro Injectable (ADMELOG) 3 Unit(s) SubCutaneous three times a day before meals  lactated ringers. 1000 milliLiter(s) (100 mL/Hr) IV Continuous <Continuous>  lactated ringers. 1000 milliLiter(s) (75 mL/Hr) IV Continuous <Continuous>  lactobacillus acidophilus 1 Tablet(s) Oral two times a day with meals  midodrine. 10 milliGRAM(s) Oral three times a day  pantoprazole    Tablet 40 milliGRAM(s) Oral before breakfast  tamsulosin 0.4 milliGRAM(s) Oral at bedtime  vancomycin  IVPB 1500 milliGRAM(s) IV Intermittent every 24 hours    MEDICATIONS  (PRN):  acetaminophen     Tablet .. 650 milliGRAM(s) Oral every 6 hours PRN Temp greater or equal to 38C (100.4F), Moderate Pain (4 - 6)  dextrose Oral Gel 15 Gram(s) Oral once PRN Blood Glucose LESS THAN 70 milliGRAM(s)/deciliter  loperamide 2 milliGRAM(s) Oral three times a day PRN Diarrhea      Allergies    No Known Allergies    Intolerances        REVIEW OF SYSTEMS:  CONSTITUTIONAL: No fever, weight loss, or fatigue  EYES: No eye pain, visual disturbances  ENMT:  No difficulty hearing, tinnitus, vertigo; No sinus or throat pain  NECK: No pain or stiffness  RESPIRATORY: No cough, wheezing, chills or hemoptysis; No shortness of breath  CARDIOVASCULAR: No chest pain, palpitations, dizziness  GASTROINTESTINAL: No abdominal or epigastric pain. No nausea, vomiting, or hematemesis; No diarrhea or constipation. No melena or hematochezia.  GENITOURINARY: No dysuria, frequency, hematuria, or incontinence  NEUROLOGICAL: No headaches, memory loss, loss of strength, numbness, or tremors  SKIN: No itching, burning  LYMPH NODES: No enlarged glands  MUSCULOSKELETAL: No joint pain or swelling; No muscle, back, or extremity pain  PSYCHIATRIC: No depression, mood swings  HEME/LYMPH: No easy bruising, or bleeding gums  ALLERGY AND IMMUNOLOGIC: No hives    Vital Signs Last 24 Hrs  T(C): 37.1 (15 Oct 2023 04:50), Max: 37.3 (14 Oct 2023 17:12)  T(F): 98.7 (15 Oct 2023 04:50), Max: 99.1 (14 Oct 2023 17:12)  HR: 59 (15 Oct 2023 04:50) (59 - 60)  BP: 109/48 (15 Oct 2023 04:50) (109/48 - 115/56)  BP(mean): --  RR: 18 (15 Oct 2023 04:50) (18 - 18)  SpO2: 94% (15 Oct 2023 04:50) (94% - 97%)    Parameters below as of 15 Oct 2023 04:50  Patient On (Oxygen Delivery Method): room air        PHYSICAL EXAM:  GENERAL: NAD, well-groomed, well-developed  HEAD:  Atraumatic, Normocephalic  EYES: EOMI, PERRLA, conjunctiva and sclera clear  ENMT: No tonsillar erythema, exudates, or enlargement   NECK: Supple, No JVD  NERVOUS SYSTEM:  Alert & Oriented  CHEST/LUNG: Clear to auscultation bilaterally; No rales, rhonchi, wheezing  HEART: Regular rate and rhythm  ABDOMEN: Soft, Nontender, Nondistended; Bowel sounds present  EXTREMITIES:  2+ Peripheral Pulses   LYMPH: No lymphadenopathy noted  SKIN: No rashes     LABS:                        8.0    6.43  )-----------( 252      ( 15 Oct 2023 08:00 )             25.7     15 Oct 2023 08:00    141    |  113    |  25     ----------------------------<  195    4.2     |  22     |  0.82     Ca    8.1        15 Oct 2023 08:00        Urinalysis Basic - ( 15 Oct 2023 08:00 )    Color: x / Appearance: x / SG: x / pH: x  Gluc: 195 mg/dL / Ketone: x  / Bili: x / Urobili: x   Blood: x / Protein: x / Nitrite: x   Leuk Esterase: x / RBC: x / WBC x   Sq Epi: x / Non Sq Epi: x / Bacteria: x      CAPILLARY BLOOD GLUCOSE      POCT Blood Glucose.: 181 mg/dL (15 Oct 2023 08:19)  POCT Blood Glucose.: 164 mg/dL (14 Oct 2023 22:41)  POCT Blood Glucose.: 156 mg/dL (14 Oct 2023 16:57)  POCT Blood Glucose.: 204 mg/dL (14 Oct 2023 12:20)    blood culture --   No growth at 72 Hours   10-12 @ 05:36    blood culture --   No growth at 72 Hours   10-12 @ 05:30    blood culture --   Numerous Methicillin Resistant Staphylococcus aureus   10-11 @ 13:13    blood culture --   10,000 - 49,000 CFU/mL Pseudomonas aeruginosa   10-10 @ 21:00    blood culture --   Growth in aerobic and anaerobic bottles: Methicillin Resistant  Staphylococcus aureus  Direct identification is available within approximately 3-5  hours either by Blood Panel Multiplexed PCR or Direct  MALDI-TOF. Details: [email or web address]   10-10 @ 18:25      urine culture --  10-12 @ 05:36  results   No growth at 72 Hours 10-12 @ 05:36  urine culture --  10-12 @ 05:30  results   No growth at 72 Hours 10-12 @ 05:30  urine culture --  10-11 @ 13:13  results   Numerous Methicillin Resistant Staphylococcus aureus 10-11 @ 13:13  urine culture --  10-10 @ 21:00  results   10,000 - 49,000 CFU/mL Pseudomonas aeruginosa 10-10 @ 21:00  urine culture --  10-10 @ 18:25  results   Growth in aerobic and anaerobic bottles: Methicillin Resistant  Staphylococcus aureus  Direct identification is available within approximately 3-5  hours either by Blood Panel Multiplexed PCR or Direct  MALDI-TOF. Details: [email or web address] 10-10 @ 18:25    wound with gram statin --    10-12 @ 05:36  organism  --   10-12 @ 05:36  specimen source .Blood Blood-Peripheral  10-12 @ 05:36  wound with gram statin --    10-12 @ 05:30  organism  --   10-12 @ 05:30  specimen source .Blood Blood-Venous  10-12 @ 05:30  wound with gram statin --    10-11 @ 13:13  organism  Methicillin resistant Staphylococcus aureus   10-11 @ 13:13  specimen source Wound Wound  10-11 @ 13:13  wound with gram statin --    10-10 @ 21:00  organism  Pseudomonas aeruginosa   10-10 @ 21:00  specimen source Catheterized Catheterized  10-10 @ 21:00  wound with gram statin --    10-10 @ 18:25  organism  Blood Culture PCR   10-10 @ 18:25  specimen source .Blood Blood-Peripheral  10-10 @ 18:25      RADIOLOGY & ADDITIONAL TESTS:      Consultant(s) Notes Reviewed:  [ x] YES  [ ] NO    Care Discussed with Consultants/Other Providers [ x] YES  [ ] NO    Advanced care planning discussed with patient and family, advanced care planning forms reviewed, discussed, and completed.  20 minutes spent.   Patient is a 78y old  Male who presents with a chief complaint of fever and weakness (15 Oct 2023 10:59)      INTERVAL HPI/OVERNIGHT EVENTS: no new events    MEDICATIONS  (STANDING):  aspirin enteric coated 81 milliGRAM(s) Oral daily  atorvastatin 80 milliGRAM(s) Oral at bedtime  bethanechol 25 milliGRAM(s) Oral two times a day  cefepime   IVPB 2000 milliGRAM(s) IV Intermittent every 8 hours  dextrose 5%. 1000 milliLiter(s) (100 mL/Hr) IV Continuous <Continuous>  dextrose 5%. 1000 milliLiter(s) (50 mL/Hr) IV Continuous <Continuous>  dextrose 50% Injectable 25 Gram(s) IV Push once  dextrose 50% Injectable 25 Gram(s) IV Push once  dextrose 50% Injectable 12.5 Gram(s) IV Push once  dronabinol 2.5 milliGRAM(s) Oral two times a day  gabapentin 300 milliGRAM(s) Oral two times a day  glucagon  Injectable 1 milliGRAM(s) IntraMuscular once  insulin glargine Injectable (LANTUS) 18 Unit(s) SubCutaneous at bedtime  insulin lispro (ADMELOG) corrective regimen sliding scale   SubCutaneous three times a day before meals  insulin lispro (ADMELOG) corrective regimen sliding scale   SubCutaneous at bedtime  insulin lispro Injectable (ADMELOG) 3 Unit(s) SubCutaneous three times a day before meals  lactated ringers. 1000 milliLiter(s) (100 mL/Hr) IV Continuous <Continuous>  lactated ringers. 1000 milliLiter(s) (75 mL/Hr) IV Continuous <Continuous>  lactobacillus acidophilus 1 Tablet(s) Oral two times a day with meals  midodrine. 10 milliGRAM(s) Oral three times a day  pantoprazole    Tablet 40 milliGRAM(s) Oral before breakfast  tamsulosin 0.4 milliGRAM(s) Oral at bedtime  vancomycin  IVPB 1500 milliGRAM(s) IV Intermittent every 24 hours    MEDICATIONS  (PRN):  acetaminophen     Tablet .. 650 milliGRAM(s) Oral every 6 hours PRN Temp greater or equal to 38C (100.4F), Moderate Pain (4 - 6)  dextrose Oral Gel 15 Gram(s) Oral once PRN Blood Glucose LESS THAN 70 milliGRAM(s)/deciliter  loperamide 2 milliGRAM(s) Oral three times a day PRN Diarrhea      Allergies    No Known Allergies    Intolerances        REVIEW OF SYSTEMS:  CONSTITUTIONAL: No fever, weight loss, or fatigue  EYES: No eye pain, visual disturbances  ENMT:  No difficulty hearing, tinnitus, vertigo; No sinus or throat pain  NECK: No pain or stiffness  RESPIRATORY: No cough, wheezing, chills or hemoptysis; No shortness of breath  CARDIOVASCULAR: No chest pain, palpitations, dizziness  GASTROINTESTINAL: No abdominal or epigastric pain. No nausea, vomiting, or hematemesis; No diarrhea or constipation. No melena or hematochezia.  GENITOURINARY: No dysuria, frequency, hematuria, or incontinence  NEUROLOGICAL: No headaches, memory loss, loss of strength, numbness, or tremors  SKIN: No itching, burning  LYMPH NODES: No enlarged glands  MUSCULOSKELETAL: No joint pain or swelling; No muscle, back, or extremity pain  PSYCHIATRIC: No depression, mood swings  HEME/LYMPH: No easy bruising, or bleeding gums  ALLERGY AND IMMUNOLOGIC: No hives    Vital Signs Last 24 Hrs  T(C): 37.1 (15 Oct 2023 04:50), Max: 37.3 (14 Oct 2023 17:12)  T(F): 98.7 (15 Oct 2023 04:50), Max: 99.1 (14 Oct 2023 17:12)  HR: 59 (15 Oct 2023 04:50) (59 - 60)  BP: 109/48 (15 Oct 2023 04:50) (109/48 - 115/56)  BP(mean): --  RR: 18 (15 Oct 2023 04:50) (18 - 18)  SpO2: 94% (15 Oct 2023 04:50) (94% - 97%)    Parameters below as of 15 Oct 2023 04:50  Patient On (Oxygen Delivery Method): room air        PHYSICAL EXAM:  GENERAL: NAD, well-groomed, well-developed  HEAD:  Atraumatic, Normocephalic  EYES: EOMI, PERRLA, conjunctiva and sclera clear  ENMT: No tonsillar erythema, exudates, or enlargement   NECK: Supple, No JVD  NERVOUS SYSTEM:  Alert & Oriented  CHEST/LUNG: Clear to auscultation bilaterally; No rales, rhonchi, wheezing  HEART: Regular rate and rhythm  ABDOMEN: Soft, Nontender, Nondistended; Bowel sounds present  EXTREMITIES:  2+ Peripheral Pulses   LYMPH: No lymphadenopathy noted  SKIN: No rashes     LABS:                        8.0    6.43  )-----------( 252      ( 15 Oct 2023 08:00 )             25.7     15 Oct 2023 08:00    141    |  113    |  25     ----------------------------<  195    4.2     |  22     |  0.82     Ca    8.1        15 Oct 2023 08:00        Urinalysis Basic - ( 15 Oct 2023 08:00 )    Color: x / Appearance: x / SG: x / pH: x  Gluc: 195 mg/dL / Ketone: x  / Bili: x / Urobili: x   Blood: x / Protein: x / Nitrite: x   Leuk Esterase: x / RBC: x / WBC x   Sq Epi: x / Non Sq Epi: x / Bacteria: x      CAPILLARY BLOOD GLUCOSE      POCT Blood Glucose.: 181 mg/dL (15 Oct 2023 08:19)  POCT Blood Glucose.: 164 mg/dL (14 Oct 2023 22:41)  POCT Blood Glucose.: 156 mg/dL (14 Oct 2023 16:57)  POCT Blood Glucose.: 204 mg/dL (14 Oct 2023 12:20)    blood culture --   No growth at 72 Hours   10-12 @ 05:36    blood culture --   No growth at 72 Hours   10-12 @ 05:30    blood culture --   Numerous Methicillin Resistant Staphylococcus aureus   10-11 @ 13:13    blood culture --   10,000 - 49,000 CFU/mL Pseudomonas aeruginosa   10-10 @ 21:00    blood culture --   Growth in aerobic and anaerobic bottles: Methicillin Resistant  Staphylococcus aureus  Direct identification is available within approximately 3-5  hours either by Blood Panel Multiplexed PCR or Direct  MALDI-TOF. Details: [email or web address]   10-10 @ 18:25      urine culture --  10-12 @ 05:36  results   No growth at 72 Hours 10-12 @ 05:36  urine culture --  10-12 @ 05:30  results   No growth at 72 Hours 10-12 @ 05:30  urine culture --  10-11 @ 13:13  results   Numerous Methicillin Resistant Staphylococcus aureus 10-11 @ 13:13  urine culture --  10-10 @ 21:00  results   10,000 - 49,000 CFU/mL Pseudomonas aeruginosa 10-10 @ 21:00  urine culture --  10-10 @ 18:25  results   Growth in aerobic and anaerobic bottles: Methicillin Resistant  Staphylococcus aureus  Direct identification is available within approximately 3-5  hours either by Blood Panel Multiplexed PCR or Direct  MALDI-TOF. Details: [email or web address] 10-10 @ 18:25    wound with gram statin --    10-12 @ 05:36  organism  --   10-12 @ 05:36  specimen source .Blood Blood-Peripheral  10-12 @ 05:36  wound with gram statin --    10-12 @ 05:30  organism  --   10-12 @ 05:30  specimen source .Blood Blood-Venous  10-12 @ 05:30  wound with gram statin --    10-11 @ 13:13  organism  Methicillin resistant Staphylococcus aureus   10-11 @ 13:13  specimen source Wound Wound  10-11 @ 13:13  wound with gram statin --    10-10 @ 21:00  organism  Pseudomonas aeruginosa   10-10 @ 21:00  specimen source Catheterized Catheterized  10-10 @ 21:00  wound with gram statin --    10-10 @ 18:25  organism  Blood Culture PCR   10-10 @ 18:25  specimen source .Blood Blood-Peripheral  10-10 @ 18:25      RADIOLOGY & ADDITIONAL TESTS:      Consultant(s) Notes Reviewed:  [ x] YES  [ ] NO    Care Discussed with Consultants/Other Providers [ x] YES  [ ] NO    Advanced care planning discussed with patient and family, advanced care planning forms reviewed, discussed, and completed.  20 minutes spent.   Patient is a 78y old  Male who presents with a chief complaint of fever and weakness (15 Oct 2023 10:59)      INTERVAL HPI/OVERNIGHT EVENTS: no new events    MEDICATIONS  (STANDING):  aspirin enteric coated 81 milliGRAM(s) Oral daily  atorvastatin 80 milliGRAM(s) Oral at bedtime  bethanechol 25 milliGRAM(s) Oral two times a day  cefepime   IVPB 2000 milliGRAM(s) IV Intermittent every 8 hours  dextrose 5%. 1000 milliLiter(s) (100 mL/Hr) IV Continuous <Continuous>  dextrose 5%. 1000 milliLiter(s) (50 mL/Hr) IV Continuous <Continuous>  dextrose 50% Injectable 25 Gram(s) IV Push once  dextrose 50% Injectable 25 Gram(s) IV Push once  dextrose 50% Injectable 12.5 Gram(s) IV Push once  dronabinol 2.5 milliGRAM(s) Oral two times a day  gabapentin 300 milliGRAM(s) Oral two times a day  glucagon  Injectable 1 milliGRAM(s) IntraMuscular once  insulin glargine Injectable (LANTUS) 18 Unit(s) SubCutaneous at bedtime  insulin lispro (ADMELOG) corrective regimen sliding scale   SubCutaneous three times a day before meals  insulin lispro (ADMELOG) corrective regimen sliding scale   SubCutaneous at bedtime  insulin lispro Injectable (ADMELOG) 3 Unit(s) SubCutaneous three times a day before meals  lactated ringers. 1000 milliLiter(s) (100 mL/Hr) IV Continuous <Continuous>  lactated ringers. 1000 milliLiter(s) (75 mL/Hr) IV Continuous <Continuous>  lactobacillus acidophilus 1 Tablet(s) Oral two times a day with meals  midodrine. 10 milliGRAM(s) Oral three times a day  pantoprazole    Tablet 40 milliGRAM(s) Oral before breakfast  tamsulosin 0.4 milliGRAM(s) Oral at bedtime  vancomycin  IVPB 1500 milliGRAM(s) IV Intermittent every 24 hours    MEDICATIONS  (PRN):  acetaminophen     Tablet .. 650 milliGRAM(s) Oral every 6 hours PRN Temp greater or equal to 38C (100.4F), Moderate Pain (4 - 6)  dextrose Oral Gel 15 Gram(s) Oral once PRN Blood Glucose LESS THAN 70 milliGRAM(s)/deciliter  loperamide 2 milliGRAM(s) Oral three times a day PRN Diarrhea      Allergies    No Known Allergies    Intolerances        REVIEW OF SYSTEMS:  CONSTITUTIONAL: No fever, weight loss, or fatigue  EYES: No eye pain, visual disturbances  ENMT:  No difficulty hearing, tinnitus, vertigo; No sinus or throat pain  NECK: No pain or stiffness  RESPIRATORY: No cough, wheezing, chills or hemoptysis; No shortness of breath  CARDIOVASCULAR: No chest pain, palpitations, dizziness  GASTROINTESTINAL: No abdominal or epigastric pain. No nausea, vomiting, or hematemesis; No diarrhea or constipation. No melena or hematochezia.  GENITOURINARY: No dysuria, frequency, hematuria, or incontinence  NEUROLOGICAL: No headaches, memory loss, loss of strength, numbness, or tremors  SKIN: No itching, burning  LYMPH NODES: No enlarged glands  MUSCULOSKELETAL: No joint pain or swelling; No muscle, back, or extremity pain  PSYCHIATRIC: No depression, mood swings  HEME/LYMPH: No easy bruising, or bleeding gums  ALLERGY AND IMMUNOLOGIC: No hives    Vital Signs Last 24 Hrs  T(C): 37.1 (15 Oct 2023 04:50), Max: 37.3 (14 Oct 2023 17:12)  T(F): 98.7 (15 Oct 2023 04:50), Max: 99.1 (14 Oct 2023 17:12)  HR: 59 (15 Oct 2023 04:50) (59 - 60)  BP: 109/48 (15 Oct 2023 04:50) (109/48 - 115/56)  BP(mean): --  RR: 18 (15 Oct 2023 04:50) (18 - 18)  SpO2: 94% (15 Oct 2023 04:50) (94% - 97%)    Parameters below as of 15 Oct 2023 04:50  Patient On (Oxygen Delivery Method): room air        PHYSICAL EXAM:  GENERAL: NAD, well-groomed, well-developed  HEAD:  Atraumatic, Normocephalic  EYES: EOMI, PERRLA, conjunctiva and sclera clear  ENMT: No tonsillar erythema, exudates, or enlargement   NECK: Supple, No JVD  NERVOUS SYSTEM:  Alert & Oriented  CHEST/LUNG: Clear to auscultation bilaterally; No rales, rhonchi, wheezing  HEART: Regular rate and rhythm  ABDOMEN: Soft, Nontender, Nondistended; Bowel sounds present  EXTREMITIES:  2+ Peripheral Pulses   LYMPH: No lymphadenopathy noted  SKIN: No rashes     LABS:                        8.0    6.43  )-----------( 252      ( 15 Oct 2023 08:00 )             25.7     15 Oct 2023 08:00    141    |  113    |  25     ----------------------------<  195    4.2     |  22     |  0.82     Ca    8.1        15 Oct 2023 08:00        Urinalysis Basic - ( 15 Oct 2023 08:00 )    Color: x / Appearance: x / SG: x / pH: x  Gluc: 195 mg/dL / Ketone: x  / Bili: x / Urobili: x   Blood: x / Protein: x / Nitrite: x   Leuk Esterase: x / RBC: x / WBC x   Sq Epi: x / Non Sq Epi: x / Bacteria: x      CAPILLARY BLOOD GLUCOSE      POCT Blood Glucose.: 181 mg/dL (15 Oct 2023 08:19)  POCT Blood Glucose.: 164 mg/dL (14 Oct 2023 22:41)  POCT Blood Glucose.: 156 mg/dL (14 Oct 2023 16:57)  POCT Blood Glucose.: 204 mg/dL (14 Oct 2023 12:20)    blood culture --   No growth at 72 Hours   10-12 @ 05:36    blood culture --   No growth at 72 Hours   10-12 @ 05:30    blood culture --   Numerous Methicillin Resistant Staphylococcus aureus   10-11 @ 13:13    blood culture --   10,000 - 49,000 CFU/mL Pseudomonas aeruginosa   10-10 @ 21:00    blood culture --   Growth in aerobic and anaerobic bottles: Methicillin Resistant  Staphylococcus aureus  Direct identification is available within approximately 3-5  hours either by Blood Panel Multiplexed PCR or Direct  MALDI-TOF. Details: [email or web address]   10-10 @ 18:25      urine culture --  10-12 @ 05:36  results   No growth at 72 Hours 10-12 @ 05:36  urine culture --  10-12 @ 05:30  results   No growth at 72 Hours 10-12 @ 05:30  urine culture --  10-11 @ 13:13  results   Numerous Methicillin Resistant Staphylococcus aureus 10-11 @ 13:13  urine culture --  10-10 @ 21:00  results   10,000 - 49,000 CFU/mL Pseudomonas aeruginosa 10-10 @ 21:00  urine culture --  10-10 @ 18:25  results   Growth in aerobic and anaerobic bottles: Methicillin Resistant  Staphylococcus aureus  Direct identification is available within approximately 3-5  hours either by Blood Panel Multiplexed PCR or Direct  MALDI-TOF. Details: [email or web address] 10-10 @ 18:25    wound with gram statin --    10-12 @ 05:36  organism  --   10-12 @ 05:36  specimen source .Blood Blood-Peripheral  10-12 @ 05:36  wound with gram statin --    10-12 @ 05:30  organism  --   10-12 @ 05:30  specimen source .Blood Blood-Venous  10-12 @ 05:30  wound with gram statin --    10-11 @ 13:13  organism  Methicillin resistant Staphylococcus aureus   10-11 @ 13:13  specimen source Wound Wound  10-11 @ 13:13  wound with gram statin --    10-10 @ 21:00  organism  Pseudomonas aeruginosa   10-10 @ 21:00  specimen source Catheterized Catheterized  10-10 @ 21:00  wound with gram statin --    10-10 @ 18:25  organism  Blood Culture PCR   10-10 @ 18:25  specimen source .Blood Blood-Peripheral  10-10 @ 18:25      RADIOLOGY & ADDITIONAL TESTS:      Consultant(s) Notes Reviewed:  [ x] YES  [ ] NO    Care Discussed with Consultants/Other Providers [ x] YES  [ ] NO    Advanced care planning discussed with patient and family, advanced care planning forms reviewed, discussed, and completed.  20 minutes spent.

## 2023-10-15 NOTE — PROGRESS NOTE ADULT - ASSESSMENT
78-year-old male presents to the hospital by ambulance from home.  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 Pt does not go to doctor on regular basis per son and does phone visits. Son reports years of struggling with foot infections with black areas of gangrene and their struggling to keep his feet but now feeling that keeping him alive is more important.  Culture - Blood (10.10.23 @ 18:25)    -  Methicillin resistant Staphylococcus aureus (MRSA): Detec  Repeat 10/12 BCx NGTD (48h)  Wcx with MRSA, placed on contact isolation   UCx + Pseudomonas, pansensitive including cefepime   Afebrile over past 24h    RECOMMENDATIONS  Very concerning for osteomyelitis -- NM bone scan ordered and pending  Cardiology rec appreciated - plan for GISSELL early this week to rule out IE or PPM involvement   C/w Vancomycin (10/11-) dosing per pharmacy protocol  S/p zosyn (10/11-10/12), changed to minimize ARMANI risk  C/w cefepime given UCx findings  Anticipate that surgery will be required so consider involving vascular for possible BKAs based on imaging  Monitor temps/WBC-- if spikes another fever -- repeat Bcx x2       Tomy Larsen M.D.  [facility], Division of Infectious Diseases  [phone]  After 5pm on weekdays and all day on weekends - please call [phone]   78-year-old male presents to the hospital by ambulance from home.  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 Pt does not go to doctor on regular basis per son and does phone visits. Son reports years of struggling with foot infections with black areas of gangrene and their struggling to keep his feet but now feeling that keeping him alive is more important.  Culture - Blood (10.10.23 @ 18:25)    -  Methicillin resistant Staphylococcus aureus (MRSA): Detec  Repeat 10/12 BCx NGTD (48h)  Wcx with MRSA, placed on contact isolation   UCx + Pseudomonas, pansensitive including cefepime   Afebrile over past 24h    RECOMMENDATIONS  Very concerning for osteomyelitis -- NM bone scan ordered and pending  Cardiology rec appreciated - plan for GISSELL early this week to rule out IE or PPM involvement   C/w Vancomycin (10/11-) dosing per pharmacy protocol  S/p zosyn (10/11-10/12), changed to minimize ARMANI risk  C/w cefepime given UCx findings  Anticipate that surgery will be required so consider involving vascular for possible BKAs based on imaging  Monitor temps/WBC-- if spikes another fever -- repeat Bcx x2       Tomy Larsen M.D.  [facility], Division of Infectious Diseases  [phone]  After 5pm on weekdays and all day on weekends - please call [phone]   78-year-old male presents to the hospital by ambulance from home.  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 Pt does not go to doctor on regular basis per son and does phone visits. Son reports years of struggling with foot infections with black areas of gangrene and their struggling to keep his feet but now feeling that keeping him alive is more important.  Culture - Blood (10.10.23 @ 18:25)    -  Methicillin resistant Staphylococcus aureus (MRSA): Detec  Repeat 10/12 BCx NGTD (48h)  Wcx with MRSA, placed on contact isolation   UCx + Pseudomonas, pansensitive including cefepime   Afebrile over past 24h    RECOMMENDATIONS  Very concerning for osteomyelitis -- NM bone scan ordered and pending  Cardiology rec appreciated - plan for GISSELL early this week to rule out IE or PPM involvement   C/w Vancomycin (10/11-) dosing per pharmacy protocol  S/p zosyn (10/11-10/12), changed to minimize ARMANI risk  C/w cefepime given UCx findings  Anticipate that surgery will be required so consider involving vascular for possible BKAs based on imaging  Monitor temps/WBC-- if spikes another fever -- repeat Bcx x2       Tomy Larsen M.D.  [facility], Division of Infectious Diseases  [phone]  After 5pm on weekdays and all day on weekends - please call [phone]

## 2023-10-15 NOTE — PROGRESS NOTE ADULT - SUBJECTIVE AND OBJECTIVE BOX
[facility]  Les Mccray PA-C  [address] [place] [address]  [phone]      INTERVAL HPI/OVERNIGHT EVENTS:  Pt s/e  Tolerating diet  No new GI events    MEDICATIONS  (STANDING):  aspirin enteric coated 81 milliGRAM(s) Oral daily  atorvastatin 80 milliGRAM(s) Oral at bedtime  bethanechol 25 milliGRAM(s) Oral two times a day  cefepime   IVPB 2000 milliGRAM(s) IV Intermittent every 8 hours  dextrose 5%. 1000 milliLiter(s) (100 mL/Hr) IV Continuous <Continuous>  dextrose 5%. 1000 milliLiter(s) (50 mL/Hr) IV Continuous <Continuous>  dextrose 50% Injectable 25 Gram(s) IV Push once  dextrose 50% Injectable 25 Gram(s) IV Push once  dextrose 50% Injectable 12.5 Gram(s) IV Push once  dronabinol 2.5 milliGRAM(s) Oral two times a day  gabapentin 300 milliGRAM(s) Oral two times a day  glucagon  Injectable 1 milliGRAM(s) IntraMuscular once  insulin glargine Injectable (LANTUS) 18 Unit(s) SubCutaneous at bedtime  insulin lispro (ADMELOG) corrective regimen sliding scale   SubCutaneous three times a day before meals  insulin lispro (ADMELOG) corrective regimen sliding scale   SubCutaneous at bedtime  insulin lispro Injectable (ADMELOG) 3 Unit(s) SubCutaneous three times a day before meals  lactated ringers. 1000 milliLiter(s) (100 mL/Hr) IV Continuous <Continuous>  lactated ringers. 1000 milliLiter(s) (75 mL/Hr) IV Continuous <Continuous>  lactobacillus acidophilus 1 Tablet(s) Oral two times a day with meals  midodrine. 10 milliGRAM(s) Oral three times a day  pantoprazole    Tablet 40 milliGRAM(s) Oral before breakfast  tamsulosin 0.4 milliGRAM(s) Oral at bedtime  vancomycin  IVPB 1500 milliGRAM(s) IV Intermittent every 24 hours    MEDICATIONS  (PRN):  acetaminophen     Tablet .. 650 milliGRAM(s) Oral every 6 hours PRN Temp greater or equal to 38C (100.4F), Moderate Pain (4 - 6)  dextrose Oral Gel 15 Gram(s) Oral once PRN Blood Glucose LESS THAN 70 milliGRAM(s)/deciliter  loperamide 2 milliGRAM(s) Oral three times a day PRN Diarrhea      Allergies    No Known Allergies    PHYSICAL EXAM:   Vital Signs:  Vital Signs Last 24 Hrs  T(C): 37.1 (15 Oct 2023 04:50), Max: 37.3 (14 Oct 2023 17:12)  T(F): 98.7 (15 Oct 2023 04:50), Max: 99.1 (14 Oct 2023 17:12)  HR: 59 (15 Oct 2023 04:50) (59 - 60)  BP: 109/48 (15 Oct 2023 04:50) (109/48 - 115/56)  BP(mean): --  RR: 18 (15 Oct 2023 04:50) (18 - 18)  SpO2: 94% (15 Oct 2023 04:50) (94% - 97%)    Parameters below as of 15 Oct 2023 04:50  Patient On (Oxygen Delivery Method): room air    GENERAL:  Appears stated age  HEENT:  NC/AT  CHEST:  Full & symmetric excursion  HEART:  Regular rhythm  ABDOMEN:  Soft, non-tender, non-distended  EXTEREMITIES:  no cyanosis  SKIN:  No rash  NEURO:  Alert      LABS:                        8.0    6.43  )-----------( 252      ( 15 Oct 2023 08:00 )             25.7     10-15    141  |  113<H>  |  25<H>  ----------------------------<  195<H>  4.2   |  22  |  0.82    Ca    8.1<L>      15 Oct 2023 08:00        Urinalysis Basic - ( 15 Oct 2023 08:00 )    Color: x / Appearance: x / SG: x / pH: x  Gluc: 195 mg/dL / Ketone: x  / Bili: x / Urobili: x   Blood: x / Protein: x / Nitrite: x   Leuk Esterase: x / RBC: x / WBC x   Sq Epi: x / Non Sq Epi: x / Bacteria: x   [facility]  Les Mccray PA-C  [address] [place] [address]  [phone]      INTERVAL HPI/OVERNIGHT EVENTS:  Pt s/e  Tolerating diet  No new GI events    MEDICATIONS  (STANDING):  aspirin enteric coated 81 milliGRAM(s) Oral daily  atorvastatin 80 milliGRAM(s) Oral at bedtime  bethanechol 25 milliGRAM(s) Oral two times a day  cefepime   IVPB 2000 milliGRAM(s) IV Intermittent every 8 hours  dextrose 5%. 1000 milliLiter(s) (100 mL/Hr) IV Continuous <Continuous>  dextrose 5%. 1000 milliLiter(s) (50 mL/Hr) IV Continuous <Continuous>  dextrose 50% Injectable 25 Gram(s) IV Push once  dextrose 50% Injectable 25 Gram(s) IV Push once  dextrose 50% Injectable 12.5 Gram(s) IV Push once  dronabinol 2.5 milliGRAM(s) Oral two times a day  gabapentin 300 milliGRAM(s) Oral two times a day  glucagon  Injectable 1 milliGRAM(s) IntraMuscular once  insulin glargine Injectable (LANTUS) 18 Unit(s) SubCutaneous at bedtime  insulin lispro (ADMELOG) corrective regimen sliding scale   SubCutaneous three times a day before meals  insulin lispro (ADMELOG) corrective regimen sliding scale   SubCutaneous at bedtime  insulin lispro Injectable (ADMELOG) 3 Unit(s) SubCutaneous three times a day before meals  lactated ringers. 1000 milliLiter(s) (100 mL/Hr) IV Continuous <Continuous>  lactated ringers. 1000 milliLiter(s) (75 mL/Hr) IV Continuous <Continuous>  lactobacillus acidophilus 1 Tablet(s) Oral two times a day with meals  midodrine. 10 milliGRAM(s) Oral three times a day  pantoprazole    Tablet 40 milliGRAM(s) Oral before breakfast  tamsulosin 0.4 milliGRAM(s) Oral at bedtime  vancomycin  IVPB 1500 milliGRAM(s) IV Intermittent every 24 hours    MEDICATIONS  (PRN):  acetaminophen     Tablet .. 650 milliGRAM(s) Oral every 6 hours PRN Temp greater or equal to 38C (100.4F), Moderate Pain (4 - 6)  dextrose Oral Gel 15 Gram(s) Oral once PRN Blood Glucose LESS THAN 70 milliGRAM(s)/deciliter  loperamide 2 milliGRAM(s) Oral three times a day PRN Diarrhea      Allergies    No Known Allergies    PHYSICAL EXAM:   Vital Signs:  Vital Signs Last 24 Hrs  T(C): 37.1 (15 Oct 2023 04:50), Max: 37.3 (14 Oct 2023 17:12)  T(F): 98.7 (15 Oct 2023 04:50), Max: 99.1 (14 Oct 2023 17:12)  HR: 59 (15 Oct 2023 04:50) (59 - 60)  BP: 109/48 (15 Oct 2023 04:50) (109/48 - 115/56)  BP(mean): --  RR: 18 (15 Oct 2023 04:50) (18 - 18)  SpO2: 94% (15 Oct 2023 04:50) (94% - 97%)    Parameters below as of 15 Oct 2023 04:50  Patient On (Oxygen Delivery Method): room air    GENERAL:  Appears stated age  HEENT:  NC/AT  CHEST:  Full & symmetric excursion  HEART:  Regular rhythm  ABDOMEN:  Soft, non-tender, non-distended  EXTEREMITIES:  no cyanosis  SKIN:  No rash  NEURO:  Alert      LABS:                        8.0    6.43  )-----------( 252      ( 15 Oct 2023 08:00 )             25.7     10-15    141  |  113<H>  |  25<H>  ----------------------------<  195<H>  4.2   |  22  |  0.82    Ca    8.1<L>      15 Oct 2023 08:00        Urinalysis Basic - ( 15 Oct 2023 08:00 )    Color: x / Appearance: x / SG: x / pH: x  Gluc: 195 mg/dL / Ketone: x  / Bili: x / Urobili: x   Blood: x / Protein: x / Nitrite: x   Leuk Esterase: x / RBC: x / WBC x   Sq Epi: x / Non Sq Epi: x / Bacteria: x   [facility]  Les Mccray PA-C  [address] [place] [address]  [phone]      INTERVAL HPI/OVERNIGHT EVENTS:  Pt s/e  Tolerating diet  No new GI events    MEDICATIONS  (STANDING):  aspirin enteric coated 81 milliGRAM(s) Oral daily  atorvastatin 80 milliGRAM(s) Oral at bedtime  bethanechol 25 milliGRAM(s) Oral two times a day  cefepime   IVPB 2000 milliGRAM(s) IV Intermittent every 8 hours  dextrose 5%. 1000 milliLiter(s) (100 mL/Hr) IV Continuous <Continuous>  dextrose 5%. 1000 milliLiter(s) (50 mL/Hr) IV Continuous <Continuous>  dextrose 50% Injectable 25 Gram(s) IV Push once  dextrose 50% Injectable 25 Gram(s) IV Push once  dextrose 50% Injectable 12.5 Gram(s) IV Push once  dronabinol 2.5 milliGRAM(s) Oral two times a day  gabapentin 300 milliGRAM(s) Oral two times a day  glucagon  Injectable 1 milliGRAM(s) IntraMuscular once  insulin glargine Injectable (LANTUS) 18 Unit(s) SubCutaneous at bedtime  insulin lispro (ADMELOG) corrective regimen sliding scale   SubCutaneous three times a day before meals  insulin lispro (ADMELOG) corrective regimen sliding scale   SubCutaneous at bedtime  insulin lispro Injectable (ADMELOG) 3 Unit(s) SubCutaneous three times a day before meals  lactated ringers. 1000 milliLiter(s) (100 mL/Hr) IV Continuous <Continuous>  lactated ringers. 1000 milliLiter(s) (75 mL/Hr) IV Continuous <Continuous>  lactobacillus acidophilus 1 Tablet(s) Oral two times a day with meals  midodrine. 10 milliGRAM(s) Oral three times a day  pantoprazole    Tablet 40 milliGRAM(s) Oral before breakfast  tamsulosin 0.4 milliGRAM(s) Oral at bedtime  vancomycin  IVPB 1500 milliGRAM(s) IV Intermittent every 24 hours    MEDICATIONS  (PRN):  acetaminophen     Tablet .. 650 milliGRAM(s) Oral every 6 hours PRN Temp greater or equal to 38C (100.4F), Moderate Pain (4 - 6)  dextrose Oral Gel 15 Gram(s) Oral once PRN Blood Glucose LESS THAN 70 milliGRAM(s)/deciliter  loperamide 2 milliGRAM(s) Oral three times a day PRN Diarrhea      Allergies    No Known Allergies    PHYSICAL EXAM:   Vital Signs:  Vital Signs Last 24 Hrs  T(C): 37.1 (15 Oct 2023 04:50), Max: 37.3 (14 Oct 2023 17:12)  T(F): 98.7 (15 Oct 2023 04:50), Max: 99.1 (14 Oct 2023 17:12)  HR: 59 (15 Oct 2023 04:50) (59 - 60)  BP: 109/48 (15 Oct 2023 04:50) (109/48 - 115/56)  BP(mean): --  RR: 18 (15 Oct 2023 04:50) (18 - 18)  SpO2: 94% (15 Oct 2023 04:50) (94% - 97%)    Parameters below as of 15 Oct 2023 04:50  Patient On (Oxygen Delivery Method): room air    GENERAL:  Appears stated age  HEENT:  NC/AT  CHEST:  Full & symmetric excursion  HEART:  Regular rhythm  ABDOMEN:  Soft, non-tender, non-distended  EXTEREMITIES:  no cyanosis  SKIN:  No rash  NEURO:  Alert      LABS:                        8.0    6.43  )-----------( 252      ( 15 Oct 2023 08:00 )             25.7     10-15    141  |  113<H>  |  25<H>  ----------------------------<  195<H>  4.2   |  22  |  0.82    Ca    8.1<L>      15 Oct 2023 08:00        Urinalysis Basic - ( 15 Oct 2023 08:00 )    Color: x / Appearance: x / SG: x / pH: x  Gluc: 195 mg/dL / Ketone: x  / Bili: x / Urobili: x   Blood: x / Protein: x / Nitrite: x   Leuk Esterase: x / RBC: x / WBC x   Sq Epi: x / Non Sq Epi: x / Bacteria: x

## 2023-10-15 NOTE — PROGRESS NOTE ADULT - ASSESSMENT
78M with PMH HTN, PPM, DM, foot ulcers , presents to the hospital by ambulance from home.  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 Pt does not go to doctor on regular basis per son and does phone visits.    TTE: CONCLUSIONS:      1. Technically difficult image quality.   2. There is abnormal septal activation, likely from a paced rhythm. There is likely superimposed anterior and septal hypokinesis. Limited views and limited endocardial definition make further interpretation limited.   3. The right ventricle is not well visualized. Normal right ventricular cavity size and reduced systolic function.   4. The left atrium is mildly dilated in size.   5. Right atrium was not well visualized.   6. Mild calcification of the aortic valve leaflets.   7. Tricuspid valve was not well visualized.   8. Aortic valve was not well visualized.    +Bacteremia. Called by Cardio NP for consultation and to arrange GISSELL to R/O Endocarditis or PPM related infection. Order placed to be done by Dr. Barrett Monday or Tuesday  Cant ASA, Statin  Pt has seen Dave Panchal and [facility] outpt 2021, [phone]  pt poor historian  will follow here 78M with PMH HTN, PPM, DM, foot ulcers , presents to the hospital by ambulance from home.  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 Pt does not go to doctor on regular basis per son and does phone visits.    TTE: CONCLUSIONS:      1. Technically difficult image quality.   2. There is abnormal septal activation, likely from a paced rhythm. There is likely superimposed anterior and septal hypokinesis. Limited views and limited endocardial definition make further interpretation limited.   3. The right ventricle is not well visualized. Normal right ventricular cavity size and reduced systolic function.   4. The left atrium is mildly dilated in size.   5. Right atrium was not well visualized.   6. Mild calcification of the aortic valve leaflets.   7. Tricuspid valve was not well visualized.   8. Aortic valve was not well visualized.    +Bacteremia. Called by Cardio NP for consultation and to arrange GISSELL to R/O Endocarditis or PPM related infection. Order placed to be done by Dr. Barrett Monday or Tuesday  Cant ASA, Statin  Pt has seen Dave Panchal and [facility] outpt 2021, [phone]  pt poor historian  will follow here 78M with PMH HTN, PPM, DM, foot ulcers , presents to the hospital by ambulance from home.  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 Pt does not go to doctor on regular basis per son and does phone visits.    TTE: CONCLUSIONS:      1. Technically difficult image quality.   2. There is abnormal septal activation, likely from a paced rhythm. There is likely superimposed anterior and septal hypokinesis. Limited views and limited endocardial definition make further interpretation limited.   3. The right ventricle is not well visualized. Normal right ventricular cavity size and reduced systolic function.   4. The left atrium is mildly dilated in size.   5. Right atrium was not well visualized.   6. Mild calcification of the aortic valve leaflets.   7. Tricuspid valve was not well visualized.   8. Aortic valve was not well visualized.    +Bacteremia. Called by Cardio NP for consultation and to arrange GISSELL to R/O Endocarditis or PPM related infection. Order placed to be done by Dr. Barrett Monday or Tuesday  Cant ASA, Statin  Pt has seen Dave Panchal and [facility] outpt 2021, [phone]  pt poor historian  will follow here 78M with PMH HTN, PPM, DM, foot ulcers , presents to the hospital by ambulance from home.  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 Pt does not go to doctor on regular basis per son and does phone visits.    TTE: CONCLUSIONS:      1. Technically difficult image quality.   2. There is abnormal septal activation, likely from a paced rhythm. There is likely superimposed anterior and septal hypokinesis. Limited views and limited endocardial definition make further interpretation limited.   3. The right ventricle is not well visualized. Normal right ventricular cavity size and reduced systolic function.   4. The left atrium is mildly dilated in size.   5. Right atrium was not well visualized.   6. Mild calcification of the aortic valve leaflets.   7. Tricuspid valve was not well visualized.   8. Aortic valve was not well visualized.    +Bacteremia. Called by Cardio NP for consultation and to arrange GISSELL to R/O Endocarditis or PPM related infection. Order placed to be done by Dr. Barrett Monday or Tuesday  Cant ASA, Statin  Pt has seen Dave Panchal and [facility] outpt 2021, [phone]  pt poor historian  will follow here    NPO after midnight tonight for possible GISSELL tomorrow. 78M with PMH HTN, PPM, DM, foot ulcers , presents to the hospital by ambulance from home.  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 Pt does not go to doctor on regular basis per son and does phone visits.    TTE: CONCLUSIONS:      1. Technically difficult image quality.   2. There is abnormal septal activation, likely from a paced rhythm. There is likely superimposed anterior and septal hypokinesis. Limited views and limited endocardial definition make further interpretation limited.   3. The right ventricle is not well visualized. Normal right ventricular cavity size and reduced systolic function.   4. The left atrium is mildly dilated in size.   5. Right atrium was not well visualized.   6. Mild calcification of the aortic valve leaflets.   7. Tricuspid valve was not well visualized.   8. Aortic valve was not well visualized.    +Bacteremia. Called by Cardio NP for consultation and to arrange GISSELL to R/O Endocarditis or PPM related infection. Order placed to be done by Dr. Barrett Monday or Tuesday  Cant ASA, Statin  Pt has seen Dave Panchal and [facility] outpt 2021, [phone]  pt poor historian  will follow here    NPO after midnight tonight for possible GISSELL tomorrow. 78M with PMH HTN, PPM, DM, foot ulcers , presents to the hospital by ambulance from home.  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 Pt does not go to doctor on regular basis per son and does phone visits.    TTE: CONCLUSIONS:      1. Technically difficult image quality.   2. There is abnormal septal activation, likely from a paced rhythm. There is likely superimposed anterior and septal hypokinesis. Limited views and limited endocardial definition make further interpretation limited.   3. The right ventricle is not well visualized. Normal right ventricular cavity size and reduced systolic function.   4. The left atrium is mildly dilated in size.   5. Right atrium was not well visualized.   6. Mild calcification of the aortic valve leaflets.   7. Tricuspid valve was not well visualized.   8. Aortic valve was not well visualized.    +Bacteremia. Called by Cardio NP for consultation and to arrange GISSELL to R/O Endocarditis or PPM related infection. Order placed to be done by Dr. Barrett Monday or Tuesday  Cant ASA, Statin  Pt has seen Dave Panchal and [facility] outpt 2021, [phone]  pt poor historian  will follow here    NPO after midnight tonight for possible GISSELL tomorrow.

## 2023-10-15 NOTE — PROGRESS NOTE ADULT - SUBJECTIVE AND OBJECTIVE BOX
[facility]iology Progress Note [phone] (Dr. Guillen, Dayan, Halie, Tessa)    CHIEF COMPLAINT: Patient is a 78y old  Male who presents with a chief complaint of fever and weakness (14 Oct 2023 16:33)      Follow Up Today: The patient denies any chest discomfort or shortness of breath.    HPI:  78-year-old male presents to the hospital by ambulance from home.  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 Pt does not go to doctor on regualar basis per son and does phone visits.   (11 Oct 2023 07:02)      PAST MEDICAL & SURGICAL HISTORY:  Diabetes      Benign essential HTN      Pacemaker      History of BPH      Diabetic foot ulcers          MEDICATIONS  (STANDING):  aspirin enteric coated 81 milliGRAM(s) Oral daily  atorvastatin 80 milliGRAM(s) Oral at bedtime  bethanechol 25 milliGRAM(s) Oral two times a day  cefepime   IVPB 2000 milliGRAM(s) IV Intermittent every 8 hours  dextrose 5%. 1000 milliLiter(s) (50 mL/Hr) IV Continuous <Continuous>  dextrose 5%. 1000 milliLiter(s) (100 mL/Hr) IV Continuous <Continuous>  dextrose 50% Injectable 25 Gram(s) IV Push once  dextrose 50% Injectable 12.5 Gram(s) IV Push once  dextrose 50% Injectable 25 Gram(s) IV Push once  dronabinol 2.5 milliGRAM(s) Oral two times a day  gabapentin 300 milliGRAM(s) Oral two times a day  glucagon  Injectable 1 milliGRAM(s) IntraMuscular once  insulin glargine Injectable (LANTUS) 18 Unit(s) SubCutaneous at bedtime  insulin lispro (ADMELOG) corrective regimen sliding scale   SubCutaneous three times a day before meals  insulin lispro (ADMELOG) corrective regimen sliding scale   SubCutaneous at bedtime  insulin lispro Injectable (ADMELOG) 3 Unit(s) SubCutaneous three times a day before meals  lactated ringers. 1000 milliLiter(s) (100 mL/Hr) IV Continuous <Continuous>  lactated ringers. 1000 milliLiter(s) (75 mL/Hr) IV Continuous <Continuous>  lactobacillus acidophilus 1 Tablet(s) Oral two times a day with meals  midodrine. 10 milliGRAM(s) Oral three times a day  pantoprazole    Tablet 40 milliGRAM(s) Oral before breakfast  tamsulosin 0.4 milliGRAM(s) Oral at bedtime  vancomycin  IVPB 1500 milliGRAM(s) IV Intermittent every 24 hours    MEDICATIONS  (PRN):  acetaminophen     Tablet .. 650 milliGRAM(s) Oral every 6 hours PRN Temp greater or equal to 38C (100.4F), Moderate Pain (4 - 6)  dextrose Oral Gel 15 Gram(s) Oral once PRN Blood Glucose LESS THAN 70 milliGRAM(s)/deciliter  loperamide 2 milliGRAM(s) Oral three times a day PRN Diarrhea      Allergies    No Known Allergies    Intolerances        REVIEW OF SYSTEMS:    All other review of systems is negative unless indicated above    Vital Signs Last 24 Hrs  T(C): 37.1 (15 Oct 2023 04:50), Max: 37.3 (14 Oct 2023 17:12)  T(F): 98.7 (15 Oct 2023 04:50), Max: 99.1 (14 Oct 2023 17:12)  HR: 59 (15 Oct 2023 04:50) (59 - 60)  BP: 109/48 (15 Oct 2023 04:50) (109/48 - 115/56)  BP(mean): --  RR: 18 (15 Oct 2023 04:50) (18 - 18)  SpO2: 94% (15 Oct 2023 04:50) (94% - 97%)    Parameters below as of 15 Oct 2023 04:50  Patient On (Oxygen Delivery Method): room air        I&O's Summary    14 Oct 2023 07:01  -  15 Oct 2023 07:00  --------------------------------------------------------  IN: 1500 mL / OUT: 2250 mL / NET: -750 mL        PHYSICAL EXAM:    Constitutional: NAD, awake and alert, well-developed  Eyes:  EOMI,  Pupils round, No oral cyanosis.  HEENT: No exudate or erythema  Pulmonary: Non-labored, breath sounds are clear bilaterally, No wheezing, rales or rhonchi  Cardiovascular: Regular, S1 and S2, No murmurs  Gastrointestinal: Bowel Sounds present, soft, nontender.   Ext: No significant LE edema  Neurological: Alert, no gross focal motor deficits  Skin: No rashes.  Psych:  Mood & affect appropriate    LABS: All Labs Reviewed:                        8.8    5.23  )-----------( 236      ( 14 Oct 2023 09:45 )             28.1                         8.4    4.58  )-----------( 224      ( 13 Oct 2023 08:18 )             26.5     14 Oct 2023 09:45    141    |  114    |  32     ----------------------------<  237    4.0     |  22     |  0.88   13 Oct 2023 08:18    138    |  113    |  47     ----------------------------<  138    3.8     |  19     |  1.00     Ca    8.2        14 Oct 2023 09:45  Ca    8.1        13 Oct 2023 08:18            Blood Culture: Organism --  Gram Stain Blood -- Gram Stain --  Specimen Source .Blood Blood-Peripheral  Culture-Blood --    Organism --  Gram Stain Blood -- Gram Stain --  Specimen Source .Blood Blood-Venous  Culture-Blood --    Organism Methicillin resistant Staphylococcus aureus  Gram Stain Blood -- Gram Stain --  Specimen Source Wound Wound  Culture-Blood --    Organism Pseudomonas aeruginosa  Gram Stain Blood -- Gram Stain --  Specimen Source Catheterized Catheterized  Culture-Blood --    Organism Blood Culture PCR  Gram Stain Blood -- Gram Stain   Growth in aerobic bottle: Gram Positive Cocci in Clusters  Growth in anaerobic bottle: Gram Positive Cocci in Clusters  Specimen Source .Blood Blood-Peripheral  Culture-Blood --            RADIOLOGY/EKG:    Attending Attestation:   50 minutes spent on total encounter; more than 50% of the visit was spent counseling and/or coordinating care by the attending physician.     ASSESSMENT AND PLAN [facility] Card[facility] Progress Note [phone] (Dr. Guillen, Dayan, Halie, Tessa)    CHIEF COMPLAINT: Patient is a 78y old  Male who presents with a chief complaint of fever and weakness (14 Oct 2023 16:33)      Follow Up Today: The patient denies any chest discomfort or shortness of breath.    HPI:  78-year-old male presents to the hospital by ambulance from home.  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 Pt does not go to doctor on regualar basis per son and does phone visits.   (11 Oct 2023 07:02)      PAST MEDICAL & SURGICAL HISTORY:  Diabetes      Benign essential HTN      Pacemaker      History of BPH      Diabetic foot ulcers          MEDICATIONS  (STANDING):  aspirin enteric coated 81 milliGRAM(s) Oral daily  atorvastatin 80 milliGRAM(s) Oral at bedtime  bethanechol 25 milliGRAM(s) Oral two times a day  cefepime   IVPB 2000 milliGRAM(s) IV Intermittent every 8 hours  dextrose 5%. 1000 milliLiter(s) (50 mL/Hr) IV Continuous <Continuous>  dextrose 5%. 1000 milliLiter(s) (100 mL/Hr) IV Continuous <Continuous>  dextrose 50% Injectable 25 Gram(s) IV Push once  dextrose 50% Injectable 12.5 Gram(s) IV Push once  dextrose 50% Injectable 25 Gram(s) IV Push once  dronabinol 2.5 milliGRAM(s) Oral two times a day  gabapentin 300 milliGRAM(s) Oral two times a day  glucagon  Injectable 1 milliGRAM(s) IntraMuscular once  insulin glargine Injectable (LANTUS) 18 Unit(s) SubCutaneous at bedtime  insulin lispro (ADMELOG) corrective regimen sliding scale   SubCutaneous three times a day before meals  insulin lispro (ADMELOG) corrective regimen sliding scale   SubCutaneous at bedtime  insulin lispro Injectable (ADMELOG) 3 Unit(s) SubCutaneous three times a day before meals  lactated ringers. 1000 milliLiter(s) (100 mL/Hr) IV Continuous <Continuous>  lactated ringers. 1000 milliLiter(s) (75 mL/Hr) IV Continuous <Continuous>  lactobacillus acidophilus 1 Tablet(s) Oral two times a day with meals  midodrine. 10 milliGRAM(s) Oral three times a day  pantoprazole    Tablet 40 milliGRAM(s) Oral before breakfast  tamsulosin 0.4 milliGRAM(s) Oral at bedtime  vancomycin  IVPB 1500 milliGRAM(s) IV Intermittent every 24 hours    MEDICATIONS  (PRN):  acetaminophen     Tablet .. 650 milliGRAM(s) Oral every 6 hours PRN Temp greater or equal to 38C (100.4F), Moderate Pain (4 - 6)  dextrose Oral Gel 15 Gram(s) Oral once PRN Blood Glucose LESS THAN 70 milliGRAM(s)/deciliter  loperamide 2 milliGRAM(s) Oral three times a day PRN Diarrhea      Allergies    No Known Allergies    Intolerances        REVIEW OF SYSTEMS:    All other review of systems is negative unless indicated above    Vital Signs Last 24 Hrs  T(C): 37.1 (15 Oct 2023 04:50), Max: 37.3 (14 Oct 2023 17:12)  T(F): 98.7 (15 Oct 2023 04:50), Max: 99.1 (14 Oct 2023 17:12)  HR: 59 (15 Oct 2023 04:50) (59 - 60)  BP: 109/48 (15 Oct 2023 04:50) (109/48 - 115/56)  BP(mean): --  RR: 18 (15 Oct 2023 04:50) (18 - 18)  SpO2: 94% (15 Oct 2023 04:50) (94% - 97%)    Parameters below as of 15 Oct 2023 04:50  Patient On (Oxygen Delivery Method): room air        I&O's Summary    14 Oct 2023 07:01  -  15 Oct 2023 07:00  --------------------------------------------------------  IN: 1500 mL / OUT: 2250 mL / NET: -750 mL        PHYSICAL EXAM:    Constitutional: NAD, awake and alert, well-developed  Eyes:  EOMI,  Pupils round, No oral cyanosis.  HEENT: No exudate or erythema  Pulmonary: Non-labored, breath sounds are clear bilaterally, No wheezing, rales or rhonchi  Cardiovascular: Regular, S1 and S2, No murmurs  Gastrointestinal: Bowel Sounds present, soft, nontender.   Ext: No significant LE edema  Neurological: Alert, no gross focal motor deficits  Skin: No rashes.  Psych:  Mood & affect appropriate    LABS: All Labs Reviewed:                        8.8    5.23  )-----------( 236      ( 14 Oct 2023 09:45 )             28.1                         8.4    4.58  )-----------( 224      ( 13 Oct 2023 08:18 )             26.5     14 Oct 2023 09:45    141    |  114    |  32     ----------------------------<  237    4.0     |  22     |  0.88   13 Oct 2023 08:18    138    |  113    |  47     ----------------------------<  138    3.8     |  19     |  1.00     Ca    8.2        14 Oct 2023 09:45  Ca    8.1        13 Oct 2023 08:18            Blood Culture: Organism --  Gram Stain Blood -- Gram Stain --  Specimen Source .Blood Blood-Peripheral  Culture-Blood --    Organism --  Gram Stain Blood -- Gram Stain --  Specimen Source .Blood Blood-Venous  Culture-Blood --    Organism Methicillin resistant Staphylococcus aureus  Gram Stain Blood -- Gram Stain --  Specimen Source Wound Wound  Culture-Blood --    Organism Pseudomonas aeruginosa  Gram Stain Blood -- Gram Stain --  Specimen Source Catheterized Catheterized  Culture-Blood --    Organism Blood Culture PCR  Gram Stain Blood -- Gram Stain   Growth in aerobic bottle: Gram Positive Cocci in Clusters  Growth in anaerobic bottle: Gram Positive Cocci in Clusters  Specimen Source .Blood Blood-Peripheral  Culture-Blood --            RADIOLOGY/EKG:    Attending Attestation:   50 minutes spent on total encounter; more than 50% of the visit was spent counseling and/or coordinating care by the attending physician.     ASSESSMENT AND PLAN [facility] Cardiology Progress Note [phone] (Dr. Guillen, Dayan, Halie, Tessa)    CHIEF COMPLAINT: Patient is a 78y old  Male who presents with a chief complaint of fever and weakness (14 Oct 2023 16:33)      Follow Up Today: The patient denies any chest discomfort or shortness of breath.    HPI:  78-year-old male presents to the hospital by ambulance from home.  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 Pt does not go to doctor on regualar basis per son and does phone visits.   (11 Oct 2023 07:02)      PAST MEDICAL & SURGICAL HISTORY:  Diabetes      Benign essential HTN      Pacemaker      History of BPH      Diabetic foot ulcers          MEDICATIONS  (STANDING):  aspirin enteric coated 81 milliGRAM(s) Oral daily  atorvastatin 80 milliGRAM(s) Oral at bedtime  bethanechol 25 milliGRAM(s) Oral two times a day  cefepime   IVPB 2000 milliGRAM(s) IV Intermittent every 8 hours  dextrose 5%. 1000 milliLiter(s) (50 mL/Hr) IV Continuous <Continuous>  dextrose 5%. 1000 milliLiter(s) (100 mL/Hr) IV Continuous <Continuous>  dextrose 50% Injectable 25 Gram(s) IV Push once  dextrose 50% Injectable 12.5 Gram(s) IV Push once  dextrose 50% Injectable 25 Gram(s) IV Push once  dronabinol 2.5 milliGRAM(s) Oral two times a day  gabapentin 300 milliGRAM(s) Oral two times a day  glucagon  Injectable 1 milliGRAM(s) IntraMuscular once  insulin glargine Injectable (LANTUS) 18 Unit(s) SubCutaneous at bedtime  insulin lispro (ADMELOG) corrective regimen sliding scale   SubCutaneous three times a day before meals  insulin lispro (ADMELOG) corrective regimen sliding scale   SubCutaneous at bedtime  insulin lispro Injectable (ADMELOG) 3 Unit(s) SubCutaneous three times a day before meals  lactated ringers. 1000 milliLiter(s) (100 mL/Hr) IV Continuous <Continuous>  lactated ringers. 1000 milliLiter(s) (75 mL/Hr) IV Continuous <Continuous>  lactobacillus acidophilus 1 Tablet(s) Oral two times a day with meals  midodrine. 10 milliGRAM(s) Oral three times a day  pantoprazole    Tablet 40 milliGRAM(s) Oral before breakfast  tamsulosin 0.4 milliGRAM(s) Oral at bedtime  vancomycin  IVPB 1500 milliGRAM(s) IV Intermittent every 24 hours    MEDICATIONS  (PRN):  acetaminophen     Tablet .. 650 milliGRAM(s) Oral every 6 hours PRN Temp greater or equal to 38C (100.4F), Moderate Pain (4 - 6)  dextrose Oral Gel 15 Gram(s) Oral once PRN Blood Glucose LESS THAN 70 milliGRAM(s)/deciliter  loperamide 2 milliGRAM(s) Oral three times a day PRN Diarrhea      Allergies    No Known Allergies    Intolerances        REVIEW OF SYSTEMS:    All other review of systems is negative unless indicated above    Vital Signs Last 24 Hrs  T(C): 37.1 (15 Oct 2023 04:50), Max: 37.3 (14 Oct 2023 17:12)  T(F): 98.7 (15 Oct 2023 04:50), Max: 99.1 (14 Oct 2023 17:12)  HR: 59 (15 Oct 2023 04:50) (59 - 60)  BP: 109/48 (15 Oct 2023 04:50) (109/48 - 115/56)  BP(mean): --  RR: 18 (15 Oct 2023 04:50) (18 - 18)  SpO2: 94% (15 Oct 2023 04:50) (94% - 97%)    Parameters below as of 15 Oct 2023 04:50  Patient On (Oxygen Delivery Method): room air        I&O's Summary    14 Oct 2023 07:01  -  15 Oct 2023 07:00  --------------------------------------------------------  IN: 1500 mL / OUT: 2250 mL / NET: -750 mL        PHYSICAL EXAM:    Constitutional: NAD, awake and alert, well-developed  Eyes:  EOMI,  Pupils round, No oral cyanosis.  HEENT: No exudate or erythema  Pulmonary: Non-labored, breath sounds are clear bilaterally, No wheezing, rales or rhonchi  Cardiovascular: Regular, S1 and S2, No murmurs  Gastrointestinal: Bowel Sounds present, soft, nontender.   Ext: No significant LE edema  Neurological: Alert, no gross focal motor deficits  Skin: No rashes.  Psych:  Mood & affect appropriate    LABS: All Labs Reviewed:                        8.8    5.23  )-----------( 236      ( 14 Oct 2023 09:45 )             28.1                         8.4    4.58  )-----------( 224      ( 13 Oct 2023 08:18 )             26.5     14 Oct 2023 09:45    141    |  114    |  32     ----------------------------<  237    4.0     |  22     |  0.88   13 Oct 2023 08:18    138    |  113    |  47     ----------------------------<  138    3.8     |  19     |  1.00     Ca    8.2        14 Oct 2023 09:45  Ca    8.1        13 Oct 2023 08:18            Blood Culture: Organism --  Gram Stain Blood -- Gram Stain --  Specimen Source .Blood Blood-Peripheral  Culture-Blood --    Organism --  Gram Stain Blood -- Gram Stain --  Specimen Source .Blood Blood-Venous  Culture-Blood --    Organism Methicillin resistant Staphylococcus aureus  Gram Stain Blood -- Gram Stain --  Specimen Source Wound Wound  Culture-Blood --    Organism Pseudomonas aeruginosa  Gram Stain Blood -- Gram Stain --  Specimen Source Catheterized Catheterized  Culture-Blood --    Organism Blood Culture PCR  Gram Stain Blood -- Gram Stain   Growth in aerobic bottle: Gram Positive Cocci in Clusters  Growth in anaerobic bottle: Gram Positive Cocci in Clusters  Specimen Source .Blood Blood-Peripheral  Culture-Blood --            RADIOLOGY/EKG:    Attending Attestation:   50 minutes spent on total encounter; more than 50% of the visit was spent counseling and/or coordinating care by the attending physician.     ASSESSMENT AND PLAN [facility] Cardiology Progress Note [phone] (Dr. Guillen, Dayan, Halie, Tessa)    CHIEF COMPLAINT: Patient is a 78y old  Male who presents with a chief complaint of fever and weakness (14 Oct 2023 16:33)      Follow Up Today: The patient denies any chest discomfort or shortness of breath.    HPI:  78-year-old male presents to the hospital by ambulance from home.  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 Pt does not go to doctor on regular basis per son and does phone visits.   (11 Oct 2023 07:02)      PAST MEDICAL & SURGICAL HISTORY:  Diabetes      Benign essential HTN      Pacemaker      History of BPH      Diabetic foot ulcers          MEDICATIONS  (STANDING):  aspirin enteric coated 81 milliGRAM(s) Oral daily  atorvastatin 80 milliGRAM(s) Oral at bedtime  bethanechol 25 milliGRAM(s) Oral two times a day  cefepime   IVPB 2000 milliGRAM(s) IV Intermittent every 8 hours  dextrose 5%. 1000 milliLiter(s) (50 mL/Hr) IV Continuous <Continuous>  dextrose 5%. 1000 milliLiter(s) (100 mL/Hr) IV Continuous <Continuous>  dextrose 50% Injectable 25 Gram(s) IV Push once  dextrose 50% Injectable 12.5 Gram(s) IV Push once  dextrose 50% Injectable 25 Gram(s) IV Push once  dronabinol 2.5 milliGRAM(s) Oral two times a day  gabapentin 300 milliGRAM(s) Oral two times a day  glucagon  Injectable 1 milliGRAM(s) IntraMuscular once  insulin glargine Injectable (LANTUS) 18 Unit(s) SubCutaneous at bedtime  insulin lispro (ADMELOG) corrective regimen sliding scale   SubCutaneous three times a day before meals  insulin lispro (ADMELOG) corrective regimen sliding scale   SubCutaneous at bedtime  insulin lispro Injectable (ADMELOG) 3 Unit(s) SubCutaneous three times a day before meals  lactated ringers. 1000 milliLiter(s) (100 mL/Hr) IV Continuous <Continuous>  lactated ringers. 1000 milliLiter(s) (75 mL/Hr) IV Continuous <Continuous>  lactobacillus acidophilus 1 Tablet(s) Oral two times a day with meals  midodrine. 10 milliGRAM(s) Oral three times a day  pantoprazole    Tablet 40 milliGRAM(s) Oral before breakfast  tamsulosin 0.4 milliGRAM(s) Oral at bedtime  vancomycin  IVPB 1500 milliGRAM(s) IV Intermittent every 24 hours    MEDICATIONS  (PRN):  acetaminophen     Tablet .. 650 milliGRAM(s) Oral every 6 hours PRN Temp greater or equal to 38C (100.4F), Moderate Pain (4 - 6)  dextrose Oral Gel 15 Gram(s) Oral once PRN Blood Glucose LESS THAN 70 milliGRAM(s)/deciliter  loperamide 2 milliGRAM(s) Oral three times a day PRN Diarrhea      Allergies    No Known Allergies    Intolerances        REVIEW OF SYSTEMS:    All other review of systems is negative unless indicated above    Vital Signs Last 24 Hrs  T(C): 37.1 (15 Oct 2023 04:50), Max: 37.3 (14 Oct 2023 17:12)  T(F): 98.7 (15 Oct 2023 04:50), Max: 99.1 (14 Oct 2023 17:12)  HR: 59 (15 Oct 2023 04:50) (59 - 60)  BP: 109/48 (15 Oct 2023 04:50) (109/48 - 115/56)  BP(mean): --  RR: 18 (15 Oct 2023 04:50) (18 - 18)  SpO2: 94% (15 Oct 2023 04:50) (94% - 97%)    Parameters below as of 15 Oct 2023 04:50  Patient On (Oxygen Delivery Method): room air        I&O's Summary    14 Oct 2023 07:01  -  15 Oct 2023 07:00  --------------------------------------------------------  IN: 1500 mL / OUT: 2250 mL / NET: -750 mL        PHYSICAL EXAM:    Constitutional: NAD, awake and alert, well-developed  Eyes:  EOMI,  Pupils round, No oral cyanosis.  HEENT: No exudate or erythema  Pulmonary: Non-labored, breath sounds are clear bilaterally, No wheezing, rales or rhonchi  Cardiovascular: Regular, S1 and S2, No murmurs  Gastrointestinal: Bowel Sounds present, soft, nontender.   Ext: No significant LE edema  Neurological: Alert, no gross focal motor deficits  Skin: No rashes.  Psych:  Mood & affect appropriate    LABS: All Labs Reviewed:                        8.8    5.23  )-----------( 236      ( 14 Oct 2023 09:45 )             28.1                         8.4    4.58  )-----------( 224      ( 13 Oct 2023 08:18 )             26.5     14 Oct 2023 09:45    141    |  114    |  32     ----------------------------<  237    4.0     |  22     |  0.88   13 Oct 2023 08:18    138    |  113    |  47     ----------------------------<  138    3.8     |  19     |  1.00     Ca    8.2        14 Oct 2023 09:45  Ca    8.1        13 Oct 2023 08:18            Blood Culture: Organism --  Gram Stain Blood -- Gram Stain --  Specimen Source .Blood Blood-Peripheral  Culture-Blood --    Organism --  Gram Stain Blood -- Gram Stain --  Specimen Source .Blood Blood-Venous  Culture-Blood --    Organism Methicillin resistant Staphylococcus aureus  Gram Stain Blood -- Gram Stain --  Specimen Source Wound Wound  Culture-Blood --    Organism Pseudomonas aeruginosa  Gram Stain Blood -- Gram Stain --  Specimen Source Catheterized Catheterized  Culture-Blood --    Organism Blood Culture PCR  Gram Stain Blood -- Gram Stain   Growth in aerobic bottle: Gram Positive Cocci in Clusters  Growth in anaerobic bottle: Gram Positive Cocci in Clusters  Specimen Source .Blood Blood-Peripheral  Culture-Blood --            RADIOLOGY/EKG:    Attending Attestation:   50 minutes spent on total encounter; more than 50% of the visit was spent counseling and/or coordinating care by the attending physician.     ASSESSMENT AND PLAN [facility] Cardiology Progress Note [phone] (Dr. Guillen, Dayan, Halie, Tessa)    CHIEF COMPLAINT: Patient is a 78y old  Male who presents with a chief complaint of fever and weakness (14 Oct 2023 16:33)      Follow Up Today: The patient denies any chest discomfort or shortness of breath.    HPI:  78-year-old male presents to the hospital by ambulance from home.  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 Pt does not go to doctor on regular basis per son and does phone visits.   (11 Oct 2023 07:02)      PAST MEDICAL & SURGICAL HISTORY:  Diabetes      Benign essential HTN      Pacemaker      History of BPH      Diabetic foot ulcers          MEDICATIONS  (STANDING):  aspirin enteric coated 81 milliGRAM(s) Oral daily  atorvastatin 80 milliGRAM(s) Oral at bedtime  bethanechol 25 milliGRAM(s) Oral two times a day  cefepime   IVPB 2000 milliGRAM(s) IV Intermittent every 8 hours  dextrose 5%. 1000 milliLiter(s) (50 mL/Hr) IV Continuous <Continuous>  dextrose 5%. 1000 milliLiter(s) (100 mL/Hr) IV Continuous <Continuous>  dextrose 50% Injectable 25 Gram(s) IV Push once  dextrose 50% Injectable 12.5 Gram(s) IV Push once  dextrose 50% Injectable 25 Gram(s) IV Push once  dronabinol 2.5 milliGRAM(s) Oral two times a day  gabapentin 300 milliGRAM(s) Oral two times a day  glucagon  Injectable 1 milliGRAM(s) IntraMuscular once  insulin glargine Injectable (LANTUS) 18 Unit(s) SubCutaneous at bedtime  insulin lispro (ADMELOG) corrective regimen sliding scale   SubCutaneous three times a day before meals  insulin lispro (ADMELOG) corrective regimen sliding scale   SubCutaneous at bedtime  insulin lispro Injectable (ADMELOG) 3 Unit(s) SubCutaneous three times a day before meals  lactated ringers. 1000 milliLiter(s) (100 mL/Hr) IV Continuous <Continuous>  lactated ringers. 1000 milliLiter(s) (75 mL/Hr) IV Continuous <Continuous>  lactobacillus acidophilus 1 Tablet(s) Oral two times a day with meals  midodrine. 10 milliGRAM(s) Oral three times a day  pantoprazole    Tablet 40 milliGRAM(s) Oral before breakfast  tamsulosin 0.4 milliGRAM(s) Oral at bedtime  vancomycin  IVPB 1500 milliGRAM(s) IV Intermittent every 24 hours    MEDICATIONS  (PRN):  acetaminophen     Tablet .. 650 milliGRAM(s) Oral every 6 hours PRN Temp greater or equal to 38C (100.4F), Moderate Pain (4 - 6)  dextrose Oral Gel 15 Gram(s) Oral once PRN Blood Glucose LESS THAN 70 milliGRAM(s)/deciliter  loperamide 2 milliGRAM(s) Oral three times a day PRN Diarrhea      Allergies    No Known Allergies    Intolerances        REVIEW OF SYSTEMS:    All other review of systems is negative unless indicated above    Vital Signs Last 24 Hrs  T(C): 37.1 (15 Oct 2023 04:50), Max: 37.3 (14 Oct 2023 17:12)  T(F): 98.7 (15 Oct 2023 04:50), Max: 99.1 (14 Oct 2023 17:12)  HR: 59 (15 Oct 2023 04:50) (59 - 60)  BP: 109/48 (15 Oct 2023 04:50) (109/48 - 115/56)  BP(mean): --  RR: 18 (15 Oct 2023 04:50) (18 - 18)  SpO2: 94% (15 Oct 2023 04:50) (94% - 97%)    Parameters below as of 15 Oct 2023 04:50  Patient On (Oxygen Delivery Method): room air        I&O's Summary    14 Oct 2023 07:01  -  15 Oct 2023 07:00  --------------------------------------------------------  IN: 1500 mL / OUT: 2250 mL / NET: -750 mL        PHYSICAL EXAM:    Constitutional: NAD, awake and alert, well-developed  Eyes:  EOMI,  Pupils round, No oral cyanosis.  HEENT: No exudate or erythema  Pulmonary: Non-labored, breath sounds are clear bilaterally, No wheezing, rales or rhonchi  Cardiovascular: Regular, S1 and S2, No murmurs  Gastrointestinal: Bowel Sounds present, soft, nontender.   Ext: No significant LE edema  Neurological: Alert, no gross focal motor deficits  Skin: No rashes.  Psych:  Mood & affect appropriate    LABS: All Labs Reviewed:                        8.8    5.23  )-----------( 236      ( 14 Oct 2023 09:45 )             28.1                         8.4    4.58  )-----------( 224      ( 13 Oct 2023 08:18 )             26.5     14 Oct 2023 09:45    141    |  114    |  32     ----------------------------<  237    4.0     |  22     |  0.88   13 Oct 2023 08:18    138    |  113    |  47     ----------------------------<  138    3.8     |  19     |  1.00     Ca    8.2        14 Oct 2023 09:45  Ca    8.1        13 Oct 2023 08:18            Blood Culture: Organism --  Gram Stain Blood -- Gram Stain --  Specimen Source .Blood Blood-Peripheral  Culture-Blood --    Organism --  Gram Stain Blood -- Gram Stain --  Specimen Source .Blood Blood-Venous  Culture-Blood --    Organism Methicillin resistant Staphylococcus aureus  Gram Stain Blood -- Gram Stain --  Specimen Source Wound Wound  Culture-Blood --    Organism Pseudomonas aeruginosa  Gram Stain Blood -- Gram Stain --  Specimen Source Catheterized Catheterized  Culture-Blood --    Organism Blood Culture PCR  Gram Stain Blood -- Gram Stain   Growth in aerobic bottle: Gram Positive Cocci in Clusters  Growth in anaerobic bottle: Gram Positive Cocci in Clusters  Specimen Source .Blood Blood-Peripheral  Culture-Blood --            RADIOLOGY/EKG:    Attending Attestation:   50 minutes spent on total encounter; more than 50% of the visit was spent counseling and/or coordinating care by the attending physician.     ASSESSMENT AND PLAN [facility] Cardiology Progress Note [phone] (Dr. Guillen, Dayan, Halie, Tessa)    CHIEF COMPLAINT: Patient is a 78y old  Male who presents with a chief complaint of fever and weakness (14 Oct 2023 16:33)      Follow Up Today: The patient denies any chest discomfort or shortness of breath.    HPI:  78-year-old male presents to the hospital by ambulance from home.  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 Pt does not go to doctor on regular basis per son and does phone visits.   (11 Oct 2023 07:02)      PAST MEDICAL & SURGICAL HISTORY:  Diabetes      Benign essential HTN      Pacemaker      History of BPH      Diabetic foot ulcers          MEDICATIONS  (STANDING):  aspirin enteric coated 81 milliGRAM(s) Oral daily  atorvastatin 80 milliGRAM(s) Oral at bedtime  bethanechol 25 milliGRAM(s) Oral two times a day  cefepime   IVPB 2000 milliGRAM(s) IV Intermittent every 8 hours  dextrose 5%. 1000 milliLiter(s) (50 mL/Hr) IV Continuous <Continuous>  dextrose 5%. 1000 milliLiter(s) (100 mL/Hr) IV Continuous <Continuous>  dextrose 50% Injectable 25 Gram(s) IV Push once  dextrose 50% Injectable 12.5 Gram(s) IV Push once  dextrose 50% Injectable 25 Gram(s) IV Push once  dronabinol 2.5 milliGRAM(s) Oral two times a day  gabapentin 300 milliGRAM(s) Oral two times a day  glucagon  Injectable 1 milliGRAM(s) IntraMuscular once  insulin glargine Injectable (LANTUS) 18 Unit(s) SubCutaneous at bedtime  insulin lispro (ADMELOG) corrective regimen sliding scale   SubCutaneous three times a day before meals  insulin lispro (ADMELOG) corrective regimen sliding scale   SubCutaneous at bedtime  insulin lispro Injectable (ADMELOG) 3 Unit(s) SubCutaneous three times a day before meals  lactated ringers. 1000 milliLiter(s) (100 mL/Hr) IV Continuous <Continuous>  lactated ringers. 1000 milliLiter(s) (75 mL/Hr) IV Continuous <Continuous>  lactobacillus acidophilus 1 Tablet(s) Oral two times a day with meals  midodrine. 10 milliGRAM(s) Oral three times a day  pantoprazole    Tablet 40 milliGRAM(s) Oral before breakfast  tamsulosin 0.4 milliGRAM(s) Oral at bedtime  vancomycin  IVPB 1500 milliGRAM(s) IV Intermittent every 24 hours    MEDICATIONS  (PRN):  acetaminophen     Tablet .. 650 milliGRAM(s) Oral every 6 hours PRN Temp greater or equal to 38C (100.4F), Moderate Pain (4 - 6)  dextrose Oral Gel 15 Gram(s) Oral once PRN Blood Glucose LESS THAN 70 milliGRAM(s)/deciliter  loperamide 2 milliGRAM(s) Oral three times a day PRN Diarrhea      Allergies    No Known Allergies    Intolerances        REVIEW OF SYSTEMS:    All other review of systems is negative unless indicated above    Vital Signs Last 24 Hrs  T(C): 37.1 (15 Oct 2023 04:50), Max: 37.3 (14 Oct 2023 17:12)  T(F): 98.7 (15 Oct 2023 04:50), Max: 99.1 (14 Oct 2023 17:12)  HR: 59 (15 Oct 2023 04:50) (59 - 60)  BP: 109/48 (15 Oct 2023 04:50) (109/48 - 115/56)  BP(mean): --  RR: 18 (15 Oct 2023 04:50) (18 - 18)  SpO2: 94% (15 Oct 2023 04:50) (94% - 97%)    Parameters below as of 15 Oct 2023 04:50  Patient On (Oxygen Delivery Method): room air        I&O's Summary    14 Oct 2023 07:01  -  15 Oct 2023 07:00  --------------------------------------------------------  IN: 1500 mL / OUT: 2250 mL / NET: -750 mL        PHYSICAL EXAM:    Constitutional: NAD, awake and alert, well-developed  Eyes:  EOMI,  Pupils round, No oral cyanosis.  HEENT: No exudate or erythema  Pulmonary: Non-labored, breath sounds are clear bilaterally, No wheezing, rales or rhonchi  Cardiovascular: Regular, S1 and S2, No murmurs  Gastrointestinal: Bowel Sounds present, soft, nontender.   Ext: No significant LE edema  Neurological: Alert, no gross focal motor deficits  Skin: No rashes.  Psych:  Mood & affect appropriate    LABS: All Labs Reviewed:                        8.8    5.23  )-----------( 236      ( 14 Oct 2023 09:45 )             28.1                         8.4    4.58  )-----------( 224      ( 13 Oct 2023 08:18 )             26.5     14 Oct 2023 09:45    141    |  114    |  32     ----------------------------<  237    4.0     |  22     |  0.88   13 Oct 2023 08:18    138    |  113    |  47     ----------------------------<  138    3.8     |  19     |  1.00     Ca    8.2        14 Oct 2023 09:45  Ca    8.1        13 Oct 2023 08:18            Blood Culture: Organism --  Gram Stain Blood -- Gram Stain --  Specimen Source .Blood Blood-Peripheral  Culture-Blood --    Organism --  Gram Stain Blood -- Gram Stain --  Specimen Source .Blood Blood-Venous  Culture-Blood --    Organism Methicillin resistant Staphylococcus aureus  Gram Stain Blood -- Gram Stain --  Specimen Source Wound Wound  Culture-Blood --    Organism Pseudomonas aeruginosa  Gram Stain Blood -- Gram Stain --  Specimen Source Catheterized Catheterized  Culture-Blood --    Organism Blood Culture PCR  Gram Stain Blood -- Gram Stain   Growth in aerobic bottle: Gram Positive Cocci in Clusters  Growth in anaerobic bottle: Gram Positive Cocci in Clusters  Specimen Source .Blood Blood-Peripheral  Culture-Blood --            RADIOLOGY/EKG:    Attending Attestation:   50 minutes spent on total encounter; more than 50% of the visit was spent counseling and/or coordinating care by the attending physician.     ASSESSMENT AND PLAN

## 2023-10-15 NOTE — PROGRESS NOTE ADULT - SUBJECTIVE AND OBJECTIVE BOX
[facility] DISE[facility]ES  GLYNN Oakley Y. Patel, S. Shah, G. Finesse  [phone]  ([phone] - weekdays after 5pm and weekends)    Name: HEMA LAZCANO  Age/Gender: 78y Male  MRN: [id number]    Interval History:  Patient seen and examined.  No new complaints noted.  Notes reviewed  No concerning overnight events  Afebrile over past 24h   Allergies: No Known Allergies      Objective:  Vitals:   T(F): 98.1 (10-15-23 @ 12:20), Max: 98.7 (10-15-23 @ 04:50)  HR: 60 (10-15-23 @ 12:20) (59 - 60)  BP: 109/45 (10-15-23 @ 12:20) (109/45 - 113/57)  RR: 18 (10-15-23 @ 12:20) (18 - 18)  SpO2: 95% (10-15-23 @ 12:20) (94% - 97%)  Physical Examination:  General: no acute distress  HEENT: NC/AT, anicteric, neck supple  Respiratory: decreased breath sounds b/l  Cardiovascular: S1 and S2 present, normal rate   Gastrointestinal: soft, nontender, nondistended  Extremities: b/l foot dressings, +edema  Skin: no visible rash    Laboratory Studies:  CBC:                       8.0    6.43  )-----------( 252      ( 15 Oct 2023 08:00 )             25.7     WBC Trend:  6.43 10-15-23 @ 08:00  5.23 10-14-23 @ 09:45  4.58 10-13-23 @ 08:18  7.26 10-12-23 @ 05:30  9.99 10-11-23 @ 04:09  11.08 10-10-23 @ 18:25    CMP: 10-15    141  |  113<H>  |  25<H>  ----------------------------<  195<H>  4.2   |  22  |  0.82    Ca    8.1<L>      15 Oct 2023 08:00      Creatinine: 0.82 mg/dL (10-15-23 @ 08:00)  Creatinine: 0.88 mg/dL (10-14-23 @ 09:45)  Creatinine: 1.00 mg/dL (10-13-23 @ 08:18)  Creatinine: 1.10 mg/dL (10-12-23 @ 05:30)  Creatinine: 1.20 mg/dL (10-11-23 @ 04:09)  Creatinine: 1.20 mg/dL (10-10-23 @ 18:25)        Vancomycin Level, Trough: 6.4 ug/mL (10-12-23 @ 22:20)    Microbiology: reviewed   Culture - Blood (collected 10-12-23 @ 05:36)  Source: .Blood Blood-Peripheral  Preliminary Report (10-15-23 @ 10:00):    No growth at 72 Hours    Culture - Blood (collected 10-12-23 @ 05:30)  Source: .Blood Blood-Venous  Preliminary Report (10-15-23 @ 10:01):    No growth at 72 Hours    Culture - Other (collected 10-11-23 @ 13:13)  Source: Wound Wound  Final Report (10-13-23 @ 17:21):    Numerous Methicillin Resistant Staphylococcus aureus  Organism: Methicillin resistant Staphylococcus aureus (10-13-23 @ 17:21)  Organism: Methicillin resistant Staphylococcus aureus (10-13-23 @ 17:21)      Method Type: KARTHIK      -  Ampicillin/Sulbactam: R <=8/4      -  Cefazolin: R <=4      -  Clindamycin: S <=0.25      -  Daptomycin: S 1      -  Erythromycin: R >4      -  Gentamicin: S 2 Should not be used as monotherapy      -  Linezolid: S 4      -  Oxacillin: R >2      -  Penicillin: R 2      -  Rifampin: S <=1 Should not be used as monotherapy      -  Tetracycline: S <=1      -  Trimethoprim/Sulfamethoxazole: S <=0.5/9.5      -  Vancomycin: S 2    Culture - Urine (collected 10-10-23 @ 21:00)  Source: Catheterized Catheterized  Final Report (10-13-23 @ 17:40):    10,000 - 49,000 CFU/mL Pseudomonas aeruginosa  Organism: Pseudomonas aeruginosa (10-13-23 @ 17:40)  Organism: Pseudomonas aeruginosa (10-13-23 @ 17:40)      Method Type: KARTHIK      -  Amikacin: S <=16      -  Aztreonam: S <=4      -  Cefepime: S <=2      -  Ceftazidime: S <=1      -  Ciprofloxacin: S <=0.25      -  Gentamicin: S <=2      -  Imipenem: S <=1      -  Levofloxacin: S <=0.5      -  Meropenem: S <=1      -  Piperacillin/Tazobactam: S <=8      -  Tobramycin: S <=2    Culture - Blood (collected 10-10-23 @ 18:25)  Source: .Blood Blood-Peripheral  Gram Stain (10-11-23 @ 21:56):    Growth in aerobic bottle: Gram Positive Cocci in Clusters    Growth in anaerobic bottle: Gram Positive Cocci in Clusters  Final Report (10-13-23 @ 18:46):    Growth in aerobic and anaerobic bottles: Methicillin Resistant    Staphylococcus aureus    See previous culture [id number]    Culture - Blood (collected 10-10-23 @ 18:25)  Source: .Blood Blood-Peripheral  Gram Stain (10-11-23 @ 15:29):    Growth in aerobic bottle: Gram Positive Cocci in Clusters    Growth in anaerobic bottle: Gram Positive Cocci in Clusters  Final Report (10-13-23 @ 12:50):    Growth in aerobic and anaerobic bottles: Methicillin Resistant    Staphylococcus aureus    Direct identification is available within approximately 3-5    hours either by Blood Panel Multiplexed PCR or Direct    MALDI-TOF. Details: [email or web address]  Organism: Blood Culture PCR  Methicillin resistant Staphylococcus aureus (10-13-23 @ 12:50)  Organism: Methicillin resistant Staphylococcus aureus (10-13-23 @ 12:50)      Method Type: KARTHIK      -  Ampicillin/Sulbactam: R <=8/4      -  Cefazolin: R <=4      -  Clindamycin: I 2      -  Daptomycin: S 1      -  Erythromycin: R >4      -  Gentamicin: S <=1 Should not be used as monotherapy      -  Linezolid: S 4      -  Oxacillin: R >2      -  Penicillin: R 2      -  Rifampin: S <=1 Should not be used as monotherapy      -  Tetracycline: S <=1      -  Trimethoprim/Sulfamethoxazole: S <=0.5/9.5      -  Vancomycin: S 1  Organism: Blood Culture PCR (10-13-23 @ 12:50)      Method Type: PCR      -  Methicillin resistant Staphylococcus aureus (MRSA): Detec    SARS-CoV-2 Result: NotDetec (10 Oct 2023 18:25)    Radiology: reviewed     Medications:  acetaminophen     Tablet .. 650 milliGRAM(s) Oral every 6 hours PRN  aspirin enteric coated 81 milliGRAM(s) Oral daily  atorvastatin 80 milliGRAM(s) Oral at bedtime  bethanechol 25 milliGRAM(s) Oral two times a day  cefepime   IVPB 2000 milliGRAM(s) IV Intermittent every 8 hours  dextrose 5%. 1000 milliLiter(s) IV Continuous <Continuous>  dextrose 5%. 1000 milliLiter(s) IV Continuous <Continuous>  dextrose 50% Injectable 25 Gram(s) IV Push once  dextrose 50% Injectable 12.5 Gram(s) IV Push once  dextrose 50% Injectable 25 Gram(s) IV Push once  dextrose Oral Gel 15 Gram(s) Oral once PRN  dronabinol 2.5 milliGRAM(s) Oral two times a day  gabapentin 300 milliGRAM(s) Oral two times a day  glucagon  Injectable 1 milliGRAM(s) IntraMuscular once  insulin glargine Injectable (LANTUS) 18 Unit(s) SubCutaneous at bedtime  insulin lispro (ADMELOG) corrective regimen sliding scale   SubCutaneous three times a day before meals  insulin lispro (ADMELOG) corrective regimen sliding scale   SubCutaneous at bedtime  insulin lispro Injectable (ADMELOG) 3 Unit(s) SubCutaneous three times a day before meals  lactated ringers. 1000 milliLiter(s) IV Continuous <Continuous>  lactated ringers. 1000 milliLiter(s) IV Continuous <Continuous>  lactobacillus acidophilus 1 Tablet(s) Oral two times a day with meals  loperamide 2 milliGRAM(s) Oral three times a day PRN  midodrine. 10 milliGRAM(s) Oral three times a day  pantoprazole    Tablet 40 milliGRAM(s) Oral before breakfast  tamsulosin 0.4 milliGRAM(s) Oral at bedtime  vancomycin  IVPB 1500 milliGRAM(s) IV Intermittent every 24 hours    Current Antimicrobials:  cefepime   IVPB 2000 milliGRAM(s) IV Intermittent every 8 hours  vancomycin  IVPB 1500 milliGRAM(s) IV Intermittent every 24 hours    Prior/Completed Antimicrobials:  piperacillin/tazobactam IVPB.  piperacillin/tazobactam IVPB.-  piperacillin/tazobactam IVPB.-  piperacillin/tazobactam IVPB...  vancomycin  IVPB.   [facility]ES  GLYNN Oakley Y. Patel, S. Shah, G. Finesse  [phone]  ([phone] - weekdays after 5pm and weekends)    Name: HEMA LAZCANO  Age/Gender: 78y Male  MRN: [id number]    Interval History:  Patient seen and examined.  No new complaints noted.  Notes reviewed  No concerning overnight events  Afebrile over past 24h   Allergies: No Known Allergies      Objective:  Vitals:   T(F): 98.1 (10-15-23 @ 12:20), Max: 98.7 (10-15-23 @ 04:50)  HR: 60 (10-15-23 @ 12:20) (59 - 60)  BP: 109/45 (10-15-23 @ 12:20) (109/45 - 113/57)  RR: 18 (10-15-23 @ 12:20) (18 - 18)  SpO2: 95% (10-15-23 @ 12:20) (94% - 97%)  Physical Examination:  General: no acute distress  HEENT: NC/AT, anicteric, neck supple  Respiratory: decreased breath sounds b/l  Cardiovascular: S1 and S2 present, normal rate   Gastrointestinal: soft, nontender, nondistended  Extremities: b/l foot dressings, +edema  Skin: no visible rash    Laboratory Studies:  CBC:                       8.0    6.43  )-----------( 252      ( 15 Oct 2023 08:00 )             25.7     WBC Trend:  6.43 10-15-23 @ 08:00  5.23 10-14-23 @ 09:45  4.58 10-13-23 @ 08:18  7.26 10-12-23 @ 05:30  9.99 10-11-23 @ 04:09  11.08 10-10-23 @ 18:25    CMP: 10-15    141  |  113<H>  |  25<H>  ----------------------------<  195<H>  4.2   |  22  |  0.82    Ca    8.1<L>      15 Oct 2023 08:00      Creatinine: 0.82 mg/dL (10-15-23 @ 08:00)  Creatinine: 0.88 mg/dL (10-14-23 @ 09:45)  Creatinine: 1.00 mg/dL (10-13-23 @ 08:18)  Creatinine: 1.10 mg/dL (10-12-23 @ 05:30)  Creatinine: 1.20 mg/dL (10-11-23 @ 04:09)  Creatinine: 1.20 mg/dL (10-10-23 @ 18:25)        Vancomycin Level, Trough: 6.4 ug/mL (10-12-23 @ 22:20)    Microbiology: reviewed   Culture - Blood (collected 10-12-23 @ 05:36)  Source: .Blood Blood-Peripheral  Preliminary Report (10-15-23 @ 10:00):    No growth at 72 Hours    Culture - Blood (collected 10-12-23 @ 05:30)  Source: .Blood Blood-Venous  Preliminary Report (10-15-23 @ 10:01):    No growth at 72 Hours    Culture - Other (collected 10-11-23 @ 13:13)  Source: Wound Wound  Final Report (10-13-23 @ 17:21):    Numerous Methicillin Resistant Staphylococcus aureus  Organism: Methicillin resistant Staphylococcus aureus (10-13-23 @ 17:21)  Organism: Methicillin resistant Staphylococcus aureus (10-13-23 @ 17:21)      Method Type: KARTHIK      -  Ampicillin/Sulbactam: R <=8/4      -  Cefazolin: R <=4      -  Clindamycin: S <=0.25      -  Daptomycin: S 1      -  Erythromycin: R >4      -  Gentamicin: S 2 Should not be used as monotherapy      -  Linezolid: S 4      -  Oxacillin: R >2      -  Penicillin: R 2      -  Rifampin: S <=1 Should not be used as monotherapy      -  Tetracycline: S <=1      -  Trimethoprim/Sulfamethoxazole: S <=0.5/9.5      -  Vancomycin: S 2    Culture - Urine (collected 10-10-23 @ 21:00)  Source: Catheterized Catheterized  Final Report (10-13-23 @ 17:40):    10,000 - 49,000 CFU/mL Pseudomonas aeruginosa  Organism: Pseudomonas aeruginosa (10-13-23 @ 17:40)  Organism: Pseudomonas aeruginosa (10-13-23 @ 17:40)      Method Type: KARTHIK      -  Amikacin: S <=16      -  Aztreonam: S <=4      -  Cefepime: S <=2      -  Ceftazidime: S <=1      -  Ciprofloxacin: S <=0.25      -  Gentamicin: S <=2      -  Imipenem: S <=1      -  Levofloxacin: S <=0.5      -  Meropenem: S <=1      -  Piperacillin/Tazobactam: S <=8      -  Tobramycin: S <=2    Culture - Blood (collected 10-10-23 @ 18:25)  Source: .Blood Blood-Peripheral  Gram Stain (10-11-23 @ 21:56):    Growth in aerobic bottle: Gram Positive Cocci in Clusters    Growth in anaerobic bottle: Gram Positive Cocci in Clusters  Final Report (10-13-23 @ 18:46):    Growth in aerobic and anaerobic bottles: Methicillin Resistant    Staphylococcus aureus    See previous culture [id number]    Culture - Blood (collected 10-10-23 @ 18:25)  Source: .Blood Blood-Peripheral  Gram Stain (10-11-23 @ 15:29):    Growth in aerobic bottle: Gram Positive Cocci in Clusters    Growth in anaerobic bottle: Gram Positive Cocci in Clusters  Final Report (10-13-23 @ 12:50):    Growth in aerobic and anaerobic bottles: Methicillin Resistant    Staphylococcus aureus    Direct identification is available within approximately 3-5    hours either by Blood Panel Multiplexed PCR or Direct    MALDI-TOF. Details: [email or web address]  Organism: Blood Culture PCR  Methicillin resistant Staphylococcus aureus (10-13-23 @ 12:50)  Organism: Methicillin resistant Staphylococcus aureus (10-13-23 @ 12:50)      Method Type: KARTHIK      -  Ampicillin/Sulbactam: R <=8/4      -  Cefazolin: R <=4      -  Clindamycin: I 2      -  Daptomycin: S 1      -  Erythromycin: R >4      -  Gentamicin: S <=1 Should not be used as monotherapy      -  Linezolid: S 4      -  Oxacillin: R >2      -  Penicillin: R 2      -  Rifampin: S <=1 Should not be used as monotherapy      -  Tetracycline: S <=1      -  Trimethoprim/Sulfamethoxazole: S <=0.5/9.5      -  Vancomycin: S 1  Organism: Blood Culture PCR (10-13-23 @ 12:50)      Method Type: PCR      -  Methicillin resistant Staphylococcus aureus (MRSA): Detec    SARS-CoV-2 Result: NotDetec (10 Oct 2023 18:25)    Radiology: reviewed     Medications:  acetaminophen     Tablet .. 650 milliGRAM(s) Oral every 6 hours PRN  aspirin enteric coated 81 milliGRAM(s) Oral daily  atorvastatin 80 milliGRAM(s) Oral at bedtime  bethanechol 25 milliGRAM(s) Oral two times a day  cefepime   IVPB 2000 milliGRAM(s) IV Intermittent every 8 hours  dextrose 5%. 1000 milliLiter(s) IV Continuous <Continuous>  dextrose 5%. 1000 milliLiter(s) IV Continuous <Continuous>  dextrose 50% Injectable 25 Gram(s) IV Push once  dextrose 50% Injectable 12.5 Gram(s) IV Push once  dextrose 50% Injectable 25 Gram(s) IV Push once  dextrose Oral Gel 15 Gram(s) Oral once PRN  dronabinol 2.5 milliGRAM(s) Oral two times a day  gabapentin 300 milliGRAM(s) Oral two times a day  glucagon  Injectable 1 milliGRAM(s) IntraMuscular once  insulin glargine Injectable (LANTUS) 18 Unit(s) SubCutaneous at bedtime  insulin lispro (ADMELOG) corrective regimen sliding scale   SubCutaneous three times a day before meals  insulin lispro (ADMELOG) corrective regimen sliding scale   SubCutaneous at bedtime  insulin lispro Injectable (ADMELOG) 3 Unit(s) SubCutaneous three times a day before meals  lactated ringers. 1000 milliLiter(s) IV Continuous <Continuous>  lactated ringers. 1000 milliLiter(s) IV Continuous <Continuous>  lactobacillus acidophilus 1 Tablet(s) Oral two times a day with meals  loperamide 2 milliGRAM(s) Oral three times a day PRN  midodrine. 10 milliGRAM(s) Oral three times a day  pantoprazole    Tablet 40 milliGRAM(s) Oral before breakfast  tamsulosin 0.4 milliGRAM(s) Oral at bedtime  vancomycin  IVPB 1500 milliGRAM(s) IV Intermittent every 24 hours    Current Antimicrobials:  cefepime   IVPB 2000 milliGRAM(s) IV Intermittent every 8 hours  vancomycin  IVPB 1500 milliGRAM(s) IV Intermittent every 24 hours    Prior/Completed Antimicrobials:  piperacillin/tazobactam IVPB.  piperacillin/tazobactam IVPB.-  piperacillin/tazobactam IVPB.-  piperacillin/tazobactam IVPB...  vancomycin  IVPB.   [facility]  GLYNN Oakley Y. Patel, S. Shah, G. Finesse  [phone]  ([phone] - weekdays after 5pm and weekends)    Name: HEMA LAZCANO  Age/Gender: 78y Male  MRN: [id number]    Interval History:  Patient seen and examined.  No new complaints noted.  Notes reviewed  No concerning overnight events  Afebrile over past 24h   Allergies: No Known Allergies      Objective:  Vitals:   T(F): 98.1 (10-15-23 @ 12:20), Max: 98.7 (10-15-23 @ 04:50)  HR: 60 (10-15-23 @ 12:20) (59 - 60)  BP: 109/45 (10-15-23 @ 12:20) (109/45 - 113/57)  RR: 18 (10-15-23 @ 12:20) (18 - 18)  SpO2: 95% (10-15-23 @ 12:20) (94% - 97%)  Physical Examination:  General: no acute distress  HEENT: NC/AT, anicteric, neck supple  Respiratory: decreased breath sounds b/l  Cardiovascular: S1 and S2 present, normal rate   Gastrointestinal: soft, nontender, nondistended  Extremities: b/l foot dressings, +edema  Skin: no visible rash    Laboratory Studies:  CBC:                       8.0    6.43  )-----------( 252      ( 15 Oct 2023 08:00 )             25.7     WBC Trend:  6.43 10-15-23 @ 08:00  5.23 10-14-23 @ 09:45  4.58 10-13-23 @ 08:18  7.26 10-12-23 @ 05:30  9.99 10-11-23 @ 04:09  11.08 10-10-23 @ 18:25    CMP: 10-15    141  |  113<H>  |  25<H>  ----------------------------<  195<H>  4.2   |  22  |  0.82    Ca    8.1<L>      15 Oct 2023 08:00      Creatinine: 0.82 mg/dL (10-15-23 @ 08:00)  Creatinine: 0.88 mg/dL (10-14-23 @ 09:45)  Creatinine: 1.00 mg/dL (10-13-23 @ 08:18)  Creatinine: 1.10 mg/dL (10-12-23 @ 05:30)  Creatinine: 1.20 mg/dL (10-11-23 @ 04:09)  Creatinine: 1.20 mg/dL (10-10-23 @ 18:25)        Vancomycin Level, Trough: 6.4 ug/mL (10-12-23 @ 22:20)    Microbiology: reviewed   Culture - Blood (collected 10-12-23 @ 05:36)  Source: .Blood Blood-Peripheral  Preliminary Report (10-15-23 @ 10:00):    No growth at 72 Hours    Culture - Blood (collected 10-12-23 @ 05:30)  Source: .Blood Blood-Venous  Preliminary Report (10-15-23 @ 10:01):    No growth at 72 Hours    Culture - Other (collected 10-11-23 @ 13:13)  Source: Wound Wound  Final Report (10-13-23 @ 17:21):    Numerous Methicillin Resistant Staphylococcus aureus  Organism: Methicillin resistant Staphylococcus aureus (10-13-23 @ 17:21)  Organism: Methicillin resistant Staphylococcus aureus (10-13-23 @ 17:21)      Method Type: KARTHIK      -  Ampicillin/Sulbactam: R <=8/4      -  Cefazolin: R <=4      -  Clindamycin: S <=0.25      -  Daptomycin: S 1      -  Erythromycin: R >4      -  Gentamicin: S 2 Should not be used as monotherapy      -  Linezolid: S 4      -  Oxacillin: R >2      -  Penicillin: R 2      -  Rifampin: S <=1 Should not be used as monotherapy      -  Tetracycline: S <=1      -  Trimethoprim/Sulfamethoxazole: S <=0.5/9.5      -  Vancomycin: S 2    Culture - Urine (collected 10-10-23 @ 21:00)  Source: Catheterized Catheterized  Final Report (10-13-23 @ 17:40):    10,000 - 49,000 CFU/mL Pseudomonas aeruginosa  Organism: Pseudomonas aeruginosa (10-13-23 @ 17:40)  Organism: Pseudomonas aeruginosa (10-13-23 @ 17:40)      Method Type: KARTHIK      -  Amikacin: S <=16      -  Aztreonam: S <=4      -  Cefepime: S <=2      -  Ceftazidime: S <=1      -  Ciprofloxacin: S <=0.25      -  Gentamicin: S <=2      -  Imipenem: S <=1      -  Levofloxacin: S <=0.5      -  Meropenem: S <=1      -  Piperacillin/Tazobactam: S <=8      -  Tobramycin: S <=2    Culture - Blood (collected 10-10-23 @ 18:25)  Source: .Blood Blood-Peripheral  Gram Stain (10-11-23 @ 21:56):    Growth in aerobic bottle: Gram Positive Cocci in Clusters    Growth in anaerobic bottle: Gram Positive Cocci in Clusters  Final Report (10-13-23 @ 18:46):    Growth in aerobic and anaerobic bottles: Methicillin Resistant    Staphylococcus aureus    See previous culture [id number]    Culture - Blood (collected 10-10-23 @ 18:25)  Source: .Blood Blood-Peripheral  Gram Stain (10-11-23 @ 15:29):    Growth in aerobic bottle: Gram Positive Cocci in Clusters    Growth in anaerobic bottle: Gram Positive Cocci in Clusters  Final Report (10-13-23 @ 12:50):    Growth in aerobic and anaerobic bottles: Methicillin Resistant    Staphylococcus aureus    Direct identification is available within approximately 3-5    hours either by Blood Panel Multiplexed PCR or Direct    MALDI-TOF. Details: [email or web address]  Organism: Blood Culture PCR  Methicillin resistant Staphylococcus aureus (10-13-23 @ 12:50)  Organism: Methicillin resistant Staphylococcus aureus (10-13-23 @ 12:50)      Method Type: KARTHIK      -  Ampicillin/Sulbactam: R <=8/4      -  Cefazolin: R <=4      -  Clindamycin: I 2      -  Daptomycin: S 1      -  Erythromycin: R >4      -  Gentamicin: S <=1 Should not be used as monotherapy      -  Linezolid: S 4      -  Oxacillin: R >2      -  Penicillin: R 2      -  Rifampin: S <=1 Should not be used as monotherapy      -  Tetracycline: S <=1      -  Trimethoprim/Sulfamethoxazole: S <=0.5/9.5      -  Vancomycin: S 1  Organism: Blood Culture PCR (10-13-23 @ 12:50)      Method Type: PCR      -  Methicillin resistant Staphylococcus aureus (MRSA): Detec    SARS-CoV-2 Result: NotDetec (10 Oct 2023 18:25)    Radiology: reviewed     Medications:  acetaminophen     Tablet .. 650 milliGRAM(s) Oral every 6 hours PRN  aspirin enteric coated 81 milliGRAM(s) Oral daily  atorvastatin 80 milliGRAM(s) Oral at bedtime  bethanechol 25 milliGRAM(s) Oral two times a day  cefepime   IVPB 2000 milliGRAM(s) IV Intermittent every 8 hours  dextrose 5%. 1000 milliLiter(s) IV Continuous <Continuous>  dextrose 5%. 1000 milliLiter(s) IV Continuous <Continuous>  dextrose 50% Injectable 25 Gram(s) IV Push once  dextrose 50% Injectable 12.5 Gram(s) IV Push once  dextrose 50% Injectable 25 Gram(s) IV Push once  dextrose Oral Gel 15 Gram(s) Oral once PRN  dronabinol 2.5 milliGRAM(s) Oral two times a day  gabapentin 300 milliGRAM(s) Oral two times a day  glucagon  Injectable 1 milliGRAM(s) IntraMuscular once  insulin glargine Injectable (LANTUS) 18 Unit(s) SubCutaneous at bedtime  insulin lispro (ADMELOG) corrective regimen sliding scale   SubCutaneous three times a day before meals  insulin lispro (ADMELOG) corrective regimen sliding scale   SubCutaneous at bedtime  insulin lispro Injectable (ADMELOG) 3 Unit(s) SubCutaneous three times a day before meals  lactated ringers. 1000 milliLiter(s) IV Continuous <Continuous>  lactated ringers. 1000 milliLiter(s) IV Continuous <Continuous>  lactobacillus acidophilus 1 Tablet(s) Oral two times a day with meals  loperamide 2 milliGRAM(s) Oral three times a day PRN  midodrine. 10 milliGRAM(s) Oral three times a day  pantoprazole    Tablet 40 milliGRAM(s) Oral before breakfast  tamsulosin 0.4 milliGRAM(s) Oral at bedtime  vancomycin  IVPB 1500 milliGRAM(s) IV Intermittent every 24 hours    Current Antimicrobials:  cefepime   IVPB 2000 milliGRAM(s) IV Intermittent every 8 hours  vancomycin  IVPB 1500 milliGRAM(s) IV Intermittent every 24 hours    Prior/Completed Antimicrobials:  piperacillin/tazobactam IVPB.  piperacillin/tazobactam IVPB.-  piperacillin/tazobactam IVPB.-  piperacillin/tazobactam IVPB...  vancomycin  IVPB.

## 2023-10-16 LAB
ANION GAP SERPL CALC-SCNC: 3 MMOL/L — LOW (ref 5–17)
BUN SERPL-MCNC: 19 MG/DL — SIGNIFICANT CHANGE UP (ref 7–23)
CALCIUM SERPL-MCNC: 8.3 MG/DL — LOW (ref 8.5–10.1)
CHLORIDE SERPL-SCNC: 113 MMOL/L — HIGH (ref 96–108)
CO2 SERPL-SCNC: 24 MMOL/L — SIGNIFICANT CHANGE UP (ref 22–31)
CREAT SERPL-MCNC: 0.68 MG/DL — SIGNIFICANT CHANGE UP (ref 0.5–1.3)
EGFR: 95 ML/MIN/1.73M2 — SIGNIFICANT CHANGE UP
GLUCOSE BLDC GLUCOMTR-MCNC: 180 MG/DL — HIGH (ref 70–99)
GLUCOSE BLDC GLUCOMTR-MCNC: 193 MG/DL — HIGH (ref 70–99)
GLUCOSE BLDC GLUCOMTR-MCNC: 194 MG/DL — HIGH (ref 70–99)
GLUCOSE BLDC GLUCOMTR-MCNC: 210 MG/DL — HIGH (ref 70–99)
GLUCOSE BLDC GLUCOMTR-MCNC: 221 MG/DL — HIGH (ref 70–99)
GLUCOSE SERPL-MCNC: 201 MG/DL — HIGH (ref 70–99)
HCT VFR BLD CALC: 26.5 % — LOW (ref 39–50)
HGB BLD-MCNC: 8.4 G/DL — LOW (ref 13–17)
MCHC RBC-ENTMCNC: 25.7 PG — LOW (ref 27–34)
MCHC RBC-ENTMCNC: 31.7 GM/DL — LOW (ref 32–36)
MCV RBC AUTO: 81 FL — SIGNIFICANT CHANGE UP (ref 80–100)
NRBC # BLD: 0 /100 WBCS — SIGNIFICANT CHANGE UP (ref 0–0)
PLATELET # BLD AUTO: 260 K/UL — SIGNIFICANT CHANGE UP (ref 150–400)
POTASSIUM SERPL-MCNC: 3.9 MMOL/L — SIGNIFICANT CHANGE UP (ref 3.5–5.3)
POTASSIUM SERPL-SCNC: 3.9 MMOL/L — SIGNIFICANT CHANGE UP (ref 3.5–5.3)
RBC # BLD: 3.27 M/UL — LOW (ref 4.2–5.8)
RBC # FLD: 16.4 % — HIGH (ref 10.3–14.5)
SODIUM SERPL-SCNC: 140 MMOL/L — SIGNIFICANT CHANGE UP (ref 135–145)
WBC # BLD: 7.49 K/UL — SIGNIFICANT CHANGE UP (ref 3.8–10.5)
WBC # FLD AUTO: 7.49 K/UL — SIGNIFICANT CHANGE UP (ref 3.8–10.5)

## 2023-10-16 RX ORDER — VANCOMYCIN HCL 1 G
750 VIAL (EA) INTRAVENOUS EVERY 12 HOURS
Refills: 0 | Status: DISCONTINUED | OUTPATIENT
Start: 2023-10-16 | End: 2023-10-18

## 2023-10-16 RX ADMIN — Medication 1 TABLET(S): at 08:34

## 2023-10-16 RX ADMIN — CEFEPIME 100 MILLIGRAM(S): 1 INJECTION, POWDER, FOR SOLUTION INTRAMUSCULAR; INTRAVENOUS at 14:40

## 2023-10-16 RX ADMIN — Medication 1: at 18:01

## 2023-10-16 RX ADMIN — MIDODRINE HYDROCHLORIDE 10 MILLIGRAM(S): 2.5 TABLET ORAL at 18:11

## 2023-10-16 RX ADMIN — Medication 81 MILLIGRAM(S): at 17:10

## 2023-10-16 RX ADMIN — Medication 1 TABLET(S): at 18:11

## 2023-10-16 RX ADMIN — Medication 2: at 06:29

## 2023-10-16 RX ADMIN — Medication 1: at 18:13

## 2023-10-16 RX ADMIN — MIDODRINE HYDROCHLORIDE 10 MILLIGRAM(S): 2.5 TABLET ORAL at 18:00

## 2023-10-16 RX ADMIN — Medication 25 MILLIGRAM(S): at 05:28

## 2023-10-16 RX ADMIN — CEFEPIME 100 MILLIGRAM(S): 1 INJECTION, POWDER, FOR SOLUTION INTRAMUSCULAR; INTRAVENOUS at 21:47

## 2023-10-16 RX ADMIN — Medication 25 MILLIGRAM(S): at 18:11

## 2023-10-16 RX ADMIN — GABAPENTIN 300 MILLIGRAM(S): 400 CAPSULE ORAL at 05:28

## 2023-10-16 RX ADMIN — Medication 2.5 MILLIGRAM(S): at 05:28

## 2023-10-16 RX ADMIN — TAMSULOSIN HYDROCHLORIDE 0.4 MILLIGRAM(S): 0.4 CAPSULE ORAL at 21:45

## 2023-10-16 RX ADMIN — PANTOPRAZOLE SODIUM 40 MILLIGRAM(S): 20 TABLET, DELAYED RELEASE ORAL at 07:21

## 2023-10-16 RX ADMIN — Medication 2.5 MILLIGRAM(S): at 18:11

## 2023-10-16 RX ADMIN — INSULIN GLARGINE 18 UNIT(S): 100 INJECTION, SOLUTION SUBCUTANEOUS at 21:46

## 2023-10-16 RX ADMIN — CEFEPIME 100 MILLIGRAM(S): 1 INJECTION, POWDER, FOR SOLUTION INTRAMUSCULAR; INTRAVENOUS at 05:22

## 2023-10-16 RX ADMIN — MIDODRINE HYDROCHLORIDE 10 MILLIGRAM(S): 2.5 TABLET ORAL at 05:28

## 2023-10-16 RX ADMIN — Medication 250 MILLIGRAM(S): at 22:45

## 2023-10-16 RX ADMIN — ATORVASTATIN CALCIUM 80 MILLIGRAM(S): 80 TABLET, FILM COATED ORAL at 21:45

## 2023-10-16 RX ADMIN — GABAPENTIN 300 MILLIGRAM(S): 400 CAPSULE ORAL at 18:11

## 2023-10-16 RX ADMIN — SODIUM CHLORIDE 75 MILLILITER(S): 9 INJECTION, SOLUTION INTRAVENOUS at 05:23

## 2023-10-16 NOTE — CASE MANAGEMENT PROGRESS NOTE - NSCMPROGRESSNOTE_GEN_ALL_CORE
Discussed on rounds this am. Chart reviewed.  Pt is on IV abx at present.  As per MD notes Pt is awaiting NM scan and GISSELL.  A CM will remain available through hospitalization.

## 2023-10-16 NOTE — PROGRESS NOTE ADULT - PROBLEM SELECTOR PLAN 2
id noted  rpt blood cultures neg to date  iv zosyn and vanco  nuclear bone scan to r/o om   tessie tomorrow  podiatry fu id noted  rpt blood cultures neg to date  iv cefipime and vanco  nuclear bone scan to r/o om   tessie tomorrow  podiatry fu

## 2023-10-16 NOTE — PROGRESS NOTE ADULT - SUBJECTIVE AND OBJECTIVE BOX
Patient is a 78y Male with a known history of :  Diabetes [E11.9]    Diabetic foot ulcers [E11.621]    Benign essential HTN [I10]    Anemia, unspecified [D64.9]    Acute UTI [N39.0]    Diabetic ulcer of right foot [E11.621]    Diabetic ulcer of left foot [E11.621]    Non-healing wound of right heel [S91.301A]    Type 2 diabetes mellitus [E11.9]    Sepsis with hypotension [A41.9]    Gram positive bacterial infection [A49.9]    Elevated troponin [R79.89]    Diabetic ulcer of left foot [E11.621]    Non-healing wound of right heel [S91.301A]      HPI:  78-year-old male presents to the hospital by ambulance from home.  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 Pt does not go to doctor on regualar basis per son and does phone visits.   (11 Oct 2023 07:02)      REVIEW OF SYSTEMS:  All other review of systems is negative unless indicated above      MEDICATIONS  (STANDING):  aspirin enteric coated 81 milliGRAM(s) Oral daily  atorvastatin 80 milliGRAM(s) Oral at bedtime  bethanechol 25 milliGRAM(s) Oral two times a day  cefepime   IVPB 2000 milliGRAM(s) IV Intermittent every 8 hours  dextrose 5%. 1000 milliLiter(s) (100 mL/Hr) IV Continuous <Continuous>  dextrose 5%. 1000 milliLiter(s) (50 mL/Hr) IV Continuous <Continuous>  dextrose 50% Injectable 25 Gram(s) IV Push once  dextrose 50% Injectable 25 Gram(s) IV Push once  dextrose 50% Injectable 12.5 Gram(s) IV Push once  dronabinol 2.5 milliGRAM(s) Oral two times a day  gabapentin 300 milliGRAM(s) Oral two times a day  glucagon  Injectable 1 milliGRAM(s) IntraMuscular once  insulin glargine Injectable (LANTUS) 18 Unit(s) SubCutaneous at bedtime  insulin lispro (ADMELOG) corrective regimen sliding scale   SubCutaneous every 6 hours  lactated ringers. 1000 milliLiter(s) (100 mL/Hr) IV Continuous <Continuous>  lactobacillus acidophilus 1 Tablet(s) Oral two times a day with meals  midodrine. 10 milliGRAM(s) Oral three times a day  pantoprazole    Tablet 40 milliGRAM(s) Oral before breakfast  tamsulosin 0.4 milliGRAM(s) Oral at bedtime  vancomycin  IVPB 750 milliGRAM(s) IV Intermittent every 12 hours    MEDICATIONS  (PRN):  acetaminophen     Tablet .. 650 milliGRAM(s) Oral every 6 hours PRN Temp greater or equal to 38C (100.4F), Moderate Pain (4 - 6)  dextrose Oral Gel 15 Gram(s) Oral once PRN Blood Glucose LESS THAN 70 milliGRAM(s)/deciliter  loperamide 2 milliGRAM(s) Oral three times a day PRN Diarrhea      ALLERGIES: No Known Allergies      FAMILY HISTORY:      PHYSICAL EXAMINATION:  -----------------------------  T(C): 36.9 (10-16-23 @ 11:50), Max: 36.9 (10-15-23 @ 20:35)  HR: 60 (10-16-23 @ 11:50) (59 - 60)  BP: 89/30 (10-16-23 @ 11:53) (89/30 - 113/57)  RR: 18 (10-16-23 @ 11:50) (18 - 18)  SpO2: 98% (10-16-23 @ 11:50) (93% - 98%)  Wt(kg): --    10-15 @ 07:01  -  10-16 @ 07:00  --------------------------------------------------------  IN:    Lactated Ringers: 900 mL  Total IN: 900 mL    OUT:    Incontinent per Condom Catheter (mL): 3800 mL  Total OUT: 3800 mL    Total NET: -2900 mL      10-16 @ 07:01  -  10-16 @ 20:05  --------------------------------------------------------  IN:  Total IN: 0 mL    OUT:    Incontinent per Condom Catheter (mL): 450 mL  Total OUT: 450 mL    Total NET: -450 mL      PHYSICAL EXAM:    Constitutional: NAD, awake and alert, well-developed  Eyes:  EOMI,  Pupils round, No oral cyanosis.  HEENT: No exudate or erythema  Pulmonary: Non-labored, breath sounds are clear bilaterally, No wheezing, rales or rhonchi  Cardiovascular: Regular, S1 and S2, No murmurs  Gastrointestinal: Bowel Sounds present, soft, nontender.   Ext: No significant LE edema  Neurological: Alert, no gross focal motor deficits  Skin: No rashes.  Psych:  Mood & affect appropriate    LABS: All Labs Reviewed:        LABS:   --------  10-16    140  |  113<H>  |  19  ----------------------------<  201<H>  3.9   |  24  |  0.68    Ca    8.3<L>      16 Oct 2023 07:42                           8.4    7.49  )-----------( 260      ( 16 Oct 2023 07:42 )             26.5

## 2023-10-16 NOTE — PROGRESS NOTE ADULT - SUBJECTIVE AND OBJECTIVE BOX
[facility]  Cecilio Burnett MD PhD, Gloria Hager MD, Rosa Maria Wilkinson MD, Tomy Larsen MD, Anshu Kilpatrick MD  and providing coverage with Amandeep Landaverde MD  Providing Infectious Disease Consultations at [facility], [facility], [place], [facility], [facility]'s    Office# [phone] to schedule follow up appointments  Answering Service for urgent calls or New Consults [phone]  Cell# to text for urgent issues Cecilio Burnett [phone]     infectious diseases progress note:    HEMA LAZCANO is a 78y y. o. Male patient    Overnight and events of the last 24hrs reviewed    Allergies    No Known Allergies    Intolerances        ANTIBIOTICS/RELEVANT:  antimicrobials  cefepime   IVPB 2000 milliGRAM(s) IV Intermittent every 8 hours  vancomycin  IVPB 1500 milliGRAM(s) IV Intermittent every 24 hours    immunologic:    OTHER:  acetaminophen     Tablet .. 650 milliGRAM(s) Oral every 6 hours PRN  aspirin enteric coated 81 milliGRAM(s) Oral daily  atorvastatin 80 milliGRAM(s) Oral at bedtime  bethanechol 25 milliGRAM(s) Oral two times a day  dextrose 5%. 1000 milliLiter(s) IV Continuous <Continuous>  dextrose 5%. 1000 milliLiter(s) IV Continuous <Continuous>  dextrose 50% Injectable 25 Gram(s) IV Push once  dextrose 50% Injectable 12.5 Gram(s) IV Push once  dextrose 50% Injectable 25 Gram(s) IV Push once  dextrose Oral Gel 15 Gram(s) Oral once PRN  dronabinol 2.5 milliGRAM(s) Oral two times a day  gabapentin 300 milliGRAM(s) Oral two times a day  glucagon  Injectable 1 milliGRAM(s) IntraMuscular once  insulin glargine Injectable (LANTUS) 18 Unit(s) SubCutaneous at bedtime  insulin lispro (ADMELOG) corrective regimen sliding scale   SubCutaneous every 6 hours  lactated ringers. 1000 milliLiter(s) IV Continuous <Continuous>  lactobacillus acidophilus 1 Tablet(s) Oral two times a day with meals  loperamide 2 milliGRAM(s) Oral three times a day PRN  midodrine. 10 milliGRAM(s) Oral three times a day  pantoprazole    Tablet 40 milliGRAM(s) Oral before breakfast  tamsulosin 0.4 milliGRAM(s) Oral at bedtime      Objective:  Vital Signs Last 24 Hrs  T(C): 36.9 (16 Oct 2023 11:50), Max: 36.9 (15 Oct 2023 20:35)  T(F): 98.4 (16 Oct 2023 11:50), Max: 98.5 (16 Oct 2023 04:43)  HR: 60 (16 Oct 2023 11:50) (59 - 60)  BP: 89/30 (16 Oct 2023 11:53) (89/30 - 113/57)  BP(mean): --  RR: 18 (16 Oct 2023 11:50) (18 - 18)  SpO2: 98% (16 Oct 2023 11:50) (93% - 98%)    Parameters below as of 16 Oct 2023 11:50  Patient On (Oxygen Delivery Method): room air        T(C): 36.9 (10-16-23 @ 11:50), Max: 37.3 (10-14-23 @ 17:12)  T(C): 36.9 (10-16-23 @ 11:50), Max: 38.4 (10-13-23 @ 20:30)  T(C): 36.9 (10-16-23 @ 11:50), Max: 38.4 (10-13-23 @ 20:30)    PHYSICAL EXAM:  HEENT: NC atraumatic  Neck: supple  Respiratory: no accessory muscle use, breathing comfortably  Cardiovascular: distant  Gastrointestinal: normal appearing, nondistended  Extremities: no clubbing, no cyanosis,        LABS:                          8.4    7.49  )-----------( 260      ( 16 Oct 2023 07:42 )             26.5       WBC  7.49 10-16 @ 07:42  6.43 10-15 @ 08:00  5.23 10-14 @ 09:45  4.58 10-13 @ 08:18  7.26 10-12 @ 05:30  9.99 10-11 @ 04:09  11.08 10-10 @ 18:25      10-16    140  |  113<H>  |  19  ----------------------------<  201<H>  3.9   |  24  |  0.68    Ca    8.3<L>      16 Oct 2023 07:42        Creatinine: 0.68 mg/dL (10-16-23 @ 07:42)  Creatinine: 0.82 mg/dL (10-15-23 @ 08:00)  Creatinine: 0.88 mg/dL (10-14-23 @ 09:45)  Creatinine: 1.00 mg/dL (10-13-23 @ 08:18)  Creatinine: 1.10 mg/dL (10-12-23 @ 05:30)  Creatinine: 1.20 mg/dL (10-11-23 @ 04:09)  Creatinine: 1.20 mg/dL (10-10-23 @ 18:25)        Urinalysis Basic - ( 16 Oct 2023 07:42 )    Color: x / Appearance: x / SG: x / pH: x  Gluc: 201 mg/dL / Ketone: x  / Bili: x / Urobili: x   Blood: x / Protein: x / Nitrite: x   Leuk Esterase: x / RBC: x / WBC x   Sq Epi: x / Non Sq Epi: x / Bacteria: x            INFLAMMATORY MARKERS      MICROBIOLOGY:              RADIOLOGY & ADDITIONAL STUDIES:   [facility]  Cecilio Burnett MD PhD, Gloria Hager MD, Rosa Maria Wilkinson MD, Tomy Larsen MD, Anshu Kilpatrick MD  and providing coverage with Amandeep Landaverde MD  Providing Infectious Disease Consultations at [facility], [facility], [place], [facility], [facility]'s    Office# [phone] to schedule follow up appointments  Answering Service for urgent calls or New Consults [phone]  Cell# to text for urgent issues Cecilio Burnett [phone]     infectious diseases progress note:    HEMA LAZCANO is a 78y y. o. Male patient    Overnight and events of the last 24hrs reviewed    Allergies    No Known Allergies    Intolerances        ANTIBIOTICS/RELEVANT:  antimicrobials  cefepime   IVPB 2000 milliGRAM(s) IV Intermittent every 8 hours  vancomycin  IVPB 1500 milliGRAM(s) IV Intermittent every 24 hours    immunologic:    OTHER:  acetaminophen     Tablet .. 650 milliGRAM(s) Oral every 6 hours PRN  aspirin enteric coated 81 milliGRAM(s) Oral daily  atorvastatin 80 milliGRAM(s) Oral at bedtime  bethanechol 25 milliGRAM(s) Oral two times a day  dextrose 5%. 1000 milliLiter(s) IV Continuous <Continuous>  dextrose 5%. 1000 milliLiter(s) IV Continuous <Continuous>  dextrose 50% Injectable 25 Gram(s) IV Push once  dextrose 50% Injectable 12.5 Gram(s) IV Push once  dextrose 50% Injectable 25 Gram(s) IV Push once  dextrose Oral Gel 15 Gram(s) Oral once PRN  dronabinol 2.5 milliGRAM(s) Oral two times a day  gabapentin 300 milliGRAM(s) Oral two times a day  glucagon  Injectable 1 milliGRAM(s) IntraMuscular once  insulin glargine Injectable (LANTUS) 18 Unit(s) SubCutaneous at bedtime  insulin lispro (ADMELOG) corrective regimen sliding scale   SubCutaneous every 6 hours  lactated ringers. 1000 milliLiter(s) IV Continuous <Continuous>  lactobacillus acidophilus 1 Tablet(s) Oral two times a day with meals  loperamide 2 milliGRAM(s) Oral three times a day PRN  midodrine. 10 milliGRAM(s) Oral three times a day  pantoprazole    Tablet 40 milliGRAM(s) Oral before breakfast  tamsulosin 0.4 milliGRAM(s) Oral at bedtime      Objective:  Vital Signs Last 24 Hrs  T(C): 36.9 (16 Oct 2023 11:50), Max: 36.9 (15 Oct 2023 20:35)  T(F): 98.4 (16 Oct 2023 11:50), Max: 98.5 (16 Oct 2023 04:43)  HR: 60 (16 Oct 2023 11:50) (59 - 60)  BP: 89/30 (16 Oct 2023 11:53) (89/30 - 113/57)  BP(mean): --  RR: 18 (16 Oct 2023 11:50) (18 - 18)  SpO2: 98% (16 Oct 2023 11:50) (93% - 98%)    Parameters below as of 16 Oct 2023 11:50  Patient On (Oxygen Delivery Method): room air        T(C): 36.9 (10-16-23 @ 11:50), Max: 37.3 (10-14-23 @ 17:12)  T(C): 36.9 (10-16-23 @ 11:50), Max: 38.4 (10-13-23 @ 20:30)  T(C): 36.9 (10-16-23 @ 11:50), Max: 38.4 (10-13-23 @ 20:30)    PHYSICAL EXAM:  HEENT: NC atraumatic  Neck: supple  Respiratory: no accessory muscle use, breathing comfortably  Cardiovascular: distant  Gastrointestinal: normal appearing, nondistended  Extremities: no clubbing, no cyanosis,        LABS:                          8.4    7.49  )-----------( 260      ( 16 Oct 2023 07:42 )             26.5       WBC  7.49 10-16 @ 07:42  6.43 10-15 @ 08:00  5.23 10-14 @ 09:45  4.58 10-13 @ 08:18  7.26 10-12 @ 05:30  9.99 10-11 @ 04:09  11.08 10-10 @ 18:25      10-16    140  |  113<H>  |  19  ----------------------------<  201<H>  3.9   |  24  |  0.68    Ca    8.3<L>      16 Oct 2023 07:42        Creatinine: 0.68 mg/dL (10-16-23 @ 07:42)  Creatinine: 0.82 mg/dL (10-15-23 @ 08:00)  Creatinine: 0.88 mg/dL (10-14-23 @ 09:45)  Creatinine: 1.00 mg/dL (10-13-23 @ 08:18)  Creatinine: 1.10 mg/dL (10-12-23 @ 05:30)  Creatinine: 1.20 mg/dL (10-11-23 @ 04:09)  Creatinine: 1.20 mg/dL (10-10-23 @ 18:25)        Urinalysis Basic - ( 16 Oct 2023 07:42 )    Color: x / Appearance: x / SG: x / pH: x  Gluc: 201 mg/dL / Ketone: x  / Bili: x / Urobili: x   Blood: x / Protein: x / Nitrite: x   Leuk Esterase: x / RBC: x / WBC x   Sq Epi: x / Non Sq Epi: x / Bacteria: x            INFLAMMATORY MARKERS      MICROBIOLOGY:              RADIOLOGY & ADDITIONAL STUDIES:   [facility]FECTIOUS DISEASE  Cecilio Burnett MD PhD, Gloria Hager MD, Rosa Maria Wilkinson MD, Tomy Larsen MD, Anshu Kilpatrick MD  and providing coverage with Amandeep Landaverde MD  Providing Infectious Disease Consultations at [facility], [facility], [place], [facility], [facility]'s    Office# [phone] to schedule follow up appointments  Answering Service for urgent calls or New Consults [phone]  Cell# to text for urgent issues Cecilio Burnett [phone]     infectious diseases progress note:    HEMA LAZCANO is a 78y y. o. Male patient    Overnight and events of the last 24hrs reviewed    Allergies    No Known Allergies    Intolerances        ANTIBIOTICS/RELEVANT:  antimicrobials  cefepime   IVPB 2000 milliGRAM(s) IV Intermittent every 8 hours  vancomycin  IVPB 1500 milliGRAM(s) IV Intermittent every 24 hours    immunologic:    OTHER:  acetaminophen     Tablet .. 650 milliGRAM(s) Oral every 6 hours PRN  aspirin enteric coated 81 milliGRAM(s) Oral daily  atorvastatin 80 milliGRAM(s) Oral at bedtime  bethanechol 25 milliGRAM(s) Oral two times a day  dextrose 5%. 1000 milliLiter(s) IV Continuous <Continuous>  dextrose 5%. 1000 milliLiter(s) IV Continuous <Continuous>  dextrose 50% Injectable 25 Gram(s) IV Push once  dextrose 50% Injectable 12.5 Gram(s) IV Push once  dextrose 50% Injectable 25 Gram(s) IV Push once  dextrose Oral Gel 15 Gram(s) Oral once PRN  dronabinol 2.5 milliGRAM(s) Oral two times a day  gabapentin 300 milliGRAM(s) Oral two times a day  glucagon  Injectable 1 milliGRAM(s) IntraMuscular once  insulin glargine Injectable (LANTUS) 18 Unit(s) SubCutaneous at bedtime  insulin lispro (ADMELOG) corrective regimen sliding scale   SubCutaneous every 6 hours  lactated ringers. 1000 milliLiter(s) IV Continuous <Continuous>  lactobacillus acidophilus 1 Tablet(s) Oral two times a day with meals  loperamide 2 milliGRAM(s) Oral three times a day PRN  midodrine. 10 milliGRAM(s) Oral three times a day  pantoprazole    Tablet 40 milliGRAM(s) Oral before breakfast  tamsulosin 0.4 milliGRAM(s) Oral at bedtime      Objective:  Vital Signs Last 24 Hrs  T(C): 36.9 (16 Oct 2023 11:50), Max: 36.9 (15 Oct 2023 20:35)  T(F): 98.4 (16 Oct 2023 11:50), Max: 98.5 (16 Oct 2023 04:43)  HR: 60 (16 Oct 2023 11:50) (59 - 60)  BP: 89/30 (16 Oct 2023 11:53) (89/30 - 113/57)  BP(mean): --  RR: 18 (16 Oct 2023 11:50) (18 - 18)  SpO2: 98% (16 Oct 2023 11:50) (93% - 98%)    Parameters below as of 16 Oct 2023 11:50  Patient On (Oxygen Delivery Method): room air        T(C): 36.9 (10-16-23 @ 11:50), Max: 37.3 (10-14-23 @ 17:12)  T(C): 36.9 (10-16-23 @ 11:50), Max: 38.4 (10-13-23 @ 20:30)  T(C): 36.9 (10-16-23 @ 11:50), Max: 38.4 (10-13-23 @ 20:30)    PHYSICAL EXAM:  HEENT: NC atraumatic  Neck: supple  Respiratory: no accessory muscle use, breathing comfortably  Cardiovascular: distant  Gastrointestinal: normal appearing, nondistended  Extremities: no clubbing, no cyanosis,        LABS:                          8.4    7.49  )-----------( 260      ( 16 Oct 2023 07:42 )             26.5       WBC  7.49 10-16 @ 07:42  6.43 10-15 @ 08:00  5.23 10-14 @ 09:45  4.58 10-13 @ 08:18  7.26 10-12 @ 05:30  9.99 10-11 @ 04:09  11.08 10-10 @ 18:25      10-16    140  |  113<H>  |  19  ----------------------------<  201<H>  3.9   |  24  |  0.68    Ca    8.3<L>      16 Oct 2023 07:42        Creatinine: 0.68 mg/dL (10-16-23 @ 07:42)  Creatinine: 0.82 mg/dL (10-15-23 @ 08:00)  Creatinine: 0.88 mg/dL (10-14-23 @ 09:45)  Creatinine: 1.00 mg/dL (10-13-23 @ 08:18)  Creatinine: 1.10 mg/dL (10-12-23 @ 05:30)  Creatinine: 1.20 mg/dL (10-11-23 @ 04:09)  Creatinine: 1.20 mg/dL (10-10-23 @ 18:25)        Urinalysis Basic - ( 16 Oct 2023 07:42 )    Color: x / Appearance: x / SG: x / pH: x  Gluc: 201 mg/dL / Ketone: x  / Bili: x / Urobili: x   Blood: x / Protein: x / Nitrite: x   Leuk Esterase: x / RBC: x / WBC x   Sq Epi: x / Non Sq Epi: x / Bacteria: x            INFLAMMATORY MARKERS      MICROBIOLOGY:              RADIOLOGY & ADDITIONAL STUDIES:

## 2023-10-16 NOTE — PROGRESS NOTE ADULT - SUBJECTIVE AND OBJECTIVE BOX
Patient is a 78y old  Male who presents with a chief complaint of fever and weakness (15 Oct 2023 17:55)      INTERVAL HPI/OVERNIGHT EVENTS: no new events, undergoing dc    MEDICATIONS  (STANDING):  aspirin enteric coated 81 milliGRAM(s) Oral daily  atorvastatin 80 milliGRAM(s) Oral at bedtime  bethanechol 25 milliGRAM(s) Oral two times a day  cefepime   IVPB 2000 milliGRAM(s) IV Intermittent every 8 hours  dextrose 5%. 1000 milliLiter(s) (50 mL/Hr) IV Continuous <Continuous>  dextrose 5%. 1000 milliLiter(s) (100 mL/Hr) IV Continuous <Continuous>  dextrose 50% Injectable 25 Gram(s) IV Push once  dextrose 50% Injectable 25 Gram(s) IV Push once  dextrose 50% Injectable 12.5 Gram(s) IV Push once  dronabinol 2.5 milliGRAM(s) Oral two times a day  gabapentin 300 milliGRAM(s) Oral two times a day  glucagon  Injectable 1 milliGRAM(s) IntraMuscular once  insulin glargine Injectable (LANTUS) 18 Unit(s) SubCutaneous at bedtime  insulin lispro (ADMELOG) corrective regimen sliding scale   SubCutaneous every 6 hours  lactated ringers. 1000 milliLiter(s) (100 mL/Hr) IV Continuous <Continuous>  lactobacillus acidophilus 1 Tablet(s) Oral two times a day with meals  midodrine. 10 milliGRAM(s) Oral three times a day  pantoprazole    Tablet 40 milliGRAM(s) Oral before breakfast  tamsulosin 0.4 milliGRAM(s) Oral at bedtime  vancomycin  IVPB 1500 milliGRAM(s) IV Intermittent every 24 hours    MEDICATIONS  (PRN):  acetaminophen     Tablet .. 650 milliGRAM(s) Oral every 6 hours PRN Temp greater or equal to 38C (100.4F), Moderate Pain (4 - 6)  dextrose Oral Gel 15 Gram(s) Oral once PRN Blood Glucose LESS THAN 70 milliGRAM(s)/deciliter  loperamide 2 milliGRAM(s) Oral three times a day PRN Diarrhea      Allergies    No Known Allergies    Intolerances        REVIEW OF SYSTEMS:  CONSTITUTIONAL: No fever, weight loss, or fatigue  EYES: No eye pain, visual disturbances  ENMT:  No difficulty hearing, tinnitus, vertigo; No sinus or throat pain  NECK: No pain or stiffness  RESPIRATORY: No cough, wheezing, chills or hemoptysis; No shortness of breath  CARDIOVASCULAR: No chest pain, palpitations, dizziness  GASTROINTESTINAL: No abdominal or epigastric pain. No nausea, vomiting, or hematemesis; No diarrhea or constipation. No melena or hematochezia.  GENITOURINARY: No dysuria, frequency, hematuria, or incontinence  NEUROLOGICAL: No headaches, memory loss, loss of strength, numbness, or tremors  SKIN: No itching, burning  LYMPH NODES: No enlarged glands  MUSCULOSKELETAL: No joint pain or swelling; No muscle, back, or extremity pain  PSYCHIATRIC: No depression, mood swings  HEME/LYMPH: No easy bruising, or bleeding gums  ALLERGY AND IMMUNOLOGIC: No hives    Vital Signs Last 24 Hrs  T(C): 36.9 (16 Oct 2023 04:43), Max: 36.9 (15 Oct 2023 20:35)  T(F): 98.5 (16 Oct 2023 04:43), Max: 98.5 (16 Oct 2023 04:43)  HR: 60 (16 Oct 2023 07:16) (59 - 60)  BP: 108/55 (16 Oct 2023 04:43) (108/55 - 113/57)  BP(mean): --  RR: 18 (16 Oct 2023 04:43) (18 - 18)  SpO2: 93% (16 Oct 2023 04:43) (93% - 95%)    Parameters below as of 16 Oct 2023 04:43  Patient On (Oxygen Delivery Method): room air        PHYSICAL EXAM:  GENERAL: NAD, well-groomed, well-developed  HEAD:  Atraumatic, Normocephalic  EYES: EOMI, PERRLA, conjunctiva and sclera clear  ENMT: No tonsillar erythema, exudates, or enlargement   NECK: Supple, No JVD  NERVOUS SYSTEM:  Alert & Oriented X3, Good concentration  CHEST/LUNG: Clear to auscultation bilaterally; No rales, rhonchi, wheezing  HEART: Regular rate and rhythm  ABDOMEN: Soft, Nontender, Nondistended; Bowel sounds present  EXTREMITIES:  2+ Peripheral Pulsesb/l legs and feet in clean dressing, trace edema   LYMPH: No lymphadenopathy noted  SKIN: No rashes     LABS:                        8.4    7.49  )-----------( 260      ( 16 Oct 2023 07:42 )             26.5     16 Oct 2023 07:42    140    |  113    |  19     ----------------------------<  201    3.9     |  24     |  0.68     Ca    8.3        16 Oct 2023 07:42        Urinalysis Basic - ( 16 Oct 2023 07:42 )    Color: x / Appearance: x / SG: x / pH: x  Gluc: 201 mg/dL / Ketone: x  / Bili: x / Urobili: x   Blood: x / Protein: x / Nitrite: x   Leuk Esterase: x / RBC: x / WBC x   Sq Epi: x / Non Sq Epi: x / Bacteria: x      CAPILLARY BLOOD GLUCOSE      POCT Blood Glucose.: 193 mg/dL (16 Oct 2023 08:15)  POCT Blood Glucose.: 221 mg/dL (16 Oct 2023 06:26)  POCT Blood Glucose.: 182 mg/dL (15 Oct 2023 21:11)  POCT Blood Glucose.: 150 mg/dL (15 Oct 2023 17:22)  POCT Blood Glucose.: 158 mg/dL (15 Oct 2023 12:15)    blood culture --   No growth at 4 days   10-12 @ 05:36    blood culture --   No growth at 4 days   10-12 @ 05:30    blood culture --   Numerous Methicillin Resistant Staphylococcus aureus   10-11 @ 13:13    blood culture --   10,000 - 49,000 CFU/mL Pseudomonas aeruginosa   10-10 @ 21:00    blood culture --   Growth in aerobic and anaerobic bottles: Methicillin Resistant  Staphylococcus aureus  Direct identification is available within approximately 3-5  hours either by Blood Panel Multiplexed PCR or Direct  MALDI-TOF. Details: [email or web address]   10-10 @ 18:25      urine culture --  10-12 @ 05:36  results   No growth at 4 days 10-12 @ 05:36  urine culture --  10-12 @ 05:30  results   No growth at 4 days 10-12 @ 05:30  urine culture --  10-11 @ 13:13  results   Numerous Methicillin Resistant Staphylococcus aureus 10-11 @ 13:13  urine culture --  10-10 @ 21:00  results   10,000 - 49,000 CFU/mL Pseudomonas aeruginosa 10-10 @ 21:00  urine culture --  10-10 @ 18:25  results   Growth in aerobic and anaerobic bottles: Methicillin Resistant  Staphylococcus aureus  Direct identification is available within approximately 3-5  hours either by Blood Panel Multiplexed PCR or Direct  MALDI-TOF. Details: [email or web address] 10-10 @ 18:25    wound with gram statin --    10-12 @ 05:36  organism  --   10-12 @ 05:36  specimen source .Blood Blood-Peripheral  10-12 @ 05:36  wound with gram statin --    10-12 @ 05:30  organism  --   10-12 @ 05:30  specimen source .Blood Blood-Venous  10-12 @ 05:30  wound with gram statin --    10-11 @ 13:13  organism  Methicillin resistant Staphylococcus aureus   10-11 @ 13:13  specimen source Wound Wound  10-11 @ 13:13  wound with gram statin --    10-10 @ 21:00  organism  Pseudomonas aeruginosa   10-10 @ 21:00  specimen source Catheterized Catheterized  10-10 @ 21:00  wound with gram statin --    10-10 @ 18:25  organism  Blood Culture PCR   10-10 @ 18:25  specimen source .Blood Blood-Peripheral  10-10 @ 18:25      RADIOLOGY & ADDITIONAL TESTS:      Consultant(s) Notes Reviewed:  [x ] YES  [ ] NO    Care Discussed with Consultants/Other Providers [x ] YES  [ ] NO    Advanced care planning discussed with patient and family, advanced care planning forms reviewed, discussed, and completed.  20 minutes spent.   Patient is a 78y old  Male who presents with a chief complaint of fever and weakness (15 Oct 2023 17:55)      INTERVAL HPI/OVERNIGHT EVENTS: no new events, undergoing dc    MEDICATIONS  (STANDING):  aspirin enteric coated 81 milliGRAM(s) Oral daily  atorvastatin 80 milliGRAM(s) Oral at bedtime  bethanechol 25 milliGRAM(s) Oral two times a day  cefepime   IVPB 2000 milliGRAM(s) IV Intermittent every 8 hours  dextrose 5%. 1000 milliLiter(s) (50 mL/Hr) IV Continuous <Continuous>  dextrose 5%. 1000 milliLiter(s) (100 mL/Hr) IV Continuous <Continuous>  dextrose 50% Injectable 25 Gram(s) IV Push once  dextrose 50% Injectable 25 Gram(s) IV Push once  dextrose 50% Injectable 12.5 Gram(s) IV Push once  dronabinol 2.5 milliGRAM(s) Oral two times a day  gabapentin 300 milliGRAM(s) Oral two times a day  glucagon  Injectable 1 milliGRAM(s) IntraMuscular once  insulin glargine Injectable (LANTUS) 18 Unit(s) SubCutaneous at bedtime  insulin lispro (ADMELOG) corrective regimen sliding scale   SubCutaneous every 6 hours  lactated ringers. 1000 milliLiter(s) (100 mL/Hr) IV Continuous <Continuous>  lactobacillus acidophilus 1 Tablet(s) Oral two times a day with meals  midodrine. 10 milliGRAM(s) Oral three times a day  pantoprazole    Tablet 40 milliGRAM(s) Oral before breakfast  tamsulosin 0.4 milliGRAM(s) Oral at bedtime  vancomycin  IVPB 1500 milliGRAM(s) IV Intermittent every 24 hours    MEDICATIONS  (PRN):  acetaminophen     Tablet .. 650 milliGRAM(s) Oral every 6 hours PRN Temp greater or equal to 38C (100.4F), Moderate Pain (4 - 6)  dextrose Oral Gel 15 Gram(s) Oral once PRN Blood Glucose LESS THAN 70 milliGRAM(s)/deciliter  loperamide 2 milliGRAM(s) Oral three times a day PRN Diarrhea      Allergies    No Known Allergies    Intolerances        REVIEW OF SYSTEMS:  CONSTITUTIONAL: No fever, weight loss, or fatigue  EYES: No eye pain, visual disturbances  ENMT:  No difficulty hearing, tinnitus, vertigo; No sinus or throat pain  NECK: No pain or stiffness  RESPIRATORY: No cough, wheezing, chills or hemoptysis; No shortness of breath  CARDIOVASCULAR: No chest pain, palpitations, dizziness  GASTROINTESTINAL: No abdominal or epigastric pain. No nausea, vomiting, or hematemesis; No diarrhea or constipation. No melena or hematochezia.  GENITOURINARY: No dysuria, frequency, hematuria, or incontinence  NEUROLOGICAL: No headaches, memory loss, loss of strength, numbness, or tremors  SKIN: No itching, burning  LYMPH NODES: No enlarged glands  MUSCULOSKELETAL: No joint pain or swelling; No muscle, back, or extremity pain  PSYCHIATRIC: No depression, mood swings  HEME/LYMPH: No easy bruising, or bleeding gums  ALLERGY AND IMMUNOLOGIC: No hives    Vital Signs Last 24 Hrs  T(C): 36.9 (16 Oct 2023 04:43), Max: 36.9 (15 Oct 2023 20:35)  T(F): 98.5 (16 Oct 2023 04:43), Max: 98.5 (16 Oct 2023 04:43)  HR: 60 (16 Oct 2023 07:16) (59 - 60)  BP: 108/55 (16 Oct 2023 04:43) (108/55 - 113/57)  BP(mean): --  RR: 18 (16 Oct 2023 04:43) (18 - 18)  SpO2: 93% (16 Oct 2023 04:43) (93% - 95%)    Parameters below as of 16 Oct 2023 04:43  Patient On (Oxygen Delivery Method): room air        PHYSICAL EXAM:  GENERAL: NAD, well-groomed, well-developed  HEAD:  Atraumatic, Normocephalic  EYES: EOMI, PERRLA, conjunctiva and sclera clear  ENMT: No tonsillar erythema, exudates, or enlargement   NECK: Supple, No JVD  NERVOUS SYSTEM:  Alert & Oriented X3, Good concentration  CHEST/LUNG: Clear to auscultation bilaterally; No rales, rhonchi, wheezing  HEART: Regular rate and rhythm  ABDOMEN: Soft, Nontender, Nondistended; Bowel sounds present  EXTREMITIES:  2+ Peripheral Pulsesb/l legs and feet in clean dressing, trace edema   LYMPH: No lymphadenopathy noted  SKIN: No rashes     LABS:                        8.4    7.49  )-----------( 260      ( 16 Oct 2023 07:42 )             26.5     16 Oct 2023 07:42    140    |  113    |  19     ----------------------------<  201    3.9     |  24     |  0.68     Ca    8.3        16 Oct 2023 07:42        Urinalysis Basic - ( 16 Oct 2023 07:42 )    Color: x / Appearance: x / SG: x / pH: x  Gluc: 201 mg/dL / Ketone: x  / Bili: x / Urobili: x   Blood: x / Protein: x / Nitrite: x   Leuk Esterase: x / RBC: x / WBC x   Sq Epi: x / Non Sq Epi: x / Bacteria: x      CAPILLARY BLOOD GLUCOSE      POCT Blood Glucose.: 193 mg/dL (16 Oct 2023 08:15)  POCT Blood Glucose.: 221 mg/dL (16 Oct 2023 06:26)  POCT Blood Glucose.: 182 mg/dL (15 Oct 2023 21:11)  POCT Blood Glucose.: 150 mg/dL (15 Oct 2023 17:22)  POCT Blood Glucose.: 158 mg/dL (15 Oct 2023 12:15)    blood culture --   No growth at 4 days   10-12 @ 05:36    blood culture --   No growth at 4 days   10-12 @ 05:30    blood culture --   Numerous Methicillin Resistant Staphylococcus aureus   10-11 @ 13:13    blood culture --   10,000 - 49,000 CFU/mL Pseudomonas aeruginosa   10-10 @ 21:00    blood culture --   Growth in aerobic and anaerobic bottles: Methicillin Resistant  Staphylococcus aureus  Direct identification is available within approximately 3-5  hours either by Blood Panel Multiplexed PCR or Direct  MALDI-TOF. Details: [email or web address]   10-10 @ 18:25      urine culture --  10-12 @ 05:36  results   No growth at 4 days 10-12 @ 05:36  urine culture --  10-12 @ 05:30  results   No growth at 4 days 10-12 @ 05:30  urine culture --  10-11 @ 13:13  results   Numerous Methicillin Resistant Staphylococcus aureus 10-11 @ 13:13  urine culture --  10-10 @ 21:00  results   10,000 - 49,000 CFU/mL Pseudomonas aeruginosa 10-10 @ 21:00  urine culture --  10-10 @ 18:25  results   Growth in aerobic and anaerobic bottles: Methicillin Resistant  Staphylococcus aureus  Direct identification is available within approximately 3-5  hours either by Blood Panel Multiplexed PCR or Direct  MALDI-TOF. Details: [email or web address] 10-10 @ 18:25    wound with gram statin --    10-12 @ 05:36  organism  --   10-12 @ 05:36  specimen source .Blood Blood-Peripheral  10-12 @ 05:36  wound with gram statin --    10-12 @ 05:30  organism  --   10-12 @ 05:30  specimen source .Blood Blood-Venous  10-12 @ 05:30  wound with gram statin --    10-11 @ 13:13  organism  Methicillin resistant Staphylococcus aureus   10-11 @ 13:13  specimen source Wound Wound  10-11 @ 13:13  wound with gram statin --    10-10 @ 21:00  organism  Pseudomonas aeruginosa   10-10 @ 21:00  specimen source Catheterized Catheterized  10-10 @ 21:00  wound with gram statin --    10-10 @ 18:25  organism  Blood Culture PCR   10-10 @ 18:25  specimen source .Blood Blood-Peripheral  10-10 @ 18:25      RADIOLOGY & ADDITIONAL TESTS:      Consultant(s) Notes Reviewed:  [x ] YES  [ ] NO    Care Discussed with Consultants/Other Providers [x ] YES  [ ] NO    Advanced care planning discussed with patient and family, advanced care planning forms reviewed, discussed, and completed.  20 minutes spent.   Patient is a 78y old  Male who presents with a chief complaint of fever and weakness (15 Oct 2023 17:55)      INTERVAL HPI/OVERNIGHT EVENTS: no new events, undergoing dc    MEDICATIONS  (STANDING):  aspirin enteric coated 81 milliGRAM(s) Oral daily  atorvastatin 80 milliGRAM(s) Oral at bedtime  bethanechol 25 milliGRAM(s) Oral two times a day  cefepime   IVPB 2000 milliGRAM(s) IV Intermittent every 8 hours  dextrose 5%. 1000 milliLiter(s) (50 mL/Hr) IV Continuous <Continuous>  dextrose 5%. 1000 milliLiter(s) (100 mL/Hr) IV Continuous <Continuous>  dextrose 50% Injectable 25 Gram(s) IV Push once  dextrose 50% Injectable 25 Gram(s) IV Push once  dextrose 50% Injectable 12.5 Gram(s) IV Push once  dronabinol 2.5 milliGRAM(s) Oral two times a day  gabapentin 300 milliGRAM(s) Oral two times a day  glucagon  Injectable 1 milliGRAM(s) IntraMuscular once  insulin glargine Injectable (LANTUS) 18 Unit(s) SubCutaneous at bedtime  insulin lispro (ADMELOG) corrective regimen sliding scale   SubCutaneous every 6 hours  lactated ringers. 1000 milliLiter(s) (100 mL/Hr) IV Continuous <Continuous>  lactobacillus acidophilus 1 Tablet(s) Oral two times a day with meals  midodrine. 10 milliGRAM(s) Oral three times a day  pantoprazole    Tablet 40 milliGRAM(s) Oral before breakfast  tamsulosin 0.4 milliGRAM(s) Oral at bedtime  vancomycin  IVPB 1500 milliGRAM(s) IV Intermittent every 24 hours    MEDICATIONS  (PRN):  acetaminophen     Tablet .. 650 milliGRAM(s) Oral every 6 hours PRN Temp greater or equal to 38C (100.4F), Moderate Pain (4 - 6)  dextrose Oral Gel 15 Gram(s) Oral once PRN Blood Glucose LESS THAN 70 milliGRAM(s)/deciliter  loperamide 2 milliGRAM(s) Oral three times a day PRN Diarrhea      Allergies    No Known Allergies    Intolerances        REVIEW OF SYSTEMS:  CONSTITUTIONAL: No fever, weight loss, or fatigue  EYES: No eye pain, visual disturbances  ENMT:  No difficulty hearing, tinnitus, vertigo; No sinus or throat pain  NECK: No pain or stiffness  RESPIRATORY: No cough, wheezing, chills or hemoptysis; No shortness of breath  CARDIOVASCULAR: No chest pain, palpitations, dizziness  GASTROINTESTINAL: No abdominal or epigastric pain. No nausea, vomiting, or hematemesis; No diarrhea or constipation. No melena or hematochezia.  GENITOURINARY: No dysuria, frequency, hematuria, or incontinence  NEUROLOGICAL: No headaches, memory loss, loss of strength, numbness, or tremors  SKIN: No itching, burning  LYMPH NODES: No enlarged glands  MUSCULOSKELETAL: No joint pain or swelling; No muscle, back, or extremity pain  PSYCHIATRIC: No depression, mood swings  HEME/LYMPH: No easy bruising, or bleeding gums  ALLERGY AND IMMUNOLOGIC: No hives    Vital Signs Last 24 Hrs  T(C): 36.9 (16 Oct 2023 04:43), Max: 36.9 (15 Oct 2023 20:35)  T(F): 98.5 (16 Oct 2023 04:43), Max: 98.5 (16 Oct 2023 04:43)  HR: 60 (16 Oct 2023 07:16) (59 - 60)  BP: 108/55 (16 Oct 2023 04:43) (108/55 - 113/57)  BP(mean): --  RR: 18 (16 Oct 2023 04:43) (18 - 18)  SpO2: 93% (16 Oct 2023 04:43) (93% - 95%)    Parameters below as of 16 Oct 2023 04:43  Patient On (Oxygen Delivery Method): room air        PHYSICAL EXAM:  GENERAL: NAD, well-groomed, well-developed  HEAD:  Atraumatic, Normocephalic  EYES: EOMI, PERRLA, conjunctiva and sclera clear  ENMT: No tonsillar erythema, exudates, or enlargement   NECK: Supple, No JVD  NERVOUS SYSTEM:  Alert & Oriented X3, Good concentration  CHEST/LUNG: Clear to auscultation bilaterally; No rales, rhonchi, wheezing  HEART: Regular rate and rhythm  ABDOMEN: Soft, Nontender, Nondistended; Bowel sounds present  EXTREMITIES:  2+ Peripheral Pulsesb/l legs and feet in clean dressing, trace edema   LYMPH: No lymphadenopathy noted  SKIN: No rashes     LABS:                        8.4    7.49  )-----------( 260      ( 16 Oct 2023 07:42 )             26.5     16 Oct 2023 07:42    140    |  113    |  19     ----------------------------<  201    3.9     |  24     |  0.68     Ca    8.3        16 Oct 2023 07:42        Urinalysis Basic - ( 16 Oct 2023 07:42 )    Color: x / Appearance: x / SG: x / pH: x  Gluc: 201 mg/dL / Ketone: x  / Bili: x / Urobili: x   Blood: x / Protein: x / Nitrite: x   Leuk Esterase: x / RBC: x / WBC x   Sq Epi: x / Non Sq Epi: x / Bacteria: x      CAPILLARY BLOOD GLUCOSE      POCT Blood Glucose.: 193 mg/dL (16 Oct 2023 08:15)  POCT Blood Glucose.: 221 mg/dL (16 Oct 2023 06:26)  POCT Blood Glucose.: 182 mg/dL (15 Oct 2023 21:11)  POCT Blood Glucose.: 150 mg/dL (15 Oct 2023 17:22)  POCT Blood Glucose.: 158 mg/dL (15 Oct 2023 12:15)    blood culture --   No growth at 4 days   10-12 @ 05:36    blood culture --   No growth at 4 days   10-12 @ 05:30    blood culture --   Numerous Methicillin Resistant Staphylococcus aureus   10-11 @ 13:13    blood culture --   10,000 - 49,000 CFU/mL Pseudomonas aeruginosa   10-10 @ 21:00    blood culture --   Growth in aerobic and anaerobic bottles: Methicillin Resistant  Staphylococcus aureus  Direct identification is available within approximately 3-5  hours either by Blood Panel Multiplexed PCR or Direct  MALDI-TOF. Details: [email or web address]   10-10 @ 18:25      urine culture --  10-12 @ 05:36  results   No growth at 4 days 10-12 @ 05:36  urine culture --  10-12 @ 05:30  results   No growth at 4 days 10-12 @ 05:30  urine culture --  10-11 @ 13:13  results   Numerous Methicillin Resistant Staphylococcus aureus 10-11 @ 13:13  urine culture --  10-10 @ 21:00  results   10,000 - 49,000 CFU/mL Pseudomonas aeruginosa 10-10 @ 21:00  urine culture --  10-10 @ 18:25  results   Growth in aerobic and anaerobic bottles: Methicillin Resistant  Staphylococcus aureus  Direct identification is available within approximately 3-5  hours either by Blood Panel Multiplexed PCR or Direct  MALDI-TOF. Details: [email or web address] 10-10 @ 18:25    wound with gram statin --    10-12 @ 05:36  organism  --   10-12 @ 05:36  specimen source .Blood Blood-Peripheral  10-12 @ 05:36  wound with gram statin --    10-12 @ 05:30  organism  --   10-12 @ 05:30  specimen source .Blood Blood-Venous  10-12 @ 05:30  wound with gram statin --    10-11 @ 13:13  organism  Methicillin resistant Staphylococcus aureus   10-11 @ 13:13  specimen source Wound Wound  10-11 @ 13:13  wound with gram statin --    10-10 @ 21:00  organism  Pseudomonas aeruginosa   10-10 @ 21:00  specimen source Catheterized Catheterized  10-10 @ 21:00  wound with gram statin --    10-10 @ 18:25  organism  Blood Culture PCR   10-10 @ 18:25  specimen source .Blood Blood-Peripheral  10-10 @ 18:25      RADIOLOGY & ADDITIONAL TESTS:      Consultant(s) Notes Reviewed:  [x ] YES  [ ] NO    Care Discussed with Consultants/Other Providers [x ] YES  [ ] NO    Advanced care planning discussed with patient and family, advanced care planning forms reviewed, discussed, and completed.  20 minutes spent.

## 2023-10-16 NOTE — PROGRESS NOTE ADULT - ASSESSMENT
78-year-old male presents to the hospital by ambulance from home.  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 Pt does not go to doctor on regular basis per son and does phone visits. Son reports years of struggling with foot infections with black areas of gangrene and their struggling to keep his feet but now feeling that keeping him alive is more important.  Culture - Blood (10.10.23 @ 18:25)    -  Methicillin resistant Staphylococcus aureus (MRSA): Detec  Repeat 10/12 BCx NGTD (48h)  Wcx with MRSA, placed on contact isolation   UCx + Pseudomonas, pansensitive including cefepime   Afebrile over past 24h    RECOMMENDATIONS  Very concerning for osteomyelitis -- NM bone scan ordered and pending  Cardiology rec appreciated   - plan for GISSELL early to rule out IE or PPM involvement   C/w Vancomycin (10/11-) dosing per pharmacy protocol  S/p zosyn (10/11-10/12), changed to cefepime  C/w cefepime given UCx findings  Anticipate that surgery will be required  -plan for nuclear study for possible osteomyelitis    Thank you for consulting us and involving us in the management of this most interesting and challenging case.  We will follow along in the care of this patient. Please call us at [phone] or text me directly on my cell# at [phone] with any concerns.       78-year-old male presents to the hospital by ambulance from home.  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 Pt does not go to doctor on regular basis per son and does phone visits. Son reports years of struggling with foot infections with black areas of gangrene and their struggling to keep his feet but now feeling that keeping him alive is more important.  Culture - Blood (10.10.23 @ 18:25)    -  Methicillin resistant Staphylococcus aureus (MRSA): Detec  Repeat 10/12 BCx NGTD (48h)  Wcx with MRSA, placed on contact isolation   UCx + Pseudomonas, pansensitive including cefepime   Afebrile over past 24h    RECOMMENDATIONS  Very concerning for osteomyelitis -- NM bone scan ordered and pending  Cardiology rec appreciated   - plan for GISSELL early to rule out IE or PPM involvement   C/w Vancomycin (10/11-) dosing per pharmacy protocol  S/p zosyn (10/11-10/12), changed to cefepime  C/w cefepime given UCx findings  Anticipate that surgery will be required  -plan for nuclear study for possible osteomyelitis    Thank you for consulting us and involving us in the management of this most interesting and challenging case.  We will follow along in the care of this patient. Please call us at [phone] or text me directly on my cell# at [phone] with any concerns.       78-year-old male presents to the hospital by ambulance from home.  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 Pt does not go to doctor on regular basis per son and does phone visits. Son reports years of struggling with foot infections with black areas of gangrene and their struggling to keep his feet but now feeling that keeping him alive is more important.  Culture - Blood (10.10.23 @ 18:25)    -  Methicillin resistant Staphylococcus aureus (MRSA): Detec  Repeat 10/12 BCx NGTD (48h)  Wcx with MRSA, placed on contact isolation   UCx + Pseudomonas, pansensitive including cefepime   Afebrile over past 24h    RECOMMENDATIONS  Very concerning for osteomyelitis -- NM bone scan ordered and pending  Cardiology rec appreciated   - plan for GISSELL early to rule out IE or PPM involvement   C/w Vancomycin (10/11-) dosing per pharmacy protocol  S/p zosyn (10/11-10/12), changed to cefepime  C/w cefepime given UCx findings  Anticipate that surgery will be required  -plan for nuclear study for possible osteomyelitis    Thank you for consulting us and involving us in the management of this most interesting and challenging case.  We will follow along in the care of this patient. Please call us at [phone] or text me directly on my cell# at [phone] with any concerns.

## 2023-10-16 NOTE — PROGRESS NOTE ADULT - SUBJECTIVE AND OBJECTIVE BOX
[facility]  Les Mccray PA-C  [address] [place] [address]  [phone]      INTERVAL HPI/OVERNIGHT EVENTS:  Pt s/e  Tolerating diet  No new GI events    MEDICATIONS  (STANDING):  aspirin enteric coated 81 milliGRAM(s) Oral daily  atorvastatin 80 milliGRAM(s) Oral at bedtime  bethanechol 25 milliGRAM(s) Oral two times a day  cefepime   IVPB 2000 milliGRAM(s) IV Intermittent every 8 hours  dextrose 5%. 1000 milliLiter(s) (100 mL/Hr) IV Continuous <Continuous>  dextrose 5%. 1000 milliLiter(s) (50 mL/Hr) IV Continuous <Continuous>  dextrose 50% Injectable 25 Gram(s) IV Push once  dextrose 50% Injectable 25 Gram(s) IV Push once  dextrose 50% Injectable 12.5 Gram(s) IV Push once  dronabinol 2.5 milliGRAM(s) Oral two times a day  gabapentin 300 milliGRAM(s) Oral two times a day  glucagon  Injectable 1 milliGRAM(s) IntraMuscular once  insulin glargine Injectable (LANTUS) 18 Unit(s) SubCutaneous at bedtime  insulin lispro (ADMELOG) corrective regimen sliding scale   SubCutaneous every 6 hours  lactated ringers. 1000 milliLiter(s) (100 mL/Hr) IV Continuous <Continuous>  lactobacillus acidophilus 1 Tablet(s) Oral two times a day with meals  midodrine. 10 milliGRAM(s) Oral three times a day  pantoprazole    Tablet 40 milliGRAM(s) Oral before breakfast  tamsulosin 0.4 milliGRAM(s) Oral at bedtime  vancomycin  IVPB 1500 milliGRAM(s) IV Intermittent every 24 hours    MEDICATIONS  (PRN):  acetaminophen     Tablet .. 650 milliGRAM(s) Oral every 6 hours PRN Temp greater or equal to 38C (100.4F), Moderate Pain (4 - 6)  dextrose Oral Gel 15 Gram(s) Oral once PRN Blood Glucose LESS THAN 70 milliGRAM(s)/deciliter  loperamide 2 milliGRAM(s) Oral three times a day PRN Diarrhea      Allergies    No Known Allergies      PHYSICAL EXAM:   Vital Signs:  Vital Signs Last 24 Hrs  T(C): 36.9 (16 Oct 2023 04:43), Max: 36.9 (15 Oct 2023 20:35)  T(F): 98.5 (16 Oct 2023 04:43), Max: 98.5 (16 Oct 2023 04:43)  HR: 60 (16 Oct 2023 07:16) (59 - 60)  BP: 108/55 (16 Oct 2023 04:43) (108/55 - 113/57)  BP(mean): --  RR: 18 (16 Oct 2023 04:43) (18 - 18)  SpO2: 93% (16 Oct 2023 04:43) (93% - 95%)    Parameters below as of 16 Oct 2023 04:43  Patient On (Oxygen Delivery Method): room air    GENERAL:  Appears stated age  HEENT:  NC/AT  CHEST:  Full & symmetric excursion  HEART:  Regular rhythm  ABDOMEN:  Soft, non-tender, non-distended  EXTEREMITIES:  no cyanosis  SKIN:  No rash  NEURO:  Alert      LABS:                        8.4    7.49  )-----------( 260      ( 16 Oct 2023 07:42 )             26.5     10-16    140  |  113<H>  |  19  ----------------------------<  201<H>  3.9   |  24  |  0.68    Ca    8.3<L>      16 Oct 2023 07:42        Urinalysis Basic - ( 16 Oct 2023 07:42 )    Color: x / Appearance: x / SG: x / pH: x  Gluc: 201 mg/dL / Ketone: x  / Bili: x / Urobili: x   Blood: x / Protein: x / Nitrite: x   Leuk Esterase: x / RBC: x / WBC x   Sq Epi: x / Non Sq Epi: x / Bacteria: x     [facility]  Les Mccray PA-C  [address] [place] [address]  [phone]      INTERVAL HPI/OVERNIGHT EVENTS:  Pt s/e  Tolerating diet  No new GI events    MEDICATIONS  (STANDING):  aspirin enteric coated 81 milliGRAM(s) Oral daily  atorvastatin 80 milliGRAM(s) Oral at bedtime  bethanechol 25 milliGRAM(s) Oral two times a day  cefepime   IVPB 2000 milliGRAM(s) IV Intermittent every 8 hours  dextrose 5%. 1000 milliLiter(s) (100 mL/Hr) IV Continuous <Continuous>  dextrose 5%. 1000 milliLiter(s) (50 mL/Hr) IV Continuous <Continuous>  dextrose 50% Injectable 25 Gram(s) IV Push once  dextrose 50% Injectable 25 Gram(s) IV Push once  dextrose 50% Injectable 12.5 Gram(s) IV Push once  dronabinol 2.5 milliGRAM(s) Oral two times a day  gabapentin 300 milliGRAM(s) Oral two times a day  glucagon  Injectable 1 milliGRAM(s) IntraMuscular once  insulin glargine Injectable (LANTUS) 18 Unit(s) SubCutaneous at bedtime  insulin lispro (ADMELOG) corrective regimen sliding scale   SubCutaneous every 6 hours  lactated ringers. 1000 milliLiter(s) (100 mL/Hr) IV Continuous <Continuous>  lactobacillus acidophilus 1 Tablet(s) Oral two times a day with meals  midodrine. 10 milliGRAM(s) Oral three times a day  pantoprazole    Tablet 40 milliGRAM(s) Oral before breakfast  tamsulosin 0.4 milliGRAM(s) Oral at bedtime  vancomycin  IVPB 1500 milliGRAM(s) IV Intermittent every 24 hours    MEDICATIONS  (PRN):  acetaminophen     Tablet .. 650 milliGRAM(s) Oral every 6 hours PRN Temp greater or equal to 38C (100.4F), Moderate Pain (4 - 6)  dextrose Oral Gel 15 Gram(s) Oral once PRN Blood Glucose LESS THAN 70 milliGRAM(s)/deciliter  loperamide 2 milliGRAM(s) Oral three times a day PRN Diarrhea      Allergies    No Known Allergies      PHYSICAL EXAM:   Vital Signs:  Vital Signs Last 24 Hrs  T(C): 36.9 (16 Oct 2023 04:43), Max: 36.9 (15 Oct 2023 20:35)  T(F): 98.5 (16 Oct 2023 04:43), Max: 98.5 (16 Oct 2023 04:43)  HR: 60 (16 Oct 2023 07:16) (59 - 60)  BP: 108/55 (16 Oct 2023 04:43) (108/55 - 113/57)  BP(mean): --  RR: 18 (16 Oct 2023 04:43) (18 - 18)  SpO2: 93% (16 Oct 2023 04:43) (93% - 95%)    Parameters below as of 16 Oct 2023 04:43  Patient On (Oxygen Delivery Method): room air    GENERAL:  Appears stated age  HEENT:  NC/AT  CHEST:  Full & symmetric excursion  HEART:  Regular rhythm  ABDOMEN:  Soft, non-tender, non-distended  EXTEREMITIES:  no cyanosis  SKIN:  No rash  NEURO:  Alert      LABS:                        8.4    7.49  )-----------( 260      ( 16 Oct 2023 07:42 )             26.5     10-16    140  |  113<H>  |  19  ----------------------------<  201<H>  3.9   |  24  |  0.68    Ca    8.3<L>      16 Oct 2023 07:42        Urinalysis Basic - ( 16 Oct 2023 07:42 )    Color: x / Appearance: x / SG: x / pH: x  Gluc: 201 mg/dL / Ketone: x  / Bili: x / Urobili: x   Blood: x / Protein: x / Nitrite: x   Leuk Esterase: x / RBC: x / WBC x   Sq Epi: x / Non Sq Epi: x / Bacteria: x     [facility]  Les Mccray PA-C  [address] [place] [address]  [phone]      INTERVAL HPI/OVERNIGHT EVENTS:  Pt s/e  Tolerating diet  No new GI events    MEDICATIONS  (STANDING):  aspirin enteric coated 81 milliGRAM(s) Oral daily  atorvastatin 80 milliGRAM(s) Oral at bedtime  bethanechol 25 milliGRAM(s) Oral two times a day  cefepime   IVPB 2000 milliGRAM(s) IV Intermittent every 8 hours  dextrose 5%. 1000 milliLiter(s) (100 mL/Hr) IV Continuous <Continuous>  dextrose 5%. 1000 milliLiter(s) (50 mL/Hr) IV Continuous <Continuous>  dextrose 50% Injectable 25 Gram(s) IV Push once  dextrose 50% Injectable 25 Gram(s) IV Push once  dextrose 50% Injectable 12.5 Gram(s) IV Push once  dronabinol 2.5 milliGRAM(s) Oral two times a day  gabapentin 300 milliGRAM(s) Oral two times a day  glucagon  Injectable 1 milliGRAM(s) IntraMuscular once  insulin glargine Injectable (LANTUS) 18 Unit(s) SubCutaneous at bedtime  insulin lispro (ADMELOG) corrective regimen sliding scale   SubCutaneous every 6 hours  lactated ringers. 1000 milliLiter(s) (100 mL/Hr) IV Continuous <Continuous>  lactobacillus acidophilus 1 Tablet(s) Oral two times a day with meals  midodrine. 10 milliGRAM(s) Oral three times a day  pantoprazole    Tablet 40 milliGRAM(s) Oral before breakfast  tamsulosin 0.4 milliGRAM(s) Oral at bedtime  vancomycin  IVPB 1500 milliGRAM(s) IV Intermittent every 24 hours    MEDICATIONS  (PRN):  acetaminophen     Tablet .. 650 milliGRAM(s) Oral every 6 hours PRN Temp greater or equal to 38C (100.4F), Moderate Pain (4 - 6)  dextrose Oral Gel 15 Gram(s) Oral once PRN Blood Glucose LESS THAN 70 milliGRAM(s)/deciliter  loperamide 2 milliGRAM(s) Oral three times a day PRN Diarrhea      Allergies    No Known Allergies      PHYSICAL EXAM:   Vital Signs:  Vital Signs Last 24 Hrs  T(C): 36.9 (16 Oct 2023 04:43), Max: 36.9 (15 Oct 2023 20:35)  T(F): 98.5 (16 Oct 2023 04:43), Max: 98.5 (16 Oct 2023 04:43)  HR: 60 (16 Oct 2023 07:16) (59 - 60)  BP: 108/55 (16 Oct 2023 04:43) (108/55 - 113/57)  BP(mean): --  RR: 18 (16 Oct 2023 04:43) (18 - 18)  SpO2: 93% (16 Oct 2023 04:43) (93% - 95%)    Parameters below as of 16 Oct 2023 04:43  Patient On (Oxygen Delivery Method): room air    GENERAL:  Appears stated age  HEENT:  NC/AT  CHEST:  Full & symmetric excursion  HEART:  Regular rhythm  ABDOMEN:  Soft, non-tender, non-distended  EXTEREMITIES:  no cyanosis  SKIN:  No rash  NEURO:  Alert      LABS:                        8.4    7.49  )-----------( 260      ( 16 Oct 2023 07:42 )             26.5     10-16    140  |  113<H>  |  19  ----------------------------<  201<H>  3.9   |  24  |  0.68    Ca    8.3<L>      16 Oct 2023 07:42        Urinalysis Basic - ( 16 Oct 2023 07:42 )    Color: x / Appearance: x / SG: x / pH: x  Gluc: 201 mg/dL / Ketone: x  / Bili: x / Urobili: x   Blood: x / Protein: x / Nitrite: x   Leuk Esterase: x / RBC: x / WBC x   Sq Epi: x / Non Sq Epi: x / Bacteria: x     Doxycycline Pregnancy And Lactation Text: This medication is Pregnancy Category D and not consider safe during pregnancy. It is also excreted in breast milk but is considered safe for shorter treatment courses.

## 2023-10-16 NOTE — PHARMACOTHERAPY INTERVENTION NOTE - COMMENTS
Patient is a 77 yo M on Vancomycin 1500 mg q24h for MRSA bacteremia. Discussed with ID Dr. Burnett to adjust dose since patient’s renal function has improved (CrCl 84 mL/min); and that per [employer], vancomycin 750 mg q12h would have an estimated steady state AUC of 469. Recommendation accepted and order entered.

## 2023-10-16 NOTE — PROGRESS NOTE ADULT - ASSESSMENT
78M with PMH HTN, PPM, DM, foot ulcers , presents to the hospital by ambulance from home.  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 Pt does not go to doctor on regular basis per son and does phone visits.    +Bacteremia. Called by Cardio NP for consultation and to arrange GISSELL to R/O Endocarditis or PPM related infection. Order placed to be done by Dr. Barrett Monday or Tuesday  Cant ASA, Statin  Pt has seen Dave Panchal and [facility] outpt 2021, [phone]  pt reports feeling well today.   NPO after midnight tonight for possible GISSELL tomorrow.       TTE: CONCLUSIONS:      1. Technically difficult image quality.   2. There is abnormal septal activation, likely from a paced rhythm. There is likely superimposed anterior and septal hypokinesis. Limited views and limited endocardial definition make further interpretation limited.   3. The right ventricle is not well visualized. Normal right ventricular cavity size and reduced systolic function.   4. The left atrium is mildly dilated in size.   5. Right atrium was not well visualized.   6. Mild calcification of the aortic valve leaflets.   7. Tricuspid valve was not well visualized.   8. Aortic valve was not well visualized.     78M with PMH HTN, PPM, DM, foot ulcers , presents to the hospital by ambulance from home.  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 Pt does not go to doctor on regular basis per son and does phone visits.    +Bacteremia. Called by Cardio NP for consultation and to arrange GISSELL to R/O Endocarditis or PPM related infection. Order placed to be done by Dr. Barrett Monday or Tuesday  Cant ASA, Statin  Pt has seen Dave Panchal and [facility] outpt 2021, [phone]  pt reports feeling well today.   NPO after midnight tonight for possible GISSELL tomorrow.       TTE: CONCLUSIONS:      1. Technically difficult image quality.   2. There is abnormal septal activation, likely from a paced rhythm. There is likely superimposed anterior and septal hypokinesis. Limited views and limited endocardial definition make further interpretation limited.   3. The right ventricle is not well visualized. Normal right ventricular cavity size and reduced systolic function.   4. The left atrium is mildly dilated in size.   5. Right atrium was not well visualized.   6. Mild calcification of the aortic valve leaflets.   7. Tricuspid valve was not well visualized.   8. Aortic valve was not well visualized.     78M with PMH HTN, PPM, DM, foot ulcers , presents to the hospital by ambulance from home.  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 Pt does not go to doctor on regular basis per son and does phone visits.    +Bacteremia. Called by Cardio NP for consultation and to arrange GISSELL to R/O Endocarditis or PPM related infection. Order placed to be done by Dr. Barrett Monday or Tuesday  Cant ASA, Statin  Pt has seen Dave Panchal and [facility] outpt 2021, [phone]  pt reports feeling well today.   NPO after midnight tonight for possible GISSELL tomorrow.       TTE: CONCLUSIONS:      1. Technically difficult image quality.   2. There is abnormal septal activation, likely from a paced rhythm. There is likely superimposed anterior and septal hypokinesis. Limited views and limited endocardial definition make further interpretation limited.   3. The right ventricle is not well visualized. Normal right ventricular cavity size and reduced systolic function.   4. The left atrium is mildly dilated in size.   5. Right atrium was not well visualized.   6. Mild calcification of the aortic valve leaflets.   7. Tricuspid valve was not well visualized.   8. Aortic valve was not well visualized.

## 2023-10-16 NOTE — CHART NOTE - NSCHARTNOTEFT_GEN_A_CORE
SABRINA/PVR's reviewed with Dr Sen  No hemodynamically significant PAD noted    < from: [facility] Physiol Extremity Lower 3+ Level, BI (10.13.23 @ 14:27) >    ACC: [id number] EXAM:  PHYSIOL EXTREM LOW 3+ LEV BI   ORDERED BY: TG PAREKH     PROCEDURE DATE:  10/13/2023          INTERPRETATION:  Clinical information: History of smoking, diabetes,   presents with left leg pain and bilateral lower extremity nonhealing   ulcers.    COMPARISON: None available.    TECHNIQUE: Lower extremity arterial Doppler study. Segmental pressures   and left ankle-brachial index are unobtainable due to noncompressibility.    FINDINGS: Right ankle brachial index measures 1.80, likely artifactually   elevated. Toe brachial index measures 0.52 on the right and 0.40 on the   left.    PVR waveforms are normal in amplitude and pulsatility throughout both   lower extremities.    IMPRESSION: Limited study due to poor vascular compressibility. PVR   waveforms suggest no hemodynamically significant arterial inflow   limitation in either lower extremity.      < end of copied text >      Plan:  No vascular surgical intervention indicated  Podiatry may proceed with intervention as indicated  Reconsult PRN  Will sign off SABRINA/PVR's reviewed with Dr Sen  No hemodynamically significant PAD noted    < from: [facility] Physiol Extremity Lower 3+ Level, BI (10.13.23 @ 14:27) >    ACC: [id number] EXAM:  PHYSIOL EXTREM LOW 3+ LEV BI   ORDERED BY: TG PAREKH     PROCEDURE DATE:  10/13/2023          INTERPRETATION:  Clinical information: History of smoking, diabetes,   presents with left leg pain and bilateral lower extremity nonhealing   ulcers.    COMPARISON: None available.    TECHNIQUE: Lower extremity arterial Doppler study. Segmental pressures   and left ankle-brachial index are unobtainable due to noncompressibility.    FINDINGS: Right ankle brachial index measures 1.80, likely artifactually   elevated. Toe brachial index measures 0.52 on the right and 0.40 on the   left.    PVR waveforms are normal in amplitude and pulsatility throughout both   lower extremities.    IMPRESSION: Limited study due to poor vascular compressibility. PVR   waveforms suggest no hemodynamically significant arterial inflow   limitation in either lower extremity.      < end of copied text >      Plan:  No vascular surgical intervention indicated  Podiatry may proceed with intervention as indicated  Reconsult PRN  Will sign off SABRINA/PVR's reviewed with Dr Sen  No hemodynamically significant PAD noted    < from: [facility] Physiol Extremity Lower 3+ Level, BI (10.13.23 @ 14:27) >    ACC: [id number] EXAM:  PHYSIOL EXTREM LOW 3+ LEV BI   ORDERED BY: TG PAREKH     PROCEDURE DATE:  10/13/2023          INTERPRETATION:  Clinical information: History of smoking, diabetes,   presents with left leg pain and bilateral lower extremity nonhealing   ulcers.    COMPARISON: None available.    TECHNIQUE: Lower extremity arterial Doppler study. Segmental pressures   and left ankle-brachial index are unobtainable due to noncompressibility.    FINDINGS: Right ankle brachial index measures 1.80, likely artifactually   elevated. Toe brachial index measures 0.52 on the right and 0.40 on the   left.    PVR waveforms are normal in amplitude and pulsatility throughout both   lower extremities.    IMPRESSION: Limited study due to poor vascular compressibility. PVR   waveforms suggest no hemodynamically significant arterial inflow   limitation in either lower extremity.      < end of copied text >      Plan:  No vascular surgical intervention indicated  Podiatry may proceed with intervention as indicated  Reconsult PRN  Will sign off

## 2023-10-17 LAB
ANION GAP SERPL CALC-SCNC: 4 MMOL/L — LOW (ref 5–17)
BUN SERPL-MCNC: 18 MG/DL — SIGNIFICANT CHANGE UP (ref 7–23)
CALCIUM SERPL-MCNC: 8.4 MG/DL — LOW (ref 8.5–10.1)
CHLORIDE SERPL-SCNC: 112 MMOL/L — HIGH (ref 96–108)
CO2 SERPL-SCNC: 26 MMOL/L — SIGNIFICANT CHANGE UP (ref 22–31)
CREAT SERPL-MCNC: 0.69 MG/DL — SIGNIFICANT CHANGE UP (ref 0.5–1.3)
CULTURE RESULTS: SIGNIFICANT CHANGE UP
EGFR: 95 ML/MIN/1.73M2 — SIGNIFICANT CHANGE UP
GLUCOSE BLDC GLUCOMTR-MCNC: 177 MG/DL — HIGH (ref 70–99)
GLUCOSE BLDC GLUCOMTR-MCNC: 192 MG/DL — HIGH (ref 70–99)
GLUCOSE BLDC GLUCOMTR-MCNC: 202 MG/DL — HIGH (ref 70–99)
GLUCOSE BLDC GLUCOMTR-MCNC: 205 MG/DL — HIGH (ref 70–99)
GLUCOSE BLDC GLUCOMTR-MCNC: 247 MG/DL — HIGH (ref 70–99)
GLUCOSE SERPL-MCNC: 172 MG/DL — HIGH (ref 70–99)
HCT VFR BLD CALC: 26.3 % — LOW (ref 39–50)
HGB BLD-MCNC: 8.2 G/DL — LOW (ref 13–17)
MCHC RBC-ENTMCNC: 25.5 PG — LOW (ref 27–34)
MCHC RBC-ENTMCNC: 31.2 GM/DL — LOW (ref 32–36)
MCV RBC AUTO: 81.9 FL — SIGNIFICANT CHANGE UP (ref 80–100)
NRBC # BLD: 0 /100 WBCS — SIGNIFICANT CHANGE UP (ref 0–0)
PLATELET # BLD AUTO: 277 K/UL — SIGNIFICANT CHANGE UP (ref 150–400)
POTASSIUM SERPL-MCNC: 4.1 MMOL/L — SIGNIFICANT CHANGE UP (ref 3.5–5.3)
POTASSIUM SERPL-SCNC: 4.1 MMOL/L — SIGNIFICANT CHANGE UP (ref 3.5–5.3)
RBC # BLD: 3.21 M/UL — LOW (ref 4.2–5.8)
RBC # FLD: 16.5 % — HIGH (ref 10.3–14.5)
SODIUM SERPL-SCNC: 142 MMOL/L — SIGNIFICANT CHANGE UP (ref 135–145)
SPECIMEN SOURCE: SIGNIFICANT CHANGE UP
VANCOMYCIN TROUGH SERPL-MCNC: 17.8 UG/ML — SIGNIFICANT CHANGE UP (ref 10–20)
WBC # BLD: 7.97 K/UL — SIGNIFICANT CHANGE UP (ref 3.8–10.5)
WBC # FLD AUTO: 7.97 K/UL — SIGNIFICANT CHANGE UP (ref 3.8–10.5)

## 2023-10-17 PROCEDURE — 78800 RP LOCLZJ TUM 1 AREA 1 D IMG: CPT | Mod: 26

## 2023-10-17 PROCEDURE — 78102 BONE MARROW IMAGING LTD: CPT | Mod: 26

## 2023-10-17 RX ORDER — INSULIN LISPRO 100/ML
VIAL (ML) SUBCUTANEOUS AT BEDTIME
Refills: 0 | Status: DISCONTINUED | OUTPATIENT
Start: 2023-10-17 | End: 2023-10-19

## 2023-10-17 RX ORDER — INSULIN LISPRO 100/ML
VIAL (ML) SUBCUTANEOUS
Refills: 0 | Status: DISCONTINUED | OUTPATIENT
Start: 2023-10-17 | End: 2023-10-19

## 2023-10-17 RX ORDER — DEXTROSE 50 % IN WATER 50 %
25 SYRINGE (ML) INTRAVENOUS ONCE
Refills: 0 | Status: DISCONTINUED | OUTPATIENT
Start: 2023-10-17 | End: 2023-10-19

## 2023-10-17 RX ORDER — SODIUM CHLORIDE 9 MG/ML
1000 INJECTION, SOLUTION INTRAVENOUS
Refills: 0 | Status: DISCONTINUED | OUTPATIENT
Start: 2023-10-17 | End: 2023-10-19

## 2023-10-17 RX ORDER — DEXTROSE 50 % IN WATER 50 %
12.5 SYRINGE (ML) INTRAVENOUS ONCE
Refills: 0 | Status: DISCONTINUED | OUTPATIENT
Start: 2023-10-17 | End: 2023-10-19

## 2023-10-17 RX ORDER — DEXTROSE 50 % IN WATER 50 %
15 SYRINGE (ML) INTRAVENOUS ONCE
Refills: 0 | Status: DISCONTINUED | OUTPATIENT
Start: 2023-10-17 | End: 2023-10-19

## 2023-10-17 RX ADMIN — Medication 250 MILLIGRAM(S): at 12:54

## 2023-10-17 RX ADMIN — Medication 2: at 00:12

## 2023-10-17 RX ADMIN — Medication 1 TABLET(S): at 09:17

## 2023-10-17 RX ADMIN — TAMSULOSIN HYDROCHLORIDE 0.4 MILLIGRAM(S): 0.4 CAPSULE ORAL at 21:27

## 2023-10-17 RX ADMIN — MIDODRINE HYDROCHLORIDE 10 MILLIGRAM(S): 2.5 TABLET ORAL at 12:55

## 2023-10-17 RX ADMIN — Medication 25 MILLIGRAM(S): at 05:21

## 2023-10-17 RX ADMIN — CEFEPIME 100 MILLIGRAM(S): 1 INJECTION, POWDER, FOR SOLUTION INTRAMUSCULAR; INTRAVENOUS at 15:33

## 2023-10-17 RX ADMIN — Medication 2.5 MILLIGRAM(S): at 05:21

## 2023-10-17 RX ADMIN — CEFEPIME 100 MILLIGRAM(S): 1 INJECTION, POWDER, FOR SOLUTION INTRAMUSCULAR; INTRAVENOUS at 21:27

## 2023-10-17 RX ADMIN — Medication 2.5 MILLIGRAM(S): at 17:29

## 2023-10-17 RX ADMIN — MIDODRINE HYDROCHLORIDE 10 MILLIGRAM(S): 2.5 TABLET ORAL at 05:20

## 2023-10-17 RX ADMIN — Medication 1: at 13:24

## 2023-10-17 RX ADMIN — CEFEPIME 100 MILLIGRAM(S): 1 INJECTION, POWDER, FOR SOLUTION INTRAMUSCULAR; INTRAVENOUS at 05:20

## 2023-10-17 RX ADMIN — GABAPENTIN 300 MILLIGRAM(S): 400 CAPSULE ORAL at 17:29

## 2023-10-17 RX ADMIN — Medication 250 MILLIGRAM(S): at 23:23

## 2023-10-17 RX ADMIN — MIDODRINE HYDROCHLORIDE 10 MILLIGRAM(S): 2.5 TABLET ORAL at 17:29

## 2023-10-17 RX ADMIN — GABAPENTIN 300 MILLIGRAM(S): 400 CAPSULE ORAL at 05:21

## 2023-10-17 RX ADMIN — Medication 81 MILLIGRAM(S): at 12:55

## 2023-10-17 RX ADMIN — Medication 1 TABLET(S): at 17:29

## 2023-10-17 RX ADMIN — INSULIN GLARGINE 18 UNIT(S): 100 INJECTION, SOLUTION SUBCUTANEOUS at 22:17

## 2023-10-17 RX ADMIN — Medication 25 MILLIGRAM(S): at 17:28

## 2023-10-17 RX ADMIN — Medication 2: at 06:01

## 2023-10-17 RX ADMIN — ATORVASTATIN CALCIUM 80 MILLIGRAM(S): 80 TABLET, FILM COATED ORAL at 21:27

## 2023-10-17 RX ADMIN — Medication 1: at 17:32

## 2023-10-17 RX ADMIN — PANTOPRAZOLE SODIUM 40 MILLIGRAM(S): 20 TABLET, DELAYED RELEASE ORAL at 05:21

## 2023-10-17 NOTE — CONSULT NOTE ADULT - CONSULT REQUESTED BY NAME
Dr KRISHNA Harris
Dr MARY ALICE Harris
Steven
mei
Dr. Pastora Harris
Cardio NP
Dr KRISHNA hurley
Lauren Urbano
Dr. Harris
Dr. Harris
dr Harris

## 2023-10-17 NOTE — PROGRESS NOTE ADULT - PROBLEM SELECTOR PLAN 2
id noted  rpt blood cultures neg to date  iv cefipime and vanco  nuclear bone scan to r/o om - underway  tessie today  podiatry fu for further tx

## 2023-10-17 NOTE — CONSULT NOTE ADULT - PROVIDER SPECIALTY LIST ADULT
Cardiology
Cardiology
Critical Care
Heme/Onc
Gastroenterology
Infectious Disease
Cardiology
Vascular Surgery
Critical Care
Podiatry
Diabetes

## 2023-10-17 NOTE — CONSULT NOTE ADULT - REASON FOR ADMISSION
fever and weakness

## 2023-10-17 NOTE — PROGRESS NOTE ADULT - SUBJECTIVE AND OBJECTIVE BOX
[facility]OUS [facility]  Cecilio Burnett MD PhD, Gloria Hager MD, Rosa Maria Wilkinson MD, Tomy Larsen MD, Anshu Kilpatrick MD  and providing coverage with Amandeep Landaverde MD  Providing Infectious Disease Consultations at [facility], [facility], [place], [facility], [facility]'s    Office# [phone] to schedule follow up appointments  Answering Service for urgent calls or New Consults [phone]  Cell# to text for urgent issues Cecilio Burnett [phone]     infectious diseases progress note:    HEMA LAZCANO is a 78y y. o. Male patient    Overnight and events of the last 24hrs reviewed    Allergies    No Known Allergies    Intolerances        ANTIBIOTICS/RELEVANT:  antimicrobials  cefepime   IVPB 2000 milliGRAM(s) IV Intermittent every 8 hours  vancomycin  IVPB 750 milliGRAM(s) IV Intermittent every 12 hours    immunologic:    OTHER:  acetaminophen     Tablet .. 650 milliGRAM(s) Oral every 6 hours PRN  aspirin enteric coated 81 milliGRAM(s) Oral daily  atorvastatin 80 milliGRAM(s) Oral at bedtime  bethanechol 25 milliGRAM(s) Oral two times a day  dextrose 5%. 1000 milliLiter(s) IV Continuous <Continuous>  dextrose 5%. 1000 milliLiter(s) IV Continuous <Continuous>  dextrose 50% Injectable 25 Gram(s) IV Push once  dextrose 50% Injectable 12.5 Gram(s) IV Push once  dextrose 50% Injectable 25 Gram(s) IV Push once  dextrose Oral Gel 15 Gram(s) Oral once PRN  dronabinol 2.5 milliGRAM(s) Oral two times a day  gabapentin 300 milliGRAM(s) Oral two times a day  glucagon  Injectable 1 milliGRAM(s) IntraMuscular once  insulin glargine Injectable (LANTUS) 18 Unit(s) SubCutaneous at bedtime  insulin lispro (ADMELOG) corrective regimen sliding scale   SubCutaneous every 6 hours  lactated ringers. 1000 milliLiter(s) IV Continuous <Continuous>  lactobacillus acidophilus 1 Tablet(s) Oral two times a day with meals  loperamide 2 milliGRAM(s) Oral three times a day PRN  midodrine. 10 milliGRAM(s) Oral three times a day  pantoprazole    Tablet 40 milliGRAM(s) Oral before breakfast  tamsulosin 0.4 milliGRAM(s) Oral at bedtime      Objective:  Vital Signs Last 24 Hrs  T(C): 37.1 (17 Oct 2023 08:31), Max: 37.3 (17 Oct 2023 05:05)  T(F): 98.8 (17 Oct 2023 08:31), Max: 99.1 (17 Oct 2023 05:05)  HR: 60 (17 Oct 2023 11:30) (59 - 74)  BP: 95/39 (17 Oct 2023 11:30) (89/30 - 122/50)  BP(mean): 62 (17 Oct 2023 11:30) (60 - 66)  RR: 15 (17 Oct 2023 11:30) (15 - 20)  SpO2: 96% (17 Oct 2023 11:30) (94% - 100%)    Parameters below as of 17 Oct 2023 11:30  Patient On (Oxygen Delivery Method): room air        T(C): 37.1 (10-17-23 @ 08:31), Max: 37.3 (10-17-23 @ 05:05)  T(C): 37.1 (10-17-23 @ 08:31), Max: 37.3 (10-14-23 @ 17:12)  T(C): 37.1 (10-17-23 @ 08:31), Max: 38.4 (10-13-23 @ 20:30)    PHYSICAL EXAM:  HEENT: NC atraumatic  Neck: supple  Respiratory: no accessory muscle use, breathing comfortably  Cardiovascular: distant  Gastrointestinal: normal appearing, nondistended  Extremities: no clubbing, no cyanosis,        LABS:                          8.2    7.97  )-----------( 277      ( 17 Oct 2023 08:38 )             26.3       WBC  7.97 10-17 @ 08:38  7.49 10-16 @ 07:42  6.43 10-15 @ 08:00  5.23 10-14 @ 09:45  4.58 10-13 @ 08:18  7.26 10-12 @ 05:30  9.99 10-11 @ 04:09  11.08 10-10 @ 18:25      10-17    142  |  112<H>  |  18  ----------------------------<  172<H>  4.1   |  26  |  0.69    Ca    8.4<L>      17 Oct 2023 08:38        Creatinine: 0.69 mg/dL (10-17-23 @ 08:38)  Creatinine: 0.68 mg/dL (10-16-23 @ 07:42)  Creatinine: 0.82 mg/dL (10-15-23 @ 08:00)  Creatinine: 0.88 mg/dL (10-14-23 @ 09:45)  Creatinine: 1.00 mg/dL (10-13-23 @ 08:18)  Creatinine: 1.10 mg/dL (10-12-23 @ 05:30)  Creatinine: 1.20 mg/dL (10-11-23 @ 04:09)  Creatinine: 1.20 mg/dL (10-10-23 @ 18:25)        Urinalysis Basic - ( 17 Oct 2023 08:38 )    Color: x / Appearance: x / SG: x / pH: x  Gluc: 172 mg/dL / Ketone: x  / Bili: x / Urobili: x   Blood: x / Protein: x / Nitrite: x   Leuk Esterase: x / RBC: x / WBC x   Sq Epi: x / Non Sq Epi: x / Bacteria: x            INFLAMMATORY MARKERS      MICROBIOLOGY:              RADIOLOGY & ADDITIONAL STUDIES:   [facility]  Cecilio Burnett MD PhD, Gloria Hager MD, Rosa Maria Wilkinson MD, Tomy Larsen MD, Anshu Kilpatrick MD  and providing coverage with Amandeep Landaverde MD  Providing Infectious Disease Consultations at [facility], [facility], [place], [facility], [facility]'s    Office# [phone] to schedule follow up appointments  Answering Service for urgent calls or New Consults [phone]  Cell# to text for urgent issues Cecilio Burnett [phone]     infectious diseases progress note:    HEMA LAZCANO is a 78y y. o. Male patient    Overnight and events of the last 24hrs reviewed    Allergies    No Known Allergies    Intolerances        ANTIBIOTICS/RELEVANT:  antimicrobials  cefepime   IVPB 2000 milliGRAM(s) IV Intermittent every 8 hours  vancomycin  IVPB 750 milliGRAM(s) IV Intermittent every 12 hours    immunologic:    OTHER:  acetaminophen     Tablet .. 650 milliGRAM(s) Oral every 6 hours PRN  aspirin enteric coated 81 milliGRAM(s) Oral daily  atorvastatin 80 milliGRAM(s) Oral at bedtime  bethanechol 25 milliGRAM(s) Oral two times a day  dextrose 5%. 1000 milliLiter(s) IV Continuous <Continuous>  dextrose 5%. 1000 milliLiter(s) IV Continuous <Continuous>  dextrose 50% Injectable 25 Gram(s) IV Push once  dextrose 50% Injectable 12.5 Gram(s) IV Push once  dextrose 50% Injectable 25 Gram(s) IV Push once  dextrose Oral Gel 15 Gram(s) Oral once PRN  dronabinol 2.5 milliGRAM(s) Oral two times a day  gabapentin 300 milliGRAM(s) Oral two times a day  glucagon  Injectable 1 milliGRAM(s) IntraMuscular once  insulin glargine Injectable (LANTUS) 18 Unit(s) SubCutaneous at bedtime  insulin lispro (ADMELOG) corrective regimen sliding scale   SubCutaneous every 6 hours  lactated ringers. 1000 milliLiter(s) IV Continuous <Continuous>  lactobacillus acidophilus 1 Tablet(s) Oral two times a day with meals  loperamide 2 milliGRAM(s) Oral three times a day PRN  midodrine. 10 milliGRAM(s) Oral three times a day  pantoprazole    Tablet 40 milliGRAM(s) Oral before breakfast  tamsulosin 0.4 milliGRAM(s) Oral at bedtime      Objective:  Vital Signs Last 24 Hrs  T(C): 37.1 (17 Oct 2023 08:31), Max: 37.3 (17 Oct 2023 05:05)  T(F): 98.8 (17 Oct 2023 08:31), Max: 99.1 (17 Oct 2023 05:05)  HR: 60 (17 Oct 2023 11:30) (59 - 74)  BP: 95/39 (17 Oct 2023 11:30) (89/30 - 122/50)  BP(mean): 62 (17 Oct 2023 11:30) (60 - 66)  RR: 15 (17 Oct 2023 11:30) (15 - 20)  SpO2: 96% (17 Oct 2023 11:30) (94% - 100%)    Parameters below as of 17 Oct 2023 11:30  Patient On (Oxygen Delivery Method): room air        T(C): 37.1 (10-17-23 @ 08:31), Max: 37.3 (10-17-23 @ 05:05)  T(C): 37.1 (10-17-23 @ 08:31), Max: 37.3 (10-14-23 @ 17:12)  T(C): 37.1 (10-17-23 @ 08:31), Max: 38.4 (10-13-23 @ 20:30)    PHYSICAL EXAM:  HEENT: NC atraumatic  Neck: supple  Respiratory: no accessory muscle use, breathing comfortably  Cardiovascular: distant  Gastrointestinal: normal appearing, nondistended  Extremities: no clubbing, no cyanosis,        LABS:                          8.2    7.97  )-----------( 277      ( 17 Oct 2023 08:38 )             26.3       WBC  7.97 10-17 @ 08:38  7.49 10-16 @ 07:42  6.43 10-15 @ 08:00  5.23 10-14 @ 09:45  4.58 10-13 @ 08:18  7.26 10-12 @ 05:30  9.99 10-11 @ 04:09  11.08 10-10 @ 18:25      10-17    142  |  112<H>  |  18  ----------------------------<  172<H>  4.1   |  26  |  0.69    Ca    8.4<L>      17 Oct 2023 08:38        Creatinine: 0.69 mg/dL (10-17-23 @ 08:38)  Creatinine: 0.68 mg/dL (10-16-23 @ 07:42)  Creatinine: 0.82 mg/dL (10-15-23 @ 08:00)  Creatinine: 0.88 mg/dL (10-14-23 @ 09:45)  Creatinine: 1.00 mg/dL (10-13-23 @ 08:18)  Creatinine: 1.10 mg/dL (10-12-23 @ 05:30)  Creatinine: 1.20 mg/dL (10-11-23 @ 04:09)  Creatinine: 1.20 mg/dL (10-10-23 @ 18:25)        Urinalysis Basic - ( 17 Oct 2023 08:38 )    Color: x / Appearance: x / SG: x / pH: x  Gluc: 172 mg/dL / Ketone: x  / Bili: x / Urobili: x   Blood: x / Protein: x / Nitrite: x   Leuk Esterase: x / RBC: x / WBC x   Sq Epi: x / Non Sq Epi: x / Bacteria: x            INFLAMMATORY MARKERS      MICROBIOLOGY:              RADIOLOGY & ADDITIONAL STUDIES:   [facility] INFECTIOUS DISEASE  Cecilio Burnett MD PhD, Gloria Hager MD, Rosa Maria Wilkinson MD, Tomy Larsen MD, Anshu Kilpatrick MD  and providing coverage with Amandeep Landaverde MD  Providing Infectious Disease Consultations at [facility], [facility], [place], [facility], [facility]'s    Office# [phone] to schedule follow up appointments  Answering Service for urgent calls or New Consults [phone]  Cell# to text for urgent issues Cecilio Burnett [phone]     infectious diseases progress note:    HEMA LAZCANO is a 78y y. o. Male patient    Overnight and events of the last 24hrs reviewed    Allergies    No Known Allergies    Intolerances        ANTIBIOTICS/RELEVANT:  antimicrobials  cefepime   IVPB 2000 milliGRAM(s) IV Intermittent every 8 hours  vancomycin  IVPB 750 milliGRAM(s) IV Intermittent every 12 hours    immunologic:    OTHER:  acetaminophen     Tablet .. 650 milliGRAM(s) Oral every 6 hours PRN  aspirin enteric coated 81 milliGRAM(s) Oral daily  atorvastatin 80 milliGRAM(s) Oral at bedtime  bethanechol 25 milliGRAM(s) Oral two times a day  dextrose 5%. 1000 milliLiter(s) IV Continuous <Continuous>  dextrose 5%. 1000 milliLiter(s) IV Continuous <Continuous>  dextrose 50% Injectable 25 Gram(s) IV Push once  dextrose 50% Injectable 12.5 Gram(s) IV Push once  dextrose 50% Injectable 25 Gram(s) IV Push once  dextrose Oral Gel 15 Gram(s) Oral once PRN  dronabinol 2.5 milliGRAM(s) Oral two times a day  gabapentin 300 milliGRAM(s) Oral two times a day  glucagon  Injectable 1 milliGRAM(s) IntraMuscular once  insulin glargine Injectable (LANTUS) 18 Unit(s) SubCutaneous at bedtime  insulin lispro (ADMELOG) corrective regimen sliding scale   SubCutaneous every 6 hours  lactated ringers. 1000 milliLiter(s) IV Continuous <Continuous>  lactobacillus acidophilus 1 Tablet(s) Oral two times a day with meals  loperamide 2 milliGRAM(s) Oral three times a day PRN  midodrine. 10 milliGRAM(s) Oral three times a day  pantoprazole    Tablet 40 milliGRAM(s) Oral before breakfast  tamsulosin 0.4 milliGRAM(s) Oral at bedtime      Objective:  Vital Signs Last 24 Hrs  T(C): 37.1 (17 Oct 2023 08:31), Max: 37.3 (17 Oct 2023 05:05)  T(F): 98.8 (17 Oct 2023 08:31), Max: 99.1 (17 Oct 2023 05:05)  HR: 60 (17 Oct 2023 11:30) (59 - 74)  BP: 95/39 (17 Oct 2023 11:30) (89/30 - 122/50)  BP(mean): 62 (17 Oct 2023 11:30) (60 - 66)  RR: 15 (17 Oct 2023 11:30) (15 - 20)  SpO2: 96% (17 Oct 2023 11:30) (94% - 100%)    Parameters below as of 17 Oct 2023 11:30  Patient On (Oxygen Delivery Method): room air        T(C): 37.1 (10-17-23 @ 08:31), Max: 37.3 (10-17-23 @ 05:05)  T(C): 37.1 (10-17-23 @ 08:31), Max: 37.3 (10-14-23 @ 17:12)  T(C): 37.1 (10-17-23 @ 08:31), Max: 38.4 (10-13-23 @ 20:30)    PHYSICAL EXAM:  HEENT: NC atraumatic  Neck: supple  Respiratory: no accessory muscle use, breathing comfortably  Cardiovascular: distant  Gastrointestinal: normal appearing, nondistended  Extremities: no clubbing, no cyanosis,        LABS:                          8.2    7.97  )-----------( 277      ( 17 Oct 2023 08:38 )             26.3       WBC  7.97 10-17 @ 08:38  7.49 10-16 @ 07:42  6.43 10-15 @ 08:00  5.23 10-14 @ 09:45  4.58 10-13 @ 08:18  7.26 10-12 @ 05:30  9.99 10-11 @ 04:09  11.08 10-10 @ 18:25      10-17    142  |  112<H>  |  18  ----------------------------<  172<H>  4.1   |  26  |  0.69    Ca    8.4<L>      17 Oct 2023 08:38        Creatinine: 0.69 mg/dL (10-17-23 @ 08:38)  Creatinine: 0.68 mg/dL (10-16-23 @ 07:42)  Creatinine: 0.82 mg/dL (10-15-23 @ 08:00)  Creatinine: 0.88 mg/dL (10-14-23 @ 09:45)  Creatinine: 1.00 mg/dL (10-13-23 @ 08:18)  Creatinine: 1.10 mg/dL (10-12-23 @ 05:30)  Creatinine: 1.20 mg/dL (10-11-23 @ 04:09)  Creatinine: 1.20 mg/dL (10-10-23 @ 18:25)        Urinalysis Basic - ( 17 Oct 2023 08:38 )    Color: x / Appearance: x / SG: x / pH: x  Gluc: 172 mg/dL / Ketone: x  / Bili: x / Urobili: x   Blood: x / Protein: x / Nitrite: x   Leuk Esterase: x / RBC: x / WBC x   Sq Epi: x / Non Sq Epi: x / Bacteria: x            INFLAMMATORY MARKERS      MICROBIOLOGY:              RADIOLOGY & ADDITIONAL STUDIES:

## 2023-10-17 NOTE — PROGRESS NOTE ADULT - SUBJECTIVE AND OBJECTIVE BOX
Patient is a 78y old  Male who presents with a chief complaint of fever and weakness (16 Oct 2023 20:04)      INTERVAL HPI/OVERNIGHT EVENTS: stable, vascular fu noted - no intervention, fu bone scan for further tx for podiatry, pt for tessie today,    MEDICATIONS  (STANDING):  aspirin enteric coated 81 milliGRAM(s) Oral daily  atorvastatin 80 milliGRAM(s) Oral at bedtime  bethanechol 25 milliGRAM(s) Oral two times a day  cefepime   IVPB 2000 milliGRAM(s) IV Intermittent every 8 hours  dextrose 5%. 1000 milliLiter(s) (100 mL/Hr) IV Continuous <Continuous>  dextrose 5%. 1000 milliLiter(s) (50 mL/Hr) IV Continuous <Continuous>  dextrose 50% Injectable 25 Gram(s) IV Push once  dextrose 50% Injectable 12.5 Gram(s) IV Push once  dextrose 50% Injectable 25 Gram(s) IV Push once  dronabinol 2.5 milliGRAM(s) Oral two times a day  gabapentin 300 milliGRAM(s) Oral two times a day  glucagon  Injectable 1 milliGRAM(s) IntraMuscular once  insulin glargine Injectable (LANTUS) 18 Unit(s) SubCutaneous at bedtime  insulin lispro (ADMELOG) corrective regimen sliding scale   SubCutaneous every 6 hours  lactated ringers. 1000 milliLiter(s) (100 mL/Hr) IV Continuous <Continuous>  lactobacillus acidophilus 1 Tablet(s) Oral two times a day with meals  midodrine. 10 milliGRAM(s) Oral three times a day  pantoprazole    Tablet 40 milliGRAM(s) Oral before breakfast  tamsulosin 0.4 milliGRAM(s) Oral at bedtime  vancomycin  IVPB 750 milliGRAM(s) IV Intermittent every 12 hours    MEDICATIONS  (PRN):  acetaminophen     Tablet .. 650 milliGRAM(s) Oral every 6 hours PRN Temp greater or equal to 38C (100.4F), Moderate Pain (4 - 6)  dextrose Oral Gel 15 Gram(s) Oral once PRN Blood Glucose LESS THAN 70 milliGRAM(s)/deciliter  loperamide 2 milliGRAM(s) Oral three times a day PRN Diarrhea      Allergies    No Known Allergies    Intolerances        REVIEW OF SYSTEMS:  CONSTITUTIONAL: No fever, weight loss, or fatigue  EYES: No eye pain, visual disturbances  ENMT:  No difficulty hearing, tinnitus, vertigo; No sinus or throat pain  NECK: No pain or stiffness  RESPIRATORY: No cough, wheezing, chills or hemoptysis; No shortness of breath  CARDIOVASCULAR: No chest pain, palpitations, dizziness  GASTROINTESTINAL: No abdominal or epigastric pain. No nausea, vomiting, or hematemesis; No diarrhea or constipation. No melena or hematochezia.  GENITOURINARY: No dysuria, frequency, hematuria, or incontinence  NEUROLOGICAL: No headaches, memory loss, loss of strength, numbness, or tremors  SKIN: No itching, burning  LYMPH NODES: No enlarged glands  MUSCULOSKELETAL: No joint pain or swelling; No muscle, back, or extremity pain  PSYCHIATRIC: No depression, mood swings  HEME/LYMPH: No easy bruising, or bleeding gums  ALLERGY AND IMMUNOLOGIC: No hives    Vital Signs Last 24 Hrs  T(C): 37.3 (17 Oct 2023 05:05), Max: 37.3 (17 Oct 2023 05:05)  T(F): 99.1 (17 Oct 2023 05:05), Max: 99.1 (17 Oct 2023 05:05)  HR: 59 (17 Oct 2023 07:08) (59 - 74)  BP: 122/50 (17 Oct 2023 05:05) (89/30 - 122/50)  BP(mean): --  RR: 17 (17 Oct 2023 05:05) (17 - 18)  SpO2: 94% (17 Oct 2023 05:05) (94% - 98%)    Parameters below as of 17 Oct 2023 05:05  Patient On (Oxygen Delivery Method): room air        PHYSICAL EXAM:  GENERAL: NAD  HEAD:  Atraumatic, Normocephalic  EYES: EOMI, PERRLA, conjunctiva and sclera clear  ENMT: No tonsillar erythema, exudates, or enlargement   NECK: Supple, No JVD  NERVOUS SYSTEM:  Alert & Oriented   CHEST/LUNG: b/l air entry  HEART: Regular rate and rhythm  ABDOMEN: Soft, Nontender, Nondistended; Bowel sounds present  EXTREMITIES:  2+ Peripheral Pulses , b/l feet in clean dressing, trace edema le  LYMPH: No lymphadenopathy noted  SKIN: No rashes     LABS:      Ca    8.3        16 Oct 2023 07:42        Urinalysis Basic - ( 16 Oct 2023 07:42 )    Color: x / Appearance: x / SG: x / pH: x  Gluc: 201 mg/dL / Ketone: x  / Bili: x / Urobili: x   Blood: x / Protein: x / Nitrite: x   Leuk Esterase: x / RBC: x / WBC x   Sq Epi: x / Non Sq Epi: x / Bacteria: x      CAPILLARY BLOOD GLUCOSE      POCT Blood Glucose.: 202 mg/dL (17 Oct 2023 06:00)  POCT Blood Glucose.: 247 mg/dL (17 Oct 2023 00:11)  POCT Blood Glucose.: 210 mg/dL (16 Oct 2023 21:33)  POCT Blood Glucose.: 180 mg/dL (16 Oct 2023 17:35)  POCT Blood Glucose.: 194 mg/dL (16 Oct 2023 12:01)    blood culture --   No growth at 4 days   10-12 @ 05:36    blood culture --   No growth at 4 days   10-12 @ 05:30    blood culture --   Numerous Methicillin Resistant Staphylococcus aureus   10-11 @ 13:13    blood culture --   10,000 - 49,000 CFU/mL Pseudomonas aeruginosa   10-10 @ 21:00    blood culture --   Growth in aerobic and anaerobic bottles: Methicillin Resistant  Staphylococcus aureus  Direct identification is available within approximately 3-5  hours either by Blood Panel Multiplexed PCR or Direct  MALDI-TOF. Details: [email or web address]   10-10 @ 18:25      urine culture --  10-12 @ 05:36  results   No growth at 4 days 10-12 @ 05:36  urine culture --  10-12 @ 05:30  results   No growth at 4 days 10-12 @ 05:30  urine culture --  10-11 @ 13:13  results   Numerous Methicillin Resistant Staphylococcus aureus 10-11 @ 13:13  urine culture --  10-10 @ 21:00  results   10,000 - 49,000 CFU/mL Pseudomonas aeruginosa 10-10 @ 21:00  urine culture --  10-10 @ 18:25  results   Growth in aerobic and anaerobic bottles: Methicillin Resistant  Staphylococcus aureus  Direct identification is available within approximately 3-5  hours either by Blood Panel Multiplexed PCR or Direct  MALDI-TOF. Details: [email or web address] 10-10 @ 18:25    wound with gram statin --    10-12 @ 05:36  organism  --   10-12 @ 05:36  specimen source .Blood Blood-Peripheral  10-12 @ 05:36  wound with gram statin --    10-12 @ 05:30  organism  --   10-12 @ 05:30  specimen source .Blood Blood-Venous  10-12 @ 05:30  wound with gram statin --    10-11 @ 13:13  organism  Methicillin resistant Staphylococcus aureus   10-11 @ 13:13  specimen source Wound Wound  10-11 @ 13:13  wound with gram statin --    10-10 @ 21:00  organism  Pseudomonas aeruginosa   10-10 @ 21:00  specimen source Catheterized Catheterized  10-10 @ 21:00  wound with gram statin --    10-10 @ 18:25  organism  Blood Culture PCR   10-10 @ 18:25  specimen source .Blood Blood-Peripheral  10-10 @ 18:25      RADIOLOGY & ADDITIONAL TESTS: vascular studies noted      Consultant(s) Notes Reviewed:  [x ] YES  [ ] NO    Care Discussed with Consultants/Other Providers [x ] YES  [ ] NO    Advanced care planning discussed with patient and family, advanced care planning forms reviewed, discussed, and completed.  20 minutes spent.   Patient is a 78y old  Male who presents with a chief complaint of fever and weakness (16 Oct 2023 20:04)      INTERVAL HPI/OVERNIGHT EVENTS: stable, vascular fu noted - no intervention, fu bone scan for further tx for podiatry, pt for tsesie today,    MEDICATIONS  (STANDING):  aspirin enteric coated 81 milliGRAM(s) Oral daily  atorvastatin 80 milliGRAM(s) Oral at bedtime  bethanechol 25 milliGRAM(s) Oral two times a day  cefepime   IVPB 2000 milliGRAM(s) IV Intermittent every 8 hours  dextrose 5%. 1000 milliLiter(s) (100 mL/Hr) IV Continuous <Continuous>  dextrose 5%. 1000 milliLiter(s) (50 mL/Hr) IV Continuous <Continuous>  dextrose 50% Injectable 25 Gram(s) IV Push once  dextrose 50% Injectable 12.5 Gram(s) IV Push once  dextrose 50% Injectable 25 Gram(s) IV Push once  dronabinol 2.5 milliGRAM(s) Oral two times a day  gabapentin 300 milliGRAM(s) Oral two times a day  glucagon  Injectable 1 milliGRAM(s) IntraMuscular once  insulin glargine Injectable (LANTUS) 18 Unit(s) SubCutaneous at bedtime  insulin lispro (ADMELOG) corrective regimen sliding scale   SubCutaneous every 6 hours  lactated ringers. 1000 milliLiter(s) (100 mL/Hr) IV Continuous <Continuous>  lactobacillus acidophilus 1 Tablet(s) Oral two times a day with meals  midodrine. 10 milliGRAM(s) Oral three times a day  pantoprazole    Tablet 40 milliGRAM(s) Oral before breakfast  tamsulosin 0.4 milliGRAM(s) Oral at bedtime  vancomycin  IVPB 750 milliGRAM(s) IV Intermittent every 12 hours    MEDICATIONS  (PRN):  acetaminophen     Tablet .. 650 milliGRAM(s) Oral every 6 hours PRN Temp greater or equal to 38C (100.4F), Moderate Pain (4 - 6)  dextrose Oral Gel 15 Gram(s) Oral once PRN Blood Glucose LESS THAN 70 milliGRAM(s)/deciliter  loperamide 2 milliGRAM(s) Oral three times a day PRN Diarrhea      Allergies    No Known Allergies    Intolerances        REVIEW OF SYSTEMS:  CONSTITUTIONAL: No fever, weight loss, or fatigue  EYES: No eye pain, visual disturbances  ENMT:  No difficulty hearing, tinnitus, vertigo; No sinus or throat pain  NECK: No pain or stiffness  RESPIRATORY: No cough, wheezing, chills or hemoptysis; No shortness of breath  CARDIOVASCULAR: No chest pain, palpitations, dizziness  GASTROINTESTINAL: No abdominal or epigastric pain. No nausea, vomiting, or hematemesis; No diarrhea or constipation. No melena or hematochezia.  GENITOURINARY: No dysuria, frequency, hematuria, or incontinence  NEUROLOGICAL: No headaches, memory loss, loss of strength, numbness, or tremors  SKIN: No itching, burning  LYMPH NODES: No enlarged glands  MUSCULOSKELETAL: No joint pain or swelling; No muscle, back, or extremity pain  PSYCHIATRIC: No depression, mood swings  HEME/LYMPH: No easy bruising, or bleeding gums  ALLERGY AND IMMUNOLOGIC: No hives    Vital Signs Last 24 Hrs  T(C): 37.3 (17 Oct 2023 05:05), Max: 37.3 (17 Oct 2023 05:05)  T(F): 99.1 (17 Oct 2023 05:05), Max: 99.1 (17 Oct 2023 05:05)  HR: 59 (17 Oct 2023 07:08) (59 - 74)  BP: 122/50 (17 Oct 2023 05:05) (89/30 - 122/50)  BP(mean): --  RR: 17 (17 Oct 2023 05:05) (17 - 18)  SpO2: 94% (17 Oct 2023 05:05) (94% - 98%)    Parameters below as of 17 Oct 2023 05:05  Patient On (Oxygen Delivery Method): room air        PHYSICAL EXAM:  GENERAL: NAD  HEAD:  Atraumatic, Normocephalic  EYES: EOMI, PERRLA, conjunctiva and sclera clear  ENMT: No tonsillar erythema, exudates, or enlargement   NECK: Supple, No JVD  NERVOUS SYSTEM:  Alert & Oriented   CHEST/LUNG: b/l air entry  HEART: Regular rate and rhythm  ABDOMEN: Soft, Nontender, Nondistended; Bowel sounds present  EXTREMITIES:  2+ Peripheral Pulses , b/l feet in clean dressing, trace edema le  LYMPH: No lymphadenopathy noted  SKIN: No rashes     LABS:      Ca    8.3        16 Oct 2023 07:42        Urinalysis Basic - ( 16 Oct 2023 07:42 )    Color: x / Appearance: x / SG: x / pH: x  Gluc: 201 mg/dL / Ketone: x  / Bili: x / Urobili: x   Blood: x / Protein: x / Nitrite: x   Leuk Esterase: x / RBC: x / WBC x   Sq Epi: x / Non Sq Epi: x / Bacteria: x      CAPILLARY BLOOD GLUCOSE      POCT Blood Glucose.: 202 mg/dL (17 Oct 2023 06:00)  POCT Blood Glucose.: 247 mg/dL (17 Oct 2023 00:11)  POCT Blood Glucose.: 210 mg/dL (16 Oct 2023 21:33)  POCT Blood Glucose.: 180 mg/dL (16 Oct 2023 17:35)  POCT Blood Glucose.: 194 mg/dL (16 Oct 2023 12:01)    blood culture --   No growth at 4 days   10-12 @ 05:36    blood culture --   No growth at 4 days   10-12 @ 05:30    blood culture --   Numerous Methicillin Resistant Staphylococcus aureus   10-11 @ 13:13    blood culture --   10,000 - 49,000 CFU/mL Pseudomonas aeruginosa   10-10 @ 21:00    blood culture --   Growth in aerobic and anaerobic bottles: Methicillin Resistant  Staphylococcus aureus  Direct identification is available within approximately 3-5  hours either by Blood Panel Multiplexed PCR or Direct  MALDI-TOF. Details: [email or web address]   10-10 @ 18:25      urine culture --  10-12 @ 05:36  results   No growth at 4 days 10-12 @ 05:36  urine culture --  10-12 @ 05:30  results   No growth at 4 days 10-12 @ 05:30  urine culture --  10-11 @ 13:13  results   Numerous Methicillin Resistant Staphylococcus aureus 10-11 @ 13:13  urine culture --  10-10 @ 21:00  results   10,000 - 49,000 CFU/mL Pseudomonas aeruginosa 10-10 @ 21:00  urine culture --  10-10 @ 18:25  results   Growth in aerobic and anaerobic bottles: Methicillin Resistant  Staphylococcus aureus  Direct identification is available within approximately 3-5  hours either by Blood Panel Multiplexed PCR or Direct  MALDI-TOF. Details: [email or web address] 10-10 @ 18:25    wound with gram statin --    10-12 @ 05:36  organism  --   10-12 @ 05:36  specimen source .Blood Blood-Peripheral  10-12 @ 05:36  wound with gram statin --    10-12 @ 05:30  organism  --   10-12 @ 05:30  specimen source .Blood Blood-Venous  10-12 @ 05:30  wound with gram statin --    10-11 @ 13:13  organism  Methicillin resistant Staphylococcus aureus   10-11 @ 13:13  specimen source Wound Wound  10-11 @ 13:13  wound with gram statin --    10-10 @ 21:00  organism  Pseudomonas aeruginosa   10-10 @ 21:00  specimen source Catheterized Catheterized  10-10 @ 21:00  wound with gram statin --    10-10 @ 18:25  organism  Blood Culture PCR   10-10 @ 18:25  specimen source .Blood Blood-Peripheral  10-10 @ 18:25      RADIOLOGY & ADDITIONAL TESTS: vascular studies noted      Consultant(s) Notes Reviewed:  [x ] YES  [ ] NO    Care Discussed with Consultants/Other Providers [x ] YES  [ ] NO    Advanced care planning discussed with patient and family, advanced care planning forms reviewed, discussed, and completed.  20 minutes spent.   Patient is a 78y old  Male who presents with a chief complaint of fever and weakness (16 Oct 2023 20:04)      INTERVAL HPI/OVERNIGHT EVENTS: stable, vascular fu noted - no intervention, fu bone scan for further tx for podiatry, pt for tessie today,    MEDICATIONS  (STANDING):  aspirin enteric coated 81 milliGRAM(s) Oral daily  atorvastatin 80 milliGRAM(s) Oral at bedtime  bethanechol 25 milliGRAM(s) Oral two times a day  cefepime   IVPB 2000 milliGRAM(s) IV Intermittent every 8 hours  dextrose 5%. 1000 milliLiter(s) (100 mL/Hr) IV Continuous <Continuous>  dextrose 5%. 1000 milliLiter(s) (50 mL/Hr) IV Continuous <Continuous>  dextrose 50% Injectable 25 Gram(s) IV Push once  dextrose 50% Injectable 12.5 Gram(s) IV Push once  dextrose 50% Injectable 25 Gram(s) IV Push once  dronabinol 2.5 milliGRAM(s) Oral two times a day  gabapentin 300 milliGRAM(s) Oral two times a day  glucagon  Injectable 1 milliGRAM(s) IntraMuscular once  insulin glargine Injectable (LANTUS) 18 Unit(s) SubCutaneous at bedtime  insulin lispro (ADMELOG) corrective regimen sliding scale   SubCutaneous every 6 hours  lactated ringers. 1000 milliLiter(s) (100 mL/Hr) IV Continuous <Continuous>  lactobacillus acidophilus 1 Tablet(s) Oral two times a day with meals  midodrine. 10 milliGRAM(s) Oral three times a day  pantoprazole    Tablet 40 milliGRAM(s) Oral before breakfast  tamsulosin 0.4 milliGRAM(s) Oral at bedtime  vancomycin  IVPB 750 milliGRAM(s) IV Intermittent every 12 hours    MEDICATIONS  (PRN):  acetaminophen     Tablet .. 650 milliGRAM(s) Oral every 6 hours PRN Temp greater or equal to 38C (100.4F), Moderate Pain (4 - 6)  dextrose Oral Gel 15 Gram(s) Oral once PRN Blood Glucose LESS THAN 70 milliGRAM(s)/deciliter  loperamide 2 milliGRAM(s) Oral three times a day PRN Diarrhea      Allergies    No Known Allergies    Intolerances        REVIEW OF SYSTEMS:  CONSTITUTIONAL: No fever, weight loss, or fatigue  EYES: No eye pain, visual disturbances  ENMT:  No difficulty hearing, tinnitus, vertigo; No sinus or throat pain  NECK: No pain or stiffness  RESPIRATORY: No cough, wheezing, chills or hemoptysis; No shortness of breath  CARDIOVASCULAR: No chest pain, palpitations, dizziness  GASTROINTESTINAL: No abdominal or epigastric pain. No nausea, vomiting, or hematemesis; No diarrhea or constipation. No melena or hematochezia.  GENITOURINARY: No dysuria, frequency, hematuria, or incontinence  NEUROLOGICAL: No headaches, memory loss, loss of strength, numbness, or tremors  SKIN: No itching, burning  LYMPH NODES: No enlarged glands  MUSCULOSKELETAL: No joint pain or swelling; No muscle, back, or extremity pain  PSYCHIATRIC: No depression, mood swings  HEME/LYMPH: No easy bruising, or bleeding gums  ALLERGY AND IMMUNOLOGIC: No hives    Vital Signs Last 24 Hrs  T(C): 37.3 (17 Oct 2023 05:05), Max: 37.3 (17 Oct 2023 05:05)  T(F): 99.1 (17 Oct 2023 05:05), Max: 99.1 (17 Oct 2023 05:05)  HR: 59 (17 Oct 2023 07:08) (59 - 74)  BP: 122/50 (17 Oct 2023 05:05) (89/30 - 122/50)  BP(mean): --  RR: 17 (17 Oct 2023 05:05) (17 - 18)  SpO2: 94% (17 Oct 2023 05:05) (94% - 98%)    Parameters below as of 17 Oct 2023 05:05  Patient On (Oxygen Delivery Method): room air        PHYSICAL EXAM:  GENERAL: NAD  HEAD:  Atraumatic, Normocephalic  EYES: EOMI, PERRLA, conjunctiva and sclera clear  ENMT: No tonsillar erythema, exudates, or enlargement   NECK: Supple, No JVD  NERVOUS SYSTEM:  Alert & Oriented   CHEST/LUNG: b/l air entry  HEART: Regular rate and rhythm  ABDOMEN: Soft, Nontender, Nondistended; Bowel sounds present  EXTREMITIES:  2+ Peripheral Pulses , b/l feet in clean dressing, trace edema le  LYMPH: No lymphadenopathy noted  SKIN: No rashes     LABS:      Ca    8.3        16 Oct 2023 07:42        Urinalysis Basic - ( 16 Oct 2023 07:42 )    Color: x / Appearance: x / SG: x / pH: x  Gluc: 201 mg/dL / Ketone: x  / Bili: x / Urobili: x   Blood: x / Protein: x / Nitrite: x   Leuk Esterase: x / RBC: x / WBC x   Sq Epi: x / Non Sq Epi: x / Bacteria: x      CAPILLARY BLOOD GLUCOSE      POCT Blood Glucose.: 202 mg/dL (17 Oct 2023 06:00)  POCT Blood Glucose.: 247 mg/dL (17 Oct 2023 00:11)  POCT Blood Glucose.: 210 mg/dL (16 Oct 2023 21:33)  POCT Blood Glucose.: 180 mg/dL (16 Oct 2023 17:35)  POCT Blood Glucose.: 194 mg/dL (16 Oct 2023 12:01)    blood culture --   No growth at 4 days   10-12 @ 05:36    blood culture --   No growth at 4 days   10-12 @ 05:30    blood culture --   Numerous Methicillin Resistant Staphylococcus aureus   10-11 @ 13:13    blood culture --   10,000 - 49,000 CFU/mL Pseudomonas aeruginosa   10-10 @ 21:00    blood culture --   Growth in aerobic and anaerobic bottles: Methicillin Resistant  Staphylococcus aureus  Direct identification is available within approximately 3-5  hours either by Blood Panel Multiplexed PCR or Direct  MALDI-TOF. Details: [email or web address]   10-10 @ 18:25      urine culture --  10-12 @ 05:36  results   No growth at 4 days 10-12 @ 05:36  urine culture --  10-12 @ 05:30  results   No growth at 4 days 10-12 @ 05:30  urine culture --  10-11 @ 13:13  results   Numerous Methicillin Resistant Staphylococcus aureus 10-11 @ 13:13  urine culture --  10-10 @ 21:00  results   10,000 - 49,000 CFU/mL Pseudomonas aeruginosa 10-10 @ 21:00  urine culture --  10-10 @ 18:25  results   Growth in aerobic and anaerobic bottles: Methicillin Resistant  Staphylococcus aureus  Direct identification is available within approximately 3-5  hours either by Blood Panel Multiplexed PCR or Direct  MALDI-TOF. Details: [email or web address] 10-10 @ 18:25    wound with gram statin --    10-12 @ 05:36  organism  --   10-12 @ 05:36  specimen source .Blood Blood-Peripheral  10-12 @ 05:36  wound with gram statin --    10-12 @ 05:30  organism  --   10-12 @ 05:30  specimen source .Blood Blood-Venous  10-12 @ 05:30  wound with gram statin --    10-11 @ 13:13  organism  Methicillin resistant Staphylococcus aureus   10-11 @ 13:13  specimen source Wound Wound  10-11 @ 13:13  wound with gram statin --    10-10 @ 21:00  organism  Pseudomonas aeruginosa   10-10 @ 21:00  specimen source Catheterized Catheterized  10-10 @ 21:00  wound with gram statin --    10-10 @ 18:25  organism  Blood Culture PCR   10-10 @ 18:25  specimen source .Blood Blood-Peripheral  10-10 @ 18:25      RADIOLOGY & ADDITIONAL TESTS: vascular studies noted      Consultant(s) Notes Reviewed:  [x ] YES  [ ] NO    Care Discussed with Consultants/Other Providers [x ] YES  [ ] NO    Advanced care planning discussed with patient and family, advanced care planning forms reviewed, discussed, and completed.  20 minutes spent.

## 2023-10-17 NOTE — PHARMACOTHERAPY INTERVENTION NOTE - COMMENTS
Patient is a 78y M presenting with MRSA bacteremia 2/2 foot wound, c/f OM + UTI with Pseudomonas. Patient currently being treated with cefepime + vancomycin. Discussed duration of cefepime therapy with ID Dr. Burnett, confirmed plan to treat UTI with cefepime x 7 days, then continue with vancomycin monotherapy for bacteremia and potential OM. Cefepime stop date updated per discussion.

## 2023-10-17 NOTE — PROGRESS NOTE ADULT - SUBJECTIVE AND OBJECTIVE BOX
[facility]  Les Mccray PA-C  [address] [place] [address]  [phone]      INTERVAL HPI/OVERNIGHT EVENTS:  Pt s/e  Tolerating diet    MEDICATIONS  (STANDING):  aspirin enteric coated 81 milliGRAM(s) Oral daily  atorvastatin 80 milliGRAM(s) Oral at bedtime  bethanechol 25 milliGRAM(s) Oral two times a day  cefepime   IVPB 2000 milliGRAM(s) IV Intermittent every 8 hours  dextrose 5%. 1000 milliLiter(s) (100 mL/Hr) IV Continuous <Continuous>  dextrose 5%. 1000 milliLiter(s) (50 mL/Hr) IV Continuous <Continuous>  dextrose 50% Injectable 25 Gram(s) IV Push once  dextrose 50% Injectable 25 Gram(s) IV Push once  dextrose 50% Injectable 12.5 Gram(s) IV Push once  dronabinol 2.5 milliGRAM(s) Oral two times a day  gabapentin 300 milliGRAM(s) Oral two times a day  glucagon  Injectable 1 milliGRAM(s) IntraMuscular once  insulin glargine Injectable (LANTUS) 18 Unit(s) SubCutaneous at bedtime  insulin lispro (ADMELOG) corrective regimen sliding scale   SubCutaneous every 6 hours  lactated ringers. 1000 milliLiter(s) (100 mL/Hr) IV Continuous <Continuous>  lactobacillus acidophilus 1 Tablet(s) Oral two times a day with meals  midodrine. 10 milliGRAM(s) Oral three times a day  pantoprazole    Tablet 40 milliGRAM(s) Oral before breakfast  tamsulosin 0.4 milliGRAM(s) Oral at bedtime  vancomycin  IVPB 750 milliGRAM(s) IV Intermittent every 12 hours    MEDICATIONS  (PRN):  acetaminophen     Tablet .. 650 milliGRAM(s) Oral every 6 hours PRN Temp greater or equal to 38C (100.4F), Moderate Pain (4 - 6)  dextrose Oral Gel 15 Gram(s) Oral once PRN Blood Glucose LESS THAN 70 milliGRAM(s)/deciliter  loperamide 2 milliGRAM(s) Oral three times a day PRN Diarrhea      Allergies    No Known Allergies    PHYSICAL EXAM:   Vital Signs:  Vital Signs Last 24 Hrs  T(C): 37.1 (17 Oct 2023 08:31), Max: 37.3 (17 Oct 2023 05:05)  T(F): 98.8 (17 Oct 2023 08:31), Max: 99.1 (17 Oct 2023 05:05)  HR: 60 (17 Oct 2023 11:45) (59 - 74)  BP: 104/57 (17 Oct 2023 11:45) (92/45 - 122/50)  BP(mean): 68 (17 Oct 2023 11:45) (60 - 68)  RR: 15 (17 Oct 2023 11:45) (15 - 20)  SpO2: 97% (17 Oct 2023 11:45) (94% - 100%)    Parameters below as of 17 Oct 2023 11:45  Patient On (Oxygen Delivery Method): room air      Daily Height in cm: 185.4 (17 Oct 2023 08:31)    Daily     GENERAL:  Appears stated age  HEENT:  NC/AT  CHEST:  Full & symmetric excursion  HEART:  Regular rhythm  ABDOMEN:  Soft, non-tender, non-distended  EXTEREMITIES:  no cyanosis  SKIN:  No rash  NEURO:  Alert      LABS:                        8.2    7.97  )-----------( 277      ( 17 Oct 2023 08:38 )             26.3     10-17    142  |  112<H>  |  18  ----------------------------<  172<H>  4.1   |  26  |  0.69    Ca    8.4<L>      17 Oct 2023 08:38        Urinalysis Basic - ( 17 Oct 2023 08:38 )    Color: x / Appearance: x / SG: x / pH: x  Gluc: 172 mg/dL / Ketone: x  / Bili: x / Urobili: x   Blood: x / Protein: x / Nitrite: x   Leuk Esterase: x / RBC: x / WBC x   Sq Epi: x / Non Sq Epi: x / Bacteria: x   [facility]  Les Mccray PA-C  [address] [place] [address]  [phone]      INTERVAL HPI/OVERNIGHT EVENTS:  Pt s/e  Tolerating diet    MEDICATIONS  (STANDING):  aspirin enteric coated 81 milliGRAM(s) Oral daily  atorvastatin 80 milliGRAM(s) Oral at bedtime  bethanechol 25 milliGRAM(s) Oral two times a day  cefepime   IVPB 2000 milliGRAM(s) IV Intermittent every 8 hours  dextrose 5%. 1000 milliLiter(s) (100 mL/Hr) IV Continuous <Continuous>  dextrose 5%. 1000 milliLiter(s) (50 mL/Hr) IV Continuous <Continuous>  dextrose 50% Injectable 25 Gram(s) IV Push once  dextrose 50% Injectable 25 Gram(s) IV Push once  dextrose 50% Injectable 12.5 Gram(s) IV Push once  dronabinol 2.5 milliGRAM(s) Oral two times a day  gabapentin 300 milliGRAM(s) Oral two times a day  glucagon  Injectable 1 milliGRAM(s) IntraMuscular once  insulin glargine Injectable (LANTUS) 18 Unit(s) SubCutaneous at bedtime  insulin lispro (ADMELOG) corrective regimen sliding scale   SubCutaneous every 6 hours  lactated ringers. 1000 milliLiter(s) (100 mL/Hr) IV Continuous <Continuous>  lactobacillus acidophilus 1 Tablet(s) Oral two times a day with meals  midodrine. 10 milliGRAM(s) Oral three times a day  pantoprazole    Tablet 40 milliGRAM(s) Oral before breakfast  tamsulosin 0.4 milliGRAM(s) Oral at bedtime  vancomycin  IVPB 750 milliGRAM(s) IV Intermittent every 12 hours    MEDICATIONS  (PRN):  acetaminophen     Tablet .. 650 milliGRAM(s) Oral every 6 hours PRN Temp greater or equal to 38C (100.4F), Moderate Pain (4 - 6)  dextrose Oral Gel 15 Gram(s) Oral once PRN Blood Glucose LESS THAN 70 milliGRAM(s)/deciliter  loperamide 2 milliGRAM(s) Oral three times a day PRN Diarrhea      Allergies    No Known Allergies    PHYSICAL EXAM:   Vital Signs:  Vital Signs Last 24 Hrs  T(C): 37.1 (17 Oct 2023 08:31), Max: 37.3 (17 Oct 2023 05:05)  T(F): 98.8 (17 Oct 2023 08:31), Max: 99.1 (17 Oct 2023 05:05)  HR: 60 (17 Oct 2023 11:45) (59 - 74)  BP: 104/57 (17 Oct 2023 11:45) (92/45 - 122/50)  BP(mean): 68 (17 Oct 2023 11:45) (60 - 68)  RR: 15 (17 Oct 2023 11:45) (15 - 20)  SpO2: 97% (17 Oct 2023 11:45) (94% - 100%)    Parameters below as of 17 Oct 2023 11:45  Patient On (Oxygen Delivery Method): room air      Daily Height in cm: 185.4 (17 Oct 2023 08:31)    Daily     GENERAL:  Appears stated age  HEENT:  NC/AT  CHEST:  Full & symmetric excursion  HEART:  Regular rhythm  ABDOMEN:  Soft, non-tender, non-distended  EXTEREMITIES:  no cyanosis  SKIN:  No rash  NEURO:  Alert      LABS:                        8.2    7.97  )-----------( 277      ( 17 Oct 2023 08:38 )             26.3     10-17    142  |  112<H>  |  18  ----------------------------<  172<H>  4.1   |  26  |  0.69    Ca    8.4<L>      17 Oct 2023 08:38        Urinalysis Basic - ( 17 Oct 2023 08:38 )    Color: x / Appearance: x / SG: x / pH: x  Gluc: 172 mg/dL / Ketone: x  / Bili: x / Urobili: x   Blood: x / Protein: x / Nitrite: x   Leuk Esterase: x / RBC: x / WBC x   Sq Epi: x / Non Sq Epi: x / Bacteria: x   [facility]  Les Mccray PA-C  [address] [place] [address]  [phone]      INTERVAL HPI/OVERNIGHT EVENTS:  Pt s/e  Tolerating diet    MEDICATIONS  (STANDING):  aspirin enteric coated 81 milliGRAM(s) Oral daily  atorvastatin 80 milliGRAM(s) Oral at bedtime  bethanechol 25 milliGRAM(s) Oral two times a day  cefepime   IVPB 2000 milliGRAM(s) IV Intermittent every 8 hours  dextrose 5%. 1000 milliLiter(s) (100 mL/Hr) IV Continuous <Continuous>  dextrose 5%. 1000 milliLiter(s) (50 mL/Hr) IV Continuous <Continuous>  dextrose 50% Injectable 25 Gram(s) IV Push once  dextrose 50% Injectable 25 Gram(s) IV Push once  dextrose 50% Injectable 12.5 Gram(s) IV Push once  dronabinol 2.5 milliGRAM(s) Oral two times a day  gabapentin 300 milliGRAM(s) Oral two times a day  glucagon  Injectable 1 milliGRAM(s) IntraMuscular once  insulin glargine Injectable (LANTUS) 18 Unit(s) SubCutaneous at bedtime  insulin lispro (ADMELOG) corrective regimen sliding scale   SubCutaneous every 6 hours  lactated ringers. 1000 milliLiter(s) (100 mL/Hr) IV Continuous <Continuous>  lactobacillus acidophilus 1 Tablet(s) Oral two times a day with meals  midodrine. 10 milliGRAM(s) Oral three times a day  pantoprazole    Tablet 40 milliGRAM(s) Oral before breakfast  tamsulosin 0.4 milliGRAM(s) Oral at bedtime  vancomycin  IVPB 750 milliGRAM(s) IV Intermittent every 12 hours    MEDICATIONS  (PRN):  acetaminophen     Tablet .. 650 milliGRAM(s) Oral every 6 hours PRN Temp greater or equal to 38C (100.4F), Moderate Pain (4 - 6)  dextrose Oral Gel 15 Gram(s) Oral once PRN Blood Glucose LESS THAN 70 milliGRAM(s)/deciliter  loperamide 2 milliGRAM(s) Oral three times a day PRN Diarrhea      Allergies    No Known Allergies    PHYSICAL EXAM:   Vital Signs:  Vital Signs Last 24 Hrs  T(C): 37.1 (17 Oct 2023 08:31), Max: 37.3 (17 Oct 2023 05:05)  T(F): 98.8 (17 Oct 2023 08:31), Max: 99.1 (17 Oct 2023 05:05)  HR: 60 (17 Oct 2023 11:45) (59 - 74)  BP: 104/57 (17 Oct 2023 11:45) (92/45 - 122/50)  BP(mean): 68 (17 Oct 2023 11:45) (60 - 68)  RR: 15 (17 Oct 2023 11:45) (15 - 20)  SpO2: 97% (17 Oct 2023 11:45) (94% - 100%)    Parameters below as of 17 Oct 2023 11:45  Patient On (Oxygen Delivery Method): room air      Daily Height in cm: 185.4 (17 Oct 2023 08:31)    Daily     GENERAL:  Appears stated age  HEENT:  NC/AT  CHEST:  Full & symmetric excursion  HEART:  Regular rhythm  ABDOMEN:  Soft, non-tender, non-distended  EXTEREMITIES:  no cyanosis  SKIN:  No rash  NEURO:  Alert      LABS:                        8.2    7.97  )-----------( 277      ( 17 Oct 2023 08:38 )             26.3     10-17    142  |  112<H>  |  18  ----------------------------<  172<H>  4.1   |  26  |  0.69    Ca    8.4<L>      17 Oct 2023 08:38        Urinalysis Basic - ( 17 Oct 2023 08:38 )    Color: x / Appearance: x / SG: x / pH: x  Gluc: 172 mg/dL / Ketone: x  / Bili: x / Urobili: x   Blood: x / Protein: x / Nitrite: x   Leuk Esterase: x / RBC: x / WBC x   Sq Epi: x / Non Sq Epi: x / Bacteria: x

## 2023-10-17 NOTE — PROGRESS NOTE ADULT - ASSESSMENT
anemia  FTT    no overt gi bleeding  Ct findings of the esophagus likely represent dysmotility  monitor GI fn  if diarrhea persist can do imodium prn  continue marinol bid for ftt    I reviewed the overnight course of events on the unit, re-confirming the patient history. I discussed the care with the patient and their family  Differential diagnosis and plan of care discussed with patient after the evaluation  40 minutes spent on total encounter of which more than fifty percent of the encounter was spent counseling and/or coordinating care by the attending physician.  Advanced care planning was discussed with patient and family.  Advanced care planning forms were reviewed and discussed.  Risks, benefits and alternatives of gastroenterologic procedures were discussed in detail and all questions were answered.

## 2023-10-17 NOTE — CONSULT NOTE ADULT - SUBJECTIVE AND OBJECTIVE BOX
[facility] [place]     Patient is a 78y old  Male who presents with a chief complaint of fever and weakness (17 Oct 2023 11:50)      HPI:  78-year-old male presents to the hospital by ambulance from home.  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 Pt does not go to doctor on regualar basis per son and does phone visits.   (11 Oct 2023 07:02)      HPI:    78y Male for Cardiology Consult    PAST MEDICAL & SURGICAL HISTORY:  Diabetes      Benign essential HTN      Pacemaker      History of BPH      Diabetic foot ulcers          FAMILY HISTORY:      SOCIAL HISTORY:   Alcohol:  Smoking:    Allergies    No Known Allergies    Intolerances        MEDICATIONS  (STANDING):  aspirin enteric coated 81 milliGRAM(s) Oral daily  atorvastatin 80 milliGRAM(s) Oral at bedtime  bethanechol 25 milliGRAM(s) Oral two times a day  cefepime   IVPB 2000 milliGRAM(s) IV Intermittent every 8 hours  dextrose 5%. 1000 milliLiter(s) (100 mL/Hr) IV Continuous <Continuous>  dextrose 5%. 1000 milliLiter(s) (50 mL/Hr) IV Continuous <Continuous>  dextrose 50% Injectable 25 Gram(s) IV Push once  dextrose 50% Injectable 12.5 Gram(s) IV Push once  dextrose 50% Injectable 25 Gram(s) IV Push once  dronabinol 2.5 milliGRAM(s) Oral two times a day  gabapentin 300 milliGRAM(s) Oral two times a day  glucagon  Injectable 1 milliGRAM(s) IntraMuscular once  insulin glargine Injectable (LANTUS) 18 Unit(s) SubCutaneous at bedtime  insulin lispro (ADMELOG) corrective regimen sliding scale   SubCutaneous every 6 hours  lactated ringers. 1000 milliLiter(s) (100 mL/Hr) IV Continuous <Continuous>  lactobacillus acidophilus 1 Tablet(s) Oral two times a day with meals  midodrine. 10 milliGRAM(s) Oral three times a day  pantoprazole    Tablet 40 milliGRAM(s) Oral before breakfast  tamsulosin 0.4 milliGRAM(s) Oral at bedtime  vancomycin  IVPB 750 milliGRAM(s) IV Intermittent every 12 hours    MEDICATIONS  (PRN):  acetaminophen     Tablet .. 650 milliGRAM(s) Oral every 6 hours PRN Temp greater or equal to 38C (100.4F), Moderate Pain (4 - 6)  dextrose Oral Gel 15 Gram(s) Oral once PRN Blood Glucose LESS THAN 70 milliGRAM(s)/deciliter  loperamide 2 milliGRAM(s) Oral three times a day PRN Diarrhea      REVIEW OF SYSTEMS:  CONSTITUTIONAL: No fever, weight loss, chills, shakes, or fat  RESPIRATORY: No cough, wheezing, hemoptysis, or shortness of breath  CARDIOVASCULAR: No chest pain, dyspnea, palpitations, dizziness, syncope, paroxysmal nocturnal dyspnea, orthopnea, or arm or leg swelling  GASTROINTESTINAL: No abdominal  or epigastric pain, nausea, vomiting, hematemesis, diarrhea, constipation, melena or bright red bloo  NEUROLOGICAL: No headaches, memory loss, slurred speech, limb weakness, loss of strength, numbness, or tremors  SKIN: No itching, burning, rashes, or lesions  ENDOCRINE: No heat or cold intolerance, or hair loss  MUSCULOSKELETAL: No joint pain or swelling, muscle, back, or extremity pain  HEME/LYMPH: No easy bruising or bleeding gums  ALLERY AND IMMUNOLOGIC: No hives or rash.    Vital Signs Last 24 Hrs  T(C): 37.1 (17 Oct 2023 08:31), Max: 37.3 (17 Oct 2023 05:05)  T(F): 98.8 (17 Oct 2023 08:31), Max: 99.1 (17 Oct 2023 05:05)  HR: 60 (17 Oct 2023 11:30) (59 - 74)  BP: 95/39 (17 Oct 2023 11:30) (92/45 - 122/50)  BP(mean): 62 (17 Oct 2023 11:30) (60 - 66)  RR: 15 (17 Oct 2023 11:30) (15 - 20)  SpO2: 96% (17 Oct 2023 11:30) (94% - 100%)    Parameters below as of 17 Oct 2023 11:30  Patient On (Oxygen Delivery Method): room air        PHYSICAL EXAM:  HEAD:  Atraumatic, Normocephalic  EYES: EOMI, PERRLA, conjunctiva and sclera clear  NECK: Supple and normal thyroid.  No JVD or carotid bruit.   HEART: S1, S2 regular , 1/6 soft ejection systolic murmur in mitral area , no thrill and no gallops .  PULMONARY: Bilateral vesicular breathing , few scattered ronchi and few scattered rales are present .  ABDOMEN: Soft nontender and positive bowl sounds   EXTREMITIES:  No clubbing, cyanosis, or pedal  edema  NEUROLOGICAL: AAOX3 , no focal deficit .    LABS:                        8.2    7.97  )-----------( 277      ( 17 Oct 2023 08:38 )             26.3     10-17    142  |  112<H>  |  18  ----------------------------<  172<H>  4.1   |  26  |  0.69    Ca    8.4<L>      17 Oct 2023 08:38            Urinalysis Basic - ( 17 Oct 2023 08:38 )    Color: x / Appearance: x / SG: x / pH: x  Gluc: 172 mg/dL / Ketone: x  / Bili: x / Urobili: x   Blood: x / Protein: x / Nitrite: x   Leuk Esterase: x / RBC: x / WBC x   Sq Epi: x / Non Sq Epi: x / Bacteria: x      BNP      EKG:  ECHO:  IMAGING:    Assessment and Plan :     Will continue to follow during hospital course and give further recommendations as needed . Thanks for your referral . if any questions please contact me at office ([phone])cell [phone])  [facility] [place]     Patient is a 78y old  Male who presents with a chief complaint of fever and weakness (17 Oct 2023 11:50)      HPI:  78-year-old male presents to the hospital by ambulance from home.  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 Pt does not go to doctor on regualar basis per son and does phone visits.   (11 Oct 2023 07:02)      HPI:    78y Male for Cardiology Consult    PAST MEDICAL & SURGICAL HISTORY:  Diabetes      Benign essential HTN      Pacemaker      History of BPH      Diabetic foot ulcers          FAMILY HISTORY:      SOCIAL HISTORY:   Alcohol:  Smoking:    Allergies    No Known Allergies    Intolerances        MEDICATIONS  (STANDING):  aspirin enteric coated 81 milliGRAM(s) Oral daily  atorvastatin 80 milliGRAM(s) Oral at bedtime  bethanechol 25 milliGRAM(s) Oral two times a day  cefepime   IVPB 2000 milliGRAM(s) IV Intermittent every 8 hours  dextrose 5%. 1000 milliLiter(s) (100 mL/Hr) IV Continuous <Continuous>  dextrose 5%. 1000 milliLiter(s) (50 mL/Hr) IV Continuous <Continuous>  dextrose 50% Injectable 25 Gram(s) IV Push once  dextrose 50% Injectable 12.5 Gram(s) IV Push once  dextrose 50% Injectable 25 Gram(s) IV Push once  dronabinol 2.5 milliGRAM(s) Oral two times a day  gabapentin 300 milliGRAM(s) Oral two times a day  glucagon  Injectable 1 milliGRAM(s) IntraMuscular once  insulin glargine Injectable (LANTUS) 18 Unit(s) SubCutaneous at bedtime  insulin lispro (ADMELOG) corrective regimen sliding scale   SubCutaneous every 6 hours  lactated ringers. 1000 milliLiter(s) (100 mL/Hr) IV Continuous <Continuous>  lactobacillus acidophilus 1 Tablet(s) Oral two times a day with meals  midodrine. 10 milliGRAM(s) Oral three times a day  pantoprazole    Tablet 40 milliGRAM(s) Oral before breakfast  tamsulosin 0.4 milliGRAM(s) Oral at bedtime  vancomycin  IVPB 750 milliGRAM(s) IV Intermittent every 12 hours    MEDICATIONS  (PRN):  acetaminophen     Tablet .. 650 milliGRAM(s) Oral every 6 hours PRN Temp greater or equal to 38C (100.4F), Moderate Pain (4 - 6)  dextrose Oral Gel 15 Gram(s) Oral once PRN Blood Glucose LESS THAN 70 milliGRAM(s)/deciliter  loperamide 2 milliGRAM(s) Oral three times a day PRN Diarrhea      REVIEW OF SYSTEMS:  CONSTITUTIONAL: No fever, weight loss, chills, shakes, or fat  RESPIRATORY: No cough, wheezing, hemoptysis, or shortness of breath  CARDIOVASCULAR: No chest pain, dyspnea, palpitations, dizziness, syncope, paroxysmal nocturnal dyspnea, orthopnea, or arm or leg swelling  GASTROINTESTINAL: No abdominal  or epigastric pain, nausea, vomiting, hematemesis, diarrhea, constipation, melena or bright red bloo  NEUROLOGICAL: No headaches, memory loss, slurred speech, limb weakness, loss of strength, numbness, or tremors  SKIN: No itching, burning, rashes, or lesions  ENDOCRINE: No heat or cold intolerance, or hair loss  MUSCULOSKELETAL: No joint pain or swelling, muscle, back, or extremity pain  HEME/LYMPH: No easy bruising or bleeding gums  ALLERY AND IMMUNOLOGIC: No hives or rash.    Vital Signs Last 24 Hrs  T(C): 37.1 (17 Oct 2023 08:31), Max: 37.3 (17 Oct 2023 05:05)  T(F): 98.8 (17 Oct 2023 08:31), Max: 99.1 (17 Oct 2023 05:05)  HR: 60 (17 Oct 2023 11:30) (59 - 74)  BP: 95/39 (17 Oct 2023 11:30) (92/45 - 122/50)  BP(mean): 62 (17 Oct 2023 11:30) (60 - 66)  RR: 15 (17 Oct 2023 11:30) (15 - 20)  SpO2: 96% (17 Oct 2023 11:30) (94% - 100%)    Parameters below as of 17 Oct 2023 11:30  Patient On (Oxygen Delivery Method): room air        PHYSICAL EXAM:  HEAD:  Atraumatic, Normocephalic  EYES: EOMI, PERRLA, conjunctiva and sclera clear  NECK: Supple and normal thyroid.  No JVD or carotid bruit.   HEART: S1, S2 regular , 1/6 soft ejection systolic murmur in mitral area , no thrill and no gallops .  PULMONARY: Bilateral vesicular breathing , few scattered ronchi and few scattered rales are present .  ABDOMEN: Soft nontender and positive bowl sounds   EXTREMITIES:  No clubbing, cyanosis, or pedal  edema  NEUROLOGICAL: AAOX3 , no focal deficit .    LABS:                        8.2    7.97  )-----------( 277      ( 17 Oct 2023 08:38 )             26.3     10-17    142  |  112<H>  |  18  ----------------------------<  172<H>  4.1   |  26  |  0.69    Ca    8.4<L>      17 Oct 2023 08:38            Urinalysis Basic - ( 17 Oct 2023 08:38 )    Color: x / Appearance: x / SG: x / pH: x  Gluc: 172 mg/dL / Ketone: x  / Bili: x / Urobili: x   Blood: x / Protein: x / Nitrite: x   Leuk Esterase: x / RBC: x / WBC x   Sq Epi: x / Non Sq Epi: x / Bacteria: x      BNP      EKG:  ECHO:  IMAGING:    Assessment and Plan :     Will continue to follow during hospital course and give further recommendations as needed . Thanks for your referral . if any questions please contact me at office ([phone])cell [phone])  [facility] [place]     Patient is a 78y old  Male who presents with a chief complaint of fever and weakness (17 Oct 2023 11:50)      HPI:  78-year-old male presents to the hospital by ambulance from home.  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 Pt does not go to doctor on regualar basis per son and does phone visits.   (11 Oct 2023 07:02)      HPI:    78y Male for Cardiology Consult    PAST MEDICAL & SURGICAL HISTORY:  Diabetes      Benign essential HTN      Pacemaker      History of BPH      Diabetic foot ulcers          FAMILY HISTORY:      SOCIAL HISTORY:   Alcohol:  Smoking:    Allergies    No Known Allergies    Intolerances        MEDICATIONS  (STANDING):  aspirin enteric coated 81 milliGRAM(s) Oral daily  atorvastatin 80 milliGRAM(s) Oral at bedtime  bethanechol 25 milliGRAM(s) Oral two times a day  cefepime   IVPB 2000 milliGRAM(s) IV Intermittent every 8 hours  dextrose 5%. 1000 milliLiter(s) (100 mL/Hr) IV Continuous <Continuous>  dextrose 5%. 1000 milliLiter(s) (50 mL/Hr) IV Continuous <Continuous>  dextrose 50% Injectable 25 Gram(s) IV Push once  dextrose 50% Injectable 12.5 Gram(s) IV Push once  dextrose 50% Injectable 25 Gram(s) IV Push once  dronabinol 2.5 milliGRAM(s) Oral two times a day  gabapentin 300 milliGRAM(s) Oral two times a day  glucagon  Injectable 1 milliGRAM(s) IntraMuscular once  insulin glargine Injectable (LANTUS) 18 Unit(s) SubCutaneous at bedtime  insulin lispro (ADMELOG) corrective regimen sliding scale   SubCutaneous every 6 hours  lactated ringers. 1000 milliLiter(s) (100 mL/Hr) IV Continuous <Continuous>  lactobacillus acidophilus 1 Tablet(s) Oral two times a day with meals  midodrine. 10 milliGRAM(s) Oral three times a day  pantoprazole    Tablet 40 milliGRAM(s) Oral before breakfast  tamsulosin 0.4 milliGRAM(s) Oral at bedtime  vancomycin  IVPB 750 milliGRAM(s) IV Intermittent every 12 hours    MEDICATIONS  (PRN):  acetaminophen     Tablet .. 650 milliGRAM(s) Oral every 6 hours PRN Temp greater or equal to 38C (100.4F), Moderate Pain (4 - 6)  dextrose Oral Gel 15 Gram(s) Oral once PRN Blood Glucose LESS THAN 70 milliGRAM(s)/deciliter  loperamide 2 milliGRAM(s) Oral three times a day PRN Diarrhea      REVIEW OF SYSTEMS:  CONSTITUTIONAL: No fever, weight loss, chills, shakes, or fat  RESPIRATORY: No cough, wheezing, hemoptysis, or shortness of breath  CARDIOVASCULAR: No chest pain, dyspnea, palpitations, dizziness, syncope, paroxysmal nocturnal dyspnea, orthopnea, or arm or leg swelling  GASTROINTESTINAL: No abdominal  or epigastric pain, nausea, vomiting, hematemesis, diarrhea, constipation, melena or bright red bloo  NEUROLOGICAL: No headaches, memory loss, slurred speech, limb weakness, loss of strength, numbness, or tremors  SKIN: No itching, burning, rashes, or lesions  ENDOCRINE: No heat or cold intolerance, or hair loss  MUSCULOSKELETAL: No joint pain or swelling, muscle, back, or extremity pain  HEME/LYMPH: No easy bruising or bleeding gums  ALLERY AND IMMUNOLOGIC: No hives or rash.    Vital Signs Last 24 Hrs  T(C): 37.1 (17 Oct 2023 08:31), Max: 37.3 (17 Oct 2023 05:05)  T(F): 98.8 (17 Oct 2023 08:31), Max: 99.1 (17 Oct 2023 05:05)  HR: 60 (17 Oct 2023 11:30) (59 - 74)  BP: 95/39 (17 Oct 2023 11:30) (92/45 - 122/50)  BP(mean): 62 (17 Oct 2023 11:30) (60 - 66)  RR: 15 (17 Oct 2023 11:30) (15 - 20)  SpO2: 96% (17 Oct 2023 11:30) (94% - 100%)    Parameters below as of 17 Oct 2023 11:30  Patient On (Oxygen Delivery Method): room air        PHYSICAL EXAM:  HEAD:  Atraumatic, Normocephalic  EYES: EOMI, PERRLA, conjunctiva and sclera clear  NECK: Supple and normal thyroid.  No JVD or carotid bruit.   HEART: S1, S2 regular , 1/6 soft ejection systolic murmur in mitral area , no thrill and no gallops .  PULMONARY: Bilateral vesicular breathing , few scattered ronchi and few scattered rales are present .  ABDOMEN: Soft nontender and positive bowl sounds   EXTREMITIES:  No clubbing, cyanosis, or pedal  edema  NEUROLOGICAL: AAOX3 , no focal deficit .    LABS:                        8.2    7.97  )-----------( 277      ( 17 Oct 2023 08:38 )             26.3     10-17    142  |  112<H>  |  18  ----------------------------<  172<H>  4.1   |  26  |  0.69    Ca    8.4<L>      17 Oct 2023 08:38            Urinalysis Basic - ( 17 Oct 2023 08:38 )    Color: x / Appearance: x / SG: x / pH: x  Gluc: 172 mg/dL / Ketone: x  / Bili: x / Urobili: x   Blood: x / Protein: x / Nitrite: x   Leuk Esterase: x / RBC: x / WBC x   Sq Epi: x / Non Sq Epi: x / Bacteria: x      BNP      EKG:  ECHO:  IMAGING:    Assessment and Plan :     Will continue to follow during hospital course and give further recommendations as needed . Thanks for your referral . if any questions please contact me at office ([phone])cell [phone])  [facility] [place]     Patient is a 78y old  Male who presents with a chief complaint of fever and weakness (17 Oct 2023 11:50)      HPI:  78-year-old male presents to the hospital by ambulance from home.  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 Pt does not go to doctor on regualar basis per son and does phone visits.   (11 Oct 2023 07:02)      HPI:    78y Male for Cardiology Consult    PAST MEDICAL & SURGICAL HISTORY:  Diabetes      Benign essential HTN      Pacemaker      History of BPH      Diabetic foot ulcers          FAMILY HISTORY:      SOCIAL HISTORY:   Alcohol:  Smoking:    Allergies    No Known Allergies    Intolerances        MEDICATIONS  (STANDING):  aspirin enteric coated 81 milliGRAM(s) Oral daily  atorvastatin 80 milliGRAM(s) Oral at bedtime  bethanechol 25 milliGRAM(s) Oral two times a day  cefepime   IVPB 2000 milliGRAM(s) IV Intermittent every 8 hours  dextrose 5%. 1000 milliLiter(s) (100 mL/Hr) IV Continuous <Continuous>  dextrose 5%. 1000 milliLiter(s) (50 mL/Hr) IV Continuous <Continuous>  dextrose 50% Injectable 25 Gram(s) IV Push once  dextrose 50% Injectable 12.5 Gram(s) IV Push once  dextrose 50% Injectable 25 Gram(s) IV Push once  dronabinol 2.5 milliGRAM(s) Oral two times a day  gabapentin 300 milliGRAM(s) Oral two times a day  glucagon  Injectable 1 milliGRAM(s) IntraMuscular once  insulin glargine Injectable (LANTUS) 18 Unit(s) SubCutaneous at bedtime  insulin lispro (ADMELOG) corrective regimen sliding scale   SubCutaneous every 6 hours  lactated ringers. 1000 milliLiter(s) (100 mL/Hr) IV Continuous <Continuous>  lactobacillus acidophilus 1 Tablet(s) Oral two times a day with meals  midodrine. 10 milliGRAM(s) Oral three times a day  pantoprazole    Tablet 40 milliGRAM(s) Oral before breakfast  tamsulosin 0.4 milliGRAM(s) Oral at bedtime  vancomycin  IVPB 750 milliGRAM(s) IV Intermittent every 12 hours    MEDICATIONS  (PRN):  acetaminophen     Tablet .. 650 milliGRAM(s) Oral every 6 hours PRN Temp greater or equal to 38C (100.4F), Moderate Pain (4 - 6)  dextrose Oral Gel 15 Gram(s) Oral once PRN Blood Glucose LESS THAN 70 milliGRAM(s)/deciliter  loperamide 2 milliGRAM(s) Oral three times a day PRN Diarrhea      REVIEW OF SYSTEMS:  CONSTITUTIONAL: No fever, weight loss, chills, shakes, or fat  RESPIRATORY: No cough, wheezing, hemoptysis, or shortness of breath  CARDIOVASCULAR: No chest pain, dyspnea, palpitations, dizziness, syncope, paroxysmal nocturnal dyspnea, orthopnea, or arm or leg swelling  GASTROINTESTINAL: No abdominal  or epigastric pain, nausea, vomiting, hematemesis, diarrhea, constipation, melena or bright red bloo  NEUROLOGICAL: No headaches, memory loss, slurred speech, limb weakness, loss of strength, numbness, or tremors  SKIN: No itching, burning, rashes, or lesions  ENDOCRINE: No heat or cold intolerance, or hair loss  MUSCULOSKELETAL: No joint pain or swelling, muscle, back, or extremity pain  HEME/LYMPH: No easy bruising or bleeding gums  ALLERY AND IMMUNOLOGIC: No hives or rash.    Vital Signs Last 24 Hrs  T(C): 37.1 (17 Oct 2023 08:31), Max: 37.3 (17 Oct 2023 05:05)  T(F): 98.8 (17 Oct 2023 08:31), Max: 99.1 (17 Oct 2023 05:05)  HR: 60 (17 Oct 2023 11:30) (59 - 74)  BP: 95/39 (17 Oct 2023 11:30) (92/45 - 122/50)  BP(mean): 62 (17 Oct 2023 11:30) (60 - 66)  RR: 15 (17 Oct 2023 11:30) (15 - 20)  SpO2: 96% (17 Oct 2023 11:30) (94% - 100%)    Parameters below as of 17 Oct 2023 11:30  Patient On (Oxygen Delivery Method): room air        PHYSICAL EXAM:  HEAD:  Atraumatic, Normocephalic  EYES: EOMI, PERRLA, conjunctiva and sclera clear  NECK: Supple and normal thyroid.  No JVD or carotid bruit.   HEART: S1, S2 regular , 1/6 soft ejection systolic murmur in mitral area , no thrill and no gallops .  PULMONARY: Bilateral vesicular breathing , few scattered ronchi and few scattered rales are present .  ABDOMEN: Soft nontender and positive bowl sounds   EXTREMITIES:  No clubbing, cyanosis, or pedal  edema  NEUROLOGICAL: AAOX3 , no focal deficit .    LABS:                        8.2    7.97  )-----------( 277      ( 17 Oct 2023 08:38 )             26.3     10-17    142  |  112<H>  |  18  ----------------------------<  172<H>  4.1   |  26  |  0.69    Ca    8.4<L>      17 Oct 2023 08:38            Urinalysis Basic - ( 17 Oct 2023 08:38 )    Color: x / Appearance: x / SG: x / pH: x  Gluc: 172 mg/dL / Ketone: x  / Bili: x / Urobili: x   Blood: x / Protein: x / Nitrite: x   Leuk Esterase: x / RBC: x / WBC x   Sq Epi: x / Non Sq Epi: x / Bacteria: x      BNP      EKG:  ECHO:  IMAGING:    Assessment and Plan :   FULL CONSULT DICTATED .  78 years old male with H/O hypertension , DM and bilateral ulcers on Feet . Molly has positive blood cultures so GISSELL recommended by ID   GISSELL FINDINGS :  1) Normal LV size and systolic function .  2) Moderate MR   3 ) NO INTRACARDIAC , MASS , THROMBUS , VEGETATIONS AND TUMOR .  4) PACEMAKER WIRE INTACT AND NO VEGETATIONS ON THE WIRE '  5) No ASD and No VSD and No PFO  FULL REPORT TO BE DICTATED .  Will continue to follow during hospital course and give further recommendations as needed . Thanks for your referral . if any questions please contact me at office ([phone])cell [phone])  [facility] [place]     Patient is a 78y old  Male who presents with a chief complaint of fever and weakness (17 Oct 2023 11:50)      HPI:  78-year-old male presents to the hospital by ambulance from home.  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 Pt does not go to doctor on regualar basis per son and does phone visits.   (11 Oct 2023 07:02)      HPI:    78y Male for Cardiology Consult    PAST MEDICAL & SURGICAL HISTORY:  Diabetes      Benign essential HTN      Pacemaker      History of BPH      Diabetic foot ulcers          FAMILY HISTORY:      SOCIAL HISTORY:   Alcohol:  Smoking:    Allergies    No Known Allergies    Intolerances        MEDICATIONS  (STANDING):  aspirin enteric coated 81 milliGRAM(s) Oral daily  atorvastatin 80 milliGRAM(s) Oral at bedtime  bethanechol 25 milliGRAM(s) Oral two times a day  cefepime   IVPB 2000 milliGRAM(s) IV Intermittent every 8 hours  dextrose 5%. 1000 milliLiter(s) (100 mL/Hr) IV Continuous <Continuous>  dextrose 5%. 1000 milliLiter(s) (50 mL/Hr) IV Continuous <Continuous>  dextrose 50% Injectable 25 Gram(s) IV Push once  dextrose 50% Injectable 12.5 Gram(s) IV Push once  dextrose 50% Injectable 25 Gram(s) IV Push once  dronabinol 2.5 milliGRAM(s) Oral two times a day  gabapentin 300 milliGRAM(s) Oral two times a day  glucagon  Injectable 1 milliGRAM(s) IntraMuscular once  insulin glargine Injectable (LANTUS) 18 Unit(s) SubCutaneous at bedtime  insulin lispro (ADMELOG) corrective regimen sliding scale   SubCutaneous every 6 hours  lactated ringers. 1000 milliLiter(s) (100 mL/Hr) IV Continuous <Continuous>  lactobacillus acidophilus 1 Tablet(s) Oral two times a day with meals  midodrine. 10 milliGRAM(s) Oral three times a day  pantoprazole    Tablet 40 milliGRAM(s) Oral before breakfast  tamsulosin 0.4 milliGRAM(s) Oral at bedtime  vancomycin  IVPB 750 milliGRAM(s) IV Intermittent every 12 hours    MEDICATIONS  (PRN):  acetaminophen     Tablet .. 650 milliGRAM(s) Oral every 6 hours PRN Temp greater or equal to 38C (100.4F), Moderate Pain (4 - 6)  dextrose Oral Gel 15 Gram(s) Oral once PRN Blood Glucose LESS THAN 70 milliGRAM(s)/deciliter  loperamide 2 milliGRAM(s) Oral three times a day PRN Diarrhea      REVIEW OF SYSTEMS:  CONSTITUTIONAL: No fever, weight loss, chills, shakes, or fat  RESPIRATORY: No cough, wheezing, hemoptysis, or shortness of breath  CARDIOVASCULAR: No chest pain, dyspnea, palpitations, dizziness, syncope, paroxysmal nocturnal dyspnea, orthopnea, or arm or leg swelling  GASTROINTESTINAL: No abdominal  or epigastric pain, nausea, vomiting, hematemesis, diarrhea, constipation, melena or bright red bloo  NEUROLOGICAL: No headaches, memory loss, slurred speech, limb weakness, loss of strength, numbness, or tremors  SKIN: No itching, burning, rashes, or lesions  ENDOCRINE: No heat or cold intolerance, or hair loss  MUSCULOSKELETAL: No joint pain or swelling, muscle, back, or extremity pain  HEME/LYMPH: No easy bruising or bleeding gums  ALLERY AND IMMUNOLOGIC: No hives or rash.    Vital Signs Last 24 Hrs  T(C): 37.1 (17 Oct 2023 08:31), Max: 37.3 (17 Oct 2023 05:05)  T(F): 98.8 (17 Oct 2023 08:31), Max: 99.1 (17 Oct 2023 05:05)  HR: 60 (17 Oct 2023 11:30) (59 - 74)  BP: 95/39 (17 Oct 2023 11:30) (92/45 - 122/50)  BP(mean): 62 (17 Oct 2023 11:30) (60 - 66)  RR: 15 (17 Oct 2023 11:30) (15 - 20)  SpO2: 96% (17 Oct 2023 11:30) (94% - 100%)    Parameters below as of 17 Oct 2023 11:30  Patient On (Oxygen Delivery Method): room air        PHYSICAL EXAM:  HEAD:  Atraumatic, Normocephalic  EYES: EOMI, PERRLA, conjunctiva and sclera clear  NECK: Supple and normal thyroid.  No JVD or carotid bruit.   HEART: S1, S2 regular , 1/6 soft ejection systolic murmur in mitral area , no thrill and no gallops .  PULMONARY: Bilateral vesicular breathing , few scattered ronchi and few scattered rales are present .  ABDOMEN: Soft nontender and positive bowl sounds   EXTREMITIES:  No clubbing, cyanosis, or pedal  edema  NEUROLOGICAL: AAOX3 , no focal deficit .    LABS:                        8.2    7.97  )-----------( 277      ( 17 Oct 2023 08:38 )             26.3     10-17    142  |  112<H>  |  18  ----------------------------<  172<H>  4.1   |  26  |  0.69    Ca    8.4<L>      17 Oct 2023 08:38            Urinalysis Basic - ( 17 Oct 2023 08:38 )    Color: x / Appearance: x / SG: x / pH: x  Gluc: 172 mg/dL / Ketone: x  / Bili: x / Urobili: x   Blood: x / Protein: x / Nitrite: x   Leuk Esterase: x / RBC: x / WBC x   Sq Epi: x / Non Sq Epi: x / Bacteria: x      BNP      EKG:  ECHO:  IMAGING:    Assessment and Plan :   FULL CONSULT DICTATED .  78 years old male with H/O hypertension , DM and bilateral ulcers on Feet . Molly has positive blood cultures so GISSELL recommended by ID   GISSELL FINDINGS :  1) Normal LV size and systolic function .  2) Moderate MR   3 ) NO INTRACARDIAC , MASS , THROMBUS , VEGETATIONS AND TUMOR .  4) PACEMAKER WIRE INTACT AND NO VEGETATIONS ON THE WIRE '  5) No ASD and No VSD and No PFO  FULL REPORT TO BE DICTATED .  Will continue to follow during hospital course and give further recommendations as needed . Thanks for your referral . if any questions please contact me at office ([phone] [phone])  [facility] [place]     Patient is a 78y old  Male who presents with a chief complaint of fever and weakness (17 Oct 2023 11:50)      HPI:  78-year-old male presents to the hospital by ambulance from home.  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 Pt does not go to doctor on regualar basis per son and does phone visits.   (11 Oct 2023 07:02)      HPI:    78y Male for Cardiology Consult    PAST MEDICAL & SURGICAL HISTORY:  Diabetes      Benign essential HTN      Pacemaker      History of BPH      Diabetic foot ulcers          FAMILY HISTORY:      SOCIAL HISTORY:   Alcohol:  Smoking:    Allergies    No Known Allergies    Intolerances        MEDICATIONS  (STANDING):  aspirin enteric coated 81 milliGRAM(s) Oral daily  atorvastatin 80 milliGRAM(s) Oral at bedtime  bethanechol 25 milliGRAM(s) Oral two times a day  cefepime   IVPB 2000 milliGRAM(s) IV Intermittent every 8 hours  dextrose 5%. 1000 milliLiter(s) (100 mL/Hr) IV Continuous <Continuous>  dextrose 5%. 1000 milliLiter(s) (50 mL/Hr) IV Continuous <Continuous>  dextrose 50% Injectable 25 Gram(s) IV Push once  dextrose 50% Injectable 12.5 Gram(s) IV Push once  dextrose 50% Injectable 25 Gram(s) IV Push once  dronabinol 2.5 milliGRAM(s) Oral two times a day  gabapentin 300 milliGRAM(s) Oral two times a day  glucagon  Injectable 1 milliGRAM(s) IntraMuscular once  insulin glargine Injectable (LANTUS) 18 Unit(s) SubCutaneous at bedtime  insulin lispro (ADMELOG) corrective regimen sliding scale   SubCutaneous every 6 hours  lactated ringers. 1000 milliLiter(s) (100 mL/Hr) IV Continuous <Continuous>  lactobacillus acidophilus 1 Tablet(s) Oral two times a day with meals  midodrine. 10 milliGRAM(s) Oral three times a day  pantoprazole    Tablet 40 milliGRAM(s) Oral before breakfast  tamsulosin 0.4 milliGRAM(s) Oral at bedtime  vancomycin  IVPB 750 milliGRAM(s) IV Intermittent every 12 hours    MEDICATIONS  (PRN):  acetaminophen     Tablet .. 650 milliGRAM(s) Oral every 6 hours PRN Temp greater or equal to 38C (100.4F), Moderate Pain (4 - 6)  dextrose Oral Gel 15 Gram(s) Oral once PRN Blood Glucose LESS THAN 70 milliGRAM(s)/deciliter  loperamide 2 milliGRAM(s) Oral three times a day PRN Diarrhea      REVIEW OF SYSTEMS:  CONSTITUTIONAL: No fever, weight loss, chills, shakes, or fat  RESPIRATORY: No cough, wheezing, hemoptysis, or shortness of breath  CARDIOVASCULAR: No chest pain, dyspnea, palpitations, dizziness, syncope, paroxysmal nocturnal dyspnea, orthopnea, or arm or leg swelling  GASTROINTESTINAL: No abdominal  or epigastric pain, nausea, vomiting, hematemesis, diarrhea, constipation, melena or bright red bloo  NEUROLOGICAL: No headaches, memory loss, slurred speech, limb weakness, loss of strength, numbness, or tremors  SKIN: No itching, burning, rashes, or lesions  ENDOCRINE: No heat or cold intolerance, or hair loss  MUSCULOSKELETAL: No joint pain or swelling, muscle, back, or extremity pain  HEME/LYMPH: No easy bruising or bleeding gums  ALLERY AND IMMUNOLOGIC: No hives or rash.    Vital Signs Last 24 Hrs  T(C): 37.1 (17 Oct 2023 08:31), Max: 37.3 (17 Oct 2023 05:05)  T(F): 98.8 (17 Oct 2023 08:31), Max: 99.1 (17 Oct 2023 05:05)  HR: 60 (17 Oct 2023 11:30) (59 - 74)  BP: 95/39 (17 Oct 2023 11:30) (92/45 - 122/50)  BP(mean): 62 (17 Oct 2023 11:30) (60 - 66)  RR: 15 (17 Oct 2023 11:30) (15 - 20)  SpO2: 96% (17 Oct 2023 11:30) (94% - 100%)    Parameters below as of 17 Oct 2023 11:30  Patient On (Oxygen Delivery Method): room air        PHYSICAL EXAM:  HEAD:  Atraumatic, Normocephalic  EYES: EOMI, PERRLA, conjunctiva and sclera clear  NECK: Supple and normal thyroid.  No JVD or carotid bruit.   HEART: S1, S2 regular , 1/6 soft ejection systolic murmur in mitral area , no thrill and no gallops .  PULMONARY: Bilateral vesicular breathing , few scattered ronchi and few scattered rales are present .  ABDOMEN: Soft nontender and positive bowl sounds   EXTREMITIES:  No clubbing, cyanosis, or pedal  edema  NEUROLOGICAL: AAOX3 , no focal deficit .    LABS:                        8.2    7.97  )-----------( 277      ( 17 Oct 2023 08:38 )             26.3     10-17    142  |  112<H>  |  18  ----------------------------<  172<H>  4.1   |  26  |  0.69    Ca    8.4<L>      17 Oct 2023 08:38            Urinalysis Basic - ( 17 Oct 2023 08:38 )    Color: x / Appearance: x / SG: x / pH: x  Gluc: 172 mg/dL / Ketone: x  / Bili: x / Urobili: x   Blood: x / Protein: x / Nitrite: x   Leuk Esterase: x / RBC: x / WBC x   Sq Epi: x / Non Sq Epi: x / Bacteria: x      BNP      EKG:  ECHO:  IMAGING:    Assessment and Plan :   FULL CONSULT DICTATED .  78 years old male with H/O hypertension , DM and bilateral ulcers on Feet . Molly has positive blood cultures so GISSELL recommended by ID   GISSELL FINDINGS :  1) Normal LV size and systolic function .  2) Moderate MR   3 ) NO INTRACARDIAC , MASS , THROMBUS , VEGETATIONS AND TUMOR .  4) PACEMAKER WIRE INTACT AND NO VEGETATIONS ON THE WIRE '  5) No ASD and No VSD and No PFO  FULL REPORT TO BE DICTATED .  Will continue to follow during hospital course and give further recommendations as needed . Thanks for your referral . if any questions please contact me at office [phone] [phone])

## 2023-10-17 NOTE — PROGRESS NOTE ADULT - SUBJECTIVE AND OBJECTIVE BOX
[facility] Cardiology    CHIEF COMPLAINT: Patient is a 78y old  Male who presents with a chief complaint of fever and weakness (17 Oct 2023 09:30)      Follow Up: [ ] Chest Pain      [ ] Dyspnea     [ ] Palpitations    [ ] Atrial Fibrillation     [ ] Ventricular Dysrhythmia    [ ] Abnormal EKG                      [ ] Abnormal Cardiac Enzymes     [ ] Valvular Disease    HPI:  78-year-old male presents to the hospital by ambulance from home.  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 Pt does not go to doctor on regualar basis per son and does phone visits.   (11 Oct 2023 07:02)    PAST MEDICAL & SURGICAL HISTORY:  Diabetes      Benign essential HTN      Pacemaker      History of BPH      Diabetic foot ulcers        MEDICATIONS  (STANDING):  aspirin enteric coated 81 milliGRAM(s) Oral daily  atorvastatin 80 milliGRAM(s) Oral at bedtime  bethanechol 25 milliGRAM(s) Oral two times a day  cefepime   IVPB 2000 milliGRAM(s) IV Intermittent every 8 hours  dextrose 5%. 1000 milliLiter(s) (100 mL/Hr) IV Continuous <Continuous>  dextrose 5%. 1000 milliLiter(s) (50 mL/Hr) IV Continuous <Continuous>  dextrose 50% Injectable 25 Gram(s) IV Push once  dextrose 50% Injectable 12.5 Gram(s) IV Push once  dextrose 50% Injectable 25 Gram(s) IV Push once  dronabinol 2.5 milliGRAM(s) Oral two times a day  gabapentin 300 milliGRAM(s) Oral two times a day  glucagon  Injectable 1 milliGRAM(s) IntraMuscular once  insulin glargine Injectable (LANTUS) 18 Unit(s) SubCutaneous at bedtime  insulin lispro (ADMELOG) corrective regimen sliding scale   SubCutaneous every 6 hours  lactated ringers. 1000 milliLiter(s) (100 mL/Hr) IV Continuous <Continuous>  lactobacillus acidophilus 1 Tablet(s) Oral two times a day with meals  midodrine. 10 milliGRAM(s) Oral three times a day  pantoprazole    Tablet 40 milliGRAM(s) Oral before breakfast  tamsulosin 0.4 milliGRAM(s) Oral at bedtime  vancomycin  IVPB 750 milliGRAM(s) IV Intermittent every 12 hours    MEDICATIONS  (PRN):  acetaminophen     Tablet .. 650 milliGRAM(s) Oral every 6 hours PRN Temp greater or equal to 38C (100.4F), Moderate Pain (4 - 6)  dextrose Oral Gel 15 Gram(s) Oral once PRN Blood Glucose LESS THAN 70 milliGRAM(s)/deciliter  loperamide 2 milliGRAM(s) Oral three times a day PRN Diarrhea    Allergies    No Known Allergies    Intolerances        REVIEW OF SYSTEMS:    CONSTITUTIONAL: No weakness, fevers or chills.   EYES/ENT: No visual changes;    NECK: No pain or stiffness  RESPIRATORY: No cough, wheezing, No shortness of breath  CARDIOVASCULAR: No chest pain or palpitations  GASTROINTESTINAL: No abdominal pain, or hematochezia.  GENITOURINARY: No dysuria orhematuria  NEUROLOGICAL: No numbness or weakness  SKIN: No itching, burning, rashes  All other review of systems is negative unless indicated above    Vital Signs Last 24 Hrs  T(C): 37.3 (17 Oct 2023 05:05), Max: 37.3 (17 Oct 2023 05:05)  T(F): 99.1 (17 Oct 2023 05:05), Max: 99.1 (17 Oct 2023 05:05)  HR: 60 (17 Oct 2023 10:12) (59 - 74)  BP: 111/50 (17 Oct 2023 10:12) (89/30 - 122/50)  BP(mean): --  RR: 20 (17 Oct 2023 10:12) (17 - 20)  SpO2: 99% (17 Oct 2023 10:12) (94% - 99%)    Parameters below as of 17 Oct 2023 10:12  Patient On (Oxygen Delivery Method): room air      I&O's Summary    16 Oct 2023 07:01  -  17 Oct 2023 07:00  --------------------------------------------------------  IN: 120 mL / OUT: 1350 mL / NET: -1230 mL        PHYSICAL EXAM:  Constitutional: NAD  Neurological: Alert, no focal deficits  HEENT: no JVD, EOMI  Cardiovascular: Regular, S1 and S2, no murmur  Pulmonary: breath sounds bilaterally  Gastrointestinal: Bowel Sounds present, soft, nontender  EXT:  no peripheral edema  Skin: No rashes.  Psych:  Mood calm  LABS: All Labs Reviewed:                          8.2    7.97  )-----------( 277      ( 17 Oct 2023 08:38 )             26.3     10-16    140  |  113<H>  |  19  ----------------------------<  201<H>  3.9   |  24  |  0.68    Ca    8.3<L>      16 Oct 2023 07:42      Assessment and Plan:   · Assessment    78M with PMH HTN, PPM, DM, foot ulcers , presents to the hospital by ambulance from home.  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 Pt does not go to doctor on regular basis per son and does phone visits.    +Bacteremia. Called by Cardio NP for consultation and to arrange GISSELL to R/O Endocarditis or PPM related infection. Order placed to be done by Dr. Barrett Monday or Tuesday  Cant ASA, Statin  Pt has seen Dave Panchal and [facility] outpt 2021, [phone]  pt reports feeling well today.   KEEP NPO for GISSELL today. R/O I.E.      TTE: CONCLUSIONS:      1. Technically difficult image quality.   2. There is abnormal septal activation, likely from a paced rhythm. There is likely superimposed anterior and septal hypokinesis. Limited views and limited endocardial definition make further interpretation limited.   3. The right ventricle is not well visualized. Normal right ventricular cavity size and reduced systolic function.   4. The left atrium is mildly dilated in size.   5. Right atrium was not well visualized.   6. Mild calcification of the aortic valve leaflets.   7. Tricuspid valve was not well visualized.   8. Aortic valve was not well visualized.    will follow                       [facility] Cardiology    CHIEF COMPLAINT: Patient is a 78y old  Male who presents with a chief complaint of fever and weakness (17 Oct 2023 09:30)      Follow Up: [ ] Chest Pain      [ ] Dyspnea     [ ] Palpitations    [ ] Atrial Fibrillation     [ ] Ventricular Dysrhythmia    [ ] Abnormal EKG                      [ ] Abnormal Cardiac Enzymes     [ ] Valvular Disease    HPI:  78-year-old male presents to the hospital by ambulance from home.  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 Pt does not go to doctor on regualar basis per son and does phone visits.   (11 Oct 2023 07:02)    PAST MEDICAL & SURGICAL HISTORY:  Diabetes      Benign essential HTN      Pacemaker      History of BPH      Diabetic foot ulcers        MEDICATIONS  (STANDING):  aspirin enteric coated 81 milliGRAM(s) Oral daily  atorvastatin 80 milliGRAM(s) Oral at bedtime  bethanechol 25 milliGRAM(s) Oral two times a day  cefepime   IVPB 2000 milliGRAM(s) IV Intermittent every 8 hours  dextrose 5%. 1000 milliLiter(s) (100 mL/Hr) IV Continuous <Continuous>  dextrose 5%. 1000 milliLiter(s) (50 mL/Hr) IV Continuous <Continuous>  dextrose 50% Injectable 25 Gram(s) IV Push once  dextrose 50% Injectable 12.5 Gram(s) IV Push once  dextrose 50% Injectable 25 Gram(s) IV Push once  dronabinol 2.5 milliGRAM(s) Oral two times a day  gabapentin 300 milliGRAM(s) Oral two times a day  glucagon  Injectable 1 milliGRAM(s) IntraMuscular once  insulin glargine Injectable (LANTUS) 18 Unit(s) SubCutaneous at bedtime  insulin lispro (ADMELOG) corrective regimen sliding scale   SubCutaneous every 6 hours  lactated ringers. 1000 milliLiter(s) (100 mL/Hr) IV Continuous <Continuous>  lactobacillus acidophilus 1 Tablet(s) Oral two times a day with meals  midodrine. 10 milliGRAM(s) Oral three times a day  pantoprazole    Tablet 40 milliGRAM(s) Oral before breakfast  tamsulosin 0.4 milliGRAM(s) Oral at bedtime  vancomycin  IVPB 750 milliGRAM(s) IV Intermittent every 12 hours    MEDICATIONS  (PRN):  acetaminophen     Tablet .. 650 milliGRAM(s) Oral every 6 hours PRN Temp greater or equal to 38C (100.4F), Moderate Pain (4 - 6)  dextrose Oral Gel 15 Gram(s) Oral once PRN Blood Glucose LESS THAN 70 milliGRAM(s)/deciliter  loperamide 2 milliGRAM(s) Oral three times a day PRN Diarrhea    Allergies    No Known Allergies    Intolerances        REVIEW OF SYSTEMS:    CONSTITUTIONAL: No weakness, fevers or chills.   EYES/ENT: No visual changes;    NECK: No pain or stiffness  RESPIRATORY: No cough, wheezing, No shortness of breath  CARDIOVASCULAR: No chest pain or palpitations  GASTROINTESTINAL: No abdominal pain, or hematochezia.  GENITOURINARY: No dysuria orhematuria  NEUROLOGICAL: No numbness or weakness  SKIN: No itching, burning, rashes  All other review of systems is negative unless indicated above    Vital Signs Last 24 Hrs  T(C): 37.3 (17 Oct 2023 05:05), Max: 37.3 (17 Oct 2023 05:05)  T(F): 99.1 (17 Oct 2023 05:05), Max: 99.1 (17 Oct 2023 05:05)  HR: 60 (17 Oct 2023 10:12) (59 - 74)  BP: 111/50 (17 Oct 2023 10:12) (89/30 - 122/50)  BP(mean): --  RR: 20 (17 Oct 2023 10:12) (17 - 20)  SpO2: 99% (17 Oct 2023 10:12) (94% - 99%)    Parameters below as of 17 Oct 2023 10:12  Patient On (Oxygen Delivery Method): room air      I&O's Summary    16 Oct 2023 07:01  -  17 Oct 2023 07:00  --------------------------------------------------------  IN: 120 mL / OUT: 1350 mL / NET: -1230 mL        PHYSICAL EXAM:  Constitutional: NAD  Neurological: Alert, no focal deficits  HEENT: no JVD, EOMI  Cardiovascular: Regular, S1 and S2, no murmur  Pulmonary: breath sounds bilaterally  Gastrointestinal: Bowel Sounds present, soft, nontender  EXT:  no peripheral edema  Skin: No rashes.  Psych:  Mood calm  LABS: All Labs Reviewed:                          8.2    7.97  )-----------( 277      ( 17 Oct 2023 08:38 )             26.3     10-16    140  |  113<H>  |  19  ----------------------------<  201<H>  3.9   |  24  |  0.68    Ca    8.3<L>      16 Oct 2023 07:42      Assessment and Plan:   · Assessment    78M with PMH HTN, PPM, DM, foot ulcers , presents to the hospital by ambulance from home.  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 Pt does not go to doctor on regular basis per son and does phone visits.    +Bacteremia. Called by Cardio NP for consultation and to arrange GISSELL to R/O Endocarditis or PPM related infection. Order placed to be done by Dr. Barrett Monday or Tuesday  Cant ASA, Statin  Pt has seen Dave Panchal and [facility] outpt 2021, [phone]  pt reports feeling well today.   KEEP NPO for GISSELL today. R/O I.E.      TTE: CONCLUSIONS:      1. Technically difficult image quality.   2. There is abnormal septal activation, likely from a paced rhythm. There is likely superimposed anterior and septal hypokinesis. Limited views and limited endocardial definition make further interpretation limited.   3. The right ventricle is not well visualized. Normal right ventricular cavity size and reduced systolic function.   4. The left atrium is mildly dilated in size.   5. Right atrium was not well visualized.   6. Mild calcification of the aortic valve leaflets.   7. Tricuspid valve was not well visualized.   8. Aortic valve was not well visualized.    will follow                       [facility] Cardiology    CHIEF COMPLAINT: Patient is a 78y old  Male who presents with a chief complaint of fever and weakness (17 Oct 2023 09:30)      Follow Up: [ ] Chest Pain      [ ] Dyspnea     [ ] Palpitations    [ ] Atrial Fibrillation     [ ] Ventricular Dysrhythmia    [ ] Abnormal EKG                      [ ] Abnormal Cardiac Enzymes     [ ] Valvular Disease    HPI:  78-year-old male presents to the hospital by ambulance from home.  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 Pt does not go to doctor on regualar basis per son and does phone visits.   (11 Oct 2023 07:02)    PAST MEDICAL & SURGICAL HISTORY:  Diabetes      Benign essential HTN      Pacemaker      History of BPH      Diabetic foot ulcers        MEDICATIONS  (STANDING):  aspirin enteric coated 81 milliGRAM(s) Oral daily  atorvastatin 80 milliGRAM(s) Oral at bedtime  bethanechol 25 milliGRAM(s) Oral two times a day  cefepime   IVPB 2000 milliGRAM(s) IV Intermittent every 8 hours  dextrose 5%. 1000 milliLiter(s) (100 mL/Hr) IV Continuous <Continuous>  dextrose 5%. 1000 milliLiter(s) (50 mL/Hr) IV Continuous <Continuous>  dextrose 50% Injectable 25 Gram(s) IV Push once  dextrose 50% Injectable 12.5 Gram(s) IV Push once  dextrose 50% Injectable 25 Gram(s) IV Push once  dronabinol 2.5 milliGRAM(s) Oral two times a day  gabapentin 300 milliGRAM(s) Oral two times a day  glucagon  Injectable 1 milliGRAM(s) IntraMuscular once  insulin glargine Injectable (LANTUS) 18 Unit(s) SubCutaneous at bedtime  insulin lispro (ADMELOG) corrective regimen sliding scale   SubCutaneous every 6 hours  lactated ringers. 1000 milliLiter(s) (100 mL/Hr) IV Continuous <Continuous>  lactobacillus acidophilus 1 Tablet(s) Oral two times a day with meals  midodrine. 10 milliGRAM(s) Oral three times a day  pantoprazole    Tablet 40 milliGRAM(s) Oral before breakfast  tamsulosin 0.4 milliGRAM(s) Oral at bedtime  vancomycin  IVPB 750 milliGRAM(s) IV Intermittent every 12 hours    MEDICATIONS  (PRN):  acetaminophen     Tablet .. 650 milliGRAM(s) Oral every 6 hours PRN Temp greater or equal to 38C (100.4F), Moderate Pain (4 - 6)  dextrose Oral Gel 15 Gram(s) Oral once PRN Blood Glucose LESS THAN 70 milliGRAM(s)/deciliter  loperamide 2 milliGRAM(s) Oral three times a day PRN Diarrhea    Allergies    No Known Allergies    Intolerances        REVIEW OF SYSTEMS:    CONSTITUTIONAL: No weakness, fevers or chills.   EYES/ENT: No visual changes;    NECK: No pain or stiffness  RESPIRATORY: No cough, wheezing, No shortness of breath  CARDIOVASCULAR: No chest pain or palpitations  GASTROINTESTINAL: No abdominal pain, or hematochezia.  GENITOURINARY: No dysuria orhematuria  NEUROLOGICAL: No numbness or weakness  SKIN: No itching, burning, rashes  All other review of systems is negative unless indicated above    Vital Signs Last 24 Hrs  T(C): 37.3 (17 Oct 2023 05:05), Max: 37.3 (17 Oct 2023 05:05)  T(F): 99.1 (17 Oct 2023 05:05), Max: 99.1 (17 Oct 2023 05:05)  HR: 60 (17 Oct 2023 10:12) (59 - 74)  BP: 111/50 (17 Oct 2023 10:12) (89/30 - 122/50)  BP(mean): --  RR: 20 (17 Oct 2023 10:12) (17 - 20)  SpO2: 99% (17 Oct 2023 10:12) (94% - 99%)    Parameters below as of 17 Oct 2023 10:12  Patient On (Oxygen Delivery Method): room air      I&O's Summary    16 Oct 2023 07:01  -  17 Oct 2023 07:00  --------------------------------------------------------  IN: 120 mL / OUT: 1350 mL / NET: -1230 mL        PHYSICAL EXAM:  Constitutional: NAD  Neurological: Alert, no focal deficits  HEENT: no JVD, EOMI  Cardiovascular: Regular, S1 and S2, no murmur  Pulmonary: breath sounds bilaterally  Gastrointestinal: Bowel Sounds present, soft, nontender  EXT:  no peripheral edema  Skin: No rashes.  Psych:  Mood calm  LABS: All Labs Reviewed:                          8.2    7.97  )-----------( 277      ( 17 Oct 2023 08:38 )             26.3     10-16    140  |  113<H>  |  19  ----------------------------<  201<H>  3.9   |  24  |  0.68    Ca    8.3<L>      16 Oct 2023 07:42      Assessment and Plan:   · Assessment    78M with PMH HTN, PPM, DM, foot ulcers , presents to the hospital by ambulance from home.  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 Pt does not go to doctor on regular basis per son and does phone visits.    +Bacteremia. Called by Cardio NP for consultation and to arrange GISSELL to R/O Endocarditis or PPM related infection. Order placed to be done by Dr. Barrett Monday or Tuesday  Cant ASA, Statin  Pt has seen Dave Panchal and Curtis outpt 2021, [phone]  pt reports feeling well today.   KEEP NPO for GISSELL today. R/O I.E.      TTE: CONCLUSIONS:      1. Technically difficult image quality.   2. There is abnormal septal activation, likely from a paced rhythm. There is likely superimposed anterior and septal hypokinesis. Limited views and limited endocardial definition make further interpretation limited.   3. The right ventricle is not well visualized. Normal right ventricular cavity size and reduced systolic function.   4. The left atrium is mildly dilated in size.   5. Right atrium was not well visualized.   6. Mild calcification of the aortic valve leaflets.   7. Tricuspid valve was not well visualized.   8. Aortic valve was not well visualized.    will follow

## 2023-10-17 NOTE — PROGRESS NOTE ADULT - PROBLEM SELECTOR PLAN 1
continue abx per id  subsequent cultures neg  for tessie today - pt medically stable for procedure to evaluated for vegitations

## 2023-10-17 NOTE — PROGRESS NOTE ADULT - ASSESSMENT
78-year-old male presents to the hospital by ambulance from home.  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 Pt does not go to doctor on regular basis per son and does phone visits. Son reports years of struggling with foot infections with black areas of gangrene and their struggling to keep his feet but now feeling that keeping him alive is more important.  Culture - Blood (10.10.23 @ 18:25)    -  Methicillin resistant Staphylococcus aureus (MRSA): Detec  Repeat 10/12 BCx NGTD (48h)  Wcx with MRSA, placed on contact isolation   UCx + Pseudomonas, pansensitive including cefepime   Afebrile over past 24h    RECOMMENDATIONS  Very concerning for osteomyelitis -- NM bone scan ordered and pending  Cardiology rec appreciated   - plan for GISSELL early to rule out IE or PPM involvement   C/w Vancomycin (10/11-) dosing per pharmacy protocol  S/p zosyn (10/11-10/12), changed to cefepime  C/w cefepime given UCx findings  Anticipate that surgery will be required  -plan for nuclear study for possible osteomyelitis    Thank you for consulting us and involving us in the management of this most interesting and challenging case.  We will follow along in the care of this patient. Please call us at [phone] or text me directly on my cell# at [phone] with any concerns.       78-year-old male presents to the hospital by ambulance from home.  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 Pt does not go to doctor on regular basis per son and does phone visits. Son reports years of struggling with foot infections with black areas of gangrene and their struggling to keep his feet but now feeling that keeping him alive is more important.  Culture - Blood (10.10.23 @ 18:25)    -  Methicillin resistant Staphylococcus aureus (MRSA): Detec  Repeat 10/12 BCx NGTD (48h)  Wcx with MRSA, placed on contact isolation   UCx + Pseudomonas, pansensitive including cefepime   Afebrile over past 24h    RECOMMENDATIONS  Very concerning for osteomyelitis -- NM bone scan ordered and pending  Cardiology rec appreciated   - plan for GISSELL early to rule out IE or PPM involvement   C/w Vancomycin (10/11-) dosing per pharmacy protocol  S/p zosyn (10/11-10/12), changed to cefepime  C/w cefepime given UCx findings  Anticipate that surgery will be required  -plan for nuclear study for possible osteomyelitis    Thank you for consulting us and involving us in the management of this most interesting and challenging case.  We will follow along in the care of this patient. Please call us at [phone] or text me directly on my cell# at [phone] with any concerns.       78-year-old male presents to the hospital by ambulance from home.  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 Pt does not go to doctor on regular basis per son and does phone visits. Son reports years of struggling with foot infections with black areas of gangrene and their struggling to keep his feet but now feeling that keeping him alive is more important.  Culture - Blood (10.10.23 @ 18:25)    -  Methicillin resistant Staphylococcus aureus (MRSA): Detec  Repeat 10/12 BCx NGTD (48h)  Wcx with MRSA, placed on contact isolation   UCx + Pseudomonas, pansensitive including cefepime   Afebrile over past 24h    RECOMMENDATIONS  Very concerning for osteomyelitis -- NM bone scan ordered and pending  Cardiology rec appreciated   - plan for GISSELL early to rule out IE or PPM involvement   C/w Vancomycin (10/11-) dosing per pharmacy protocol  S/p zosyn (10/11-10/12), changed to cefepime  C/w cefepime given UCx findings  Anticipate that surgery will be required  -plan for nuclear study for possible osteomyelitis    Thank you for consulting us and involving us in the management of this most interesting and challenging case.  We will follow along in the care of this patient. Please call us at [phone] or text me directly on my cell# at [phone] with any concerns.

## 2023-10-17 NOTE — CONSULT NOTE ADULT - CONSULT REQUESTED DATE/TIME
12-Oct-2023 12:49
11-Oct-2023 16:29
11-Oct-2023 16:00
11-Oct-2023 22:05
11-Oct-2023 15:30
11-Oct-2023 14:25
13-Oct-2023 15:40
17-Oct-2023 11:53
11-Oct-2023 22:13
11-Oct-2023 07:11
12-Oct-2023 12:30

## 2023-10-18 ENCOUNTER — TRANSCRIPTION ENCOUNTER (OUTPATIENT)
Age: 78
End: 2023-10-18

## 2023-10-18 LAB
ANION GAP SERPL CALC-SCNC: 5 MMOL/L — SIGNIFICANT CHANGE UP (ref 5–17)
BUN SERPL-MCNC: 17 MG/DL — SIGNIFICANT CHANGE UP (ref 7–23)
CALCIUM SERPL-MCNC: 8.2 MG/DL — LOW (ref 8.5–10.1)
CHLORIDE SERPL-SCNC: 110 MMOL/L — HIGH (ref 96–108)
CO2 SERPL-SCNC: 27 MMOL/L — SIGNIFICANT CHANGE UP (ref 22–31)
CREAT SERPL-MCNC: 0.75 MG/DL — SIGNIFICANT CHANGE UP (ref 0.5–1.3)
EGFR: 92 ML/MIN/1.73M2 — SIGNIFICANT CHANGE UP
GLUCOSE BLDC GLUCOMTR-MCNC: 194 MG/DL — HIGH (ref 70–99)
GLUCOSE BLDC GLUCOMTR-MCNC: 223 MG/DL — HIGH (ref 70–99)
GLUCOSE BLDC GLUCOMTR-MCNC: 242 MG/DL — HIGH (ref 70–99)
GLUCOSE BLDC GLUCOMTR-MCNC: 287 MG/DL — HIGH (ref 70–99)
GLUCOSE SERPL-MCNC: 204 MG/DL — HIGH (ref 70–99)
HCT VFR BLD CALC: 27.8 % — LOW (ref 39–50)
HGB BLD-MCNC: 8.6 G/DL — LOW (ref 13–17)
MCHC RBC-ENTMCNC: 25.7 PG — LOW (ref 27–34)
MCHC RBC-ENTMCNC: 30.9 GM/DL — LOW (ref 32–36)
MCV RBC AUTO: 83 FL — SIGNIFICANT CHANGE UP (ref 80–100)
NRBC # BLD: 0 /100 WBCS — SIGNIFICANT CHANGE UP (ref 0–0)
PLATELET # BLD AUTO: 282 K/UL — SIGNIFICANT CHANGE UP (ref 150–400)
POTASSIUM SERPL-MCNC: 3.9 MMOL/L — SIGNIFICANT CHANGE UP (ref 3.5–5.3)
POTASSIUM SERPL-SCNC: 3.9 MMOL/L — SIGNIFICANT CHANGE UP (ref 3.5–5.3)
RBC # BLD: 3.35 M/UL — LOW (ref 4.2–5.8)
RBC # FLD: 16.5 % — HIGH (ref 10.3–14.5)
SODIUM SERPL-SCNC: 142 MMOL/L — SIGNIFICANT CHANGE UP (ref 135–145)
VANCOMYCIN TROUGH SERPL-MCNC: 18.3 UG/ML — SIGNIFICANT CHANGE UP (ref 10–20)
WBC # BLD: 8.77 K/UL — SIGNIFICANT CHANGE UP (ref 3.8–10.5)
WBC # FLD AUTO: 8.77 K/UL — SIGNIFICANT CHANGE UP (ref 3.8–10.5)

## 2023-10-18 PROCEDURE — 99232 SBSQ HOSP IP/OBS MODERATE 35: CPT | Mod: 57

## 2023-10-18 RX ORDER — VANCOMYCIN HCL 1 G
500 VIAL (EA) INTRAVENOUS EVERY 12 HOURS
Refills: 0 | Status: DISCONTINUED | OUTPATIENT
Start: 2023-10-19 | End: 2023-10-19

## 2023-10-18 RX ADMIN — GABAPENTIN 300 MILLIGRAM(S): 400 CAPSULE ORAL at 05:53

## 2023-10-18 RX ADMIN — Medication 1 TABLET(S): at 18:04

## 2023-10-18 RX ADMIN — GABAPENTIN 300 MILLIGRAM(S): 400 CAPSULE ORAL at 18:03

## 2023-10-18 RX ADMIN — Medication 2.5 MILLIGRAM(S): at 05:53

## 2023-10-18 RX ADMIN — CEFEPIME 100 MILLIGRAM(S): 1 INJECTION, POWDER, FOR SOLUTION INTRAMUSCULAR; INTRAVENOUS at 05:51

## 2023-10-18 RX ADMIN — Medication 1: at 08:28

## 2023-10-18 RX ADMIN — Medication 1: at 21:41

## 2023-10-18 RX ADMIN — CEFEPIME 100 MILLIGRAM(S): 1 INJECTION, POWDER, FOR SOLUTION INTRAMUSCULAR; INTRAVENOUS at 13:35

## 2023-10-18 RX ADMIN — Medication 2: at 12:04

## 2023-10-18 RX ADMIN — Medication 2: at 18:19

## 2023-10-18 RX ADMIN — Medication 25 MILLIGRAM(S): at 05:52

## 2023-10-18 RX ADMIN — CEFEPIME 100 MILLIGRAM(S): 1 INJECTION, POWDER, FOR SOLUTION INTRAMUSCULAR; INTRAVENOUS at 21:47

## 2023-10-18 RX ADMIN — MIDODRINE HYDROCHLORIDE 10 MILLIGRAM(S): 2.5 TABLET ORAL at 12:05

## 2023-10-18 RX ADMIN — Medication 2.5 MILLIGRAM(S): at 18:03

## 2023-10-18 RX ADMIN — Medication 1 TABLET(S): at 08:26

## 2023-10-18 RX ADMIN — INSULIN GLARGINE 18 UNIT(S): 100 INJECTION, SOLUTION SUBCUTANEOUS at 21:41

## 2023-10-18 RX ADMIN — MIDODRINE HYDROCHLORIDE 10 MILLIGRAM(S): 2.5 TABLET ORAL at 18:04

## 2023-10-18 RX ADMIN — PANTOPRAZOLE SODIUM 40 MILLIGRAM(S): 20 TABLET, DELAYED RELEASE ORAL at 05:51

## 2023-10-18 RX ADMIN — Medication 250 MILLIGRAM(S): at 12:06

## 2023-10-18 RX ADMIN — Medication 81 MILLIGRAM(S): at 12:04

## 2023-10-18 RX ADMIN — Medication 25 MILLIGRAM(S): at 18:03

## 2023-10-18 RX ADMIN — TAMSULOSIN HYDROCHLORIDE 0.4 MILLIGRAM(S): 0.4 CAPSULE ORAL at 21:42

## 2023-10-18 RX ADMIN — MIDODRINE HYDROCHLORIDE 10 MILLIGRAM(S): 2.5 TABLET ORAL at 05:51

## 2023-10-18 RX ADMIN — ATORVASTATIN CALCIUM 80 MILLIGRAM(S): 80 TABLET, FILM COATED ORAL at 21:47

## 2023-10-18 NOTE — PROGRESS NOTE ADULT - SUBJECTIVE AND OBJECTIVE BOX
[facility]  Cecilio Burnett MD PhD, Gloria Hager MD, Rosa Maria Wilkinson MD, Tomy Larsen MD, Anshu Kilpatrick MD  and providing coverage with Amandeep Landaverde MD  Providing Infectious Disease Consultations at [facility], [facility], [facility], [facility]'s    Office# [phone] to schedule follow up appointments  Answering Service for urgent calls or New Consults [phone]  Cell# to text for urgent issues Cecilio Burnett [phone]     infectious diseases progress note:    HEMA LAZCANO is a 78y y. o. Male patient    Overnight and events of the last 24hrs reviewed    Allergies    No Known Allergies    Intolerances        ANTIBIOTICS/RELEVANT:  antimicrobials  cefepime   IVPB 2000 milliGRAM(s) IV Intermittent every 8 hours  vancomycin  IVPB 750 milliGRAM(s) IV Intermittent every 12 hours    immunologic:    OTHER:  acetaminophen     Tablet .. 650 milliGRAM(s) Oral every 6 hours PRN  aspirin enteric coated 81 milliGRAM(s) Oral daily  atorvastatin 80 milliGRAM(s) Oral at bedtime  bethanechol 25 milliGRAM(s) Oral two times a day  dextrose 5%. 1000 milliLiter(s) IV Continuous <Continuous>  dextrose 5%. 1000 milliLiter(s) IV Continuous <Continuous>  dextrose 5%. 1000 milliLiter(s) IV Continuous <Continuous>  dextrose 5%. 1000 milliLiter(s) IV Continuous <Continuous>  dextrose 50% Injectable 12.5 Gram(s) IV Push once  dextrose 50% Injectable 25 Gram(s) IV Push once  dextrose 50% Injectable 25 Gram(s) IV Push once  dextrose Oral Gel 15 Gram(s) Oral once PRN  dronabinol 2.5 milliGRAM(s) Oral two times a day  gabapentin 300 milliGRAM(s) Oral two times a day  glucagon  Injectable 1 milliGRAM(s) IntraMuscular once  insulin glargine Injectable (LANTUS) 18 Unit(s) SubCutaneous at bedtime  insulin lispro (ADMELOG) corrective regimen sliding scale   SubCutaneous at bedtime  insulin lispro (ADMELOG) corrective regimen sliding scale   SubCutaneous three times a day before meals  lactated ringers. 1000 milliLiter(s) IV Continuous <Continuous>  lactobacillus acidophilus 1 Tablet(s) Oral two times a day with meals  loperamide 2 milliGRAM(s) Oral three times a day PRN  midodrine. 10 milliGRAM(s) Oral three times a day  pantoprazole    Tablet 40 milliGRAM(s) Oral before breakfast  tamsulosin 0.4 milliGRAM(s) Oral at bedtime      Objective:  Vital Signs Last 24 Hrs  T(C): 36.7 (18 Oct 2023 05:08), Max: 36.9 (17 Oct 2023 21:49)  T(F): 98.1 (18 Oct 2023 05:08), Max: 98.5 (17 Oct 2023 21:49)  HR: 59 (18 Oct 2023 07:51) (59 - 64)  BP: 128/48 (18 Oct 2023 05:14) (92/45 - 128/48)  BP(mean): 68 (17 Oct 2023 11:45) (60 - 68)  RR: 16 (18 Oct 2023 05:08) (15 - 20)  SpO2: 95% (18 Oct 2023 05:08) (95% - 100%)    Parameters below as of 18 Oct 2023 05:08  Patient On (Oxygen Delivery Method): room air        T(C): 36.7 (10-18-23 @ 05:08), Max: 37.3 (10-17-23 @ 05:05)  T(C): 36.7 (10-18-23 @ 05:08), Max: 37.3 (10-17-23 @ 05:05)  T(C): 36.7 (10-18-23 @ 05:08), Max: 37.3 (10-14-23 @ 17:12)    PHYSICAL EXAM:  HEENT: NC atraumatic  Neck: supple  Respiratory: no accessory muscle use, breathing comfortably  Cardiovascular: distant  Gastrointestinal: normal appearing, nondistended  Extremities: no clubbing, no cyanosis,        LABS:                          8.6    8.77  )-----------( 282      ( 18 Oct 2023 06:50 )             27.8       WBC  8.77 10-18 @ 06:50  7.97 10-17 @ 08:38  7.49 10-16 @ 07:42  6.43 10-15 @ 08:00  5.23 10-14 @ 09:45  4.58 10-13 @ 08:18  7.26 10-12 @ 05:30      10-18    142  |  110<H>  |  17  ----------------------------<  204<H>  3.9   |  27  |  0.75    Ca    8.2<L>      18 Oct 2023 06:50        Creatinine: 0.75 mg/dL (10-18-23 @ 06:50)  Creatinine: 0.69 mg/dL (10-17-23 @ 08:38)  Creatinine: 0.68 mg/dL (10-16-23 @ 07:42)  Creatinine: 0.82 mg/dL (10-15-23 @ 08:00)  Creatinine: 0.88 mg/dL (10-14-23 @ 09:45)  Creatinine: 1.00 mg/dL (10-13-23 @ 08:18)  Creatinine: 1.10 mg/dL (10-12-23 @ 05:30)        Urinalysis Basic - ( 18 Oct 2023 06:50 )    Color: x / Appearance: x / SG: x / pH: x  Gluc: 204 mg/dL / Ketone: x  / Bili: x / Urobili: x   Blood: x / Protein: x / Nitrite: x   Leuk Esterase: x / RBC: x / WBC x   Sq Epi: x / Non Sq Epi: x / Bacteria: x            INFLAMMATORY MARKERS      MICROBIOLOGY:              RADIOLOGY & ADDITIONAL STUDIES:  < from: NM Bone Marrow Imaging Limited (10.17.23 @ 15:11) >    ACC: [id number] EXAM:  NM MULTI DAY PROCEDURE   ORDERED BY: DORIAN BARRIENTOS     ACC: [id number] EXAM:  NM BONE MARROW IMG LTD AREA   ORDERED BY: ZAID BARRIENTOS     ACC: [id number] EXAM:  NM INFLAMM LOC WBC SA SD   ORDERED BY: MAHSA SAN     PROCEDURE DATE:  10/17/2023          INTERPRETATION:  RADIOPHARMACEUTICAL: 550 microcuries In-111 labeled   autologous leukocytes, I.V.; 10.1 mCi Tc99m sulfur colloid, I.V.,    CLINICAL INFORMATION: 78 year-old male with bilateral heel wounds,   referred toevaluate for osteomyelitis    TECHNIQUE:  The patient was injected with the above dose of labeled   autologous leukocytes on 10/16/2023. Approximately 24 hours later, on   10/17/2023, static images of the feet were obtained. The patient then was   injected with Tc-99m sulfur colloid and the images were repeated in the   same projections using dual isotope acquisition mode.    COMPARISON: Bilateral foot x-rays 10/10/2023.    FINDINGS: There is increased uptake of radiolabeled leukocytes and Tc-99m  sulfur colloid in right heel and left midfoot, in a distribution   suspicious for osteomyelitis.    IMPRESSION: Abnormal combined Indium-111 labeled leukocyte study and   marrow scan. Increased uptake of both radiotracer is in the right heel   and left midfoot, concerning for osteomyelitis.       [facility] DISEASE  Cecilio Burnett MD PhD, Gloria Hager MD, Rosa Maria Wilkinson MD, Tomy Larsen MD, Anshu Kilpatrick MD  and providing coverage with Amandeep Landaverde MD  Providing Infectious Disease Consultations at [facility], [facility], [facility], [facility]'s    Office# [phone] to schedule follow up appointments  Answering Service for urgent calls or New Consults [phone]  Cell# to text for urgent issues Cecilio Burnett [phone]     infectious diseases progress note:    HEMA LAZCANO is a 78y y. o. Male patient    Overnight and events of the last 24hrs reviewed    Allergies    No Known Allergies    Intolerances        ANTIBIOTICS/RELEVANT:  antimicrobials  cefepime   IVPB 2000 milliGRAM(s) IV Intermittent every 8 hours  vancomycin  IVPB 750 milliGRAM(s) IV Intermittent every 12 hours    immunologic:    OTHER:  acetaminophen     Tablet .. 650 milliGRAM(s) Oral every 6 hours PRN  aspirin enteric coated 81 milliGRAM(s) Oral daily  atorvastatin 80 milliGRAM(s) Oral at bedtime  bethanechol 25 milliGRAM(s) Oral two times a day  dextrose 5%. 1000 milliLiter(s) IV Continuous <Continuous>  dextrose 5%. 1000 milliLiter(s) IV Continuous <Continuous>  dextrose 5%. 1000 milliLiter(s) IV Continuous <Continuous>  dextrose 5%. 1000 milliLiter(s) IV Continuous <Continuous>  dextrose 50% Injectable 12.5 Gram(s) IV Push once  dextrose 50% Injectable 25 Gram(s) IV Push once  dextrose 50% Injectable 25 Gram(s) IV Push once  dextrose Oral Gel 15 Gram(s) Oral once PRN  dronabinol 2.5 milliGRAM(s) Oral two times a day  gabapentin 300 milliGRAM(s) Oral two times a day  glucagon  Injectable 1 milliGRAM(s) IntraMuscular once  insulin glargine Injectable (LANTUS) 18 Unit(s) SubCutaneous at bedtime  insulin lispro (ADMELOG) corrective regimen sliding scale   SubCutaneous at bedtime  insulin lispro (ADMELOG) corrective regimen sliding scale   SubCutaneous three times a day before meals  lactated ringers. 1000 milliLiter(s) IV Continuous <Continuous>  lactobacillus acidophilus 1 Tablet(s) Oral two times a day with meals  loperamide 2 milliGRAM(s) Oral three times a day PRN  midodrine. 10 milliGRAM(s) Oral three times a day  pantoprazole    Tablet 40 milliGRAM(s) Oral before breakfast  tamsulosin 0.4 milliGRAM(s) Oral at bedtime      Objective:  Vital Signs Last 24 Hrs  T(C): 36.7 (18 Oct 2023 05:08), Max: 36.9 (17 Oct 2023 21:49)  T(F): 98.1 (18 Oct 2023 05:08), Max: 98.5 (17 Oct 2023 21:49)  HR: 59 (18 Oct 2023 07:51) (59 - 64)  BP: 128/48 (18 Oct 2023 05:14) (92/45 - 128/48)  BP(mean): 68 (17 Oct 2023 11:45) (60 - 68)  RR: 16 (18 Oct 2023 05:08) (15 - 20)  SpO2: 95% (18 Oct 2023 05:08) (95% - 100%)    Parameters below as of 18 Oct 2023 05:08  Patient On (Oxygen Delivery Method): room air        T(C): 36.7 (10-18-23 @ 05:08), Max: 37.3 (10-17-23 @ 05:05)  T(C): 36.7 (10-18-23 @ 05:08), Max: 37.3 (10-17-23 @ 05:05)  T(C): 36.7 (10-18-23 @ 05:08), Max: 37.3 (10-14-23 @ 17:12)    PHYSICAL EXAM:  HEENT: NC atraumatic  Neck: supple  Respiratory: no accessory muscle use, breathing comfortably  Cardiovascular: distant  Gastrointestinal: normal appearing, nondistended  Extremities: no clubbing, no cyanosis,        LABS:                          8.6    8.77  )-----------( 282      ( 18 Oct 2023 06:50 )             27.8       WBC  8.77 10-18 @ 06:50  7.97 10-17 @ 08:38  7.49 10-16 @ 07:42  6.43 10-15 @ 08:00  5.23 10-14 @ 09:45  4.58 10-13 @ 08:18  7.26 10-12 @ 05:30      10-18    142  |  110<H>  |  17  ----------------------------<  204<H>  3.9   |  27  |  0.75    Ca    8.2<L>      18 Oct 2023 06:50        Creatinine: 0.75 mg/dL (10-18-23 @ 06:50)  Creatinine: 0.69 mg/dL (10-17-23 @ 08:38)  Creatinine: 0.68 mg/dL (10-16-23 @ 07:42)  Creatinine: 0.82 mg/dL (10-15-23 @ 08:00)  Creatinine: 0.88 mg/dL (10-14-23 @ 09:45)  Creatinine: 1.00 mg/dL (10-13-23 @ 08:18)  Creatinine: 1.10 mg/dL (10-12-23 @ 05:30)        Urinalysis Basic - ( 18 Oct 2023 06:50 )    Color: x / Appearance: x / SG: x / pH: x  Gluc: 204 mg/dL / Ketone: x  / Bili: x / Urobili: x   Blood: x / Protein: x / Nitrite: x   Leuk Esterase: x / RBC: x / WBC x   Sq Epi: x / Non Sq Epi: x / Bacteria: x            INFLAMMATORY MARKERS      MICROBIOLOGY:              RADIOLOGY & ADDITIONAL STUDIES:  < from: NM Bone Marrow Imaging Limited (10.17.23 @ 15:11) >    ACC: [id number] EXAM:  NM MULTI DAY PROCEDURE   ORDERED BY: DORIAN BARRIENTOS     ACC: [id number] EXAM:  NM BONE MARROW IMG LTD AREA   ORDERED BY: ZAID BARRIENTOS     ACC: [id number] EXAM:  NM INFLAMM LOC WBC SA SD   ORDERED BY: MAHSA SAN     PROCEDURE DATE:  10/17/2023          INTERPRETATION:  RADIOPHARMACEUTICAL: 550 microcuries In-111 labeled   autologous leukocytes, I.V.; 10.1 mCi Tc99m sulfur colloid, I.V.,    CLINICAL INFORMATION: 78 year-old male with bilateral heel wounds,   referred toevaluate for osteomyelitis    TECHNIQUE:  The patient was injected with the above dose of labeled   autologous leukocytes on 10/16/2023. Approximately 24 hours later, on   10/17/2023, static images of the feet were obtained. The patient then was   injected with Tc-99m sulfur colloid and the images were repeated in the   same projections using dual isotope acquisition mode.    COMPARISON: Bilateral foot x-rays 10/10/2023.    FINDINGS: There is increased uptake of radiolabeled leukocytes and Tc-99m  sulfur colloid in right heel and left midfoot, in a distribution   suspicious for osteomyelitis.    IMPRESSION: Abnormal combined Indium-111 labeled leukocyte study and   marrow scan. Increased uptake of both radiotracer is in the right heel   and left midfoot, concerning for osteomyelitis.       [facility]OUS DISEASE  Cecilio Burnett MD PhD, Gloria Hager MD, Rosa Maria Wilkinson MD, Tomy Larsen MD, Anshu Kilpatrick MD  and providing coverage with Amandeep Landaverde MD  Providing Infectious Disease Consultations at [facility], [facility], [facility], [facility]    Office# [phone] to schedule follow up appointments  Answering Service for urgent calls or New Consults [phone]  Cell# to text for urgent issues Cecilio Burnett [phone]     infectious diseases progress note:    HEMA LAZCANO is a 78y y. o. Male patient    Overnight and events of the last 24hrs reviewed    Allergies    No Known Allergies    Intolerances        ANTIBIOTICS/RELEVANT:  antimicrobials  cefepime   IVPB 2000 milliGRAM(s) IV Intermittent every 8 hours  vancomycin  IVPB 750 milliGRAM(s) IV Intermittent every 12 hours    immunologic:    OTHER:  acetaminophen     Tablet .. 650 milliGRAM(s) Oral every 6 hours PRN  aspirin enteric coated 81 milliGRAM(s) Oral daily  atorvastatin 80 milliGRAM(s) Oral at bedtime  bethanechol 25 milliGRAM(s) Oral two times a day  dextrose 5%. 1000 milliLiter(s) IV Continuous <Continuous>  dextrose 5%. 1000 milliLiter(s) IV Continuous <Continuous>  dextrose 5%. 1000 milliLiter(s) IV Continuous <Continuous>  dextrose 5%. 1000 milliLiter(s) IV Continuous <Continuous>  dextrose 50% Injectable 12.5 Gram(s) IV Push once  dextrose 50% Injectable 25 Gram(s) IV Push once  dextrose 50% Injectable 25 Gram(s) IV Push once  dextrose Oral Gel 15 Gram(s) Oral once PRN  dronabinol 2.5 milliGRAM(s) Oral two times a day  gabapentin 300 milliGRAM(s) Oral two times a day  glucagon  Injectable 1 milliGRAM(s) IntraMuscular once  insulin glargine Injectable (LANTUS) 18 Unit(s) SubCutaneous at bedtime  insulin lispro (ADMELOG) corrective regimen sliding scale   SubCutaneous at bedtime  insulin lispro (ADMELOG) corrective regimen sliding scale   SubCutaneous three times a day before meals  lactated ringers. 1000 milliLiter(s) IV Continuous <Continuous>  lactobacillus acidophilus 1 Tablet(s) Oral two times a day with meals  loperamide 2 milliGRAM(s) Oral three times a day PRN  midodrine. 10 milliGRAM(s) Oral three times a day  pantoprazole    Tablet 40 milliGRAM(s) Oral before breakfast  tamsulosin 0.4 milliGRAM(s) Oral at bedtime      Objective:  Vital Signs Last 24 Hrs  T(C): 36.7 (18 Oct 2023 05:08), Max: 36.9 (17 Oct 2023 21:49)  T(F): 98.1 (18 Oct 2023 05:08), Max: 98.5 (17 Oct 2023 21:49)  HR: 59 (18 Oct 2023 07:51) (59 - 64)  BP: 128/48 (18 Oct 2023 05:14) (92/45 - 128/48)  BP(mean): 68 (17 Oct 2023 11:45) (60 - 68)  RR: 16 (18 Oct 2023 05:08) (15 - 20)  SpO2: 95% (18 Oct 2023 05:08) (95% - 100%)    Parameters below as of 18 Oct 2023 05:08  Patient On (Oxygen Delivery Method): room air        T(C): 36.7 (10-18-23 @ 05:08), Max: 37.3 (10-17-23 @ 05:05)  T(C): 36.7 (10-18-23 @ 05:08), Max: 37.3 (10-17-23 @ 05:05)  T(C): 36.7 (10-18-23 @ 05:08), Max: 37.3 (10-14-23 @ 17:12)    PHYSICAL EXAM:  HEENT: NC atraumatic  Neck: supple  Respiratory: no accessory muscle use, breathing comfortably  Cardiovascular: distant  Gastrointestinal: normal appearing, nondistended  Extremities: no clubbing, no cyanosis,        LABS:                          8.6    8.77  )-----------( 282      ( 18 Oct 2023 06:50 )             27.8       WBC  8.77 10-18 @ 06:50  7.97 10-17 @ 08:38  7.49 10-16 @ 07:42  6.43 10-15 @ 08:00  5.23 10-14 @ 09:45  4.58 10-13 @ 08:18  7.26 10-12 @ 05:30      10-18    142  |  110<H>  |  17  ----------------------------<  204<H>  3.9   |  27  |  0.75    Ca    8.2<L>      18 Oct 2023 06:50        Creatinine: 0.75 mg/dL (10-18-23 @ 06:50)  Creatinine: 0.69 mg/dL (10-17-23 @ 08:38)  Creatinine: 0.68 mg/dL (10-16-23 @ 07:42)  Creatinine: 0.82 mg/dL (10-15-23 @ 08:00)  Creatinine: 0.88 mg/dL (10-14-23 @ 09:45)  Creatinine: 1.00 mg/dL (10-13-23 @ 08:18)  Creatinine: 1.10 mg/dL (10-12-23 @ 05:30)        Urinalysis Basic - ( 18 Oct 2023 06:50 )    Color: x / Appearance: x / SG: x / pH: x  Gluc: 204 mg/dL / Ketone: x  / Bili: x / Urobili: x   Blood: x / Protein: x / Nitrite: x   Leuk Esterase: x / RBC: x / WBC x   Sq Epi: x / Non Sq Epi: x / Bacteria: x            INFLAMMATORY MARKERS      MICROBIOLOGY:              RADIOLOGY & ADDITIONAL STUDIES:  < from: NM Bone Marrow Imaging Limited (10.17.23 @ 15:11) >    ACC: [id number] EXAM:  NM MULTI DAY PROCEDURE   ORDERED BY: DORIAN BARRIENTOS     ACC: [id number] EXAM:  NM BONE MARROW IMG LTD AREA   ORDERED BY: ZAID BARRIENTOS     ACC: [id number] EXAM:  NM INFLAMM LOC WBC SA SD   ORDERED BY: MAHSA SAN     PROCEDURE DATE:  10/17/2023          INTERPRETATION:  RADIOPHARMACEUTICAL: 550 microcuries In-111 labeled   autologous leukocytes, I.V.; 10.1 mCi Tc99m sulfur colloid, I.V.,    CLINICAL INFORMATION: 78 year-old male with bilateral heel wounds,   referred toevaluate for osteomyelitis    TECHNIQUE:  The patient was injected with the above dose of labeled   autologous leukocytes on 10/16/2023. Approximately 24 hours later, on   10/17/2023, static images of the feet were obtained. The patient then was   injected with Tc-99m sulfur colloid and the images were repeated in the   same projections using dual isotope acquisition mode.    COMPARISON: Bilateral foot x-rays 10/10/2023.    FINDINGS: There is increased uptake of radiolabeled leukocytes and Tc-99m  sulfur colloid in right heel and left midfoot, in a distribution   suspicious for osteomyelitis.    IMPRESSION: Abnormal combined Indium-111 labeled leukocyte study and   marrow scan. Increased uptake of both radiotracer is in the right heel   and left midfoot, concerning for osteomyelitis.

## 2023-10-18 NOTE — PROGRESS NOTE ADULT - SUBJECTIVE AND OBJECTIVE BOX
Patient is a 78y old  Male who presents with a chief complaint of fever and weakness (18 Oct 2023 09:03)      INTERVAL HPI/OVERNIGHT EVENTS: chart noted, no new events, om on nuclear bone scan, for debridement in OR tomorrow    MEDICATIONS  (STANDING):  aspirin enteric coated 81 milliGRAM(s) Oral daily  atorvastatin 80 milliGRAM(s) Oral at bedtime  bethanechol 25 milliGRAM(s) Oral two times a day  cefepime   IVPB 2000 milliGRAM(s) IV Intermittent every 8 hours  dextrose 5%. 1000 milliLiter(s) (100 mL/Hr) IV Continuous <Continuous>  dextrose 5%. 1000 milliLiter(s) (100 mL/Hr) IV Continuous <Continuous>  dextrose 5%. 1000 milliLiter(s) (50 mL/Hr) IV Continuous <Continuous>  dextrose 5%. 1000 milliLiter(s) (50 mL/Hr) IV Continuous <Continuous>  dextrose 50% Injectable 12.5 Gram(s) IV Push once  dextrose 50% Injectable 25 Gram(s) IV Push once  dextrose 50% Injectable 25 Gram(s) IV Push once  dronabinol 2.5 milliGRAM(s) Oral two times a day  gabapentin 300 milliGRAM(s) Oral two times a day  glucagon  Injectable 1 milliGRAM(s) IntraMuscular once  insulin glargine Injectable (LANTUS) 18 Unit(s) SubCutaneous at bedtime  insulin lispro (ADMELOG) corrective regimen sliding scale   SubCutaneous at bedtime  insulin lispro (ADMELOG) corrective regimen sliding scale   SubCutaneous three times a day before meals  lactated ringers. 1000 milliLiter(s) (100 mL/Hr) IV Continuous <Continuous>  lactobacillus acidophilus 1 Tablet(s) Oral two times a day with meals  midodrine. 10 milliGRAM(s) Oral three times a day  pantoprazole    Tablet 40 milliGRAM(s) Oral before breakfast  tamsulosin 0.4 milliGRAM(s) Oral at bedtime  vancomycin  IVPB 750 milliGRAM(s) IV Intermittent every 12 hours    MEDICATIONS  (PRN):  acetaminophen     Tablet .. 650 milliGRAM(s) Oral every 6 hours PRN Temp greater or equal to 38C (100.4F), Moderate Pain (4 - 6)  dextrose Oral Gel 15 Gram(s) Oral once PRN Blood Glucose LESS THAN 70 milliGRAM(s)/deciliter  loperamide 2 milliGRAM(s) Oral three times a day PRN Diarrhea      Allergies    No Known Allergies    Intolerances        REVIEW OF SYSTEMS:  CONSTITUTIONAL: No fever, weight loss, or fatigue  EYES: No eye pain, visual disturbances  ENMT:  No difficulty hearing, tinnitus, vertigo; No sinus or throat pain  NECK: No pain or stiffness  RESPIRATORY: No cough, wheezing, chills or hemoptysis; No shortness of breath  CARDIOVASCULAR: No chest pain, palpitations, dizziness  GASTROINTESTINAL: No abdominal or epigastric pain. No nausea, vomiting, or hematemesis; No diarrhea or constipation. No melena or hematochezia.  GENITOURINARY: No dysuria, frequency, hematuria, or incontinence  NEUROLOGICAL: No headaches, memory loss, loss of strength, numbness, or tremors  SKIN: No itching, burning  LYMPH NODES: No enlarged glands  MUSCULOSKELETAL: no feel pain  PSYCHIATRIC: No depression, mood swings  HEME/LYMPH: No easy bruising, or bleeding gums  ALLERGY AND IMMUNOLOGIC: No hives    Vital Signs Last 24 Hrs  T(C): 36.7 (18 Oct 2023 05:08), Max: 36.9 (17 Oct 2023 21:49)  T(F): 98.1 (18 Oct 2023 05:08), Max: 98.5 (17 Oct 2023 21:49)  HR: 59 (18 Oct 2023 07:51) (59 - 64)  BP: 128/48 (18 Oct 2023 05:14) (92/45 - 128/48)  BP(mean): 68 (17 Oct 2023 11:45) (60 - 68)  RR: 16 (18 Oct 2023 05:08) (15 - 20)  SpO2: 95% (18 Oct 2023 05:08) (95% - 100%)    Parameters below as of 18 Oct 2023 05:08  Patient On (Oxygen Delivery Method): room air        PHYSICAL EXAM:  GENERAL: NAD, well-groomed, well-developed  HEAD:  Atraumatic, Normocephalic  EYES: EOMI, PERRLA, conjunctiva and sclera clear  ENMT: No tonsillar erythema, exudates, or enlargement   NECK: Supple, No JVD  NERVOUS SYSTEM:  Alert & Oriented X3, Good concentration  CHEST/LUNG: Clear to auscultation bilaterally; No rales, rhonchi, wheezing  HEART: Regular rate and rhythm  ABDOMEN: Soft, Nontender, Nondistended; Bowel sounds present  EXTREMITIES:  2+ Peripheral Pulses , trace edema  LYMPH: No lymphadenopathy noted  SKIN: No rashes     LABS:                        8.6    8.77  )-----------( 282      ( 18 Oct 2023 06:50 )             27.8     18 Oct 2023 06:50    142    |  110    |  17     ----------------------------<  204    3.9     |  27     |  0.75     Ca    8.2        18 Oct 2023 06:50        Urinalysis Basic - ( 18 Oct 2023 06:50 )    Color: x / Appearance: x / SG: x / pH: x  Gluc: 204 mg/dL / Ketone: x  / Bili: x / Urobili: x   Blood: x / Protein: x / Nitrite: x   Leuk Esterase: x / RBC: x / WBC x   Sq Epi: x / Non Sq Epi: x / Bacteria: x      CAPILLARY BLOOD GLUCOSE      POCT Blood Glucose.: 194 mg/dL (18 Oct 2023 08:27)  POCT Blood Glucose.: 205 mg/dL (17 Oct 2023 21:32)  POCT Blood Glucose.: 192 mg/dL (17 Oct 2023 17:10)  POCT Blood Glucose.: 177 mg/dL (17 Oct 2023 12:59)    blood culture --   No growth at 5 days   10-12 @ 05:36    blood culture --   No growth at 5 days   10-12 @ 05:30    blood culture --   Numerous Methicillin Resistant Staphylococcus aureus   10-11 @ 13:13      urine culture --  10-12 @ 05:36  results   No growth at 5 days 10-12 @ 05:36  urine culture --  10-12 @ 05:30  results   No growth at 5 days 10-12 @ 05:30  urine culture --  10-11 @ 13:13  results   Numerous Methicillin Resistant Staphylococcus aureus 10-11 @ 13:13    wound with gram statin --    10-12 @ 05:36  organism  --   10-12 @ 05:36  specimen source .Blood Blood-Peripheral  10-12 @ 05:36  wound with gram statin --    10-12 @ 05:30  organism  --   10-12 @ 05:30  specimen source .Blood Blood-Venous  10-12 @ 05:30  wound with gram statin --    10-11 @ 13:13  organism  Methicillin resistant Staphylococcus aureus   10-11 @ 13:13  specimen source Wound Wound  10-11 @ 13:13      RADIOLOGY & ADDITIONAL TESTS:      Consultant(s) Notes Reviewed:  [x ] YES  [ ] NO    Care Discussed with Consultants/Other Providers [x ] YES  [ ] NO    Advanced care planning discussed with patient and family, advanced care planning forms reviewed, discussed, and completed.  20 minutes spent.

## 2023-10-18 NOTE — PROGRESS NOTE ADULT - ASSESSMENT
78-year-old male presents to the hospital by ambulance from home.  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 Pt does not go to doctor on regular basis per son and does phone visits. Son reports years of struggling with foot infections with black areas of gangrene and their struggling to keep his feet but now feeling that keeping him alive is more important.  Culture - Blood (10.10.23 @ 18:25)    -  Methicillin resistant Staphylococcus aureus (MRSA): Detec  Repeat 10/12 BCx NGTD (48h)  Wcx with MRSA, placed on contact isolation   UCx + Pseudomonas, pansensitive including cefepime   Afebrile over past 24h    RECOMMENDATIONS  1-MRSA bacteremia/Osteomyelitis   - NM bone scan ordered and  Abnormal combined Indium-111 labeled leukocyte study and marrow scan. Increased uptake of both radiotracer is in the right heel   and left midfoot, concerning for osteomyelitis.  Cardiology rec appreciated   - sp GISSELL early to rule out IE or PPM involvement-read pending, but of note with PPM very high rate of lead infection so will factor into management recs.  C/w Vancomycin (10/11-) dosing per pharmacy protocol  S/p zosyn (10/11-10/12), changed to cefepime-plan for last day 10/18 (for urine isolate  Anticipate that surgery will be required    Thank you for consulting us and involving us in the management of this most interesting and challenging case.  We will follow along in the care of this patient. Please call us at [phone] or text me directly on my cell# at [phone] with any concerns.    Starting tomorrow Dr Rosa Maria Wilkinson will be assuming care of this patient so please contact her with any questions, concerns or new micro data.     78-year-old male presents to the hospital by ambulance from home.  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 Pt does not go to doctor on regular basis per son and does phone visits. Son reports years of struggling with foot infections with black areas of gangrene and their struggling to keep his feet but now feeling that keeping him alive is more important.  Culture - Blood (10.10.23 @ 18:25)    -  Methicillin resistant Staphylococcus aureus (MRSA): Detec  Repeat 10/12 BCx NGTD (48h)  Wcx with MRSA, placed on contact isolation   UCx + Pseudomonas, pansensitive including cefepime   Afebrile over past 24h    RECOMMENDATIONS  1-MRSA bacteremia/Osteomyelitis   - NM bone scan ordered and  Abnormal combined Indium-111 labeled leukocyte study and marrow scan. Increased uptake of both radiotracer is in the right heel   and left midfoot, concerning for osteomyelitis.  Cardiology rec appreciated   - sp GISSELL early to rule out IE or PPM involvement-read pending, but of note with PPM very high rate of lead infection so will factor into management recs.  C/w Vancomycin (10/11-) dosing per pharmacy protocol  S/p zosyn (10/11-10/12), changed to cefepime-plan for last day 10/18 (for urine isolate  Anticipate that surgery will be required    Thank you for consulting us and involving us in the management of this most interesting and challenging case.  We will follow along in the care of this patient. Please call us at [phone] or text me directly on my cell# at [phone] with any concerns.    Starting tomorrow Dr Rosa Maria Wilkinson will be assuming care of this patient so please contact her with any questions, concerns or new micro data.     78-year-old male presents to the hospital by ambulance from home.  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 Pt does not go to doctor on regular basis per son and does phone visits. Son reports years of struggling with foot infections with black areas of gangrene and their struggling to keep his feet but now feeling that keeping him alive is more important.  Culture - Blood (10.10.23 @ 18:25)    -  Methicillin resistant Staphylococcus aureus (MRSA): Detec  Repeat 10/12 BCx NGTD (48h)  Wcx with MRSA, placed on contact isolation   UCx + Pseudomonas, pansensitive including cefepime   Afebrile over past 24h    RECOMMENDATIONS  1-MRSA bacteremia/Osteomyelitis   - NM bone scan ordered and  Abnormal combined Indium-111 labeled leukocyte study and marrow scan. Increased uptake of both radiotracer is in the right heel   and left midfoot, concerning for osteomyelitis.  Cardiology rec appreciated   - sp GISSELL early to rule out IE or PPM involvement-read pending, but of note with PPM very high rate of lead infection so will factor into management recs.  C/w Vancomycin (10/11-) dosing per pharmacy protocol  S/p zosyn (10/11-10/12), changed to cefepime-plan for last day 10/18 (for urine isolate  Anticipate that surgery will be required    Thank you for consulting us and involving us in the management of this most interesting and challenging case.  We will follow along in the care of this patient. Please call us at [phone] or text me directly on my cell# at [phone] with any concerns.    Starting tomorrow Dr Rosa Maria Wilkinson will be assuming care of this patient so please contact her with any questions, concerns or new micro data.

## 2023-10-18 NOTE — PROGRESS NOTE ADULT - SUBJECTIVE AND OBJECTIVE BOX
[facility]  Les Mccray PA-C  [address] [place] [address]  [phone]      INTERVAL HPI/OVERNIGHT EVENTS:  Pt s/e  Tolerating diet    MEDICATIONS  (STANDING):  aspirin enteric coated 81 milliGRAM(s) Oral daily  atorvastatin 80 milliGRAM(s) Oral at bedtime  bethanechol 25 milliGRAM(s) Oral two times a day  cefepime   IVPB 2000 milliGRAM(s) IV Intermittent every 8 hours  dextrose 5%. 1000 milliLiter(s) (100 mL/Hr) IV Continuous <Continuous>  dextrose 5%. 1000 milliLiter(s) (100 mL/Hr) IV Continuous <Continuous>  dextrose 5%. 1000 milliLiter(s) (50 mL/Hr) IV Continuous <Continuous>  dextrose 5%. 1000 milliLiter(s) (50 mL/Hr) IV Continuous <Continuous>  dextrose 50% Injectable 25 Gram(s) IV Push once  dextrose 50% Injectable 12.5 Gram(s) IV Push once  dextrose 50% Injectable 25 Gram(s) IV Push once  dronabinol 2.5 milliGRAM(s) Oral two times a day  gabapentin 300 milliGRAM(s) Oral two times a day  glucagon  Injectable 1 milliGRAM(s) IntraMuscular once  insulin glargine Injectable (LANTUS) 18 Unit(s) SubCutaneous at bedtime  insulin lispro (ADMELOG) corrective regimen sliding scale   SubCutaneous three times a day before meals  insulin lispro (ADMELOG) corrective regimen sliding scale   SubCutaneous at bedtime  lactated ringers. 1000 milliLiter(s) (100 mL/Hr) IV Continuous <Continuous>  lactobacillus acidophilus 1 Tablet(s) Oral two times a day with meals  midodrine. 10 milliGRAM(s) Oral three times a day  pantoprazole    Tablet 40 milliGRAM(s) Oral before breakfast  tamsulosin 0.4 milliGRAM(s) Oral at bedtime  vancomycin  IVPB 750 milliGRAM(s) IV Intermittent every 12 hours    MEDICATIONS  (PRN):  acetaminophen     Tablet .. 650 milliGRAM(s) Oral every 6 hours PRN Temp greater or equal to 38C (100.4F), Moderate Pain (4 - 6)  dextrose Oral Gel 15 Gram(s) Oral once PRN Blood Glucose LESS THAN 70 milliGRAM(s)/deciliter  loperamide 2 milliGRAM(s) Oral three times a day PRN Diarrhea      Allergies    No Known Allergies      PHYSICAL EXAM:   Vital Signs:  Vital Signs Last 24 Hrs  T(C): 36.8 (18 Oct 2023 11:32), Max: 36.9 (17 Oct 2023 21:49)  T(F): 98.3 (18 Oct 2023 11:32), Max: 98.5 (17 Oct 2023 21:49)  HR: 60 (18 Oct 2023 11:32) (59 - 64)  BP: 117/47 (18 Oct 2023 11:32) (101/70 - 128/48)  BP(mean): --  RR: 16 (18 Oct 2023 11:32) (16 - 17)  SpO2: 96% (18 Oct 2023 11:32) (95% - 100%)    Parameters below as of 18 Oct 2023 11:32  Patient On (Oxygen Delivery Method): room air      Daily     Daily Weight in k.2 (18 Oct 2023 05:08)    GENERAL:  Appears stated age  HEENT:  NC/AT  CHEST:  Full & symmetric excursion  HEART:  Regular rhythm  ABDOMEN:  Soft, non-tender, non-distended  EXTEREMITIES:  no cyanosis  SKIN:  No rash  NEURO:  Alert      LABS:                        8.6    8.77  )-----------( 282      ( 18 Oct 2023 06:50 )             27.8     10-18    142  |  110<H>  |  17  ----------------------------<  204<H>  3.9   |  27  |  0.75    Ca    8.2<L>      18 Oct 2023 06:50        Urinalysis Basic - ( 18 Oct 2023 06:50 )    Color: x / Appearance: x / SG: x / pH: x  Gluc: 204 mg/dL / Ketone: x  / Bili: x / Urobili: x   Blood: x / Protein: x / Nitrite: x   Leuk Esterase: x / RBC: x / WBC x   Sq Epi: x / Non Sq Epi: x / Bacteria: x     [facility]  Les Mccray PA-C  [address] [place] [address]  [phone]      INTERVAL HPI/OVERNIGHT EVENTS:  Pt s/e  Tolerating diet    MEDICATIONS  (STANDING):  aspirin enteric coated 81 milliGRAM(s) Oral daily  atorvastatin 80 milliGRAM(s) Oral at bedtime  bethanechol 25 milliGRAM(s) Oral two times a day  cefepime   IVPB 2000 milliGRAM(s) IV Intermittent every 8 hours  dextrose 5%. 1000 milliLiter(s) (100 mL/Hr) IV Continuous <Continuous>  dextrose 5%. 1000 milliLiter(s) (100 mL/Hr) IV Continuous <Continuous>  dextrose 5%. 1000 milliLiter(s) (50 mL/Hr) IV Continuous <Continuous>  dextrose 5%. 1000 milliLiter(s) (50 mL/Hr) IV Continuous <Continuous>  dextrose 50% Injectable 25 Gram(s) IV Push once  dextrose 50% Injectable 12.5 Gram(s) IV Push once  dextrose 50% Injectable 25 Gram(s) IV Push once  dronabinol 2.5 milliGRAM(s) Oral two times a day  gabapentin 300 milliGRAM(s) Oral two times a day  glucagon  Injectable 1 milliGRAM(s) IntraMuscular once  insulin glargine Injectable (LANTUS) 18 Unit(s) SubCutaneous at bedtime  insulin lispro (ADMELOG) corrective regimen sliding scale   SubCutaneous three times a day before meals  insulin lispro (ADMELOG) corrective regimen sliding scale   SubCutaneous at bedtime  lactated ringers. 1000 milliLiter(s) (100 mL/Hr) IV Continuous <Continuous>  lactobacillus acidophilus 1 Tablet(s) Oral two times a day with meals  midodrine. 10 milliGRAM(s) Oral three times a day  pantoprazole    Tablet 40 milliGRAM(s) Oral before breakfast  tamsulosin 0.4 milliGRAM(s) Oral at bedtime  vancomycin  IVPB 750 milliGRAM(s) IV Intermittent every 12 hours    MEDICATIONS  (PRN):  acetaminophen     Tablet .. 650 milliGRAM(s) Oral every 6 hours PRN Temp greater or equal to 38C (100.4F), Moderate Pain (4 - 6)  dextrose Oral Gel 15 Gram(s) Oral once PRN Blood Glucose LESS THAN 70 milliGRAM(s)/deciliter  loperamide 2 milliGRAM(s) Oral three times a day PRN Diarrhea      Allergies    No Known Allergies      PHYSICAL EXAM:   Vital Signs:  Vital Signs Last 24 Hrs  T(C): 36.8 (18 Oct 2023 11:32), Max: 36.9 (17 Oct 2023 21:49)  T(F): 98.3 (18 Oct 2023 11:32), Max: 98.5 (17 Oct 2023 21:49)  HR: 60 (18 Oct 2023 11:32) (59 - 64)  BP: 117/47 (18 Oct 2023 11:32) (101/70 - 128/48)  BP(mean): --  RR: 16 (18 Oct 2023 11:32) (16 - 17)  SpO2: 96% (18 Oct 2023 11:32) (95% - 100%)    Parameters below as of 18 Oct 2023 11:32  Patient On (Oxygen Delivery Method): room air      Daily     Daily Weight in k.2 (18 Oct 2023 05:08)    GENERAL:  Appears stated age  HEENT:  NC/AT  CHEST:  Full & symmetric excursion  HEART:  Regular rhythm  ABDOMEN:  Soft, non-tender, non-distended  EXTEREMITIES:  no cyanosis  SKIN:  No rash  NEURO:  Alert      LABS:                        8.6    8.77  )-----------( 282      ( 18 Oct 2023 06:50 )             27.8     10-18    142  |  110<H>  |  17  ----------------------------<  204<H>  3.9   |  27  |  0.75    Ca    8.2<L>      18 Oct 2023 06:50        Urinalysis Basic - ( 18 Oct 2023 06:50 )    Color: x / Appearance: x / SG: x / pH: x  Gluc: 204 mg/dL / Ketone: x  / Bili: x / Urobili: x   Blood: x / Protein: x / Nitrite: x   Leuk Esterase: x / RBC: x / WBC x   Sq Epi: x / Non Sq Epi: x / Bacteria: x     [facility]  Les Mccray PA-C  [address] [place] [address]  [phone]      INTERVAL HPI/OVERNIGHT EVENTS:  Pt s/e  Tolerating diet    MEDICATIONS  (STANDING):  aspirin enteric coated 81 milliGRAM(s) Oral daily  atorvastatin 80 milliGRAM(s) Oral at bedtime  bethanechol 25 milliGRAM(s) Oral two times a day  cefepime   IVPB 2000 milliGRAM(s) IV Intermittent every 8 hours  dextrose 5%. 1000 milliLiter(s) (100 mL/Hr) IV Continuous <Continuous>  dextrose 5%. 1000 milliLiter(s) (100 mL/Hr) IV Continuous <Continuous>  dextrose 5%. 1000 milliLiter(s) (50 mL/Hr) IV Continuous <Continuous>  dextrose 5%. 1000 milliLiter(s) (50 mL/Hr) IV Continuous <Continuous>  dextrose 50% Injectable 25 Gram(s) IV Push once  dextrose 50% Injectable 12.5 Gram(s) IV Push once  dextrose 50% Injectable 25 Gram(s) IV Push once  dronabinol 2.5 milliGRAM(s) Oral two times a day  gabapentin 300 milliGRAM(s) Oral two times a day  glucagon  Injectable 1 milliGRAM(s) IntraMuscular once  insulin glargine Injectable (LANTUS) 18 Unit(s) SubCutaneous at bedtime  insulin lispro (ADMELOG) corrective regimen sliding scale   SubCutaneous three times a day before meals  insulin lispro (ADMELOG) corrective regimen sliding scale   SubCutaneous at bedtime  lactated ringers. 1000 milliLiter(s) (100 mL/Hr) IV Continuous <Continuous>  lactobacillus acidophilus 1 Tablet(s) Oral two times a day with meals  midodrine. 10 milliGRAM(s) Oral three times a day  pantoprazole    Tablet 40 milliGRAM(s) Oral before breakfast  tamsulosin 0.4 milliGRAM(s) Oral at bedtime  vancomycin  IVPB 750 milliGRAM(s) IV Intermittent every 12 hours    MEDICATIONS  (PRN):  acetaminophen     Tablet .. 650 milliGRAM(s) Oral every 6 hours PRN Temp greater or equal to 38C (100.4F), Moderate Pain (4 - 6)  dextrose Oral Gel 15 Gram(s) Oral once PRN Blood Glucose LESS THAN 70 milliGRAM(s)/deciliter  loperamide 2 milliGRAM(s) Oral three times a day PRN Diarrhea      Allergies    No Known Allergies      PHYSICAL EXAM:   Vital Signs:  Vital Signs Last 24 Hrs  T(C): 36.8 (18 Oct 2023 11:32), Max: 36.9 (17 Oct 2023 21:49)  T(F): 98.3 (18 Oct 2023 11:32), Max: 98.5 (17 Oct 2023 21:49)  HR: 60 (18 Oct 2023 11:32) (59 - 64)  BP: 117/47 (18 Oct 2023 11:32) (101/70 - 128/48)  BP(mean): --  RR: 16 (18 Oct 2023 11:32) (16 - 17)  SpO2: 96% (18 Oct 2023 11:32) (95% - 100%)    Parameters below as of 18 Oct 2023 11:32  Patient On (Oxygen Delivery Method): room air      Daily     Daily Weight in k.2 (18 Oct 2023 05:08)    GENERAL:  Appears stated age  HEENT:  NC/AT  CHEST:  Full & symmetric excursion  HEART:  Regular rhythm  ABDOMEN:  Soft, non-tender, non-distended  EXTEREMITIES:  no cyanosis  SKIN:  No rash  NEURO:  Alert      LABS:                        8.6    8.77  )-----------( 282      ( 18 Oct 2023 06:50 )             27.8     10-18    142  |  110<H>  |  17  ----------------------------<  204<H>  3.9   |  27  |  0.75    Ca    8.2<L>      18 Oct 2023 06:50        Urinalysis Basic - ( 18 Oct 2023 06:50 )    Color: x / Appearance: x / SG: x / pH: x  Gluc: 204 mg/dL / Ketone: x  / Bili: x / Urobili: x   Blood: x / Protein: x / Nitrite: x   Leuk Esterase: x / RBC: x / WBC x   Sq Epi: x / Non Sq Epi: x / Bacteria: x

## 2023-10-18 NOTE — PROGRESS NOTE ADULT - PROBLEM SELECTOR PLAN 2
id noted  rpt blood cultures neg to date  iv cefipime and vanco  tessie clean  om on bone scan  for OR debridement of b/l heel wounds

## 2023-10-18 NOTE — PROGRESS NOTE ADULT - PROBLEM/PLAN-7
DISPLAY PLAN FREE TEXT
Home

## 2023-10-18 NOTE — PROGRESS NOTE ADULT - PROBLEM SELECTOR PLAN 1
Patient was seen and evaluated   Discussed with bone scan findings with patient's son via telephone. Discussed conservative vs surgical intervention with patient and son (via telephone). Discussed the risks, benefits, and potential complications and all questions answered to satisfaction. Patient's son relates that they are consider pursuing surgical debridement at this time. Will rediscuss with patient's son and patient later today. Will tentatively plan for bilateral foot debridement with Dr. Urbano tomorrow 10/19/23.

## 2023-10-18 NOTE — PROGRESS NOTE ADULT - PROBLEM SELECTOR PLAN 1
continue abx per id  subsequent cultures neg  s/p tessie - results discussed with cario - no vegetations seen and ppm wires are fine  continue abx per Id recs

## 2023-10-18 NOTE — PROGRESS NOTE ADULT - SUBJECTIVE AND OBJECTIVE BOX
SUBJECTIVE: 78y year old Male seen at [facility] 365 D1 for for Right heel wound and left lateral foot wound. Patient was in bed. Patient is s/p bone scan showing uptake of the right heel and the left midfoot. Denies any fever, chills, nausea, vomiting, chest pain, shortness of breath, or calf pain at this time.    Allergies    No Known Allergies    Intolerances        MEDICATIONS  (STANDING):  aspirin enteric coated 81 milliGRAM(s) Oral daily  atorvastatin 80 milliGRAM(s) Oral at bedtime  bethanechol 25 milliGRAM(s) Oral two times a day  cefepime   IVPB 2000 milliGRAM(s) IV Intermittent every 8 hours  dextrose 5%. 1000 milliLiter(s) (100 mL/Hr) IV Continuous <Continuous>  dextrose 5%. 1000 milliLiter(s) (50 mL/Hr) IV Continuous <Continuous>  dextrose 5%. 1000 milliLiter(s) (100 mL/Hr) IV Continuous <Continuous>  dextrose 5%. 1000 milliLiter(s) (50 mL/Hr) IV Continuous <Continuous>  dextrose 50% Injectable 25 Gram(s) IV Push once  dextrose 50% Injectable 25 Gram(s) IV Push once  dextrose 50% Injectable 12.5 Gram(s) IV Push once  dronabinol 2.5 milliGRAM(s) Oral two times a day  gabapentin 300 milliGRAM(s) Oral two times a day  glucagon  Injectable 1 milliGRAM(s) IntraMuscular once  insulin glargine Injectable (LANTUS) 18 Unit(s) SubCutaneous at bedtime  insulin lispro (ADMELOG) corrective regimen sliding scale   SubCutaneous three times a day before meals  insulin lispro (ADMELOG) corrective regimen sliding scale   SubCutaneous at bedtime  lactated ringers. 1000 milliLiter(s) (100 mL/Hr) IV Continuous <Continuous>  lactobacillus acidophilus 1 Tablet(s) Oral two times a day with meals  midodrine. 10 milliGRAM(s) Oral three times a day  pantoprazole    Tablet 40 milliGRAM(s) Oral before breakfast  tamsulosin 0.4 milliGRAM(s) Oral at bedtime  vancomycin  IVPB 750 milliGRAM(s) IV Intermittent every 12 hours    MEDICATIONS  (PRN):  acetaminophen     Tablet .. 650 milliGRAM(s) Oral every 6 hours PRN Temp greater or equal to 38C (100.4F), Moderate Pain (4 - 6)  dextrose Oral Gel 15 Gram(s) Oral once PRN Blood Glucose LESS THAN 70 milliGRAM(s)/deciliter  loperamide 2 milliGRAM(s) Oral three times a day PRN Diarrhea      Vital Signs Last 24 Hrs  T(C): 36.8 (18 Oct 2023 11:32), Max: 36.9 (17 Oct 2023 21:49)  T(F): 98.3 (18 Oct 2023 11:32), Max: 98.5 (17 Oct 2023 21:49)  HR: 60 (18 Oct 2023 11:32) (59 - 64)  BP: 117/47 (18 Oct 2023 11:32) (101/70 - 128/48)  BP(mean): --  RR: 16 (18 Oct 2023 11:32) (16 - 17)  SpO2: 96% (18 Oct 2023 11:32) (95% - 100%)    Parameters below as of 18 Oct 2023 11:32  Patient On (Oxygen Delivery Method): room air        PHYSICAL EXAM:  Vascular: DP & PT non  palpable bilaterally, Capillary refill delayed to the digits  Digital hair absent bilaterally  Neurological: Light touch sensation diminished  bilaterally  Musculoskeletal: 2/5  strength in all quadrants bilaterally, s/p right partial calcanectomy   Dermatological: 6.0cm x 4.0cm  x 1.0cm ulceration noted to the right heel with fibrogranular  tissue  no probe to bone, no periwound erythema, no fluctuance, no malodor, no proximal streaking at this time  4.0cm x 2.0cm x 1cm ulceration of the left midfoot down to bone with necrotic tissue and purulent drainage, periwound erythema and edema, no proximal streaking                          8.6    8.77  )-----------( 282      ( 18 Oct 2023 06:50 )             27.8       10-18    142  |  110<H>  |  17  ----------------------------<  204<H>  3.9   |  27  |  0.75    Ca    8.2<L>      18 Oct 2023 06:50                Imaging: Bone scan shows uptake of the right heel and the left midfoot   SUBJECTIVE: 78y year old Male seen at [facility] 365 D1 for for Right heel wound and left lateral foot wound. Patient was in bed. Patient is s/p bone scan showing uptake of the right heel and the left midfoot. Denies any fever, chills, nausea, vomiting, chest pain, shortness of breath, or calf pain at this time.    Allergies    No Known Allergies    Intolerances        MEDICATIONS  (STANDING):  aspirin enteric coated 81 milliGRAM(s) Oral daily  atorvastatin 80 milliGRAM(s) Oral at bedtime  bethanechol 25 milliGRAM(s) Oral two times a day  cefepime   IVPB 2000 milliGRAM(s) IV Intermittent every 8 hours  dextrose 5%. 1000 milliLiter(s) (100 mL/Hr) IV Continuous <Continuous>  dextrose 5%. 1000 milliLiter(s) (50 mL/Hr) IV Continuous <Continuous>  dextrose 5%. 1000 milliLiter(s) (100 mL/Hr) IV Continuous <Continuous>  dextrose 5%. 1000 milliLiter(s) (50 mL/Hr) IV Continuous <Continuous>  dextrose 50% Injectable 25 Gram(s) IV Push once  dextrose 50% Injectable 25 Gram(s) IV Push once  dextrose 50% Injectable 12.5 Gram(s) IV Push once  dronabinol 2.5 milliGRAM(s) Oral two times a day  gabapentin 300 milliGRAM(s) Oral two times a day  glucagon  Injectable 1 milliGRAM(s) IntraMuscular once  insulin glargine Injectable (LANTUS) 18 Unit(s) SubCutaneous at bedtime  insulin lispro (ADMELOG) corrective regimen sliding scale   SubCutaneous three times a day before meals  insulin lispro (ADMELOG) corrective regimen sliding scale   SubCutaneous at bedtime  lactated ringers. 1000 milliLiter(s) (100 mL/Hr) IV Continuous <Continuous>  lactobacillus acidophilus 1 Tablet(s) Oral two times a day with meals  midodrine. 10 milliGRAM(s) Oral three times a day  pantoprazole    Tablet 40 milliGRAM(s) Oral before breakfast  tamsulosin 0.4 milliGRAM(s) Oral at bedtime  vancomycin  IVPB 750 milliGRAM(s) IV Intermittent every 12 hours    MEDICATIONS  (PRN):  acetaminophen     Tablet .. 650 milliGRAM(s) Oral every 6 hours PRN Temp greater or equal to 38C (100.4F), Moderate Pain (4 - 6)  dextrose Oral Gel 15 Gram(s) Oral once PRN Blood Glucose LESS THAN 70 milliGRAM(s)/deciliter  loperamide 2 milliGRAM(s) Oral three times a day PRN Diarrhea      Vital Signs Last 24 Hrs  T(C): 36.8 (18 Oct 2023 11:32), Max: 36.9 (17 Oct 2023 21:49)  T(F): 98.3 (18 Oct 2023 11:32), Max: 98.5 (17 Oct 2023 21:49)  HR: 60 (18 Oct 2023 11:32) (59 - 64)  BP: 117/47 (18 Oct 2023 11:32) (101/70 - 128/48)  BP(mean): --  RR: 16 (18 Oct 2023 11:32) (16 - 17)  SpO2: 96% (18 Oct 2023 11:32) (95% - 100%)    Parameters below as of 18 Oct 2023 11:32  Patient On (Oxygen Delivery Method): room air        PHYSICAL EXAM:  Vascular: DP & PT non  palpable bilaterally, Capillary refill delayed to the digits  Digital hair absent bilaterally  Neurological: Light touch sensation diminished  bilaterally  Musculoskeletal: 2/5  strength in all quadrants bilaterally, s/p right partial calcanectomy   Dermatological: 6.0cm x 4.0cm  x 1.0cm ulceration noted to the right heel with fibrogranular  tissue  no probe to bone, no periwound erythema, no fluctuance, no malodor, no proximal streaking at this time  4.0cm x 2.0cm x 1cm ulceration of the left midfoot down to bone with necrotic tissue and purulent drainage, periwound erythema and edema, no proximal streaking                          8.6    8.77  )-----------( 282      ( 18 Oct 2023 06:50 )             27.8       10-18    142  |  110<H>  |  17  ----------------------------<  204<H>  3.9   |  27  |  0.75    Ca    8.2<L>      18 Oct 2023 06:50                Imaging: Bone scan shows uptake of the right heel and the left midfoot   SUBJECTIVE: 78y year old Male seen at [facility] [place] [facility] 365 D1 for for Right heel wound and left lateral foot wound. Patient was in bed. Patient is s/p bone scan showing uptake of the right heel and the left midfoot. Denies any fever, chills, nausea, vomiting, chest pain, shortness of breath, or calf pain at this time.    Allergies    No Known Allergies    Intolerances        MEDICATIONS  (STANDING):  aspirin enteric coated 81 milliGRAM(s) Oral daily  atorvastatin 80 milliGRAM(s) Oral at bedtime  bethanechol 25 milliGRAM(s) Oral two times a day  cefepime   IVPB 2000 milliGRAM(s) IV Intermittent every 8 hours  dextrose 5%. 1000 milliLiter(s) (100 mL/Hr) IV Continuous <Continuous>  dextrose 5%. 1000 milliLiter(s) (50 mL/Hr) IV Continuous <Continuous>  dextrose 5%. 1000 milliLiter(s) (100 mL/Hr) IV Continuous <Continuous>  dextrose 5%. 1000 milliLiter(s) (50 mL/Hr) IV Continuous <Continuous>  dextrose 50% Injectable 25 Gram(s) IV Push once  dextrose 50% Injectable 25 Gram(s) IV Push once  dextrose 50% Injectable 12.5 Gram(s) IV Push once  dronabinol 2.5 milliGRAM(s) Oral two times a day  gabapentin 300 milliGRAM(s) Oral two times a day  glucagon  Injectable 1 milliGRAM(s) IntraMuscular once  insulin glargine Injectable (LANTUS) 18 Unit(s) SubCutaneous at bedtime  insulin lispro (ADMELOG) corrective regimen sliding scale   SubCutaneous three times a day before meals  insulin lispro (ADMELOG) corrective regimen sliding scale   SubCutaneous at bedtime  lactated ringers. 1000 milliLiter(s) (100 mL/Hr) IV Continuous <Continuous>  lactobacillus acidophilus 1 Tablet(s) Oral two times a day with meals  midodrine. 10 milliGRAM(s) Oral three times a day  pantoprazole    Tablet 40 milliGRAM(s) Oral before breakfast  tamsulosin 0.4 milliGRAM(s) Oral at bedtime  vancomycin  IVPB 750 milliGRAM(s) IV Intermittent every 12 hours    MEDICATIONS  (PRN):  acetaminophen     Tablet .. 650 milliGRAM(s) Oral every 6 hours PRN Temp greater or equal to 38C (100.4F), Moderate Pain (4 - 6)  dextrose Oral Gel 15 Gram(s) Oral once PRN Blood Glucose LESS THAN 70 milliGRAM(s)/deciliter  loperamide 2 milliGRAM(s) Oral three times a day PRN Diarrhea      Vital Signs Last 24 Hrs  T(C): 36.8 (18 Oct 2023 11:32), Max: 36.9 (17 Oct 2023 21:49)  T(F): 98.3 (18 Oct 2023 11:32), Max: 98.5 (17 Oct 2023 21:49)  HR: 60 (18 Oct 2023 11:32) (59 - 64)  BP: 117/47 (18 Oct 2023 11:32) (101/70 - 128/48)  BP(mean): --  RR: 16 (18 Oct 2023 11:32) (16 - 17)  SpO2: 96% (18 Oct 2023 11:32) (95% - 100%)    Parameters below as of 18 Oct 2023 11:32  Patient On (Oxygen Delivery Method): room air        PHYSICAL EXAM:  Vascular: DP & PT non  palpable bilaterally, Capillary refill delayed to the digits  Digital hair absent bilaterally  Neurological: Light touch sensation diminished  bilaterally  Musculoskeletal: 2/5  strength in all quadrants bilaterally, s/p right partial calcanectomy   Dermatological: 6.0cm x 4.0cm  x 1.0cm ulceration noted to the right heel with fibrogranular  tissue  no probe to bone, no periwound erythema, no fluctuance, no malodor, no proximal streaking at this time  4.0cm x 2.0cm x 1cm ulceration of the left midfoot down to bone with necrotic tissue and purulent drainage, periwound erythema and edema, no proximal streaking                          8.6    8.77  )-----------( 282      ( 18 Oct 2023 06:50 )             27.8       10-18    142  |  110<H>  |  17  ----------------------------<  204<H>  3.9   |  27  |  0.75    Ca    8.2<L>      18 Oct 2023 06:50                Imaging: Bone scan shows uptake of the right heel and the left midfoot

## 2023-10-18 NOTE — PHARMACOTHERAPY INTERVENTION NOTE - COMMENTS
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 Trough this AM resulted as 18.3. Discussed w/ ID Dr. Burnett and recommended decreasing dose to 500mg q12h. MD agreed and order entered.

## 2023-10-19 LAB
GLUCOSE BLDC GLUCOMTR-MCNC: 205 MG/DL — HIGH (ref 70–99)
GLUCOSE BLDC GLUCOMTR-MCNC: 208 MG/DL — HIGH (ref 70–99)
GLUCOSE BLDC GLUCOMTR-MCNC: 240 MG/DL — HIGH (ref 70–99)
GLUCOSE BLDC GLUCOMTR-MCNC: 250 MG/DL — HIGH (ref 70–99)
GLUCOSE BLDC GLUCOMTR-MCNC: 254 MG/DL — HIGH (ref 70–99)
GLUCOSE BLDC GLUCOMTR-MCNC: 260 MG/DL — HIGH (ref 70–99)
GRAM STN FLD: SIGNIFICANT CHANGE UP
SPECIMEN SOURCE: SIGNIFICANT CHANGE UP

## 2023-10-19 PROCEDURE — 20220 BONE BIOPSY TROCAR/NDL SUPFC: CPT | Mod: LT

## 2023-10-19 PROCEDURE — 88304 TISSUE EXAM BY PATHOLOGIST: CPT | Mod: 26

## 2023-10-19 PROCEDURE — 11044 DBRDMT BONE 1ST 20 SQ CM/<: CPT | Mod: RT

## 2023-10-19 PROCEDURE — 73630 X-RAY EXAM OF FOOT: CPT | Mod: 26,LT

## 2023-10-19 PROCEDURE — 88311 DECALCIFY TISSUE: CPT | Mod: 26

## 2023-10-19 RX ORDER — PANTOPRAZOLE SODIUM 20 MG/1
40 TABLET, DELAYED RELEASE ORAL
Refills: 0 | Status: DISCONTINUED | OUTPATIENT
Start: 2023-10-19 | End: 2023-10-25

## 2023-10-19 RX ORDER — MIDODRINE HYDROCHLORIDE 2.5 MG/1
10 TABLET ORAL THREE TIMES A DAY
Refills: 0 | Status: DISCONTINUED | OUTPATIENT
Start: 2023-10-19 | End: 2023-10-25

## 2023-10-19 RX ORDER — SODIUM CHLORIDE 9 MG/ML
1000 INJECTION, SOLUTION INTRAVENOUS
Refills: 0 | Status: DISCONTINUED | OUTPATIENT
Start: 2023-10-19 | End: 2023-10-19

## 2023-10-19 RX ORDER — ATORVASTATIN CALCIUM 80 MG/1
80 TABLET, FILM COATED ORAL AT BEDTIME
Refills: 0 | Status: DISCONTINUED | OUTPATIENT
Start: 2023-10-19 | End: 2023-10-25

## 2023-10-19 RX ORDER — LOPERAMIDE HCL 2 MG
2 TABLET ORAL THREE TIMES A DAY
Refills: 0 | Status: DISCONTINUED | OUTPATIENT
Start: 2023-10-19 | End: 2023-10-25

## 2023-10-19 RX ORDER — LACTOBACILLUS ACIDOPHILUS 100MM CELL
1 CAPSULE ORAL
Refills: 0 | Status: DISCONTINUED | OUTPATIENT
Start: 2023-10-19 | End: 2023-10-25

## 2023-10-19 RX ORDER — ACETAMINOPHEN 500 MG
650 TABLET ORAL EVERY 6 HOURS
Refills: 0 | Status: DISCONTINUED | OUTPATIENT
Start: 2023-10-19 | End: 2023-10-25

## 2023-10-19 RX ORDER — DEXTROSE 50 % IN WATER 50 %
12.5 SYRINGE (ML) INTRAVENOUS ONCE
Refills: 0 | Status: DISCONTINUED | OUTPATIENT
Start: 2023-10-19 | End: 2023-10-25

## 2023-10-19 RX ORDER — DEXTROSE 50 % IN WATER 50 %
25 SYRINGE (ML) INTRAVENOUS ONCE
Refills: 0 | Status: DISCONTINUED | OUTPATIENT
Start: 2023-10-19 | End: 2023-10-25

## 2023-10-19 RX ORDER — INSULIN LISPRO 100/ML
VIAL (ML) SUBCUTANEOUS AT BEDTIME
Refills: 0 | Status: DISCONTINUED | OUTPATIENT
Start: 2023-10-19 | End: 2023-10-25

## 2023-10-19 RX ORDER — DEXTROSE 50 % IN WATER 50 %
15 SYRINGE (ML) INTRAVENOUS ONCE
Refills: 0 | Status: DISCONTINUED | OUTPATIENT
Start: 2023-10-19 | End: 2023-10-25

## 2023-10-19 RX ORDER — HYDROMORPHONE HYDROCHLORIDE 2 MG/ML
0.5 INJECTION INTRAMUSCULAR; INTRAVENOUS; SUBCUTANEOUS
Refills: 0 | Status: DISCONTINUED | OUTPATIENT
Start: 2023-10-19 | End: 2023-10-19

## 2023-10-19 RX ORDER — TAMSULOSIN HYDROCHLORIDE 0.4 MG/1
0.4 CAPSULE ORAL AT BEDTIME
Refills: 0 | Status: DISCONTINUED | OUTPATIENT
Start: 2023-10-19 | End: 2023-10-25

## 2023-10-19 RX ORDER — OXYCODONE HYDROCHLORIDE 5 MG/1
5 TABLET ORAL ONCE
Refills: 0 | Status: DISCONTINUED | OUTPATIENT
Start: 2023-10-19 | End: 2023-10-19

## 2023-10-19 RX ORDER — BETHANECHOL CHLORIDE 25 MG
25 TABLET ORAL
Refills: 0 | Status: DISCONTINUED | OUTPATIENT
Start: 2023-10-19 | End: 2023-10-25

## 2023-10-19 RX ORDER — GLUCAGON INJECTION, SOLUTION 0.5 MG/.1ML
1 INJECTION, SOLUTION SUBCUTANEOUS ONCE
Refills: 0 | Status: DISCONTINUED | OUTPATIENT
Start: 2023-10-19 | End: 2023-10-25

## 2023-10-19 RX ORDER — ONDANSETRON 8 MG/1
4 TABLET, FILM COATED ORAL ONCE
Refills: 0 | Status: DISCONTINUED | OUTPATIENT
Start: 2023-10-19 | End: 2023-10-19

## 2023-10-19 RX ORDER — VANCOMYCIN HCL 1 G
500 VIAL (EA) INTRAVENOUS EVERY 12 HOURS
Refills: 0 | Status: COMPLETED | OUTPATIENT
Start: 2023-10-19 | End: 2023-10-22

## 2023-10-19 RX ORDER — INSULIN LISPRO 100/ML
VIAL (ML) SUBCUTANEOUS
Refills: 0 | Status: DISCONTINUED | OUTPATIENT
Start: 2023-10-19 | End: 2023-10-25

## 2023-10-19 RX ORDER — GABAPENTIN 400 MG/1
300 CAPSULE ORAL
Refills: 0 | Status: DISCONTINUED | OUTPATIENT
Start: 2023-10-19 | End: 2023-10-25

## 2023-10-19 RX ADMIN — Medication 650 MILLIGRAM(S): at 09:08

## 2023-10-19 RX ADMIN — Medication 1 TABLET(S): at 18:19

## 2023-10-19 RX ADMIN — MIDODRINE HYDROCHLORIDE 10 MILLIGRAM(S): 2.5 TABLET ORAL at 14:16

## 2023-10-19 RX ADMIN — GABAPENTIN 300 MILLIGRAM(S): 400 CAPSULE ORAL at 18:19

## 2023-10-19 RX ADMIN — TAMSULOSIN HYDROCHLORIDE 0.4 MILLIGRAM(S): 0.4 CAPSULE ORAL at 22:15

## 2023-10-19 RX ADMIN — MIDODRINE HYDROCHLORIDE 10 MILLIGRAM(S): 2.5 TABLET ORAL at 05:49

## 2023-10-19 RX ADMIN — Medication 25 MILLIGRAM(S): at 18:19

## 2023-10-19 RX ADMIN — GABAPENTIN 300 MILLIGRAM(S): 400 CAPSULE ORAL at 05:48

## 2023-10-19 RX ADMIN — SODIUM CHLORIDE 75 MILLILITER(S): 9 INJECTION, SOLUTION INTRAVENOUS at 13:11

## 2023-10-19 RX ADMIN — ATORVASTATIN CALCIUM 80 MILLIGRAM(S): 80 TABLET, FILM COATED ORAL at 22:14

## 2023-10-19 RX ADMIN — Medication 100 MILLIGRAM(S): at 00:23

## 2023-10-19 RX ADMIN — Medication 25 MILLIGRAM(S): at 05:44

## 2023-10-19 RX ADMIN — Medication 3: at 18:19

## 2023-10-19 RX ADMIN — Medication 100 MILLIGRAM(S): at 14:26

## 2023-10-19 RX ADMIN — Medication 3: at 08:43

## 2023-10-19 RX ADMIN — Medication 2: at 14:25

## 2023-10-19 RX ADMIN — Medication 2 MILLIGRAM(S): at 22:15

## 2023-10-19 RX ADMIN — SODIUM CHLORIDE 75 MILLILITER(S): 9 INJECTION, SOLUTION INTRAVENOUS at 15:53

## 2023-10-19 RX ADMIN — PANTOPRAZOLE SODIUM 40 MILLIGRAM(S): 20 TABLET, DELAYED RELEASE ORAL at 07:30

## 2023-10-19 RX ADMIN — MIDODRINE HYDROCHLORIDE 10 MILLIGRAM(S): 2.5 TABLET ORAL at 22:15

## 2023-10-19 RX ADMIN — Medication 650 MILLIGRAM(S): at 08:08

## 2023-10-19 NOTE — PROGRESS NOTE ADULT - SUBJECTIVE AND OBJECTIVE BOX
[facility], Division of Infectious Diseases  GLYNN Oakley Y. Patel, S. Shah, G. [facility]  [phone]    Name: HEMA LAZCANO  Age: 78y  Gender: Male  MRN: [id number]    Interval History:  Patient seen and examined at bedside   S/p Debridement of fascia 19-Oct-2023 12:51:29  Lauren Urbano  Open biopsy, bone, foot 19-Oct-2023 12:52:34  Lauren Urbano.  Notes reviewed    Antibiotics:  vancomycin  IVPB 500 milliGRAM(s) IV Intermittent every 12 hours      Medications:  acetaminophen     Tablet .. 650 milliGRAM(s) Oral every 6 hours PRN  atorvastatin 80 milliGRAM(s) Oral at bedtime  bethanechol 25 milliGRAM(s) Oral two times a day  dextrose 50% Injectable 25 Gram(s) IV Push once  dextrose 50% Injectable 25 Gram(s) IV Push once  dextrose 50% Injectable 12.5 Gram(s) IV Push once  dextrose Oral Gel 15 Gram(s) Oral once PRN  gabapentin 300 milliGRAM(s) Oral two times a day  glucagon  Injectable 1 milliGRAM(s) IntraMuscular once  HYDROmorphone  Injectable 0.5 milliGRAM(s) IV Push every 10 minutes PRN  insulin lispro (ADMELOG) corrective regimen sliding scale   SubCutaneous three times a day before meals  insulin lispro (ADMELOG) corrective regimen sliding scale   SubCutaneous at bedtime  lactated ringers. 1000 milliLiter(s) IV Continuous <Continuous>  lactated ringers. 1000 milliLiter(s) IV Continuous <Continuous>  lactobacillus acidophilus 1 Tablet(s) Oral two times a day with meals  loperamide 2 milliGRAM(s) Oral three times a day PRN  midodrine. 10 milliGRAM(s) Oral three times a day  ondansetron Injectable 4 milliGRAM(s) IV Push once PRN  oxyCODONE    IR 5 milliGRAM(s) Oral once PRN  pantoprazole    Tablet 40 milliGRAM(s) Oral before breakfast  tamsulosin 0.4 milliGRAM(s) Oral at bedtime  vancomycin  IVPB 500 milliGRAM(s) IV Intermittent every 12 hours      Review of Systems:  A 10-point review of systems was obtained.     Pertinent positives and negatives--  Constitutional: No fevers. No Chills. No Rigors.   Cardiovascular: No chest pain. No palpitations.  Respiratory: No shortness of breath. No cough.  Gastrointestinal: No nausea or vomiting. No diarrhea or constipation.   Psychiatric: Pleasant. Appropriate affect.    Review of systems otherwise negative except as previously noted.    Allergies: No Known Allergies    For details regarding the patient's past medical history, social history, family history, and other miscellaneous elements, please refer the initial infectious diseases consultation and/or the admitting history and physical examination for this admission.    Objective:  Vitals:   T(C): 37.3 (10-19-23 @ 12:47), Max: 37.3 (10-19-23 @ 12:17)  HR: 60 (10-19-23 @ 13:24) (60 - 71)  BP: 102/51 (10-19-23 @ 13:24) (100/46 - 124/72)  RR: 12 (10-19-23 @ 13:24) (12 - 18)  SpO2: 97% (10-19-23 @ 13:24) (97% - 100%)    Physical Examination:  General: no acute distress  HEENT: NC/AT, EOMI,  Cardio: S1, S2 heard, RRR, no murmurs  Resp: breath sounds heard bilaterally, no rales, wheezes or rhonchi  Abd: soft, NT, ND  Ext: no edema or cyanosis, dressing c/d/i  Skin: warm, dry, no visible rash      Laboratory Studies:  CBC:                       8.6    8.77  )-----------( 282      ( 18 Oct 2023 06:50 )             27.8     CMP: 10-18    142  |  110<H>  |  17  ----------------------------<  204<H>  3.9   |  27  |  0.75    Ca    8.2<L>      18 Oct 2023 06:50        Urinalysis Basic - ( 18 Oct 2023 06:50 )    Color: x / Appearance: x / SG: x / pH: x  Gluc: 204 mg/dL / Ketone: x  / Bili: x / Urobili: x   Blood: x / Protein: x / Nitrite: x   Leuk Esterase: x / RBC: x / WBC x   Sq Epi: x / Non Sq Epi: x / Bacteria: x        Microbiology: reviewed    Culture - Blood (collected 10-12-23 @ 05:36)  Source: .Blood Blood-Peripheral  Final Report (10-17-23 @ 10:00):    No growth at 5 days    Culture - Blood (collected 10-12-23 @ 05:30)  Source: .Blood Blood-Venous  Final Report (10-17-23 @ 10:00):    No growth at 5 days    Culture - Other (collected 10-11-23 @ 13:13)  Source: Wound Wound  Final Report (10-13-23 @ 17:21):    Numerous Methicillin Resistant Staphylococcus aureus  Organism: Methicillin resistant Staphylococcus aureus (10-13-23 @ 17:21)  Organism: Methicillin resistant Staphylococcus aureus (10-13-23 @ 17:21)      -  Clindamycin: S <=0.25      -  Oxacillin: R >2      -  Gentamicin: S 2 Should not be used as monotherapy      -  Daptomycin: S 1      -  Linezolid: S 4      -  Cefazolin: R <=4      -  Vancomycin: S 2      -  Tetracycline: S <=1      Method Type: KARTHIK      -  Ampicillin/Sulbactam: R <=8/4      -  Penicillin: R 2      -  Rifampin: S <=1 Should not be used as monotherapy      -  Erythromycin: R >4      -  Trimethoprim/Sulfamethoxazole: S <=0.5/9.5    Culture - Urine (collected 10-10-23 @ 21:00)  Source: Catheterized Catheterized  Final Report (10-13-23 @ 17:40):    10,000 - 49,000 CFU/mL Pseudomonas aeruginosa  Organism: Pseudomonas aeruginosa (10-13-23 @ 17:40)  Organism: Pseudomonas aeruginosa (10-13-23 @ 17:40)      -  Levofloxacin: S <=0.5      -  Tobramycin: S <=2      -  Aztreonam: S <=4      -  Gentamicin: S <=2      -  Cefepime: S <=2      -  Piperacillin/Tazobactam: S <=8      -  Ciprofloxacin: S <=0.25      -  Imipenem: S <=1      Method Type: KARTHIK      -  Meropenem: S <=1      -  Ceftazidime: S <=1      -  Amikacin: S <=16    Culture - Blood (collected 10-10-23 @ 18:25)  Source: .Blood Blood-Peripheral  Gram Stain (10-11-23 @ 21:56):    Growth in aerobic bottle: Gram Positive Cocci in Clusters    Growth in anaerobic bottle: Gram Positive Cocci in Clusters  Final Report (10-13-23 @ 18:46):    Growth in aerobic and anaerobic bottles: Methicillin Resistant    Staphylococcus aureus    See previous culture [id number]    Culture - Blood (collected 10-10-23 @ 18:25)  Source: .Blood Blood-Peripheral  Gram Stain (10-11-23 @ 15:29):    Growth in aerobic bottle: Gram Positive Cocci in Clusters    Growth in anaerobic bottle: Gram Positive Cocci in Clusters  Final Report (10-13-23 @ 12:50):    Growth in aerobic and anaerobic bottles: Methicillin Resistant    Staphylococcus aureus    Direct identification is available within approximately 3-5    hours either by Blood Panel Multiplexed PCR or Direct    MALDI-TOF. Details: [email or web address]  Organism: Blood Culture PCR  Methicillin resistant Staphylococcus aureus (10-13-23 @ 12:50)  Organism: Methicillin resistant Staphylococcus aureus (10-13-23 @ 12:50)      -  Clindamycin: I 2      -  Oxacillin: R >2      -  Gentamicin: S <=1 Should not be used as monotherapy      -  Daptomycin: S 1      -  Linezolid: S 4      -  Cefazolin: R <=4      -  Vancomycin: S 1      -  Tetracycline: S <=1      Method Type: KARTHIK      -  Ampicillin/Sulbactam: R <=8/4      -  Penicillin: R 2      -  Rifampin: S <=1 Should not be used as monotherapy      -  Erythromycin: R >4      -  Trimethoprim/Sulfamethoxazole: S <=0.5/9.5  Organism: Blood Culture PCR (10-13-23 @ 12:50)      Method Type: PCR      -  Methicillin resistant Staphylococcus aureus (MRSA): Detec          Radiology: reviewed       [facility], Division of Infectious Diseases  GLYNN Oakley Y. Patel, S. Shah, G. [facility]  [phone]    Name: HEMA LAZCANO  Age: 78y  Gender: Male  MRN: [id number]    Interval History:  Patient seen and examined at bedside   S/p Debridement of fascia 19-Oct-2023 12:51:29  Lauren Urbano  Open biopsy, bone, foot 19-Oct-2023 12:52:34  Lauren Urbano.  Notes reviewed    Antibiotics:  vancomycin  IVPB 500 milliGRAM(s) IV Intermittent every 12 hours      Medications:  acetaminophen     Tablet .. 650 milliGRAM(s) Oral every 6 hours PRN  atorvastatin 80 milliGRAM(s) Oral at bedtime  bethanechol 25 milliGRAM(s) Oral two times a day  dextrose 50% Injectable 25 Gram(s) IV Push once  dextrose 50% Injectable 25 Gram(s) IV Push once  dextrose 50% Injectable 12.5 Gram(s) IV Push once  dextrose Oral Gel 15 Gram(s) Oral once PRN  gabapentin 300 milliGRAM(s) Oral two times a day  glucagon  Injectable 1 milliGRAM(s) IntraMuscular once  HYDROmorphone  Injectable 0.5 milliGRAM(s) IV Push every 10 minutes PRN  insulin lispro (ADMELOG) corrective regimen sliding scale   SubCutaneous three times a day before meals  insulin lispro (ADMELOG) corrective regimen sliding scale   SubCutaneous at bedtime  lactated ringers. 1000 milliLiter(s) IV Continuous <Continuous>  lactated ringers. 1000 milliLiter(s) IV Continuous <Continuous>  lactobacillus acidophilus 1 Tablet(s) Oral two times a day with meals  loperamide 2 milliGRAM(s) Oral three times a day PRN  midodrine. 10 milliGRAM(s) Oral three times a day  ondansetron Injectable 4 milliGRAM(s) IV Push once PRN  oxyCODONE    IR 5 milliGRAM(s) Oral once PRN  pantoprazole    Tablet 40 milliGRAM(s) Oral before breakfast  tamsulosin 0.4 milliGRAM(s) Oral at bedtime  vancomycin  IVPB 500 milliGRAM(s) IV Intermittent every 12 hours      Review of Systems:  A 10-point review of systems was obtained.     Pertinent positives and negatives--  Constitutional: No fevers. No Chills. No Rigors.   Cardiovascular: No chest pain. No palpitations.  Respiratory: No shortness of breath. No cough.  Gastrointestinal: No nausea or vomiting. No diarrhea or constipation.   Psychiatric: Pleasant. Appropriate affect.    Review of systems otherwise negative except as previously noted.    Allergies: No Known Allergies    For details regarding the patient's past medical history, social history, family history, and other miscellaneous elements, please refer the initial infectious diseases consultation and/or the admitting history and physical examination for this admission.    Objective:  Vitals:   T(C): 37.3 (10-19-23 @ 12:47), Max: 37.3 (10-19-23 @ 12:17)  HR: 60 (10-19-23 @ 13:24) (60 - 71)  BP: 102/51 (10-19-23 @ 13:24) (100/46 - 124/72)  RR: 12 (10-19-23 @ 13:24) (12 - 18)  SpO2: 97% (10-19-23 @ 13:24) (97% - 100%)    Physical Examination:  General: no acute distress  HEENT: NC/AT, EOMI,  Cardio: S1, S2 heard, RRR, no murmurs  Resp: breath sounds heard bilaterally, no rales, wheezes or rhonchi  Abd: soft, NT, ND  Ext: no edema or cyanosis, dressing c/d/i  Skin: warm, dry, no visible rash      Laboratory Studies:  CBC:                       8.6    8.77  )-----------( 282      ( 18 Oct 2023 06:50 )             27.8     CMP: 10-18    142  |  110<H>  |  17  ----------------------------<  204<H>  3.9   |  27  |  0.75    Ca    8.2<L>      18 Oct 2023 06:50        Urinalysis Basic - ( 18 Oct 2023 06:50 )    Color: x / Appearance: x / SG: x / pH: x  Gluc: 204 mg/dL / Ketone: x  / Bili: x / Urobili: x   Blood: x / Protein: x / Nitrite: x   Leuk Esterase: x / RBC: x / WBC x   Sq Epi: x / Non Sq Epi: x / Bacteria: x        Microbiology: reviewed    Culture - Blood (collected 10-12-23 @ 05:36)  Source: .Blood Blood-Peripheral  Final Report (10-17-23 @ 10:00):    No growth at 5 days    Culture - Blood (collected 10-12-23 @ 05:30)  Source: .Blood Blood-Venous  Final Report (10-17-23 @ 10:00):    No growth at 5 days    Culture - Other (collected 10-11-23 @ 13:13)  Source: Wound Wound  Final Report (10-13-23 @ 17:21):    Numerous Methicillin Resistant Staphylococcus aureus  Organism: Methicillin resistant Staphylococcus aureus (10-13-23 @ 17:21)  Organism: Methicillin resistant Staphylococcus aureus (10-13-23 @ 17:21)      -  Clindamycin: S <=0.25      -  Oxacillin: R >2      -  Gentamicin: S 2 Should not be used as monotherapy      -  Daptomycin: S 1      -  Linezolid: S 4      -  Cefazolin: R <=4      -  Vancomycin: S 2      -  Tetracycline: S <=1      Method Type: KARTHIK      -  Ampicillin/Sulbactam: R <=8/4      -  Penicillin: R 2      -  Rifampin: S <=1 Should not be used as monotherapy      -  Erythromycin: R >4      -  Trimethoprim/Sulfamethoxazole: S <=0.5/9.5    Culture - Urine (collected 10-10-23 @ 21:00)  Source: Catheterized Catheterized  Final Report (10-13-23 @ 17:40):    10,000 - 49,000 CFU/mL Pseudomonas aeruginosa  Organism: Pseudomonas aeruginosa (10-13-23 @ 17:40)  Organism: Pseudomonas aeruginosa (10-13-23 @ 17:40)      -  Levofloxacin: S <=0.5      -  Tobramycin: S <=2      -  Aztreonam: S <=4      -  Gentamicin: S <=2      -  Cefepime: S <=2      -  Piperacillin/Tazobactam: S <=8      -  Ciprofloxacin: S <=0.25      -  Imipenem: S <=1      Method Type: KARTHIK      -  Meropenem: S <=1      -  Ceftazidime: S <=1      -  Amikacin: S <=16    Culture - Blood (collected 10-10-23 @ 18:25)  Source: .Blood Blood-Peripheral  Gram Stain (10-11-23 @ 21:56):    Growth in aerobic bottle: Gram Positive Cocci in Clusters    Growth in anaerobic bottle: Gram Positive Cocci in Clusters  Final Report (10-13-23 @ 18:46):    Growth in aerobic and anaerobic bottles: Methicillin Resistant    Staphylococcus aureus    See previous culture [id number]    Culture - Blood (collected 10-10-23 @ 18:25)  Source: .Blood Blood-Peripheral  Gram Stain (10-11-23 @ 15:29):    Growth in aerobic bottle: Gram Positive Cocci in Clusters    Growth in anaerobic bottle: Gram Positive Cocci in Clusters  Final Report (10-13-23 @ 12:50):    Growth in aerobic and anaerobic bottles: Methicillin Resistant    Staphylococcus aureus    Direct identification is available within approximately 3-5    hours either by Blood Panel Multiplexed PCR or Direct    MALDI-TOF. Details: [email or web address]  Organism: Blood Culture PCR  Methicillin resistant Staphylococcus aureus (10-13-23 @ 12:50)  Organism: Methicillin resistant Staphylococcus aureus (10-13-23 @ 12:50)      -  Clindamycin: I 2      -  Oxacillin: R >2      -  Gentamicin: S <=1 Should not be used as monotherapy      -  Daptomycin: S 1      -  Linezolid: S 4      -  Cefazolin: R <=4      -  Vancomycin: S 1      -  Tetracycline: S <=1      Method Type: KARTHIK      -  Ampicillin/Sulbactam: R <=8/4      -  Penicillin: R 2      -  Rifampin: S <=1 Should not be used as monotherapy      -  Erythromycin: R >4      -  Trimethoprim/Sulfamethoxazole: S <=0.5/9.5  Organism: Blood Culture PCR (10-13-23 @ 12:50)      Method Type: PCR      -  Methicillin resistant Staphylococcus aureus (MRSA): Detec          Radiology: reviewed       [facility], Division of Infectious Diseases  GLYNN Oakley Y. Patel, S. Shah, G. [facility]  [phone]    Name: HEMA LAZCANO  Age: 78y  Gender: Male  MRN: [id number]    Interval History:  Patient seen and examined at bedside   S/p Debridement of fascia 19-Oct-2023 12:51:29  Lauren Urbano  Open biopsy, bone, foot 19-Oct-2023 12:52:34  Lauren Urbano.  Notes reviewed    Antibiotics:  vancomycin  IVPB 500 milliGRAM(s) IV Intermittent every 12 hours      Medications:  acetaminophen     Tablet .. 650 milliGRAM(s) Oral every 6 hours PRN  atorvastatin 80 milliGRAM(s) Oral at bedtime  bethanechol 25 milliGRAM(s) Oral two times a day  dextrose 50% Injectable 25 Gram(s) IV Push once  dextrose 50% Injectable 25 Gram(s) IV Push once  dextrose 50% Injectable 12.5 Gram(s) IV Push once  dextrose Oral Gel 15 Gram(s) Oral once PRN  gabapentin 300 milliGRAM(s) Oral two times a day  glucagon  Injectable 1 milliGRAM(s) IntraMuscular once  HYDROmorphone  Injectable 0.5 milliGRAM(s) IV Push every 10 minutes PRN  insulin lispro (ADMELOG) corrective regimen sliding scale   SubCutaneous three times a day before meals  insulin lispro (ADMELOG) corrective regimen sliding scale   SubCutaneous at bedtime  lactated ringers. 1000 milliLiter(s) IV Continuous <Continuous>  lactated ringers. 1000 milliLiter(s) IV Continuous <Continuous>  lactobacillus acidophilus 1 Tablet(s) Oral two times a day with meals  loperamide 2 milliGRAM(s) Oral three times a day PRN  midodrine. 10 milliGRAM(s) Oral three times a day  ondansetron Injectable 4 milliGRAM(s) IV Push once PRN  oxyCODONE    IR 5 milliGRAM(s) Oral once PRN  pantoprazole    Tablet 40 milliGRAM(s) Oral before breakfast  tamsulosin 0.4 milliGRAM(s) Oral at bedtime  vancomycin  IVPB 500 milliGRAM(s) IV Intermittent every 12 hours      Review of Systems:  A 10-point review of systems was obtained.     Pertinent positives and negatives--  Constitutional: No fevers. No Chills. No Rigors.   Cardiovascular: No chest pain. No palpitations.  Respiratory: No shortness of breath. No cough.  Gastrointestinal: No nausea or vomiting. No diarrhea or constipation.   Psychiatric: Pleasant. Appropriate affect.    Review of systems otherwise negative except as previously noted.    Allergies: No Known Allergies    For details regarding the patient's past medical history, social history, family history, and other miscellaneous elements, please refer the initial infectious diseases consultation and/or the admitting history and physical examination for this admission.    Objective:  Vitals:   T(C): 37.3 (10-19-23 @ 12:47), Max: 37.3 (10-19-23 @ 12:17)  HR: 60 (10-19-23 @ 13:24) (60 - 71)  BP: 102/51 (10-19-23 @ 13:24) (100/46 - 124/72)  RR: 12 (10-19-23 @ 13:24) (12 - 18)  SpO2: 97% (10-19-23 @ 13:24) (97% - 100%)    Physical Examination:  General: no acute distress  HEENT: NC/AT, EOMI,  Cardio: S1, S2 heard, RRR, no murmurs  Resp: breath sounds heard bilaterally, no rales, wheezes or rhonchi  Abd: soft, NT, ND  Ext: no edema or cyanosis, dressing c/d/i  Skin: warm, dry, no visible rash      Laboratory Studies:  CBC:                       8.6    8.77  )-----------( 282      ( 18 Oct 2023 06:50 )             27.8     CMP: 10-18    142  |  110<H>  |  17  ----------------------------<  204<H>  3.9   |  27  |  0.75    Ca    8.2<L>      18 Oct 2023 06:50        Urinalysis Basic - ( 18 Oct 2023 06:50 )    Color: x / Appearance: x / SG: x / pH: x  Gluc: 204 mg/dL / Ketone: x  / Bili: x / Urobili: x   Blood: x / Protein: x / Nitrite: x   Leuk Esterase: x / RBC: x / WBC x   Sq Epi: x / Non Sq Epi: x / Bacteria: x        Microbiology: reviewed    Culture - Blood (collected 10-12-23 @ 05:36)  Source: .Blood Blood-Peripheral  Final Report (10-17-23 @ 10:00):    No growth at 5 days    Culture - Blood (collected 10-12-23 @ 05:30)  Source: .Blood Blood-Venous  Final Report (10-17-23 @ 10:00):    No growth at 5 days    Culture - Other (collected 10-11-23 @ 13:13)  Source: Wound Wound  Final Report (10-13-23 @ 17:21):    Numerous Methicillin Resistant Staphylococcus aureus  Organism: Methicillin resistant Staphylococcus aureus (10-13-23 @ 17:21)  Organism: Methicillin resistant Staphylococcus aureus (10-13-23 @ 17:21)      -  Clindamycin: S <=0.25      -  Oxacillin: R >2      -  Gentamicin: S 2 Should not be used as monotherapy      -  Daptomycin: S 1      -  Linezolid: S 4      -  Cefazolin: R <=4      -  Vancomycin: S 2      -  Tetracycline: S <=1      Method Type: KARTHIK      -  Ampicillin/Sulbactam: R <=8/4      -  Penicillin: R 2      -  Rifampin: S <=1 Should not be used as monotherapy      -  Erythromycin: R >4      -  Trimethoprim/Sulfamethoxazole: S <=0.5/9.5    Culture - Urine (collected 10-10-23 @ 21:00)  Source: Catheterized Catheterized  Final Report (10-13-23 @ 17:40):    10,000 - 49,000 CFU/mL Pseudomonas aeruginosa  Organism: Pseudomonas aeruginosa (10-13-23 @ 17:40)  Organism: Pseudomonas aeruginosa (10-13-23 @ 17:40)      -  Levofloxacin: S <=0.5      -  Tobramycin: S <=2      -  Aztreonam: S <=4      -  Gentamicin: S <=2      -  Cefepime: S <=2      -  Piperacillin/Tazobactam: S <=8      -  Ciprofloxacin: S <=0.25      -  Imipenem: S <=1      Method Type: KARTHIK      -  Meropenem: S <=1      -  Ceftazidime: S <=1      -  Amikacin: S <=16    Culture - Blood (collected 10-10-23 @ 18:25)  Source: .Blood Blood-Peripheral  Gram Stain (10-11-23 @ 21:56):    Growth in aerobic bottle: Gram Positive Cocci in Clusters    Growth in anaerobic bottle: Gram Positive Cocci in Clusters  Final Report (10-13-23 @ 18:46):    Growth in aerobic and anaerobic bottles: Methicillin Resistant    Staphylococcus aureus    See previous culture [id number]    Culture - Blood (collected 10-10-23 @ 18:25)  Source: .Blood Blood-Peripheral  Gram Stain (10-11-23 @ 15:29):    Growth in aerobic bottle: Gram Positive Cocci in Clusters    Growth in anaerobic bottle: Gram Positive Cocci in Clusters  Final Report (10-13-23 @ 12:50):    Growth in aerobic and anaerobic bottles: Methicillin Resistant    Staphylococcus aureus    Direct identification is available within approximately 3-5    hours either by Blood Panel Multiplexed PCR or Direct    MALDI-TOF. Details: [email or web address]  Organism: Blood Culture PCR  Methicillin resistant Staphylococcus aureus (10-13-23 @ 12:50)  Organism: Methicillin resistant Staphylococcus aureus (10-13-23 @ 12:50)      -  Clindamycin: I 2      -  Oxacillin: R >2      -  Gentamicin: S <=1 Should not be used as monotherapy      -  Daptomycin: S 1      -  Linezolid: S 4      -  Cefazolin: R <=4      -  Vancomycin: S 1      -  Tetracycline: S <=1      Method Type: KARTHIK      -  Ampicillin/Sulbactam: R <=8/4      -  Penicillin: R 2      -  Rifampin: S <=1 Should not be used as monotherapy      -  Erythromycin: R >4      -  Trimethoprim/Sulfamethoxazole: S <=0.5/9.5  Organism: Blood Culture PCR (10-13-23 @ 12:50)      Method Type: PCR      -  Methicillin resistant Staphylococcus aureus (MRSA): Detec          Radiology: reviewed       [facility], Division of Infectious Diseases  GLYNN Oakley Y. Patel, S. Shah, G. [facility]  [phone]    Name: HEMA LAZCANO  Age: 78y  Gender: Male  MRN: [id number]    Interval History:  Patient seen and examined at bedside   S/p Debridement of fascia 19-Oct-2023 12:51:29  Lauren Urbano  Open biopsy, bone, foot 19-Oct-2023 12:52:34  Lauren Urbano.  Notes reviewed    Antibiotics:  vancomycin  IVPB 500 milliGRAM(s) IV Intermittent every 12 hours      Medications:  acetaminophen     Tablet .. 650 milliGRAM(s) Oral every 6 hours PRN  atorvastatin 80 milliGRAM(s) Oral at bedtime  bethanechol 25 milliGRAM(s) Oral two times a day  dextrose 50% Injectable 25 Gram(s) IV Push once  dextrose 50% Injectable 25 Gram(s) IV Push once  dextrose 50% Injectable 12.5 Gram(s) IV Push once  dextrose Oral Gel 15 Gram(s) Oral once PRN  gabapentin 300 milliGRAM(s) Oral two times a day  glucagon  Injectable 1 milliGRAM(s) IntraMuscular once  HYDROmorphone  Injectable 0.5 milliGRAM(s) IV Push every 10 minutes PRN  insulin lispro (ADMELOG) corrective regimen sliding scale   SubCutaneous three times a day before meals  insulin lispro (ADMELOG) corrective regimen sliding scale   SubCutaneous at bedtime  lactated ringers. 1000 milliLiter(s) IV Continuous <Continuous>  lactated ringers. 1000 milliLiter(s) IV Continuous <Continuous>  lactobacillus acidophilus 1 Tablet(s) Oral two times a day with meals  loperamide 2 milliGRAM(s) Oral three times a day PRN  midodrine. 10 milliGRAM(s) Oral three times a day  ondansetron Injectable 4 milliGRAM(s) IV Push once PRN  oxyCODONE    IR 5 milliGRAM(s) Oral once PRN  pantoprazole    Tablet 40 milliGRAM(s) Oral before breakfast  tamsulosin 0.4 milliGRAM(s) Oral at bedtime  vancomycin  IVPB 500 milliGRAM(s) IV Intermittent every 12 hours      Review of Systems:  A 10-point review of systems was obtained.     Pertinent positives and negatives--  Constitutional: No fevers. No Chills. No Rigors.   Cardiovascular: No chest pain. No palpitations.  Respiratory: No shortness of breath. No cough.  Gastrointestinal: No nausea or vomiting. No diarrhea or constipation.   Psychiatric: Pleasant. Appropriate affect.    Review of systems otherwise negative except as previously noted.    Allergies: No Known Allergies    For details regarding the patient's past medical history, social history, family history, and other miscellaneous elements, please refer the initial infectious diseases consultation and/or the admitting history and physical examination for this admission.    Objective:  Vitals:   T(C): 37.3 (10-19-23 @ 12:47), Max: 37.3 (10-19-23 @ 12:17)  HR: 60 (10-19-23 @ 13:24) (60 - 71)  BP: 102/51 (10-19-23 @ 13:24) (100/46 - 124/72)  RR: 12 (10-19-23 @ 13:24) (12 - 18)  SpO2: 97% (10-19-23 @ 13:24) (97% - 100%)    Physical Examination:  General: no acute distress  HEENT: NC/AT, EOMI,  Cardio: S1, S2 heard, RRR, no murmurs  Resp: breath sounds heard bilaterally, no rales, wheezes or rhonchi  Abd: soft, NT, ND  Ext: no edema or cyanosis, dressing c/d/i  Skin: warm, dry, no visible rash      Laboratory Studies:  CBC:                       8.6    8.77  )-----------( 282      ( 18 Oct 2023 06:50 )             27.8     CMP: 10-18    142  |  110<H>  |  17  ----------------------------<  204<H>  3.9   |  27  |  0.75    Ca    8.2<L>      18 Oct 2023 06:50        Urinalysis Basic - ( 18 Oct 2023 06:50 )    Color: x / Appearance: x / SG: x / pH: x  Gluc: 204 mg/dL / Ketone: x  / Bili: x / Urobili: x   Blood: x / Protein: x / Nitrite: x   Leuk Esterase: x / RBC: x / WBC x   Sq Epi: x / Non Sq Epi: x / Bacteria: x        Microbiology: reviewed    Culture - Blood (collected 10-12-23 @ 05:36)  Source: .Blood Blood-Peripheral  Final Report (10-17-23 @ 10:00):    No growth at 5 days    Culture - Blood (collected 10-12-23 @ 05:30)  Source: .Blood Blood-Venous  Final Report (10-17-23 @ 10:00):    No growth at 5 days    Culture - Other (collected 10-11-23 @ 13:13)  Source: Wound Wound  Final Report (10-13-23 @ 17:21):    Numerous Methicillin Resistant Staphylococcus aureus  Organism: Methicillin resistant Staphylococcus aureus (10-13-23 @ 17:21)  Organism: Methicillin resistant Staphylococcus aureus (10-13-23 @ 17:21)      -  Clindamycin: S <=0.25      -  Oxacillin: R >2      -  Gentamicin: S 2 Should not be used as monotherapy      -  Daptomycin: S 1      -  Linezolid: S 4      -  Cefazolin: R <=4      -  Vancomycin: S 2      -  Tetracycline: S <=1      Method Type: KARTHIK      -  Ampicillin/Sulbactam: R <=8/4      -  Penicillin: R 2      -  Rifampin: S <=1 Should not be used as monotherapy      -  Erythromycin: R >4      -  Trimethoprim/Sulfamethoxazole: S <=0.5/9.5    Culture - Urine (collected 10-10-23 @ 21:00)  Source: Catheterized Catheterized  Final Report (10-13-23 @ 17:40):    10,000 - 49,000 CFU/mL Pseudomonas aeruginosa  Organism: Pseudomonas aeruginosa (10-13-23 @ 17:40)  Organism: Pseudomonas aeruginosa (10-13-23 @ 17:40)      -  Levofloxacin: S <=0.5      -  Tobramycin: S <=2      -  Aztreonam: S <=4      -  Gentamicin: S <=2      -  Cefepime: S <=2      -  Piperacillin/Tazobactam: S <=8      -  Ciprofloxacin: S <=0.25      -  Imipenem: S <=1      Method Type: KARTHIK      -  Meropenem: S <=1      -  Ceftazidime: S <=1      -  Amikacin: S <=16    Culture - Blood (collected 10-10-23 @ 18:25)  Source: .Blood Blood-Peripheral  Gram Stain (10-11-23 @ 21:56):    Growth in aerobic bottle: Gram Positive Cocci in Clusters    Growth in anaerobic bottle: Gram Positive Cocci in Clusters  Final Report (10-13-23 @ 18:46):    Growth in aerobic and anaerobic bottles: Methicillin Resistant    Staphylococcus aureus    See previous culture [id number]    Culture - Blood (collected 10-10-23 @ 18:25)  Source: .Blood Blood-Peripheral  Gram Stain (10-11-23 @ 15:29):    Growth in aerobic bottle: Gram Positive Cocci in Clusters    Growth in anaerobic bottle: Gram Positive Cocci in Clusters  Final Report (10-13-23 @ 12:50):    Growth in aerobic and anaerobic bottles: Methicillin Resistant    Staphylococcus aureus    Direct identification is available within approximately 3-5    hours either by Blood Panel Multiplexed PCR or Direct    MALDI-TOF. Details: [email or web address]  Organism: Blood Culture PCR  Methicillin resistant Staphylococcus aureus (10-13-23 @ 12:50)  Organism: Methicillin resistant Staphylococcus aureus (10-13-23 @ 12:50)      -  Clindamycin: I 2      -  Oxacillin: R >2      -  Gentamicin: S <=1 Should not be used as monotherapy      -  Daptomycin: S 1      -  Linezolid: S 4      -  Cefazolin: R <=4      -  Vancomycin: S 1      -  Tetracycline: S <=1      Method Type: KARTHIK      -  Ampicillin/Sulbactam: R <=8/4      -  Penicillin: R 2      -  Rifampin: S <=1 Should not be used as monotherapy      -  Erythromycin: R >4      -  Trimethoprim/Sulfamethoxazole: S <=0.5/9.5  Organism: Blood Culture PCR (10-13-23 @ 12:50)      Method Type: PCR      -  Methicillin resistant Staphylococcus aureus (MRSA): Detec          Radiology: reviewed    < from: GISSELL W or WO Ultrasound Enhancing Agent (10.17.23 @ 10:51) >    ADDENDUM TO PRIOR ECHOCARDIOGRAM REPORT    Reason for amended report: missing information.    [id number] Satya Barrett on 10/18/2023 at 4:02:02 PM                       TRANSESOPHAGEAL ECHOCARDIOGRAM REPORT  ________________________________________________________________________________                                      _______       Pt. Name:       HEMA LAZCANO Study Date:    10/17/2023  MRN:            [id number]     YOB: 1945  Accession #:    [id number]     Age:           78 years  Account#:       [id number]    Gender:        M  Heart Rate:                   Height:        72.83 in (185.00 cm)  Rhythm:                       Weight:        169.75 lb (77.00 kg)  Blood Pressure: 88/36 mmHg    BSA/BMI:       2.00 m² / 22.50 kg/m²  ________________________________________________________________________________________  Referring Physician:    [id number] Pastora Harris  Interpreting Physician: Satya Barrett  Primary Sonographer:    Micaela Shah [facility]    CPT:               ECHO TRANSESOPH W/O CON - 22240.m; DOPPLER ECHO COMP W SPECT                     - 02573.m; DOPPL ECHO COLOR FLOW - 57832.m  Indication(s):     Abnormal electrocardiogram ECG/EKG - R94.31  Procedure:         Transesophageal echocardiogram performed with 2D, M-mode and                     complete spectral and color flow Doppler.  Ordering Location: [facility]    _______________________________________________________________________________________     CONCLUSIONS:      1. Left ventricular systolic function is normal with an ejection fraction visually estimated at 55 to 60 %.   2. There is mild (grade 1) left ventricular diastolic dysfunction.   3. Normal right ventricular cavity size and wall thickness,.   4. Normal biventricular systolic function.   5. The left atrium is normal in size.   6. The right atrium is normal in size.   7. Mild to moderate mitral regurgitation.   8. No thrombus seen in the left atrial, left atrial appendage, right atrial or right atrial appendage.   9. No evidence of an intracardiac shunt.  10. Structurally normal pulmonic valve with normal leaflet excursion.  11. No echocardiographic evidence of pulmonary hypertension.  12. No pericardial effusion seen.  13. There is no significant plaque seen in the visualized portions of the descending aorta and aortic arch.  14. No atrial septal defect detected.  15. No evidence of a patent foramen ovale by color flow Doppler.  16. NO INTRA CARDIAC MASS , THROMBUS , VEGETATIONS AND TUMOR.  17. Normal left atral appendage.  18. No aortic dissection and thoracic aortic aneurysm.  19. Pacemaker wire is present in right atrium and right ventricle and intact and no vegetations on the pacemaker wire.  20. Agitated saline injection was negative for intracardiac shunt.  21. Aortic root at the sinuses of Valsalva is normal in size, measuring 3.58 cm (indexed 1.79 cm/m²).  22. Mild tricuspid regurgitation.  23. No evidence of an interatrial septal aneurysm.  24. No evidence of aortic regurgitation.  25. No mitral valve stenosis.  26. There is mild calcification of the mitral valve annulus.  27. Tricuspid aortic valve with normal leaflet excursion with normal systolic excursion. no aortic valve stenosis.    ____________________________________________________________________  GISSELL Procedure:  After discussion of the risks and benefits of the GISSELL, an informed consent was obtained. Study was performed under anesthesia. The GISSELL probe was passed without difficulty. Images were obtained with the patient in a left lateral decubitus position. Image quality was good. The patient's vital signs; including heart rate, blood pressure, and oxygen saturation; remained stable throughout the procedure. The patient tolerated the procedure well and without complications.  ________________________________________________________________________________________  FINDINGS:     Left Ventricle:  Left ventricular systolic function is normal with an ejection fraction visually estimated at 55 to 60%. There is mild (grade 1) left ventricular diastolic dysfunction.     Right Ventricle:  The right ventricular cavity is normal in size and normal wall thickness.     Left Atrium:  The left atrium is normal in size.     Right Atrium:  The right atrium is normal in size.     Interatrial Septum:  There is no evidence of an interatrial septal aneurysm. The atrial septum appears intact. There is no evidence of a patent foramen ovale by color flow Doppler. Agitated saline injection was negative for intracardiac shunt.     Aortic Valve:  The aortic valve is tricuspid with normal leaflet excursion with normal systolic excursion. There is no aortic valve stenosis. There is no evidence of aortic regurgitation.     Mitral Valve:  There is mitral valve thickening of the anterior and posterior leaflets. There is mild calcification of the mitral valve annulus. There is no mitral valve stenosis. There is mild to moderate mitral regurgitation.     Tricuspid Valve:  The tricuspid valve leaflets appear normal. There is no evidence of tricuspid stenosis. There is mild tricuspid regurgitation. There is no echocardiographic evidence of pulmonary hypertension.     Pulmonic Valve:  Structurally normal pulmonic valve with normal leaflet excursion. There is trace pulmonic regurgitation.     Aorta:  The aortic root at the sinuses of Valsalva is normal in size, measuring 3.58 cm (indexed 1.79 cm/m²). The aortic annulus is normal in diameter. The aortic diameter at the sinotubular junction is normal in size. The ascending aorta diameter is normal insize. The aortic arch diameter is normal in size. The descending aorta diameter is normal in size.     Pericardium:  No pericardial effusion seen.     Systemic Veins:  The inferior vena cava is normal in size (normal <2.1cm) with normal inspiratory collapse (normal >50%) consistent with normal right atrial pressure (~3, range 0-5mmHg).  ____________________________________________________________________  Quantitative Data:  Left Ventricle Measurements: (Indexed to BSA)     Visualized LV EF%: 55 to 60%    Aorta Measurements: (normal range) (Indexed to BSA)     Sinuses of Valsalva: 3.58 cm (3.1 - 3.7 cm)       Tricuspid Valve Measurements:     RA Pressure: 3 mmHg    ________________________________________________________________________________________  Electronically signed on 10/18/2023 at 4:01:09 PM by Satya Barrett         *** Final (Updated) ***    < end of copied text >     [facility], Division of Infectious Diseases  GLYNN Oakley Y. Patel, S. Shah, G. [facility]  [phone]    Name: HEMA LAZCANO  Age: 78y  Gender: Male  MRN: [id number]    Interval History:  Patient seen and examined at bedside   S/p Debridement of fascia 19-Oct-2023 12:51:29  Lauren Urbano  Open biopsy, bone, foot 19-Oct-2023 12:52:34  Lauren Urbano.  Notes reviewed    Antibiotics:  vancomycin  IVPB 500 milliGRAM(s) IV Intermittent every 12 hours      Medications:  acetaminophen     Tablet .. 650 milliGRAM(s) Oral every 6 hours PRN  atorvastatin 80 milliGRAM(s) Oral at bedtime  bethanechol 25 milliGRAM(s) Oral two times a day  dextrose 50% Injectable 25 Gram(s) IV Push once  dextrose 50% Injectable 25 Gram(s) IV Push once  dextrose 50% Injectable 12.5 Gram(s) IV Push once  dextrose Oral Gel 15 Gram(s) Oral once PRN  gabapentin 300 milliGRAM(s) Oral two times a day  glucagon  Injectable 1 milliGRAM(s) IntraMuscular once  HYDROmorphone  Injectable 0.5 milliGRAM(s) IV Push every 10 minutes PRN  insulin lispro (ADMELOG) corrective regimen sliding scale   SubCutaneous three times a day before meals  insulin lispro (ADMELOG) corrective regimen sliding scale   SubCutaneous at bedtime  lactated ringers. 1000 milliLiter(s) IV Continuous <Continuous>  lactated ringers. 1000 milliLiter(s) IV Continuous <Continuous>  lactobacillus acidophilus 1 Tablet(s) Oral two times a day with meals  loperamide 2 milliGRAM(s) Oral three times a day PRN  midodrine. 10 milliGRAM(s) Oral three times a day  ondansetron Injectable 4 milliGRAM(s) IV Push once PRN  oxyCODONE    IR 5 milliGRAM(s) Oral once PRN  pantoprazole    Tablet 40 milliGRAM(s) Oral before breakfast  tamsulosin 0.4 milliGRAM(s) Oral at bedtime  vancomycin  IVPB 500 milliGRAM(s) IV Intermittent every 12 hours      Review of Systems:  A 10-point review of systems was obtained.     Pertinent positives and negatives--  Constitutional: No fevers. No Chills. No Rigors.   Cardiovascular: No chest pain. No palpitations.  Respiratory: No shortness of breath. No cough.  Gastrointestinal: No nausea or vomiting. No diarrhea or constipation.   Psychiatric: Pleasant. Appropriate affect.    Review of systems otherwise negative except as previously noted.    Allergies: No Known Allergies    For details regarding the patient's past medical history, social history, family history, and other miscellaneous elements, please refer the initial infectious diseases consultation and/or the admitting history and physical examination for this admission.    Objective:  Vitals:   T(C): 37.3 (10-19-23 @ 12:47), Max: 37.3 (10-19-23 @ 12:17)  HR: 60 (10-19-23 @ 13:24) (60 - 71)  BP: 102/51 (10-19-23 @ 13:24) (100/46 - 124/72)  RR: 12 (10-19-23 @ 13:24) (12 - 18)  SpO2: 97% (10-19-23 @ 13:24) (97% - 100%)    Physical Examination:  General: no acute distress  HEENT: NC/AT, EOMI,  Cardio: S1, S2 heard, RRR, no murmurs  Resp: breath sounds heard bilaterally, no rales, wheezes or rhonchi  Abd: soft, NT, ND  Ext: no edema or cyanosis, dressing c/d/i  Skin: warm, dry, no visible rash      Laboratory Studies:  CBC:                       8.6    8.77  )-----------( 282      ( 18 Oct 2023 06:50 )             27.8     CMP: 10-18    142  |  110<H>  |  17  ----------------------------<  204<H>  3.9   |  27  |  0.75    Ca    8.2<L>      18 Oct 2023 06:50        Urinalysis Basic - ( 18 Oct 2023 06:50 )    Color: x / Appearance: x / SG: x / pH: x  Gluc: 204 mg/dL / Ketone: x  / Bili: x / Urobili: x   Blood: x / Protein: x / Nitrite: x   Leuk Esterase: x / RBC: x / WBC x   Sq Epi: x / Non Sq Epi: x / Bacteria: x        Microbiology: reviewed    Culture - Blood (collected 10-12-23 @ 05:36)  Source: .Blood Blood-Peripheral  Final Report (10-17-23 @ 10:00):    No growth at 5 days    Culture - Blood (collected 10-12-23 @ 05:30)  Source: .Blood Blood-Venous  Final Report (10-17-23 @ 10:00):    No growth at 5 days    Culture - Other (collected 10-11-23 @ 13:13)  Source: Wound Wound  Final Report (10-13-23 @ 17:21):    Numerous Methicillin Resistant Staphylococcus aureus  Organism: Methicillin resistant Staphylococcus aureus (10-13-23 @ 17:21)  Organism: Methicillin resistant Staphylococcus aureus (10-13-23 @ 17:21)      -  Clindamycin: S <=0.25      -  Oxacillin: R >2      -  Gentamicin: S 2 Should not be used as monotherapy      -  Daptomycin: S 1      -  Linezolid: S 4      -  Cefazolin: R <=4      -  Vancomycin: S 2      -  Tetracycline: S <=1      Method Type: KARTHIK      -  Ampicillin/Sulbactam: R <=8/4      -  Penicillin: R 2      -  Rifampin: S <=1 Should not be used as monotherapy      -  Erythromycin: R >4      -  Trimethoprim/Sulfamethoxazole: S <=0.5/9.5    Culture - Urine (collected 10-10-23 @ 21:00)  Source: Catheterized Catheterized  Final Report (10-13-23 @ 17:40):    10,000 - 49,000 CFU/mL Pseudomonas aeruginosa  Organism: Pseudomonas aeruginosa (10-13-23 @ 17:40)  Organism: Pseudomonas aeruginosa (10-13-23 @ 17:40)      -  Levofloxacin: S <=0.5      -  Tobramycin: S <=2      -  Aztreonam: S <=4      -  Gentamicin: S <=2      -  Cefepime: S <=2      -  Piperacillin/Tazobactam: S <=8      -  Ciprofloxacin: S <=0.25      -  Imipenem: S <=1      Method Type: KARTHIK      -  Meropenem: S <=1      -  Ceftazidime: S <=1      -  Amikacin: S <=16    Culture - Blood (collected 10-10-23 @ 18:25)  Source: .Blood Blood-Peripheral  Gram Stain (10-11-23 @ 21:56):    Growth in aerobic bottle: Gram Positive Cocci in Clusters    Growth in anaerobic bottle: Gram Positive Cocci in Clusters  Final Report (10-13-23 @ 18:46):    Growth in aerobic and anaerobic bottles: Methicillin Resistant    Staphylococcus aureus    See previous culture [id number]    Culture - Blood (collected 10-10-23 @ 18:25)  Source: .Blood Blood-Peripheral  Gram Stain (10-11-23 @ 15:29):    Growth in aerobic bottle: Gram Positive Cocci in Clusters    Growth in anaerobic bottle: Gram Positive Cocci in Clusters  Final Report (10-13-23 @ 12:50):    Growth in aerobic and anaerobic bottles: Methicillin Resistant    Staphylococcus aureus    Direct identification is available within approximately 3-5    hours either by Blood Panel Multiplexed PCR or Direct    MALDI-TOF. Details: [email or web address]  Organism: Blood Culture PCR  Methicillin resistant Staphylococcus aureus (10-13-23 @ 12:50)  Organism: Methicillin resistant Staphylococcus aureus (10-13-23 @ 12:50)      -  Clindamycin: I 2      -  Oxacillin: R >2      -  Gentamicin: S <=1 Should not be used as monotherapy      -  Daptomycin: S 1      -  Linezolid: S 4      -  Cefazolin: R <=4      -  Vancomycin: S 1      -  Tetracycline: S <=1      Method Type: KARTHIK      -  Ampicillin/Sulbactam: R <=8/4      -  Penicillin: R 2      -  Rifampin: S <=1 Should not be used as monotherapy      -  Erythromycin: R >4      -  Trimethoprim/Sulfamethoxazole: S <=0.5/9.5  Organism: Blood Culture PCR (10-13-23 @ 12:50)      Method Type: PCR      -  Methicillin resistant Staphylococcus aureus (MRSA): Detec          Radiology: reviewed    < from: GISSELL W or WO Ultrasound Enhancing Agent (10.17.23 @ 10:51) >    ADDENDUM TO PRIOR ECHOCARDIOGRAM REPORT    Reason for amended report: missing information.    [id number] Satya Barrett on 10/18/2023 at 4:02:02 PM                       TRANSESOPHAGEAL ECHOCARDIOGRAM REPORT  ________________________________________________________________________________                                      _______       Pt. Name:       HEMA LAZCANO Study Date:    10/17/2023  MRN:            [id number]     YOB: 1945  Accession #:    [id number]     Age:           78 years  Account#:       [id number]    Gender:        M  Heart Rate:                   Height:        72.83 in (185.00 cm)  Rhythm:                       Weight:        169.75 lb (77.00 kg)  Blood Pressure: 88/36 mmHg    BSA/BMI:       2.00 m² / 22.50 kg/m²  ________________________________________________________________________________________  Referring Physician:    [id number] Pastora Harris  Interpreting Physician: Satya Barrett  Primary Sonographer:    Micaela Shah [facility]    CPT:               ECHO TRANSESOPH W/O CON - 69891.m; DOPPLER ECHO COMP W SPECT                     - 27514.m; DOPPL ECHO COLOR FLOW - 06384.m  Indication(s):     Abnormal electrocardiogram ECG/EKG - R94.31  Procedure:         Transesophageal echocardiogram performed with 2D, M-mode and                     complete spectral and color flow Doppler.  Ordering Location: [facility]    _______________________________________________________________________________________     CONCLUSIONS:      1. Left ventricular systolic function is normal with an ejection fraction visually estimated at 55 to 60 %.   2. There is mild (grade 1) left ventricular diastolic dysfunction.   3. Normal right ventricular cavity size and wall thickness,.   4. Normal biventricular systolic function.   5. The left atrium is normal in size.   6. The right atrium is normal in size.   7. Mild to moderate mitral regurgitation.   8. No thrombus seen in the left atrial, left atrial appendage, right atrial or right atrial appendage.   9. No evidence of an intracardiac shunt.  10. Structurally normal pulmonic valve with normal leaflet excursion.  11. No echocardiographic evidence of pulmonary hypertension.  12. No pericardial effusion seen.  13. There is no significant plaque seen in the visualized portions of the descending aorta and aortic arch.  14. No atrial septal defect detected.  15. No evidence of a patent foramen ovale by color flow Doppler.  16. NO INTRA CARDIAC MASS , THROMBUS , VEGETATIONS AND TUMOR.  17. Normal left atral appendage.  18. No aortic dissection and thoracic aortic aneurysm.  19. Pacemaker wire is present in right atrium and right ventricle and intact and no vegetations on the pacemaker wire.  20. Agitated saline injection was negative for intracardiac shunt.  21. Aortic root at the sinuses of Valsalva is normal in size, measuring 3.58 cm (indexed 1.79 cm/m²).  22. Mild tricuspid regurgitation.  23. No evidence of an interatrial septal aneurysm.  24. No evidence of aortic regurgitation.  25. No mitral valve stenosis.  26. There is mild calcification of the mitral valve annulus.  27. Tricuspid aortic valve with normal leaflet excursion with normal systolic excursion. no aortic valve stenosis.    ____________________________________________________________________  GISSELL Procedure:  After discussion of the risks and benefits of the GISSELL, an informed consent was obtained. Study was performed under anesthesia. The GISSELL probe was passed without difficulty. Images were obtained with the patient in a left lateral decubitus position. Image quality was good. The patient's vital signs; including heart rate, blood pressure, and oxygen saturation; remained stable throughout the procedure. The patient tolerated the procedure well and without complications.  ________________________________________________________________________________________  FINDINGS:     Left Ventricle:  Left ventricular systolic function is normal with an ejection fraction visually estimated at 55 to 60%. There is mild (grade 1) left ventricular diastolic dysfunction.     Right Ventricle:  The right ventricular cavity is normal in size and normal wall thickness.     Left Atrium:  The left atrium is normal in size.     Right Atrium:  The right atrium is normal in size.     Interatrial Septum:  There is no evidence of an interatrial septal aneurysm. The atrial septum appears intact. There is no evidence of a patent foramen ovale by color flow Doppler. Agitated saline injection was negative for intracardiac shunt.     Aortic Valve:  The aortic valve is tricuspid with normal leaflet excursion with normal systolic excursion. There is no aortic valve stenosis. There is no evidence of aortic regurgitation.     Mitral Valve:  There is mitral valve thickening of the anterior and posterior leaflets. There is mild calcification of the mitral valve annulus. There is no mitral valve stenosis. There is mild to moderate mitral regurgitation.     Tricuspid Valve:  The tricuspid valve leaflets appear normal. There is no evidence of tricuspid stenosis. There is mild tricuspid regurgitation. There is no echocardiographic evidence of pulmonary hypertension.     Pulmonic Valve:  Structurally normal pulmonic valve with normal leaflet excursion. There is trace pulmonic regurgitation.     Aorta:  The aortic root at the sinuses of Valsalva is normal in size, measuring 3.58 cm (indexed 1.79 cm/m²). The aortic annulus is normal in diameter. The aortic diameter at the sinotubular junction is normal in size. The ascending aorta diameter is normal insize. The aortic arch diameter is normal in size. The descending aorta diameter is normal in size.     Pericardium:  No pericardial effusion seen.     Systemic Veins:  The inferior vena cava is normal in size (normal <2.1cm) with normal inspiratory collapse (normal >50%) consistent with normal right atrial pressure (~3, range 0-5mmHg).  ____________________________________________________________________  Quantitative Data:  Left Ventricle Measurements: (Indexed to BSA)     Visualized LV EF%: 55 to 60%    Aorta Measurements: (normal range) (Indexed to BSA)     Sinuses of Valsalva: 3.58 cm (3.1 - 3.7 cm)       Tricuspid Valve Measurements:     RA Pressure: 3 mmHg    ________________________________________________________________________________________  Electronically signed on 10/18/2023 at 4:01:09 PM by Satya Barrett         *** Final (Updated) ***    < end of copied text >     [facility], Division of Infectious Diseases  GLYNN Oakley Y. Patel, S. Shah, [facility]  [phone]    Name: HEMA LAZCANO  Age: 78y  Gender: Male  MRN: [id number]    Interval History:  Patient seen and examined at bedside   S/p Debridement of fascia 19-Oct-2023 12:51:29  Lauren Urbano  Open biopsy, bone, foot 19-Oct-2023 12:52:34  Lauren Urbano.  Notes reviewed    Antibiotics:  vancomycin  IVPB 500 milliGRAM(s) IV Intermittent every 12 hours      Medications:  acetaminophen     Tablet .. 650 milliGRAM(s) Oral every 6 hours PRN  atorvastatin 80 milliGRAM(s) Oral at bedtime  bethanechol 25 milliGRAM(s) Oral two times a day  dextrose 50% Injectable 25 Gram(s) IV Push once  dextrose 50% Injectable 25 Gram(s) IV Push once  dextrose 50% Injectable 12.5 Gram(s) IV Push once  dextrose Oral Gel 15 Gram(s) Oral once PRN  gabapentin 300 milliGRAM(s) Oral two times a day  glucagon  Injectable 1 milliGRAM(s) IntraMuscular once  HYDROmorphone  Injectable 0.5 milliGRAM(s) IV Push every 10 minutes PRN  insulin lispro (ADMELOG) corrective regimen sliding scale   SubCutaneous three times a day before meals  insulin lispro (ADMELOG) corrective regimen sliding scale   SubCutaneous at bedtime  lactated ringers. 1000 milliLiter(s) IV Continuous <Continuous>  lactated ringers. 1000 milliLiter(s) IV Continuous <Continuous>  lactobacillus acidophilus 1 Tablet(s) Oral two times a day with meals  loperamide 2 milliGRAM(s) Oral three times a day PRN  midodrine. 10 milliGRAM(s) Oral three times a day  ondansetron Injectable 4 milliGRAM(s) IV Push once PRN  oxyCODONE    IR 5 milliGRAM(s) Oral once PRN  pantoprazole    Tablet 40 milliGRAM(s) Oral before breakfast  tamsulosin 0.4 milliGRAM(s) Oral at bedtime  vancomycin  IVPB 500 milliGRAM(s) IV Intermittent every 12 hours      Review of Systems:  A 10-point review of systems was obtained.     Pertinent positives and negatives--  Constitutional: No fevers. No Chills. No Rigors.   Cardiovascular: No chest pain. No palpitations.  Respiratory: No shortness of breath. No cough.  Gastrointestinal: No nausea or vomiting. No diarrhea or constipation.   Psychiatric: Pleasant. Appropriate affect.    Review of systems otherwise negative except as previously noted.    Allergies: No Known Allergies    For details regarding the patient's past medical history, social history, family history, and other miscellaneous elements, please refer the initial infectious diseases consultation and/or the admitting history and physical examination for this admission.    Objective:  Vitals:   T(C): 37.3 (10-19-23 @ 12:47), Max: 37.3 (10-19-23 @ 12:17)  HR: 60 (10-19-23 @ 13:24) (60 - 71)  BP: 102/51 (10-19-23 @ 13:24) (100/46 - 124/72)  RR: 12 (10-19-23 @ 13:24) (12 - 18)  SpO2: 97% (10-19-23 @ 13:24) (97% - 100%)    Physical Examination:  General: no acute distress  HEENT: NC/AT, EOMI,  Cardio: S1, S2 heard, RRR, no murmurs  Resp: breath sounds heard bilaterally, no rales, wheezes or rhonchi  Abd: soft, NT, ND  Ext: no edema or cyanosis, dressing c/d/i  Skin: warm, dry, no visible rash      Laboratory Studies:  CBC:                       8.6    8.77  )-----------( 282      ( 18 Oct 2023 06:50 )             27.8     CMP: 10-18    142  |  110<H>  |  17  ----------------------------<  204<H>  3.9   |  27  |  0.75    Ca    8.2<L>      18 Oct 2023 06:50        Urinalysis Basic - ( 18 Oct 2023 06:50 )    Color: x / Appearance: x / SG: x / pH: x  Gluc: 204 mg/dL / Ketone: x  / Bili: x / Urobili: x   Blood: x / Protein: x / Nitrite: x   Leuk Esterase: x / RBC: x / WBC x   Sq Epi: x / Non Sq Epi: x / Bacteria: x        Microbiology: reviewed    Culture - Blood (collected 10-12-23 @ 05:36)  Source: .Blood Blood-Peripheral  Final Report (10-17-23 @ 10:00):    No growth at 5 days    Culture - Blood (collected 10-12-23 @ 05:30)  Source: .Blood Blood-Venous  Final Report (10-17-23 @ 10:00):    No growth at 5 days    Culture - Other (collected 10-11-23 @ 13:13)  Source: Wound Wound  Final Report (10-13-23 @ 17:21):    Numerous Methicillin Resistant Staphylococcus aureus  Organism: Methicillin resistant Staphylococcus aureus (10-13-23 @ 17:21)  Organism: Methicillin resistant Staphylococcus aureus (10-13-23 @ 17:21)      -  Clindamycin: S <=0.25      -  Oxacillin: R >2      -  Gentamicin: S 2 Should not be used as monotherapy      -  Daptomycin: S 1      -  Linezolid: S 4      -  Cefazolin: R <=4      -  Vancomycin: S 2      -  Tetracycline: S <=1      Method Type: KARTHIK      -  Ampicillin/Sulbactam: R <=8/4      -  Penicillin: R 2      -  Rifampin: S <=1 Should not be used as monotherapy      -  Erythromycin: R >4      -  Trimethoprim/Sulfamethoxazole: S <=0.5/9.5    Culture - Urine (collected 10-10-23 @ 21:00)  Source: Catheterized Catheterized  Final Report (10-13-23 @ 17:40):    10,000 - 49,000 CFU/mL Pseudomonas aeruginosa  Organism: Pseudomonas aeruginosa (10-13-23 @ 17:40)  Organism: Pseudomonas aeruginosa (10-13-23 @ 17:40)      -  Levofloxacin: S <=0.5      -  Tobramycin: S <=2      -  Aztreonam: S <=4      -  Gentamicin: S <=2      -  Cefepime: S <=2      -  Piperacillin/Tazobactam: S <=8      -  Ciprofloxacin: S <=0.25      -  Imipenem: S <=1      Method Type: KARTHIK      -  Meropenem: S <=1      -  Ceftazidime: S <=1      -  Amikacin: S <=16    Culture - Blood (collected 10-10-23 @ 18:25)  Source: .Blood Blood-Peripheral  Gram Stain (10-11-23 @ 21:56):    Growth in aerobic bottle: Gram Positive Cocci in Clusters    Growth in anaerobic bottle: Gram Positive Cocci in Clusters  Final Report (10-13-23 @ 18:46):    Growth in aerobic and anaerobic bottles: Methicillin Resistant    Staphylococcus aureus    See previous culture [id number]    Culture - Blood (collected 10-10-23 @ 18:25)  Source: .Blood Blood-Peripheral  Gram Stain (10-11-23 @ 15:29):    Growth in aerobic bottle: Gram Positive Cocci in Clusters    Growth in anaerobic bottle: Gram Positive Cocci in Clusters  Final Report (10-13-23 @ 12:50):    Growth in aerobic and anaerobic bottles: Methicillin Resistant    Staphylococcus aureus    Direct identification is available within approximately 3-5    hours either by Blood Panel Multiplexed PCR or Direct    MALDI-TOF. Details: [email or web address]  Organism: Blood Culture PCR  Methicillin resistant Staphylococcus aureus (10-13-23 @ 12:50)  Organism: Methicillin resistant Staphylococcus aureus (10-13-23 @ 12:50)      -  Clindamycin: I 2      -  Oxacillin: R >2      -  Gentamicin: S <=1 Should not be used as monotherapy      -  Daptomycin: S 1      -  Linezolid: S 4      -  Cefazolin: R <=4      -  Vancomycin: S 1      -  Tetracycline: S <=1      Method Type: KARTHIK      -  Ampicillin/Sulbactam: R <=8/4      -  Penicillin: R 2      -  Rifampin: S <=1 Should not be used as monotherapy      -  Erythromycin: R >4      -  Trimethoprim/Sulfamethoxazole: S <=0.5/9.5  Organism: Blood Culture PCR (10-13-23 @ 12:50)      Method Type: PCR      -  Methicillin resistant Staphylococcus aureus (MRSA): Detec          Radiology: reviewed    < from: GISSELL W or WO Ultrasound Enhancing Agent (10.17.23 @ 10:51) >    ADDENDUM TO PRIOR ECHOCARDIOGRAM REPORT    Reason for amended report: missing information.    [id number] Satya Barrett on 10/18/2023 at 4:02:02 PM                       TRANSESOPHAGEAL ECHOCARDIOGRAM REPORT  ________________________________________________________________________________                                      _______       Pt. Name:       HEMA LAZCANO Study Date:    10/17/2023  MRN:            [id number]     YOB: 1945  Accession #:    [id number]     Age:           78 years  Account#:       [id number]    Gender:        M  Heart Rate:                   Height:        72.83 in (185.00 cm)  Rhythm:                       Weight:        169.75 lb (77.00 kg)  Blood Pressure: 88/36 mmHg    BSA/BMI:       2.00 m² / 22.50 kg/m²  ________________________________________________________________________________________  Referring Physician:    [id number] Pastora Harris  Interpreting Physician: Satya Barrett  Primary Sonographer:    Micaela Shah [facility]    CPT:               ECHO TRANSESOPH W/O CON - 68889.m; DOPPLER ECHO COMP W SPECT                     - 64557.m; DOPPL ECHO COLOR FLOW - 00259.m  Indication(s):     Abnormal electrocardiogram ECG/EKG - R94.31  Procedure:         Transesophageal echocardiogram performed with 2D, M-mode and                     complete spectral and color flow Doppler.  Ordering Location: [facility]    _______________________________________________________________________________________     CONCLUSIONS:      1. Left ventricular systolic function is normal with an ejection fraction visually estimated at 55 to 60 %.   2. There is mild (grade 1) left ventricular diastolic dysfunction.   3. Normal right ventricular cavity size and wall thickness,.   4. Normal biventricular systolic function.   5. The left atrium is normal in size.   6. The right atrium is normal in size.   7. Mild to moderate mitral regurgitation.   8. No thrombus seen in the left atrial, left atrial appendage, right atrial or right atrial appendage.   9. No evidence of an intracardiac shunt.  10. Structurally normal pulmonic valve with normal leaflet excursion.  11. No echocardiographic evidence of pulmonary hypertension.  12. No pericardial effusion seen.  13. There is no significant plaque seen in the visualized portions of the descending aorta and aortic arch.  14. No atrial septal defect detected.  15. No evidence of a patent foramen ovale by color flow Doppler.  16. NO INTRA CARDIAC MASS , THROMBUS , VEGETATIONS AND TUMOR.  17. Normal left atral appendage.  18. No aortic dissection and thoracic aortic aneurysm.  19. Pacemaker wire is present in right atrium and right ventricle and intact and no vegetations on the pacemaker wire.  20. Agitated saline injection was negative for intracardiac shunt.  21. Aortic root at the sinuses of Valsalva is normal in size, measuring 3.58 cm (indexed 1.79 cm/m²).  22. Mild tricuspid regurgitation.  23. No evidence of an interatrial septal aneurysm.  24. No evidence of aortic regurgitation.  25. No mitral valve stenosis.  26. There is mild calcification of the mitral valve annulus.  27. Tricuspid aortic valve with normal leaflet excursion with normal systolic excursion. no aortic valve stenosis.    ____________________________________________________________________  GISSELL Procedure:  After discussion of the risks and benefits of the GISSELL, an informed consent was obtained. Study was performed under anesthesia. The GISSELL probe was passed without difficulty. Images were obtained with the patient in a left lateral decubitus position. Image quality was good. The patient's vital signs; including heart rate, blood pressure, and oxygen saturation; remained stable throughout the procedure. The patient tolerated the procedure well and without complications.  ________________________________________________________________________________________  FINDINGS:     Left Ventricle:  Left ventricular systolic function is normal with an ejection fraction visually estimated at 55 to 60%. There is mild (grade 1) left ventricular diastolic dysfunction.     Right Ventricle:  The right ventricular cavity is normal in size and normal wall thickness.     Left Atrium:  The left atrium is normal in size.     Right Atrium:  The right atrium is normal in size.     Interatrial Septum:  There is no evidence of an interatrial septal aneurysm. The atrial septum appears intact. There is no evidence of a patent foramen ovale by color flow Doppler. Agitated saline injection was negative for intracardiac shunt.     Aortic Valve:  The aortic valve is tricuspid with normal leaflet excursion with normal systolic excursion. There is no aortic valve stenosis. There is no evidence of aortic regurgitation.     Mitral Valve:  There is mitral valve thickening of the anterior and posterior leaflets. There is mild calcification of the mitral valve annulus. There is no mitral valve stenosis. There is mild to moderate mitral regurgitation.     Tricuspid Valve:  The tricuspid valve leaflets appear normal. There is no evidence of tricuspid stenosis. There is mild tricuspid regurgitation. There is no echocardiographic evidence of pulmonary hypertension.     Pulmonic Valve:  Structurally normal pulmonic valve with normal leaflet excursion. There is trace pulmonic regurgitation.     Aorta:  The aortic root at the sinuses of Valsalva is normal in size, measuring 3.58 cm (indexed 1.79 cm/m²). The aortic annulus is normal in diameter. The aortic diameter at the sinotubular junction is normal in size. The ascending aorta diameter is normal insize. The aortic arch diameter is normal in size. The descending aorta diameter is normal in size.     Pericardium:  No pericardial effusion seen.     Systemic Veins:  The inferior vena cava is normal in size (normal <2.1cm) with normal inspiratory collapse (normal >50%) consistent with normal right atrial pressure (~3, range 0-5mmHg).  ____________________________________________________________________  Quantitative Data:  Left Ventricle Measurements: (Indexed to BSA)     Visualized LV EF%: 55 to 60%    Aorta Measurements: (normal range) (Indexed to BSA)     Sinuses of Valsalva: 3.58 cm (3.1 - 3.7 cm)       Tricuspid Valve Measurements:     RA Pressure: 3 mmHg    ________________________________________________________________________________________  Electronically signed on 10/18/2023 at 4:01:09 PM by Satya Barrett         *** Final (Updated) ***    < end of copied text >

## 2023-10-19 NOTE — PROGRESS NOTE ADULT - PROBLEM SELECTOR PLAN 2
id noted  rpt blood cultures neg to date  iv cefipime and vanco  tessie clean  om on bone scan  s/p or debridement of b/l heel wounds - fu cultures for abx recs upon dc

## 2023-10-19 NOTE — PROGRESS NOTE ADULT - SUBJECTIVE AND OBJECTIVE BOX
Patient is a 78y old  Male who presents with a chief complaint of fever and weakness (19 Oct 2023 10:24)      INTERVAL HPI/OVERNIGHT EVENTS: pt stable, s/p debridement of wounds on b/l heels    MEDICATIONS  (STANDING):  lactated ringers. 1000 milliLiter(s) (75 mL/Hr) IV Continuous <Continuous>  lactated ringers. 1000 milliLiter(s) (100 mL/Hr) IV Continuous <Continuous>    MEDICATIONS  (PRN):  HYDROmorphone  Injectable 0.5 milliGRAM(s) IV Push every 10 minutes PRN Severe Pain (7 - 10)  ondansetron Injectable 4 milliGRAM(s) IV Push once PRN Nausea and/or Vomiting  oxyCODONE    IR 5 milliGRAM(s) Oral once PRN Moderate Pain (4 - 6)      Allergies    No Known Allergies    Intolerances        REVIEW OF SYSTEMS:  CONSTITUTIONAL: No fever, weight loss, or fatigue  EYES: No eye pain, visual disturbances  ENMT:  No difficulty hearing, tinnitus, vertigo; No sinus or throat pain  NECK: No pain or stiffness  RESPIRATORY: No cough, wheezing, chills or hemoptysis; No shortness of breath  CARDIOVASCULAR: No chest pain, palpitations, dizziness  GASTROINTESTINAL: No abdominal or epigastric pain. No nausea, vomiting, or hematemesis; No diarrhea or constipation. No melena or hematochezia.  GENITOURINARY: No dysuria, frequency, hematuria, or incontinence  NEUROLOGICAL: No headaches, memory loss, loss of strength, numbness, or tremors  SKIN: No itching, burning  LYMPH NODES: No enlarged glands  MUSCULOSKELETAL: No joint pain or swelling; No muscle, back, or extremity pain  PSYCHIATRIC: No depression, mood swings  HEME/LYMPH: No easy bruising, or bleeding gums  ALLERGY AND IMMUNOLOGIC: No hives    Vital Signs Last 24 Hrs  T(C): 36.6 (19 Oct 2023 09:37), Max: 37.1 (18 Oct 2023 17:13)  T(F): 97.9 (19 Oct 2023 09:37), Max: 98.8 (18 Oct 2023 17:13)  HR: 71 (19 Oct 2023 09:37) (60 - 71)  BP: 116/51 (19 Oct 2023 09:37) (115/61 - 124/72)  BP(mean): --  RR: 14 (19 Oct 2023 09:37) (14 - 18)  SpO2: 99% (19 Oct 2023 09:37) (98% - 99%)    Parameters below as of 19 Oct 2023 09:37  Patient On (Oxygen Delivery Method): room air        PHYSICAL EXAM:  GENERAL: NAD, well-groomed, well-developed  HEAD:  Atraumatic, Normocephalic  EYES: EOMI, PERRLA, conjunctiva and sclera clear  ENMT: No tonsillar erythema, exudates, or enlargement   NECK: Supple, No JVD  NERVOUS SYSTEM:  Alert & Oriented X3, Good concentration  CHEST/LUNG: Clear to auscultation bilaterally; No rales, rhonchi, wheezing  HEART: Regular rate and rhythm  ABDOMEN: Soft, Nontender, Nondistended; Bowel sounds present  EXTREMITIES:  2+ Peripheral Pulses   LYMPH: No lymphadenopathy noted  SKIN: No rashes     LABS:      Ca    8.2        18 Oct 2023 06:50        Urinalysis Basic - ( 18 Oct 2023 06:50 )    Color: x / Appearance: x / SG: x / pH: x  Gluc: 204 mg/dL / Ketone: x  / Bili: x / Urobili: x   Blood: x / Protein: x / Nitrite: x   Leuk Esterase: x / RBC: x / WBC x   Sq Epi: x / Non Sq Epi: x / Bacteria: x      CAPILLARY BLOOD GLUCOSE      POCT Blood Glucose.: 205 mg/dL (19 Oct 2023 12:22)  POCT Blood Glucose.: 240 mg/dL (19 Oct 2023 09:50)  POCT Blood Glucose.: 254 mg/dL (19 Oct 2023 08:06)  POCT Blood Glucose.: 287 mg/dL (18 Oct 2023 21:07)  POCT Blood Glucose.: 242 mg/dL (18 Oct 2023 17:05)            RADIOLOGY & ADDITIONAL TESTS:      Consultant(s) Notes Reviewed:  [ x] YES  [ ] NO    Care Discussed with Consultants/Other Providers [ x] YES  [ ] NO    Advanced care planning discussed with patient and family, advanced care planning forms reviewed, discussed, and completed.  20 minutes spent.

## 2023-10-19 NOTE — PROGRESS NOTE ADULT - SUBJECTIVE AND OBJECTIVE BOX
[facility] Card[facility]    CHIEF COMPLAINT: Patient is a 78y old  Male who presents with a chief complaint of fever and weakness (18 Oct 2023 14:08)      Follow Up: [ ] Chest Pain      [ ] Dyspnea     [ ] Palpitations    [ ] Atrial Fibrillation     [ ] Ventricular Dysrhythmia    [ ] Abnormal EKG                      [ ] Abnormal Cardiac Enzymes     [ ] Valvular Disease    HPI:  78-year-old male presents to the hospital by ambulance from home.  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 Pt does not go to doctor on regualar basis per son and does phone visits.   (11 Oct 2023 07:02)    PAST MEDICAL & SURGICAL HISTORY:  Diabetes      Benign essential HTN      Pacemaker      History of BPH      Diabetic foot ulcers    MEDICATIONS  (STANDING):  aspirin enteric coated 81 milliGRAM(s) Oral daily  atorvastatin 80 milliGRAM(s) Oral at bedtime  bethanechol 25 milliGRAM(s) Oral two times a day  dextrose 5%. 1000 milliLiter(s) (100 mL/Hr) IV Continuous <Continuous>  dextrose 5%. 1000 milliLiter(s) (50 mL/Hr) IV Continuous <Continuous>  dextrose 5%. 1000 milliLiter(s) (50 mL/Hr) IV Continuous <Continuous>  dextrose 5%. 1000 milliLiter(s) (100 mL/Hr) IV Continuous <Continuous>  dextrose 50% Injectable 25 Gram(s) IV Push once  dextrose 50% Injectable 12.5 Gram(s) IV Push once  dextrose 50% Injectable 25 Gram(s) IV Push once  gabapentin 300 milliGRAM(s) Oral two times a day  glucagon  Injectable 1 milliGRAM(s) IntraMuscular once  insulin glargine Injectable (LANTUS) 18 Unit(s) SubCutaneous at bedtime  insulin lispro (ADMELOG) corrective regimen sliding scale   SubCutaneous at bedtime  insulin lispro (ADMELOG) corrective regimen sliding scale   SubCutaneous three times a day before meals  lactated ringers. 1000 milliLiter(s) (100 mL/Hr) IV Continuous <Continuous>  lactobacillus acidophilus 1 Tablet(s) Oral two times a day with meals  midodrine. 10 milliGRAM(s) Oral three times a day  pantoprazole    Tablet 40 milliGRAM(s) Oral before breakfast  tamsulosin 0.4 milliGRAM(s) Oral at bedtime  vancomycin  IVPB 500 milliGRAM(s) IV Intermittent every 12 hours    MEDICATIONS  (PRN):  acetaminophen     Tablet .. 650 milliGRAM(s) Oral every 6 hours PRN Temp greater or equal to 38C (100.4F), Moderate Pain (4 - 6)  dextrose Oral Gel 15 Gram(s) Oral once PRN Blood Glucose LESS THAN 70 milliGRAM(s)/deciliter  loperamide 2 milliGRAM(s) Oral three times a day PRN Diarrhea    Allergies    No Known Allergies  Intolerances  REVIEW OF SYSTEMS:    CONSTITUTIONAL: No weakness, fevers or chills.   EYES/ENT: No visual changes;    NECK: No pain or stiffness  RESPIRATORY: No cough, wheezing, No shortness of breath  CARDIOVASCULAR: No chest pain or palpitations  GASTROINTESTINAL: No abdominal pain, or hematochezia.  GENITOURINARY: No dysuria orhematuria  NEUROLOGICAL: No numbness or weakness  SKIN: No itching, burning, rashes  All other review of systems is negative unless indicated above    Vital Signs Last 24 Hrs  T(C): 36.6 (19 Oct 2023 09:37), Max: 37.1 (18 Oct 2023 17:13)  T(F): 97.9 (19 Oct 2023 09:37), Max: 98.8 (18 Oct 2023 17:13)  HR: 71 (19 Oct 2023 09:37) (60 - 71)  BP: 116/51 (19 Oct 2023 09:37) (115/61 - 124/72)  BP(mean): --  RR: 14 (19 Oct 2023 09:37) (14 - 18)  SpO2: 99% (19 Oct 2023 09:37) (96% - 99%)    Parameters below as of 19 Oct 2023 09:37  Patient On (Oxygen Delivery Method): room air      I&O's Summary    18 Oct 2023 07:01  -  19 Oct 2023 07:00  --------------------------------------------------------  IN: 0 mL / OUT: 900 mL / NET: -900 mL      PHYSICAL EXAM:  Constitutional: NAD  Neurological: Alert, no focal deficits  HEENT: no JVD, EOMI  Cardiovascular: Regular, S1 and S2, no murmur  Pulmonary: breath sounds bilaterally  Gastrointestinal: Bowel Sounds present, soft, nontender  EXT:  no peripheral edema  Skin: No rashes.  Psych:  Mood calm  LABS: All Labs Reviewed:                          8.6    8.77  )-----------( 282      ( 18 Oct 2023 06:50 )             27.8     10-18    142  |  110<H>  |  17  ----------------------------<  204<H>  3.9   |  27  |  0.75    Ca    8.2<L>      18 Oct 2023 06:50    · Assessment    78M with PMH HTN, PPM, DM, foot ulcers , presents to the hospital by ambulance from home.  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 Pt does not go to doctor on regular basis per son and does phone visits.    +Bacteremia. Called by Cardio NP for consultation and to arrange GISSELL to R/O Endocarditis or PPM related infection. Order placed to be done by Dr. Barrett Monday or Tuesday  Cant ASA, Statin  Pt has seen Dave Panchal and [facility] outpt 2021, [phone]  pt reports feeling well today.    s/p GISSELL: No vegetations    TTE: CONCLUSIONS:      1. Technically difficult image quality.   2. There is abnormal septal activation, likely from a paced rhythm. There is likely superimposed anterior and septal hypokinesis. Limited views and limited endocardial definition make further interpretation limited.   3. The right ventricle is not well visualized. Normal right ventricular cavity size and reduced systolic function.   4. The left atrium is mildly dilated in size.   5. Right atrium was not well visualized.   6. Mild calcification of the aortic valve leaflets.   7. Tricuspid valve was not well visualized.   8. Aortic valve was not well visualized.  no cardiac contraindications to podiatry/surgical debridement    will follow prn             [facility]    CHIEF COMPLAINT: Patient is a 78y old  Male who presents with a chief complaint of fever and weakness (18 Oct 2023 14:08)      Follow Up: [ ] Chest Pain      [ ] Dyspnea     [ ] Palpitations    [ ] Atrial Fibrillation     [ ] Ventricular Dysrhythmia    [ ] Abnormal EKG                      [ ] Abnormal Cardiac Enzymes     [ ] Valvular Disease    HPI:  78-year-old male presents to the hospital by ambulance from home.  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 Pt does not go to doctor on regualar basis per son and does phone visits.   (11 Oct 2023 07:02)    PAST MEDICAL & SURGICAL HISTORY:  Diabetes      Benign essential HTN      Pacemaker      History of BPH      Diabetic foot ulcers    MEDICATIONS  (STANDING):  aspirin enteric coated 81 milliGRAM(s) Oral daily  atorvastatin 80 milliGRAM(s) Oral at bedtime  bethanechol 25 milliGRAM(s) Oral two times a day  dextrose 5%. 1000 milliLiter(s) (100 mL/Hr) IV Continuous <Continuous>  dextrose 5%. 1000 milliLiter(s) (50 mL/Hr) IV Continuous <Continuous>  dextrose 5%. 1000 milliLiter(s) (50 mL/Hr) IV Continuous <Continuous>  dextrose 5%. 1000 milliLiter(s) (100 mL/Hr) IV Continuous <Continuous>  dextrose 50% Injectable 25 Gram(s) IV Push once  dextrose 50% Injectable 12.5 Gram(s) IV Push once  dextrose 50% Injectable 25 Gram(s) IV Push once  gabapentin 300 milliGRAM(s) Oral two times a day  glucagon  Injectable 1 milliGRAM(s) IntraMuscular once  insulin glargine Injectable (LANTUS) 18 Unit(s) SubCutaneous at bedtime  insulin lispro (ADMELOG) corrective regimen sliding scale   SubCutaneous at bedtime  insulin lispro (ADMELOG) corrective regimen sliding scale   SubCutaneous three times a day before meals  lactated ringers. 1000 milliLiter(s) (100 mL/Hr) IV Continuous <Continuous>  lactobacillus acidophilus 1 Tablet(s) Oral two times a day with meals  midodrine. 10 milliGRAM(s) Oral three times a day  pantoprazole    Tablet 40 milliGRAM(s) Oral before breakfast  tamsulosin 0.4 milliGRAM(s) Oral at bedtime  vancomycin  IVPB 500 milliGRAM(s) IV Intermittent every 12 hours    MEDICATIONS  (PRN):  acetaminophen     Tablet .. 650 milliGRAM(s) Oral every 6 hours PRN Temp greater or equal to 38C (100.4F), Moderate Pain (4 - 6)  dextrose Oral Gel 15 Gram(s) Oral once PRN Blood Glucose LESS THAN 70 milliGRAM(s)/deciliter  loperamide 2 milliGRAM(s) Oral three times a day PRN Diarrhea    Allergies    No Known Allergies  Intolerances  REVIEW OF SYSTEMS:    CONSTITUTIONAL: No weakness, fevers or chills.   EYES/ENT: No visual changes;    NECK: No pain or stiffness  RESPIRATORY: No cough, wheezing, No shortness of breath  CARDIOVASCULAR: No chest pain or palpitations  GASTROINTESTINAL: No abdominal pain, or hematochezia.  GENITOURINARY: No dysuria orhematuria  NEUROLOGICAL: No numbness or weakness  SKIN: No itching, burning, rashes  All other review of systems is negative unless indicated above    Vital Signs Last 24 Hrs  T(C): 36.6 (19 Oct 2023 09:37), Max: 37.1 (18 Oct 2023 17:13)  T(F): 97.9 (19 Oct 2023 09:37), Max: 98.8 (18 Oct 2023 17:13)  HR: 71 (19 Oct 2023 09:37) (60 - 71)  BP: 116/51 (19 Oct 2023 09:37) (115/61 - 124/72)  BP(mean): --  RR: 14 (19 Oct 2023 09:37) (14 - 18)  SpO2: 99% (19 Oct 2023 09:37) (96% - 99%)    Parameters below as of 19 Oct 2023 09:37  Patient On (Oxygen Delivery Method): room air      I&O's Summary    18 Oct 2023 07:01  -  19 Oct 2023 07:00  --------------------------------------------------------  IN: 0 mL / OUT: 900 mL / NET: -900 mL      PHYSICAL EXAM:  Constitutional: NAD  Neurological: Alert, no focal deficits  HEENT: no JVD, EOMI  Cardiovascular: Regular, S1 and S2, no murmur  Pulmonary: breath sounds bilaterally  Gastrointestinal: Bowel Sounds present, soft, nontender  EXT:  no peripheral edema  Skin: No rashes.  Psych:  Mood calm  LABS: All Labs Reviewed:                          8.6    8.77  )-----------( 282      ( 18 Oct 2023 06:50 )             27.8     10-18    142  |  110<H>  |  17  ----------------------------<  204<H>  3.9   |  27  |  0.75    Ca    8.2<L>      18 Oct 2023 06:50    · Assessment    78M with PMH HTN, PPM, DM, foot ulcers , presents to the hospital by ambulance from home.  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 Pt does not go to doctor on regular basis per son and does phone visits.    +Bacteremia. Called by Cardio NP for consultation and to arrange GISSELL to R/O Endocarditis or PPM related infection. Order placed to be done by Dr. Barrett Monday or Tuesday  Cant ASA, Statin  Pt has seen Dave Panchal and [facility] outpt 2021, [phone]  pt reports feeling well today.    s/p GISSELL: No vegetations    TTE: CONCLUSIONS:      1. Technically difficult image quality.   2. There is abnormal septal activation, likely from a paced rhythm. There is likely superimposed anterior and septal hypokinesis. Limited views and limited endocardial definition make further interpretation limited.   3. The right ventricle is not well visualized. Normal right ventricular cavity size and reduced systolic function.   4. The left atrium is mildly dilated in size.   5. Right atrium was not well visualized.   6. Mild calcification of the aortic valve leaflets.   7. Tricuspid valve was not well visualized.   8. Aortic valve was not well visualized.  no cardiac contraindications to podiatry/surgical debridement    will follow prn             [facility]    CHIEF COMPLAINT: Patient is a 78y old  Male who presents with a chief complaint of fever and weakness (18 Oct 2023 14:08)      Follow Up: [ ] Chest Pain      [ ] Dyspnea     [ ] Palpitations    [ ] Atrial Fibrillation     [ ] Ventricular Dysrhythmia    [ ] Abnormal EKG                      [ ] Abnormal Cardiac Enzymes     [ ] Valvular Disease    HPI:  78-year-old male presents to the hospital by ambulance from home.  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 Pt does not go to doctor on regualar basis per son and does phone visits.   (11 Oct 2023 07:02)    PAST MEDICAL & SURGICAL HISTORY:  Diabetes      Benign essential HTN      Pacemaker      History of BPH      Diabetic foot ulcers    MEDICATIONS  (STANDING):  aspirin enteric coated 81 milliGRAM(s) Oral daily  atorvastatin 80 milliGRAM(s) Oral at bedtime  bethanechol 25 milliGRAM(s) Oral two times a day  dextrose 5%. 1000 milliLiter(s) (100 mL/Hr) IV Continuous <Continuous>  dextrose 5%. 1000 milliLiter(s) (50 mL/Hr) IV Continuous <Continuous>  dextrose 5%. 1000 milliLiter(s) (50 mL/Hr) IV Continuous <Continuous>  dextrose 5%. 1000 milliLiter(s) (100 mL/Hr) IV Continuous <Continuous>  dextrose 50% Injectable 25 Gram(s) IV Push once  dextrose 50% Injectable 12.5 Gram(s) IV Push once  dextrose 50% Injectable 25 Gram(s) IV Push once  gabapentin 300 milliGRAM(s) Oral two times a day  glucagon  Injectable 1 milliGRAM(s) IntraMuscular once  insulin glargine Injectable (LANTUS) 18 Unit(s) SubCutaneous at bedtime  insulin lispro (ADMELOG) corrective regimen sliding scale   SubCutaneous at bedtime  insulin lispro (ADMELOG) corrective regimen sliding scale   SubCutaneous three times a day before meals  lactated ringers. 1000 milliLiter(s) (100 mL/Hr) IV Continuous <Continuous>  lactobacillus acidophilus 1 Tablet(s) Oral two times a day with meals  midodrine. 10 milliGRAM(s) Oral three times a day  pantoprazole    Tablet 40 milliGRAM(s) Oral before breakfast  tamsulosin 0.4 milliGRAM(s) Oral at bedtime  vancomycin  IVPB 500 milliGRAM(s) IV Intermittent every 12 hours    MEDICATIONS  (PRN):  acetaminophen     Tablet .. 650 milliGRAM(s) Oral every 6 hours PRN Temp greater or equal to 38C (100.4F), Moderate Pain (4 - 6)  dextrose Oral Gel 15 Gram(s) Oral once PRN Blood Glucose LESS THAN 70 milliGRAM(s)/deciliter  loperamide 2 milliGRAM(s) Oral three times a day PRN Diarrhea    Allergies    No Known Allergies  Intolerances  REVIEW OF SYSTEMS:    CONSTITUTIONAL: No weakness, fevers or chills.   EYES/ENT: No visual changes;    NECK: No pain or stiffness  RESPIRATORY: No cough, wheezing, No shortness of breath  CARDIOVASCULAR: No chest pain or palpitations  GASTROINTESTINAL: No abdominal pain, or hematochezia.  GENITOURINARY: No dysuria orhematuria  NEUROLOGICAL: No numbness or weakness  SKIN: No itching, burning, rashes  All other review of systems is negative unless indicated above    Vital Signs Last 24 Hrs  T(C): 36.6 (19 Oct 2023 09:37), Max: 37.1 (18 Oct 2023 17:13)  T(F): 97.9 (19 Oct 2023 09:37), Max: 98.8 (18 Oct 2023 17:13)  HR: 71 (19 Oct 2023 09:37) (60 - 71)  BP: 116/51 (19 Oct 2023 09:37) (115/61 - 124/72)  BP(mean): --  RR: 14 (19 Oct 2023 09:37) (14 - 18)  SpO2: 99% (19 Oct 2023 09:37) (96% - 99%)    Parameters below as of 19 Oct 2023 09:37  Patient On (Oxygen Delivery Method): room air      I&O's Summary    18 Oct 2023 07:01  -  19 Oct 2023 07:00  --------------------------------------------------------  IN: 0 mL / OUT: 900 mL / NET: -900 mL      PHYSICAL EXAM:  Constitutional: NAD  Neurological: Alert, no focal deficits  HEENT: no JVD, EOMI  Cardiovascular: Regular, S1 and S2, no murmur  Pulmonary: breath sounds bilaterally  Gastrointestinal: Bowel Sounds present, soft, nontender  EXT:  no peripheral edema  Skin: No rashes.  Psych:  Mood calm  LABS: All Labs Reviewed:                          8.6    8.77  )-----------( 282      ( 18 Oct 2023 06:50 )             27.8     10-18    142  |  110<H>  |  17  ----------------------------<  204<H>  3.9   |  27  |  0.75    Ca    8.2<L>      18 Oct 2023 06:50    · Assessment    78M with PMH HTN, PPM, DM, foot ulcers , presents to the hospital by ambulance from home.  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 Pt does not go to doctor on regular basis per son and does phone visits.    +Bacteremia. Called by Cardio NP for consultation and to arrange GISSELL to R/O Endocarditis or PPM related infection. Order placed to be done by Dr. Barrett Monday or Tuesday  Cant ASA, Statin  Pt has seen Dave Panchal and Curtis outpt 2021, [phone]  pt reports feeling well today.    s/p GISSELL: No vegetations    TTE: CONCLUSIONS:      1. Technically difficult image quality.   2. There is abnormal septal activation, likely from a paced rhythm. There is likely superimposed anterior and septal hypokinesis. Limited views and limited endocardial definition make further interpretation limited.   3. The right ventricle is not well visualized. Normal right ventricular cavity size and reduced systolic function.   4. The left atrium is mildly dilated in size.   5. Right atrium was not well visualized.   6. Mild calcification of the aortic valve leaflets.   7. Tricuspid valve was not well visualized.   8. Aortic valve was not well visualized.  no cardiac contraindications to podiatry/surgical debridement    will follow prn

## 2023-10-20 DIAGNOSIS — M86.9 OSTEOMYELITIS, UNSPECIFIED: ICD-10-CM

## 2023-10-20 LAB
ANION GAP SERPL CALC-SCNC: 4 MMOL/L — LOW (ref 5–17)
BUN SERPL-MCNC: 17 MG/DL — SIGNIFICANT CHANGE UP (ref 7–23)
CALCIUM SERPL-MCNC: 7.9 MG/DL — LOW (ref 8.5–10.1)
CHLORIDE SERPL-SCNC: 108 MMOL/L — SIGNIFICANT CHANGE UP (ref 96–108)
CO2 SERPL-SCNC: 27 MMOL/L — SIGNIFICANT CHANGE UP (ref 22–31)
CREAT SERPL-MCNC: 0.79 MG/DL — SIGNIFICANT CHANGE UP (ref 0.5–1.3)
EGFR: 91 ML/MIN/1.73M2 — SIGNIFICANT CHANGE UP
GLUCOSE BLDC GLUCOMTR-MCNC: 234 MG/DL — HIGH (ref 70–99)
GLUCOSE BLDC GLUCOMTR-MCNC: 235 MG/DL — HIGH (ref 70–99)
GLUCOSE BLDC GLUCOMTR-MCNC: 239 MG/DL — HIGH (ref 70–99)
GLUCOSE BLDC GLUCOMTR-MCNC: 251 MG/DL — HIGH (ref 70–99)
GLUCOSE SERPL-MCNC: 246 MG/DL — HIGH (ref 70–99)
HCT VFR BLD CALC: 25.8 % — LOW (ref 39–50)
HGB BLD-MCNC: 8 G/DL — LOW (ref 13–17)
MCHC RBC-ENTMCNC: 25.6 PG — LOW (ref 27–34)
MCHC RBC-ENTMCNC: 31 GM/DL — LOW (ref 32–36)
MCV RBC AUTO: 82.4 FL — SIGNIFICANT CHANGE UP (ref 80–100)
NRBC # BLD: 0 /100 WBCS — SIGNIFICANT CHANGE UP (ref 0–0)
PLATELET # BLD AUTO: 300 K/UL — SIGNIFICANT CHANGE UP (ref 150–400)
POTASSIUM SERPL-MCNC: 3.8 MMOL/L — SIGNIFICANT CHANGE UP (ref 3.5–5.3)
POTASSIUM SERPL-SCNC: 3.8 MMOL/L — SIGNIFICANT CHANGE UP (ref 3.5–5.3)
RBC # BLD: 3.13 M/UL — LOW (ref 4.2–5.8)
RBC # FLD: 16.4 % — HIGH (ref 10.3–14.5)
SODIUM SERPL-SCNC: 139 MMOL/L — SIGNIFICANT CHANGE UP (ref 135–145)
VANCOMYCIN TROUGH SERPL-MCNC: 17.7 UG/ML — SIGNIFICANT CHANGE UP (ref 10–20)
WBC # BLD: 12.15 K/UL — HIGH (ref 3.8–10.5)
WBC # FLD AUTO: 12.15 K/UL — HIGH (ref 3.8–10.5)

## 2023-10-20 PROCEDURE — 99024 POSTOP FOLLOW-UP VISIT: CPT

## 2023-10-20 RX ADMIN — MIDODRINE HYDROCHLORIDE 10 MILLIGRAM(S): 2.5 TABLET ORAL at 17:48

## 2023-10-20 RX ADMIN — ATORVASTATIN CALCIUM 80 MILLIGRAM(S): 80 TABLET, FILM COATED ORAL at 21:35

## 2023-10-20 RX ADMIN — MIDODRINE HYDROCHLORIDE 10 MILLIGRAM(S): 2.5 TABLET ORAL at 06:29

## 2023-10-20 RX ADMIN — PANTOPRAZOLE SODIUM 40 MILLIGRAM(S): 20 TABLET, DELAYED RELEASE ORAL at 06:29

## 2023-10-20 RX ADMIN — Medication 100 MILLIGRAM(S): at 14:50

## 2023-10-20 RX ADMIN — Medication 25 MILLIGRAM(S): at 17:48

## 2023-10-20 RX ADMIN — Medication 2: at 17:47

## 2023-10-20 RX ADMIN — Medication 25 MILLIGRAM(S): at 06:31

## 2023-10-20 RX ADMIN — Medication 3: at 08:52

## 2023-10-20 RX ADMIN — GABAPENTIN 300 MILLIGRAM(S): 400 CAPSULE ORAL at 06:29

## 2023-10-20 RX ADMIN — GABAPENTIN 300 MILLIGRAM(S): 400 CAPSULE ORAL at 17:47

## 2023-10-20 RX ADMIN — MIDODRINE HYDROCHLORIDE 10 MILLIGRAM(S): 2.5 TABLET ORAL at 12:05

## 2023-10-20 RX ADMIN — Medication 2: at 12:42

## 2023-10-20 RX ADMIN — Medication 1 TABLET(S): at 17:47

## 2023-10-20 RX ADMIN — Medication 1 TABLET(S): at 08:52

## 2023-10-20 RX ADMIN — Medication 100 MILLIGRAM(S): at 03:41

## 2023-10-20 RX ADMIN — TAMSULOSIN HYDROCHLORIDE 0.4 MILLIGRAM(S): 0.4 CAPSULE ORAL at 21:35

## 2023-10-20 NOTE — PROGRESS NOTE ADULT - SUBJECTIVE AND OBJECTIVE BOX
[facility] Cardiology    CHIEF COMPLAINT: Patient is a 78y old  Male who presents with a chief complaint of fever and weakness (19 Oct 2023 14:48)      Follow Up: [ ] Chest Pain      [ ] Dyspnea     [ ] Palpitations    [ ] Atrial Fibrillation     [ ] Ventricular Dysrhythmia    [ ] Abnormal EKG                      [ ] Abnormal Cardiac Enzymes     [ ] Valvular Disease    HPI:  78-year-old male presents to the hospital by ambulance from home.  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 Pt does not go to doctor on regualar basis per son and does phone visits.   (11 Oct 2023 07:02)    PAST MEDICAL & SURGICAL HISTORY:  Diabetes      Benign essential HTN      Pacemaker      History of BPH      Diabetic foot ulcers        MEDICATIONS  (STANDING):  atorvastatin 80 milliGRAM(s) Oral at bedtime  bethanechol 25 milliGRAM(s) Oral two times a day  dextrose 50% Injectable 12.5 Gram(s) IV Push once  dextrose 50% Injectable 25 Gram(s) IV Push once  dextrose 50% Injectable 25 Gram(s) IV Push once  gabapentin 300 milliGRAM(s) Oral two times a day  glucagon  Injectable 1 milliGRAM(s) IntraMuscular once  insulin lispro (ADMELOG) corrective regimen sliding scale   SubCutaneous three times a day before meals  insulin lispro (ADMELOG) corrective regimen sliding scale   SubCutaneous at bedtime  lactated ringers. 1000 milliLiter(s) (100 mL/Hr) IV Continuous <Continuous>  lactobacillus acidophilus 1 Tablet(s) Oral two times a day with meals  midodrine. 10 milliGRAM(s) Oral three times a day  pantoprazole    Tablet 40 milliGRAM(s) Oral before breakfast  tamsulosin 0.4 milliGRAM(s) Oral at bedtime  vancomycin  IVPB 500 milliGRAM(s) IV Intermittent every 12 hours    MEDICATIONS  (PRN):  acetaminophen     Tablet .. 650 milliGRAM(s) Oral every 6 hours PRN Temp greater or equal to 38C (100.4F), Moderate Pain (4 - 6)  dextrose Oral Gel 15 Gram(s) Oral once PRN Blood Glucose LESS THAN 70 milliGRAM(s)/deciliter  loperamide 2 milliGRAM(s) Oral three times a day PRN Diarrhea    Allergies    No Known Allergies    Intolerances        REVIEW OF SYSTEMS:    CONSTITUTIONAL: No weakness, fevers or chills.   EYES/ENT: No visual changes;    NECK: No pain or stiffness  RESPIRATORY: No cough, wheezing, No shortness of breath  CARDIOVASCULAR: No chest pain or palpitations  GASTROINTESTINAL: No abdominal pain, or hematochezia.  GENITOURINARY: No dysuria orhematuria  NEUROLOGICAL: No numbness or weakness  SKIN: No itching, burning, rashes  All other review of systems is negative unless indicated above    Vital Signs Last 24 Hrs  T(C): 36.9 (20 Oct 2023 05:31), Max: 37.3 (19 Oct 2023 12:17)  T(F): 98.5 (20 Oct 2023 05:31), Max: 99.1 (19 Oct 2023 12:17)  HR: 61 (20 Oct 2023 05:31) (60 - 61)  BP: 137/70 (20 Oct 2023 05:31) (100/46 - 137/70)  BP(mean): --  RR: 16 (20 Oct 2023 05:31) (12 - 18)  SpO2: 95% (20 Oct 2023 05:31) (94% - 100%)    Parameters below as of 20 Oct 2023 05:31  Patient On (Oxygen Delivery Method): room air  I&O's Summary  pHYSICAL EXAM:  Constitutional: NAD  Neurological: calm  HEENT: no JVD, EOMI  Skin: No rashes.  Psych:  Mood calm  LABS: All Labs Reviewed:                      8.0    12.15 )-----------( 300      ( 20 Oct 2023 08:40 )             25.8     10-20    139  |  108  |  17  ----------------------------<  246<H>  3.8   |  27  |  0.79    Ca    7.9<L>      20 Oct 2023 08:40    · Assessment  78M with PMH HTN, PPM, DM, foot ulcers , presents to the hospital by ambulance from home.  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 Pt does not go to doctor on regular basis per son and does phone visits.  +Bacteremia. Called by Cardio NP for consultation and to arrange GISSELL to R/O Endocarditis or PPM related infection. Order placed to be done by Dr. Barrett Monday or Tuesday     s/p GISSELL: No vegetations  TTE: CONCLUSIONS:    1. Technically difficult image quality.   2. There is abnormal septal activation, likely from a paced rhythm. There is likely superimposed anterior and septal hypokinesis. Limited views and limited endocardial definition make further interpretation limited.   3. The right ventricle is not well visualized. Normal right ventricular cavity size and reduced systolic function.   4. The left atrium is mildly dilated in size.   5. Right atrium was not well visualized.   6. Mild calcification of the aortic valve leaflets.   7. Tricuspid valve was not well visualized.   8. Aortic valve was not well visualized.  POD1 s/p podiatry/surgical debridement  rec ASA, Statin  Pt has seen Dave Panchal and [facility] outpt in 2021, [phone]    will follow prn                       [facility]    CHIEF COMPLAINT: Patient is a 78y old  Male who presents with a chief complaint of fever and weakness (19 Oct 2023 14:48)      Follow Up: [ ] Chest Pain      [ ] Dyspnea     [ ] Palpitations    [ ] Atrial Fibrillation     [ ] Ventricular Dysrhythmia    [ ] Abnormal EKG                      [ ] Abnormal Cardiac Enzymes     [ ] Valvular Disease    HPI:  78-year-old male presents to the hospital by ambulance from home.  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 Pt does not go to doctor on regualar basis per son and does phone visits.   (11 Oct 2023 07:02)    PAST MEDICAL & SURGICAL HISTORY:  Diabetes      Benign essential HTN      Pacemaker      History of BPH      Diabetic foot ulcers        MEDICATIONS  (STANDING):  atorvastatin 80 milliGRAM(s) Oral at bedtime  bethanechol 25 milliGRAM(s) Oral two times a day  dextrose 50% Injectable 12.5 Gram(s) IV Push once  dextrose 50% Injectable 25 Gram(s) IV Push once  dextrose 50% Injectable 25 Gram(s) IV Push once  gabapentin 300 milliGRAM(s) Oral two times a day  glucagon  Injectable 1 milliGRAM(s) IntraMuscular once  insulin lispro (ADMELOG) corrective regimen sliding scale   SubCutaneous three times a day before meals  insulin lispro (ADMELOG) corrective regimen sliding scale   SubCutaneous at bedtime  lactated ringers. 1000 milliLiter(s) (100 mL/Hr) IV Continuous <Continuous>  lactobacillus acidophilus 1 Tablet(s) Oral two times a day with meals  midodrine. 10 milliGRAM(s) Oral three times a day  pantoprazole    Tablet 40 milliGRAM(s) Oral before breakfast  tamsulosin 0.4 milliGRAM(s) Oral at bedtime  vancomycin  IVPB 500 milliGRAM(s) IV Intermittent every 12 hours    MEDICATIONS  (PRN):  acetaminophen     Tablet .. 650 milliGRAM(s) Oral every 6 hours PRN Temp greater or equal to 38C (100.4F), Moderate Pain (4 - 6)  dextrose Oral Gel 15 Gram(s) Oral once PRN Blood Glucose LESS THAN 70 milliGRAM(s)/deciliter  loperamide 2 milliGRAM(s) Oral three times a day PRN Diarrhea    Allergies    No Known Allergies    Intolerances        REVIEW OF SYSTEMS:    CONSTITUTIONAL: No weakness, fevers or chills.   EYES/ENT: No visual changes;    NECK: No pain or stiffness  RESPIRATORY: No cough, wheezing, No shortness of breath  CARDIOVASCULAR: No chest pain or palpitations  GASTROINTESTINAL: No abdominal pain, or hematochezia.  GENITOURINARY: No dysuria orhematuria  NEUROLOGICAL: No numbness or weakness  SKIN: No itching, burning, rashes  All other review of systems is negative unless indicated above    Vital Signs Last 24 Hrs  T(C): 36.9 (20 Oct 2023 05:31), Max: 37.3 (19 Oct 2023 12:17)  T(F): 98.5 (20 Oct 2023 05:31), Max: 99.1 (19 Oct 2023 12:17)  HR: 61 (20 Oct 2023 05:31) (60 - 61)  BP: 137/70 (20 Oct 2023 05:31) (100/46 - 137/70)  BP(mean): --  RR: 16 (20 Oct 2023 05:31) (12 - 18)  SpO2: 95% (20 Oct 2023 05:31) (94% - 100%)    Parameters below as of 20 Oct 2023 05:31  Patient On (Oxygen Delivery Method): room air  I&O's Summary  pHYSICAL EXAM:  Constitutional: NAD  Neurological: calm  HEENT: no JVD, EOMI  Skin: No rashes.  Psych:  Mood calm  LABS: All Labs Reviewed:                      8.0    12.15 )-----------( 300      ( 20 Oct 2023 08:40 )             25.8     10-20    139  |  108  |  17  ----------------------------<  246<H>  3.8   |  27  |  0.79    Ca    7.9<L>      20 Oct 2023 08:40    · Assessment  78M with PMH HTN, PPM, DM, foot ulcers , presents to the hospital by ambulance from home.  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 Pt does not go to doctor on regular basis per son and does phone visits.  +Bacteremia. Called by Cardio NP for consultation and to arrange GISSELL to R/O Endocarditis or PPM related infection. Order placed to be done by Dr. Barrett Monday or Tuesday     s/p GISSELL: No vegetations  TTE: CONCLUSIONS:    1. Technically difficult image quality.   2. There is abnormal septal activation, likely from a paced rhythm. There is likely superimposed anterior and septal hypokinesis. Limited views and limited endocardial definition make further interpretation limited.   3. The right ventricle is not well visualized. Normal right ventricular cavity size and reduced systolic function.   4. The left atrium is mildly dilated in size.   5. Right atrium was not well visualized.   6. Mild calcification of the aortic valve leaflets.   7. Tricuspid valve was not well visualized.   8. Aortic valve was not well visualized.  POD1 s/p podiatry/surgical debridement  rec ASA, Statin  Pt has seen Dave Panchal and [facility] outpt in 2021, [phone]    will follow prn                       [facility]    CHIEF COMPLAINT: Patient is a 78y old  Male who presents with a chief complaint of fever and weakness (19 Oct 2023 14:48)      Follow Up: [ ] Chest Pain      [ ] Dyspnea     [ ] Palpitations    [ ] Atrial Fibrillation     [ ] Ventricular Dysrhythmia    [ ] Abnormal EKG                      [ ] Abnormal Cardiac Enzymes     [ ] Valvular Disease    HPI:  78-year-old male presents to the hospital by ambulance from home.  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 Pt does not go to doctor on regualar basis per son and does phone visits.   (11 Oct 2023 07:02)    PAST MEDICAL & SURGICAL HISTORY:  Diabetes      Benign essential HTN      Pacemaker      History of BPH      Diabetic foot ulcers        MEDICATIONS  (STANDING):  atorvastatin 80 milliGRAM(s) Oral at bedtime  bethanechol 25 milliGRAM(s) Oral two times a day  dextrose 50% Injectable 12.5 Gram(s) IV Push once  dextrose 50% Injectable 25 Gram(s) IV Push once  dextrose 50% Injectable 25 Gram(s) IV Push once  gabapentin 300 milliGRAM(s) Oral two times a day  glucagon  Injectable 1 milliGRAM(s) IntraMuscular once  insulin lispro (ADMELOG) corrective regimen sliding scale   SubCutaneous three times a day before meals  insulin lispro (ADMELOG) corrective regimen sliding scale   SubCutaneous at bedtime  lactated ringers. 1000 milliLiter(s) (100 mL/Hr) IV Continuous <Continuous>  lactobacillus acidophilus 1 Tablet(s) Oral two times a day with meals  midodrine. 10 milliGRAM(s) Oral three times a day  pantoprazole    Tablet 40 milliGRAM(s) Oral before breakfast  tamsulosin 0.4 milliGRAM(s) Oral at bedtime  vancomycin  IVPB 500 milliGRAM(s) IV Intermittent every 12 hours    MEDICATIONS  (PRN):  acetaminophen     Tablet .. 650 milliGRAM(s) Oral every 6 hours PRN Temp greater or equal to 38C (100.4F), Moderate Pain (4 - 6)  dextrose Oral Gel 15 Gram(s) Oral once PRN Blood Glucose LESS THAN 70 milliGRAM(s)/deciliter  loperamide 2 milliGRAM(s) Oral three times a day PRN Diarrhea    Allergies    No Known Allergies    Intolerances        REVIEW OF SYSTEMS:    CONSTITUTIONAL: No weakness, fevers or chills.   EYES/ENT: No visual changes;    NECK: No pain or stiffness  RESPIRATORY: No cough, wheezing, No shortness of breath  CARDIOVASCULAR: No chest pain or palpitations  GASTROINTESTINAL: No abdominal pain, or hematochezia.  GENITOURINARY: No dysuria orhematuria  NEUROLOGICAL: No numbness or weakness  SKIN: No itching, burning, rashes  All other review of systems is negative unless indicated above    Vital Signs Last 24 Hrs  T(C): 36.9 (20 Oct 2023 05:31), Max: 37.3 (19 Oct 2023 12:17)  T(F): 98.5 (20 Oct 2023 05:31), Max: 99.1 (19 Oct 2023 12:17)  HR: 61 (20 Oct 2023 05:31) (60 - 61)  BP: 137/70 (20 Oct 2023 05:31) (100/46 - 137/70)  BP(mean): --  RR: 16 (20 Oct 2023 05:31) (12 - 18)  SpO2: 95% (20 Oct 2023 05:31) (94% - 100%)    Parameters below as of 20 Oct 2023 05:31  Patient On (Oxygen Delivery Method): room air  I&O's Summary  pHYSICAL EXAM:  Constitutional: NAD  Neurological: calm  HEENT: no JVD, EOMI  Skin: No rashes.  Psych:  Mood calm  LABS: All Labs Reviewed:                      8.0    12.15 )-----------( 300      ( 20 Oct 2023 08:40 )             25.8     10-20    139  |  108  |  17  ----------------------------<  246<H>  3.8   |  27  |  0.79    Ca    7.9<L>      20 Oct 2023 08:40    · Assessment  78M with PMH HTN, PPM, DM, foot ulcers , presents to the hospital by ambulance from home.  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 Pt does not go to doctor on regular basis per son and does phone visits.  +Bacteremia. Called by Cardio NP for consultation and to arrange GISSELL to R/O Endocarditis or PPM related infection. Order placed to be done by Dr. Barrett Monday or Tuesday     s/p GISSELL: No vegetations  TTE: CONCLUSIONS:    1. Technically difficult image quality.   2. There is abnormal septal activation, likely from a paced rhythm. There is likely superimposed anterior and septal hypokinesis. Limited views and limited endocardial definition make further interpretation limited.   3. The right ventricle is not well visualized. Normal right ventricular cavity size and reduced systolic function.   4. The left atrium is mildly dilated in size.   5. Right atrium was not well visualized.   6. Mild calcification of the aortic valve leaflets.   7. Tricuspid valve was not well visualized.   8. Aortic valve was not well visualized.  POD1 s/p podiatry/surgical debridement  rec ASA, Statin  Pt has seen Dave Panchal and [facility] outpt in 2021, [phone]    will follow prn

## 2023-10-20 NOTE — CASE MANAGEMENT PROGRESS NOTE - NSCMPROGRESSNOTE_GEN_ALL_CORE
Discussed on rounds this am and again with Misti Harris and Jaja re: d/c planning for early next week with LT IV abx and wound care.  Met with patient at bedside, discussed above.  He states his spouse has given him IV abx in the past and  he would prefer to be dc to home.  He is in agreement for CM to contact his spouse.  Call placed and VM message left with CM contact information.  Call back received from son Nico as noted in caregiver note.  Dr. Wilkinson made aware of his conversation re: additional surgical procedure and VM message left for Dr. Harris.  Call placed to wound care.  Awaiting call back.

## 2023-10-20 NOTE — PROGRESS NOTE ADULT - SUBJECTIVE AND OBJECTIVE BOX
Patient is a 78y old  Male who presents with a chief complaint of fever and weakness (20 Oct 2023 11:53)      INTERVAL HPI/OVERNIGHT EVENTS:stable, doing well pod 1    MEDICATIONS  (STANDING):  atorvastatin 80 milliGRAM(s) Oral at bedtime  bethanechol 25 milliGRAM(s) Oral two times a day  dextrose 50% Injectable 12.5 Gram(s) IV Push once  dextrose 50% Injectable 25 Gram(s) IV Push once  dextrose 50% Injectable 25 Gram(s) IV Push once  gabapentin 300 milliGRAM(s) Oral two times a day  glucagon  Injectable 1 milliGRAM(s) IntraMuscular once  insulin lispro (ADMELOG) corrective regimen sliding scale   SubCutaneous three times a day before meals  insulin lispro (ADMELOG) corrective regimen sliding scale   SubCutaneous at bedtime  lactated ringers. 1000 milliLiter(s) (100 mL/Hr) IV Continuous <Continuous>  lactobacillus acidophilus 1 Tablet(s) Oral two times a day with meals  midodrine. 10 milliGRAM(s) Oral three times a day  pantoprazole    Tablet 40 milliGRAM(s) Oral before breakfast  tamsulosin 0.4 milliGRAM(s) Oral at bedtime  vancomycin  IVPB 500 milliGRAM(s) IV Intermittent every 12 hours    MEDICATIONS  (PRN):  acetaminophen     Tablet .. 650 milliGRAM(s) Oral every 6 hours PRN Temp greater or equal to 38C (100.4F), Moderate Pain (4 - 6)  dextrose Oral Gel 15 Gram(s) Oral once PRN Blood Glucose LESS THAN 70 milliGRAM(s)/deciliter  loperamide 2 milliGRAM(s) Oral three times a day PRN Diarrhea      Allergies    No Known Allergies    Intolerances        REVIEW OF SYSTEMS:  CONSTITUTIONAL: No fever, weight loss, or fatigue  EYES: No eye pain, visual disturbances  ENMT:  No difficulty hearing, tinnitus, vertigo; No sinus or throat pain  NECK: No pain or stiffness  RESPIRATORY: No cough, wheezing, chills or hemoptysis; No shortness of breath  CARDIOVASCULAR: No chest pain, palpitations, dizziness  GASTROINTESTINAL: No abdominal or epigastric pain. No nausea, vomiting, or hematemesis; No diarrhea or constipation. No melena or hematochezia.  GENITOURINARY: No dysuria, frequency, hematuria, or incontinence  NEUROLOGICAL: No headaches, memory loss, loss of strength, numbness, or tremors  SKIN: No itching, burning  LYMPH NODES: No enlarged glands  MUSCULOSKELETAL: No joint pain or swelling; No muscle, back, or extremity pain  PSYCHIATRIC: No depression, mood swings  HEME/LYMPH: No easy bruising, or bleeding gums  ALLERGY AND IMMUNOLOGIC: No hives    Vital Signs Last 24 Hrs  T(C): 36.9 (20 Oct 2023 05:31), Max: 37.3 (19 Oct 2023 12:17)  T(F): 98.5 (20 Oct 2023 05:31), Max: 99.1 (19 Oct 2023 12:17)  HR: 61 (20 Oct 2023 05:31) (60 - 61)  BP: 137/70 (20 Oct 2023 05:31) (100/46 - 137/70)  BP(mean): --  RR: 16 (20 Oct 2023 05:31) (12 - 18)  SpO2: 95% (20 Oct 2023 05:31) (94% - 100%)    Parameters below as of 20 Oct 2023 05:31  Patient On (Oxygen Delivery Method): room air        PHYSICAL EXAM:  GENERAL: NAD, well-groomed, well-developed  HEAD:  Atraumatic, Normocephalic  EYES: EOMI, PERRLA, conjunctiva and sclera clear  ENMT: No tonsillar erythema, exudates, or enlargement   NECK: Supple, No JVD  NERVOUS SYSTEM:  Alert & Oriented X3, Good concentration  CHEST/LUNG: Clear to auscultation bilaterally; No rales, rhonchi, wheezing  HEART: Regular rate and rhythm  ABDOMEN: Soft, Nontender, Nondistended; Bowel sounds present  EXTREMITIES:  2+ Peripheral Pulses, b/l feet in clean dressing   LYMPH: No lymphadenopathy noted  SKIN: No rashes     LABS:                        8.0    12.15 )-----------( 300      ( 20 Oct 2023 08:40 )             25.8     20 Oct 2023 08:40    139    |  108    |  17     ----------------------------<  246    3.8     |  27     |  0.79     Ca    7.9        20 Oct 2023 08:40        Urinalysis Basic - ( 20 Oct 2023 08:40 )    Color: x / Appearance: x / SG: x / pH: x  Gluc: 246 mg/dL / Ketone: x  / Bili: x / Urobili: x   Blood: x / Protein: x / Nitrite: x   Leuk Esterase: x / RBC: x / WBC x   Sq Epi: x / Non Sq Epi: x / Bacteria: x      CAPILLARY BLOOD GLUCOSE      POCT Blood Glucose.: 251 mg/dL (20 Oct 2023 08:32)  POCT Blood Glucose.: 250 mg/dL (19 Oct 2023 22:00)  POCT Blood Glucose.: 260 mg/dL (19 Oct 2023 17:30)  POCT Blood Glucose.: 208 mg/dL (19 Oct 2023 14:12)  POCT Blood Glucose.: 205 mg/dL (19 Oct 2023 12:22)        wound with gram statin --    10-19 @ 11:35  organism  --   10-19 @ 11:35  specimen source .Tissue Other, RIGHT FOOT SOFT TISSUE CULTURE  10-19 @ 11:35      RADIOLOGY & ADDITIONAL TESTS:      Consultant(s) Notes Reviewed:  [x ] YES  [ ] NO    Care Discussed with Consultants/Other Providers [ x] YES  [ ] NO    Advanced care planning discussed with patient and family, advanced care planning forms reviewed, discussed, and completed.  20 minutes spent.

## 2023-10-20 NOTE — PROGRESS NOTE ADULT - ASSESSMENT
78-year-old male presents to the hospital by ambulance from home.  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 Pt does not go to doctor on regular basis per son and does phone visits. Son reports years of struggling with foot infections with black areas of gangrene and their struggling to keep his feet but now feeling that keeping him alive is more important.  Culture - Blood (10.10.23 @ 18:25)    -  Methicillin resistant Staphylococcus aureus (MRSA): Detec  Repeat 10/12 BCx NGTD (48h)  Wcx with MRSA, placed on contact isolation   UCx + Pseudomonas, pansensitive including cefepime     RECOMMENDATIONS  1-MRSA bacteremia/Osteomyelitis   NM bone scan ordered and  Abnormal combined Indium-111 labeled leukocyte study and marrow scan. Increased uptake of both radiotracer is in the right heel and left midfoot, concerning for osteomyelitis.  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  C/w Vancomycin (10/11-) dosing per pharmacy protocol  Patient will ultimately need LT IV Abx x6 wks until 11/22/23   --PICC ordered  --pt will need weekly CMP, CBC, ESR, CRP and vanc level faxed to [phone]    2-Pseudomonal UTI  S/p zosyn (10/11-10/12), changed to cefepime, completed on 10/18    D/w Dr. Harris  D/w CM    Infectious Diseases will continue to follow. Please call with any questions.   Rosa Maria Wilkinson M.D.  [facility] Division of Infectious Diseases [phone]  For after 5 P.M. and weekends, please call [phone]       78-year-old male presents to the hospital by ambulance from home.  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 Pt does not go to doctor on regular basis per son and does phone visits. Son reports years of struggling with foot infections with black areas of gangrene and their struggling to keep his feet but now feeling that keeping him alive is more important.  Culture - Blood (10.10.23 @ 18:25)    -  Methicillin resistant Staphylococcus aureus (MRSA): Detec  Repeat 10/12 BCx NGTD (48h)  Wcx with MRSA, placed on contact isolation   UCx + Pseudomonas, pansensitive including cefepime     RECOMMENDATIONS  1-MRSA bacteremia/Osteomyelitis   NM bone scan ordered and  Abnormal combined Indium-111 labeled leukocyte study and marrow scan. Increased uptake of both radiotracer is in the right heel and left midfoot, concerning for osteomyelitis.  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  C/w Vancomycin (10/11-) dosing per pharmacy protocol  Patient will ultimately need LT IV Abx x6 wks until 11/22/23   --PICC ordered  --pt will need weekly CMP, CBC, ESR, CRP and vanc level faxed to [phone]    2-Pseudomonal UTI  S/p zosyn (10/11-10/12), changed to cefepime, completed on 10/18    D/w Dr. Harris  D/w CM    Infectious Diseases will continue to follow. Please call with any questions.   Rosa Maria Wilkinson M.D.  [facility] Division of Infectious Diseases [phone]  For after 5 P.M. and weekends, please call [phone]       78-year-old male presents to the hospital by ambulance from home.  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 Pt does not go to doctor on regular basis per son and does phone visits. Son reports years of struggling with foot infections with black areas of gangrene and their struggling to keep his feet but now feeling that keeping him alive is more important.  Culture - Blood (10.10.23 @ 18:25)    -  Methicillin resistant Staphylococcus aureus (MRSA): Detec  Repeat 10/12 BCx NGTD (48h)  Wcx with MRSA, placed on contact isolation   UCx + Pseudomonas, pansensitive including cefepime     RECOMMENDATIONS  1-MRSA bacteremia/Osteomyelitis   NM bone scan ordered and  Abnormal combined Indium-111 labeled leukocyte study and marrow scan. Increased uptake of both radiotracer is in the right heel and left midfoot, concerning for osteomyelitis.  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  C/w Vancomycin (10/11-) dosing per pharmacy protocol  Patient will ultimately need LT IV Abx x6 wks until 11/22/23   --PICC ordered  --pt will need weekly CMP, CBC, ESR, CRP and vanc level faxed to [phone]    2-Pseudomonal UTI  S/p zosyn (10/11-10/12), changed to cefepime, completed on 10/18    D/w Dr. Harris  D/w CM    Infectious Diseases will continue to follow. Please call with any questions.   Rosa Maria Wilkinson M.D.  [facility] Division of Infectious Diseases [phone]  For after 5 P.M. and weekends, please call [phone]       78-year-old male presents to the hospital by ambulance from home.  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 Pt does not go to doctor on regular basis per son and does phone visits. Son reports years of struggling with foot infections with black areas of gangrene and their struggling to keep his feet but now feeling that keeping him alive is more important.  Culture - Blood (10.10.23 @ 18:25)    -  Methicillin resistant Staphylococcus aureus (MRSA): Detec  Repeat 10/12 BCx NGTD (48h)  Wcx with MRSA, placed on contact isolation   UCx + Pseudomonas, pansensitive including cefepime     RECOMMENDATIONS  1-MRSA bacteremia/Osteomyelitis   NM bone scan ordered and  Abnormal combined Indium-111 labeled leukocyte study and marrow scan. Increased uptake of both radiotracer is in the right heel and left midfoot, concerning for osteomyelitis.  Cardiology rec appreciated - sp GISSELL early to rule out IE or PPM involvement-Pacemaker wire is present in right atrium and right ventricle and intact and no vegetations on the pacemaker wire.  S/p Debridement of fascia 19-Oct-2023 12:51:29  Lauren Urbano, Open biopsy, bone, foot 19-Oct-2023 12:52:34  Lauren Urbano.  C/w Vancomycin (10/11-) dosing per pharmacy protocol  Patient will ultimately need LT IV Abx x6 wks until 11/22/23   --PICC ordered  --pt will need weekly CMP, CBC, ESR, CRP and vanc level faxed to [phone]    2-Pseudomonal UTI  S/p zosyn (10/11-10/12), changed to cefepime, completed on 10/18    D/w Dr. Harris  D/w CM    Infectious Diseases will continue to follow. Please call with any questions.   Rosa Maria Wilkinson M.D.  [facility] Division of Infectious Diseases [phone]  For after 5 P.M. and weekends, please call [phone]       78-year-old male presents to the hospital by ambulance from home.  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 Pt does not go to doctor on regular basis per son and does phone visits. Son reports years of struggling with foot infections with black areas of gangrene and their struggling to keep his feet but now feeling that keeping him alive is more important.  Culture - Blood (10.10.23 @ 18:25)    -  Methicillin resistant Staphylococcus aureus (MRSA): Detec  Repeat 10/12 BCx NGTD (48h)  Wcx with MRSA, placed on contact isolation   UCx + Pseudomonas, pansensitive including cefepime     RECOMMENDATIONS  1-MRSA bacteremia/Osteomyelitis   NM bone scan ordered and  Abnormal combined Indium-111 labeled leukocyte study and marrow scan. Increased uptake of both radiotracer is in the right heel and left midfoot, concerning for osteomyelitis.  Cardiology rec appreciated - sp GISSELL early to rule out IE or PPM involvement-Pacemaker wire is present in right atrium and right ventricle and intact and no vegetations on the pacemaker wire.  S/p Debridement of fascia 19-Oct-2023 12:51:29  Lauren Urbano, Open biopsy, bone, foot 19-Oct-2023 12:52:34  Lauren Urbano.  C/w Vancomycin (10/11-) dosing per pharmacy protocol  Patient will ultimately need LT IV Abx x6 wks until 11/22/23   --PICC ordered  --pt will need weekly CMP, CBC, ESR, CRP and vanc level faxed to [phone]    2-Pseudomonal UTI  S/p zosyn (10/11-10/12), changed to cefepime, completed on 10/18    D/w Dr. Harris  D/w CM    Infectious Diseases will continue to follow. Please call with any questions.   Rosa Maria Wilkinson M.D.  [facility] Division of Infectious Diseases [phone]  For after 5 P.M. and weekends, please call [phone]       78-year-old male presents to the hospital by ambulance from home.  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 Pt does not go to doctor on regular basis per son and does phone visits. Son reports years of struggling with foot infections with black areas of gangrene and their struggling to keep his feet but now feeling that keeping him alive is more important.  Culture - Blood (10.10.23 @ 18:25)    -  Methicillin resistant Staphylococcus aureus (MRSA): Detec  Repeat 10/12 BCx NGTD (48h)  Wcx with MRSA, placed on contact isolation   UCx + Pseudomonas, pansensitive including cefepime     RECOMMENDATIONS  1-MRSA bacteremia/Osteomyelitis   NM bone scan ordered and  Abnormal combined Indium-111 labeled leukocyte study and marrow scan. Increased uptake of both radiotracer is in the right heel and left midfoot, concerning for osteomyelitis.  Cardiology rec appreciated - sp GISSELL early to rule out IE or PPM involvement-Pacemaker wire is present in right atrium and right ventricle and intact and no vegetations on the pacemaker wire.  S/p Debridement of fascia 19-Oct-2023 12:51:29  Lauren Urbano, Open biopsy, bone, foot 19-Oct-2023 12:52:34  Lauren Urbano.  C/w Vancomycin (10/11-) dosing per pharmacy protocol  Patient will ultimately need LT IV Abx x6 wks until 11/22/23   --PICC ordered  --pt will need weekly CMP, CBC, ESR, CRP and vanc level faxed to [phone]    2-Pseudomonal UTI  S/p zosyn (10/11-10/12), changed to cefepime, completed on 10/18    D/w Dr. Harris  D/w CM    Infectious Diseases will continue to follow. Please call with any questions.   Rosa Maria Wilkinson M.D.  [facility] Division of Infectious Diseases [phone]  For after 5 P.M. and weekends, please call [phone]

## 2023-10-20 NOTE — PATIENT CHOICE NOTE. - NSPTCHOICESTATE_GEN_ALL_CORE
I have met with the patient and/or caregiver to discuss discharge goals and treatment plan. Patient and/or caregiver also provided with instructions on accessing the CMS Compare websites for additional information related to Post Acute Provider quality and resource use measures to assist them in evaluation of the providers and in selecting their post-acute provider of choice. Patient and caregiver were informed of the facilities that are owned and/or operated by [facility]. I have discussed with the patient the availability of in-network facilities and providers. Patient and caregiver provided with a list of post-acute providers whose services are appropriate to the discharge plans and patient needs.     For patient requiring durable medical equipment, patient and/or caregiver were informed that they have the right to request who provides the required equipment. I have met with the patient and/or caregiver to discuss discharge goals and treatment plan. Patient and/or caregiver also provided with instructions on accessing the CMS Compare websites for additional information related to Post Acute Provider quality and resource use measures to assist them in evaluation of the providers and in selecting their post-acute provider of choice. Patient and caregiver were informed of the facilities that are owned and/or operated by [facility]. I have discussed with the patient the availability of in-network facilities and providers. Patient and caregiver provided with a list of post-acute providers whose services are appropriate to the discharge plans and patient needs.     For patient requiring durable medical equipment, patient and/or caregiver were informed that they have the right to request who provides the required equipment. I have met with the patient and/or caregiver to discuss discharge goals and treatment plan. Patient and/or caregiver also provided with instructions on accessing the CMS Compare websites for additional information related to Post Acute Provider quality and resource use measures to assist them in evaluation of the providers and in selecting their post-acute provider of choice. Patient and caregiver were informed of the facilities that are owned and/or operated by [facility]. I have discussed with the patient the availability of in-network facilities and providers. Patient and caregiver provided with a list of post-acute providers whose services are appropriate to the discharge plans and patient needs.     For patient requiring durable medical equipment, patient and/or caregiver were informed that they have the right to request who provides the required equipment.

## 2023-10-20 NOTE — PROGRESS NOTE ADULT - SUBJECTIVE AND OBJECTIVE BOX
[facility], Division of Infectious Diseases  GLYNN Oakley Y. Patel, S. Shah, G. [facility]  [phone]    Name: HEMA LAZCANO  Age: 78y  Gender: Male  MRN: [id number]    Interval History:  Patient seen and examined at bedside   No acute overnight events. Afebrile  No complaints  Notes reviewed    Antibiotics:  vancomycin  IVPB 500 milliGRAM(s) IV Intermittent every 12 hours      Medications:  acetaminophen     Tablet .. 650 milliGRAM(s) Oral every 6 hours PRN  atorvastatin 80 milliGRAM(s) Oral at bedtime  bethanechol 25 milliGRAM(s) Oral two times a day  dextrose 50% Injectable 12.5 Gram(s) IV Push once  dextrose 50% Injectable 25 Gram(s) IV Push once  dextrose 50% Injectable 25 Gram(s) IV Push once  dextrose Oral Gel 15 Gram(s) Oral once PRN  gabapentin 300 milliGRAM(s) Oral two times a day  glucagon  Injectable 1 milliGRAM(s) IntraMuscular once  insulin lispro (ADMELOG) corrective regimen sliding scale   SubCutaneous three times a day before meals  insulin lispro (ADMELOG) corrective regimen sliding scale   SubCutaneous at bedtime  lactated ringers. 1000 milliLiter(s) IV Continuous <Continuous>  lactobacillus acidophilus 1 Tablet(s) Oral two times a day with meals  loperamide 2 milliGRAM(s) Oral three times a day PRN  midodrine. 10 milliGRAM(s) Oral three times a day  pantoprazole    Tablet 40 milliGRAM(s) Oral before breakfast  tamsulosin 0.4 milliGRAM(s) Oral at bedtime  vancomycin  IVPB 500 milliGRAM(s) IV Intermittent every 12 hours      Review of Systems:  A 10-point review of systems was obtained.   Review of systems otherwise negative except as previously noted.    Allergies: No Known Allergies    For details regarding the patient's past medical history, social history, family history, and other miscellaneous elements, please refer the initial infectious diseases consultation and/or the admitting history and physical examination for this admission.    Objective:  Vitals:   T(C): 36.9 (10-20-23 @ 05:31), Max: 37.3 (10-19-23 @ 12:17)  HR: 61 (10-20-23 @ 05:31) (60 - 61)  BP: 137/70 (10-20-23 @ 05:31) (100/46 - 137/70)  RR: 16 (10-20-23 @ 05:31) (12 - 18)  SpO2: 95% (10-20-23 @ 05:31) (94% - 100%)    Physical Examination:  General: no acute distress  HEENT: NC/AT, EOMI,   Cardio: S1, S2 heard, RRR, no murmurs  Resp: decreased breath sounds  Abd: soft, NT, ND  Ext: b/l dressing c/d/i  Skin: warm, dry, no visible rash      Laboratory Studies:  CBC:                       8.0    12.15 )-----------( 300      ( 20 Oct 2023 08:40 )             25.8     CMP: 10-20    139  |  108  |  17  ----------------------------<  246<H>  3.8   |  27  |  0.79    Ca    7.9<L>      20 Oct 2023 08:40        Urinalysis Basic - ( 20 Oct 2023 08:40 )    Color: x / Appearance: x / SG: x / pH: x  Gluc: 246 mg/dL / Ketone: x  / Bili: x / Urobili: x   Blood: x / Protein: x / Nitrite: x   Leuk Esterase: x / RBC: x / WBC x   Sq Epi: x / Non Sq Epi: x / Bacteria: x        Microbiology: reviewed    Culture - Tissue with Gram Stain (collected 10-19-23 @ 11:35)  Source: .Tissue Other, RIGHT FOOT SOFT TISSUE CULTURE  Gram Stain (10-19-23 @ 23:27):    Few polymorphonuclear leukocytes per low power field    Numerous Gram positive cocci in pairs per oil power field    Moderate Gram Variable Rods per oil power field    Culture - Tissue with Gram Stain (collected 10-19-23 @ 11:35)  Source: .Tissue Other, LEFT FOOT DEEP TISSUE CULTURE  Gram Stain (10-19-23 @ 23:29):    No polymorphonuclear cells seen per low power field    Moderate Gram positive cocci in pairs per oil power field    Culture - Tissue with Gram Stain (collected 10-19-23 @ 11:35)  Source: .Tissue Other, LEFT FOOT BONE CULTURE  Gram Stain (10-19-23 @ 23:02):    No polymorphonuclear cells seen per low power field    No organisms seen per oil power field    Culture - Blood (collected 10-12-23 @ 05:36)  Source: .Blood Blood-Peripheral  Final Report (10-17-23 @ 10:00):    No growth at 5 days    Culture - Blood (collected 10-12-23 @ 05:30)  Source: .Blood Blood-Venous  Final Report (10-17-23 @ 10:00):    No growth at 5 days    Culture - Other (collected 10-11-23 @ 13:13)  Source: Wound Wound  Final Report (10-13-23 @ 17:21):    Numerous Methicillin Resistant Staphylococcus aureus  Organism: Methicillin resistant Staphylococcus aureus (10-13-23 @ 17:21)  Organism: Methicillin resistant Staphylococcus aureus (10-13-23 @ 17:21)      -  Clindamycin: S <=0.25      -  Oxacillin: R >2      -  Gentamicin: S 2 Should not be used as monotherapy      -  Daptomycin: S 1      -  Linezolid: S 4      -  Cefazolin: R <=4      -  Vancomycin: S 2      -  Tetracycline: S <=1      Method Type: KARTHIK      -  Ampicillin/Sulbactam: R <=8/4      -  Penicillin: R 2      -  Rifampin: S <=1 Should not be used as monotherapy      -  Erythromycin: R >4      -  Trimethoprim/Sulfamethoxazole: S <=0.5/9.5    Culture - Urine (collected 10-10-23 @ 21:00)  Source: Catheterized Catheterized  Final Report (10-13-23 @ 17:40):    10,000 - 49,000 CFU/mL Pseudomonas aeruginosa  Organism: Pseudomonas aeruginosa (10-13-23 @ 17:40)  Organism: Pseudomonas aeruginosa (10-13-23 @ 17:40)      -  Levofloxacin: S <=0.5      -  Tobramycin: S <=2      -  Aztreonam: S <=4      -  Gentamicin: S <=2      -  Cefepime: S <=2      -  Piperacillin/Tazobactam: S <=8      -  Ciprofloxacin: S <=0.25      -  Imipenem: S <=1      Method Type: KARTHIK      -  Meropenem: S <=1      -  Ceftazidime: S <=1      -  Amikacin: S <=16    Culture - Blood (collected 10-10-23 @ 18:25)  Source: .Blood Blood-Peripheral  Gram Stain (10-11-23 @ 15:29):    Growth in aerobic bottle: Gram Positive Cocci in Clusters    Growth in anaerobic bottle: Gram Positive Cocci in Clusters  Final Report (10-13-23 @ 12:50):    Growth in aerobic and anaerobic bottles: Methicillin Resistant    Staphylococcus aureus    Direct identification is available within approximately 3-5    hours either by Blood Panel Multiplexed PCR or Direct    MALDI-TOF. Details: [email or web address]  Organism: Blood Culture PCR  Methicillin resistant Staphylococcus aureus (10-13-23 @ 12:50)  Organism: Methicillin resistant Staphylococcus aureus (10-13-23 @ 12:50)      -  Clindamycin: I 2      -  Oxacillin: R >2      -  Gentamicin: S <=1 Should not be used as monotherapy      -  Daptomycin: S 1      -  Linezolid: S 4      -  Cefazolin: R <=4      -  Vancomycin: S 1      -  Tetracycline: S <=1      Method Type: KARTHIK      -  Ampicillin/Sulbactam: R <=8/4      -  Penicillin: R 2      -  Rifampin: S <=1 Should not be used as monotherapy      -  Erythromycin: R >4      -  Trimethoprim/Sulfamethoxazole: S <=0.5/9.5  Organism: Blood Culture PCR (10-13-23 @ 12:50)      Method Type: PCR      -  Methicillin resistant Staphylococcus aureus (MRSA): Detec    Culture - Blood (collected 10-10-23 @ 18:25)  Source: .Blood Blood-Peripheral  Gram Stain (10-11-23 @ 21:56):    Growth in aerobic bottle: Gram Positive Cocci in Clusters    Growth in anaerobic bottle: Gram Positive Cocci in Clusters  Final Report (10-13-23 @ 18:46):    Growth in aerobic and anaerobic bottles: Methicillin Resistant    Staphylococcus aureus    See previous culture [id number]          Radiology: reviewed       [facility], Division of Infectious Diseases  GLYNN Oakley Y. Patel, S. Shah, G. [facility]  [phone]    Name: HEMA LAZCANO  Age: 78y  Gender: Male  MRN: [id number]    Interval History:  Patient seen and examined at bedside   No acute overnight events. Afebrile  No complaints  Notes reviewed    Antibiotics:  vancomycin  IVPB 500 milliGRAM(s) IV Intermittent every 12 hours      Medications:  acetaminophen     Tablet .. 650 milliGRAM(s) Oral every 6 hours PRN  atorvastatin 80 milliGRAM(s) Oral at bedtime  bethanechol 25 milliGRAM(s) Oral two times a day  dextrose 50% Injectable 12.5 Gram(s) IV Push once  dextrose 50% Injectable 25 Gram(s) IV Push once  dextrose 50% Injectable 25 Gram(s) IV Push once  dextrose Oral Gel 15 Gram(s) Oral once PRN  gabapentin 300 milliGRAM(s) Oral two times a day  glucagon  Injectable 1 milliGRAM(s) IntraMuscular once  insulin lispro (ADMELOG) corrective regimen sliding scale   SubCutaneous three times a day before meals  insulin lispro (ADMELOG) corrective regimen sliding scale   SubCutaneous at bedtime  lactated ringers. 1000 milliLiter(s) IV Continuous <Continuous>  lactobacillus acidophilus 1 Tablet(s) Oral two times a day with meals  loperamide 2 milliGRAM(s) Oral three times a day PRN  midodrine. 10 milliGRAM(s) Oral three times a day  pantoprazole    Tablet 40 milliGRAM(s) Oral before breakfast  tamsulosin 0.4 milliGRAM(s) Oral at bedtime  vancomycin  IVPB 500 milliGRAM(s) IV Intermittent every 12 hours      Review of Systems:  A 10-point review of systems was obtained.   Review of systems otherwise negative except as previously noted.    Allergies: No Known Allergies    For details regarding the patient's past medical history, social history, family history, and other miscellaneous elements, please refer the initial infectious diseases consultation and/or the admitting history and physical examination for this admission.    Objective:  Vitals:   T(C): 36.9 (10-20-23 @ 05:31), Max: 37.3 (10-19-23 @ 12:17)  HR: 61 (10-20-23 @ 05:31) (60 - 61)  BP: 137/70 (10-20-23 @ 05:31) (100/46 - 137/70)  RR: 16 (10-20-23 @ 05:31) (12 - 18)  SpO2: 95% (10-20-23 @ 05:31) (94% - 100%)    Physical Examination:  General: no acute distress  HEENT: NC/AT, EOMI,   Cardio: S1, S2 heard, RRR, no murmurs  Resp: decreased breath sounds  Abd: soft, NT, ND  Ext: b/l dressing c/d/i  Skin: warm, dry, no visible rash      Laboratory Studies:  CBC:                       8.0    12.15 )-----------( 300      ( 20 Oct 2023 08:40 )             25.8     CMP: 10-20    139  |  108  |  17  ----------------------------<  246<H>  3.8   |  27  |  0.79    Ca    7.9<L>      20 Oct 2023 08:40        Urinalysis Basic - ( 20 Oct 2023 08:40 )    Color: x / Appearance: x / SG: x / pH: x  Gluc: 246 mg/dL / Ketone: x  / Bili: x / Urobili: x   Blood: x / Protein: x / Nitrite: x   Leuk Esterase: x / RBC: x / WBC x   Sq Epi: x / Non Sq Epi: x / Bacteria: x        Microbiology: reviewed    Culture - Tissue with Gram Stain (collected 10-19-23 @ 11:35)  Source: .Tissue Other, RIGHT FOOT SOFT TISSUE CULTURE  Gram Stain (10-19-23 @ 23:27):    Few polymorphonuclear leukocytes per low power field    Numerous Gram positive cocci in pairs per oil power field    Moderate Gram Variable Rods per oil power field    Culture - Tissue with Gram Stain (collected 10-19-23 @ 11:35)  Source: .Tissue Other, LEFT FOOT DEEP TISSUE CULTURE  Gram Stain (10-19-23 @ 23:29):    No polymorphonuclear cells seen per low power field    Moderate Gram positive cocci in pairs per oil power field    Culture - Tissue with Gram Stain (collected 10-19-23 @ 11:35)  Source: .Tissue Other, LEFT FOOT BONE CULTURE  Gram Stain (10-19-23 @ 23:02):    No polymorphonuclear cells seen per low power field    No organisms seen per oil power field    Culture - Blood (collected 10-12-23 @ 05:36)  Source: .Blood Blood-Peripheral  Final Report (10-17-23 @ 10:00):    No growth at 5 days    Culture - Blood (collected 10-12-23 @ 05:30)  Source: .Blood Blood-Venous  Final Report (10-17-23 @ 10:00):    No growth at 5 days    Culture - Other (collected 10-11-23 @ 13:13)  Source: Wound Wound  Final Report (10-13-23 @ 17:21):    Numerous Methicillin Resistant Staphylococcus aureus  Organism: Methicillin resistant Staphylococcus aureus (10-13-23 @ 17:21)  Organism: Methicillin resistant Staphylococcus aureus (10-13-23 @ 17:21)      -  Clindamycin: S <=0.25      -  Oxacillin: R >2      -  Gentamicin: S 2 Should not be used as monotherapy      -  Daptomycin: S 1      -  Linezolid: S 4      -  Cefazolin: R <=4      -  Vancomycin: S 2      -  Tetracycline: S <=1      Method Type: KARTHIK      -  Ampicillin/Sulbactam: R <=8/4      -  Penicillin: R 2      -  Rifampin: S <=1 Should not be used as monotherapy      -  Erythromycin: R >4      -  Trimethoprim/Sulfamethoxazole: S <=0.5/9.5    Culture - Urine (collected 10-10-23 @ 21:00)  Source: Catheterized Catheterized  Final Report (10-13-23 @ 17:40):    10,000 - 49,000 CFU/mL Pseudomonas aeruginosa  Organism: Pseudomonas aeruginosa (10-13-23 @ 17:40)  Organism: Pseudomonas aeruginosa (10-13-23 @ 17:40)      -  Levofloxacin: S <=0.5      -  Tobramycin: S <=2      -  Aztreonam: S <=4      -  Gentamicin: S <=2      -  Cefepime: S <=2      -  Piperacillin/Tazobactam: S <=8      -  Ciprofloxacin: S <=0.25      -  Imipenem: S <=1      Method Type: KARTHIK      -  Meropenem: S <=1      -  Ceftazidime: S <=1      -  Amikacin: S <=16    Culture - Blood (collected 10-10-23 @ 18:25)  Source: .Blood Blood-Peripheral  Gram Stain (10-11-23 @ 15:29):    Growth in aerobic bottle: Gram Positive Cocci in Clusters    Growth in anaerobic bottle: Gram Positive Cocci in Clusters  Final Report (10-13-23 @ 12:50):    Growth in aerobic and anaerobic bottles: Methicillin Resistant    Staphylococcus aureus    Direct identification is available within approximately 3-5    hours either by Blood Panel Multiplexed PCR or Direct    MALDI-TOF. Details: [email or web address]  Organism: Blood Culture PCR  Methicillin resistant Staphylococcus aureus (10-13-23 @ 12:50)  Organism: Methicillin resistant Staphylococcus aureus (10-13-23 @ 12:50)      -  Clindamycin: I 2      -  Oxacillin: R >2      -  Gentamicin: S <=1 Should not be used as monotherapy      -  Daptomycin: S 1      -  Linezolid: S 4      -  Cefazolin: R <=4      -  Vancomycin: S 1      -  Tetracycline: S <=1      Method Type: KARTHIK      -  Ampicillin/Sulbactam: R <=8/4      -  Penicillin: R 2      -  Rifampin: S <=1 Should not be used as monotherapy      -  Erythromycin: R >4      -  Trimethoprim/Sulfamethoxazole: S <=0.5/9.5  Organism: Blood Culture PCR (10-13-23 @ 12:50)      Method Type: PCR      -  Methicillin resistant Staphylococcus aureus (MRSA): Detec    Culture - Blood (collected 10-10-23 @ 18:25)  Source: .Blood Blood-Peripheral  Gram Stain (10-11-23 @ 21:56):    Growth in aerobic bottle: Gram Positive Cocci in Clusters    Growth in anaerobic bottle: Gram Positive Cocci in Clusters  Final Report (10-13-23 @ 18:46):    Growth in aerobic and anaerobic bottles: Methicillin Resistant    Staphylococcus aureus    See previous culture [id number]          Radiology: reviewed       [facility]atta, Division of Infectious Diseases  GLYNN Oakley Y. Patel, S. Shah, G. [facility]  [phone]    Name: HEMA LAZCANO  Age: 78y  Gender: Male  MRN: [id number]    Interval History:  Patient seen and examined at bedside   No acute overnight events. Afebrile  No complaints  Notes reviewed    Antibiotics:  vancomycin  IVPB 500 milliGRAM(s) IV Intermittent every 12 hours      Medications:  acetaminophen     Tablet .. 650 milliGRAM(s) Oral every 6 hours PRN  atorvastatin 80 milliGRAM(s) Oral at bedtime  bethanechol 25 milliGRAM(s) Oral two times a day  dextrose 50% Injectable 12.5 Gram(s) IV Push once  dextrose 50% Injectable 25 Gram(s) IV Push once  dextrose 50% Injectable 25 Gram(s) IV Push once  dextrose Oral Gel 15 Gram(s) Oral once PRN  gabapentin 300 milliGRAM(s) Oral two times a day  glucagon  Injectable 1 milliGRAM(s) IntraMuscular once  insulin lispro (ADMELOG) corrective regimen sliding scale   SubCutaneous three times a day before meals  insulin lispro (ADMELOG) corrective regimen sliding scale   SubCutaneous at bedtime  lactated ringers. 1000 milliLiter(s) IV Continuous <Continuous>  lactobacillus acidophilus 1 Tablet(s) Oral two times a day with meals  loperamide 2 milliGRAM(s) Oral three times a day PRN  midodrine. 10 milliGRAM(s) Oral three times a day  pantoprazole    Tablet 40 milliGRAM(s) Oral before breakfast  tamsulosin 0.4 milliGRAM(s) Oral at bedtime  vancomycin  IVPB 500 milliGRAM(s) IV Intermittent every 12 hours      Review of Systems:  A 10-point review of systems was obtained.   Review of systems otherwise negative except as previously noted.    Allergies: No Known Allergies    For details regarding the patient's past medical history, social history, family history, and other miscellaneous elements, please refer the initial infectious diseases consultation and/or the admitting history and physical examination for this admission.    Objective:  Vitals:   T(C): 36.9 (10-20-23 @ 05:31), Max: 37.3 (10-19-23 @ 12:17)  HR: 61 (10-20-23 @ 05:31) (60 - 61)  BP: 137/70 (10-20-23 @ 05:31) (100/46 - 137/70)  RR: 16 (10-20-23 @ 05:31) (12 - 18)  SpO2: 95% (10-20-23 @ 05:31) (94% - 100%)    Physical Examination:  General: no acute distress  HEENT: NC/AT, EOMI,   Cardio: S1, S2 heard, RRR, no murmurs  Resp: decreased breath sounds  Abd: soft, NT, ND  Ext: b/l dressing c/d/i  Skin: warm, dry, no visible rash      Laboratory Studies:  CBC:                       8.0    12.15 )-----------( 300      ( 20 Oct 2023 08:40 )             25.8     CMP: 10-20    139  |  108  |  17  ----------------------------<  246<H>  3.8   |  27  |  0.79    Ca    7.9<L>      20 Oct 2023 08:40        Urinalysis Basic - ( 20 Oct 2023 08:40 )    Color: x / Appearance: x / SG: x / pH: x  Gluc: 246 mg/dL / Ketone: x  / Bili: x / Urobili: x   Blood: x / Protein: x / Nitrite: x   Leuk Esterase: x / RBC: x / WBC x   Sq Epi: x / Non Sq Epi: x / Bacteria: x        Microbiology: reviewed    Culture - Tissue with Gram Stain (collected 10-19-23 @ 11:35)  Source: .Tissue Other, RIGHT FOOT SOFT TISSUE CULTURE  Gram Stain (10-19-23 @ 23:27):    Few polymorphonuclear leukocytes per low power field    Numerous Gram positive cocci in pairs per oil power field    Moderate Gram Variable Rods per oil power field    Culture - Tissue with Gram Stain (collected 10-19-23 @ 11:35)  Source: .Tissue Other, LEFT FOOT DEEP TISSUE CULTURE  Gram Stain (10-19-23 @ 23:29):    No polymorphonuclear cells seen per low power field    Moderate Gram positive cocci in pairs per oil power field    Culture - Tissue with Gram Stain (collected 10-19-23 @ 11:35)  Source: .Tissue Other, LEFT FOOT BONE CULTURE  Gram Stain (10-19-23 @ 23:02):    No polymorphonuclear cells seen per low power field    No organisms seen per oil power field    Culture - Blood (collected 10-12-23 @ 05:36)  Source: .Blood Blood-Peripheral  Final Report (10-17-23 @ 10:00):    No growth at 5 days    Culture - Blood (collected 10-12-23 @ 05:30)  Source: .Blood Blood-Venous  Final Report (10-17-23 @ 10:00):    No growth at 5 days    Culture - Other (collected 10-11-23 @ 13:13)  Source: Wound Wound  Final Report (10-13-23 @ 17:21):    Numerous Methicillin Resistant Staphylococcus aureus  Organism: Methicillin resistant Staphylococcus aureus (10-13-23 @ 17:21)  Organism: Methicillin resistant Staphylococcus aureus (10-13-23 @ 17:21)      -  Clindamycin: S <=0.25      -  Oxacillin: R >2      -  Gentamicin: S 2 Should not be used as monotherapy      -  Daptomycin: S 1      -  Linezolid: S 4      -  Cefazolin: R <=4      -  Vancomycin: S 2      -  Tetracycline: S <=1      Method Type: KARTHIK      -  Ampicillin/Sulbactam: R <=8/4      -  Penicillin: R 2      -  Rifampin: S <=1 Should not be used as monotherapy      -  Erythromycin: R >4      -  Trimethoprim/Sulfamethoxazole: S <=0.5/9.5    Culture - Urine (collected 10-10-23 @ 21:00)  Source: Catheterized Catheterized  Final Report (10-13-23 @ 17:40):    10,000 - 49,000 CFU/mL Pseudomonas aeruginosa  Organism: Pseudomonas aeruginosa (10-13-23 @ 17:40)  Organism: Pseudomonas aeruginosa (10-13-23 @ 17:40)      -  Levofloxacin: S <=0.5      -  Tobramycin: S <=2      -  Aztreonam: S <=4      -  Gentamicin: S <=2      -  Cefepime: S <=2      -  Piperacillin/Tazobactam: S <=8      -  Ciprofloxacin: S <=0.25      -  Imipenem: S <=1      Method Type: KARTHIK      -  Meropenem: S <=1      -  Ceftazidime: S <=1      -  Amikacin: S <=16    Culture - Blood (collected 10-10-23 @ 18:25)  Source: .Blood Blood-Peripheral  Gram Stain (10-11-23 @ 15:29):    Growth in aerobic bottle: Gram Positive Cocci in Clusters    Growth in anaerobic bottle: Gram Positive Cocci in Clusters  Final Report (10-13-23 @ 12:50):    Growth in aerobic and anaerobic bottles: Methicillin Resistant    Staphylococcus aureus    Direct identification is available within approximately 3-5    hours either by Blood Panel Multiplexed PCR or Direct    MALDI-TOF. Details: [email or web address]  Organism: Blood Culture PCR  Methicillin resistant Staphylococcus aureus (10-13-23 @ 12:50)  Organism: Methicillin resistant Staphylococcus aureus (10-13-23 @ 12:50)      -  Clindamycin: I 2      -  Oxacillin: R >2      -  Gentamicin: S <=1 Should not be used as monotherapy      -  Daptomycin: S 1      -  Linezolid: S 4      -  Cefazolin: R <=4      -  Vancomycin: S 1      -  Tetracycline: S <=1      Method Type: KARTHIK      -  Ampicillin/Sulbactam: R <=8/4      -  Penicillin: R 2      -  Rifampin: S <=1 Should not be used as monotherapy      -  Erythromycin: R >4      -  Trimethoprim/Sulfamethoxazole: S <=0.5/9.5  Organism: Blood Culture PCR (10-13-23 @ 12:50)      Method Type: PCR      -  Methicillin resistant Staphylococcus aureus (MRSA): Detec    Culture - Blood (collected 10-10-23 @ 18:25)  Source: .Blood Blood-Peripheral  Gram Stain (10-11-23 @ 21:56):    Growth in aerobic bottle: Gram Positive Cocci in Clusters    Growth in anaerobic bottle: Gram Positive Cocci in Clusters  Final Report (10-13-23 @ 18:46):    Growth in aerobic and anaerobic bottles: Methicillin Resistant    Staphylococcus aureus    See previous culture [id number]          Radiology: reviewed

## 2023-10-20 NOTE — PROGRESS NOTE ADULT - PROBLEM SELECTOR PLAN 1
Patient examined and evaluated.  Surgical site dressed with silver alginate and dry, sterile dressing.  Surgical pathology and cultures pending at this time.  Antibiotics as per ID recommendations.    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

## 2023-10-20 NOTE — PROGRESS NOTE ADULT - SUBJECTIVE AND OBJECTIVE BOX
[facility]  Les Mccray PA-C  [address] [place] [address]  [phone]      INTERVAL HPI/OVERNIGHT EVENTS:  Pt s/e  Tolerating diet    MEDICATIONS  (STANDING):  atorvastatin 80 milliGRAM(s) Oral at bedtime  bethanechol 25 milliGRAM(s) Oral two times a day  dextrose 50% Injectable 12.5 Gram(s) IV Push once  dextrose 50% Injectable 25 Gram(s) IV Push once  dextrose 50% Injectable 25 Gram(s) IV Push once  gabapentin 300 milliGRAM(s) Oral two times a day  glucagon  Injectable 1 milliGRAM(s) IntraMuscular once  insulin lispro (ADMELOG) corrective regimen sliding scale   SubCutaneous at bedtime  insulin lispro (ADMELOG) corrective regimen sliding scale   SubCutaneous three times a day before meals  lactated ringers. 1000 milliLiter(s) (100 mL/Hr) IV Continuous <Continuous>  lactobacillus acidophilus 1 Tablet(s) Oral two times a day with meals  midodrine. 10 milliGRAM(s) Oral three times a day  pantoprazole    Tablet 40 milliGRAM(s) Oral before breakfast  tamsulosin 0.4 milliGRAM(s) Oral at bedtime  vancomycin  IVPB 500 milliGRAM(s) IV Intermittent every 12 hours    MEDICATIONS  (PRN):  acetaminophen     Tablet .. 650 milliGRAM(s) Oral every 6 hours PRN Temp greater or equal to 38C (100.4F), Moderate Pain (4 - 6)  dextrose Oral Gel 15 Gram(s) Oral once PRN Blood Glucose LESS THAN 70 milliGRAM(s)/deciliter  loperamide 2 milliGRAM(s) Oral three times a day PRN Diarrhea      Allergies    No Known Allergies      PHYSICAL EXAM:   Vital Signs:  Vital Signs Last 24 Hrs  T(C): 36.9 (20 Oct 2023 05:31), Max: 37.3 (19 Oct 2023 12:17)  T(F): 98.5 (20 Oct 2023 05:31), Max: 99.1 (19 Oct 2023 12:17)  HR: 61 (20 Oct 2023 05:31) (60 - 61)  BP: 137/70 (20 Oct 2023 05:31) (100/46 - 137/70)  BP(mean): --  RR: 16 (20 Oct 2023 05:31) (12 - 18)  SpO2: 95% (20 Oct 2023 05:31) (94% - 100%)    Parameters below as of 20 Oct 2023 05:31  Patient On (Oxygen Delivery Method): room air    GENERAL:  Appears stated age  HEENT:  NC/AT  CHEST:  Full & symmetric excursion  HEART:  Regular rhythm  ABDOMEN:  Soft, non-tender, non-distended  EXTEREMITIES:  no cyanosis  SKIN:  No rash  NEURO:  Alert      LABS:                        8.0    12.15 )-----------( 300      ( 20 Oct 2023 08:40 )             25.8     10-20    139  |  108  |  17  ----------------------------<  246<H>  3.8   |  27  |  0.79    Ca    7.9<L>      20 Oct 2023 08:40        Urinalysis Basic - ( 20 Oct 2023 08:40 )    Color: x / Appearance: x / SG: x / pH: x  Gluc: 246 mg/dL / Ketone: x  / Bili: x / Urobili: x   Blood: x / Protein: x / Nitrite: x   Leuk Esterase: x / RBC: x / WBC x   Sq Epi: x / Non Sq Epi: x / Bacteria: x     [facility]  Les Mccray PA-C  [address] [place] [address]  [phone]      INTERVAL HPI/OVERNIGHT EVENTS:  Pt s/e  Tolerating diet    MEDICATIONS  (STANDING):  atorvastatin 80 milliGRAM(s) Oral at bedtime  bethanechol 25 milliGRAM(s) Oral two times a day  dextrose 50% Injectable 12.5 Gram(s) IV Push once  dextrose 50% Injectable 25 Gram(s) IV Push once  dextrose 50% Injectable 25 Gram(s) IV Push once  gabapentin 300 milliGRAM(s) Oral two times a day  glucagon  Injectable 1 milliGRAM(s) IntraMuscular once  insulin lispro (ADMELOG) corrective regimen sliding scale   SubCutaneous at bedtime  insulin lispro (ADMELOG) corrective regimen sliding scale   SubCutaneous three times a day before meals  lactated ringers. 1000 milliLiter(s) (100 mL/Hr) IV Continuous <Continuous>  lactobacillus acidophilus 1 Tablet(s) Oral two times a day with meals  midodrine. 10 milliGRAM(s) Oral three times a day  pantoprazole    Tablet 40 milliGRAM(s) Oral before breakfast  tamsulosin 0.4 milliGRAM(s) Oral at bedtime  vancomycin  IVPB 500 milliGRAM(s) IV Intermittent every 12 hours    MEDICATIONS  (PRN):  acetaminophen     Tablet .. 650 milliGRAM(s) Oral every 6 hours PRN Temp greater or equal to 38C (100.4F), Moderate Pain (4 - 6)  dextrose Oral Gel 15 Gram(s) Oral once PRN Blood Glucose LESS THAN 70 milliGRAM(s)/deciliter  loperamide 2 milliGRAM(s) Oral three times a day PRN Diarrhea      Allergies    No Known Allergies      PHYSICAL EXAM:   Vital Signs:  Vital Signs Last 24 Hrs  T(C): 36.9 (20 Oct 2023 05:31), Max: 37.3 (19 Oct 2023 12:17)  T(F): 98.5 (20 Oct 2023 05:31), Max: 99.1 (19 Oct 2023 12:17)  HR: 61 (20 Oct 2023 05:31) (60 - 61)  BP: 137/70 (20 Oct 2023 05:31) (100/46 - 137/70)  BP(mean): --  RR: 16 (20 Oct 2023 05:31) (12 - 18)  SpO2: 95% (20 Oct 2023 05:31) (94% - 100%)    Parameters below as of 20 Oct 2023 05:31  Patient On (Oxygen Delivery Method): room air    GENERAL:  Appears stated age  HEENT:  NC/AT  CHEST:  Full & symmetric excursion  HEART:  Regular rhythm  ABDOMEN:  Soft, non-tender, non-distended  EXTEREMITIES:  no cyanosis  SKIN:  No rash  NEURO:  Alert      LABS:                        8.0    12.15 )-----------( 300      ( 20 Oct 2023 08:40 )             25.8     10-20    139  |  108  |  17  ----------------------------<  246<H>  3.8   |  27  |  0.79    Ca    7.9<L>      20 Oct 2023 08:40        Urinalysis Basic - ( 20 Oct 2023 08:40 )    Color: x / Appearance: x / SG: x / pH: x  Gluc: 246 mg/dL / Ketone: x  / Bili: x / Urobili: x   Blood: x / Protein: x / Nitrite: x   Leuk Esterase: x / RBC: x / WBC x   Sq Epi: x / Non Sq Epi: x / Bacteria: x     [facility]  Les Mccray PA-C  [address] [place] [address]  [phone]      INTERVAL HPI/OVERNIGHT EVENTS:  Pt s/e  Tolerating diet    MEDICATIONS  (STANDING):  atorvastatin 80 milliGRAM(s) Oral at bedtime  bethanechol 25 milliGRAM(s) Oral two times a day  dextrose 50% Injectable 12.5 Gram(s) IV Push once  dextrose 50% Injectable 25 Gram(s) IV Push once  dextrose 50% Injectable 25 Gram(s) IV Push once  gabapentin 300 milliGRAM(s) Oral two times a day  glucagon  Injectable 1 milliGRAM(s) IntraMuscular once  insulin lispro (ADMELOG) corrective regimen sliding scale   SubCutaneous at bedtime  insulin lispro (ADMELOG) corrective regimen sliding scale   SubCutaneous three times a day before meals  lactated ringers. 1000 milliLiter(s) (100 mL/Hr) IV Continuous <Continuous>  lactobacillus acidophilus 1 Tablet(s) Oral two times a day with meals  midodrine. 10 milliGRAM(s) Oral three times a day  pantoprazole    Tablet 40 milliGRAM(s) Oral before breakfast  tamsulosin 0.4 milliGRAM(s) Oral at bedtime  vancomycin  IVPB 500 milliGRAM(s) IV Intermittent every 12 hours    MEDICATIONS  (PRN):  acetaminophen     Tablet .. 650 milliGRAM(s) Oral every 6 hours PRN Temp greater or equal to 38C (100.4F), Moderate Pain (4 - 6)  dextrose Oral Gel 15 Gram(s) Oral once PRN Blood Glucose LESS THAN 70 milliGRAM(s)/deciliter  loperamide 2 milliGRAM(s) Oral three times a day PRN Diarrhea      Allergies    No Known Allergies      PHYSICAL EXAM:   Vital Signs:  Vital Signs Last 24 Hrs  T(C): 36.9 (20 Oct 2023 05:31), Max: 37.3 (19 Oct 2023 12:17)  T(F): 98.5 (20 Oct 2023 05:31), Max: 99.1 (19 Oct 2023 12:17)  HR: 61 (20 Oct 2023 05:31) (60 - 61)  BP: 137/70 (20 Oct 2023 05:31) (100/46 - 137/70)  BP(mean): --  RR: 16 (20 Oct 2023 05:31) (12 - 18)  SpO2: 95% (20 Oct 2023 05:31) (94% - 100%)    Parameters below as of 20 Oct 2023 05:31  Patient On (Oxygen Delivery Method): room air    GENERAL:  Appears stated age  HEENT:  NC/AT  CHEST:  Full & symmetric excursion  HEART:  Regular rhythm  ABDOMEN:  Soft, non-tender, non-distended  EXTEREMITIES:  no cyanosis  SKIN:  No rash  NEURO:  Alert      LABS:                        8.0    12.15 )-----------( 300      ( 20 Oct 2023 08:40 )             25.8     10-20    139  |  108  |  17  ----------------------------<  246<H>  3.8   |  27  |  0.79    Ca    7.9<L>      20 Oct 2023 08:40        Urinalysis Basic - ( 20 Oct 2023 08:40 )    Color: x / Appearance: x / SG: x / pH: x  Gluc: 246 mg/dL / Ketone: x  / Bili: x / Urobili: x   Blood: x / Protein: x / Nitrite: x   Leuk Esterase: x / RBC: x / WBC x   Sq Epi: x / Non Sq Epi: x / Bacteria: x     Repair Anesthesia Method: local infiltration

## 2023-10-20 NOTE — PROVIDER CONTACT NOTE (CRITICAL VALUE NOTIFICATION) - DATE AND TIME:
-- DO NOT REPLY / DO NOT REPLY ALL --  -- Message is from the [facility]--    COVID-19 Universal Screening: N/A - Not about scheduling    General Patient Message      Reason for Call: Patient would like to let the doctor know that she was in the [facility] ER yesterday and they preformed EKG, chest x-ray and blood work. Patient was not seen by a doctor since they were there for about 8 hours and they stated it would be another 8 hours until they see a doctor. Patient would like to obtain results. Please reach out to assist at [phone].    Caller Information       Type Contact Phone    05/19/2021 09:14 AM CDT Phone (Incoming) AMNA MITCHELL (Emergency Contact) [phone]          Alternative phone number: [phone]    Turnaround time given to caller:   \"This message will be sent to [state Provider's name]. The clinical team will fulfill your request as soon as they review your message.\"     20-Oct-2023 02:30 20-Oct-2023 02:34

## 2023-10-20 NOTE — PATIENT CHOICE NOTE. - NSPTCHOICENOTES_GEN_ALL_CORE
spoke with son, Nico, via phone, discussed d/c plan, folder with choices of [facility] and [facility] left at bedside with CM and SW contact information.  He is aware both will remain available.

## 2023-10-20 NOTE — CAREGIVER ENGAGEMENT NOTE - CAREGIVER EDUCATION NOTES - FREE TEXT
VM message left for pts spouse re: anticipated d/c home mond/tues with home IV abx and CHHA.  Received a call back from pts son as noted.  He states he was told by Dr. VAZQUEZ that pt will likely require another surgical procedure and then be discharged with wound care and long term antibiotics.  He states he and his mother are considering [facility] choices and is aware an additional resource folder will be left at bedside.  He is aware of need for 6 [facility] choices and the instructions to check the medicare website are in the folder also.  He verbalized understanding.

## 2023-10-20 NOTE — PROGRESS NOTE ADULT - SUBJECTIVE AND OBJECTIVE BOX
78y year old Male seen at [facility] 3WES 365 D1 s/p sharp excisional debridement of bilateral foot wounds with bone biopsy of the left lateral foot wound DOS 10/19/23. Patient relates no overnight events and states that they are doing well at this time. Patient denies any pain to the foot wounds.   Denies any fever, chills, nausea, vomiting, chest pain, shortness of breath, or calf pain at this time.    Allergies    No Known Allergies    Intolerances        MEDICATIONS  (STANDING):  atorvastatin 80 milliGRAM(s) Oral at bedtime  bethanechol 25 milliGRAM(s) Oral two times a day  dextrose 50% Injectable 12.5 Gram(s) IV Push once  dextrose 50% Injectable 25 Gram(s) IV Push once  dextrose 50% Injectable 25 Gram(s) IV Push once  gabapentin 300 milliGRAM(s) Oral two times a day  glucagon  Injectable 1 milliGRAM(s) IntraMuscular once  insulin lispro (ADMELOG) corrective regimen sliding scale   SubCutaneous three times a day before meals  insulin lispro (ADMELOG) corrective regimen sliding scale   SubCutaneous at bedtime  lactated ringers. 1000 milliLiter(s) (100 mL/Hr) IV Continuous <Continuous>  lactobacillus acidophilus 1 Tablet(s) Oral two times a day with meals  midodrine. 10 milliGRAM(s) Oral three times a day  pantoprazole    Tablet 40 milliGRAM(s) Oral before breakfast  tamsulosin 0.4 milliGRAM(s) Oral at bedtime  vancomycin  IVPB 500 milliGRAM(s) IV Intermittent every 12 hours    MEDICATIONS  (PRN):  acetaminophen     Tablet .. 650 milliGRAM(s) Oral every 6 hours PRN Temp greater or equal to 38C (100.4F), Moderate Pain (4 - 6)  dextrose Oral Gel 15 Gram(s) Oral once PRN Blood Glucose LESS THAN 70 milliGRAM(s)/deciliter  loperamide 2 milliGRAM(s) Oral three times a day PRN Diarrhea      Vital Signs Last 24 Hrs  T(C): 36.8 (20 Oct 2023 13:04), Max: 36.9 (19 Oct 2023 22:07)  T(F): 98.3 (20 Oct 2023 13:04), Max: 98.5 (20 Oct 2023 05:31)  HR: 61 (20 Oct 2023 13:04) (60 - 61)  BP: 121/68 (20 Oct 2023 13:04) (112/59 - 137/70)  BP(mean): --  RR: 17 (20 Oct 2023 13:04) (16 - 18)  SpO2: 97% (20 Oct 2023 13:04) (94% - 97%)    Parameters below as of 20 Oct 2023 13:04  Patient On (Oxygen Delivery Method): room air      PHYSICAL EXAM:  Vascular: DP & PT non  palpable bilaterally, Capillary refill delayed to the digits  Digital hair absent bilaterally  Neurological: Light touch sensation diminished  bilaterally  Musculoskeletal: 2/5  strength in all quadrants bilaterally, s/p right partial calcanectomy   Dermatological: Right heel wound noted with granular tissue and measuring 5.5x 6.6 x down to bone and left lateral wound 5.5 x 4.5 x down to bone  mild priscilla-incision erythema, no proximal streaking, no fluctuance, no malodor     CBC Full  -  ( 20 Oct 2023 08:40 )  WBC Count : 12.15 K/uL  RBC Count : 3.13 M/uL  Hemoglobin : 8.0 g/dL  Hematocrit : 25.8 %  Platelet Count - Automated : 300 K/uL  Mean Cell Volume : 82.4 fl  Mean Cell Hemoglobin : 25.6 pg  Mean Cell Hemoglobin Concentration : 31.0 gm/dL  Auto Neutrophil # : x  Auto Lymphocyte # : x  Auto Monocyte # : x  Auto Eosinophil # : x  Auto Basophil # : x  Auto Neutrophil % : x  Auto Lymphocyte % : x  Auto Monocyte % : x  Auto Eosinophil % : x  Auto Basophil % : x      10-20    139  |  108  |  17  ----------------------------<  246<H>  3.8   |  27  |  0.79    Ca    7.9<L>      20 Oct 2023 08:40                            8.0    12.15 )-----------( 300      ( 20 Oct 2023 08:40 )             25.8       Culture - Tissue with Gram Stain (collected 19 Oct 2023 11:35)  Source: .Tissue Other, LEFT FOOT DEEP TISSUE CULTURE  Gram Stain (19 Oct 2023 23:29):    No polymorphonuclear cells seen per low power field    Moderate Gram positive cocci in pairs per oil power field  Preliminary Report (20 Oct 2023 17:42):    Numerous Staphylococcus aureus    Culture - Tissue with Gram Stain (collected 19 Oct 2023 11:35)  Source: .Tissue Other, LEFT FOOT BONE CULTURE  Gram Stain (19 Oct 2023 23:02):    No polymorphonuclear cells seen per low power field    No organisms seen per oil power field  Preliminary Report (20 Oct 2023 17:43):    Rare Staphylococcus aureus    Culture - Tissue with Gram Stain (collected 19 Oct 2023 11:35)  Source: .Tissue Other, RIGHT FOOT SOFT TISSUE CULTURE  Gram Stain (19 Oct 2023 23:27):    Few polymorphonuclear leukocytes per low power field    Numerous Gram positive cocci in pairs per oil power field    Moderate Gram Variable Rods per oil power field  Preliminary Report (20 Oct 2023 17:41):    Numerous Staphylococcus aureus        Imaging: ----------    ACC: [id number] EXAM: XR FOOT COMP MIN 3 VIEWS LT ORDERED BY: MAHSA SAN    PROCEDURE DATE: 10/19/2023        INTERPRETATION: Status post bone biopsy.    3 views left foot. Comparison 10/10/2023.    IMPRESSION:  There are new soft tissue and bony defects in the lateral left mid foot at the base of the fifth metatarsal and lateral carpus. This could all reflect recent bone biopsy. Cannot exclude osteomyelitis. Recommend MR for additional characterization. The remainder of the foot is unchanged. Severe diffuse patchy demineralization. No acute fractures or dislocation.    --- End of Report ---            RICHARD GALAN MD; Attending Radiologist  This document has been electronically signed. Oct 20 2023 12:28PM 78y year old Male seen at [facility] 3WES 365 D1 s/p sharp excisional debridement of bilateral foot wounds with bone biopsy of the left lateral foot wound DOS 10/19/23. Patient relates no overnight events and states that they are doing well at this time. Patient denies any pain to the foot wounds.   Denies any fever, chills, nausea, vomiting, chest pain, shortness of breath, or calf pain at this time.    Allergies    No Known Allergies    Intolerances        MEDICATIONS  (STANDING):  atorvastatin 80 milliGRAM(s) Oral at bedtime  bethanechol 25 milliGRAM(s) Oral two times a day  dextrose 50% Injectable 12.5 Gram(s) IV Push once  dextrose 50% Injectable 25 Gram(s) IV Push once  dextrose 50% Injectable 25 Gram(s) IV Push once  gabapentin 300 milliGRAM(s) Oral two times a day  glucagon  Injectable 1 milliGRAM(s) IntraMuscular once  insulin lispro (ADMELOG) corrective regimen sliding scale   SubCutaneous three times a day before meals  insulin lispro (ADMELOG) corrective regimen sliding scale   SubCutaneous at bedtime  lactated ringers. 1000 milliLiter(s) (100 mL/Hr) IV Continuous <Continuous>  lactobacillus acidophilus 1 Tablet(s) Oral two times a day with meals  midodrine. 10 milliGRAM(s) Oral three times a day  pantoprazole    Tablet 40 milliGRAM(s) Oral before breakfast  tamsulosin 0.4 milliGRAM(s) Oral at bedtime  vancomycin  IVPB 500 milliGRAM(s) IV Intermittent every 12 hours    MEDICATIONS  (PRN):  acetaminophen     Tablet .. 650 milliGRAM(s) Oral every 6 hours PRN Temp greater or equal to 38C (100.4F), Moderate Pain (4 - 6)  dextrose Oral Gel 15 Gram(s) Oral once PRN Blood Glucose LESS THAN 70 milliGRAM(s)/deciliter  loperamide 2 milliGRAM(s) Oral three times a day PRN Diarrhea      Vital Signs Last 24 Hrs  T(C): 36.8 (20 Oct 2023 13:04), Max: 36.9 (19 Oct 2023 22:07)  T(F): 98.3 (20 Oct 2023 13:04), Max: 98.5 (20 Oct 2023 05:31)  HR: 61 (20 Oct 2023 13:04) (60 - 61)  BP: 121/68 (20 Oct 2023 13:04) (112/59 - 137/70)  BP(mean): --  RR: 17 (20 Oct 2023 13:04) (16 - 18)  SpO2: 97% (20 Oct 2023 13:04) (94% - 97%)    Parameters below as of 20 Oct 2023 13:04  Patient On (Oxygen Delivery Method): room air      PHYSICAL EXAM:  Vascular: DP & PT non  palpable bilaterally, Capillary refill delayed to the digits  Digital hair absent bilaterally  Neurological: Light touch sensation diminished  bilaterally  Musculoskeletal: 2/5  strength in all quadrants bilaterally, s/p right partial calcanectomy   Dermatological: Right heel wound noted with granular tissue and measuring 5.5x 6.6 x down to bone and left lateral wound 5.5 x 4.5 x down to bone  mild priscilla-incision erythema, no proximal streaking, no fluctuance, no malodor     CBC Full  -  ( 20 Oct 2023 08:40 )  WBC Count : 12.15 K/uL  RBC Count : 3.13 M/uL  Hemoglobin : 8.0 g/dL  Hematocrit : 25.8 %  Platelet Count - Automated : 300 K/uL  Mean Cell Volume : 82.4 fl  Mean Cell Hemoglobin : 25.6 pg  Mean Cell Hemoglobin Concentration : 31.0 gm/dL  Auto Neutrophil # : x  Auto Lymphocyte # : x  Auto Monocyte # : x  Auto Eosinophil # : x  Auto Basophil # : x  Auto Neutrophil % : x  Auto Lymphocyte % : x  Auto Monocyte % : x  Auto Eosinophil % : x  Auto Basophil % : x      10-20    139  |  108  |  17  ----------------------------<  246<H>  3.8   |  27  |  0.79    Ca    7.9<L>      20 Oct 2023 08:40                            8.0    12.15 )-----------( 300      ( 20 Oct 2023 08:40 )             25.8       Culture - Tissue with Gram Stain (collected 19 Oct 2023 11:35)  Source: .Tissue Other, LEFT FOOT DEEP TISSUE CULTURE  Gram Stain (19 Oct 2023 23:29):    No polymorphonuclear cells seen per low power field    Moderate Gram positive cocci in pairs per oil power field  Preliminary Report (20 Oct 2023 17:42):    Numerous Staphylococcus aureus    Culture - Tissue with Gram Stain (collected 19 Oct 2023 11:35)  Source: .Tissue Other, LEFT FOOT BONE CULTURE  Gram Stain (19 Oct 2023 23:02):    No polymorphonuclear cells seen per low power field    No organisms seen per oil power field  Preliminary Report (20 Oct 2023 17:43):    Rare Staphylococcus aureus    Culture - Tissue with Gram Stain (collected 19 Oct 2023 11:35)  Source: .Tissue Other, RIGHT FOOT SOFT TISSUE CULTURE  Gram Stain (19 Oct 2023 23:27):    Few polymorphonuclear leukocytes per low power field    Numerous Gram positive cocci in pairs per oil power field    Moderate Gram Variable Rods per oil power field  Preliminary Report (20 Oct 2023 17:41):    Numerous Staphylococcus aureus        Imaging: ----------    ACC: [id number] EXAM: XR FOOT COMP MIN 3 VIEWS LT ORDERED BY: MAHSA SAN    PROCEDURE DATE: 10/19/2023        INTERPRETATION: Status post bone biopsy.    3 views left foot. Comparison 10/10/2023.    IMPRESSION:  There are new soft tissue and bony defects in the lateral left mid foot at the base of the fifth metatarsal and lateral carpus. This could all reflect recent bone biopsy. Cannot exclude osteomyelitis. Recommend MR for additional characterization. The remainder of the foot is unchanged. Severe diffuse patchy demineralization. No acute fractures or dislocation.    --- End of Report ---            RICHARD GALAN MD; Attending Radiologist  This document has been electronically signed. Oct 20 2023 12:28PM 78y year old Male seen at [facility] 3WES 365 D1 s/p sharp excisional debridement of bilateral foot wounds with bone biopsy of the left lateral foot wound DOS 10/19/23. Patient relates no overnight events and states that they are doing well at this time. Patient denies any pain to the foot wounds.   Denies any fever, chills, nausea, vomiting, chest pain, shortness of breath, or calf pain at this time.    Allergies    No Known Allergies    Intolerances        MEDICATIONS  (STANDING):  atorvastatin 80 milliGRAM(s) Oral at bedtime  bethanechol 25 milliGRAM(s) Oral two times a day  dextrose 50% Injectable 12.5 Gram(s) IV Push once  dextrose 50% Injectable 25 Gram(s) IV Push once  dextrose 50% Injectable 25 Gram(s) IV Push once  gabapentin 300 milliGRAM(s) Oral two times a day  glucagon  Injectable 1 milliGRAM(s) IntraMuscular once  insulin lispro (ADMELOG) corrective regimen sliding scale   SubCutaneous three times a day before meals  insulin lispro (ADMELOG) corrective regimen sliding scale   SubCutaneous at bedtime  lactated ringers. 1000 milliLiter(s) (100 mL/Hr) IV Continuous <Continuous>  lactobacillus acidophilus 1 Tablet(s) Oral two times a day with meals  midodrine. 10 milliGRAM(s) Oral three times a day  pantoprazole    Tablet 40 milliGRAM(s) Oral before breakfast  tamsulosin 0.4 milliGRAM(s) Oral at bedtime  vancomycin  IVPB 500 milliGRAM(s) IV Intermittent every 12 hours    MEDICATIONS  (PRN):  acetaminophen     Tablet .. 650 milliGRAM(s) Oral every 6 hours PRN Temp greater or equal to 38C (100.4F), Moderate Pain (4 - 6)  dextrose Oral Gel 15 Gram(s) Oral once PRN Blood Glucose LESS THAN 70 milliGRAM(s)/deciliter  loperamide 2 milliGRAM(s) Oral three times a day PRN Diarrhea      Vital Signs Last 24 Hrs  T(C): 36.8 (20 Oct 2023 13:04), Max: 36.9 (19 Oct 2023 22:07)  T(F): 98.3 (20 Oct 2023 13:04), Max: 98.5 (20 Oct 2023 05:31)  HR: 61 (20 Oct 2023 13:04) (60 - 61)  BP: 121/68 (20 Oct 2023 13:04) (112/59 - 137/70)  BP(mean): --  RR: 17 (20 Oct 2023 13:04) (16 - 18)  SpO2: 97% (20 Oct 2023 13:04) (94% - 97%)    Parameters below as of 20 Oct 2023 13:04  Patient On (Oxygen Delivery Method): room air      PHYSICAL EXAM:  Vascular: DP & PT non  palpable bilaterally, Capillary refill delayed to the digits  Digital hair absent bilaterally  Neurological: Light touch sensation diminished  bilaterally  Musculoskeletal: 2/5  strength in all quadrants bilaterally, s/p right partial calcanectomy   Dermatological: Right heel wound noted with granular tissue and measuring 5.5x 6.6 x down to bone and left lateral wound 5.5 x 4.5 x down to bone  mild priscilla-incision erythema, no proximal streaking, no fluctuance, no malodor     CBC Full  -  ( 20 Oct 2023 08:40 )  WBC Count : 12.15 K/uL  RBC Count : 3.13 M/uL  Hemoglobin : 8.0 g/dL  Hematocrit : 25.8 %  Platelet Count - Automated : 300 K/uL  Mean Cell Volume : 82.4 fl  Mean Cell Hemoglobin : 25.6 pg  Mean Cell Hemoglobin Concentration : 31.0 gm/dL  Auto Neutrophil # : x  Auto Lymphocyte # : x  Auto Monocyte # : x  Auto Eosinophil # : x  Auto Basophil # : x  Auto Neutrophil % : x  Auto Lymphocyte % : x  Auto Monocyte % : x  Auto Eosinophil % : x  Auto Basophil % : x      10-20    139  |  108  |  17  ----------------------------<  246<H>  3.8   |  27  |  0.79    Ca    7.9<L>      20 Oct 2023 08:40                            8.0    12.15 )-----------( 300      ( 20 Oct 2023 08:40 )             25.8       Culture - Tissue with Gram Stain (collected 19 Oct 2023 11:35)  Source: .Tissue Other, LEFT FOOT DEEP TISSUE CULTURE  Gram Stain (19 Oct 2023 23:29):    No polymorphonuclear cells seen per low power field    Moderate Gram positive cocci in pairs per oil power field  Preliminary Report (20 Oct 2023 17:42):    Numerous Staphylococcus aureus    Culture - Tissue with Gram Stain (collected 19 Oct 2023 11:35)  Source: .Tissue Other, LEFT FOOT BONE CULTURE  Gram Stain (19 Oct 2023 23:02):    No polymorphonuclear cells seen per low power field    No organisms seen per oil power field  Preliminary Report (20 Oct 2023 17:43):    Rare Staphylococcus aureus    Culture - Tissue with Gram Stain (collected 19 Oct 2023 11:35)  Source: .Tissue Other, RIGHT FOOT SOFT TISSUE CULTURE  Gram Stain (19 Oct 2023 23:27):    Few polymorphonuclear leukocytes per low power field    Numerous Gram positive cocci in pairs per oil power field    Moderate Gram Variable Rods per oil power field  Preliminary Report (20 Oct 2023 17:41):    Numerous Staphylococcus aureus        Imaging: ----------    ACC: [id number] EXAM: XR FOOT COMP MIN 3 VIEWS LT ORDERED BY: MAHSA SAN    PROCEDURE DATE: 10/19/2023        INTERPRETATION: Status post bone biopsy.    3 views left foot. Comparison 10/10/2023.    IMPRESSION:  There are new soft tissue and bony defects in the lateral left mid foot at the base of the fifth metatarsal and lateral carpus. This could all reflect recent bone biopsy. Cannot exclude osteomyelitis. Recommend MR for additional characterization. The remainder of the foot is unchanged. Severe diffuse patchy demineralization. No acute fractures or dislocation.    --- End of Report ---            RICHARD GALAN MD; Attending Radiologist  This document has been electronically signed. Oct 20 2023 12:28PM

## 2023-10-20 NOTE — PROGRESS NOTE ADULT - PROBLEM SELECTOR PLAN 2
id noted  rpt blood cultures neg to date  iv cefipime and vanco for now - picc line and dc planning  tessie clean  om on bone scan  s/p or debridement of b/l heel wounds - fu cultures for abx recs upon dc

## 2023-10-21 LAB
-  AMPICILLIN/SULBACTAM: SIGNIFICANT CHANGE UP
-  CEFAZOLIN: SIGNIFICANT CHANGE UP
-  CLINDAMYCIN: SIGNIFICANT CHANGE UP
-  DAPTOMYCIN: SIGNIFICANT CHANGE UP
-  ERYTHROMYCIN: SIGNIFICANT CHANGE UP
-  GENTAMICIN: SIGNIFICANT CHANGE UP
-  LINEZOLID: SIGNIFICANT CHANGE UP
-  OXACILLIN: SIGNIFICANT CHANGE UP
-  PENICILLIN: SIGNIFICANT CHANGE UP
-  RIFAMPIN: SIGNIFICANT CHANGE UP
-  TETRACYCLINE: SIGNIFICANT CHANGE UP
-  TRIMETHOPRIM/SULFAMETHOXAZOLE: SIGNIFICANT CHANGE UP
-  VANCOMYCIN: SIGNIFICANT CHANGE UP
GLUCOSE BLDC GLUCOMTR-MCNC: 218 MG/DL — HIGH (ref 70–99)
GLUCOSE BLDC GLUCOMTR-MCNC: 220 MG/DL — HIGH (ref 70–99)
GLUCOSE BLDC GLUCOMTR-MCNC: 232 MG/DL — HIGH (ref 70–99)
GLUCOSE BLDC GLUCOMTR-MCNC: 237 MG/DL — HIGH (ref 70–99)
METHOD TYPE: SIGNIFICANT CHANGE UP

## 2023-10-21 RX ADMIN — Medication 2: at 13:01

## 2023-10-21 RX ADMIN — Medication 2: at 08:49

## 2023-10-21 RX ADMIN — MIDODRINE HYDROCHLORIDE 10 MILLIGRAM(S): 2.5 TABLET ORAL at 17:36

## 2023-10-21 RX ADMIN — GABAPENTIN 300 MILLIGRAM(S): 400 CAPSULE ORAL at 06:11

## 2023-10-21 RX ADMIN — Medication 2 MILLIGRAM(S): at 21:04

## 2023-10-21 RX ADMIN — GABAPENTIN 300 MILLIGRAM(S): 400 CAPSULE ORAL at 17:36

## 2023-10-21 RX ADMIN — MIDODRINE HYDROCHLORIDE 10 MILLIGRAM(S): 2.5 TABLET ORAL at 10:37

## 2023-10-21 RX ADMIN — MIDODRINE HYDROCHLORIDE 10 MILLIGRAM(S): 2.5 TABLET ORAL at 06:11

## 2023-10-21 RX ADMIN — Medication 1 TABLET(S): at 07:56

## 2023-10-21 RX ADMIN — Medication 25 MILLIGRAM(S): at 17:36

## 2023-10-21 RX ADMIN — Medication 100 MILLIGRAM(S): at 13:00

## 2023-10-21 RX ADMIN — Medication 2: at 17:36

## 2023-10-21 RX ADMIN — Medication 1 TABLET(S): at 17:36

## 2023-10-21 RX ADMIN — Medication 100 MILLIGRAM(S): at 02:08

## 2023-10-21 RX ADMIN — TAMSULOSIN HYDROCHLORIDE 0.4 MILLIGRAM(S): 0.4 CAPSULE ORAL at 21:04

## 2023-10-21 RX ADMIN — Medication 25 MILLIGRAM(S): at 06:11

## 2023-10-21 RX ADMIN — ATORVASTATIN CALCIUM 80 MILLIGRAM(S): 80 TABLET, FILM COATED ORAL at 21:05

## 2023-10-21 RX ADMIN — PANTOPRAZOLE SODIUM 40 MILLIGRAM(S): 20 TABLET, DELAYED RELEASE ORAL at 06:11

## 2023-10-21 NOTE — PROVIDER CONTACT NOTE (OTHER) - ASSESSMENT
patient A&O x 4, no signs/symptoms of acute distress noted
BP 64/26, hr 60, temp 98.5, spo2 99% on RA
BP 74/30, hr 60, spo2 99% on RA
pt alert
BP 68/24, hr 60, temp 98.5, spo2 99% on RA
Patient A&O x 4, no signs/symptoms of acute distress noted. No complaints of shortness of breath or chest pain. /50, HR 60, temp 98.5, respirations 16 & SpO2 @ 95% on room air

## 2023-10-21 NOTE — PROGRESS NOTE ADULT - SUBJECTIVE AND OBJECTIVE BOX
Patient is a 78y old  Male who presents with a chief complaint of fever and weakness (21 Oct 2023 12:03)      INTERVAL HPI/OVERNIGHT EVENTS: stable, no new events    MEDICATIONS  (STANDING):  atorvastatin 80 milliGRAM(s) Oral at bedtime  bethanechol 25 milliGRAM(s) Oral two times a day  dextrose 50% Injectable 12.5 Gram(s) IV Push once  dextrose 50% Injectable 25 Gram(s) IV Push once  dextrose 50% Injectable 25 Gram(s) IV Push once  gabapentin 300 milliGRAM(s) Oral two times a day  glucagon  Injectable 1 milliGRAM(s) IntraMuscular once  insulin lispro (ADMELOG) corrective regimen sliding scale   SubCutaneous three times a day before meals  insulin lispro (ADMELOG) corrective regimen sliding scale   SubCutaneous at bedtime  lactated ringers. 1000 milliLiter(s) (100 mL/Hr) IV Continuous <Continuous>  lactobacillus acidophilus 1 Tablet(s) Oral two times a day with meals  midodrine. 10 milliGRAM(s) Oral three times a day  pantoprazole    Tablet 40 milliGRAM(s) Oral before breakfast  tamsulosin 0.4 milliGRAM(s) Oral at bedtime  vancomycin  IVPB 500 milliGRAM(s) IV Intermittent every 12 hours    MEDICATIONS  (PRN):  acetaminophen     Tablet .. 650 milliGRAM(s) Oral every 6 hours PRN Temp greater or equal to 38C (100.4F), Moderate Pain (4 - 6)  dextrose Oral Gel 15 Gram(s) Oral once PRN Blood Glucose LESS THAN 70 milliGRAM(s)/deciliter  loperamide 2 milliGRAM(s) Oral three times a day PRN Diarrhea      Allergies    No Known Allergies    Intolerances        REVIEW OF SYSTEMS:  CONSTITUTIONAL: No fever, weight loss, or fatigue  EYES: No eye pain, visual disturbances  ENMT:  No difficulty hearing, tinnitus, vertigo; No sinus or throat pain  NECK: No pain or stiffness  RESPIRATORY: No cough, wheezing, chills or hemoptysis; No shortness of breath  CARDIOVASCULAR: No chest pain, palpitations, dizziness  GASTROINTESTINAL: No abdominal or epigastric pain. No nausea, vomiting, or hematemesis; No diarrhea or constipation. No melena or hematochezia.  GENITOURINARY: No dysuria, frequency, hematuria, or incontinence  NEUROLOGICAL: No headaches, memory loss, loss of strength, numbness, or tremors  SKIN: No itching, burning  LYMPH NODES: No enlarged glands  MUSCULOSKELETAL: No joint pain or swelling; No muscle, back, or extremity pain  PSYCHIATRIC: No depression, mood swings  HEME/LYMPH: No easy bruising, or bleeding gums  ALLERGY AND IMMUNOLOGIC: No hives    Vital Signs Last 24 Hrs  T(C): 37.2 (21 Oct 2023 11:51), Max: 37.2 (21 Oct 2023 11:51)  T(F): 99 (21 Oct 2023 11:51), Max: 99 (21 Oct 2023 11:51)  HR: 60 (21 Oct 2023 11:51) (60 - 62)  BP: 122/59 (21 Oct 2023 11:51) (109/64 - 126/60)  BP(mean): --  RR: 18 (21 Oct 2023 11:51) (17 - 18)  SpO2: 97% (21 Oct 2023 11:51) (95% - 99%)    Parameters below as of 21 Oct 2023 11:51  Patient On (Oxygen Delivery Method): room air        PHYSICAL EXAM:  GENERAL: NAD, well-groomed, well-developed  HEAD:  Atraumatic, Normocephalic  EYES: EOMI, PERRLA, conjunctiva and sclera clear  ENMT: No tonsillar erythema, exudates, or enlargement   NECK: Supple, No JVD  NERVOUS SYSTEM:  Alert & Oriented X3, Good concentration  CHEST/LUNG: Clear to auscultation bilaterally; No rales, rhonchi, wheezing  HEART: Regular rate and rhythm  ABDOMEN: Soft, Nontender, Nondistended; Bowel sounds present  EXTREMITIES:  2+ Peripheral Pulses   LYMPH: No lymphadenopathy noted  SKIN: No rashes     LABS:      Ca    7.9        20 Oct 2023 08:40        Urinalysis Basic - ( 20 Oct 2023 08:40 )    Color: x / Appearance: x / SG: x / pH: x  Gluc: 246 mg/dL / Ketone: x  / Bili: x / Urobili: x   Blood: x / Protein: x / Nitrite: x   Leuk Esterase: x / RBC: x / WBC x   Sq Epi: x / Non Sq Epi: x / Bacteria: x      CAPILLARY BLOOD GLUCOSE      POCT Blood Glucose.: 220 mg/dL (21 Oct 2023 08:30)  POCT Blood Glucose.: 235 mg/dL (20 Oct 2023 21:35)  POCT Blood Glucose.: 239 mg/dL (20 Oct 2023 17:36)  POCT Blood Glucose.: 234 mg/dL (20 Oct 2023 12:25)    blood culture --   Numerous Staphylococcus aureus   10-19 @ 11:35      urine culture --  10-19 @ 11:35  results   Numerous Staphylococcus aureus 10-19 @ 11:35    wound with gram statin --    10-19 @ 11:35  organism  --   10-19 @ 11:35  specimen source .Tissue Other, RIGHT FOOT SOFT TISSUE CULTURE  10-19 @ 11:35      RADIOLOGY & ADDITIONAL TESTS:      Consultant(s) Notes Reviewed:  [ x] YES  [ ] NO    Care Discussed with Consultants/Other Providers [ x] YES  [ ] NO    Advanced care planning discussed with patient and family, advanced care planning forms reviewed, discussed, and completed.  20 minutes spent.

## 2023-10-21 NOTE — PROVIDER CONTACT NOTE (OTHER) - RECOMMENDATIONS
Notify provider
bolus
Notify provider
Recommend to order STAT vanco trough prior to administration, if deemed necessary

## 2023-10-21 NOTE — PROGRESS NOTE ADULT - SUBJECTIVE AND OBJECTIVE BOX
[facility]  Les Mccray PA-C  [address] [place] [address]  [phone]      INTERVAL HPI/OVERNIGHT EVENTS:      INTERVAL HPI/OVERNIGHT EVENTS:    MEDICATIONS  (STANDING):  atorvastatin 80 milliGRAM(s) Oral at bedtime  bethanechol 25 milliGRAM(s) Oral two times a day  dextrose 50% Injectable 12.5 Gram(s) IV Push once  dextrose 50% Injectable 25 Gram(s) IV Push once  dextrose 50% Injectable 25 Gram(s) IV Push once  gabapentin 300 milliGRAM(s) Oral two times a day  glucagon  Injectable 1 milliGRAM(s) IntraMuscular once  insulin lispro (ADMELOG) corrective regimen sliding scale   SubCutaneous three times a day before meals  insulin lispro (ADMELOG) corrective regimen sliding scale   SubCutaneous at bedtime  lactated ringers. 1000 milliLiter(s) (100 mL/Hr) IV Continuous <Continuous>  lactobacillus acidophilus 1 Tablet(s) Oral two times a day with meals  midodrine. 10 milliGRAM(s) Oral three times a day  pantoprazole    Tablet 40 milliGRAM(s) Oral before breakfast  tamsulosin 0.4 milliGRAM(s) Oral at bedtime  vancomycin  IVPB 500 milliGRAM(s) IV Intermittent every 12 hours    MEDICATIONS  (PRN):  acetaminophen     Tablet .. 650 milliGRAM(s) Oral every 6 hours PRN Temp greater or equal to 38C (100.4F), Moderate Pain (4 - 6)  dextrose Oral Gel 15 Gram(s) Oral once PRN Blood Glucose LESS THAN 70 milliGRAM(s)/deciliter  loperamide 2 milliGRAM(s) Oral three times a day PRN Diarrhea      Allergies    No Known Allergies    Intolerances      Vital Signs Last 24 Hrs  T(C): 37.2 (21 Oct 2023 11:51), Max: 37.2 (21 Oct 2023 11:51)  T(F): 99 (21 Oct 2023 11:51), Max: 99 (21 Oct 2023 11:51)  HR: 60 (21 Oct 2023 11:51) (60 - 62)  BP: 122/59 (21 Oct 2023 11:51) (109/64 - 126/60)  BP(mean): --  RR: 18 (21 Oct 2023 11:51) (17 - 18)  SpO2: 97% (21 Oct 2023 11:51) (95% - 99%)    Parameters below as of 21 Oct 2023 11:51  Patient On (Oxygen Delivery Method): room air      GENERAL:  Appears stated age  HEENT:  NC/AT  CHEST:  Full & symmetric excursion  HEART:  Regular rhythm  ABDOMEN:  Soft, non-tender, non-distended  EXTEREMITIES:  no cyanosis  SKIN:  No rash  NEURO:  Alert          LABS:                        8.0    12.15 )-----------( 300      ( 20 Oct 2023 08:40 )             25.8     10-20    139  |  108  |  17  ----------------------------<  246<H>  3.8   |  27  |  0.79    Ca    7.9<L>      20 Oct 2023 08:40            10-20-23 @ 07:01  -  10-21-23 @ 07:00  --------------------------------------------------------  IN: 0 mL / OUT: 2100 mL / NET: -2100 mL                Culture - Tissue with Gram Stain (collected 19 Oct 2023 11:35)  Source: .Tissue Other, LEFT FOOT BONE CULTURE  Gram Stain (19 Oct 2023 23:02):    No polymorphonuclear cells seen per low power field    No organisms seen per oil power field  Preliminary Report (20 Oct 2023 17:43):    Rare Staphylococcus aureus    Culture - Tissue with Gram Stain (collected 19 Oct 2023 11:35)  Source: .Tissue Other, LEFT FOOT DEEP TISSUE CULTURE  Gram Stain (19 Oct 2023 23:29):    No polymorphonuclear cells seen per low power field    Moderate Gram positive cocci in pairs per oil power field  Preliminary Report (20 Oct 2023 17:42):    Numerous Staphylococcus aureus    Culture - Tissue with Gram Stain (collected 19 Oct 2023 11:35)  Source: .Tissue Other, RIGHT FOOT SOFT TISSUE CULTURE  Gram Stain (19 Oct 2023 23:27):    Few polymorphonuclear leukocytes per low power field    Numerous Gram positive cocci in pairs per oil power field    Moderate Gram Variable Rods per oil power field  Preliminary Report (20 Oct 2023 17:41):    Numerous Staphylococcus aureus        RADIOLOGY    [facility]  Les Mccray PA-C  [address] [place] [address]  [phone]      INTERVAL HPI/OVERNIGHT EVENTS:      INTERVAL HPI/OVERNIGHT EVENTS:    MEDICATIONS  (STANDING):  atorvastatin 80 milliGRAM(s) Oral at bedtime  bethanechol 25 milliGRAM(s) Oral two times a day  dextrose 50% Injectable 12.5 Gram(s) IV Push once  dextrose 50% Injectable 25 Gram(s) IV Push once  dextrose 50% Injectable 25 Gram(s) IV Push once  gabapentin 300 milliGRAM(s) Oral two times a day  glucagon  Injectable 1 milliGRAM(s) IntraMuscular once  insulin lispro (ADMELOG) corrective regimen sliding scale   SubCutaneous three times a day before meals  insulin lispro (ADMELOG) corrective regimen sliding scale   SubCutaneous at bedtime  lactated ringers. 1000 milliLiter(s) (100 mL/Hr) IV Continuous <Continuous>  lactobacillus acidophilus 1 Tablet(s) Oral two times a day with meals  midodrine. 10 milliGRAM(s) Oral three times a day  pantoprazole    Tablet 40 milliGRAM(s) Oral before breakfast  tamsulosin 0.4 milliGRAM(s) Oral at bedtime  vancomycin  IVPB 500 milliGRAM(s) IV Intermittent every 12 hours    MEDICATIONS  (PRN):  acetaminophen     Tablet .. 650 milliGRAM(s) Oral every 6 hours PRN Temp greater or equal to 38C (100.4F), Moderate Pain (4 - 6)  dextrose Oral Gel 15 Gram(s) Oral once PRN Blood Glucose LESS THAN 70 milliGRAM(s)/deciliter  loperamide 2 milliGRAM(s) Oral three times a day PRN Diarrhea      Allergies    No Known Allergies    Intolerances      Vital Signs Last 24 Hrs  T(C): 37.2 (21 Oct 2023 11:51), Max: 37.2 (21 Oct 2023 11:51)  T(F): 99 (21 Oct 2023 11:51), Max: 99 (21 Oct 2023 11:51)  HR: 60 (21 Oct 2023 11:51) (60 - 62)  BP: 122/59 (21 Oct 2023 11:51) (109/64 - 126/60)  BP(mean): --  RR: 18 (21 Oct 2023 11:51) (17 - 18)  SpO2: 97% (21 Oct 2023 11:51) (95% - 99%)    Parameters below as of 21 Oct 2023 11:51  Patient On (Oxygen Delivery Method): room air      GENERAL:  Appears stated age  HEENT:  NC/AT  CHEST:  Full & symmetric excursion  HEART:  Regular rhythm  ABDOMEN:  Soft, non-tender, non-distended  EXTEREMITIES:  no cyanosis  SKIN:  No rash  NEURO:  Alert          LABS:                        8.0    12.15 )-----------( 300      ( 20 Oct 2023 08:40 )             25.8     10-20    139  |  108  |  17  ----------------------------<  246<H>  3.8   |  27  |  0.79    Ca    7.9<L>      20 Oct 2023 08:40            10-20-23 @ 07:01  -  10-21-23 @ 07:00  --------------------------------------------------------  IN: 0 mL / OUT: 2100 mL / NET: -2100 mL                Culture - Tissue with Gram Stain (collected 19 Oct 2023 11:35)  Source: .Tissue Other, LEFT FOOT BONE CULTURE  Gram Stain (19 Oct 2023 23:02):    No polymorphonuclear cells seen per low power field    No organisms seen per oil power field  Preliminary Report (20 Oct 2023 17:43):    Rare Staphylococcus aureus    Culture - Tissue with Gram Stain (collected 19 Oct 2023 11:35)  Source: .Tissue Other, LEFT FOOT DEEP TISSUE CULTURE  Gram Stain (19 Oct 2023 23:29):    No polymorphonuclear cells seen per low power field    Moderate Gram positive cocci in pairs per oil power field  Preliminary Report (20 Oct 2023 17:42):    Numerous Staphylococcus aureus    Culture - Tissue with Gram Stain (collected 19 Oct 2023 11:35)  Source: .Tissue Other, RIGHT FOOT SOFT TISSUE CULTURE  Gram Stain (19 Oct 2023 23:27):    Few polymorphonuclear leukocytes per low power field    Numerous Gram positive cocci in pairs per oil power field    Moderate Gram Variable Rods per oil power field  Preliminary Report (20 Oct 2023 17:41):    Numerous Staphylococcus aureus        RADIOLOGY    [facility]  Les Mccray PA-C  [address] [place] [address]  [phone]      INTERVAL HPI/OVERNIGHT EVENTS:      INTERVAL HPI/OVERNIGHT EVENTS:    MEDICATIONS  (STANDING):  atorvastatin 80 milliGRAM(s) Oral at bedtime  bethanechol 25 milliGRAM(s) Oral two times a day  dextrose 50% Injectable 12.5 Gram(s) IV Push once  dextrose 50% Injectable 25 Gram(s) IV Push once  dextrose 50% Injectable 25 Gram(s) IV Push once  gabapentin 300 milliGRAM(s) Oral two times a day  glucagon  Injectable 1 milliGRAM(s) IntraMuscular once  insulin lispro (ADMELOG) corrective regimen sliding scale   SubCutaneous three times a day before meals  insulin lispro (ADMELOG) corrective regimen sliding scale   SubCutaneous at bedtime  lactated ringers. 1000 milliLiter(s) (100 mL/Hr) IV Continuous <Continuous>  lactobacillus acidophilus 1 Tablet(s) Oral two times a day with meals  midodrine. 10 milliGRAM(s) Oral three times a day  pantoprazole    Tablet 40 milliGRAM(s) Oral before breakfast  tamsulosin 0.4 milliGRAM(s) Oral at bedtime  vancomycin  IVPB 500 milliGRAM(s) IV Intermittent every 12 hours    MEDICATIONS  (PRN):  acetaminophen     Tablet .. 650 milliGRAM(s) Oral every 6 hours PRN Temp greater or equal to 38C (100.4F), Moderate Pain (4 - 6)  dextrose Oral Gel 15 Gram(s) Oral once PRN Blood Glucose LESS THAN 70 milliGRAM(s)/deciliter  loperamide 2 milliGRAM(s) Oral three times a day PRN Diarrhea      Allergies    No Known Allergies    Intolerances      Vital Signs Last 24 Hrs  T(C): 37.2 (21 Oct 2023 11:51), Max: 37.2 (21 Oct 2023 11:51)  T(F): 99 (21 Oct 2023 11:51), Max: 99 (21 Oct 2023 11:51)  HR: 60 (21 Oct 2023 11:51) (60 - 62)  BP: 122/59 (21 Oct 2023 11:51) (109/64 - 126/60)  BP(mean): --  RR: 18 (21 Oct 2023 11:51) (17 - 18)  SpO2: 97% (21 Oct 2023 11:51) (95% - 99%)    Parameters below as of 21 Oct 2023 11:51  Patient On (Oxygen Delivery Method): room air      GENERAL:  Appears stated age  HEENT:  NC/AT  CHEST:  Full & symmetric excursion  HEART:  Regular rhythm  ABDOMEN:  Soft, non-tender, non-distended  EXTEREMITIES:  no cyanosis  SKIN:  No rash  NEURO:  Alert          LABS:                        8.0    12.15 )-----------( 300      ( 20 Oct 2023 08:40 )             25.8     10-20    139  |  108  |  17  ----------------------------<  246<H>  3.8   |  27  |  0.79    Ca    7.9<L>      20 Oct 2023 08:40            10-20-23 @ 07:01  -  10-21-23 @ 07:00  --------------------------------------------------------  IN: 0 mL / OUT: 2100 mL / NET: -2100 mL                Culture - Tissue with Gram Stain (collected 19 Oct 2023 11:35)  Source: .Tissue Other, LEFT FOOT BONE CULTURE  Gram Stain (19 Oct 2023 23:02):    No polymorphonuclear cells seen per low power field    No organisms seen per oil power field  Preliminary Report (20 Oct 2023 17:43):    Rare Staphylococcus aureus    Culture - Tissue with Gram Stain (collected 19 Oct 2023 11:35)  Source: .Tissue Other, LEFT FOOT DEEP TISSUE CULTURE  Gram Stain (19 Oct 2023 23:29):    No polymorphonuclear cells seen per low power field    Moderate Gram positive cocci in pairs per oil power field  Preliminary Report (20 Oct 2023 17:42):    Numerous Staphylococcus aureus    Culture - Tissue with Gram Stain (collected 19 Oct 2023 11:35)  Source: .Tissue Other, RIGHT FOOT SOFT TISSUE CULTURE  Gram Stain (19 Oct 2023 23:27):    Few polymorphonuclear leukocytes per low power field    Numerous Gram positive cocci in pairs per oil power field    Moderate Gram Variable Rods per oil power field  Preliminary Report (20 Oct 2023 17:41):    Numerous Staphylococcus aureus        RADIOLOGY    [facility]  Les Mccray PA-C  [address] [place], [place] [id number] [phone]      INTERVAL HPI/OVERNIGHT EVENTS:  pt seen and examined  Tolerating diet    No acute events overnight as per nursing staff       MEDICATIONS  (STANDING):  atorvastatin 80 milliGRAM(s) Oral at bedtime  bethanechol 25 milliGRAM(s) Oral two times a day  dextrose 50% Injectable 12.5 Gram(s) IV Push once  dextrose 50% Injectable 25 Gram(s) IV Push once  dextrose 50% Injectable 25 Gram(s) IV Push once  gabapentin 300 milliGRAM(s) Oral two times a day  glucagon  Injectable 1 milliGRAM(s) IntraMuscular once  insulin lispro (ADMELOG) corrective regimen sliding scale   SubCutaneous three times a day before meals  insulin lispro (ADMELOG) corrective regimen sliding scale   SubCutaneous at bedtime  lactated ringers. 1000 milliLiter(s) (100 mL/Hr) IV Continuous <Continuous>  lactobacillus acidophilus 1 Tablet(s) Oral two times a day with meals  midodrine. 10 milliGRAM(s) Oral three times a day  pantoprazole    Tablet 40 milliGRAM(s) Oral before breakfast  tamsulosin 0.4 milliGRAM(s) Oral at bedtime  vancomycin  IVPB 500 milliGRAM(s) IV Intermittent every 12 hours    MEDICATIONS  (PRN):  acetaminophen     Tablet .. 650 milliGRAM(s) Oral every 6 hours PRN Temp greater or equal to 38C (100.4F), Moderate Pain (4 - 6)  dextrose Oral Gel 15 Gram(s) Oral once PRN Blood Glucose LESS THAN 70 milliGRAM(s)/deciliter  loperamide 2 milliGRAM(s) Oral three times a day PRN Diarrhea      Allergies    No Known Allergies    Intolerances      Vital Signs Last 24 Hrs  T(C): 37.2 (21 Oct 2023 11:51), Max: 37.2 (21 Oct 2023 11:51)  T(F): 99 (21 Oct 2023 11:51), Max: 99 (21 Oct 2023 11:51)  HR: 60 (21 Oct 2023 11:51) (60 - 62)  BP: 122/59 (21 Oct 2023 11:51) (109/64 - 126/60)  BP(mean): --  RR: 18 (21 Oct 2023 11:51) (17 - 18)  SpO2: 97% (21 Oct 2023 11:51) (95% - 99%)    Parameters below as of 21 Oct 2023 11:51  Patient On (Oxygen Delivery Method): room air      GENERAL:  Appears stated age  HEENT:  NC/AT  CHEST:  Full & symmetric excursion  HEART:  Regular rhythm  ABDOMEN:  Soft, non-tender, non-distended  EXTEREMITIES:  no cyanosis  SKIN:  No rash  NEURO:  Alert          LABS:                        8.0    12.15 )-----------( 300      ( 20 Oct 2023 08:40 )             25.8     10-20    139  |  108  |  17  ----------------------------<  246<H>  3.8   |  27  |  0.79    Ca    7.9<L>      20 Oct 2023 08:40            10-20-23 @ 07:01  -  10-21-23 @ 07:00  --------------------------------------------------------  IN: 0 mL / OUT: 2100 mL / NET: -2100 mL                Culture - Tissue with Gram Stain (collected 19 Oct 2023 11:35)  Source: .Tissue Other, LEFT FOOT BONE CULTURE  Gram Stain (19 Oct 2023 23:02):    No polymorphonuclear cells seen per low power field    No organisms seen per oil power field  Preliminary Report (20 Oct 2023 17:43):    Rare Staphylococcus aureus    Culture - Tissue with Gram Stain (collected 19 Oct 2023 11:35)  Source: .Tissue Other, LEFT FOOT DEEP TISSUE CULTURE  Gram Stain (19 Oct 2023 23:29):    No polymorphonuclear cells seen per low power field    Moderate Gram positive cocci in pairs per oil power field  Preliminary Report (20 Oct 2023 17:42):    Numerous Staphylococcus aureus    Culture - Tissue with Gram Stain (collected 19 Oct 2023 11:35)  Source: .Tissue Other, RIGHT FOOT SOFT TISSUE CULTURE  Gram Stain (19 Oct 2023 23:27):    Few polymorphonuclear leukocytes per low power field    Numerous Gram positive cocci in pairs per oil power field    Moderate Gram Variable Rods per oil power field  Preliminary Report (20 Oct 2023 17:41):    Numerous Staphylococcus aureus        RADIOLOGY    [facility]  Les Mccray PA-C  [address] [place], [place] [id number] [phone]      INTERVAL HPI/OVERNIGHT EVENTS:  pt seen and examined  Tolerating diet    No acute events overnight as per nursing staff       MEDICATIONS  (STANDING):  atorvastatin 80 milliGRAM(s) Oral at bedtime  bethanechol 25 milliGRAM(s) Oral two times a day  dextrose 50% Injectable 12.5 Gram(s) IV Push once  dextrose 50% Injectable 25 Gram(s) IV Push once  dextrose 50% Injectable 25 Gram(s) IV Push once  gabapentin 300 milliGRAM(s) Oral two times a day  glucagon  Injectable 1 milliGRAM(s) IntraMuscular once  insulin lispro (ADMELOG) corrective regimen sliding scale   SubCutaneous three times a day before meals  insulin lispro (ADMELOG) corrective regimen sliding scale   SubCutaneous at bedtime  lactated ringers. 1000 milliLiter(s) (100 mL/Hr) IV Continuous <Continuous>  lactobacillus acidophilus 1 Tablet(s) Oral two times a day with meals  midodrine. 10 milliGRAM(s) Oral three times a day  pantoprazole    Tablet 40 milliGRAM(s) Oral before breakfast  tamsulosin 0.4 milliGRAM(s) Oral at bedtime  vancomycin  IVPB 500 milliGRAM(s) IV Intermittent every 12 hours    MEDICATIONS  (PRN):  acetaminophen     Tablet .. 650 milliGRAM(s) Oral every 6 hours PRN Temp greater or equal to 38C (100.4F), Moderate Pain (4 - 6)  dextrose Oral Gel 15 Gram(s) Oral once PRN Blood Glucose LESS THAN 70 milliGRAM(s)/deciliter  loperamide 2 milliGRAM(s) Oral three times a day PRN Diarrhea      Allergies    No Known Allergies    Intolerances      Vital Signs Last 24 Hrs  T(C): 37.2 (21 Oct 2023 11:51), Max: 37.2 (21 Oct 2023 11:51)  T(F): 99 (21 Oct 2023 11:51), Max: 99 (21 Oct 2023 11:51)  HR: 60 (21 Oct 2023 11:51) (60 - 62)  BP: 122/59 (21 Oct 2023 11:51) (109/64 - 126/60)  BP(mean): --  RR: 18 (21 Oct 2023 11:51) (17 - 18)  SpO2: 97% (21 Oct 2023 11:51) (95% - 99%)    Parameters below as of 21 Oct 2023 11:51  Patient On (Oxygen Delivery Method): room air      GENERAL:  Appears stated age  HEENT:  NC/AT  CHEST:  Full & symmetric excursion  HEART:  Regular rhythm  ABDOMEN:  Soft, non-tender, non-distended  EXTEREMITIES:  no cyanosis  SKIN:  No rash  NEURO:  Alert          LABS:                        8.0    12.15 )-----------( 300      ( 20 Oct 2023 08:40 )             25.8     10-20    139  |  108  |  17  ----------------------------<  246<H>  3.8   |  27  |  0.79    Ca    7.9<L>      20 Oct 2023 08:40            10-20-23 @ 07:01  -  10-21-23 @ 07:00  --------------------------------------------------------  IN: 0 mL / OUT: 2100 mL / NET: -2100 mL                Culture - Tissue with Gram Stain (collected 19 Oct 2023 11:35)  Source: .Tissue Other, LEFT FOOT BONE CULTURE  Gram Stain (19 Oct 2023 23:02):    No polymorphonuclear cells seen per low power field    No organisms seen per oil power field  Preliminary Report (20 Oct 2023 17:43):    Rare Staphylococcus aureus    Culture - Tissue with Gram Stain (collected 19 Oct 2023 11:35)  Source: .Tissue Other, LEFT FOOT DEEP TISSUE CULTURE  Gram Stain (19 Oct 2023 23:29):    No polymorphonuclear cells seen per low power field    Moderate Gram positive cocci in pairs per oil power field  Preliminary Report (20 Oct 2023 17:42):    Numerous Staphylococcus aureus    Culture - Tissue with Gram Stain (collected 19 Oct 2023 11:35)  Source: .Tissue Other, RIGHT FOOT SOFT TISSUE CULTURE  Gram Stain (19 Oct 2023 23:27):    Few polymorphonuclear leukocytes per low power field    Numerous Gram positive cocci in pairs per oil power field    Moderate Gram Variable Rods per oil power field  Preliminary Report (20 Oct 2023 17:41):    Numerous Staphylococcus aureus        RADIOLOGY    [facility]  Les Mccray PA-C  [address] [place], [place] [id number] [phone]      INTERVAL HPI/OVERNIGHT EVENTS:  pt seen and examined  Tolerating diet    No acute events overnight as per nursing staff       MEDICATIONS  (STANDING):  atorvastatin 80 milliGRAM(s) Oral at bedtime  bethanechol 25 milliGRAM(s) Oral two times a day  dextrose 50% Injectable 12.5 Gram(s) IV Push once  dextrose 50% Injectable 25 Gram(s) IV Push once  dextrose 50% Injectable 25 Gram(s) IV Push once  gabapentin 300 milliGRAM(s) Oral two times a day  glucagon  Injectable 1 milliGRAM(s) IntraMuscular once  insulin lispro (ADMELOG) corrective regimen sliding scale   SubCutaneous three times a day before meals  insulin lispro (ADMELOG) corrective regimen sliding scale   SubCutaneous at bedtime  lactated ringers. 1000 milliLiter(s) (100 mL/Hr) IV Continuous <Continuous>  lactobacillus acidophilus 1 Tablet(s) Oral two times a day with meals  midodrine. 10 milliGRAM(s) Oral three times a day  pantoprazole    Tablet 40 milliGRAM(s) Oral before breakfast  tamsulosin 0.4 milliGRAM(s) Oral at bedtime  vancomycin  IVPB 500 milliGRAM(s) IV Intermittent every 12 hours    MEDICATIONS  (PRN):  acetaminophen     Tablet .. 650 milliGRAM(s) Oral every 6 hours PRN Temp greater or equal to 38C (100.4F), Moderate Pain (4 - 6)  dextrose Oral Gel 15 Gram(s) Oral once PRN Blood Glucose LESS THAN 70 milliGRAM(s)/deciliter  loperamide 2 milliGRAM(s) Oral three times a day PRN Diarrhea      Allergies    No Known Allergies    Intolerances      Vital Signs Last 24 Hrs  T(C): 37.2 (21 Oct 2023 11:51), Max: 37.2 (21 Oct 2023 11:51)  T(F): 99 (21 Oct 2023 11:51), Max: 99 (21 Oct 2023 11:51)  HR: 60 (21 Oct 2023 11:51) (60 - 62)  BP: 122/59 (21 Oct 2023 11:51) (109/64 - 126/60)  BP(mean): --  RR: 18 (21 Oct 2023 11:51) (17 - 18)  SpO2: 97% (21 Oct 2023 11:51) (95% - 99%)    Parameters below as of 21 Oct 2023 11:51  Patient On (Oxygen Delivery Method): room air      GENERAL:  Appears stated age  HEENT:  NC/AT  CHEST:  Full & symmetric excursion  HEART:  Regular rhythm  ABDOMEN:  Soft, non-tender, non-distended  EXTEREMITIES:  no cyanosis  SKIN:  No rash  NEURO:  Alert          LABS:                        8.0    12.15 )-----------( 300      ( 20 Oct 2023 08:40 )             25.8     10-20    139  |  108  |  17  ----------------------------<  246<H>  3.8   |  27  |  0.79    Ca    7.9<L>      20 Oct 2023 08:40            10-20-23 @ 07:01  -  10-21-23 @ 07:00  --------------------------------------------------------  IN: 0 mL / OUT: 2100 mL / NET: -2100 mL                Culture - Tissue with Gram Stain (collected 19 Oct 2023 11:35)  Source: .Tissue Other, LEFT FOOT BONE CULTURE  Gram Stain (19 Oct 2023 23:02):    No polymorphonuclear cells seen per low power field    No organisms seen per oil power field  Preliminary Report (20 Oct 2023 17:43):    Rare Staphylococcus aureus    Culture - Tissue with Gram Stain (collected 19 Oct 2023 11:35)  Source: .Tissue Other, LEFT FOOT DEEP TISSUE CULTURE  Gram Stain (19 Oct 2023 23:29):    No polymorphonuclear cells seen per low power field    Moderate Gram positive cocci in pairs per oil power field  Preliminary Report (20 Oct 2023 17:42):    Numerous Staphylococcus aureus    Culture - Tissue with Gram Stain (collected 19 Oct 2023 11:35)  Source: .Tissue Other, RIGHT FOOT SOFT TISSUE CULTURE  Gram Stain (19 Oct 2023 23:27):    Few polymorphonuclear leukocytes per low power field    Numerous Gram positive cocci in pairs per oil power field    Moderate Gram Variable Rods per oil power field  Preliminary Report (20 Oct 2023 17:41):    Numerous Staphylococcus aureus        RADIOLOGY

## 2023-10-21 NOTE — PROVIDER CONTACT NOTE (OTHER) - REASON
low bp
pt has extremely low bp
no peripheral vascular access
pt has extremely low bp
pt has extremely low bp
Per tele tech Jose, patient had 7 beats of Vtach
Patient /48 manually auscultated w/ stethoscope
Patient due to receive Vancomycin 750mg, 3rd dose of 750mg but 4th dose since last trough was drawn.

## 2023-10-21 NOTE — PATIENT CHOICE NOTE. - NSPTCHOICESTATE_GEN_ALL_CORE
I have met with the patient and/or caregiver to discuss discharge goals and treatment plan. Patient and/or caregiver also provided with instructions on accessing the CMS Compare websites for additional information related to Post Acute Provider quality and resource use measures to assist them in evaluation of the providers and in selecting their post-acute provider of choice. Patient and caregiver were informed of the facilities that are owned and/or operated by [facility]. I have discussed with the patient the availability of in-network facilities and providers. Patient and caregiver provided with a list of post-acute providers whose services are appropriate to the discharge plans and patient needs.     For patient requiring durable medical equipment, patient and/or caregiver were informed that they have the right to request who provides the required equipment. I have met with the patient and/or caregiver to discuss discharge goals and treatment plan. Patient and/or caregiver also provided with instructions on accessing the CMS Compare websites for additional information related to Post Acute Provider quality and resource use measures to assist them in evaluation of the providers and in selecting their post-acute provider of choice. Patient and caregiver were informed of the facilities that are owned and/or operated by [facility]. I have discussed with the patient the availability of in-network facilities and providers. Patient and caregiver provided with a list of post-acute providers whose services are appropriate to the discharge plans and patient needs.     For patient requiring durable medical equipment, patient and/or caregiver were informed that they have the right to request who provides the required equipment. I have met with the patient and/or caregiver to discuss discharge goals and treatment plan. Patient and/or caregiver also provided with instructions on accessing the CMS Compare websites for additional information related to Post Acute Provider quality and resource use measures to assist them in evaluation of the providers and in selecting their post-acute provider of choice. Patient and caregiver were informed of the facilities that are owned and/or operated by [facility]. I have discussed with the patient the availability of in-network facilities and providers. Patient and caregiver provided with a list of post-acute providers whose services are appropriate to the discharge plans and patient needs.     For patient requiring durable medical equipment, patient and/or caregiver were informed that they have the right to request who provides the required equipment.

## 2023-10-21 NOTE — PROVIDER CONTACT NOTE (OTHER) - ACTION/TREATMENT ORDERED:
Dr. Harris and ICU NP made aware. will place central line for access
Dr. Harris made aware, ordered midodrine and second bolus and LR at 100 ml/hr
eval pt
Dr. Rogers notified & aware. Per Dr. Rogers, patient to have vancomycin trough ordered & drawn prior to administration of vancomycin 750mg ordered at 2200
Dr. Harris made aware ordered albumin
Provider notified & aware. Per Dr. Tran, to review vital signs/patient chart and order mag/sulfate blood draw. No further orders at this time
Dr. Harris made aware, ordered bolus, remote tele
Dr. Rogers notified & aware. Per Dr. Rogers, patient to receive AM dose of midodrine as ordered; no new orders at this time.

## 2023-10-21 NOTE — PROGRESS NOTE ADULT - SUBJECTIVE AND OBJECTIVE BOX
[facility] Cardiology    CHIEF COMPLAINT: Patient is a 78y old  Male who presents with a chief complaint of fever and weakness (20 Oct 2023 19:13)      Follow Up: [ ] Chest Pain      [ ] Dyspnea     [ ] Palpitations    [ ] Atrial Fibrillation     [ ] Ventricular Dysrhythmia    [ ] Abnormal EKG                      [ ] Abnormal Cardiac Enzymes     [ ] Valvular Disease    HPI:  78-year-old male presents to the hospital by ambulance from home.  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 Pt does not go to doctor on regualar basis per son and does phone visits.   (11 Oct 2023 07:02)    PAST MEDICAL & SURGICAL HISTORY:  Diabetes      Benign essential HTN      Pacemaker      History of BPH      Diabetic foot ulcers        MEDICATIONS  (STANDING):  atorvastatin 80 milliGRAM(s) Oral at bedtime  bethanechol 25 milliGRAM(s) Oral two times a day  dextrose 50% Injectable 25 Gram(s) IV Push once  dextrose 50% Injectable 25 Gram(s) IV Push once  dextrose 50% Injectable 12.5 Gram(s) IV Push once  gabapentin 300 milliGRAM(s) Oral two times a day  glucagon  Injectable 1 milliGRAM(s) IntraMuscular once  insulin lispro (ADMELOG) corrective regimen sliding scale   SubCutaneous three times a day before meals  insulin lispro (ADMELOG) corrective regimen sliding scale   SubCutaneous at bedtime  lactated ringers. 1000 milliLiter(s) (100 mL/Hr) IV Continuous <Continuous>  lactobacillus acidophilus 1 Tablet(s) Oral two times a day with meals  midodrine. 10 milliGRAM(s) Oral three times a day  pantoprazole    Tablet 40 milliGRAM(s) Oral before breakfast  tamsulosin 0.4 milliGRAM(s) Oral at bedtime  vancomycin  IVPB 500 milliGRAM(s) IV Intermittent every 12 hours    MEDICATIONS  (PRN):  acetaminophen     Tablet .. 650 milliGRAM(s) Oral every 6 hours PRN Temp greater or equal to 38C (100.4F), Moderate Pain (4 - 6)  dextrose Oral Gel 15 Gram(s) Oral once PRN Blood Glucose LESS THAN 70 milliGRAM(s)/deciliter  loperamide 2 milliGRAM(s) Oral three times a day PRN Diarrhea    Allergies    No Known Allergies    Intolerances        REVIEW OF SYSTEMS:    CONSTITUTIONAL: No weakness, fevers or chills.   EYES/ENT: No visual changes;    NECK: No pain or stiffness  RESPIRATORY: No cough, wheezing, No shortness of breath  CARDIOVASCULAR: No chest pain or palpitations  GASTROINTESTINAL: No abdominal pain, or hematochezia.  GENITOURINARY: No dysuria orhematuria  NEUROLOGICAL: No numbness or weakness  SKIN: No itching, burning, rashes  All other review of systems is negative unless indicated above    Vital Signs Last 24 Hrs  T(C): 36.7 (21 Oct 2023 10:20), Max: 37 (21 Oct 2023 05:06)  T(F): 98 (21 Oct 2023 10:20), Max: 98.6 (21 Oct 2023 05:06)  HR: 60 (21 Oct 2023 10:20) (60 - 62)  BP: 109/64 (21 Oct 2023 10:20) (109/64 - 126/60)  BP(mean): --  RR: 18 (21 Oct 2023 10:20) (17 - 18)  SpO2: 99% (21 Oct 2023 10:20) (95% - 99%)    Parameters below as of 21 Oct 2023 10:20  Patient On (Oxygen Delivery Method): room air      I&O's Summary    20 Oct 2023 07:01  -  21 Oct 2023 07:00  --------------------------------------------------------  IN: 0 mL / OUT: 2100 mL / NET: -2100 mL        PHYSICAL EXAM:  Constitutional: NAD  Neurological: Alert, no focal deficits  HEENT: no JVD, EOMI  Cardiovascular: Regular, S1 and S2, no murmur  Pulmonary: breath sounds bilaterally  Gastrointestinal: Bowel Sounds present, soft, nontender  EXT:  no peripheral edema  Skin: No rashes.  Psych:  Mood calm  LABS: All Labs Reviewed:                          8.0    12.15 )-----------( 300      ( 20 Oct 2023 08:40 )             25.8     10-20    139  |  108  |  17  ----------------------------<  246<H>  3.8   |  27  |  0.79    Ca    7.9<L>      20 Oct 2023 08:40    · Assessment  78M with PMH HTN, PPM, DM, foot ulcers , presents to the hospital by ambulance from home.  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 Pt does not go to doctor on regular basis per son and does phone visits.  +Bacteremia. Called by Cardio NP for consultation and to arrange GISSELL to R/O Endocarditis or PPM related infection. Order placed to be done by Dr. Barrett Monday or Tuesday     s/p GISSELL: No vegetations  TTE: CONCLUSIONS:    1. Technically difficult image quality.   2. There is abnormal septal activation, likely from a paced rhythm. There is likely superimposed anterior and septal hypokinesis. Limited views and limited endocardial definition make further interpretation limited.   3. The right ventricle is not well visualized. Normal right ventricular cavity size and reduced systolic function.   4. The left atrium is mildly dilated in size.   5. Right atrium was not well visualized.   6. Mild calcification of the aortic valve leaflets.   7. Tricuspid valve was not well visualized.   8. Aortic valve was not well visualized.    POD2 s/p podiatry/surgical debridement  rec ASA, Statin  Pt has seen Dave Panchal [phone] and Curtis ferris [phone]    will follow prn                           [facility] Cardiology    CHIEF COMPLAINT: Patient is a 78y old  Male who presents with a chief complaint of fever and weakness (20 Oct 2023 19:13)      Follow Up: [ ] Chest Pain      [ ] Dyspnea     [ ] Palpitations    [ ] Atrial Fibrillation     [ ] Ventricular Dysrhythmia    [ ] Abnormal EKG                      [ ] Abnormal Cardiac Enzymes     [ ] Valvular Disease    HPI:  78-year-old male presents to the hospital by ambulance from home.  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 Pt does not go to doctor on regualar basis per son and does phone visits.   (11 Oct 2023 07:02)    PAST MEDICAL & SURGICAL HISTORY:  Diabetes      Benign essential HTN      Pacemaker      History of BPH      Diabetic foot ulcers        MEDICATIONS  (STANDING):  atorvastatin 80 milliGRAM(s) Oral at bedtime  bethanechol 25 milliGRAM(s) Oral two times a day  dextrose 50% Injectable 25 Gram(s) IV Push once  dextrose 50% Injectable 25 Gram(s) IV Push once  dextrose 50% Injectable 12.5 Gram(s) IV Push once  gabapentin 300 milliGRAM(s) Oral two times a day  glucagon  Injectable 1 milliGRAM(s) IntraMuscular once  insulin lispro (ADMELOG) corrective regimen sliding scale   SubCutaneous three times a day before meals  insulin lispro (ADMELOG) corrective regimen sliding scale   SubCutaneous at bedtime  lactated ringers. 1000 milliLiter(s) (100 mL/Hr) IV Continuous <Continuous>  lactobacillus acidophilus 1 Tablet(s) Oral two times a day with meals  midodrine. 10 milliGRAM(s) Oral three times a day  pantoprazole    Tablet 40 milliGRAM(s) Oral before breakfast  tamsulosin 0.4 milliGRAM(s) Oral at bedtime  vancomycin  IVPB 500 milliGRAM(s) IV Intermittent every 12 hours    MEDICATIONS  (PRN):  acetaminophen     Tablet .. 650 milliGRAM(s) Oral every 6 hours PRN Temp greater or equal to 38C (100.4F), Moderate Pain (4 - 6)  dextrose Oral Gel 15 Gram(s) Oral once PRN Blood Glucose LESS THAN 70 milliGRAM(s)/deciliter  loperamide 2 milliGRAM(s) Oral three times a day PRN Diarrhea    Allergies    No Known Allergies    Intolerances        REVIEW OF SYSTEMS:    CONSTITUTIONAL: No weakness, fevers or chills.   EYES/ENT: No visual changes;    NECK: No pain or stiffness  RESPIRATORY: No cough, wheezing, No shortness of breath  CARDIOVASCULAR: No chest pain or palpitations  GASTROINTESTINAL: No abdominal pain, or hematochezia.  GENITOURINARY: No dysuria orhematuria  NEUROLOGICAL: No numbness or weakness  SKIN: No itching, burning, rashes  All other review of systems is negative unless indicated above    Vital Signs Last 24 Hrs  T(C): 36.7 (21 Oct 2023 10:20), Max: 37 (21 Oct 2023 05:06)  T(F): 98 (21 Oct 2023 10:20), Max: 98.6 (21 Oct 2023 05:06)  HR: 60 (21 Oct 2023 10:20) (60 - 62)  BP: 109/64 (21 Oct 2023 10:20) (109/64 - 126/60)  BP(mean): --  RR: 18 (21 Oct 2023 10:20) (17 - 18)  SpO2: 99% (21 Oct 2023 10:20) (95% - 99%)    Parameters below as of 21 Oct 2023 10:20  Patient On (Oxygen Delivery Method): room air      I&O's Summary    20 Oct 2023 07:01  -  21 Oct 2023 07:00  --------------------------------------------------------  IN: 0 mL / OUT: 2100 mL / NET: -2100 mL        PHYSICAL EXAM:  Constitutional: NAD  Neurological: Alert, no focal deficits  HEENT: no JVD, EOMI  Cardiovascular: Regular, S1 and S2, no murmur  Pulmonary: breath sounds bilaterally  Gastrointestinal: Bowel Sounds present, soft, nontender  EXT:  no peripheral edema  Skin: No rashes.  Psych:  Mood calm  LABS: All Labs Reviewed:                          8.0    12.15 )-----------( 300      ( 20 Oct 2023 08:40 )             25.8     10-20    139  |  108  |  17  ----------------------------<  246<H>  3.8   |  27  |  0.79    Ca    7.9<L>      20 Oct 2023 08:40    · Assessment  78M with PMH HTN, PPM, DM, foot ulcers , presents to the hospital by ambulance from home.  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 Pt does not go to doctor on regular basis per son and does phone visits.  +Bacteremia. Called by Cardio NP for consultation and to arrange GISSELL to R/O Endocarditis or PPM related infection. Order placed to be done by Dr. Barrett Monday or Tuesday     s/p GISSELL: No vegetations  TTE: CONCLUSIONS:    1. Technically difficult image quality.   2. There is abnormal septal activation, likely from a paced rhythm. There is likely superimposed anterior and septal hypokinesis. Limited views and limited endocardial definition make further interpretation limited.   3. The right ventricle is not well visualized. Normal right ventricular cavity size and reduced systolic function.   4. The left atrium is mildly dilated in size.   5. Right atrium was not well visualized.   6. Mild calcification of the aortic valve leaflets.   7. Tricuspid valve was not well visualized.   8. Aortic valve was not well visualized.    POD2 s/p podiatry/surgical debridement  rec ASA, Statin  Pt has seen Dave Panchal [phone] and Curtis ferris [phone]    will follow prn                           [facility] Cardiology    CHIEF COMPLAINT: Patient is a 78y old  Male who presents with a chief complaint of fever and weakness (20 Oct 2023 19:13)      Follow Up: [ ] Chest Pain      [ ] Dyspnea     [ ] Palpitations    [ ] Atrial Fibrillation     [ ] Ventricular Dysrhythmia    [ ] Abnormal EKG                      [ ] Abnormal Cardiac Enzymes     [ ] Valvular Disease    HPI:  78-year-old male presents to the hospital by ambulance from home.  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 Pt does not go to doctor on regualar basis per son and does phone visits.   (11 Oct 2023 07:02)    PAST MEDICAL & SURGICAL HISTORY:  Diabetes      Benign essential HTN      Pacemaker      History of BPH      Diabetic foot ulcers        MEDICATIONS  (STANDING):  atorvastatin 80 milliGRAM(s) Oral at bedtime  bethanechol 25 milliGRAM(s) Oral two times a day  dextrose 50% Injectable 25 Gram(s) IV Push once  dextrose 50% Injectable 25 Gram(s) IV Push once  dextrose 50% Injectable 12.5 Gram(s) IV Push once  gabapentin 300 milliGRAM(s) Oral two times a day  glucagon  Injectable 1 milliGRAM(s) IntraMuscular once  insulin lispro (ADMELOG) corrective regimen sliding scale   SubCutaneous three times a day before meals  insulin lispro (ADMELOG) corrective regimen sliding scale   SubCutaneous at bedtime  lactated ringers. 1000 milliLiter(s) (100 mL/Hr) IV Continuous <Continuous>  lactobacillus acidophilus 1 Tablet(s) Oral two times a day with meals  midodrine. 10 milliGRAM(s) Oral three times a day  pantoprazole    Tablet 40 milliGRAM(s) Oral before breakfast  tamsulosin 0.4 milliGRAM(s) Oral at bedtime  vancomycin  IVPB 500 milliGRAM(s) IV Intermittent every 12 hours    MEDICATIONS  (PRN):  acetaminophen     Tablet .. 650 milliGRAM(s) Oral every 6 hours PRN Temp greater or equal to 38C (100.4F), Moderate Pain (4 - 6)  dextrose Oral Gel 15 Gram(s) Oral once PRN Blood Glucose LESS THAN 70 milliGRAM(s)/deciliter  loperamide 2 milliGRAM(s) Oral three times a day PRN Diarrhea    Allergies    No Known Allergies    Intolerances        REVIEW OF SYSTEMS:    CONSTITUTIONAL: No weakness, fevers or chills.   EYES/ENT: No visual changes;    NECK: No pain or stiffness  RESPIRATORY: No cough, wheezing, No shortness of breath  CARDIOVASCULAR: No chest pain or palpitations  GASTROINTESTINAL: No abdominal pain, or hematochezia.  GENITOURINARY: No dysuria orhematuria  NEUROLOGICAL: No numbness or weakness  SKIN: No itching, burning, rashes  All other review of systems is negative unless indicated above    Vital Signs Last 24 Hrs  T(C): 36.7 (21 Oct 2023 10:20), Max: 37 (21 Oct 2023 05:06)  T(F): 98 (21 Oct 2023 10:20), Max: 98.6 (21 Oct 2023 05:06)  HR: 60 (21 Oct 2023 10:20) (60 - 62)  BP: 109/64 (21 Oct 2023 10:20) (109/64 - 126/60)  BP(mean): --  RR: 18 (21 Oct 2023 10:20) (17 - 18)  SpO2: 99% (21 Oct 2023 10:20) (95% - 99%)    Parameters below as of 21 Oct 2023 10:20  Patient On (Oxygen Delivery Method): room air      I&O's Summary    20 Oct 2023 07:01  -  21 Oct 2023 07:00  --------------------------------------------------------  IN: 0 mL / OUT: 2100 mL / NET: -2100 mL        PHYSICAL EXAM:  Constitutional: NAD  Neurological: Alert, no focal deficits  HEENT: no JVD, EOMI  Cardiovascular: Regular, S1 and S2, no murmur  Pulmonary: breath sounds bilaterally  Gastrointestinal: Bowel Sounds present, soft, nontender  EXT:  no peripheral edema  Skin: No rashes.  Psych:  Mood calm  LABS: All Labs Reviewed:                          8.0    12.15 )-----------( 300      ( 20 Oct 2023 08:40 )             25.8     10-20    139  |  108  |  17  ----------------------------<  246<H>  3.8   |  27  |  0.79    Ca    7.9<L>      20 Oct 2023 08:40    · Assessment  78M with PMH HTN, PPM, DM, foot ulcers , presents to the hospital by ambulance from home.  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 Pt does not go to doctor on regular basis per son and does phone visits.  +Bacteremia. Called by Cardio NP for consultation and to arrange GISSELL to R/O Endocarditis or PPM related infection. Order placed to be done by Dr. Barrett Monday or Tuesday     s/p GISSELL: No vegetations  TTE: CONCLUSIONS:    1. Technically difficult image quality.   2. There is abnormal septal activation, likely from a paced rhythm. There is likely superimposed anterior and septal hypokinesis. Limited views and limited endocardial definition make further interpretation limited.   3. The right ventricle is not well visualized. Normal right ventricular cavity size and reduced systolic function.   4. The left atrium is mildly dilated in size.   5. Right atrium was not well visualized.   6. Mild calcification of the aortic valve leaflets.   7. Tricuspid valve was not well visualized.   8. Aortic valve was not well visualized.    POD2 s/p podiatry/surgical debridement  rec ASA, Statin  Pt has seen Dave Panchal [phone] and Curtis ferris [phone]    will follow prn

## 2023-10-21 NOTE — PROVIDER CONTACT NOTE (OTHER) - SITUATION
Per tele tech Jose, patient had 7 beats of Vtach.
pt had low bp in the night, rechecking vitals and found pt bp to be low again
Patient /48 manually auscultated w/ stethoscope
Patient due to receive Vancomycin 750mg, 3rd dose of 750mg but 4th dose since last trough was drawn.
pt had low bp in the night, rechecking vitals and found pt bp to be low again
pt has no iv access as all points of access infiltrated. unable to get any iv access, attempted by primary nurse, and icu nurse.
pt had low bp in the night, rechecking vitals and found pt bp to be low again
bp 78/38 manual cuff

## 2023-10-21 NOTE — PROGRESS NOTE ADULT - PROBLEM SELECTOR PLAN 2
id noted  rpt blood cultures neg to date  iv cefipime and vanco for now - picc line and dc planning pending path  tessie clean  s/p or debridement of b/l heel wounds - fu cultures for abx recs upon dc

## 2023-10-21 NOTE — PROVIDER CONTACT NOTE (OTHER) - BACKGROUND
Patient admitted for UTI
pt admitted for bacteremia.
pt admitted for bacteremia.
patient admitted for fever/weakness/UTI; current MRSA of foot wound
Patient admitted for UTI
pt admitted for bacteremia.
admitted for
pt admitted for bacteremia

## 2023-10-21 NOTE — PROVIDER CONTACT NOTE (OTHER) - DATE AND TIME:
12-Oct-2023 09:35
17-Oct-2023 22:19
21-Oct-2023 23:49
18-Oct-2023 05:45
12-Oct-2023 12:25
12-Oct-2023 10:00
12-Oct-2023 14:38
11-Oct-2023 20:50

## 2023-10-21 NOTE — SOCIAL WORK PROGRESS NOTE - NSSWPROGRESSNOTE_GEN_ALL_CORE
I met with patient at bedside to discuss EVERARDO for long term IV antibiotics. D/C planning folder is at bedside. He states he prefers to go home and states wife has helped with IV tx at home in the past. He wants son Nico to be primary contact. Spoke with son by phone at [phone]. He states he spoke with someone from wound care yesterday and they said he was scheduled for another procedure on Monday. Podiatry note from yesterday indicates possible further re-section of 5ht metatarsal. son states he and pt.'s spouse agree he will need EVERARDO when ready for d/c. He will review d/c folder on Monday and get back to us with choices. Encouraged him to reinforce plan for EVERARDO with patient. Social work to follow. I met with patient at bedside to discuss EVERARDO for long term IV antibiotics. D/C planning folder is at bedside. He states he prefers to go home and states wife has helped with IV tx at home in the past. He wants son Nico to be primary contact. Spoke with son by phone at [phone]. He states he spoke with someone from wound care yesterday and they said he was scheduled for another procedure on Monday. Podiatry note from yesterday indicates possible further re-section of 5ht metatarsal. son states he and pt.'s spouse agree he will need EVERARDO when ready for d/c. He will review d/c folder on Monday and get back to us with choices. Encouraged him to reinforce plan for EVERARDO with patient. Social work to follow. I met with patient at bedside to discuss EVERARDO for long term IV antibiotics. D/C planning folder is at bedside. He states he prefers to go home and states wife has helped with IV tx at home in the past. He wants son Nico to be primary contact. Spoke with son by phone at [phone]. He states he spoke with someone from wound care yesterday and they said he was scheduled for another procedure on Monday. Podiatry note from yesterday indicates possible further re-section of 5ht metatarsal. son states he and pt.'s spouse agree he will need EVERARDO when ready for d/c. He will review d/c folder on Monday and get back to us with choices. Encouraged him to reinforce plan for EVERARDO with patient. Social work to follow.

## 2023-10-22 LAB
ANION GAP SERPL CALC-SCNC: 6 MMOL/L — SIGNIFICANT CHANGE UP (ref 5–17)
BUN SERPL-MCNC: 16 MG/DL — SIGNIFICANT CHANGE UP (ref 7–23)
CALCIUM SERPL-MCNC: 8 MG/DL — LOW (ref 8.5–10.1)
CHLORIDE SERPL-SCNC: 106 MMOL/L — SIGNIFICANT CHANGE UP (ref 96–108)
CO2 SERPL-SCNC: 29 MMOL/L — SIGNIFICANT CHANGE UP (ref 22–31)
CREAT SERPL-MCNC: 0.88 MG/DL — SIGNIFICANT CHANGE UP (ref 0.5–1.3)
EGFR: 88 ML/MIN/1.73M2 — SIGNIFICANT CHANGE UP
GLUCOSE BLDC GLUCOMTR-MCNC: 184 MG/DL — HIGH (ref 70–99)
GLUCOSE BLDC GLUCOMTR-MCNC: 209 MG/DL — HIGH (ref 70–99)
GLUCOSE BLDC GLUCOMTR-MCNC: 218 MG/DL — HIGH (ref 70–99)
GLUCOSE BLDC GLUCOMTR-MCNC: 227 MG/DL — HIGH (ref 70–99)
GLUCOSE SERPL-MCNC: 224 MG/DL — HIGH (ref 70–99)
HCT VFR BLD CALC: 27.6 % — LOW (ref 39–50)
HGB BLD-MCNC: 8.5 G/DL — LOW (ref 13–17)
MAGNESIUM SERPL-MCNC: 2.1 MG/DL — SIGNIFICANT CHANGE UP (ref 1.6–2.6)
MCHC RBC-ENTMCNC: 25.4 PG — LOW (ref 27–34)
MCHC RBC-ENTMCNC: 30.8 GM/DL — LOW (ref 32–36)
MCV RBC AUTO: 82.6 FL — SIGNIFICANT CHANGE UP (ref 80–100)
NRBC # BLD: 0 /100 WBCS — SIGNIFICANT CHANGE UP (ref 0–0)
PHOSPHATE SERPL-MCNC: 2.1 MG/DL — LOW (ref 2.5–4.5)
PLATELET # BLD AUTO: 316 K/UL — SIGNIFICANT CHANGE UP (ref 150–400)
POTASSIUM SERPL-MCNC: 3.9 MMOL/L — SIGNIFICANT CHANGE UP (ref 3.5–5.3)
POTASSIUM SERPL-SCNC: 3.9 MMOL/L — SIGNIFICANT CHANGE UP (ref 3.5–5.3)
RBC # BLD: 3.34 M/UL — LOW (ref 4.2–5.8)
RBC # FLD: 16.5 % — HIGH (ref 10.3–14.5)
SODIUM SERPL-SCNC: 141 MMOL/L — SIGNIFICANT CHANGE UP (ref 135–145)
VANCOMYCIN TROUGH SERPL-MCNC: 15.2 UG/ML — SIGNIFICANT CHANGE UP (ref 10–20)
VANCOMYCIN TROUGH SERPL-MCNC: 15.4 UG/ML — SIGNIFICANT CHANGE UP (ref 10–20)
VANCOMYCIN TROUGH SERPL-MCNC: 16.1 UG/ML — SIGNIFICANT CHANGE UP (ref 10–20)
WBC # BLD: 10.81 K/UL — HIGH (ref 3.8–10.5)
WBC # FLD AUTO: 10.81 K/UL — HIGH (ref 3.8–10.5)

## 2023-10-22 RX ORDER — VANCOMYCIN HCL 1 G
500 VIAL (EA) INTRAVENOUS EVERY 12 HOURS
Refills: 0 | Status: DISCONTINUED | OUTPATIENT
Start: 2023-10-23 | End: 2023-10-25

## 2023-10-22 RX ADMIN — Medication 2: at 12:36

## 2023-10-22 RX ADMIN — Medication 25 MILLIGRAM(S): at 05:37

## 2023-10-22 RX ADMIN — MIDODRINE HYDROCHLORIDE 10 MILLIGRAM(S): 2.5 TABLET ORAL at 05:37

## 2023-10-22 RX ADMIN — Medication 25 MILLIGRAM(S): at 17:45

## 2023-10-22 RX ADMIN — TAMSULOSIN HYDROCHLORIDE 0.4 MILLIGRAM(S): 0.4 CAPSULE ORAL at 21:20

## 2023-10-22 RX ADMIN — Medication 1: at 17:44

## 2023-10-22 RX ADMIN — Medication 2: at 08:29

## 2023-10-22 RX ADMIN — Medication 1 TABLET(S): at 08:29

## 2023-10-22 RX ADMIN — Medication 100 MILLIGRAM(S): at 15:01

## 2023-10-22 RX ADMIN — PANTOPRAZOLE SODIUM 40 MILLIGRAM(S): 20 TABLET, DELAYED RELEASE ORAL at 05:37

## 2023-10-22 RX ADMIN — MIDODRINE HYDROCHLORIDE 10 MILLIGRAM(S): 2.5 TABLET ORAL at 11:54

## 2023-10-22 RX ADMIN — MIDODRINE HYDROCHLORIDE 10 MILLIGRAM(S): 2.5 TABLET ORAL at 17:45

## 2023-10-22 RX ADMIN — Medication 100 MILLIGRAM(S): at 03:27

## 2023-10-22 RX ADMIN — ATORVASTATIN CALCIUM 80 MILLIGRAM(S): 80 TABLET, FILM COATED ORAL at 21:20

## 2023-10-22 RX ADMIN — GABAPENTIN 300 MILLIGRAM(S): 400 CAPSULE ORAL at 05:37

## 2023-10-22 RX ADMIN — GABAPENTIN 300 MILLIGRAM(S): 400 CAPSULE ORAL at 17:49

## 2023-10-22 RX ADMIN — Medication 1 TABLET(S): at 17:45

## 2023-10-22 NOTE — PROGRESS NOTE ADULT - SUBJECTIVE AND OBJECTIVE BOX
Patient is a 78y old  Male who presents with a chief complaint of fever and weakness (21 Oct 2023 12:04)      INTERVAL HPI/OVERNIGHT EVENTS: stable no new events    MEDICATIONS  (STANDING):  atorvastatin 80 milliGRAM(s) Oral at bedtime  bethanechol 25 milliGRAM(s) Oral two times a day  dextrose 50% Injectable 12.5 Gram(s) IV Push once  dextrose 50% Injectable 25 Gram(s) IV Push once  dextrose 50% Injectable 25 Gram(s) IV Push once  gabapentin 300 milliGRAM(s) Oral two times a day  glucagon  Injectable 1 milliGRAM(s) IntraMuscular once  insulin lispro (ADMELOG) corrective regimen sliding scale   SubCutaneous three times a day before meals  insulin lispro (ADMELOG) corrective regimen sliding scale   SubCutaneous at bedtime  lactated ringers. 1000 milliLiter(s) (100 mL/Hr) IV Continuous <Continuous>  lactobacillus acidophilus 1 Tablet(s) Oral two times a day with meals  midodrine. 10 milliGRAM(s) Oral three times a day  pantoprazole    Tablet 40 milliGRAM(s) Oral before breakfast  tamsulosin 0.4 milliGRAM(s) Oral at bedtime  vancomycin  IVPB 500 milliGRAM(s) IV Intermittent every 12 hours    MEDICATIONS  (PRN):  acetaminophen     Tablet .. 650 milliGRAM(s) Oral every 6 hours PRN Temp greater or equal to 38C (100.4F), Moderate Pain (4 - 6)  dextrose Oral Gel 15 Gram(s) Oral once PRN Blood Glucose LESS THAN 70 milliGRAM(s)/deciliter  loperamide 2 milliGRAM(s) Oral three times a day PRN Diarrhea      Allergies    No Known Allergies    Intolerances        REVIEW OF SYSTEMS:  CONSTITUTIONAL: No fever, weight loss, or fatigue  EYES: No eye pain, visual disturbances  ENMT:  No difficulty hearing, tinnitus, vertigo; No sinus or throat pain  NECK: No pain or stiffness  RESPIRATORY: No cough, wheezing, chills or hemoptysis; No shortness of breath  CARDIOVASCULAR: No chest pain, palpitations, dizziness  GASTROINTESTINAL: No abdominal or epigastric pain. No nausea, vomiting, or hematemesis; No diarrhea or constipation. No melena or hematochezia.  GENITOURINARY: No dysuria, frequency, hematuria, or incontinence  NEUROLOGICAL: No headaches, memory loss, loss of strength, numbness, or tremors  SKIN: No itching, burning  LYMPH NODES: No enlarged glands  MUSCULOSKELETAL: No joint pain or swelling; No muscle, back, or extremity pain  PSYCHIATRIC: No depression, mood swings  HEME/LYMPH: No easy bruising, or bleeding gums  ALLERGY AND IMMUNOLOGIC: No hives    Vital Signs Last 24 Hrs  T(C): 36.9 (22 Oct 2023 05:04), Max: 37.3 (21 Oct 2023 20:38)  T(F): 98.5 (22 Oct 2023 05:04), Max: 99.1 (21 Oct 2023 20:38)  HR: 60 (22 Oct 2023 05:04) (60 - 63)  BP: 119/47 (22 Oct 2023 05:04) (109/64 - 124/50)  BP(mean): --  RR: 16 (22 Oct 2023 05:04) (16 - 18)  SpO2: 95% (22 Oct 2023 05:04) (94% - 99%)    Parameters below as of 22 Oct 2023 05:04  Patient On (Oxygen Delivery Method): room air        PHYSICAL EXAM:  GENERAL: NAD, well-groomed, well-developed  HEAD:  Atraumatic, Normocephalic  EYES: EOMI, PERRLA, conjunctiva and sclera clear  ENMT: No tonsillar erythema, exudates, or enlargement   NECK: Supple, No JVD  NERVOUS SYSTEM:  Alert & Oriented X3, Good concentration  CHEST/LUNG: Clear to auscultation bilaterally; No rales, rhonchi, wheezing  HEART: Regular rate and rhythm  ABDOMEN: Soft, Nontender, Nondistended; Bowel sounds present  EXTREMITIES:  2+ Peripheral Pulses   LYMPH: No lymphadenopathy noted  SKIN: No rashes     LABS:              CAPILLARY BLOOD GLUCOSE      POCT Blood Glucose.: 227 mg/dL (22 Oct 2023 08:19)  POCT Blood Glucose.: 232 mg/dL (21 Oct 2023 21:48)  POCT Blood Glucose.: 218 mg/dL (21 Oct 2023 17:14)  POCT Blood Glucose.: 237 mg/dL (21 Oct 2023 12:59)    blood culture --   Numerous Methicillin Resistant Staphylococcus aureus   10-19 @ 11:35      urine culture --  10-19 @ 11:35  results   Numerous Methicillin Resistant Staphylococcus aureus 10-19 @ 11:35    wound with gram statin --    10-19 @ 11:35  organism  Methicillin resistant Staphylococcus aureus   10-19 @ 11:35  specimen source .Tissue Other, RIGHT FOOT SOFT TISSUE CULTURE  10-19 @ 11:35      RADIOLOGY & ADDITIONAL TESTS:      Consultant(s) Notes Reviewed:  [ x] YES  [ ] NO    Care Discussed with Consultants/Other Providers [ x] YES  [ ] NO    Advanced care planning discussed with patient and family, advanced care planning forms reviewed, discussed, and completed.  20 minutes spent.

## 2023-10-22 NOTE — PROVIDER CONTACT NOTE (CRITICAL VALUE NOTIFICATION) - PERSON GIVING RESULT:
Dong Kellogg, [facility] Dong Kellogg, [facility] Dong Kellogg, [facility]

## 2023-10-22 NOTE — PROGRESS NOTE ADULT - PROBLEM SELECTOR PROBLEM 6
Anemia, unspecified
Anemia, unspecified
Benign essential HTN
Diabetes
Benign essential HTN
Diabetes
Anemia, unspecified
Anemia, unspecified
Diabetes

## 2023-10-22 NOTE — PROGRESS NOTE ADULT - PROBLEM SELECTOR PLAN 6
improving  hold bp meds
improving  hold bp meds
new onset per son  guicac negative  heme eval  gi eval
riss
improving  hold bp meds
riss
new onset per son  guicac negative  heme eval  gi eval
improving  hold bp meds
riss
new onset per son  guicac negative  heme eval  gi eval
new onset per son  guicac negative  heme eval  gi eval

## 2023-10-22 NOTE — PROGRESS NOTE ADULT - SUBJECTIVE AND OBJECTIVE BOX
[facility]  Les Mccray PA-C  [address] [place] [address] [phone]      INTERVAL HPI/OVERNIGHT EVENTS:  pt seen and examined  Tolerating diet    No acute events overnight as per nursing staff     MEDICATIONS  (STANDING):  atorvastatin 80 milliGRAM(s) Oral at bedtime  bethanechol 25 milliGRAM(s) Oral two times a day  dextrose 50% Injectable 12.5 Gram(s) IV Push once  dextrose 50% Injectable 25 Gram(s) IV Push once  dextrose 50% Injectable 25 Gram(s) IV Push once  gabapentin 300 milliGRAM(s) Oral two times a day  glucagon  Injectable 1 milliGRAM(s) IntraMuscular once  insulin lispro (ADMELOG) corrective regimen sliding scale   SubCutaneous three times a day before meals  insulin lispro (ADMELOG) corrective regimen sliding scale   SubCutaneous at bedtime  lactated ringers. 1000 milliLiter(s) (100 mL/Hr) IV Continuous <Continuous>  lactobacillus acidophilus 1 Tablet(s) Oral two times a day with meals  midodrine. 10 milliGRAM(s) Oral three times a day  pantoprazole    Tablet 40 milliGRAM(s) Oral before breakfast  tamsulosin 0.4 milliGRAM(s) Oral at bedtime  vancomycin  IVPB 500 milliGRAM(s) IV Intermittent every 12 hours    MEDICATIONS  (PRN):  acetaminophen     Tablet .. 650 milliGRAM(s) Oral every 6 hours PRN Temp greater or equal to 38C (100.4F), Moderate Pain (4 - 6)  dextrose Oral Gel 15 Gram(s) Oral once PRN Blood Glucose LESS THAN 70 milliGRAM(s)/deciliter  loperamide 2 milliGRAM(s) Oral three times a day PRN Diarrhea      Allergies  No Known Allergies    Intolerances      Vital Signs Last 24 Hrs  T(C): 36.3 (22 Oct 2023 11:06), Max: 37.3 (21 Oct 2023 20:38)  T(F): 97.4 (22 Oct 2023 11:06), Max: 99.1 (21 Oct 2023 20:38)  HR: 60 (22 Oct 2023 11:06) (60 - 63)  BP: 117/57 (22 Oct 2023 11:06) (116/60 - 124/50)  BP(mean): --  RR: 17 (22 Oct 2023 11:06) (16 - 18)  SpO2: 97% (22 Oct 2023 11:06) (94% - 97%)    Parameters below as of 22 Oct 2023 11:06  Patient On (Oxygen Delivery Method): room air      GENERAL:  Appears stated age  HEENT:  NC/AT  CHEST:  Full & symmetric excursion  HEART:  Regular rhythm  ABDOMEN:  Soft, non-tender, non-distended  EXTEREMITIES:  no cyanosis  SKIN:  No rash  NEURO:  Alert        LABS:                        8.5    10.81 )-----------( 316      ( 22 Oct 2023 10:10 )             27.6     10-22    141  |  106  |  16  ----------------------------<  224<H>  3.9   |  29  |  0.88    Ca    8.0<L>      22 Oct 2023 10:10  Phos  2.1     10-22  Mg     2.1     10-22                     [facility]  Les Mccray PA-C  [address] [place] [address] [phone]      INTERVAL HPI/OVERNIGHT EVENTS:  pt seen and examined  Tolerating diet    No acute events overnight as per nursing staff     MEDICATIONS  (STANDING):  atorvastatin 80 milliGRAM(s) Oral at bedtime  bethanechol 25 milliGRAM(s) Oral two times a day  dextrose 50% Injectable 12.5 Gram(s) IV Push once  dextrose 50% Injectable 25 Gram(s) IV Push once  dextrose 50% Injectable 25 Gram(s) IV Push once  gabapentin 300 milliGRAM(s) Oral two times a day  glucagon  Injectable 1 milliGRAM(s) IntraMuscular once  insulin lispro (ADMELOG) corrective regimen sliding scale   SubCutaneous three times a day before meals  insulin lispro (ADMELOG) corrective regimen sliding scale   SubCutaneous at bedtime  lactated ringers. 1000 milliLiter(s) (100 mL/Hr) IV Continuous <Continuous>  lactobacillus acidophilus 1 Tablet(s) Oral two times a day with meals  midodrine. 10 milliGRAM(s) Oral three times a day  pantoprazole    Tablet 40 milliGRAM(s) Oral before breakfast  tamsulosin 0.4 milliGRAM(s) Oral at bedtime  vancomycin  IVPB 500 milliGRAM(s) IV Intermittent every 12 hours    MEDICATIONS  (PRN):  acetaminophen     Tablet .. 650 milliGRAM(s) Oral every 6 hours PRN Temp greater or equal to 38C (100.4F), Moderate Pain (4 - 6)  dextrose Oral Gel 15 Gram(s) Oral once PRN Blood Glucose LESS THAN 70 milliGRAM(s)/deciliter  loperamide 2 milliGRAM(s) Oral three times a day PRN Diarrhea      Allergies  No Known Allergies    Intolerances      Vital Signs Last 24 Hrs  T(C): 36.3 (22 Oct 2023 11:06), Max: 37.3 (21 Oct 2023 20:38)  T(F): 97.4 (22 Oct 2023 11:06), Max: 99.1 (21 Oct 2023 20:38)  HR: 60 (22 Oct 2023 11:06) (60 - 63)  BP: 117/57 (22 Oct 2023 11:06) (116/60 - 124/50)  BP(mean): --  RR: 17 (22 Oct 2023 11:06) (16 - 18)  SpO2: 97% (22 Oct 2023 11:06) (94% - 97%)    Parameters below as of 22 Oct 2023 11:06  Patient On (Oxygen Delivery Method): room air      GENERAL:  Appears stated age  HEENT:  NC/AT  CHEST:  Full & symmetric excursion  HEART:  Regular rhythm  ABDOMEN:  Soft, non-tender, non-distended  EXTEREMITIES:  no cyanosis  SKIN:  No rash  NEURO:  Alert        LABS:                        8.5    10.81 )-----------( 316      ( 22 Oct 2023 10:10 )             27.6     10-22    141  |  106  |  16  ----------------------------<  224<H>  3.9   |  29  |  0.88    Ca    8.0<L>      22 Oct 2023 10:10  Phos  2.1     10-22  Mg     2.1     10-22                     [facility]  Les Mccray PA-C  [address] [place] [address] [phone]      INTERVAL HPI/OVERNIGHT EVENTS:  pt seen and examined  Tolerating diet    No acute events overnight as per nursing staff     MEDICATIONS  (STANDING):  atorvastatin 80 milliGRAM(s) Oral at bedtime  bethanechol 25 milliGRAM(s) Oral two times a day  dextrose 50% Injectable 12.5 Gram(s) IV Push once  dextrose 50% Injectable 25 Gram(s) IV Push once  dextrose 50% Injectable 25 Gram(s) IV Push once  gabapentin 300 milliGRAM(s) Oral two times a day  glucagon  Injectable 1 milliGRAM(s) IntraMuscular once  insulin lispro (ADMELOG) corrective regimen sliding scale   SubCutaneous three times a day before meals  insulin lispro (ADMELOG) corrective regimen sliding scale   SubCutaneous at bedtime  lactated ringers. 1000 milliLiter(s) (100 mL/Hr) IV Continuous <Continuous>  lactobacillus acidophilus 1 Tablet(s) Oral two times a day with meals  midodrine. 10 milliGRAM(s) Oral three times a day  pantoprazole    Tablet 40 milliGRAM(s) Oral before breakfast  tamsulosin 0.4 milliGRAM(s) Oral at bedtime  vancomycin  IVPB 500 milliGRAM(s) IV Intermittent every 12 hours    MEDICATIONS  (PRN):  acetaminophen     Tablet .. 650 milliGRAM(s) Oral every 6 hours PRN Temp greater or equal to 38C (100.4F), Moderate Pain (4 - 6)  dextrose Oral Gel 15 Gram(s) Oral once PRN Blood Glucose LESS THAN 70 milliGRAM(s)/deciliter  loperamide 2 milliGRAM(s) Oral three times a day PRN Diarrhea      Allergies  No Known Allergies    Intolerances      Vital Signs Last 24 Hrs  T(C): 36.3 (22 Oct 2023 11:06), Max: 37.3 (21 Oct 2023 20:38)  T(F): 97.4 (22 Oct 2023 11:06), Max: 99.1 (21 Oct 2023 20:38)  HR: 60 (22 Oct 2023 11:06) (60 - 63)  BP: 117/57 (22 Oct 2023 11:06) (116/60 - 124/50)  BP(mean): --  RR: 17 (22 Oct 2023 11:06) (16 - 18)  SpO2: 97% (22 Oct 2023 11:06) (94% - 97%)    Parameters below as of 22 Oct 2023 11:06  Patient On (Oxygen Delivery Method): room air      GENERAL:  Appears stated age  HEENT:  NC/AT  CHEST:  Full & symmetric excursion  HEART:  Regular rhythm  ABDOMEN:  Soft, non-tender, non-distended  EXTEREMITIES:  no cyanosis  SKIN:  No rash  NEURO:  Alert        LABS:                        8.5    10.81 )-----------( 316      ( 22 Oct 2023 10:10 )             27.6     10-22    141  |  106  |  16  ----------------------------<  224<H>  3.9   |  29  |  0.88    Ca    8.0<L>      22 Oct 2023 10:10  Phos  2.1     10-22  Mg     2.1     10-22

## 2023-10-23 LAB
ANION GAP SERPL CALC-SCNC: 7 MMOL/L — SIGNIFICANT CHANGE UP (ref 5–17)
BUN SERPL-MCNC: 16 MG/DL — SIGNIFICANT CHANGE UP (ref 7–23)
CALCIUM SERPL-MCNC: 8.4 MG/DL — LOW (ref 8.5–10.1)
CHLORIDE SERPL-SCNC: 104 MMOL/L — SIGNIFICANT CHANGE UP (ref 96–108)
CO2 SERPL-SCNC: 29 MMOL/L — SIGNIFICANT CHANGE UP (ref 22–31)
CREAT SERPL-MCNC: 0.87 MG/DL — SIGNIFICANT CHANGE UP (ref 0.5–1.3)
EGFR: 88 ML/MIN/1.73M2 — SIGNIFICANT CHANGE UP
GLUCOSE BLDC GLUCOMTR-MCNC: 215 MG/DL — HIGH (ref 70–99)
GLUCOSE BLDC GLUCOMTR-MCNC: 220 MG/DL — HIGH (ref 70–99)
GLUCOSE BLDC GLUCOMTR-MCNC: 228 MG/DL — HIGH (ref 70–99)
GLUCOSE BLDC GLUCOMTR-MCNC: 229 MG/DL — HIGH (ref 70–99)
GLUCOSE SERPL-MCNC: 210 MG/DL — HIGH (ref 70–99)
HCT VFR BLD CALC: 29.6 % — LOW (ref 39–50)
HGB BLD-MCNC: 9.1 G/DL — LOW (ref 13–17)
MCHC RBC-ENTMCNC: 25.5 PG — LOW (ref 27–34)
MCHC RBC-ENTMCNC: 30.7 GM/DL — LOW (ref 32–36)
MCV RBC AUTO: 82.9 FL — SIGNIFICANT CHANGE UP (ref 80–100)
NRBC # BLD: 0 /100 WBCS — SIGNIFICANT CHANGE UP (ref 0–0)
PLATELET # BLD AUTO: 365 K/UL — SIGNIFICANT CHANGE UP (ref 150–400)
POTASSIUM SERPL-MCNC: 3.8 MMOL/L — SIGNIFICANT CHANGE UP (ref 3.5–5.3)
POTASSIUM SERPL-SCNC: 3.8 MMOL/L — SIGNIFICANT CHANGE UP (ref 3.5–5.3)
RBC # BLD: 3.57 M/UL — LOW (ref 4.2–5.8)
RBC # FLD: 16.2 % — HIGH (ref 10.3–14.5)
SODIUM SERPL-SCNC: 140 MMOL/L — SIGNIFICANT CHANGE UP (ref 135–145)
WBC # BLD: 11.76 K/UL — HIGH (ref 3.8–10.5)
WBC # FLD AUTO: 11.76 K/UL — HIGH (ref 3.8–10.5)

## 2023-10-23 PROCEDURE — 99232 SBSQ HOSP IP/OBS MODERATE 35: CPT | Mod: 57,24

## 2023-10-23 RX ADMIN — Medication 2: at 18:04

## 2023-10-23 RX ADMIN — Medication 1 TABLET(S): at 18:03

## 2023-10-23 RX ADMIN — MIDODRINE HYDROCHLORIDE 10 MILLIGRAM(S): 2.5 TABLET ORAL at 06:19

## 2023-10-23 RX ADMIN — Medication 100 MILLIGRAM(S): at 02:26

## 2023-10-23 RX ADMIN — MIDODRINE HYDROCHLORIDE 10 MILLIGRAM(S): 2.5 TABLET ORAL at 18:04

## 2023-10-23 RX ADMIN — ATORVASTATIN CALCIUM 80 MILLIGRAM(S): 80 TABLET, FILM COATED ORAL at 21:48

## 2023-10-23 RX ADMIN — Medication 25 MILLIGRAM(S): at 18:03

## 2023-10-23 RX ADMIN — MIDODRINE HYDROCHLORIDE 10 MILLIGRAM(S): 2.5 TABLET ORAL at 12:34

## 2023-10-23 RX ADMIN — GABAPENTIN 300 MILLIGRAM(S): 400 CAPSULE ORAL at 18:02

## 2023-10-23 RX ADMIN — TAMSULOSIN HYDROCHLORIDE 0.4 MILLIGRAM(S): 0.4 CAPSULE ORAL at 21:48

## 2023-10-23 RX ADMIN — PANTOPRAZOLE SODIUM 40 MILLIGRAM(S): 20 TABLET, DELAYED RELEASE ORAL at 06:19

## 2023-10-23 RX ADMIN — Medication 1 TABLET(S): at 08:24

## 2023-10-23 RX ADMIN — Medication 2: at 12:34

## 2023-10-23 RX ADMIN — GABAPENTIN 300 MILLIGRAM(S): 400 CAPSULE ORAL at 06:19

## 2023-10-23 RX ADMIN — Medication 2: at 08:25

## 2023-10-23 RX ADMIN — Medication 25 MILLIGRAM(S): at 06:19

## 2023-10-23 RX ADMIN — Medication 100 MILLIGRAM(S): at 14:38

## 2023-10-23 NOTE — PROGRESS NOTE ADULT - SUBJECTIVE AND OBJECTIVE BOX
78y year old Male seen at [facility] 3WES 365 D1 s/p sharp excisional debridement of bilateral foot wounds with bone biopsy of the left lateral foot wound DOS 10/19/23. Patient relates no overnight events and states that they are doing well at this time.  Denies any fever, chills, nausea, vomiting, chest pain, shortness of breath, or calf pain at this time.    Allergies    No Known Allergies    Intolerances        MEDICATIONS  (STANDING):  atorvastatin 80 milliGRAM(s) Oral at bedtime  bethanechol 25 milliGRAM(s) Oral two times a day  dextrose 50% Injectable 25 Gram(s) IV Push once  dextrose 50% Injectable 25 Gram(s) IV Push once  dextrose 50% Injectable 12.5 Gram(s) IV Push once  gabapentin 300 milliGRAM(s) Oral two times a day  glucagon  Injectable 1 milliGRAM(s) IntraMuscular once  insulin lispro (ADMELOG) corrective regimen sliding scale   SubCutaneous three times a day before meals  insulin lispro (ADMELOG) corrective regimen sliding scale   SubCutaneous at bedtime  lactated ringers. 1000 milliLiter(s) (100 mL/Hr) IV Continuous <Continuous>  lactobacillus acidophilus 1 Tablet(s) Oral two times a day with meals  midodrine. 10 milliGRAM(s) Oral three times a day  pantoprazole    Tablet 40 milliGRAM(s) Oral before breakfast  tamsulosin 0.4 milliGRAM(s) Oral at bedtime  vancomycin  IVPB 500 milliGRAM(s) IV Intermittent every 12 hours    MEDICATIONS  (PRN):  acetaminophen     Tablet .. 650 milliGRAM(s) Oral every 6 hours PRN Temp greater or equal to 38C (100.4F), Moderate Pain (4 - 6)  dextrose Oral Gel 15 Gram(s) Oral once PRN Blood Glucose LESS THAN 70 milliGRAM(s)/deciliter  loperamide 2 milliGRAM(s) Oral three times a day PRN Diarrhea      Vital Signs Last 24 Hrs  T(C): 36.9 (23 Oct 2023 13:15), Max: 36.9 (23 Oct 2023 13:15)  T(F): 98.4 (23 Oct 2023 13:15), Max: 98.4 (23 Oct 2023 13:15)  HR: 60 (23 Oct 2023 13:15) (60 - 75)  BP: 122/52 (23 Oct 2023 13:15) (117/51 - 129/61)  BP(mean): --  RR: 17 (23 Oct 2023 13:15) (17 - 17)  SpO2: 94% (23 Oct 2023 13:15) (94% - 97%)    Parameters below as of 23 Oct 2023 13:15  Patient On (Oxygen Delivery Method): room air          PHYSICAL EXAM:  Vascular: DP & PT non  palpable bilaterally, Capillary refill delayed to the digits  Digital hair absent bilaterally  Neurological: Light touch sensation diminished  bilaterally  Musculoskeletal: 2/5  strength in all quadrants bilaterally, s/p right partial calcanectomy   Dermatological: Right heel wound noted with granular tissue and measuring 5.5x 6.6 x down to bone and left lateral wound 5.5 x 4.5 x down to bone  mild priscilla-incision erythema, no proximal streaking, no fluctuance, no malodor     CBC Full  -  ( 23 Oct 2023 08:55 )  WBC Count : 11.76 K/uL  RBC Count : 3.57 M/uL  Hemoglobin : 9.1 g/dL  Hematocrit : 29.6 %  Platelet Count - Automated : 365 K/uL  Mean Cell Volume : 82.9 fl  Mean Cell Hemoglobin : 25.5 pg  Mean Cell Hemoglobin Concentration : 30.7 gm/dL  Auto Neutrophil # : x  Auto Lymphocyte # : x  Auto Monocyte # : x  Auto Eosinophil # : x  Auto Basophil # : x  Auto Neutrophil % : x  Auto Lymphocyte % : x  Auto Monocyte % : x  Auto Eosinophil % : x  Auto Basophil % : x      10-23    140  |  104  |  16  ----------------------------<  210<H>  3.8   |  29  |  0.87    Ca    8.4<L>      23 Oct 2023 08:55  Phos  2.1     10-22  Mg     2.1     10-22                          9.1    11.76 )-----------( 365      ( 23 Oct 2023 08:55 )             29.6             Imaging: ----------    1 / 1 Nico Elena              Report date: 10/17/2023      View Order      (Report matches exam selected in Patient History pane)                     ACC: [id number] EXAM: NM MULTI DAY PROCEDURE ORDERED BY: DORIAN BARRIENTOS    ACC: [id number] EXAM: NM BONE MARROW IMG LTD AREA ORDERED BY: ZAID BARRIENTOS    ACC: [id number] EXAM: NM INFLAMM LOC WBC SA SD ORDERED BY: MAHSA SAN    PROCEDURE DATE: 10/17/2023        INTERPRETATION: RADIOPHARMACEUTICAL: 550 microcuries In-111 labeled autologous leukocytes, I.V.; 10.1 mCi Tc99m sulfur colloid, I.V.,    CLINICAL INFORMATION: 78 year-old male with bilateral heel wounds, referred to evaluate for osteomyelitis    TECHNIQUE: The patient was injected with the above dose of labeled autologous leukocytes on 10/16/2023. Approximately 24 hours later, on 10/17/2023, static images of the feet were obtained. The patient then was injected with Tc-99m sulfur colloid and the images were repeated in the same projections using dual isotope acquisition mode.    COMPARISON: Bilateral foot x-rays 10/10/2023.    FINDINGS: There is increased uptake of radiolabeled leukocytes and Tc-99m sulfur colloid in right heel and left midfoot, in a distribution suspicious for osteomyelitis.    IMPRESSION: Abnormal combined Indium-111 labeled leukocyte study and marrow scan. Increased uptake of both radiotracer is in the right heel and left midfoot, concerning for osteomyelitis.    --- End of Report ---     78y year old Male seen at [facility] 3WES 365 D1 s/p sharp excisional debridement of bilateral foot wounds with bone biopsy of the left lateral foot wound DOS 10/19/23. Patient relates no overnight events and states that they are doing well at this time.  Denies any fever, chills, nausea, vomiting, chest pain, shortness of breath, or calf pain at this time.    Allergies    No Known Allergies    Intolerances        MEDICATIONS  (STANDING):  atorvastatin 80 milliGRAM(s) Oral at bedtime  bethanechol 25 milliGRAM(s) Oral two times a day  dextrose 50% Injectable 25 Gram(s) IV Push once  dextrose 50% Injectable 25 Gram(s) IV Push once  dextrose 50% Injectable 12.5 Gram(s) IV Push once  gabapentin 300 milliGRAM(s) Oral two times a day  glucagon  Injectable 1 milliGRAM(s) IntraMuscular once  insulin lispro (ADMELOG) corrective regimen sliding scale   SubCutaneous three times a day before meals  insulin lispro (ADMELOG) corrective regimen sliding scale   SubCutaneous at bedtime  lactated ringers. 1000 milliLiter(s) (100 mL/Hr) IV Continuous <Continuous>  lactobacillus acidophilus 1 Tablet(s) Oral two times a day with meals  midodrine. 10 milliGRAM(s) Oral three times a day  pantoprazole    Tablet 40 milliGRAM(s) Oral before breakfast  tamsulosin 0.4 milliGRAM(s) Oral at bedtime  vancomycin  IVPB 500 milliGRAM(s) IV Intermittent every 12 hours    MEDICATIONS  (PRN):  acetaminophen     Tablet .. 650 milliGRAM(s) Oral every 6 hours PRN Temp greater or equal to 38C (100.4F), Moderate Pain (4 - 6)  dextrose Oral Gel 15 Gram(s) Oral once PRN Blood Glucose LESS THAN 70 milliGRAM(s)/deciliter  loperamide 2 milliGRAM(s) Oral three times a day PRN Diarrhea      Vital Signs Last 24 Hrs  T(C): 36.9 (23 Oct 2023 13:15), Max: 36.9 (23 Oct 2023 13:15)  T(F): 98.4 (23 Oct 2023 13:15), Max: 98.4 (23 Oct 2023 13:15)  HR: 60 (23 Oct 2023 13:15) (60 - 75)  BP: 122/52 (23 Oct 2023 13:15) (117/51 - 129/61)  BP(mean): --  RR: 17 (23 Oct 2023 13:15) (17 - 17)  SpO2: 94% (23 Oct 2023 13:15) (94% - 97%)    Parameters below as of 23 Oct 2023 13:15  Patient On (Oxygen Delivery Method): room air          PHYSICAL EXAM:  Vascular: DP & PT non  palpable bilaterally, Capillary refill delayed to the digits  Digital hair absent bilaterally  Neurological: Light touch sensation diminished  bilaterally  Musculoskeletal: 2/5  strength in all quadrants bilaterally, s/p right partial calcanectomy   Dermatological: Right heel wound noted with granular tissue and measuring 5.5x 6.6 x down to bone and left lateral wound 5.5 x 4.5 x down to bone  mild priscilla-incision erythema, no proximal streaking, no fluctuance, no malodor     CBC Full  -  ( 23 Oct 2023 08:55 )  WBC Count : 11.76 K/uL  RBC Count : 3.57 M/uL  Hemoglobin : 9.1 g/dL  Hematocrit : 29.6 %  Platelet Count - Automated : 365 K/uL  Mean Cell Volume : 82.9 fl  Mean Cell Hemoglobin : 25.5 pg  Mean Cell Hemoglobin Concentration : 30.7 gm/dL  Auto Neutrophil # : x  Auto Lymphocyte # : x  Auto Monocyte # : x  Auto Eosinophil # : x  Auto Basophil # : x  Auto Neutrophil % : x  Auto Lymphocyte % : x  Auto Monocyte % : x  Auto Eosinophil % : x  Auto Basophil % : x      10-23    140  |  104  |  16  ----------------------------<  210<H>  3.8   |  29  |  0.87    Ca    8.4<L>      23 Oct 2023 08:55  Phos  2.1     10-22  Mg     2.1     10-22                          9.1    11.76 )-----------( 365      ( 23 Oct 2023 08:55 )             29.6             Imaging: ----------    1 / 1 Nico Elena              Report date: 10/17/2023      View Order      (Report matches exam selected in Patient History pane)                     ACC: [id number] EXAM: NM MULTI DAY PROCEDURE ORDERED BY: DORIAN BARRIENTOS    ACC: [id number] EXAM: NM BONE MARROW IMG LTD AREA ORDERED BY: ZAID BARRIENTOS    ACC: [id number] EXAM: NM INFLAMM LOC WBC SA SD ORDERED BY: MAHSA SAN    PROCEDURE DATE: 10/17/2023        INTERPRETATION: RADIOPHARMACEUTICAL: 550 microcuries In-111 labeled autologous leukocytes, I.V.; 10.1 mCi Tc99m sulfur colloid, I.V.,    CLINICAL INFORMATION: 78 year-old male with bilateral heel wounds, referred to evaluate for osteomyelitis    TECHNIQUE: The patient was injected with the above dose of labeled autologous leukocytes on 10/16/2023. Approximately 24 hours later, on 10/17/2023, static images of the feet were obtained. The patient then was injected with Tc-99m sulfur colloid and the images were repeated in the same projections using dual isotope acquisition mode.    COMPARISON: Bilateral foot x-rays 10/10/2023.    FINDINGS: There is increased uptake of radiolabeled leukocytes and Tc-99m sulfur colloid in right heel and left midfoot, in a distribution suspicious for osteomyelitis.    IMPRESSION: Abnormal combined Indium-111 labeled leukocyte study and marrow scan. Increased uptake of both radiotracer is in the right heel and left midfoot, concerning for osteomyelitis.    --- End of Report ---     78y year old Male seen at [facility] 3WES 365 D1 s/p sharp excisional debridement of bilateral foot wounds with bone biopsy of the left lateral foot wound DOS 10/19/23. Patient relates no overnight events and states that they are doing well at this time.  Denies any fever, chills, nausea, vomiting, chest pain, shortness of breath, or calf pain at this time.    Allergies    No Known Allergies    Intolerances        MEDICATIONS  (STANDING):  atorvastatin 80 milliGRAM(s) Oral at bedtime  bethanechol 25 milliGRAM(s) Oral two times a day  dextrose 50% Injectable 25 Gram(s) IV Push once  dextrose 50% Injectable 25 Gram(s) IV Push once  dextrose 50% Injectable 12.5 Gram(s) IV Push once  gabapentin 300 milliGRAM(s) Oral two times a day  glucagon  Injectable 1 milliGRAM(s) IntraMuscular once  insulin lispro (ADMELOG) corrective regimen sliding scale   SubCutaneous three times a day before meals  insulin lispro (ADMELOG) corrective regimen sliding scale   SubCutaneous at bedtime  lactated ringers. 1000 milliLiter(s) (100 mL/Hr) IV Continuous <Continuous>  lactobacillus acidophilus 1 Tablet(s) Oral two times a day with meals  midodrine. 10 milliGRAM(s) Oral three times a day  pantoprazole    Tablet 40 milliGRAM(s) Oral before breakfast  tamsulosin 0.4 milliGRAM(s) Oral at bedtime  vancomycin  IVPB 500 milliGRAM(s) IV Intermittent every 12 hours    MEDICATIONS  (PRN):  acetaminophen     Tablet .. 650 milliGRAM(s) Oral every 6 hours PRN Temp greater or equal to 38C (100.4F), Moderate Pain (4 - 6)  dextrose Oral Gel 15 Gram(s) Oral once PRN Blood Glucose LESS THAN 70 milliGRAM(s)/deciliter  loperamide 2 milliGRAM(s) Oral three times a day PRN Diarrhea      Vital Signs Last 24 Hrs  T(C): 36.9 (23 Oct 2023 13:15), Max: 36.9 (23 Oct 2023 13:15)  T(F): 98.4 (23 Oct 2023 13:15), Max: 98.4 (23 Oct 2023 13:15)  HR: 60 (23 Oct 2023 13:15) (60 - 75)  BP: 122/52 (23 Oct 2023 13:15) (117/51 - 129/61)  BP(mean): --  RR: 17 (23 Oct 2023 13:15) (17 - 17)  SpO2: 94% (23 Oct 2023 13:15) (94% - 97%)    Parameters below as of 23 Oct 2023 13:15  Patient On (Oxygen Delivery Method): room air          PHYSICAL EXAM:  Vascular: DP & PT non  palpable bilaterally, Capillary refill delayed to the digits  Digital hair absent bilaterally  Neurological: Light touch sensation diminished  bilaterally  Musculoskeletal: 2/5  strength in all quadrants bilaterally, s/p right partial calcanectomy   Dermatological: Right heel wound noted with granular tissue and measuring 5.5x 6.6 x down to bone and left lateral wound 5.5 x 4.5 x down to bone  mild priscilla-incision erythema, no proximal streaking, no fluctuance, no malodor     CBC Full  -  ( 23 Oct 2023 08:55 )  WBC Count : 11.76 K/uL  RBC Count : 3.57 M/uL  Hemoglobin : 9.1 g/dL  Hematocrit : 29.6 %  Platelet Count - Automated : 365 K/uL  Mean Cell Volume : 82.9 fl  Mean Cell Hemoglobin : 25.5 pg  Mean Cell Hemoglobin Concentration : 30.7 gm/dL  Auto Neutrophil # : x  Auto Lymphocyte # : x  Auto Monocyte # : x  Auto Eosinophil # : x  Auto Basophil # : x  Auto Neutrophil % : x  Auto Lymphocyte % : x  Auto Monocyte % : x  Auto Eosinophil % : x  Auto Basophil % : x      10-23    140  |  104  |  16  ----------------------------<  210<H>  3.8   |  29  |  0.87    Ca    8.4<L>      23 Oct 2023 08:55  Phos  2.1     10-22  Mg     2.1     10-22                          9.1    11.76 )-----------( 365      ( 23 Oct 2023 08:55 )             29.6             Imaging: ----------    1 / 1 Nico Elena              Report date: 10/17/2023      View Order      (Report matches exam selected in Patient History pane)                     ACC: [id number] EXAM: NM MULTI DAY PROCEDURE ORDERED BY: DORIAN BARRIENTOS    ACC: [id number] EXAM: NM BONE MARROW IMG LTD AREA ORDERED BY: ZAID BARRIENTOS    ACC: [id number] EXAM: NM INFLAMM LOC WBC SA SD ORDERED BY: MAHSA SAN    PROCEDURE DATE: 10/17/2023        INTERPRETATION: RADIOPHARMACEUTICAL: 550 microcuries In-111 labeled autologous leukocytes, I.V.; 10.1 mCi Tc99m sulfur colloid, I.V.,    CLINICAL INFORMATION: 78 year-old male with bilateral heel wounds, referred to evaluate for osteomyelitis    TECHNIQUE: The patient was injected with the above dose of labeled autologous leukocytes on 10/16/2023. Approximately 24 hours later, on 10/17/2023, static images of the feet were obtained. The patient then was injected with Tc-99m sulfur colloid and the images were repeated in the same projections using dual isotope acquisition mode.    COMPARISON: Bilateral foot x-rays 10/10/2023.    FINDINGS: There is increased uptake of radiolabeled leukocytes and Tc-99m sulfur colloid in right heel and left midfoot, in a distribution suspicious for osteomyelitis.    IMPRESSION: Abnormal combined Indium-111 labeled leukocyte study and marrow scan. Increased uptake of both radiotracer is in the right heel and left midfoot, concerning for osteomyelitis.    --- End of Report ---

## 2023-10-23 NOTE — PROGRESS NOTE ADULT - PROBLEM SELECTOR PLAN 1
Patient was seen and evaluated   Left foot wound with fibrotic tissue noted and bone exposed   Applied Aquacel  and sterile dry dressing to the right foot and left foot   C/w  IV antibiotics per infectious disease   Discussed with patient and son that left foot bone noted to be very soft intra op , still awaiting surgical pathology   Most likely positive for OM. Discussed at length that surgical intervention will involve resection of the base of the 5th metatarsal and part of te cuboid. No gaurantees given but will still need long term IV for residual OM and to aid with the wound healing process.   Procedure discussed at length with patient regarding risks, complications, benefits, as well as pre/intra/post-operative expectations. Patient verbally consents to procedure and understood discussion regarding procedure and all questions were answered to patient's satisfaction at this time.  Patient scheduled for left foot resection of 5th metatarsal base and partial cuboid on Wednesday 10/25/23. . Discussed with patient and family that patient is still high risk for more proximal amputation, loss of limb, sepsis and loss of life.   Request medical and cardiac optimization and risk stratification - (pt has 7 beats of vtach on 10/21/23)   Please keep patient NPO after midnight on Tuesday 10/24/23 for surgery scheduled Wednesday 10/25/23.

## 2023-10-23 NOTE — PROGRESS NOTE ADULT - SUBJECTIVE AND OBJECTIVE BOX
[facility], Division of Infectious Diseases  GLYNN Oakley Y. Patel, S. Shah, G. [facility]  [phone]    Name: HEMA LAZCANO  Age: 78y  Gender: Male  MRN: [id number]    Interval History:  Patient seen and examined at bedside  No acute overnight events. Afebrile  Feeling good  Notes reviewed    Antibiotics:  vancomycin  IVPB 500 milliGRAM(s) IV Intermittent every 12 hours      Medications:  acetaminophen     Tablet .. 650 milliGRAM(s) Oral every 6 hours PRN  atorvastatin 80 milliGRAM(s) Oral at bedtime  bethanechol 25 milliGRAM(s) Oral two times a day  dextrose 50% Injectable 12.5 Gram(s) IV Push once  dextrose 50% Injectable 25 Gram(s) IV Push once  dextrose 50% Injectable 25 Gram(s) IV Push once  dextrose Oral Gel 15 Gram(s) Oral once PRN  gabapentin 300 milliGRAM(s) Oral two times a day  glucagon  Injectable 1 milliGRAM(s) IntraMuscular once  insulin lispro (ADMELOG) corrective regimen sliding scale   SubCutaneous three times a day before meals  insulin lispro (ADMELOG) corrective regimen sliding scale   SubCutaneous at bedtime  lactated ringers. 1000 milliLiter(s) IV Continuous <Continuous>  lactobacillus acidophilus 1 Tablet(s) Oral two times a day with meals  loperamide 2 milliGRAM(s) Oral three times a day PRN  midodrine. 10 milliGRAM(s) Oral three times a day  pantoprazole    Tablet 40 milliGRAM(s) Oral before breakfast  tamsulosin 0.4 milliGRAM(s) Oral at bedtime  vancomycin  IVPB 500 milliGRAM(s) IV Intermittent every 12 hours      Review of Systems:  A 10-point review of systems was obtained.   Review of systems otherwise negative except as previously noted.    Allergies: No Known Allergies    For details regarding the patient's past medical history, social history, family history, and other miscellaneous elements, please refer the initial infectious diseases consultation and/or the admitting history and physical examination for this admission.    Objective:  Vitals:   T(C): 36.9 (10-23-23 @ 13:15), Max: 36.9 (10-23-23 @ 13:15)  HR: 60 (10-23-23 @ 13:15) (60 - 75)  BP: 122/52 (10-23-23 @ 13:15) (117/51 - 129/61)  RR: 17 (10-23-23 @ 13:15) (17 - 17)  SpO2: 94% (10-23-23 @ 13:15) (94% - 97%)    Physical Examination:  General: no acute distress  HEENT: NC/AT, EOMI,   Cardio: S1, S2 heard, RRR, no murmurs  Resp: decreased breath sounds  Abd: soft, NT, ND  Ext: b/l dressing c/d/i  Skin: warm, dry, no visible rash    Laboratory Studies:  CBC:                       9.1    11.76 )-----------( 365      ( 23 Oct 2023 08:55 )             29.6     CMP: 10-23    140  |  104  |  16  ----------------------------<  210<H>  3.8   |  29  |  0.87    Ca    8.4<L>      23 Oct 2023 08:55  Phos  2.1     10-22  Mg     2.1     10-22        Urinalysis Basic - ( 23 Oct 2023 08:55 )    Color: x / Appearance: x / SG: x / pH: x  Gluc: 210 mg/dL / Ketone: x  / Bili: x / Urobili: x   Blood: x / Protein: x / Nitrite: x   Leuk Esterase: x / RBC: x / WBC x   Sq Epi: x / Non Sq Epi: x / Bacteria: x        Microbiology: reviewed    Culture - Tissue with Gram Stain (collected 10-19-23 @ 11:35)  Source: .Tissue Other, LEFT FOOT BONE CULTURE  Gram Stain (10-19-23 @ 23:02):    No polymorphonuclear cells seen per low power field    No organisms seen per oil power field  Preliminary Report (10-21-23 @ 15:15):    Rare Methicillin Resistant Staphylococcus aureus  Organism: Methicillin resistant Staphylococcus aureus (10-21-23 @ 15:14)  Organism: Methicillin resistant Staphylococcus aureus (10-21-23 @ 15:14)      Method Type: KARTHIK      -  Ampicillin/Sulbactam: R <=8/4      -  Cefazolin: R <=4      -  Clindamycin: S <=0.25      -  Daptomycin: S 0.5      -  Erythromycin: R >4      -  Gentamicin: S <=1 Should not be used as monotherapy      -  Linezolid: S 2      -  Oxacillin: R >2      -  Penicillin: R 1      -  Rifampin: S <=1 Should not be used as monotherapy      -  Tetracycline: S <=1      -  Trimethoprim/Sulfamethoxazole: S <=0.5/9.5      -  Vancomycin: S 1    Culture - Tissue with Gram Stain (collected 10-19-23 @ 11:35)  Source: .Tissue Other, LEFT FOOT DEEP TISSUE CULTURE  Gram Stain (10-19-23 @ 23:29):    No polymorphonuclear cells seen per low power field    Moderate Gram positive cocci in pairs per oil power field  Preliminary Report (10-21-23 @ 15:22):    Numerous Methicillin Resistant Staphylococcus aureus  Organism: Methicillin resistant Staphylococcus aureus (10-21-23 @ 15:21)  Organism: Methicillin resistant Staphylococcus aureus (10-21-23 @ 15:21)      Method Type: KARTHIK      -  Ampicillin/Sulbactam: R <=8/4      -  Cefazolin: R <=4      -  Clindamycin: S <=0.25      -  Daptomycin: S 0.5      -  Erythromycin: R >4      -  Gentamicin: S <=1 Should not be used as monotherapy      -  Linezolid: S 2      -  Oxacillin: R >2      -  Penicillin: R 1      -  Rifampin: S <=1 Should not be used as monotherapy      -  Tetracycline: S <=1      -  Trimethoprim/Sulfamethoxazole: S <=0.5/9.5      -  Vancomycin: S 1    Culture - Tissue with Gram Stain (collected 10-19-23 @ 11:35)  Source: .Tissue Other, RIGHT FOOT SOFT TISSUE CULTURE  Gram Stain (10-19-23 @ 23:27):    Few polymorphonuclear leukocytes per low power field    Numerous Gram positive cocci in pairs per oil power field    Moderate Gram Variable Rods per oil power field  Preliminary Report (10-21-23 @ 15:40):    Numerous Methicillin Resistant Staphylococcus aureus  Organism: Methicillin resistant Staphylococcus aureus (10-21-23 @ 15:23)  Organism: Methicillin resistant Staphylococcus aureus (10-21-23 @ 15:23)      Method Type: KARTHIK      -  Ampicillin/Sulbactam: R <=8/4      -  Cefazolin: R <=4      -  Clindamycin: S <=0.25      -  Daptomycin: S 0.5      -  Erythromycin: R >4      -  Gentamicin: S 2 Should not be used as monotherapy      -  Linezolid: S 2      -  Oxacillin: R >2      -  Penicillin: R >8      -  Rifampin: S <=1 Should not be used as monotherapy      -  Tetracycline: S <=1      -  Trimethoprim/Sulfamethoxazole: S <=0.5/9.5      -  Vancomycin: S 1    Culture - Blood (collected 10-12-23 @ 05:36)  Source: .Blood Blood-Peripheral  Final Report (10-17-23 @ 10:00):    No growth at 5 days    Culture - Blood (collected 10-12-23 @ 05:30)  Source: .Blood Blood-Venous  Final Report (10-17-23 @ 10:00):    No growth at 5 days    Culture - Other (collected 10-11-23 @ 13:13)  Source: Wound Wound  Final Report (10-13-23 @ 17:21):    Numerous Methicillin Resistant Staphylococcus aureus  Organism: Methicillin resistant Staphylococcus aureus (10-13-23 @ 17:21)  Organism: Methicillin resistant Staphylococcus aureus (10-13-23 @ 17:21)      Method Type: KARTHIK      -  Ampicillin/Sulbactam: R <=8/4      -  Cefazolin: R <=4      -  Clindamycin: S <=0.25      -  Daptomycin: S 1      -  Erythromycin: R >4      -  Gentamicin: S 2 Should not be used as monotherapy      -  Linezolid: S 4      -  Oxacillin: R >2      -  Penicillin: R 2      -  Rifampin: S <=1 Should not be used as monotherapy      -  Tetracycline: S <=1      -  Trimethoprim/Sulfamethoxazole: S <=0.5/9.5      -  Vancomycin: S 2    Culture - Urine (collected 10-10-23 @ 21:00)  Source: Catheterized Catheterized  Final Report (10-13-23 @ 17:40):    10,000 - 49,000 CFU/mL Pseudomonas aeruginosa  Organism: Pseudomonas aeruginosa (10-13-23 @ 17:40)  Organism: Pseudomonas aeruginosa (10-13-23 @ 17:40)      Method Type: KARTHIK      -  Amikacin: S <=16      -  Aztreonam: S <=4      -  Cefepime: S <=2      -  Ceftazidime: S <=1      -  Ciprofloxacin: S <=0.25      -  Gentamicin: S <=2      -  Imipenem: S <=1      -  Levofloxacin: S <=0.5      -  Meropenem: S <=1      -  Piperacillin/Tazobactam: S <=8      -  Tobramycin: S <=2    Culture - Blood (collected 10-10-23 @ 18:25)  Source: .Blood Blood-Peripheral  Gram Stain (10-11-23 @ 21:56):    Growth in aerobic bottle: Gram Positive Cocci in Clusters    Growth in anaerobic bottle: Gram Positive Cocci in Clusters  Final Report (10-13-23 @ 18:46):    Growth in aerobic and anaerobic bottles: Methicillin Resistant    Staphylococcus aureus    See previous culture [id number]    Culture - Blood (collected 10-10-23 @ 18:25)  Source: .Blood Blood-Peripheral  Gram Stain (10-11-23 @ 15:29):    Growth in aerobic bottle: Gram Positive Cocci in Clusters    Growth in anaerobic bottle: Gram Positive Cocci in Clusters  Final Report (10-13-23 @ 12:50):    Growth in aerobic and anaerobic bottles: Methicillin Resistant    Staphylococcus aureus    Direct identification is available within approximately 3-5    hours either by Blood Panel Multiplexed PCR or Direct    MALDI-TOF. Details: [email or web address]  Organism: Blood Culture PCR  Methicillin resistant Staphylococcus aureus (10-13-23 @ 12:50)  Organism: Methicillin resistant Staphylococcus aureus (10-13-23 @ 12:50)      Method Type: KARTHIK      -  Ampicillin/Sulbactam: R <=8/4      -  Cefazolin: R <=4      -  Clindamycin: I 2      -  Daptomycin: S 1      -  Erythromycin: R >4      -  Gentamicin: S <=1 Should not be used as monotherapy      -  Linezolid: S 4      -  Oxacillin: R >2      -  Penicillin: R 2      -  Rifampin: S <=1 Should not be used as monotherapy      -  Tetracycline: S <=1      -  Trimethoprim/Sulfamethoxazole: S <=0.5/9.5      -  Vancomycin: S 1  Organism: Blood Culture PCR (10-13-23 @ 12:50)      Method Type: PCR      -  Methicillin resistant Staphylococcus aureus (MRSA): Detec          Radiology: reviewed       Kenyatta, Division of Infectious Diseases  GLYNN Oakley Y. Patel, S. Shah, G. [facility]  [phone]    Name: HEMA LAZCANO  Age: 78y  Gender: Male  MRN: [id number]    Interval History:  Patient seen and examined at bedside  No acute overnight events. Afebrile  Feeling good  Notes reviewed    Antibiotics:  vancomycin  IVPB 500 milliGRAM(s) IV Intermittent every 12 hours      Medications:  acetaminophen     Tablet .. 650 milliGRAM(s) Oral every 6 hours PRN  atorvastatin 80 milliGRAM(s) Oral at bedtime  bethanechol 25 milliGRAM(s) Oral two times a day  dextrose 50% Injectable 12.5 Gram(s) IV Push once  dextrose 50% Injectable 25 Gram(s) IV Push once  dextrose 50% Injectable 25 Gram(s) IV Push once  dextrose Oral Gel 15 Gram(s) Oral once PRN  gabapentin 300 milliGRAM(s) Oral two times a day  glucagon  Injectable 1 milliGRAM(s) IntraMuscular once  insulin lispro (ADMELOG) corrective regimen sliding scale   SubCutaneous three times a day before meals  insulin lispro (ADMELOG) corrective regimen sliding scale   SubCutaneous at bedtime  lactated ringers. 1000 milliLiter(s) IV Continuous <Continuous>  lactobacillus acidophilus 1 Tablet(s) Oral two times a day with meals  loperamide 2 milliGRAM(s) Oral three times a day PRN  midodrine. 10 milliGRAM(s) Oral three times a day  pantoprazole    Tablet 40 milliGRAM(s) Oral before breakfast  tamsulosin 0.4 milliGRAM(s) Oral at bedtime  vancomycin  IVPB 500 milliGRAM(s) IV Intermittent every 12 hours      Review of Systems:  A 10-point review of systems was obtained.   Review of systems otherwise negative except as previously noted.    Allergies: No Known Allergies    For details regarding the patient's past medical history, social history, family history, and other miscellaneous elements, please refer the initial infectious diseases consultation and/or the admitting history and physical examination for this admission.    Objective:  Vitals:   T(C): 36.9 (10-23-23 @ 13:15), Max: 36.9 (10-23-23 @ 13:15)  HR: 60 (10-23-23 @ 13:15) (60 - 75)  BP: 122/52 (10-23-23 @ 13:15) (117/51 - 129/61)  RR: 17 (10-23-23 @ 13:15) (17 - 17)  SpO2: 94% (10-23-23 @ 13:15) (94% - 97%)    Physical Examination:  General: no acute distress  HEENT: NC/AT, EOMI,   Cardio: S1, S2 heard, RRR, no murmurs  Resp: decreased breath sounds  Abd: soft, NT, ND  Ext: b/l dressing c/d/i  Skin: warm, dry, no visible rash    Laboratory Studies:  CBC:                       9.1    11.76 )-----------( 365      ( 23 Oct 2023 08:55 )             29.6     CMP: 10-23    140  |  104  |  16  ----------------------------<  210<H>  3.8   |  29  |  0.87    Ca    8.4<L>      23 Oct 2023 08:55  Phos  2.1     10-22  Mg     2.1     10-22        Urinalysis Basic - ( 23 Oct 2023 08:55 )    Color: x / Appearance: x / SG: x / pH: x  Gluc: 210 mg/dL / Ketone: x  / Bili: x / Urobili: x   Blood: x / Protein: x / Nitrite: x   Leuk Esterase: x / RBC: x / WBC x   Sq Epi: x / Non Sq Epi: x / Bacteria: x        Microbiology: reviewed    Culture - Tissue with Gram Stain (collected 10-19-23 @ 11:35)  Source: .Tissue Other, LEFT FOOT BONE CULTURE  Gram Stain (10-19-23 @ 23:02):    No polymorphonuclear cells seen per low power field    No organisms seen per oil power field  Preliminary Report (10-21-23 @ 15:15):    Rare Methicillin Resistant Staphylococcus aureus  Organism: Methicillin resistant Staphylococcus aureus (10-21-23 @ 15:14)  Organism: Methicillin resistant Staphylococcus aureus (10-21-23 @ 15:14)      Method Type: KARTHIK      -  Ampicillin/Sulbactam: R <=8/4      -  Cefazolin: R <=4      -  Clindamycin: S <=0.25      -  Daptomycin: S 0.5      -  Erythromycin: R >4      -  Gentamicin: S <=1 Should not be used as monotherapy      -  Linezolid: S 2      -  Oxacillin: R >2      -  Penicillin: R 1      -  Rifampin: S <=1 Should not be used as monotherapy      -  Tetracycline: S <=1      -  Trimethoprim/Sulfamethoxazole: S <=0.5/9.5      -  Vancomycin: S 1    Culture - Tissue with Gram Stain (collected 10-19-23 @ 11:35)  Source: .Tissue Other, LEFT FOOT DEEP TISSUE CULTURE  Gram Stain (10-19-23 @ 23:29):    No polymorphonuclear cells seen per low power field    Moderate Gram positive cocci in pairs per oil power field  Preliminary Report (10-21-23 @ 15:22):    Numerous Methicillin Resistant Staphylococcus aureus  Organism: Methicillin resistant Staphylococcus aureus (10-21-23 @ 15:21)  Organism: Methicillin resistant Staphylococcus aureus (10-21-23 @ 15:21)      Method Type: KARTHIK      -  Ampicillin/Sulbactam: R <=8/4      -  Cefazolin: R <=4      -  Clindamycin: S <=0.25      -  Daptomycin: S 0.5      -  Erythromycin: R >4      -  Gentamicin: S <=1 Should not be used as monotherapy      -  Linezolid: S 2      -  Oxacillin: R >2      -  Penicillin: R 1      -  Rifampin: S <=1 Should not be used as monotherapy      -  Tetracycline: S <=1      -  Trimethoprim/Sulfamethoxazole: S <=0.5/9.5      -  Vancomycin: S 1    Culture - Tissue with Gram Stain (collected 10-19-23 @ 11:35)  Source: .Tissue Other, RIGHT FOOT SOFT TISSUE CULTURE  Gram Stain (10-19-23 @ 23:27):    Few polymorphonuclear leukocytes per low power field    Numerous Gram positive cocci in pairs per oil power field    Moderate Gram Variable Rods per oil power field  Preliminary Report (10-21-23 @ 15:40):    Numerous Methicillin Resistant Staphylococcus aureus  Organism: Methicillin resistant Staphylococcus aureus (10-21-23 @ 15:23)  Organism: Methicillin resistant Staphylococcus aureus (10-21-23 @ 15:23)      Method Type: KARTHIK      -  Ampicillin/Sulbactam: R <=8/4      -  Cefazolin: R <=4      -  Clindamycin: S <=0.25      -  Daptomycin: S 0.5      -  Erythromycin: R >4      -  Gentamicin: S 2 Should not be used as monotherapy      -  Linezolid: S 2      -  Oxacillin: R >2      -  Penicillin: R >8      -  Rifampin: S <=1 Should not be used as monotherapy      -  Tetracycline: S <=1      -  Trimethoprim/Sulfamethoxazole: S <=0.5/9.5      -  Vancomycin: S 1    Culture - Blood (collected 10-12-23 @ 05:36)  Source: .Blood Blood-Peripheral  Final Report (10-17-23 @ 10:00):    No growth at 5 days    Culture - Blood (collected 10-12-23 @ 05:30)  Source: .Blood Blood-Venous  Final Report (10-17-23 @ 10:00):    No growth at 5 days    Culture - Other (collected 10-11-23 @ 13:13)  Source: Wound Wound  Final Report (10-13-23 @ 17:21):    Numerous Methicillin Resistant Staphylococcus aureus  Organism: Methicillin resistant Staphylococcus aureus (10-13-23 @ 17:21)  Organism: Methicillin resistant Staphylococcus aureus (10-13-23 @ 17:21)      Method Type: KARTHIK      -  Ampicillin/Sulbactam: R <=8/4      -  Cefazolin: R <=4      -  Clindamycin: S <=0.25      -  Daptomycin: S 1      -  Erythromycin: R >4      -  Gentamicin: S 2 Should not be used as monotherapy      -  Linezolid: S 4      -  Oxacillin: R >2      -  Penicillin: R 2      -  Rifampin: S <=1 Should not be used as monotherapy      -  Tetracycline: S <=1      -  Trimethoprim/Sulfamethoxazole: S <=0.5/9.5      -  Vancomycin: S 2    Culture - Urine (collected 10-10-23 @ 21:00)  Source: Catheterized Catheterized  Final Report (10-13-23 @ 17:40):    10,000 - 49,000 CFU/mL Pseudomonas aeruginosa  Organism: Pseudomonas aeruginosa (10-13-23 @ 17:40)  Organism: Pseudomonas aeruginosa (10-13-23 @ 17:40)      Method Type: KARTHIK      -  Amikacin: S <=16      -  Aztreonam: S <=4      -  Cefepime: S <=2      -  Ceftazidime: S <=1      -  Ciprofloxacin: S <=0.25      -  Gentamicin: S <=2      -  Imipenem: S <=1      -  Levofloxacin: S <=0.5      -  Meropenem: S <=1      -  Piperacillin/Tazobactam: S <=8      -  Tobramycin: S <=2    Culture - Blood (collected 10-10-23 @ 18:25)  Source: .Blood Blood-Peripheral  Gram Stain (10-11-23 @ 21:56):    Growth in aerobic bottle: Gram Positive Cocci in Clusters    Growth in anaerobic bottle: Gram Positive Cocci in Clusters  Final Report (10-13-23 @ 18:46):    Growth in aerobic and anaerobic bottles: Methicillin Resistant    Staphylococcus aureus    See previous culture [id number]    Culture - Blood (collected 10-10-23 @ 18:25)  Source: .Blood Blood-Peripheral  Gram Stain (10-11-23 @ 15:29):    Growth in aerobic bottle: Gram Positive Cocci in Clusters    Growth in anaerobic bottle: Gram Positive Cocci in Clusters  Final Report (10-13-23 @ 12:50):    Growth in aerobic and anaerobic bottles: Methicillin Resistant    Staphylococcus aureus    Direct identification is available within approximately 3-5    hours either by Blood Panel Multiplexed PCR or Direct    MALDI-TOF. Details: [email or web address]  Organism: Blood Culture PCR  Methicillin resistant Staphylococcus aureus (10-13-23 @ 12:50)  Organism: Methicillin resistant Staphylococcus aureus (10-13-23 @ 12:50)      Method Type: KARTHIK      -  Ampicillin/Sulbactam: R <=8/4      -  Cefazolin: R <=4      -  Clindamycin: I 2      -  Daptomycin: S 1      -  Erythromycin: R >4      -  Gentamicin: S <=1 Should not be used as monotherapy      -  Linezolid: S 4      -  Oxacillin: R >2      -  Penicillin: R 2      -  Rifampin: S <=1 Should not be used as monotherapy      -  Tetracycline: S <=1      -  Trimethoprim/Sulfamethoxazole: S <=0.5/9.5      -  Vancomycin: S 1  Organism: Blood Culture PCR (10-13-23 @ 12:50)      Method Type: PCR      -  Methicillin resistant Staphylococcus aureus (MRSA): Detec          Radiology: reviewed       Kenyatta, Division of Infectious Diseases  GLYNN Oakley Y. Patel, S. Shah, G. [facility]  [phone]    Name: HEMA LAZCANO  Age: 78y  Gender: Male  MRN: [id number]    Interval History:  Patient seen and examined at bedside  No acute overnight events. Afebrile  Feeling good  Notes reviewed    Antibiotics:  vancomycin  IVPB 500 milliGRAM(s) IV Intermittent every 12 hours      Medications:  acetaminophen     Tablet .. 650 milliGRAM(s) Oral every 6 hours PRN  atorvastatin 80 milliGRAM(s) Oral at bedtime  bethanechol 25 milliGRAM(s) Oral two times a day  dextrose 50% Injectable 12.5 Gram(s) IV Push once  dextrose 50% Injectable 25 Gram(s) IV Push once  dextrose 50% Injectable 25 Gram(s) IV Push once  dextrose Oral Gel 15 Gram(s) Oral once PRN  gabapentin 300 milliGRAM(s) Oral two times a day  glucagon  Injectable 1 milliGRAM(s) IntraMuscular once  insulin lispro (ADMELOG) corrective regimen sliding scale   SubCutaneous three times a day before meals  insulin lispro (ADMELOG) corrective regimen sliding scale   SubCutaneous at bedtime  lactated ringers. 1000 milliLiter(s) IV Continuous <Continuous>  lactobacillus acidophilus 1 Tablet(s) Oral two times a day with meals  loperamide 2 milliGRAM(s) Oral three times a day PRN  midodrine. 10 milliGRAM(s) Oral three times a day  pantoprazole    Tablet 40 milliGRAM(s) Oral before breakfast  tamsulosin 0.4 milliGRAM(s) Oral at bedtime  vancomycin  IVPB 500 milliGRAM(s) IV Intermittent every 12 hours      Review of Systems:  A 10-point review of systems was obtained.   Review of systems otherwise negative except as previously noted.    Allergies: No Known Allergies    For details regarding the patient's past medical history, social history, family history, and other miscellaneous elements, please refer the initial infectious diseases consultation and/or the admitting history and physical examination for this admission.    Objective:  Vitals:   T(C): 36.9 (10-23-23 @ 13:15), Max: 36.9 (10-23-23 @ 13:15)  HR: 60 (10-23-23 @ 13:15) (60 - 75)  BP: 122/52 (10-23-23 @ 13:15) (117/51 - 129/61)  RR: 17 (10-23-23 @ 13:15) (17 - 17)  SpO2: 94% (10-23-23 @ 13:15) (94% - 97%)    Physical Examination:  General: no acute distress  HEENT: NC/AT, EOMI,   Cardio: S1, S2 heard, RRR, no murmurs  Resp: decreased breath sounds  Abd: soft, NT, ND  Ext: b/l dressing c/d/i  Skin: warm, dry, no visible rash    Laboratory Studies:  CBC:                       9.1    11.76 )-----------( 365      ( 23 Oct 2023 08:55 )             29.6     CMP: 10-23    140  |  104  |  16  ----------------------------<  210<H>  3.8   |  29  |  0.87    Ca    8.4<L>      23 Oct 2023 08:55  Phos  2.1     10-22  Mg     2.1     10-22        Urinalysis Basic - ( 23 Oct 2023 08:55 )    Color: x / Appearance: x / SG: x / pH: x  Gluc: 210 mg/dL / Ketone: x  / Bili: x / Urobili: x   Blood: x / Protein: x / Nitrite: x   Leuk Esterase: x / RBC: x / WBC x   Sq Epi: x / Non Sq Epi: x / Bacteria: x        Microbiology: reviewed    Culture - Tissue with Gram Stain (collected 10-19-23 @ 11:35)  Source: .Tissue Other, LEFT FOOT BONE CULTURE  Gram Stain (10-19-23 @ 23:02):    No polymorphonuclear cells seen per low power field    No organisms seen per oil power field  Preliminary Report (10-21-23 @ 15:15):    Rare Methicillin Resistant Staphylococcus aureus  Organism: Methicillin resistant Staphylococcus aureus (10-21-23 @ 15:14)  Organism: Methicillin resistant Staphylococcus aureus (10-21-23 @ 15:14)      Method Type: KARTHIK      -  Ampicillin/Sulbactam: R <=8/4      -  Cefazolin: R <=4      -  Clindamycin: S <=0.25      -  Daptomycin: S 0.5      -  Erythromycin: R >4      -  Gentamicin: S <=1 Should not be used as monotherapy      -  Linezolid: S 2      -  Oxacillin: R >2      -  Penicillin: R 1      -  Rifampin: S <=1 Should not be used as monotherapy      -  Tetracycline: S <=1      -  Trimethoprim/Sulfamethoxazole: S <=0.5/9.5      -  Vancomycin: S 1    Culture - Tissue with Gram Stain (collected 10-19-23 @ 11:35)  Source: .Tissue Other, LEFT FOOT DEEP TISSUE CULTURE  Gram Stain (10-19-23 @ 23:29):    No polymorphonuclear cells seen per low power field    Moderate Gram positive cocci in pairs per oil power field  Preliminary Report (10-21-23 @ 15:22):    Numerous Methicillin Resistant Staphylococcus aureus  Organism: Methicillin resistant Staphylococcus aureus (10-21-23 @ 15:21)  Organism: Methicillin resistant Staphylococcus aureus (10-21-23 @ 15:21)      Method Type: KARTHIK      -  Ampicillin/Sulbactam: R <=8/4      -  Cefazolin: R <=4      -  Clindamycin: S <=0.25      -  Daptomycin: S 0.5      -  Erythromycin: R >4      -  Gentamicin: S <=1 Should not be used as monotherapy      -  Linezolid: S 2      -  Oxacillin: R >2      -  Penicillin: R 1      -  Rifampin: S <=1 Should not be used as monotherapy      -  Tetracycline: S <=1      -  Trimethoprim/Sulfamethoxazole: S <=0.5/9.5      -  Vancomycin: S 1    Culture - Tissue with Gram Stain (collected 10-19-23 @ 11:35)  Source: .Tissue Other, RIGHT FOOT SOFT TISSUE CULTURE  Gram Stain (10-19-23 @ 23:27):    Few polymorphonuclear leukocytes per low power field    Numerous Gram positive cocci in pairs per oil power field    Moderate Gram Variable Rods per oil power field  Preliminary Report (10-21-23 @ 15:40):    Numerous Methicillin Resistant Staphylococcus aureus  Organism: Methicillin resistant Staphylococcus aureus (10-21-23 @ 15:23)  Organism: Methicillin resistant Staphylococcus aureus (10-21-23 @ 15:23)      Method Type: KARTHIK      -  Ampicillin/Sulbactam: R <=8/4      -  Cefazolin: R <=4      -  Clindamycin: S <=0.25      -  Daptomycin: S 0.5      -  Erythromycin: R >4      -  Gentamicin: S 2 Should not be used as monotherapy      -  Linezolid: S 2      -  Oxacillin: R >2      -  Penicillin: R >8      -  Rifampin: S <=1 Should not be used as monotherapy      -  Tetracycline: S <=1      -  Trimethoprim/Sulfamethoxazole: S <=0.5/9.5      -  Vancomycin: S 1    Culture - Blood (collected 10-12-23 @ 05:36)  Source: .Blood Blood-Peripheral  Final Report (10-17-23 @ 10:00):    No growth at 5 days    Culture - Blood (collected 10-12-23 @ 05:30)  Source: .Blood Blood-Venous  Final Report (10-17-23 @ 10:00):    No growth at 5 days    Culture - Other (collected 10-11-23 @ 13:13)  Source: Wound Wound  Final Report (10-13-23 @ 17:21):    Numerous Methicillin Resistant Staphylococcus aureus  Organism: Methicillin resistant Staphylococcus aureus (10-13-23 @ 17:21)  Organism: Methicillin resistant Staphylococcus aureus (10-13-23 @ 17:21)      Method Type: KARTHIK      -  Ampicillin/Sulbactam: R <=8/4      -  Cefazolin: R <=4      -  Clindamycin: S <=0.25      -  Daptomycin: S 1      -  Erythromycin: R >4      -  Gentamicin: S 2 Should not be used as monotherapy      -  Linezolid: S 4      -  Oxacillin: R >2      -  Penicillin: R 2      -  Rifampin: S <=1 Should not be used as monotherapy      -  Tetracycline: S <=1      -  Trimethoprim/Sulfamethoxazole: S <=0.5/9.5      -  Vancomycin: S 2    Culture - Urine (collected 10-10-23 @ 21:00)  Source: Catheterized Catheterized  Final Report (10-13-23 @ 17:40):    10,000 - 49,000 CFU/mL Pseudomonas aeruginosa  Organism: Pseudomonas aeruginosa (10-13-23 @ 17:40)  Organism: Pseudomonas aeruginosa (10-13-23 @ 17:40)      Method Type: KARTHIK      -  Amikacin: S <=16      -  Aztreonam: S <=4      -  Cefepime: S <=2      -  Ceftazidime: S <=1      -  Ciprofloxacin: S <=0.25      -  Gentamicin: S <=2      -  Imipenem: S <=1      -  Levofloxacin: S <=0.5      -  Meropenem: S <=1      -  Piperacillin/Tazobactam: S <=8      -  Tobramycin: S <=2    Culture - Blood (collected 10-10-23 @ 18:25)  Source: .Blood Blood-Peripheral  Gram Stain (10-11-23 @ 21:56):    Growth in aerobic bottle: Gram Positive Cocci in Clusters    Growth in anaerobic bottle: Gram Positive Cocci in Clusters  Final Report (10-13-23 @ 18:46):    Growth in aerobic and anaerobic bottles: Methicillin Resistant    Staphylococcus aureus    See previous culture [id number]    Culture - Blood (collected 10-10-23 @ 18:25)  Source: .Blood Blood-Peripheral  Gram Stain (10-11-23 @ 15:29):    Growth in aerobic bottle: Gram Positive Cocci in Clusters    Growth in anaerobic bottle: Gram Positive Cocci in Clusters  Final Report (10-13-23 @ 12:50):    Growth in aerobic and anaerobic bottles: Methicillin Resistant    Staphylococcus aureus    Direct identification is available within approximately 3-5    hours either by Blood Panel Multiplexed PCR or Direct    MALDI-TOF. Details: [email or web address]  Organism: Blood Culture PCR  Methicillin resistant Staphylococcus aureus (10-13-23 @ 12:50)  Organism: Methicillin resistant Staphylococcus aureus (10-13-23 @ 12:50)      Method Type: KARTHIK      -  Ampicillin/Sulbactam: R <=8/4      -  Cefazolin: R <=4      -  Clindamycin: I 2      -  Daptomycin: S 1      -  Erythromycin: R >4      -  Gentamicin: S <=1 Should not be used as monotherapy      -  Linezolid: S 4      -  Oxacillin: R >2      -  Penicillin: R 2      -  Rifampin: S <=1 Should not be used as monotherapy      -  Tetracycline: S <=1      -  Trimethoprim/Sulfamethoxazole: S <=0.5/9.5      -  Vancomycin: S 1  Organism: Blood Culture PCR (10-13-23 @ 12:50)      Method Type: PCR      -  Methicillin resistant Staphylococcus aureus (MRSA): Detec          Radiology: reviewed

## 2023-10-23 NOTE — PROGRESS NOTE ADULT - SUBJECTIVE AND OBJECTIVE BOX
Patient is a 78y old  Male who presents with a chief complaint of fever and weakness (22 Oct 2023 12:08)      INTERVAL HPI/OVERNIGHT EVENTS: stable, feels well await final cultures and path    MEDICATIONS  (STANDING):  atorvastatin 80 milliGRAM(s) Oral at bedtime  bethanechol 25 milliGRAM(s) Oral two times a day  dextrose 50% Injectable 12.5 Gram(s) IV Push once  dextrose 50% Injectable 25 Gram(s) IV Push once  dextrose 50% Injectable 25 Gram(s) IV Push once  gabapentin 300 milliGRAM(s) Oral two times a day  glucagon  Injectable 1 milliGRAM(s) IntraMuscular once  insulin lispro (ADMELOG) corrective regimen sliding scale   SubCutaneous three times a day before meals  insulin lispro (ADMELOG) corrective regimen sliding scale   SubCutaneous at bedtime  lactated ringers. 1000 milliLiter(s) (100 mL/Hr) IV Continuous <Continuous>  lactobacillus acidophilus 1 Tablet(s) Oral two times a day with meals  midodrine. 10 milliGRAM(s) Oral three times a day  pantoprazole    Tablet 40 milliGRAM(s) Oral before breakfast  tamsulosin 0.4 milliGRAM(s) Oral at bedtime  vancomycin  IVPB 500 milliGRAM(s) IV Intermittent every 12 hours    MEDICATIONS  (PRN):  acetaminophen     Tablet .. 650 milliGRAM(s) Oral every 6 hours PRN Temp greater or equal to 38C (100.4F), Moderate Pain (4 - 6)  dextrose Oral Gel 15 Gram(s) Oral once PRN Blood Glucose LESS THAN 70 milliGRAM(s)/deciliter  loperamide 2 milliGRAM(s) Oral three times a day PRN Diarrhea      Allergies    No Known Allergies    Intolerances        REVIEW OF SYSTEMS:  CONSTITUTIONAL: No fever, weight loss, or fatigue  EYES: No eye pain, visual disturbances  ENMT:  No difficulty hearing, tinnitus, vertigo; No sinus or throat pain  NECK: No pain or stiffness  RESPIRATORY: No cough, wheezing, chills or hemoptysis; No shortness of breath  CARDIOVASCULAR: No chest pain, palpitations, dizziness  GASTROINTESTINAL: No abdominal or epigastric pain. No nausea, vomiting, or hematemesis; No diarrhea or constipation. No melena or hematochezia.  GENITOURINARY: No dysuria, frequency, hematuria, or incontinence  NEUROLOGICAL: No headaches, memory loss, loss of strength, numbness, or tremors  SKIN: No itching, burning  LYMPH NODES: No enlarged glands  MUSCULOSKELETAL: No joint pain or swelling; No muscle, back, or extremity pain  PSYCHIATRIC: No depression, mood swings  HEME/LYMPH: No easy bruising, or bleeding gums  ALLERGY AND IMMUNOLOGIC: No hives    Vital Signs Last 24 Hrs  T(C): 36.8 (23 Oct 2023 04:52), Max: 36.8 (22 Oct 2023 20:44)  T(F): 98.2 (23 Oct 2023 04:52), Max: 98.2 (22 Oct 2023 20:44)  HR: 75 (23 Oct 2023 06:19) (60 - 75)  BP: 117/51 (23 Oct 2023 06:19) (117/51 - 129/61)  BP(mean): --  RR: 17 (23 Oct 2023 04:52) (17 - 17)  SpO2: 97% (23 Oct 2023 04:52) (95% - 97%)    Parameters below as of 23 Oct 2023 04:52  Patient On (Oxygen Delivery Method): room air        PHYSICAL EXAM:  GENERAL: NAD, well-groomed, well-developed  HEAD:  Atraumatic, Normocephalic  EYES: EOMI, PERRLA, conjunctiva and sclera clear  ENMT: No tonsillar erythema, exudates, or enlargement   NECK: Supple, No JVD  NERVOUS SYSTEM:  Alert & Oriented X3, Good concentration  CHEST/LUNG: Clear to auscultation bilaterally; No rales, rhonchi, wheezing  HEART: Regular rate and rhythm  ABDOMEN: Soft, Nontender, Nondistended; Bowel sounds present  EXTREMITIES:  2+ Peripheral Pulses   LYMPH: No lymphadenopathy noted  SKIN: No rashes     LABS:                        9.1    11.76 )-----------( 365      ( 23 Oct 2023 08:55 )             29.6     23 Oct 2023 08:55    140    |  104    |  16     ----------------------------<  210    3.8     |  29     |  0.87     Ca    8.4        23 Oct 2023 08:55        Urinalysis Basic - ( 23 Oct 2023 08:55 )    Color: x / Appearance: x / SG: x / pH: x  Gluc: 210 mg/dL / Ketone: x  / Bili: x / Urobili: x   Blood: x / Protein: x / Nitrite: x   Leuk Esterase: x / RBC: x / WBC x   Sq Epi: x / Non Sq Epi: x / Bacteria: x      CAPILLARY BLOOD GLUCOSE      POCT Blood Glucose.: 215 mg/dL (23 Oct 2023 08:11)  POCT Blood Glucose.: 218 mg/dL (22 Oct 2023 21:56)  POCT Blood Glucose.: 184 mg/dL (22 Oct 2023 17:10)  POCT Blood Glucose.: 209 mg/dL (22 Oct 2023 12:19)    blood culture --   Numerous Methicillin Resistant Staphylococcus aureus   10-19 @ 11:35      urine culture --  10-19 @ 11:35  results   Numerous Methicillin Resistant Staphylococcus aureus 10-19 @ 11:35    wound with gram statin --    10-19 @ 11:35  organism  Methicillin resistant Staphylococcus aureus   10-19 @ 11:35  specimen source .Tissue Other, RIGHT FOOT SOFT TISSUE CULTURE  10-19 @ 11:35      RADIOLOGY & ADDITIONAL TESTS:      Consultant(s) Notes Reviewed:  [ x] YES  [ ] NO    Care Discussed with Consultants/Other Providers [x ] YES  [ ] NO    Advanced care planning discussed with patient and family, advanced care planning forms reviewed, discussed, and completed.  20 minutes spent.

## 2023-10-23 NOTE — PROGRESS NOTE ADULT - ASSESSMENT
anemia  FTT    no overt gi bleeding  Ct findings of the esophagus likely represent dysmotility  monitor GI fn  if diarrhea persist can do imodium prn  continue marinol bid for ftt    35 minutes spent on total encounter of which more than fifty percent of the encounter was spent counseling and/or coordinating care by the attending physician.

## 2023-10-23 NOTE — PROGRESS NOTE ADULT - PROBLEM SELECTOR PLAN 1
id noted  rpt blood cultures neg to date  iv vanco for now - picc line and dc planning pending path  tessie clean  s/p or debridement of b/l heel wounds - fu cultures for abx recs upon dc

## 2023-10-23 NOTE — CHART NOTE - NSCHARTNOTEFT_GEN_A_CORE
Assessment: patient seen for follow up malnutrition. chart reviewewd  78y old  Male who presents with a chief complaint of fever and weakness , FTT   for EVERARDO  patient states eats okay taking trial of glucerna supplement.   marinol discontinued  10/23 fecal incontinence       Factors impacting intake: [x] none [ ] nausea  [ ] vomiting [ ] diarrhea [ ] constipation  [ ]chewing problems [ ] swallowing issues  [ ] other:     Diet Prescription: Diet, Regular:   Consistent Carbohydrate {No Snacks}  DASH/TLC {Sodium & Cholesterol Restricted} (10-19-23 @ 12:42)    Intake: up to 100% per flow sheet     Current Weight: Weight 10/18 165.7# 10/11 172# + 2 right and left leg edema      Pertinent Medications: MEDICATIONS  (STANDING):  atorvastatin 80 milliGRAM(s) Oral at bedtime  bethanechol 25 milliGRAM(s) Oral two times a day  dextrose 50% Injectable 25 Gram(s) IV Push once  dextrose 50% Injectable 25 Gram(s) IV Push once  dextrose 50% Injectable 12.5 Gram(s) IV Push once  gabapentin 300 milliGRAM(s) Oral two times a day  glucagon  Injectable 1 milliGRAM(s) IntraMuscular once  insulin lispro (ADMELOG) corrective regimen sliding scale   SubCutaneous at bedtime  insulin lispro (ADMELOG) corrective regimen sliding scale   SubCutaneous three times a day before meals  lactated ringers. 1000 milliLiter(s) (100 mL/Hr) IV Continuous <Continuous>  lactobacillus acidophilus 1 Tablet(s) Oral two times a day with meals  midodrine. 10 milliGRAM(s) Oral three times a day  pantoprazole    Tablet 40 milliGRAM(s) Oral before breakfast  tamsulosin 0.4 milliGRAM(s) Oral at bedtime  vancomycin  IVPB 500 milliGRAM(s) IV Intermittent every 12 hours    MEDICATIONS  (PRN):  acetaminophen     Tablet .. 650 milliGRAM(s) Oral every 6 hours PRN Temp greater or equal to 38C (100.4F), Moderate Pain (4 - 6)  dextrose Oral Gel 15 Gram(s) Oral once PRN Blood Glucose LESS THAN 70 milliGRAM(s)/deciliter  loperamide 2 milliGRAM(s) Oral three times a day PRN Diarrhea    Pertinent Labs: POCT 228, 215   Skin: right heel stage 2 sacrum stage 2 left outer foot un-stageable    Estimated Needs:   [x ] no change since previous assessment based on 184# 83.4kg 25-30kcals/kg 2085-2502kcals and 1.2-1.4gms protein /kg 100-116gms protein  [ ] recalculated:     Previous Nutrition Diagnosis:   [ ] Inadequate Energy Intake [ ]Inadequate Oral Intake [ ] Excessive Energy Intake   [ ] Underweight [x ] Increased Nutrient Needs [ ] Overweight/Obesity   [ ] Altered GI Function [ ] Unintended Weight Loss [ ] Food & Nutrition Related Knowledge Deficit [x ] Malnutrition severe chronic    Nutrition Diagnosis is [x ] ongoing  [ ] resolved [ ] not applicable     New Nutrition Diagnosis: [x ] not applicable       Interventions:   Recommend  [ ] Change Diet To:  [ ] Nutrition Supplement  [ ] Nutrition Support  [ x] Other: recommend glucerna BID , recommend MVI and Vit C 500mg BID     Monitoring and Evaluation:   [x ] PO intake [ x ] Tolerance to diet prescription [ x ] weights [ x ] labs[ x ] follow up per protocol  [ ] other:

## 2023-10-23 NOTE — PROGRESS NOTE ADULT - SUBJECTIVE AND OBJECTIVE BOX
[facility]  Les Mccray PA-C  [address] [place], [place] [id number]  [phone]      INTERVAL HPI/OVERNIGHT EVENTS:  Pt s/e  Tolerating diet    MEDICATIONS  (STANDING):  atorvastatin 80 milliGRAM(s) Oral at bedtime  bethanechol 25 milliGRAM(s) Oral two times a day  dextrose 50% Injectable 25 Gram(s) IV Push once  dextrose 50% Injectable 25 Gram(s) IV Push once  dextrose 50% Injectable 12.5 Gram(s) IV Push once  gabapentin 300 milliGRAM(s) Oral two times a day  glucagon  Injectable 1 milliGRAM(s) IntraMuscular once  insulin lispro (ADMELOG) corrective regimen sliding scale   SubCutaneous three times a day before meals  insulin lispro (ADMELOG) corrective regimen sliding scale   SubCutaneous at bedtime  lactated ringers. 1000 milliLiter(s) (100 mL/Hr) IV Continuous <Continuous>  lactobacillus acidophilus 1 Tablet(s) Oral two times a day with meals  midodrine. 10 milliGRAM(s) Oral three times a day  pantoprazole    Tablet 40 milliGRAM(s) Oral before breakfast  tamsulosin 0.4 milliGRAM(s) Oral at bedtime  vancomycin  IVPB 500 milliGRAM(s) IV Intermittent every 12 hours    MEDICATIONS  (PRN):  acetaminophen     Tablet .. 650 milliGRAM(s) Oral every 6 hours PRN Temp greater or equal to 38C (100.4F), Moderate Pain (4 - 6)  dextrose Oral Gel 15 Gram(s) Oral once PRN Blood Glucose LESS THAN 70 milliGRAM(s)/deciliter  loperamide 2 milliGRAM(s) Oral three times a day PRN Diarrhea      Allergies  No Known Allergies        PHYSICAL EXAM:   Vital Signs:  Vital Signs Last 24 Hrs  T(C): 36.8 (23 Oct 2023 04:52), Max: 36.8 (22 Oct 2023 20:44)  T(F): 98.2 (23 Oct 2023 04:52), Max: 98.2 (22 Oct 2023 20:44)  HR: 75 (23 Oct 2023 06:19) (60 - 75)  BP: 117/51 (23 Oct 2023 06:19) (117/51 - 129/61)  BP(mean): --  RR: 17 (23 Oct 2023 04:52) (17 - 17)  SpO2: 97% (23 Oct 2023 04:52) (95% - 97%)    Parameters below as of 23 Oct 2023 04:52  Patient On (Oxygen Delivery Method): room air    GENERAL:  Appears stated age  HEENT:  NC/AT  CHEST:  Full & symmetric excursion  HEART:  Regular rhythm  ABDOMEN:  Soft, non-tender, non-distended  EXTEREMITIES:  no cyanosis  SKIN:  No rash  NEURO:  Alert      LABS:                        9.1    11.76 )-----------( 365      ( 23 Oct 2023 08:55 )             29.6     10-23    140  |  104  |  16  ----------------------------<  210<H>  3.8   |  29  |  0.87    Ca    8.4<L>      23 Oct 2023 08:55  Phos  2.1     10-22  Mg     2.1     10-22        Urinalysis Basic - ( 23 Oct 2023 08:55 )    Color: x / Appearance: x / SG: x / pH: x  Gluc: 210 mg/dL / Ketone: x  / Bili: x / Urobili: x   Blood: x / Protein: x / Nitrite: x   Leuk Esterase: x / RBC: x / WBC x   Sq Epi: x / Non Sq Epi: x / Bacteria: x [facility]  Les Mccray PA-C  [address] [place], [place] [id number]  [phone]      INTERVAL HPI/OVERNIGHT EVENTS:  Pt s/e  Tolerating diet    MEDICATIONS  (STANDING):  atorvastatin 80 milliGRAM(s) Oral at bedtime  bethanechol 25 milliGRAM(s) Oral two times a day  dextrose 50% Injectable 25 Gram(s) IV Push once  dextrose 50% Injectable 25 Gram(s) IV Push once  dextrose 50% Injectable 12.5 Gram(s) IV Push once  gabapentin 300 milliGRAM(s) Oral two times a day  glucagon  Injectable 1 milliGRAM(s) IntraMuscular once  insulin lispro (ADMELOG) corrective regimen sliding scale   SubCutaneous three times a day before meals  insulin lispro (ADMELOG) corrective regimen sliding scale   SubCutaneous at bedtime  lactated ringers. 1000 milliLiter(s) (100 mL/Hr) IV Continuous <Continuous>  lactobacillus acidophilus 1 Tablet(s) Oral two times a day with meals  midodrine. 10 milliGRAM(s) Oral three times a day  pantoprazole    Tablet 40 milliGRAM(s) Oral before breakfast  tamsulosin 0.4 milliGRAM(s) Oral at bedtime  vancomycin  IVPB 500 milliGRAM(s) IV Intermittent every 12 hours    MEDICATIONS  (PRN):  acetaminophen     Tablet .. 650 milliGRAM(s) Oral every 6 hours PRN Temp greater or equal to 38C (100.4F), Moderate Pain (4 - 6)  dextrose Oral Gel 15 Gram(s) Oral once PRN Blood Glucose LESS THAN 70 milliGRAM(s)/deciliter  loperamide 2 milliGRAM(s) Oral three times a day PRN Diarrhea      Allergies  No Known Allergies        PHYSICAL EXAM:   Vital Signs:  Vital Signs Last 24 Hrs  T(C): 36.8 (23 Oct 2023 04:52), Max: 36.8 (22 Oct 2023 20:44)  T(F): 98.2 (23 Oct 2023 04:52), Max: 98.2 (22 Oct 2023 20:44)  HR: 75 (23 Oct 2023 06:19) (60 - 75)  BP: 117/51 (23 Oct 2023 06:19) (117/51 - 129/61)  BP(mean): --  RR: 17 (23 Oct 2023 04:52) (17 - 17)  SpO2: 97% (23 Oct 2023 04:52) (95% - 97%)    Parameters below as of 23 Oct 2023 04:52  Patient On (Oxygen Delivery Method): room air    GENERAL:  Appears stated age  HEENT:  NC/AT  CHEST:  Full & symmetric excursion  HEART:  Regular rhythm  ABDOMEN:  Soft, non-tender, non-distended  EXTEREMITIES:  no cyanosis  SKIN:  No rash  NEURO:  Alert      LABS:                        9.1    11.76 )-----------( 365      ( 23 Oct 2023 08:55 )             29.6     10-23    140  |  104  |  16  ----------------------------<  210<H>  3.8   |  29  |  0.87    Ca    8.4<L>      23 Oct 2023 08:55  Phos  2.1     10-22  Mg     2.1     10-22        Urinalysis Basic - ( 23 Oct 2023 08:55 )    Color: x / Appearance: x / SG: x / pH: x  Gluc: 210 mg/dL / Ketone: x  / Bili: x / Urobili: x   Blood: x / Protein: x / Nitrite: x   Leuk Esterase: x / RBC: x / WBC x   Sq Epi: x / Non Sq Epi: x / Bacteria: x [facility]  Les Mccray PA-C  [address] [place], [place] [id number]  [phone]      INTERVAL HPI/OVERNIGHT EVENTS:  Pt s/e  Tolerating diet    MEDICATIONS  (STANDING):  atorvastatin 80 milliGRAM(s) Oral at bedtime  bethanechol 25 milliGRAM(s) Oral two times a day  dextrose 50% Injectable 25 Gram(s) IV Push once  dextrose 50% Injectable 25 Gram(s) IV Push once  dextrose 50% Injectable 12.5 Gram(s) IV Push once  gabapentin 300 milliGRAM(s) Oral two times a day  glucagon  Injectable 1 milliGRAM(s) IntraMuscular once  insulin lispro (ADMELOG) corrective regimen sliding scale   SubCutaneous three times a day before meals  insulin lispro (ADMELOG) corrective regimen sliding scale   SubCutaneous at bedtime  lactated ringers. 1000 milliLiter(s) (100 mL/Hr) IV Continuous <Continuous>  lactobacillus acidophilus 1 Tablet(s) Oral two times a day with meals  midodrine. 10 milliGRAM(s) Oral three times a day  pantoprazole    Tablet 40 milliGRAM(s) Oral before breakfast  tamsulosin 0.4 milliGRAM(s) Oral at bedtime  vancomycin  IVPB 500 milliGRAM(s) IV Intermittent every 12 hours    MEDICATIONS  (PRN):  acetaminophen     Tablet .. 650 milliGRAM(s) Oral every 6 hours PRN Temp greater or equal to 38C (100.4F), Moderate Pain (4 - 6)  dextrose Oral Gel 15 Gram(s) Oral once PRN Blood Glucose LESS THAN 70 milliGRAM(s)/deciliter  loperamide 2 milliGRAM(s) Oral three times a day PRN Diarrhea      Allergies  No Known Allergies        PHYSICAL EXAM:   Vital Signs:  Vital Signs Last 24 Hrs  T(C): 36.8 (23 Oct 2023 04:52), Max: 36.8 (22 Oct 2023 20:44)  T(F): 98.2 (23 Oct 2023 04:52), Max: 98.2 (22 Oct 2023 20:44)  HR: 75 (23 Oct 2023 06:19) (60 - 75)  BP: 117/51 (23 Oct 2023 06:19) (117/51 - 129/61)  BP(mean): --  RR: 17 (23 Oct 2023 04:52) (17 - 17)  SpO2: 97% (23 Oct 2023 04:52) (95% - 97%)    Parameters below as of 23 Oct 2023 04:52  Patient On (Oxygen Delivery Method): room air    GENERAL:  Appears stated age  HEENT:  NC/AT  CHEST:  Full & symmetric excursion  HEART:  Regular rhythm  ABDOMEN:  Soft, non-tender, non-distended  EXTEREMITIES:  no cyanosis  SKIN:  No rash  NEURO:  Alert      LABS:                        9.1    11.76 )-----------( 365      ( 23 Oct 2023 08:55 )             29.6     10-23    140  |  104  |  16  ----------------------------<  210<H>  3.8   |  29  |  0.87    Ca    8.4<L>      23 Oct 2023 08:55  Phos  2.1     10-22  Mg     2.1     10-22        Urinalysis Basic - ( 23 Oct 2023 08:55 )    Color: x / Appearance: x / SG: x / pH: x  Gluc: 210 mg/dL / Ketone: x  / Bili: x / Urobili: x   Blood: x / Protein: x / Nitrite: x   Leuk Esterase: x / RBC: x / WBC x   Sq Epi: x / Non Sq Epi: x / Bacteria: x

## 2023-10-23 NOTE — PROGRESS NOTE ADULT - PROBLEM SELECTOR PLAN 2
multiple chronic old heel ulcers likely from hx of dm  podiatry eval noted- s/p  debridement- may require further intervention of left foot  id eval noted  continue local care  fu inrtraopereative cultures and path for abx recs upon dc  podiatry fu

## 2023-10-23 NOTE — CASE MANAGEMENT PROGRESS NOTE - NSCMPROGRESSNOTE_GEN_ALL_CORE
Discussed on rounds this am and with MD.  Pt ordered for PICC line insertion for long term abx.  Pt is also awaiting surgical path report for possible additional surgical management of wounds.  At present remains on IV abx.  A CM will remain available through hospitalization.

## 2023-10-23 NOTE — PROGRESS NOTE ADULT - ASSESSMENT
78-year-old male presents to the hospital by ambulance from home.  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 Pt does not go to doctor on regular basis per son and does phone visits. Son reports years of struggling with foot infections with black areas of gangrene and their struggling to keep his feet but now feeling that keeping him alive is more important.  Culture - Blood (10.10.23 @ 18:25)    -  Methicillin resistant Staphylococcus aureus (MRSA): Detec  Repeat 10/12 BCx NGTD (48h)  Wcx with MRSA, placed on contact isolation   UCx + Pseudomonas, pansensitive including cefepime     RECOMMENDATIONS  1-MRSA bacteremia/Osteomyelitis   NM bone scan ordered and  Abnormal combined Indium-111 labeled leukocyte study and marrow scan. Increased uptake of both radiotracer is in the right heel and left midfoot, concerning for osteomyelitis.  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  C/w Vancomycin (10/11-) dosing per pharmacy protocol  Patient will ultimately need LT IV Abx x6 wks until 11/22/23   --PICC ordered, pending placement  --pt will need weekly CMP, CBC, ESR, CRP and vanc level faxed to [phone]    2-Pseudomonal UTI  S/p zosyn (10/11-10/12), changed to cefepime, completed on 10/18      Infectious Diseases will continue to follow. Please call with any questions.   Rosa Maria Wilkinson M.D.  [facility] Division of Infectious Diseases [phone]  For after 5 P.M. and weekends, please call [phone]       78-year-old male presents to the hospital by ambulance from home.  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 Pt does not go to doctor on regular basis per son and does phone visits. Son reports years of struggling with foot infections with black areas of gangrene and their struggling to keep his feet but now feeling that keeping him alive is more important.  Culture - Blood (10.10.23 @ 18:25)    -  Methicillin resistant Staphylococcus aureus (MRSA): Detec  Repeat 10/12 BCx NGTD (48h)  Wcx with MRSA, placed on contact isolation   UCx + Pseudomonas, pansensitive including cefepime     RECOMMENDATIONS  1-MRSA bacteremia/Osteomyelitis   NM bone scan ordered and  Abnormal combined Indium-111 labeled leukocyte study and marrow scan. Increased uptake of both radiotracer is in the right heel and left midfoot, concerning for osteomyelitis.  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  C/w Vancomycin (10/11-) dosing per pharmacy protocol  Patient will ultimately need LT IV Abx x6 wks until 11/22/23   --PICC ordered, pending placement  --pt will need weekly CMP, CBC, ESR, CRP and vanc level faxed to [phone]    2-Pseudomonal UTI  S/p zosyn (10/11-10/12), changed to cefepime, completed on 10/18      Infectious Diseases will continue to follow. Please call with any questions.   Rosa Maria Wilkinson M.D.  [facility] Division of Infectious Diseases [phone]  For after 5 P.M. and weekends, please call [phone]       78-year-old male presents to the hospital by ambulance from home.  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 Pt does not go to doctor on regular basis per son and does phone visits. Son reports years of struggling with foot infections with black areas of gangrene and their struggling to keep his feet but now feeling that keeping him alive is more important.  Culture - Blood (10.10.23 @ 18:25)    -  Methicillin resistant Staphylococcus aureus (MRSA): Detec  Repeat 10/12 BCx NGTD (48h)  Wcx with MRSA, placed on contact isolation   UCx + Pseudomonas, pansensitive including cefepime     RECOMMENDATIONS  1-MRSA bacteremia/Osteomyelitis   NM bone scan ordered and  Abnormal combined Indium-111 labeled leukocyte study and marrow scan. Increased uptake of both radiotracer is in the right heel and left midfoot, concerning for osteomyelitis.  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  C/w Vancomycin (10/11-) dosing per pharmacy protocol  Patient will ultimately need LT IV Abx x6 wks until 11/22/23   --PICC ordered, pending placement  --pt will need weekly CMP, CBC, ESR, CRP and vanc level faxed to [phone]    2-Pseudomonal UTI  S/p zosyn (10/11-10/12), changed to cefepime, completed on 10/18      Infectious Diseases will continue to follow. Please call with any questions.   Rosa Maria Wilkinson M.D.  [facility] Division of Infectious Diseases [phone]  For after 5 P.M. and weekends, please call [phone]       78-year-old male presents to the hospital by ambulance from home.  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 Pt does not go to doctor on regular basis per son and does phone visits. Son reports years of struggling with foot infections with black areas of gangrene and their struggling to keep his feet but now feeling that keeping him alive is more important.  Culture - Blood (10.10.23 @ 18:25)    -  Methicillin resistant Staphylococcus aureus (MRSA): Detec  Repeat 10/12 BCx NGTD (48h)  Wcx with MRSA, placed on contact isolation   UCx + Pseudomonas, pansensitive including cefepime     RECOMMENDATIONS  1-MRSA bacteremia/Osteomyelitis   NM bone scan ordered and  Abnormal combined Indium-111 labeled leukocyte study and marrow scan. Increased uptake of both radiotracer is in the right heel and left midfoot, concerning for osteomyelitis.  Cardiology rec appreciated - sp GISSELL early to rule out IE or PPM involvement-Pacemaker wire is present in right atrium and right ventricle and intact and no vegetations on the pacemaker wire.  S/p Debridement of fascia 19-Oct-2023 12:51:29  Lauren Urbano, Open biopsy, bone, foot 19-Oct-2023 12:52:34  Lauren Urbano.  C/w Vancomycin (10/11-) dosing per pharmacy protocol  Patient will ultimately need LT IV Abx x6 wks until 11/22/23   --PICC ordered, pending placement  --pt will need weekly CMP, CBC, ESR, CRP and vanc level faxed to [phone]    2-Pseudomonal UTI  S/p zosyn (10/11-10/12), changed to cefepime, completed on 10/18      Infectious Diseases will continue to follow. Please call with any questions.   Rosa Maria Wilkinson M.D.  [facility] Division of Infectious Diseases [phone]  For after 5 P.M. and weekends, please call [phone]       78-year-old male presents to the hospital by ambulance from home.  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 Pt does not go to doctor on regular basis per son and does phone visits. Son reports years of struggling with foot infections with black areas of gangrene and their struggling to keep his feet but now feeling that keeping him alive is more important.  Culture - Blood (10.10.23 @ 18:25)    -  Methicillin resistant Staphylococcus aureus (MRSA): Detec  Repeat 10/12 BCx NGTD (48h)  Wcx with MRSA, placed on contact isolation   UCx + Pseudomonas, pansensitive including cefepime     RECOMMENDATIONS  1-MRSA bacteremia/Osteomyelitis   NM bone scan ordered and  Abnormal combined Indium-111 labeled leukocyte study and marrow scan. Increased uptake of both radiotracer is in the right heel and left midfoot, concerning for osteomyelitis.  Cardiology rec appreciated - sp GISSELL early to rule out IE or PPM involvement-Pacemaker wire is present in right atrium and right ventricle and intact and no vegetations on the pacemaker wire.  S/p Debridement of fascia 19-Oct-2023 12:51:29  Lauren Urbano, Open biopsy, bone, foot 19-Oct-2023 12:52:34  Lauren Urbano.  C/w Vancomycin (10/11-) dosing per pharmacy protocol  Patient will ultimately need LT IV Abx x6 wks until 11/22/23   --PICC ordered, pending placement  --pt will need weekly CMP, CBC, ESR, CRP and vanc level faxed to [phone]    2-Pseudomonal UTI  S/p zosyn (10/11-10/12), changed to cefepime, completed on 10/18      Infectious Diseases will continue to follow. Please call with any questions.   Rosa Maria Wilkinson M.D.  [facility] Division of Infectious Diseases [phone]  For after 5 P.M. and weekends, please call [phone]       78-year-old male presents to the hospital by ambulance from home.  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 Pt does not go to doctor on regular basis per son and does phone visits. Son reports years of struggling with foot infections with black areas of gangrene and their struggling to keep his feet but now feeling that keeping him alive is more important.  Culture - Blood (10.10.23 @ 18:25)    -  Methicillin resistant Staphylococcus aureus (MRSA): Detec  Repeat 10/12 BCx NGTD (48h)  Wcx with MRSA, placed on contact isolation   UCx + Pseudomonas, pansensitive including cefepime     RECOMMENDATIONS  1-MRSA bacteremia/Osteomyelitis   NM bone scan ordered and  Abnormal combined Indium-111 labeled leukocyte study and marrow scan. Increased uptake of both radiotracer is in the right heel and left midfoot, concerning for osteomyelitis.  Cardiology rec appreciated - sp GISSELL early to rule out IE or PPM involvement-Pacemaker wire is present in right atrium and right ventricle and intact and no vegetations on the pacemaker wire.  S/p Debridement of fascia 19-Oct-2023 12:51:29  Lauren Urbano, Open biopsy, bone, foot 19-Oct-2023 12:52:34  Lauren Urbano.  C/w Vancomycin (10/11-) dosing per pharmacy protocol  Patient will ultimately need LT IV Abx x6 wks until 11/22/23   --PICC ordered, pending placement  --pt will need weekly CMP, CBC, ESR, CRP and vanc level faxed to [phone]    2-Pseudomonal UTI  S/p zosyn (10/11-10/12), changed to cefepime, completed on 10/18      Infectious Diseases will continue to follow. Please call with any questions.   Rosa Maria Wilkinson M.D.  [facility] Division of Infectious Diseases [phone]  For after 5 P.M. and weekends, please call [phone]

## 2023-10-24 ENCOUNTER — TRANSCRIPTION ENCOUNTER (OUTPATIENT)
Age: 78
End: 2023-10-24

## 2023-10-24 LAB
CULTURE RESULTS: SIGNIFICANT CHANGE UP
GLUCOSE BLDC GLUCOMTR-MCNC: 204 MG/DL — HIGH (ref 70–99)
GLUCOSE BLDC GLUCOMTR-MCNC: 205 MG/DL — HIGH (ref 70–99)
GLUCOSE BLDC GLUCOMTR-MCNC: 207 MG/DL — HIGH (ref 70–99)
GLUCOSE BLDC GLUCOMTR-MCNC: 212 MG/DL — HIGH (ref 70–99)
HCT VFR BLD CALC: 27.8 % — LOW (ref 39–50)
HGB BLD-MCNC: 8.7 G/DL — LOW (ref 13–17)
MCHC RBC-ENTMCNC: 25.7 PG — LOW (ref 27–34)
MCHC RBC-ENTMCNC: 31.3 GM/DL — LOW (ref 32–36)
MCV RBC AUTO: 82.2 FL — SIGNIFICANT CHANGE UP (ref 80–100)
NRBC # BLD: 0 /100 WBCS — SIGNIFICANT CHANGE UP (ref 0–0)
ORGANISM # SPEC MICROSCOPIC CNT: SIGNIFICANT CHANGE UP
PLATELET # BLD AUTO: 303 K/UL — SIGNIFICANT CHANGE UP (ref 150–400)
RBC # BLD: 3.38 M/UL — LOW (ref 4.2–5.8)
RBC # FLD: 16.2 % — HIGH (ref 10.3–14.5)
SPECIMEN SOURCE: SIGNIFICANT CHANGE UP
VANCOMYCIN TROUGH SERPL-MCNC: 15.1 UG/ML — SIGNIFICANT CHANGE UP (ref 10–20)
WBC # BLD: 8.82 K/UL — SIGNIFICANT CHANGE UP (ref 3.8–10.5)
WBC # FLD AUTO: 8.82 K/UL — SIGNIFICANT CHANGE UP (ref 3.8–10.5)

## 2023-10-24 RX ORDER — SODIUM CHLORIDE 9 MG/ML
1000 INJECTION INTRAMUSCULAR; INTRAVENOUS; SUBCUTANEOUS
Refills: 0 | Status: DISCONTINUED | OUTPATIENT
Start: 2023-10-25 | End: 2023-10-25

## 2023-10-24 RX ADMIN — Medication 25 MILLIGRAM(S): at 05:43

## 2023-10-24 RX ADMIN — Medication 2: at 17:36

## 2023-10-24 RX ADMIN — Medication 2: at 12:26

## 2023-10-24 RX ADMIN — Medication 1 TABLET(S): at 08:56

## 2023-10-24 RX ADMIN — Medication 100 MILLIGRAM(S): at 01:23

## 2023-10-24 RX ADMIN — GABAPENTIN 300 MILLIGRAM(S): 400 CAPSULE ORAL at 17:36

## 2023-10-24 RX ADMIN — Medication 2: at 08:56

## 2023-10-24 RX ADMIN — Medication 100 MILLIGRAM(S): at 16:10

## 2023-10-24 RX ADMIN — ATORVASTATIN CALCIUM 80 MILLIGRAM(S): 80 TABLET, FILM COATED ORAL at 21:51

## 2023-10-24 RX ADMIN — MIDODRINE HYDROCHLORIDE 10 MILLIGRAM(S): 2.5 TABLET ORAL at 12:26

## 2023-10-24 RX ADMIN — Medication 1 TABLET(S): at 17:36

## 2023-10-24 RX ADMIN — PANTOPRAZOLE SODIUM 40 MILLIGRAM(S): 20 TABLET, DELAYED RELEASE ORAL at 06:36

## 2023-10-24 RX ADMIN — GABAPENTIN 300 MILLIGRAM(S): 400 CAPSULE ORAL at 05:43

## 2023-10-24 RX ADMIN — MIDODRINE HYDROCHLORIDE 10 MILLIGRAM(S): 2.5 TABLET ORAL at 17:36

## 2023-10-24 RX ADMIN — TAMSULOSIN HYDROCHLORIDE 0.4 MILLIGRAM(S): 0.4 CAPSULE ORAL at 21:49

## 2023-10-24 RX ADMIN — Medication 25 MILLIGRAM(S): at 17:36

## 2023-10-24 NOTE — DISCHARGE NOTE PROVIDER - CARE PROVIDER_API CALL
Lauren Urbano [facility]  Podiatric Medicine  [address] [place] [address]  Phone: [phone]  Fax: [phone]  Follow Up Time: 1 week   Lauren Urbano [facility]  Podiatric Medicine  [address] [place] [address]  Phone: [phone]  Fax: [phone]  Follow Up Time: 1 week   Lauren Urbano [facility]  Podiatric Medicine  [address] [place] [address]  Phone: [phone]  Fax: [phone]  Follow Up Time: 1 week   Lauren Urbano [facility]  Podiatric Medicine  [address] [place] [address]  Phone: [phone]  Fax: [phone]  Follow Up Time: 1 week    Rosa Maria Wilkinson  Infectious Disease  [address], [address]  [place] [address]  Phone: [phone]  Fax: [phone]  Follow Up Time: 2 weeks   Lauren Urbano [facility]  Podiatric Medicine  [address] [place] [address]  Phone: [phone]  Fax: [phone]  Follow Up Time: 1 week    Rosa Maria Wilkinson  Infectious Disease  [address], [address]  [place] [address]  Phone: [phone]  Fax: [phone]  Follow Up Time: 2 weeks   Lauren Urbano [facility]  Podiatric Medicine  [address] [place] [address]  Phone: [phone]  Fax: [phone]  Follow Up Time: 1 week    Rosa Maria Wilkinson  Infectious Disease  [address], [address]  [place] [address]  Phone: [phone]  Fax: [phone]  Follow Up Time: 2 weeks

## 2023-10-24 NOTE — DISCHARGE NOTE PROVIDER - NSDCCAREPROVSEEN_GEN_ALL_CORE_FT
Sundeep, Lauren Martinez, Ladan Burnett, Cecilio Harris, Pastora Wang, Tanner Weil, Tiara Sundeep, Lauren Martinez, Ladan Burnett, Cecilio Harris, Pastora Wang, Tanner Weil, Tiara Conklin, James Sundeep, Lauren Martinez, Ladan Burnett, Cecilio Harris, Pastora Wang, Tanner Weil, Tiara Conklin, Lauren Patton

## 2023-10-24 NOTE — DISCHARGE NOTE PROVIDER - HOSPITAL COURSE
HPI:  78-year-old male presents to the hospital by ambulance from home.  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 Pt does not go to doctor on regualar basis per son and does phone visits.   (11 Oct 2023 07:02)    *****************************************THIS DOCUMENT IS INCOMPLETE*********************  ---  HOSPITAL COURSE: Patient admitted to medicine floor for management of     Pt seen and examined on day of discharge. Patient is medically optimized for discharge to home with close outpatient followup.     ---  CONSULTANTS:     ---  TIME SPENT:  I, the attending physician, was physically present for the key portions of the evaluation and management (E/M) service provided. The total amount of time spent reviewing the hospital notes, laboratory values, imaging findings, assessing/counseling the patient, discussing with consultant physicians, social work, nursing staff was -- minutes    ---  Primary care provider was made aware of plan for discharge:      [  ] NO     [  ] YES   HPI:  78-year-old male presents to the hospital by ambulance from home.  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 Pt does not go to doctor on regualar basis per son and does phone visits.        HOSPITAL COURSE: Patient admitted to medicine floor for management of chronic b/l feet ulcers  due to dm - pt had b/l heel debridements and was treated for OM and will require long term abx with picc line with dc to [facility].  He was also treated for staph bacteremia, had negative tessie for vegetations, subsequent blood cultures cleared and will be discharged  on iv abx for staph bacteremia and om of b/l feet heel wounds/dm ulcers    Pt seen and examined on day of discharge. Patient is medically optimized for discharge to home with close outpatient followup.    >35 minutes spent on discharge         HPI:  78-year-old male presents to the hospital by ambulance from home.  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 Pt does not go to doctor on regualar basis per son and does phone visits.        HOSPITAL COURSE: Patient admitted to medicine floor for management of chronic b/l feet ulcers  due to dm - pt had b/l heel debridements and was treated for OM and will require long term abx with picc line with dc to [facility].  He was also treated for staph bacteremia, had negative tessie for vegetations, subsequent blood cultures cleared and will be discharged  on iv abx for staph bacteremia and om of b/l feet heel wounds/dm ulcers    Pt seen and examined on day of discharge. Patient is medically optimized for discharge to home with close outpatient followup.    >35 minutes spent on discharge         HPI:  78-year-old male presents to the hospital by ambulance from home.  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 Pt does not go to doctor on regualar basis per son and does phone visits.        HOSPITAL COURSE: Patient admitted to medicine floor for management of chronic b/l feet ulcers  due to dm - pt had b/l heel debridements and was treated for OM and will require long term abx with picc line with dc to [facility].  He was also treated for staph bacteremia, had negative tessie for vegetations, subsequent blood cultures cleared and will be discharged  on iv abx for staph bacteremia and om of b/l feet heel wounds/dm ulcers    Pt seen and examined on day of discharge. Patient is medically optimized for discharge to home with close outpatient followup.    >35 minutes spent on discharge         HPI:  78-year-old male presents to the hospital by ambulance from home.  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 Pt does not go to doctor on regualar basis per son and does phone visits.        HOSPITAL COURSE: Patient admitted to medicine floor for management of chronic b/l feet ulcers  due to dm - pt had b/l heel debridements and was treated for OM and will require long term abx with picc line with dc to [facility].  He was also treated for staph bacteremia, had negative tessie for vegetations, subsequent blood cultures cleared and will be discharged  on iv abx for staph bacteremia and om of b/l feet heel wounds/dm ulcers.    Patient is medically optimized for discharge to home with close outpatient followup.    >35 minutes spent on discharge         HPI:  78-year-old male presents to the hospital by ambulance from home.  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 Pt does not go to doctor on regualar basis per son and does phone visits.        HOSPITAL COURSE: Patient admitted to medicine floor for management of chronic b/l feet ulcers  due to dm - pt had b/l heel debridements and was treated for OM and will require long term abx with picc line with dc to [facility].  He was also treated for staph bacteremia, had negative tessie for vegetations, subsequent blood cultures cleared and will be discharged  on iv abx for staph bacteremia and om of b/l feet heel wounds/dm ulcers.    Patient is medically optimized for discharge to home with close outpatient followup.    >35 minutes spent on discharge         HPI:  78-year-old male presents to the hospital by ambulance from home.  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 Pt does not go to doctor on regualar basis per son and does phone visits.        HOSPITAL COURSE: Patient admitted to medicine floor for management of chronic b/l feet ulcers  due to dm - pt had b/l heel debridements and was treated for OM and will require long term abx with picc line with dc to [facility].  He was also treated for staph bacteremia, had negative tessie for vegetations, subsequent blood cultures cleared and will be discharged  on iv abx for staph bacteremia and om of b/l feet heel wounds/dm ulcers.    Patient is medically optimized for discharge to home with close outpatient followup.    >35 minutes spent on discharge         HPI:  78-year-old male presents to the hospital by ambulance from home.  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 Pt does not go to doctor on regualar basis per son and does phone visits.        HOSPITAL COURSE: Patient admitted to medicine floor for management of chronic b/l feet ulcers  due to dm - pt had b/l heel debridements and was treated for OM and will require long term abx with picc line with dc to [facility].  He was also treated for staph bacteremia, had negative tessie for vegetations, subsequent blood cultures cleared and will be discharged  on iv abx for staph bacteremia and om of b/l feet heel wounds/dm ulcers. He will fu with podiatry at woundcare and wound vac to be set up at [facility].    >35 minutes spent on discharge        HPI:  78-year-old male presents to the hospital by ambulance from home.  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 Pt does not go to doctor on regualar basis per son and does phone visits.        HOSPITAL COURSE: Patient admitted to medicine floor for management of chronic b/l feet ulcers  due to dm - pt had b/l heel debridements and was treated for OM and will require long term abx with picc line with dc to [facility].  He was also treated for staph bacteremia, had negative tessie for vegetations, subsequent blood cultures cleared and will be discharged  on iv abx for staph bacteremia and om of b/l feet heel wounds/dm ulcers. He will fu with podiatry at woundcare and wound vac to be set up at [facility].    >35 minutes spent on discharge        HPI:  78-year-old male presents to the hospital by ambulance from home.  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 Pt does not go to doctor on regualar basis per son and does phone visits.        HOSPITAL COURSE: Patient admitted to medicine floor for management of chronic b/l feet ulcers  due to dm - pt had b/l heel debridements and was treated for OM and will require long term abx with picc line with dc to [facility].  He was also treated for staph bacteremia, had negative tessie for vegetations, subsequent blood cultures cleared and will be discharged  on iv abx for staph bacteremia and om of b/l feet heel wounds/dm ulcers. He will fu with podiatry at woundcare and wound vac to be set up at [facility].    >35 minutes spent on discharge

## 2023-10-24 NOTE — PROGRESS NOTE ADULT - ASSESSMENT
78M with PMH HTN, PPM, DM, foot ulcers , presents to the hospital by ambulance from home.  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 Pt does not go to doctor on regular basis per son and does phone visits.  +Bacteremia. Called by Cardio NP for consultation and to arrange GISSELL to R/O Endocarditis or PPM related infection. Order placed to be done by Dr. Barrett Monday or Tuesday     s/p GISSELL: No vegetations  TTE: CONCLUSIONS:    1. Technically difficult image quality.   2. There is abnormal septal activation, likely from a paced rhythm. There is likely superimposed anterior and septal hypokinesis. Limited views and limited endocardial definition make further interpretation limited.   3. The right ventricle is not well visualized. Normal right ventricular cavity size and reduced systolic function.   4. The left atrium is mildly dilated in size.   5. Right atrium was not well visualized.   6. Mild calcification of the aortic valve leaflets.   7. Tricuspid valve was not well visualized.   8. Aortic valve was not well visualized.    POD2 s/p podiatry/surgical debridement  rec ASA, Statin  Pt has seen Dave Panchal [phone] and Curtis ferris [phone]    will follow prn 78M with PMH HTN, PPM, DM, foot ulcers , presents to the hospital by ambulance from home.  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 Pt does not go to doctor on regular basis per son and does phone visits.  +Bacteremia. Called by Cardio NP for consultation and to arrange GISSELL to R/O Endocarditis or PPM related infection. Order placed to be done by Dr. Barrett Monday or Tuesday     s/p GISSELL: No vegetations  TTE: CONCLUSIONS:    1. Technically difficult image quality.   2. There is abnormal septal activation, likely from a paced rhythm. There is likely superimposed anterior and septal hypokinesis. Limited views and limited endocardial definition make further interpretation limited.   3. The right ventricle is not well visualized. Normal right ventricular cavity size and reduced systolic function.   4. The left atrium is mildly dilated in size.   5. Right atrium was not well visualized.   6. Mild calcification of the aortic valve leaflets.   7. Tricuspid valve was not well visualized.   8. Aortic valve was not well visualized.    POD2 s/p podiatry/surgical debridement  rec ASA, Statin  Pt has seen Dave Panchal [phone] and Curtis ferris [phone]    will follow prn 78M with PMH HTN, PPM, DM, foot ulcers , presents to the hospital by ambulance from home.  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 Pt does not go to doctor on regular basis per son and does phone visits.  +Bacteremia. Called by Cardio NP for consultation and to arrange GISSELL to R/O Endocarditis or PPM related infection. Order placed to be done by Dr. Barrett Monday or Tuesday     s/p GISSELL: No vegetations  TTE: CONCLUSIONS:    1. Technically difficult image quality.   2. There is abnormal septal activation, likely from a paced rhythm. There is likely superimposed anterior and septal hypokinesis. Limited views and limited endocardial definition make further interpretation limited.   3. The right ventricle is not well visualized. Normal right ventricular cavity size and reduced systolic function.   4. The left atrium is mildly dilated in size.   5. Right atrium was not well visualized.   6. Mild calcification of the aortic valve leaflets.   7. Tricuspid valve was not well visualized.   8. Aortic valve was not well visualized.    POD2 s/p podiatry/surgical debridement  rec ASA, Statin  Pt has seen Dave Panchal [phone] and Curtis ferris [phone]    will follow prn 78M with PMH HTN, PPM, DM, foot ulcers , presents to the hospital by ambulance from home.  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 Pt does not go to doctor on regular basis per son and does phone visits.  +Bacteremia. Called by Cardio NP for consultation and to arrange GISSELL to R/O Endocarditis or PPM related infection. Order placed to be done by Dr. Barrett Monday or Tuesday     s/p GISSELL: No vegetations  TTE: CONCLUSIONS:    1. Technically difficult image quality.   2. There is abnormal septal activation, likely from a paced rhythm. There is likely superimposed anterior and septal hypokinesis. Limited views and limited endocardial definition make further interpretation limited.   3. The right ventricle is not well visualized. Normal right ventricular cavity size and reduced systolic function.   4. The left atrium is mildly dilated in size.   5. Right atrium was not well visualized.   6. Mild calcification of the aortic valve leaflets.   7. Tricuspid valve was not well visualized.   8. Aortic valve was not well visualized.    POD2 s/p podiatry/surgical debridement  Going for LEFT foot debridement tomorrow. - cardiac optimized for podiatry surgery tomorow  rec ASA, Statin  Pt has seen Dave Panchal [phone] and Curtis ferris [phone]    will follow prn 78M with PMH HTN, PPM, DM, foot ulcers , presents to the hospital by ambulance from home.  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 Pt does not go to doctor on regular basis per son and does phone visits.  +Bacteremia. Called by Cardio NP for consultation and to arrange GISSELL to R/O Endocarditis or PPM related infection. Order placed to be done by Dr. Barrett Monday or Tuesday     s/p GISSELL: No vegetations  TTE: CONCLUSIONS:    1. Technically difficult image quality.   2. There is abnormal septal activation, likely from a paced rhythm. There is likely superimposed anterior and septal hypokinesis. Limited views and limited endocardial definition make further interpretation limited.   3. The right ventricle is not well visualized. Normal right ventricular cavity size and reduced systolic function.   4. The left atrium is mildly dilated in size.   5. Right atrium was not well visualized.   6. Mild calcification of the aortic valve leaflets.   7. Tricuspid valve was not well visualized.   8. Aortic valve was not well visualized.    POD2 s/p podiatry/surgical debridement  Going for LEFT foot debridement tomorrow. - cardiac optimized for podiatry surgery tomorow  rec ASA, Statin  Pt has seen Dave Panchal [phone] and Curtis ferris [phone]    will follow prn 78M with PMH HTN, PPM, DM, foot ulcers , presents to the hospital by ambulance from home.  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 Pt does not go to doctor on regular basis per son and does phone visits.  +Bacteremia. Called by Cardio NP for consultation and to arrange GISSELL to R/O Endocarditis or PPM related infection. Order placed to be done by Dr. Barrett Monday or Tuesday     s/p GISSELL: No vegetations  TTE: CONCLUSIONS:    1. Technically difficult image quality.   2. There is abnormal septal activation, likely from a paced rhythm. There is likely superimposed anterior and septal hypokinesis. Limited views and limited endocardial definition make further interpretation limited.   3. The right ventricle is not well visualized. Normal right ventricular cavity size and reduced systolic function.   4. The left atrium is mildly dilated in size.   5. Right atrium was not well visualized.   6. Mild calcification of the aortic valve leaflets.   7. Tricuspid valve was not well visualized.   8. Aortic valve was not well visualized.    POD2 s/p podiatry/surgical debridement  Going for LEFT foot debridement tomorrow. - cardiac optimized for podiatry surgery tomorow  rec ASA, Statin  Pt has seen Dave Panchal [phone] and Cutris ferris [phone]    will follow prn

## 2023-10-24 NOTE — DISCHARGE NOTE PROVIDER - NSDCFUADDAPPT_GEN_ALL_CORE_FT
Please follow with the podiatrist at the wound care clinic.   [facility]  [facility]  [address] [place], [place] [id number] [phone] Please follow with the podiatrist at the wound care clinic.   [facility]  [facility] Center  [address] [place], [place] [address] [phone] Please follow with the podiatrist at the wound care clinic.   [facility]  [facility] Center  [address] [place], [place] [address] [phone] Please follow with the podiatrist at the wound care clinic.   [facility]  Hyperbar[facility] Wound Care Center  [address] [place], [place] [address] [phone]    pt will need weekly CMP, CBC, ESR, CRP and vanc level faxed to [phone] Please follow with the podiatrist at the wound care clinic.   [facility]  Hyperbaric Wound Care Center  [address] [place], [place] [address] [phone]    pt will need weekly CMP, CBC, ESR, CRP and vanc level faxed to [phone] Please follow with the podiatrist at the wound care clinic.   [facility]  Hyperbar[facility] Wound Care Center  [address] [place], [place] [id number] [phone]    pt will need weekly CMP, CBC, ESR, CRP and vanc level faxed to [phone]

## 2023-10-24 NOTE — PROGRESS NOTE ADULT - SUBJECTIVE AND OBJECTIVE BOX
[facility]  Les Mccray PA-C  [address] [place] [address]  [phone]      INTERVAL HPI/OVERNIGHT EVENTS:  Pt s/e  No new GI events    MEDICATIONS  (STANDING):  atorvastatin 80 milliGRAM(s) Oral at bedtime  bethanechol 25 milliGRAM(s) Oral two times a day  dextrose 50% Injectable 25 Gram(s) IV Push once  dextrose 50% Injectable 25 Gram(s) IV Push once  dextrose 50% Injectable 12.5 Gram(s) IV Push once  gabapentin 300 milliGRAM(s) Oral two times a day  glucagon  Injectable 1 milliGRAM(s) IntraMuscular once  insulin lispro (ADMELOG) corrective regimen sliding scale   SubCutaneous three times a day before meals  insulin lispro (ADMELOG) corrective regimen sliding scale   SubCutaneous at bedtime  lactated ringers. 1000 milliLiter(s) (100 mL/Hr) IV Continuous <Continuous>  lactobacillus acidophilus 1 Tablet(s) Oral two times a day with meals  midodrine. 10 milliGRAM(s) Oral three times a day  pantoprazole    Tablet 40 milliGRAM(s) Oral before breakfast  tamsulosin 0.4 milliGRAM(s) Oral at bedtime  vancomycin  IVPB 500 milliGRAM(s) IV Intermittent every 12 hours    MEDICATIONS  (PRN):  acetaminophen     Tablet .. 650 milliGRAM(s) Oral every 6 hours PRN Temp greater or equal to 38C (100.4F), Moderate Pain (4 - 6)  dextrose Oral Gel 15 Gram(s) Oral once PRN Blood Glucose LESS THAN 70 milliGRAM(s)/deciliter  loperamide 2 milliGRAM(s) Oral three times a day PRN Diarrhea      Allergies    No Known Allergies      PHYSICAL EXAM:   Vital Signs:  Vital Signs Last 24 Hrs  T(C): 36.7 (24 Oct 2023 05:29), Max: 36.9 (23 Oct 2023 13:15)  T(F): 98 (24 Oct 2023 05:29), Max: 98.4 (23 Oct 2023 13:15)  HR: 60 (24 Oct 2023 05:29) (60 - 60)  BP: 107/52 (24 Oct 2023 05:29) (107/52 - 135/61)  BP(mean): --  RR: 17 (24 Oct 2023 05:29) (17 - 17)  SpO2: 94% (24 Oct 2023 05:29) (94% - 94%)    Parameters below as of 24 Oct 2023 05:29  Patient On (Oxygen Delivery Method): room air      GENERAL:  Appears stated age  HEENT:  NC/AT  CHEST:  Full & symmetric excursion  HEART:  Regular rhythm  ABDOMEN:  Soft, non-tender, non-distended  EXTEREMITIES:  no cyanosis  SKIN:  No rash  NEURO:  Alert      LABS:                        8.7    8.82  )-----------( 303      ( 24 Oct 2023 08:45 )             27.8     10-23    140  |  104  |  16  ----------------------------<  210<H>  3.8   |  29  |  0.87    Ca    8.4<L>      23 Oct 2023 08:55        Urinalysis Basic - ( 23 Oct 2023 08:55 )    Color: x / Appearance: x / SG: x / pH: x  Gluc: 210 mg/dL / Ketone: x  / Bili: x / Urobili: x   Blood: x / Protein: x / Nitrite: x   Leuk Esterase: x / RBC: x / WBC x   Sq Epi: x / Non Sq Epi: x / Bacteria: x [facility]  Les Mccray PA-C  [address] [place] [address]  [phone]      INTERVAL HPI/OVERNIGHT EVENTS:  Pt s/e  No new GI events    MEDICATIONS  (STANDING):  atorvastatin 80 milliGRAM(s) Oral at bedtime  bethanechol 25 milliGRAM(s) Oral two times a day  dextrose 50% Injectable 25 Gram(s) IV Push once  dextrose 50% Injectable 25 Gram(s) IV Push once  dextrose 50% Injectable 12.5 Gram(s) IV Push once  gabapentin 300 milliGRAM(s) Oral two times a day  glucagon  Injectable 1 milliGRAM(s) IntraMuscular once  insulin lispro (ADMELOG) corrective regimen sliding scale   SubCutaneous three times a day before meals  insulin lispro (ADMELOG) corrective regimen sliding scale   SubCutaneous at bedtime  lactated ringers. 1000 milliLiter(s) (100 mL/Hr) IV Continuous <Continuous>  lactobacillus acidophilus 1 Tablet(s) Oral two times a day with meals  midodrine. 10 milliGRAM(s) Oral three times a day  pantoprazole    Tablet 40 milliGRAM(s) Oral before breakfast  tamsulosin 0.4 milliGRAM(s) Oral at bedtime  vancomycin  IVPB 500 milliGRAM(s) IV Intermittent every 12 hours    MEDICATIONS  (PRN):  acetaminophen     Tablet .. 650 milliGRAM(s) Oral every 6 hours PRN Temp greater or equal to 38C (100.4F), Moderate Pain (4 - 6)  dextrose Oral Gel 15 Gram(s) Oral once PRN Blood Glucose LESS THAN 70 milliGRAM(s)/deciliter  loperamide 2 milliGRAM(s) Oral three times a day PRN Diarrhea      Allergies    No Known Allergies      PHYSICAL EXAM:   Vital Signs:  Vital Signs Last 24 Hrs  T(C): 36.7 (24 Oct 2023 05:29), Max: 36.9 (23 Oct 2023 13:15)  T(F): 98 (24 Oct 2023 05:29), Max: 98.4 (23 Oct 2023 13:15)  HR: 60 (24 Oct 2023 05:29) (60 - 60)  BP: 107/52 (24 Oct 2023 05:29) (107/52 - 135/61)  BP(mean): --  RR: 17 (24 Oct 2023 05:29) (17 - 17)  SpO2: 94% (24 Oct 2023 05:29) (94% - 94%)    Parameters below as of 24 Oct 2023 05:29  Patient On (Oxygen Delivery Method): room air      GENERAL:  Appears stated age  HEENT:  NC/AT  CHEST:  Full & symmetric excursion  HEART:  Regular rhythm  ABDOMEN:  Soft, non-tender, non-distended  EXTEREMITIES:  no cyanosis  SKIN:  No rash  NEURO:  Alert      LABS:                        8.7    8.82  )-----------( 303      ( 24 Oct 2023 08:45 )             27.8     10-23    140  |  104  |  16  ----------------------------<  210<H>  3.8   |  29  |  0.87    Ca    8.4<L>      23 Oct 2023 08:55        Urinalysis Basic - ( 23 Oct 2023 08:55 )    Color: x / Appearance: x / SG: x / pH: x  Gluc: 210 mg/dL / Ketone: x  / Bili: x / Urobili: x   Blood: x / Protein: x / Nitrite: x   Leuk Esterase: x / RBC: x / WBC x   Sq Epi: x / Non Sq Epi: x / Bacteria: x [facility]  Les Mccray PA-C  [address] [place] [address]  [phone]      INTERVAL HPI/OVERNIGHT EVENTS:  Pt s/e  No new GI events    MEDICATIONS  (STANDING):  atorvastatin 80 milliGRAM(s) Oral at bedtime  bethanechol 25 milliGRAM(s) Oral two times a day  dextrose 50% Injectable 25 Gram(s) IV Push once  dextrose 50% Injectable 25 Gram(s) IV Push once  dextrose 50% Injectable 12.5 Gram(s) IV Push once  gabapentin 300 milliGRAM(s) Oral two times a day  glucagon  Injectable 1 milliGRAM(s) IntraMuscular once  insulin lispro (ADMELOG) corrective regimen sliding scale   SubCutaneous three times a day before meals  insulin lispro (ADMELOG) corrective regimen sliding scale   SubCutaneous at bedtime  lactated ringers. 1000 milliLiter(s) (100 mL/Hr) IV Continuous <Continuous>  lactobacillus acidophilus 1 Tablet(s) Oral two times a day with meals  midodrine. 10 milliGRAM(s) Oral three times a day  pantoprazole    Tablet 40 milliGRAM(s) Oral before breakfast  tamsulosin 0.4 milliGRAM(s) Oral at bedtime  vancomycin  IVPB 500 milliGRAM(s) IV Intermittent every 12 hours    MEDICATIONS  (PRN):  acetaminophen     Tablet .. 650 milliGRAM(s) Oral every 6 hours PRN Temp greater or equal to 38C (100.4F), Moderate Pain (4 - 6)  dextrose Oral Gel 15 Gram(s) Oral once PRN Blood Glucose LESS THAN 70 milliGRAM(s)/deciliter  loperamide 2 milliGRAM(s) Oral three times a day PRN Diarrhea      Allergies    No Known Allergies      PHYSICAL EXAM:   Vital Signs:  Vital Signs Last 24 Hrs  T(C): 36.7 (24 Oct 2023 05:29), Max: 36.9 (23 Oct 2023 13:15)  T(F): 98 (24 Oct 2023 05:29), Max: 98.4 (23 Oct 2023 13:15)  HR: 60 (24 Oct 2023 05:29) (60 - 60)  BP: 107/52 (24 Oct 2023 05:29) (107/52 - 135/61)  BP(mean): --  RR: 17 (24 Oct 2023 05:29) (17 - 17)  SpO2: 94% (24 Oct 2023 05:29) (94% - 94%)    Parameters below as of 24 Oct 2023 05:29  Patient On (Oxygen Delivery Method): room air      GENERAL:  Appears stated age  HEENT:  NC/AT  CHEST:  Full & symmetric excursion  HEART:  Regular rhythm  ABDOMEN:  Soft, non-tender, non-distended  EXTEREMITIES:  no cyanosis  SKIN:  No rash  NEURO:  Alert      LABS:                        8.7    8.82  )-----------( 303      ( 24 Oct 2023 08:45 )             27.8     10-23    140  |  104  |  16  ----------------------------<  210<H>  3.8   |  29  |  0.87    Ca    8.4<L>      23 Oct 2023 08:55        Urinalysis Basic - ( 23 Oct 2023 08:55 )    Color: x / Appearance: x / SG: x / pH: x  Gluc: 210 mg/dL / Ketone: x  / Bili: x / Urobili: x   Blood: x / Protein: x / Nitrite: x   Leuk Esterase: x / RBC: x / WBC x   Sq Epi: x / Non Sq Epi: x / Bacteria: x

## 2023-10-24 NOTE — DISCHARGE NOTE PROVIDER - NPI NUMBER (FOR SYSADMIN USE ONLY) :
[[id number]] [[id number]] [[id number]] [[id number]],[[id number]] [[id number]],[[id number]] [[id number]],[[id number]]

## 2023-10-24 NOTE — PROGRESS NOTE ADULT - SUBJECTIVE AND OBJECTIVE BOX
Patient is a 78y old  Male who presents with a chief complaint of fever and weakness (24 Oct 2023 08:38)      INTERVAL HPI/OVERNIGHT EVENTS: stable no new complants, chart noted, for or again tomorrow    MEDICATIONS  (STANDING):  atorvastatin 80 milliGRAM(s) Oral at bedtime  bethanechol 25 milliGRAM(s) Oral two times a day  dextrose 50% Injectable 25 Gram(s) IV Push once  dextrose 50% Injectable 25 Gram(s) IV Push once  dextrose 50% Injectable 12.5 Gram(s) IV Push once  gabapentin 300 milliGRAM(s) Oral two times a day  glucagon  Injectable 1 milliGRAM(s) IntraMuscular once  insulin lispro (ADMELOG) corrective regimen sliding scale   SubCutaneous three times a day before meals  insulin lispro (ADMELOG) corrective regimen sliding scale   SubCutaneous at bedtime  lactated ringers. 1000 milliLiter(s) (100 mL/Hr) IV Continuous <Continuous>  lactobacillus acidophilus 1 Tablet(s) Oral two times a day with meals  midodrine. 10 milliGRAM(s) Oral three times a day  pantoprazole    Tablet 40 milliGRAM(s) Oral before breakfast  tamsulosin 0.4 milliGRAM(s) Oral at bedtime  vancomycin  IVPB 500 milliGRAM(s) IV Intermittent every 12 hours    MEDICATIONS  (PRN):  acetaminophen     Tablet .. 650 milliGRAM(s) Oral every 6 hours PRN Temp greater or equal to 38C (100.4F), Moderate Pain (4 - 6)  dextrose Oral Gel 15 Gram(s) Oral once PRN Blood Glucose LESS THAN 70 milliGRAM(s)/deciliter  loperamide 2 milliGRAM(s) Oral three times a day PRN Diarrhea      Allergies    No Known Allergies    Intolerances        REVIEW OF SYSTEMS:  CONSTITUTIONAL: No fever, weight loss, or fatigue  EYES: No eye pain, visual disturbances  ENMT:  No difficulty hearing, tinnitus, vertigo; No sinus or throat pain  NECK: No pain or stiffness  RESPIRATORY: No cough, wheezing, chills or hemoptysis; No shortness of breath  CARDIOVASCULAR: No chest pain, palpitations, dizziness  GASTROINTESTINAL: No abdominal or epigastric pain. No nausea, vomiting, or hematemesis; No diarrhea or constipation. No melena or hematochezia.  GENITOURINARY: No dysuria, frequency, hematuria, or incontinence  NEUROLOGICAL: No headaches, memory loss, loss of strength, numbness, or tremors  SKIN: No itching, burning  LYMPH NODES: No enlarged glands  MUSCULOSKELETAL: No joint pain or swelling; No muscle, back, or extremity pain  PSYCHIATRIC: No depression, mood swings  HEME/LYMPH: No easy bruising, or bleeding gums  ALLERGY AND IMMUNOLOGIC: No hives    Vital Signs Last 24 Hrs  T(C): 36.7 (24 Oct 2023 05:29), Max: 36.9 (23 Oct 2023 13:15)  T(F): 98 (24 Oct 2023 05:29), Max: 98.4 (23 Oct 2023 13:15)  HR: 60 (24 Oct 2023 05:29) (60 - 60)  BP: 107/52 (24 Oct 2023 05:29) (107/52 - 135/61)  BP(mean): --  RR: 17 (24 Oct 2023 05:29) (17 - 17)  SpO2: 94% (24 Oct 2023 05:29) (94% - 94%)    Parameters below as of 24 Oct 2023 05:29  Patient On (Oxygen Delivery Method): room air        PHYSICAL EXAM:  GENERAL: NAD, well-groomed, well-developed  HEAD:  Atraumatic, Normocephalic  EYES: EOMI, PERRLA, conjunctiva and sclera clear  ENMT: No tonsillar erythema, exudates, or enlargement   NECK: Supple, No JVD  NERVOUS SYSTEM:  Alert & Oriented X3, Good concentration  CHEST/LUNG: Clear to auscultation bilaterally; No rales, rhonchi, wheezing  HEART: Regular rate and rhythm  ABDOMEN: Soft, Nontender, Nondistended; Bowel sounds present  EXTREMITIES:  2+ Peripheral Pulses, trace edema, wounds in clean dressing   LYMPH: No lymphadenopathy noted  SKIN: No rashes     LABS:      Ca    8.4        23 Oct 2023 08:55        Urinalysis Basic - ( 23 Oct 2023 08:55 )    Color: x / Appearance: x / SG: x / pH: x  Gluc: 210 mg/dL / Ketone: x  / Bili: x / Urobili: x   Blood: x / Protein: x / Nitrite: x   Leuk Esterase: x / RBC: x / WBC x   Sq Epi: x / Non Sq Epi: x / Bacteria: x      CAPILLARY BLOOD GLUCOSE      POCT Blood Glucose.: 212 mg/dL (24 Oct 2023 08:42)  POCT Blood Glucose.: 229 mg/dL (23 Oct 2023 21:17)  POCT Blood Glucose.: 220 mg/dL (23 Oct 2023 17:36)  POCT Blood Glucose.: 228 mg/dL (23 Oct 2023 12:29)    blood culture --   Numerous Methicillin Resistant Staphylococcus aureus  Few Corynebacterium species "Susceptibilities not performed"   10-19 @ 11:35      urine culture --  10-19 @ 11:35  results   Numerous Methicillin Resistant Staphylococcus aureus  Few Corynebacterium species "Susceptibilities not performed" 10-19 @ 11:35    wound with gram statin --    10-19 @ 11:35  organism  Methicillin resistant Staphylococcus aureus   10-19 @ 11:35  specimen source .Tissue Other, RIGHT FOOT SOFT TISSUE CULTURE  10-19 @ 11:35      RADIOLOGY & ADDITIONAL TESTS:      Consultant(s) Notes Reviewed:  [x ] YES  [ ] NO    Care Discussed with Consultants/Other Providers [ x] YES  [ ] NO    Advanced care planning discussed with patient and family, advanced care planning forms reviewed, discussed, and completed.  20 minutes spent.

## 2023-10-24 NOTE — DISCHARGE NOTE PROVIDER - NSDCFUADDINST_GEN_ALL_CORE_FT
pt will need weekly CMP, CBC, ESR, CRP and vanc level faxed to [phone] pt will need weekly CMP, CBC, ESR, CRP and vanc level faxed to [phone] pt will need weekly CMP, CBC, ESR, CRP and vanc level faxed to [phone]

## 2023-10-24 NOTE — DISCHARGE NOTE PROVIDER - DISCHARGE DIET
Regular Diet - No restrictions/Consistent Carbohydrate Diabetic Diets Regular Diet - No restrictions/Low Sodium Diet/Consistent Carbohydrate Diabetic Diets

## 2023-10-24 NOTE — PROGRESS NOTE ADULT - SUBJECTIVE AND OBJECTIVE BOX
[facility] Cardiology Progress Note [phone] (Dr. Guillen, Dayan, Halie, Tessa)    CHIEF COMPLAINT: Patient is a 78y old  Male who presents with a chief complaint of fever and weakness (23 Oct 2023 14:21)      Follow Up Today: The patient denies any chest discomfort or shortness of breath.    HPI:  78-year-old male presents to the hospital by ambulance from home.  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 Pt does not go to doctor on regualar basis per son and does phone visits.   (11 Oct 2023 07:02)      PAST MEDICAL & SURGICAL HISTORY:  Diabetes      Benign essential HTN      Pacemaker      History of BPH      Diabetic foot ulcers          MEDICATIONS  (STANDING):  atorvastatin 80 milliGRAM(s) Oral at bedtime  bethanechol 25 milliGRAM(s) Oral two times a day  dextrose 50% Injectable 12.5 Gram(s) IV Push once  dextrose 50% Injectable 25 Gram(s) IV Push once  dextrose 50% Injectable 25 Gram(s) IV Push once  gabapentin 300 milliGRAM(s) Oral two times a day  glucagon  Injectable 1 milliGRAM(s) IntraMuscular once  insulin lispro (ADMELOG) corrective regimen sliding scale   SubCutaneous at bedtime  insulin lispro (ADMELOG) corrective regimen sliding scale   SubCutaneous three times a day before meals  lactated ringers. 1000 milliLiter(s) (100 mL/Hr) IV Continuous <Continuous>  lactobacillus acidophilus 1 Tablet(s) Oral two times a day with meals  midodrine. 10 milliGRAM(s) Oral three times a day  pantoprazole    Tablet 40 milliGRAM(s) Oral before breakfast  tamsulosin 0.4 milliGRAM(s) Oral at bedtime  vancomycin  IVPB 500 milliGRAM(s) IV Intermittent every 12 hours    MEDICATIONS  (PRN):  acetaminophen     Tablet .. 650 milliGRAM(s) Oral every 6 hours PRN Temp greater or equal to 38C (100.4F), Moderate Pain (4 - 6)  dextrose Oral Gel 15 Gram(s) Oral once PRN Blood Glucose LESS THAN 70 milliGRAM(s)/deciliter  loperamide 2 milliGRAM(s) Oral three times a day PRN Diarrhea      Allergies    No Known Allergies    Intolerances        REVIEW OF SYSTEMS:    All other review of systems is negative unless indicated above    Vital Signs Last 24 Hrs  T(C): 36.7 (24 Oct 2023 05:29), Max: 36.9 (23 Oct 2023 13:15)  T(F): 98 (24 Oct 2023 05:29), Max: 98.4 (23 Oct 2023 13:15)  HR: 60 (24 Oct 2023 05:29) (60 - 60)  BP: 107/52 (24 Oct 2023 05:29) (107/52 - 135/61)  BP(mean): --  RR: 17 (24 Oct 2023 05:29) (17 - 17)  SpO2: 94% (24 Oct 2023 05:29) (94% - 94%)    Parameters below as of 24 Oct 2023 05:29  Patient On (Oxygen Delivery Method): room air        I&O's Summary    23 Oct 2023 07:01  -  24 Oct 2023 07:00  --------------------------------------------------------  IN: 0 mL / OUT: 250 mL / NET: -250 mL        PHYSICAL EXAM:    Constitutional: NAD, awake and alert, well-developed  Eyes:  EOMI,  Pupils round, No oral cyanosis.  HEENT: No exudate or erythema  Pulmonary: Non-labored, breath sounds are clear bilaterally, No wheezing, rales or rhonchi  Cardiovascular: Regular, S1 and S2, No murmurs  Gastrointestinal: Bowel Sounds present, soft, nontender.   Ext: No significant LE edema  Neurological: Alert, no gross focal motor deficits  Skin: No rashes.  Psych:  Mood & affect appropriate    LABS: All Labs Reviewed:                        9.1    11.76 )-----------( 365      ( 23 Oct 2023 08:55 )             29.6                         8.5    10.81 )-----------( 316      ( 22 Oct 2023 10:10 )             27.6     23 Oct 2023 08:55    140    |  104    |  16     ----------------------------<  210    3.8     |  29     |  0.87   22 Oct 2023 10:10    141    |  106    |  16     ----------------------------<  224    3.9     |  29     |  0.88     Ca    8.4        23 Oct 2023 08:55  Ca    8.0        22 Oct 2023 10:10  Phos  2.1       22 Oct 2023 10:10  Mg     2.1       22 Oct 2023 10:10            Blood Culture: Organism Methicillin resistant Staphylococcus aureus  Gram Stain Blood -- Gram Stain   Few polymorphonuclear leukocytes per low power field  Numerous Gram positive cocci in pairs per oil power field  Moderate Gram Variable Rods per oil power field  Specimen Source .Tissue Other, RIGHT FOOT SOFT TISSUE CULTURE  Culture-Blood --            RADIOLOGY/EKG:    Attending Attestation:   50 minutes spent on total encounter; more than 50% of the visit was spent counseling and/or coordinating care by the attending physician.     ASSESSMENT AND PLAN [facility] Cardiology Progress Note [phone] (Dr. Guillen, Dayan, Halie, Tessa)    CHIEF COMPLAINT: Patient is a 78y old  Male who presents with a chief complaint of fever and weakness (23 Oct 2023 14:21)      Follow Up Today: The patient denies any chest discomfort or shortness of breath.    HPI:  78-year-old male presents to the hospital by ambulance from home.  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 Pt does not go to doctor on regualar basis per son and does phone visits.   (11 Oct 2023 07:02)      PAST MEDICAL & SURGICAL HISTORY:  Diabetes      Benign essential HTN      Pacemaker      History of BPH      Diabetic foot ulcers          MEDICATIONS  (STANDING):  atorvastatin 80 milliGRAM(s) Oral at bedtime  bethanechol 25 milliGRAM(s) Oral two times a day  dextrose 50% Injectable 12.5 Gram(s) IV Push once  dextrose 50% Injectable 25 Gram(s) IV Push once  dextrose 50% Injectable 25 Gram(s) IV Push once  gabapentin 300 milliGRAM(s) Oral two times a day  glucagon  Injectable 1 milliGRAM(s) IntraMuscular once  insulin lispro (ADMELOG) corrective regimen sliding scale   SubCutaneous at bedtime  insulin lispro (ADMELOG) corrective regimen sliding scale   SubCutaneous three times a day before meals  lactated ringers. 1000 milliLiter(s) (100 mL/Hr) IV Continuous <Continuous>  lactobacillus acidophilus 1 Tablet(s) Oral two times a day with meals  midodrine. 10 milliGRAM(s) Oral three times a day  pantoprazole    Tablet 40 milliGRAM(s) Oral before breakfast  tamsulosin 0.4 milliGRAM(s) Oral at bedtime  vancomycin  IVPB 500 milliGRAM(s) IV Intermittent every 12 hours    MEDICATIONS  (PRN):  acetaminophen     Tablet .. 650 milliGRAM(s) Oral every 6 hours PRN Temp greater or equal to 38C (100.4F), Moderate Pain (4 - 6)  dextrose Oral Gel 15 Gram(s) Oral once PRN Blood Glucose LESS THAN 70 milliGRAM(s)/deciliter  loperamide 2 milliGRAM(s) Oral three times a day PRN Diarrhea      Allergies    No Known Allergies    Intolerances        REVIEW OF SYSTEMS:    All other review of systems is negative unless indicated above    Vital Signs Last 24 Hrs  T(C): 36.7 (24 Oct 2023 05:29), Max: 36.9 (23 Oct 2023 13:15)  T(F): 98 (24 Oct 2023 05:29), Max: 98.4 (23 Oct 2023 13:15)  HR: 60 (24 Oct 2023 05:29) (60 - 60)  BP: 107/52 (24 Oct 2023 05:29) (107/52 - 135/61)  BP(mean): --  RR: 17 (24 Oct 2023 05:29) (17 - 17)  SpO2: 94% (24 Oct 2023 05:29) (94% - 94%)    Parameters below as of 24 Oct 2023 05:29  Patient On (Oxygen Delivery Method): room air        I&O's Summary    23 Oct 2023 07:01  -  24 Oct 2023 07:00  --------------------------------------------------------  IN: 0 mL / OUT: 250 mL / NET: -250 mL        PHYSICAL EXAM:    Constitutional: NAD, awake and alert, well-developed  Eyes:  EOMI,  Pupils round, No oral cyanosis.  HEENT: No exudate or erythema  Pulmonary: Non-labored, breath sounds are clear bilaterally, No wheezing, rales or rhonchi  Cardiovascular: Regular, S1 and S2, No murmurs  Gastrointestinal: Bowel Sounds present, soft, nontender.   Ext: No significant LE edema  Neurological: Alert, no gross focal motor deficits  Skin: No rashes.  Psych:  Mood & affect appropriate    LABS: All Labs Reviewed:                        9.1    11.76 )-----------( 365      ( 23 Oct 2023 08:55 )             29.6                         8.5    10.81 )-----------( 316      ( 22 Oct 2023 10:10 )             27.6     23 Oct 2023 08:55    140    |  104    |  16     ----------------------------<  210    3.8     |  29     |  0.87   22 Oct 2023 10:10    141    |  106    |  16     ----------------------------<  224    3.9     |  29     |  0.88     Ca    8.4        23 Oct 2023 08:55  Ca    8.0        22 Oct 2023 10:10  Phos  2.1       22 Oct 2023 10:10  Mg     2.1       22 Oct 2023 10:10            Blood Culture: Organism Methicillin resistant Staphylococcus aureus  Gram Stain Blood -- Gram Stain   Few polymorphonuclear leukocytes per low power field  Numerous Gram positive cocci in pairs per oil power field  Moderate Gram Variable Rods per oil power field  Specimen Source .Tissue Other, RIGHT FOOT SOFT TISSUE CULTURE  Culture-Blood --            RADIOLOGY/EKG:    Attending Attestation:   50 minutes spent on total encounter; more than 50% of the visit was spent counseling and/or coordinating care by the attending physician.     ASSESSMENT AND PLAN [facility] Cardiology Progress Note [phone] (Dr. Guillen, Dayan, Halie, Tessa)    CHIEF COMPLAINT: Patient is a 78y old  Male who presents with a chief complaint of fever and weakness (23 Oct 2023 14:21)      Follow Up Today: The patient denies any chest discomfort or shortness of breath.    HPI:  78-year-old male presents to the hospital by ambulance from home.  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 Pt does not go to doctor on regualar basis per son and does phone visits.   (11 Oct 2023 07:02)      PAST MEDICAL & SURGICAL HISTORY:  Diabetes      Benign essential HTN      Pacemaker      History of BPH      Diabetic foot ulcers          MEDICATIONS  (STANDING):  atorvastatin 80 milliGRAM(s) Oral at bedtime  bethanechol 25 milliGRAM(s) Oral two times a day  dextrose 50% Injectable 12.5 Gram(s) IV Push once  dextrose 50% Injectable 25 Gram(s) IV Push once  dextrose 50% Injectable 25 Gram(s) IV Push once  gabapentin 300 milliGRAM(s) Oral two times a day  glucagon  Injectable 1 milliGRAM(s) IntraMuscular once  insulin lispro (ADMELOG) corrective regimen sliding scale   SubCutaneous at bedtime  insulin lispro (ADMELOG) corrective regimen sliding scale   SubCutaneous three times a day before meals  lactated ringers. 1000 milliLiter(s) (100 mL/Hr) IV Continuous <Continuous>  lactobacillus acidophilus 1 Tablet(s) Oral two times a day with meals  midodrine. 10 milliGRAM(s) Oral three times a day  pantoprazole    Tablet 40 milliGRAM(s) Oral before breakfast  tamsulosin 0.4 milliGRAM(s) Oral at bedtime  vancomycin  IVPB 500 milliGRAM(s) IV Intermittent every 12 hours    MEDICATIONS  (PRN):  acetaminophen     Tablet .. 650 milliGRAM(s) Oral every 6 hours PRN Temp greater or equal to 38C (100.4F), Moderate Pain (4 - 6)  dextrose Oral Gel 15 Gram(s) Oral once PRN Blood Glucose LESS THAN 70 milliGRAM(s)/deciliter  loperamide 2 milliGRAM(s) Oral three times a day PRN Diarrhea      Allergies    No Known Allergies    Intolerances        REVIEW OF SYSTEMS:    All other review of systems is negative unless indicated above    Vital Signs Last 24 Hrs  T(C): 36.7 (24 Oct 2023 05:29), Max: 36.9 (23 Oct 2023 13:15)  T(F): 98 (24 Oct 2023 05:29), Max: 98.4 (23 Oct 2023 13:15)  HR: 60 (24 Oct 2023 05:29) (60 - 60)  BP: 107/52 (24 Oct 2023 05:29) (107/52 - 135/61)  BP(mean): --  RR: 17 (24 Oct 2023 05:29) (17 - 17)  SpO2: 94% (24 Oct 2023 05:29) (94% - 94%)    Parameters below as of 24 Oct 2023 05:29  Patient On (Oxygen Delivery Method): room air        I&O's Summary    23 Oct 2023 07:01  -  24 Oct 2023 07:00  --------------------------------------------------------  IN: 0 mL / OUT: 250 mL / NET: -250 mL        PHYSICAL EXAM:    Constitutional: NAD, awake and alert, well-developed  Eyes:  EOMI,  Pupils round, No oral cyanosis.  HEENT: No exudate or erythema  Pulmonary: Non-labored, breath sounds are clear bilaterally, No wheezing, rales or rhonchi  Cardiovascular: Regular, S1 and S2, No murmurs  Gastrointestinal: Bowel Sounds present, soft, nontender.   Ext: No significant LE edema  Neurological: Alert, no gross focal motor deficits  Skin: No rashes.  Psych:  Mood & affect appropriate    LABS: All Labs Reviewed:                        9.1    11.76 )-----------( 365      ( 23 Oct 2023 08:55 )             29.6                         8.5    10.81 )-----------( 316      ( 22 Oct 2023 10:10 )             27.6     23 Oct 2023 08:55    140    |  104    |  16     ----------------------------<  210    3.8     |  29     |  0.87   22 Oct 2023 10:10    141    |  106    |  16     ----------------------------<  224    3.9     |  29     |  0.88     Ca    8.4        23 Oct 2023 08:55  Ca    8.0        22 Oct 2023 10:10  Phos  2.1       22 Oct 2023 10:10  Mg     2.1       22 Oct 2023 10:10            Blood Culture: Organism Methicillin resistant Staphylococcus aureus  Gram Stain Blood -- Gram Stain   Few polymorphonuclear leukocytes per low power field  Numerous Gram positive cocci in pairs per oil power field  Moderate Gram Variable Rods per oil power field  Specimen Source .Tissue Other, RIGHT FOOT SOFT TISSUE CULTURE  Culture-Blood --            RADIOLOGY/EKG:    Attending Attestation:   50 minutes spent on total encounter; more than 50% of the visit was spent counseling and/or coordinating care by the attending physician.     ASSESSMENT AND PLAN

## 2023-10-24 NOTE — DISCHARGE NOTE PROVIDER - PROVIDER TOKENS
PROVIDER:[TOKEN:[[id number]],FOLLOWUP:[1 week]] PROVIDER:[TOKEN:[[id number]],FOLLOWUP:[1 week]] PROVIDER:[TOKEN:[[id number]],FOLLOWUP:[1 week]] PROVIDER:[TOKEN:[[id number]],FOLLOWUP:[1 week]],PROVIDER:[TOKEN:[[id number]],FOLLOWUP:[2 weeks]] PROVIDER:[TOKEN:[[id number]],FOLLOWUP:[1 week]],PROVIDER:[TOKEN:[[id number]],FOLLOWUP:[2 weeks]] PROVIDER:[TOKEN:[[id number]],FOLLOWUP:[1 week]],PROVIDER:[TOKEN:[[id number]],FOLLOWUP:[2 weeks]]

## 2023-10-24 NOTE — DISCHARGE NOTE PROVIDER - NSDCCPCAREPLAN_GEN_ALL_CORE_FT
PRINCIPAL DISCHARGE DIAGNOSIS  Diagnosis: Acute UTI  Assessment and Plan of Treatment: Youwere admitted with a urinary tract infection. You were seen by the infectious disease docto who recommended you start on intravenous antibiotics. Your symptoms have improved.      SECONDARY DISCHARGE DIAGNOSES  Diagnosis: Diabetic foot ulcers  Assessment and Plan of Treatment: Multiple chronic heel ulcers. You were seen by podiatry......  INCOMPLETE.......   You will need to continue with intravenous antibiotics for a total of 6 weeks; UNTIL 11/22/23.   You will need weekly CMP, CBC, ESR, CRP and vanc level faxed to [phone]    Diagnosis: Anemia of chronic disease  Assessment and Plan of Treatment: Continue current management. Follow with your primary medical doctor.     PRINCIPAL DISCHARGE DIAGNOSIS  Diagnosis: Acute UTI  Assessment and Plan of Treatment: Youwere admitted with a urinary tract infection. You were seen by the infectious disease docto who recommended you start on intravenous antibiotics. Your symptoms have improved.      SECONDARY DISCHARGE DIAGNOSES  Diagnosis: Diabetic foot ulcers  Assessment and Plan of Treatment: Multiple chronic heel ulcers. You were seen by podiatry......  INCOMPLETE.......   You will need to continue with intravenous antibiotics for a total of 6 weeks; UNTIL 11/22/23.   You will need weekly CMP, CBC, ESR, CRP and vanc level faxed to [phone]    Diagnosis: Anemia of chronic disease  Assessment and Plan of Treatment: Continue current management. Follow with your primary medical doctor.     PRINCIPAL DISCHARGE DIAGNOSIS  Diagnosis: Acute UTI  Assessment and Plan of Treatment: Youwere admitted with a urinary tract infection. You were seen by the infectious disease docto who recommended you start on intravenous antibiotics. Your symptoms have improved.      SECONDARY DISCHARGE DIAGNOSES  Diagnosis: Diabetic foot ulcers  Assessment and Plan of Treatment: Multiple chronic heel ulcers. You were seen by podiatry......  INCOMPLETE.......   You will need to continue with intravenous antibiotics for a total of 6 weeks; UNTIL 11/22/23.   You will need weekly CMP, CBC, ESR, CRP and vanc level faxed to [phone]    Diagnosis: Anemia of chronic disease  Assessment and Plan of Treatment: Continue current management. Follow with your primary medical doctor.     PRINCIPAL DISCHARGE DIAGNOSIS  Diagnosis: Diabetic foot ulcers  Assessment and Plan of Treatment: Multiple chronic heel ulcers. You were seen by podiatry.  You will need to continue with intravenous antibiotics for a total of 6 weeks; UNTIL 11/22/23.   You will need weekly CMP, CBC, ESR, CRP and vanc level faxed to [phone]      SECONDARY DISCHARGE DIAGNOSES  Diagnosis: Anemia of chronic disease  Assessment and Plan of Treatment: Continue current management. Follow with your primary medical doctor.     PRINCIPAL DISCHARGE DIAGNOSIS  Diagnosis: Diabetic foot ulcers  Assessment and Plan of Treatment: Multiple chronic heel ulcers. You were seen by podiatry.  You will need to continue with intravenous antibiotics for a total of 6 weeks; UNTIL 11/22/23.   You will need weekly CMP, CBC, ESR, CRP and vanc level faxed to [phone]      SECONDARY DISCHARGE DIAGNOSES  Diagnosis: Anemia of chronic disease  Assessment and Plan of Treatment: Continue current management. Follow with your primary medical doctor.     PRINCIPAL DISCHARGE DIAGNOSIS  Diagnosis: Diabetic foot ulcers  Assessment and Plan of Treatment: Multiple chronic heel ulcers. You were seen by podiatry.  You will need to continue with intravenous antibiotics for a total of 6 weeks; UNTIL 11/22/23.   You will need weekly CMP, CBC, ESR, CRP and vanc level faxed to [phone]      SECONDARY DISCHARGE DIAGNOSES  Diagnosis: Anemia of chronic disease  Assessment and Plan of Treatment: Continue current management. Follow with your primary medical doctor.

## 2023-10-24 NOTE — PROGRESS NOTE ADULT - PROBLEM SELECTOR PLAN 2
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

## 2023-10-24 NOTE — PROVIDER CONTACT NOTE (CRITICAL VALUE NOTIFICATION) - ACTION/TREATMENT ORDERED:
ekg/ Dr. Hope will notify attending in the AM
Call placed to Dr. Wilkinson
provider notified & aware. Per provider, no new orders as patient is already on Vancomycin
NP Ladan Kent notified of above, No new orders at this time.
Tomy Sarmiento notified of above, No new orders at this time. Will continue to monitor.
no change

## 2023-10-24 NOTE — DISCHARGE NOTE PROVIDER - NSDCMRMEDTOKEN_GEN_ALL_CORE_FT
Aspir 81 oral delayed release tablet: 1 tab(s) orally once a day  bethanechol 25 mg oral tablet: 1 tab(s) orally 2 times a day  Coreg 3.125 mg oral tablet: 1 tab(s) orally 2 times a day  Flomax 0.4 mg oral capsule: 1 cap(s) orally once a day (at bedtime)  gabapentin 300 mg oral capsule: 1 cap(s) orally 2 times a day  Lantus 100 units/mL subcutaneous solution: 18 unit(s) subcutaneous once a day (at bedtime)  ramipril 2.5 mg oral capsule: 1 cap(s) orally once a day   acetaminophen 325 mg oral tablet: 2 tab(s) orally every 6 hours As needed Temp greater or equal to 38C (100.4F), Moderate Pain (4 - 6)  Aspir 81 oral delayed release tablet: 1 tab(s) orally once a day  atorvastatin 80 mg oral tablet: 1 tab(s) orally once a day (at bedtime)  bethanechol 25 mg oral tablet: 1 tab(s) orally 2 times a day  Coreg 3.125 mg oral tablet: 1 tab(s) orally 2 times a day  Flomax 0.4 mg oral capsule: 1 cap(s) orally once a day (at bedtime)  gabapentin 300 mg oral capsule: 1 cap(s) orally 2 times a day  lactobacillus acidophilus oral capsule: 1 cap(s) orally 2 times a day  Lantus 100 units/mL subcutaneous solution: 18 unit(s) subcutaneous once a day (at bedtime)  midodrine 10 mg oral tablet: 1 tab(s) orally 3 times a day  pantoprazole 40 mg oral delayed release tablet: 1 tab(s) orally once a day (before a meal)  ramipril 2.5 mg oral capsule: 1 cap(s) orally once a day  vancomycin 500 mg intravenous injection: 500 milligram(s) intravenous every 12 hours last day 11/22/23   acetaminophen 325 mg oral tablet: 2 tab(s) orally every 6 hours As needed Temp greater or equal to 38C (100.4F), Moderate Pain (4 - 6)  ascorbic acid 500 mg oral tablet: 1 tab(s) orally once a day  Aspir 81 oral delayed release tablet: 1 tab(s) orally once a day  atorvastatin 80 mg oral tablet: 1 tab(s) orally once a day (at bedtime)  bethanechol 25 mg oral tablet: 1 tab(s) orally 2 times a day  Coreg 3.125 mg oral tablet: 1 tab(s) orally 2 times a day  Flomax 0.4 mg oral capsule: 1 cap(s) orally once a day (at bedtime)  gabapentin 300 mg oral capsule: 1 cap(s) orally 2 times a day  lactobacillus acidophilus oral capsule: 1 cap(s) orally 2 times a day  Lantus 100 units/mL subcutaneous solution: 18 unit(s) subcutaneous once a day (at bedtime)  midodrine 10 mg oral tablet: 1 tab(s) orally 3 times a day  Multiple Vitamins with Minerals oral tablet: 1 tab(s) orally once a day  pantoprazole 40 mg oral delayed release tablet: 1 tab(s) orally once a day (before a meal)  ramipril 2.5 mg oral capsule: 1 cap(s) orally once a day  vancomycin 500 mg intravenous injection: 500 milligram(s) intravenous every 12 hours last day 11/22/23

## 2023-10-24 NOTE — PROGRESS NOTE ADULT - ASSESSMENT
78-year-old male presents to the hospital by ambulance from home.  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 Pt does not go to doctor on regular basis per son and does phone visits. Son reports years of struggling with foot infections with black areas of gangrene and their struggling to keep his feet but now feeling that keeping him alive is more important.  Culture - Blood (10.10.23 @ 18:25)    -  Methicillin resistant Staphylococcus aureus (MRSA): Detec  Repeat 10/12 BCx NGTD (48h)  Wcx with MRSA, placed on contact isolation   UCx + Pseudomonas, pansensitive including cefepime     RECOMMENDATIONS  1-MRSA bacteremia/Osteomyelitis   NM bone scan ordered and  Abnormal combined Indium-111 labeled leukocyte study and marrow scan. Increased uptake of both radiotracer is in the right heel and left midfoot, concerning for osteomyelitis.  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  Appreciate podiatry recs  C/w Vancomycin (10/11-) dosing per pharmacy protocol  Patient will ultimately need LT IV Abx x6 wks until 11/22/23   --PICC ordered, pending placement  --pt will need weekly CMP, CBC, ESR, CRP and vanc level faxed to [phone]    2-Pseudomonal UTI  S/p zosyn (10/11-10/12), changed to cefepime, completed on 10/18    Infectious Diseases will continue to follow. Please call with any questions.   Rosa Maria Wilkinson M.D.  [facility] [phone]  For after 5 P.M. and weekends, please call [phone]       78-year-old male presents to the hospital by ambulance from home.  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 Pt does not go to doctor on regular basis per son and does phone visits. Son reports years of struggling with foot infections with black areas of gangrene and their struggling to keep his feet but now feeling that keeping him alive is more important.  Culture - Blood (10.10.23 @ 18:25)    -  Methicillin resistant Staphylococcus aureus (MRSA): Detec  Repeat 10/12 BCx NGTD (48h)  Wcx with MRSA, placed on contact isolation   UCx + Pseudomonas, pansensitive including cefepime     RECOMMENDATIONS  1-MRSA bacteremia/Osteomyelitis   NM bone scan ordered and  Abnormal combined Indium-111 labeled leukocyte study and marrow scan. Increased uptake of both radiotracer is in the right heel and left midfoot, concerning for osteomyelitis.  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  Appreciate podiatry recs  C/w Vancomycin (10/11-) dosing per pharmacy protocol  Patient will ultimately need LT IV Abx x6 wks until 11/22/23   --PICC ordered, pending placement  --pt will need weekly CMP, CBC, ESR, CRP and vanc level faxed to [phone]    2-Pseudomonal UTI  S/p zosyn (10/11-10/12), changed to cefepime, completed on 10/18    Infectious Diseases will continue to follow. Please call with any questions.   Rosa Maria Wilkinson M.D.  [facility] [phone]  For after 5 P.M. and weekends, please call [phone]       78-year-old male presents to the hospital by ambulance from home.  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 Pt does not go to doctor on regular basis per son and does phone visits. Son reports years of struggling with foot infections with black areas of gangrene and their struggling to keep his feet but now feeling that keeping him alive is more important.  Culture - Blood (10.10.23 @ 18:25)    -  Methicillin resistant Staphylococcus aureus (MRSA): Detec  Repeat 10/12 BCx NGTD (48h)  Wcx with MRSA, placed on contact isolation   UCx + Pseudomonas, pansensitive including cefepime     RECOMMENDATIONS  1-MRSA bacteremia/Osteomyelitis   NM bone scan ordered and  Abnormal combined Indium-111 labeled leukocyte study and marrow scan. Increased uptake of both radiotracer is in the right heel and left midfoot, concerning for osteomyelitis.  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  Appreciate podiatry recs  C/w Vancomycin (10/11-) dosing per pharmacy protocol  Patient will ultimately need LT IV Abx x6 wks until 11/22/23   --PICC ordered, pending placement  --pt will need weekly CMP, CBC, ESR, CRP and vanc level faxed to [phone]    2-Pseudomonal UTI  S/p zosyn (10/11-10/12), changed to cefepime, completed on 10/18    Infectious Diseases will continue to follow. Please call with any questions.   Rosa Maria Wilkinson M.D.  [facility] [phone]  For after 5 P.M. and weekends, please call [phone]       78-year-old male presents to the hospital by ambulance from home.  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 Pt does not go to doctor on regular basis per son and does phone visits. Son reports years of struggling with foot infections with black areas of gangrene and their struggling to keep his feet but now feeling that keeping him alive is more important.  Culture - Blood (10.10.23 @ 18:25)    -  Methicillin resistant Staphylococcus aureus (MRSA): Detec  Repeat 10/12 BCx NGTD (48h)  Wcx with MRSA, placed on contact isolation   UCx + Pseudomonas, pansensitive including cefepime     RECOMMENDATIONS  1-MRSA bacteremia/Osteomyelitis   NM bone scan ordered and  Abnormal combined Indium-111 labeled leukocyte study and marrow scan. Increased uptake of both radiotracer is in the right heel and left midfoot, concerning for osteomyelitis.  Cardiology rec appreciated - sp GISSELL early to rule out IE or PPM involvement-Pacemaker wire is present in right atrium and right ventricle and intact and no vegetations on the pacemaker wire.  S/p Debridement of fascia 19-Oct-2023 12:51:29  Lauren Urbano, Open biopsy, bone, foot 19-Oct-2023 12:52:34  Lauren Urbano.  Appreciate podiatry recs  C/w Vancomycin (10/11-) dosing per pharmacy protocol  Patient will ultimately need LT IV Abx x6 wks until 11/22/23   --PICC ordered, pending placement  --pt will need weekly CMP, CBC, ESR, CRP and vanc level faxed to [phone]    2-Pseudomonal UTI  S/p zosyn (10/11-10/12), changed to cefepime, completed on 10/18    Infectious Diseases will continue to follow. Please call with any questions.   Rosa Maria Wilkinson M.D.  [facility] [phone]  For after 5 P.M. and weekends, please call [phone]       78-year-old male presents to the hospital by ambulance from home.  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 Pt does not go to doctor on regular basis per son and does phone visits. Son reports years of struggling with foot infections with black areas of gangrene and their struggling to keep his feet but now feeling that keeping him alive is more important.  Culture - Blood (10.10.23 @ 18:25)    -  Methicillin resistant Staphylococcus aureus (MRSA): Detec  Repeat 10/12 BCx NGTD (48h)  Wcx with MRSA, placed on contact isolation   UCx + Pseudomonas, pansensitive including cefepime     RECOMMENDATIONS  1-MRSA bacteremia/Osteomyelitis   NM bone scan ordered and  Abnormal combined Indium-111 labeled leukocyte study and marrow scan. Increased uptake of both radiotracer is in the right heel and left midfoot, concerning for osteomyelitis.  Cardiology rec appreciated - sp GISSELL early to rule out IE or PPM involvement-Pacemaker wire is present in right atrium and right ventricle and intact and no vegetations on the pacemaker wire.  S/p Debridement of fascia 19-Oct-2023 12:51:29  Lauren Urbano, Open biopsy, bone, foot 19-Oct-2023 12:52:34  Lauren Urbano.  Appreciate podiatry recs  C/w Vancomycin (10/11-) dosing per pharmacy protocol  Patient will ultimately need LT IV Abx x6 wks until 11/22/23   --PICC ordered, pending placement  --pt will need weekly CMP, CBC, ESR, CRP and vanc level faxed to [phone]    2-Pseudomonal UTI  S/p zosyn (10/11-10/12), changed to cefepime, completed on 10/18    Infectious Diseases will continue to follow. Please call with any questions.   Rosa Maria Wilkinson M.D.  [facility] [phone]  For after 5 P.M. and weekends, please call [phone]       78-year-old male presents to the hospital by ambulance from home.  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 Pt does not go to doctor on regular basis per son and does phone visits. Son reports years of struggling with foot infections with black areas of gangrene and their struggling to keep his feet but now feeling that keeping him alive is more important.  Culture - Blood (10.10.23 @ 18:25)    -  Methicillin resistant Staphylococcus aureus (MRSA): Detec  Repeat 10/12 BCx NGTD (48h)  Wcx with MRSA, placed on contact isolation   UCx + Pseudomonas, pansensitive including cefepime     RECOMMENDATIONS  1-MRSA bacteremia/Osteomyelitis   NM bone scan ordered and  Abnormal combined Indium-111 labeled leukocyte study and marrow scan. Increased uptake of both radiotracer is in the right heel and left midfoot, concerning for osteomyelitis.  Cardiology rec appreciated - sp GISSELL early to rule out IE or PPM involvement-Pacemaker wire is present in right atrium and right ventricle and intact and no vegetations on the pacemaker wire.  S/p Debridement of fascia 19-Oct-2023 12:51:29  Lauren Urbano, Open biopsy, bone, foot 19-Oct-2023 12:52:34  Lauren Urbano.  Appreciate podiatry recs  C/w Vancomycin (10/11-) dosing per pharmacy protocol  Patient will ultimately need LT IV Abx x6 wks until 11/22/23   --PICC ordered, pending placement  --pt will need weekly CMP, CBC, ESR, CRP and vanc level faxed to [phone]    2-Pseudomonal UTI  S/p zosyn (10/11-10/12), changed to cefepime, completed on 10/18    Infectious Diseases will continue to follow. Please call with any questions.   Rosa Maria Wilkinson M.D.  [facility] [phone]  For after 5 P.M. and weekends, please call [phone]

## 2023-10-25 ENCOUNTER — TRANSCRIPTION ENCOUNTER (OUTPATIENT)
Age: 78
End: 2023-10-25

## 2023-10-25 LAB
ANION GAP SERPL CALC-SCNC: 6 MMOL/L — SIGNIFICANT CHANGE UP (ref 5–17)
BUN SERPL-MCNC: 22 MG/DL — SIGNIFICANT CHANGE UP (ref 7–23)
CALCIUM SERPL-MCNC: 8 MG/DL — LOW (ref 8.5–10.1)
CHLORIDE SERPL-SCNC: 106 MMOL/L — SIGNIFICANT CHANGE UP (ref 96–108)
CO2 SERPL-SCNC: 29 MMOL/L — SIGNIFICANT CHANGE UP (ref 22–31)
CREAT SERPL-MCNC: 0.76 MG/DL — SIGNIFICANT CHANGE UP (ref 0.5–1.3)
EGFR: 92 ML/MIN/1.73M2 — SIGNIFICANT CHANGE UP
GLUCOSE BLDC GLUCOMTR-MCNC: 164 MG/DL — HIGH (ref 70–99)
GLUCOSE BLDC GLUCOMTR-MCNC: 171 MG/DL — HIGH (ref 70–99)
GLUCOSE BLDC GLUCOMTR-MCNC: 179 MG/DL — HIGH (ref 70–99)
GLUCOSE BLDC GLUCOMTR-MCNC: 194 MG/DL — HIGH (ref 70–99)
GLUCOSE BLDC GLUCOMTR-MCNC: 209 MG/DL — HIGH (ref 70–99)
GLUCOSE SERPL-MCNC: 199 MG/DL — HIGH (ref 70–99)
HCT VFR BLD CALC: 24.6 % — LOW (ref 39–50)
HCT VFR BLD CALC: 25.6 % — LOW (ref 39–50)
HGB BLD-MCNC: 7.6 G/DL — LOW (ref 13–17)
HGB BLD-MCNC: 7.9 G/DL — LOW (ref 13–17)
MCHC RBC-ENTMCNC: 25.6 PG — LOW (ref 27–34)
MCHC RBC-ENTMCNC: 30.9 GM/DL — LOW (ref 32–36)
MCV RBC AUTO: 82.8 FL — SIGNIFICANT CHANGE UP (ref 80–100)
NRBC # BLD: 0 /100 WBCS — SIGNIFICANT CHANGE UP (ref 0–0)
PLATELET # BLD AUTO: 272 K/UL — SIGNIFICANT CHANGE UP (ref 150–400)
POTASSIUM SERPL-MCNC: 4 MMOL/L — SIGNIFICANT CHANGE UP (ref 3.5–5.3)
POTASSIUM SERPL-SCNC: 4 MMOL/L — SIGNIFICANT CHANGE UP (ref 3.5–5.3)
RBC # BLD: 3.09 M/UL — LOW (ref 4.2–5.8)
RBC # FLD: 16.3 % — HIGH (ref 10.3–14.5)
SODIUM SERPL-SCNC: 141 MMOL/L — SIGNIFICANT CHANGE UP (ref 135–145)
WBC # BLD: 7.88 K/UL — SIGNIFICANT CHANGE UP (ref 3.8–10.5)
WBC # FLD AUTO: 7.88 K/UL — SIGNIFICANT CHANGE UP (ref 3.8–10.5)

## 2023-10-25 PROCEDURE — 76937 US GUIDE VASCULAR ACCESS: CPT | Mod: 26

## 2023-10-25 PROCEDURE — 88304 TISSUE EXAM BY PATHOLOGIST: CPT | Mod: 26

## 2023-10-25 PROCEDURE — 28122 PARTIAL REMOVAL OF FOOT BONE: CPT | Mod: LT

## 2023-10-25 PROCEDURE — 88311 DECALCIFY TISSUE: CPT | Mod: 26

## 2023-10-25 PROCEDURE — 73630 X-RAY EXAM OF FOOT: CPT | Mod: 26,LT

## 2023-10-25 PROCEDURE — 36573 INSJ PICC RS&I 5 YR+: CPT

## 2023-10-25 DEVICE — BONE FILLER BIOCOMPOSITE STIMULAN RAPID CURE 10CC: Type: IMPLANTABLE DEVICE | Site: LEFT | Status: FUNCTIONAL

## 2023-10-25 DEVICE — SURGICEL 2 X 14": Type: IMPLANTABLE DEVICE | Site: LEFT | Status: FUNCTIONAL

## 2023-10-25 RX ORDER — OXYCODONE HYDROCHLORIDE 5 MG/1
5 TABLET ORAL ONCE
Refills: 0 | Status: DISCONTINUED | OUTPATIENT
Start: 2023-10-25 | End: 2023-10-25

## 2023-10-25 RX ORDER — DEXTROSE 50 % IN WATER 50 %
15 SYRINGE (ML) INTRAVENOUS ONCE
Refills: 0 | Status: DISCONTINUED | OUTPATIENT
Start: 2023-10-25 | End: 2023-10-28

## 2023-10-25 RX ORDER — HYDROMORPHONE HYDROCHLORIDE 2 MG/ML
0.5 INJECTION INTRAMUSCULAR; INTRAVENOUS; SUBCUTANEOUS
Refills: 0 | Status: DISCONTINUED | OUTPATIENT
Start: 2023-10-25 | End: 2023-10-25

## 2023-10-25 RX ORDER — DEXTROSE 50 % IN WATER 50 %
12.5 SYRINGE (ML) INTRAVENOUS ONCE
Refills: 0 | Status: DISCONTINUED | OUTPATIENT
Start: 2023-10-25 | End: 2023-10-28

## 2023-10-25 RX ORDER — LOPERAMIDE HCL 2 MG
2 TABLET ORAL THREE TIMES A DAY
Refills: 0 | Status: DISCONTINUED | OUTPATIENT
Start: 2023-10-25 | End: 2023-10-28

## 2023-10-25 RX ORDER — ACETAMINOPHEN 500 MG
650 TABLET ORAL EVERY 6 HOURS
Refills: 0 | Status: DISCONTINUED | OUTPATIENT
Start: 2023-10-25 | End: 2023-10-28

## 2023-10-25 RX ORDER — BETHANECHOL CHLORIDE 25 MG
25 TABLET ORAL
Refills: 0 | Status: DISCONTINUED | OUTPATIENT
Start: 2023-10-25 | End: 2023-10-28

## 2023-10-25 RX ORDER — CHLORHEXIDINE GLUCONATE 213 G/1000ML
1 SOLUTION TOPICAL
Refills: 0 | Status: DISCONTINUED | OUTPATIENT
Start: 2023-10-25 | End: 2023-10-25

## 2023-10-25 RX ORDER — MIDODRINE HYDROCHLORIDE 2.5 MG/1
10 TABLET ORAL THREE TIMES A DAY
Refills: 0 | Status: DISCONTINUED | OUTPATIENT
Start: 2023-10-25 | End: 2023-10-28

## 2023-10-25 RX ORDER — INSULIN LISPRO 100/ML
VIAL (ML) SUBCUTANEOUS
Refills: 0 | Status: DISCONTINUED | OUTPATIENT
Start: 2023-10-25 | End: 2023-10-28

## 2023-10-25 RX ORDER — ONDANSETRON 8 MG/1
4 TABLET, FILM COATED ORAL ONCE
Refills: 0 | Status: DISCONTINUED | OUTPATIENT
Start: 2023-10-25 | End: 2023-10-25

## 2023-10-25 RX ORDER — ATORVASTATIN CALCIUM 80 MG/1
80 TABLET, FILM COATED ORAL AT BEDTIME
Refills: 0 | Status: DISCONTINUED | OUTPATIENT
Start: 2023-10-25 | End: 2023-10-28

## 2023-10-25 RX ORDER — SODIUM CHLORIDE 9 MG/ML
10 INJECTION INTRAMUSCULAR; INTRAVENOUS; SUBCUTANEOUS
Refills: 0 | Status: DISCONTINUED | OUTPATIENT
Start: 2023-10-25 | End: 2023-10-25

## 2023-10-25 RX ORDER — TAMSULOSIN HYDROCHLORIDE 0.4 MG/1
0.4 CAPSULE ORAL AT BEDTIME
Refills: 0 | Status: DISCONTINUED | OUTPATIENT
Start: 2023-10-25 | End: 2023-10-28

## 2023-10-25 RX ORDER — VANCOMYCIN HCL 1 G
500 VIAL (EA) INTRAVENOUS EVERY 12 HOURS
Refills: 0 | Status: DISCONTINUED | OUTPATIENT
Start: 2023-10-26 | End: 2023-10-28

## 2023-10-25 RX ORDER — LACTOBACILLUS ACIDOPHILUS 100MM CELL
1 CAPSULE ORAL
Refills: 0 | Status: DISCONTINUED | OUTPATIENT
Start: 2023-10-25 | End: 2023-10-28

## 2023-10-25 RX ORDER — DEXTROSE 50 % IN WATER 50 %
25 SYRINGE (ML) INTRAVENOUS ONCE
Refills: 0 | Status: DISCONTINUED | OUTPATIENT
Start: 2023-10-25 | End: 2023-10-28

## 2023-10-25 RX ORDER — SODIUM CHLORIDE 9 MG/ML
1000 INJECTION, SOLUTION INTRAVENOUS
Refills: 0 | Status: DISCONTINUED | OUTPATIENT
Start: 2023-10-25 | End: 2023-10-25

## 2023-10-25 RX ORDER — INSULIN LISPRO 100/ML
VIAL (ML) SUBCUTANEOUS AT BEDTIME
Refills: 0 | Status: DISCONTINUED | OUTPATIENT
Start: 2023-10-25 | End: 2023-10-28

## 2023-10-25 RX ORDER — SODIUM CHLORIDE 9 MG/ML
1000 INJECTION INTRAMUSCULAR; INTRAVENOUS; SUBCUTANEOUS
Refills: 0 | Status: DISCONTINUED | OUTPATIENT
Start: 2023-10-25 | End: 2023-10-26

## 2023-10-25 RX ORDER — CHLORHEXIDINE GLUCONATE 213 G/1000ML
1 SOLUTION TOPICAL
Refills: 0 | Status: DISCONTINUED | OUTPATIENT
Start: 2023-10-25 | End: 2023-10-28

## 2023-10-25 RX ORDER — PANTOPRAZOLE SODIUM 20 MG/1
40 TABLET, DELAYED RELEASE ORAL
Refills: 0 | Status: DISCONTINUED | OUTPATIENT
Start: 2023-10-26 | End: 2023-10-28

## 2023-10-25 RX ORDER — SODIUM CHLORIDE 9 MG/ML
10 INJECTION INTRAMUSCULAR; INTRAVENOUS; SUBCUTANEOUS
Refills: 0 | Status: DISCONTINUED | OUTPATIENT
Start: 2023-10-25 | End: 2023-10-28

## 2023-10-25 RX ORDER — GLUCAGON INJECTION, SOLUTION 0.5 MG/.1ML
1 INJECTION, SOLUTION SUBCUTANEOUS ONCE
Refills: 0 | Status: DISCONTINUED | OUTPATIENT
Start: 2023-10-25 | End: 2023-10-28

## 2023-10-25 RX ORDER — GABAPENTIN 400 MG/1
300 CAPSULE ORAL
Refills: 0 | Status: DISCONTINUED | OUTPATIENT
Start: 2023-10-25 | End: 2023-10-28

## 2023-10-25 RX ADMIN — Medication 100 MILLIGRAM(S): at 02:23

## 2023-10-25 RX ADMIN — PANTOPRAZOLE SODIUM 40 MILLIGRAM(S): 20 TABLET, DELAYED RELEASE ORAL at 06:46

## 2023-10-25 RX ADMIN — Medication 25 MILLIGRAM(S): at 05:34

## 2023-10-25 RX ADMIN — SODIUM CHLORIDE 50 MILLILITER(S): 9 INJECTION INTRAMUSCULAR; INTRAVENOUS; SUBCUTANEOUS at 17:43

## 2023-10-25 RX ADMIN — MIDODRINE HYDROCHLORIDE 10 MILLIGRAM(S): 2.5 TABLET ORAL at 05:34

## 2023-10-25 RX ADMIN — GABAPENTIN 300 MILLIGRAM(S): 400 CAPSULE ORAL at 05:34

## 2023-10-25 RX ADMIN — Medication 2: at 09:13

## 2023-10-25 RX ADMIN — TAMSULOSIN HYDROCHLORIDE 0.4 MILLIGRAM(S): 0.4 CAPSULE ORAL at 21:41

## 2023-10-25 RX ADMIN — Medication 100 MILLIGRAM(S): at 14:43

## 2023-10-25 RX ADMIN — Medication 1: at 12:42

## 2023-10-25 RX ADMIN — ATORVASTATIN CALCIUM 80 MILLIGRAM(S): 80 TABLET, FILM COATED ORAL at 21:40

## 2023-10-25 NOTE — PROGRESS NOTE ADULT - ASSESSMENT
78-year-old male presents to the hospital by ambulance from home.  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 Pt does not go to doctor on regular basis per son and does phone visits. Son reports years of struggling with foot infections with black areas of gangrene and their struggling to keep his feet but now feeling that keeping him alive is more important.  Culture - Blood (10.10.23 @ 18:25)    -  Methicillin resistant Staphylococcus aureus (MRSA): Detec  Repeat 10/12 BCx NGTD (48h)  Wcx with MRSA, placed on contact isolation   UCx + Pseudomonas, pansensitive including cefepime     RECOMMENDATIONS  1-MRSA bacteremia/Osteomyelitis   NM bone scan ordered and  Abnormal combined Indium-111 labeled leukocyte study and marrow scan. Increased uptake of both radiotracer is in the right heel and left midfoot, concerning for osteomyelitis.  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  Appreciate podiatry recs--in OR today  for left foot resection of 5th metatarsal base and partial cuboid    C/w Vancomycin (10/11-) dosing per pharmacy protocol  Patient will ultimately need LT IV Abx x6 wks until 11/22/23, getting PICC today as well  --pt will need weekly CMP, CBC, ESR, CRP and vanc level faxed to [phone]    2-Pseudomonal UTI  S/p zosyn (10/11-10/12), changed to cefepime, completed on 10/18    Infectious Diseases will continue to follow. Please call with any questions.   Rosa Maria Wilkinson M.D.  [facility] [phone]  For after 5 P.M. and weekends, please call [phone]       78-year-old male presents to the hospital by ambulance from home.  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 Pt does not go to doctor on regular basis per son and does phone visits. Son reports years of struggling with foot infections with black areas of gangrene and their struggling to keep his feet but now feeling that keeping him alive is more important.  Culture - Blood (10.10.23 @ 18:25)    -  Methicillin resistant Staphylococcus aureus (MRSA): Detec  Repeat 10/12 BCx NGTD (48h)  Wcx with MRSA, placed on contact isolation   UCx + Pseudomonas, pansensitive including cefepime     RECOMMENDATIONS  1-MRSA bacteremia/Osteomyelitis   NM bone scan ordered and  Abnormal combined Indium-111 labeled leukocyte study and marrow scan. Increased uptake of both radiotracer is in the right heel and left midfoot, concerning for osteomyelitis.  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  Appreciate podiatry recs--in OR today  for left foot resection of 5th metatarsal base and partial cuboid    C/w Vancomycin (10/11-) dosing per pharmacy protocol  Patient will ultimately need LT IV Abx x6 wks until 11/22/23, getting PICC today as well  --pt will need weekly CMP, CBC, ESR, CRP and vanc level faxed to [phone]    2-Pseudomonal UTI  S/p zosyn (10/11-10/12), changed to cefepime, completed on 10/18    Infectious Diseases will continue to follow. Please call with any questions.   Rosa Maria Wilkinson M.D.  [facility] [phone]  For after 5 P.M. and weekends, please call [phone]       78-year-old male presents to the hospital by ambulance from home.  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 Pt does not go to doctor on regular basis per son and does phone visits. Son reports years of struggling with foot infections with black areas of gangrene and their struggling to keep his feet but now feeling that keeping him alive is more important.  Culture - Blood (10.10.23 @ 18:25)    -  Methicillin resistant Staphylococcus aureus (MRSA): Detec  Repeat 10/12 BCx NGTD (48h)  Wcx with MRSA, placed on contact isolation   UCx + Pseudomonas, pansensitive including cefepime     RECOMMENDATIONS  1-MRSA bacteremia/Osteomyelitis   NM bone scan ordered and  Abnormal combined Indium-111 labeled leukocyte study and marrow scan. Increased uptake of both radiotracer is in the right heel and left midfoot, concerning for osteomyelitis.  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  Appreciate podiatry recs--in OR today  for left foot resection of 5th metatarsal base and partial cuboid    C/w Vancomycin (10/11-) dosing per pharmacy protocol  Patient will ultimately need LT IV Abx x6 wks until 11/22/23, getting PICC today as well  --pt will need weekly CMP, CBC, ESR, CRP and vanc level faxed to [phone]    2-Pseudomonal UTI  S/p zosyn (10/11-10/12), changed to cefepime, completed on 10/18    Infectious Diseases will continue to follow. Please call with any questions.   oRsa Maria Wilkinson M.D.  [facility] [phone]  For after 5 P.M. and weekends, please call [phone]       78-year-old male presents to the hospital by ambulance from home.  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 Pt does not go to doctor on regular basis per son and does phone visits. Son reports years of struggling with foot infections with black areas of gangrene and their struggling to keep his feet but now feeling that keeping him alive is more important.  Culture - Blood (10.10.23 @ 18:25)    -  Methicillin resistant Staphylococcus aureus (MRSA): Detec  Repeat 10/12 BCx NGTD (48h)  Wcx with MRSA, placed on contact isolation   UCx + Pseudomonas, pansensitive including cefepime     RECOMMENDATIONS  1-MRSA bacteremia/Osteomyelitis   NM bone scan ordered and  Abnormal combined Indium-111 labeled leukocyte study and marrow scan. Increased uptake of both radiotracer is in the right heel and left midfoot, concerning for osteomyelitis.  Cardiology rec appreciated - sp GISSELL early to rule out IE or PPM involvement-Pacemaker wire is present in right atrium and right ventricle and intact and no vegetations on the pacemaker wire.  S/p Debridement of fascia 19-Oct-2023 12:51:29  Lauren Urbano, Open biopsy, bone, foot 19-Oct-2023 12:52:34  Lauren Urbano.  Appreciate podiatry recs--in OR today  for left foot resection of 5th metatarsal base and partial cuboid    C/w Vancomycin (10/11-) dosing per pharmacy protocol  Patient will ultimately need LT IV Abx x6 wks until 11/22/23, getting PICC today as well  --pt will need weekly CMP, CBC, ESR, CRP and vanc level faxed to [phone]    2-Pseudomonal UTI  S/p zosyn (10/11-10/12), changed to cefepime, completed on 10/18    Infectious Diseases will continue to follow. Please call with any questions.   Rosa Maria Wilkinson M.D.  [facility] [phone]  For after 5 P.M. and weekends, please call [phone]       78-year-old male presents to the hospital by ambulance from home.  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 Pt does not go to doctor on regular basis per son and does phone visits. Son reports years of struggling with foot infections with black areas of gangrene and their struggling to keep his feet but now feeling that keeping him alive is more important.  Culture - Blood (10.10.23 @ 18:25)    -  Methicillin resistant Staphylococcus aureus (MRSA): Detec  Repeat 10/12 BCx NGTD (48h)  Wcx with MRSA, placed on contact isolation   UCx + Pseudomonas, pansensitive including cefepime     RECOMMENDATIONS  1-MRSA bacteremia/Osteomyelitis   NM bone scan ordered and  Abnormal combined Indium-111 labeled leukocyte study and marrow scan. Increased uptake of both radiotracer is in the right heel and left midfoot, concerning for osteomyelitis.  Cardiology rec appreciated - sp GISSELL early to rule out IE or PPM involvement-Pacemaker wire is present in right atrium and right ventricle and intact and no vegetations on the pacemaker wire.  S/p Debridement of fascia 19-Oct-2023 12:51:29  Lauren Urbano, Open biopsy, bone, foot 19-Oct-2023 12:52:34  Lauren Urbano.  Appreciate podiatry recs--in OR today  for left foot resection of 5th metatarsal base and partial cuboid    C/w Vancomycin (10/11-) dosing per pharmacy protocol  Patient will ultimately need LT IV Abx x6 wks until 11/22/23, getting PICC today as well  --pt will need weekly CMP, CBC, ESR, CRP and vanc level faxed to [phone]    2-Pseudomonal UTI  S/p zosyn (10/11-10/12), changed to cefepime, completed on 10/18    Infectious Diseases will continue to follow. Please call with any questions.   Rosa Maria Wilkinson M.D.  [facility] [phone]  For after 5 P.M. and weekends, please call [phone]       78-year-old male presents to the hospital by ambulance from home.  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 Pt does not go to doctor on regular basis per son and does phone visits. Son reports years of struggling with foot infections with black areas of gangrene and their struggling to keep his feet but now feeling that keeping him alive is more important.  Culture - Blood (10.10.23 @ 18:25)    -  Methicillin resistant Staphylococcus aureus (MRSA): Detec  Repeat 10/12 BCx NGTD (48h)  Wcx with MRSA, placed on contact isolation   UCx + Pseudomonas, pansensitive including cefepime     RECOMMENDATIONS  1-MRSA bacteremia/Osteomyelitis   NM bone scan ordered and  Abnormal combined Indium-111 labeled leukocyte study and marrow scan. Increased uptake of both radiotracer is in the right heel and left midfoot, concerning for osteomyelitis.  Cardiology rec appreciated - sp GISSELL early to rule out IE or PPM involvement-Pacemaker wire is present in right atrium and right ventricle and intact and no vegetations on the pacemaker wire.  S/p Debridement of fascia 19-Oct-2023 12:51:29  Lauren Urbano, Open biopsy, bone, foot 19-Oct-2023 12:52:34  Lauren Urbano.  Appreciate podiatry recs--in OR today  for left foot resection of 5th metatarsal base and partial cuboid    C/w Vancomycin (10/11-) dosing per pharmacy protocol  Patient will ultimately need LT IV Abx x6 wks until 11/22/23, getting PICC today as well  --pt will need weekly CMP, CBC, ESR, CRP and vanc level faxed to [phone]    2-Pseudomonal UTI  S/p zosyn (10/11-10/12), changed to cefepime, completed on 10/18    Infectious Diseases will continue to follow. Please call with any questions.   Rosa Maria Wilkinson M.D.  [facility] [phone]  For after 5 P.M. and weekends, please call [phone]

## 2023-10-25 NOTE — PROGRESS NOTE ADULT - ASSESSMENT
78M with PMH HTN, PPM, DM, foot ulcers , presents to the hospital by ambulance from home.  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 Pt does not go to doctor on regular basis per son and does phone visits.  +Bacteremia. Called by Cardio NP for consultation and to arrange GISSELL to R/O Endocarditis or PPM related infection and was neg.     POD 3 s/p podiatry/surgical debridement  rec ASA, Statin  Pt has seen Dave Panchal [phone] and Curtis ferris [phone]    will follow prn       s/p GISSELL: No vegetations  TTE: CONCLUSIONS:    1. Technically difficult image quality.   2. There is abnormal septal activation, likely from a paced rhythm. There is likely superimposed anterior and septal hypokinesis. Limited views and limited endocardial definition make further interpretation limited.   3. The right ventricle is not well visualized. Normal right ventricular cavity size and reduced systolic function.   4. The left atrium is mildly dilated in size.   5. Right atrium was not well visualized.   6. Mild calcification of the aortic valve leaflets.   7. Tricuspid valve was not well visualized.   8. Aortic valve was not well visualized. 78M with PMH HTN, PPM, DM, foot ulcers , presents to the hospital by ambulance from home.  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 Pt does not go to doctor on regular basis per son and does phone visits.  +Bacteremia. Called by Cardio NP for consultation and to arrange GISSELL to R/O Endocarditis or PPM related infection and was neg.     POD 3 s/p podiatry/surgical debridement  rec ASA, Statin  Pt has seen Dave Panchal [phone] and Curtis ferris [phone]    will follow prn       s/p GISSELL: No vegetations  TTE: CONCLUSIONS:    1. Technically difficult image quality.   2. There is abnormal septal activation, likely from a paced rhythm. There is likely superimposed anterior and septal hypokinesis. Limited views and limited endocardial definition make further interpretation limited.   3. The right ventricle is not well visualized. Normal right ventricular cavity size and reduced systolic function.   4. The left atrium is mildly dilated in size.   5. Right atrium was not well visualized.   6. Mild calcification of the aortic valve leaflets.   7. Tricuspid valve was not well visualized.   8. Aortic valve was not well visualized. 78M with PMH HTN, PPM, DM, foot ulcers , presents to the hospital by ambulance from home.  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 Pt does not go to doctor on regular basis per son and does phone visits.  +Bacteremia. Called by Cardio NP for consultation and to arrange GISSELL to R/O Endocarditis or PPM related infection and was neg.     POD 3 s/p podiatry/surgical debridement  rec ASA, Statin  Pt has seen Dave Panchal [phone] and Curtis ferris [phone]    will follow prn       s/p GISSELL: No vegetations  TTE: CONCLUSIONS:    1. Technically difficult image quality.   2. There is abnormal septal activation, likely from a paced rhythm. There is likely superimposed anterior and septal hypokinesis. Limited views and limited endocardial definition make further interpretation limited.   3. The right ventricle is not well visualized. Normal right ventricular cavity size and reduced systolic function.   4. The left atrium is mildly dilated in size.   5. Right atrium was not well visualized.   6. Mild calcification of the aortic valve leaflets.   7. Tricuspid valve was not well visualized.   8. Aortic valve was not well visualized. 78M with PMH HTN, PPM, DM, foot ulcers , presents to the hospital by ambulance from home.  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 Pt does not go to doctor on regular basis per son and does phone visits.  +Bacteremia.      1. Multi nonhealing wound  +NM suspicious for OM.  For 5th metatarsal amputation today.   S/p recent debridement with no complication.   Optimized from cardiac standpoint.     2. HLD continue Statin      Pt has seen Dave Panchal [phone] and Curtis ferris [phone]  will follow prn      S/p GISSELL: No vegetations  TTE: CONCLUSIONS:    1. Technically difficult image quality.   2. There is abnormal septal activation, likely from a paced rhythm. There is likely superimposed anterior and septal hypokinesis. Limited views and limited endocardial definition make further interpretation limited.   3. The right ventricle is not well visualized. Normal right ventricular cavity size and reduced systolic function.   4. The left atrium is mildly dilated in size.   5. Right atrium was not well visualized.   6. Mild calcification of the aortic valve leaflets.   7. Tricuspid valve was not well visualized.   8. Aortic valve was not well visualized.   78M with PMH HTN, PPM, DM, foot ulcers , presents to the hospital by ambulance from home.  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 Pt does not go to doctor on regular basis per son and does phone visits.  +Bacteremia.      1. Multi nonhealing wound  +NM suspicious for OM.  For 5th metatarsal amputation today.   S/p recent debridement with no complication.   Optimized from cardiac standpoint.     2. HLD continue Statin      Pt has seen Dave Panchal [phone] and Curtis ferris [phone]  will follow prn      S/p GISSELL: No vegetations  TTE: CONCLUSIONS:    1. Technically difficult image quality.   2. There is abnormal septal activation, likely from a paced rhythm. There is likely superimposed anterior and septal hypokinesis. Limited views and limited endocardial definition make further interpretation limited.   3. The right ventricle is not well visualized. Normal right ventricular cavity size and reduced systolic function.   4. The left atrium is mildly dilated in size.   5. Right atrium was not well visualized.   6. Mild calcification of the aortic valve leaflets.   7. Tricuspid valve was not well visualized.   8. Aortic valve was not well visualized.   78M with PMH HTN, PPM, DM, foot ulcers , presents to the hospital by ambulance from home.  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 Pt does not go to doctor on regular basis per son and does phone visits.  +Bacteremia.      1. Multi nonhealing wound  +NM suspicious for OM.  For 5th metatarsal amputation today.   S/p recent debridement with no complication.   Optimized from cardiac standpoint.     2. HLD continue Statin      Pt has seen Dave Panchal [phone] and Curtis ferris [phone]  will follow prn      S/p GISSELL: No vegetations  TTE: CONCLUSIONS:    1. Technically difficult image quality.   2. There is abnormal septal activation, likely from a paced rhythm. There is likely superimposed anterior and septal hypokinesis. Limited views and limited endocardial definition make further interpretation limited.   3. The right ventricle is not well visualized. Normal right ventricular cavity size and reduced systolic function.   4. The left atrium is mildly dilated in size.   5. Right atrium was not well visualized.   6. Mild calcification of the aortic valve leaflets.   7. Tricuspid valve was not well visualized.   8. Aortic valve was not well visualized.

## 2023-10-25 NOTE — PROGRESS NOTE ADULT - PROBLEM SELECTOR PLAN 2
multiple chronic old heel ulcers likely from hx of dm  podiatry eval noted- s/p  debridement- amputation of 5th metatarsal today, pt medically opitimized for procedure  Cardio clearnace called and noted  continue local care  PICC line placed this am  cbc noted - dilutional as pt is on ivf for npo state  Long term abx at [facility]

## 2023-10-25 NOTE — PROGRESS NOTE ADULT - SUBJECTIVE AND OBJECTIVE BOX
[facility]  Les Mccray PA-C  [address] [place] [address]  [phone]      INTERVAL HPI/OVERNIGHT EVENTS:  Pt s/e  Tolerating diet    MEDICATIONS  (STANDING):  atorvastatin 80 milliGRAM(s) Oral at bedtime  bethanechol 25 milliGRAM(s) Oral two times a day  dextrose 50% Injectable 12.5 Gram(s) IV Push once  dextrose 50% Injectable 25 Gram(s) IV Push once  dextrose 50% Injectable 25 Gram(s) IV Push once  gabapentin 300 milliGRAM(s) Oral two times a day  glucagon  Injectable 1 milliGRAM(s) IntraMuscular once  insulin lispro (ADMELOG) corrective regimen sliding scale   SubCutaneous three times a day before meals  insulin lispro (ADMELOG) corrective regimen sliding scale   SubCutaneous at bedtime  lactated ringers. 1000 milliLiter(s) (100 mL/Hr) IV Continuous <Continuous>  lactobacillus acidophilus 1 Tablet(s) Oral two times a day with meals  midodrine. 10 milliGRAM(s) Oral three times a day  pantoprazole    Tablet 40 milliGRAM(s) Oral before breakfast  sodium chloride 0.9%. 1000 milliLiter(s) (50 mL/Hr) IV Continuous <Continuous>  tamsulosin 0.4 milliGRAM(s) Oral at bedtime  vancomycin  IVPB 500 milliGRAM(s) IV Intermittent every 12 hours    MEDICATIONS  (PRN):  acetaminophen     Tablet .. 650 milliGRAM(s) Oral every 6 hours PRN Temp greater or equal to 38C (100.4F), Moderate Pain (4 - 6)  dextrose Oral Gel 15 Gram(s) Oral once PRN Blood Glucose LESS THAN 70 milliGRAM(s)/deciliter  loperamide 2 milliGRAM(s) Oral three times a day PRN Diarrhea      Allergies  No Known Allergies      PHYSICAL EXAM:   Vital Signs:  Vital Signs Last 24 Hrs  T(C): 37 (25 Oct 2023 05:24), Max: 37.2 (24 Oct 2023 20:43)  T(F): 98.6 (25 Oct 2023 05:24), Max: 99 (24 Oct 2023 20:43)  HR: 60 (25 Oct 2023 05:24) (60 - 65)  BP: 128/54 (25 Oct 2023 05:24) (123/63 - 131/65)  BP(mean): --  RR: 18 (25 Oct 2023 05:24) (17 - 18)  SpO2: 93% (25 Oct 2023 05:24) (93% - 99%)    Parameters below as of 25 Oct 2023 05:24  Patient On (Oxygen Delivery Method): room air    GENERAL:  Appears stated age  HEENT:  NC/AT  CHEST:  Full & symmetric excursion  HEART:  Regular rhythm  ABDOMEN:  Soft, non-tender, non-distended  EXTEREMITIES:  no cyanosis  SKIN:  No rash  NEURO:  Alert      LABS:                        7.9    7.88  )-----------( 272      ( 25 Oct 2023 06:51 )             25.6     10-25    141  |  106  |  22  ----------------------------<  199<H>  4.0   |  29  |  0.76    Ca    8.0<L>      25 Oct 2023 06:51        Urinalysis Basic - ( 25 Oct 2023 06:51 )    Color: x / Appearance: x / SG: x / pH: x  Gluc: 199 mg/dL / Ketone: x  / Bili: x / Urobili: x   Blood: x / Protein: x / Nitrite: x   Leuk Esterase: x / RBC: x / WBC x   Sq Epi: x / Non Sq Epi: x / Bacteria: x   [facility]  Les Mccray PA-C  [address] [place] [address]  [phone]      INTERVAL HPI/OVERNIGHT EVENTS:  Pt s/e  Tolerating diet    MEDICATIONS  (STANDING):  atorvastatin 80 milliGRAM(s) Oral at bedtime  bethanechol 25 milliGRAM(s) Oral two times a day  dextrose 50% Injectable 12.5 Gram(s) IV Push once  dextrose 50% Injectable 25 Gram(s) IV Push once  dextrose 50% Injectable 25 Gram(s) IV Push once  gabapentin 300 milliGRAM(s) Oral two times a day  glucagon  Injectable 1 milliGRAM(s) IntraMuscular once  insulin lispro (ADMELOG) corrective regimen sliding scale   SubCutaneous three times a day before meals  insulin lispro (ADMELOG) corrective regimen sliding scale   SubCutaneous at bedtime  lactated ringers. 1000 milliLiter(s) (100 mL/Hr) IV Continuous <Continuous>  lactobacillus acidophilus 1 Tablet(s) Oral two times a day with meals  midodrine. 10 milliGRAM(s) Oral three times a day  pantoprazole    Tablet 40 milliGRAM(s) Oral before breakfast  sodium chloride 0.9%. 1000 milliLiter(s) (50 mL/Hr) IV Continuous <Continuous>  tamsulosin 0.4 milliGRAM(s) Oral at bedtime  vancomycin  IVPB 500 milliGRAM(s) IV Intermittent every 12 hours    MEDICATIONS  (PRN):  acetaminophen     Tablet .. 650 milliGRAM(s) Oral every 6 hours PRN Temp greater or equal to 38C (100.4F), Moderate Pain (4 - 6)  dextrose Oral Gel 15 Gram(s) Oral once PRN Blood Glucose LESS THAN 70 milliGRAM(s)/deciliter  loperamide 2 milliGRAM(s) Oral three times a day PRN Diarrhea      Allergies  No Known Allergies      PHYSICAL EXAM:   Vital Signs:  Vital Signs Last 24 Hrs  T(C): 37 (25 Oct 2023 05:24), Max: 37.2 (24 Oct 2023 20:43)  T(F): 98.6 (25 Oct 2023 05:24), Max: 99 (24 Oct 2023 20:43)  HR: 60 (25 Oct 2023 05:24) (60 - 65)  BP: 128/54 (25 Oct 2023 05:24) (123/63 - 131/65)  BP(mean): --  RR: 18 (25 Oct 2023 05:24) (17 - 18)  SpO2: 93% (25 Oct 2023 05:24) (93% - 99%)    Parameters below as of 25 Oct 2023 05:24  Patient On (Oxygen Delivery Method): room air    GENERAL:  Appears stated age  HEENT:  NC/AT  CHEST:  Full & symmetric excursion  HEART:  Regular rhythm  ABDOMEN:  Soft, non-tender, non-distended  EXTEREMITIES:  no cyanosis  SKIN:  No rash  NEURO:  Alert      LABS:                        7.9    7.88  )-----------( 272      ( 25 Oct 2023 06:51 )             25.6     10-25    141  |  106  |  22  ----------------------------<  199<H>  4.0   |  29  |  0.76    Ca    8.0<L>      25 Oct 2023 06:51        Urinalysis Basic - ( 25 Oct 2023 06:51 )    Color: x / Appearance: x / SG: x / pH: x  Gluc: 199 mg/dL / Ketone: x  / Bili: x / Urobili: x   Blood: x / Protein: x / Nitrite: x   Leuk Esterase: x / RBC: x / WBC x   Sq Epi: x / Non Sq Epi: x / Bacteria: x   [facility]  Les Mccray PA-C  [address] [place] [address]  [phone]      INTERVAL HPI/OVERNIGHT EVENTS:  Pt s/e  Tolerating diet    MEDICATIONS  (STANDING):  atorvastatin 80 milliGRAM(s) Oral at bedtime  bethanechol 25 milliGRAM(s) Oral two times a day  dextrose 50% Injectable 12.5 Gram(s) IV Push once  dextrose 50% Injectable 25 Gram(s) IV Push once  dextrose 50% Injectable 25 Gram(s) IV Push once  gabapentin 300 milliGRAM(s) Oral two times a day  glucagon  Injectable 1 milliGRAM(s) IntraMuscular once  insulin lispro (ADMELOG) corrective regimen sliding scale   SubCutaneous three times a day before meals  insulin lispro (ADMELOG) corrective regimen sliding scale   SubCutaneous at bedtime  lactated ringers. 1000 milliLiter(s) (100 mL/Hr) IV Continuous <Continuous>  lactobacillus acidophilus 1 Tablet(s) Oral two times a day with meals  midodrine. 10 milliGRAM(s) Oral three times a day  pantoprazole    Tablet 40 milliGRAM(s) Oral before breakfast  sodium chloride 0.9%. 1000 milliLiter(s) (50 mL/Hr) IV Continuous <Continuous>  tamsulosin 0.4 milliGRAM(s) Oral at bedtime  vancomycin  IVPB 500 milliGRAM(s) IV Intermittent every 12 hours    MEDICATIONS  (PRN):  acetaminophen     Tablet .. 650 milliGRAM(s) Oral every 6 hours PRN Temp greater or equal to 38C (100.4F), Moderate Pain (4 - 6)  dextrose Oral Gel 15 Gram(s) Oral once PRN Blood Glucose LESS THAN 70 milliGRAM(s)/deciliter  loperamide 2 milliGRAM(s) Oral three times a day PRN Diarrhea      Allergies  No Known Allergies      PHYSICAL EXAM:   Vital Signs:  Vital Signs Last 24 Hrs  T(C): 37 (25 Oct 2023 05:24), Max: 37.2 (24 Oct 2023 20:43)  T(F): 98.6 (25 Oct 2023 05:24), Max: 99 (24 Oct 2023 20:43)  HR: 60 (25 Oct 2023 05:24) (60 - 65)  BP: 128/54 (25 Oct 2023 05:24) (123/63 - 131/65)  BP(mean): --  RR: 18 (25 Oct 2023 05:24) (17 - 18)  SpO2: 93% (25 Oct 2023 05:24) (93% - 99%)    Parameters below as of 25 Oct 2023 05:24  Patient On (Oxygen Delivery Method): room air    GENERAL:  Appears stated age  HEENT:  NC/AT  CHEST:  Full & symmetric excursion  HEART:  Regular rhythm  ABDOMEN:  Soft, non-tender, non-distended  EXTEREMITIES:  no cyanosis  SKIN:  No rash  NEURO:  Alert      LABS:                        7.9    7.88  )-----------( 272      ( 25 Oct 2023 06:51 )             25.6     10-25    141  |  106  |  22  ----------------------------<  199<H>  4.0   |  29  |  0.76    Ca    8.0<L>      25 Oct 2023 06:51        Urinalysis Basic - ( 25 Oct 2023 06:51 )    Color: x / Appearance: x / SG: x / pH: x  Gluc: 199 mg/dL / Ketone: x  / Bili: x / Urobili: x   Blood: x / Protein: x / Nitrite: x   Leuk Esterase: x / RBC: x / WBC x   Sq Epi: x / Non Sq Epi: x / Bacteria: x

## 2023-10-25 NOTE — BRIEF OPERATIVE NOTE - OPERATION/FINDINGS
Osteomyelitis
Left foot noted with 3cc of purulent drainage and necrotic wound and right heel noted with fibrotic wound base

## 2023-10-25 NOTE — BRIEF OPERATIVE NOTE - NSICDXBRIEFPOSTOP_GEN_ALL_CORE_FT
POST-OP DIAGNOSIS:  Diabetic ulcer of left foot 19-Oct-2023 12:53:40  Lauren Urbano  Diabetic ulcer of right heel 19-Oct-2023 12:53:56  Lauren Urbano  
POST-OP DIAGNOSIS:  Osteomyelitis of ankle or foot, acute 25-Oct-2023 16:49:56  Aiden Wang

## 2023-10-25 NOTE — BRIEF OPERATIVE NOTE - NSICDXBRIEFPROCEDURE_GEN_ALL_CORE_FT
PROCEDURES:  Debridement of fascia 19-Oct-2023 12:51:29  Lauren Urbano  Open biopsy, bone, foot 19-Oct-2023 12:52:34  Lauren Urbano  
PROCEDURES:  Resection of left metatarsal-tarsal joint, open approach 25-Oct-2023 16:49:34  Aiden Wang

## 2023-10-25 NOTE — PROGRESS NOTE ADULT - SUBJECTIVE AND OBJECTIVE BOX
Patient is a 78y Male with a known history of :  Diabetes [E11.9]    Diabetic foot ulcers [E11.621]    Benign essential HTN [I10]    Anemia, unspecified [D64.9]    Acute UTI [N39.0]    Diabetic ulcer of right foot [E11.621]    Diabetic ulcer of left foot [E11.621]    Non-healing wound of right heel [S91.301A]    Type 2 diabetes mellitus [E11.9]    Sepsis with hypotension [A41.9]    Gram positive bacterial infection [A49.9]    Elevated troponin [R79.89]    Diabetic ulcer of left foot [E11.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 Pt does not go to doctor on regualar basis per son and does phone visits.   (11 Oct 2023 07:02)      REVIEW OF SYSTEMS:    CONSTITUTIONAL: No fever, weight loss, or fatigue  EYES: No eye pain, visual disturbances, or discharge  ENMT:  No difficulty hearing, tinnitus, vertigo; No sinus or throat pain  NECK: No pain or stiffness  BREASTS: No pain, masses, or nipple discharge  RESPIRATORY: No cough, wheezing, chills or hemoptysis; No shortness of breath  CARDIOVASCULAR: No chest pain, palpitations, dizziness, or leg swelling  GASTROINTESTINAL: No abdominal or epigastric pain. No nausea, vomiting, or hematemesis; No diarrhea or constipation. No melena or hematochezia.  GENITOURINARY: No dysuria, frequency, hematuria, or incontinence  NEUROLOGICAL: No headaches, memory loss, loss of strength, numbness, or tremors  SKIN: No itching, burning, rashes, or lesions   LYMPH NODES: No enlarged glands  ENDOCRINE: No heat or cold intolerance; No hair loss  MUSCULOSKELETAL: No joint pain or swelling; No muscle, back, or extremity pain  PSYCHIATRIC: No depression, anxiety, mood swings, or difficulty sleeping  HEME/LYMPH: No easy bruising, or bleeding gums  ALLERGY AND IMMUNOLOGIC: No hives or eczema    MEDICATIONS  (STANDING):  atorvastatin 80 milliGRAM(s) Oral at bedtime  bethanechol 25 milliGRAM(s) Oral two times a day  dextrose 50% Injectable 12.5 Gram(s) IV Push once  dextrose 50% Injectable 25 Gram(s) IV Push once  dextrose 50% Injectable 25 Gram(s) IV Push once  gabapentin 300 milliGRAM(s) Oral two times a day  glucagon  Injectable 1 milliGRAM(s) IntraMuscular once  insulin lispro (ADMELOG) corrective regimen sliding scale   SubCutaneous three times a day before meals  insulin lispro (ADMELOG) corrective regimen sliding scale   SubCutaneous at bedtime  lactated ringers. 1000 milliLiter(s) (100 mL/Hr) IV Continuous <Continuous>  lactobacillus acidophilus 1 Tablet(s) Oral two times a day with meals  midodrine. 10 milliGRAM(s) Oral three times a day  pantoprazole    Tablet 40 milliGRAM(s) Oral before breakfast  sodium chloride 0.9%. 1000 milliLiter(s) (50 mL/Hr) IV Continuous <Continuous>  tamsulosin 0.4 milliGRAM(s) Oral at bedtime  vancomycin  IVPB 500 milliGRAM(s) IV Intermittent every 12 hours    MEDICATIONS  (PRN):  acetaminophen     Tablet .. 650 milliGRAM(s) Oral every 6 hours PRN Temp greater or equal to 38C (100.4F), Moderate Pain (4 - 6)  dextrose Oral Gel 15 Gram(s) Oral once PRN Blood Glucose LESS THAN 70 milliGRAM(s)/deciliter  loperamide 2 milliGRAM(s) Oral three times a day PRN Diarrhea      ALLERGIES: No Known Allergies      FAMILY HISTORY:      PHYSICAL EXAMINATION:  -----------------------------  T(C): 37 (10-25-23 @ 05:24), Max: 37.2 (10-24-23 @ 20:43)  HR: 60 (10-25-23 @ 05:24) (60 - 65)  BP: 128/54 (10-25-23 @ 05:24) (123/63 - 131/65)  RR: 18 (10-25-23 @ 05:24) (17 - 18)  SpO2: 93% (10-25-23 @ 05:24) (93% - 99%)  Wt(kg): --      Weight (kg): 87.6 (10-25 @ 05:24)    Constitutional: well developed, normal appearance, well groomed, well nourished, no deformities and no acute distress.   Eyes: the conjunctiva exhibited no abnormalities and the eyelids demonstrated no xanthelasmas.   HEENT: normal oral mucosa, no oral pallor and no oral cyanosis.   Neck: normal jugular venous A waves present, normal jugular venous V waves present and no jugular venous tomas A waves.   Pulmonary: no respiratory distress, normal respiratory rhythm and effort, no accessory muscle use and lungs were clear to auscultation bilaterally.   Cardiovascular: heart rate and rhythm were normal, normal S1 and S2 and no murmur, gallop, rub, heave or thrill are present.   Abdomen: soft, non-tender, no hepato-splenomegaly and no abdominal mass palpated.   Musculoskeletal: the gait could not be assessed..   Extremities: no clubbing of the fingernails, no localized cyanosis, no petechial hemorrhages and no ischemic changes.   Skin: normal skin color and pigmentation, no rash, no venous stasis, no skin lesions, no skin ulcer and no xanthoma was observed.   Psychiatric: oriented to person, place, and time, the affect was normal, the mood was normal and not feeling anxious.     LABS:   --------  10-25    141  |  106  |  22  ----------------------------<  199<H>  4.0   |  29  |  0.76    Ca    8.0<L>      25 Oct 2023 06:51                           7.9    7.88  )-----------( 272      ( 25 Oct 2023 06:51 )             25.6                  Patient is a 78y Male with a known history of :  Diabetes [E11.9]    Diabetic foot ulcers [E11.621]    Benign essential HTN [I10]    Anemia, unspecified [D64.9]    Acute UTI [N39.0]    Diabetic ulcer of right foot [E11.621]    Diabetic ulcer of left foot [E11.621]    Non-healing wound of right heel [S91.301A]    Type 2 diabetes mellitus [E11.9]    Sepsis with hypotension [A41.9]    Gram positive bacterial infection [A49.9]    Elevated troponin [R79.89]    Diabetic ulcer of left foot [E11.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 Pt does not go to doctor on regualar basis per son and does phone visits.   (11 Oct 2023 07:02)      REVIEW OF SYSTEMS:  CONSTITUTIONAL: No fever, weight loss, or fatigue  RESPIRATORY: No shortness of breath  CARDIOVASCULAR: No chest pain, palpitations, dizziness  GASTROINTESTINAL: No abdominal or epigastric pain. No melena or hematochezia.  GENITOURINARY: No dysuria, frequency, hematuria, or incontinence  NEUROLOGICAL: No headaches, memory loss, loss of strength, numbness, or tremors  MUSCULOSKELETAL: No joint pain or swelling; No muscle, back  PSYCHIATRIC: No depression, anxiety, mood swings, or difficulty sleeping  HEME/LYMPH: No easy bruising, or bleeding gums  ALLERGY AND IMMUNOLOGIC: No hives or eczema      MEDICATIONS  (STANDING):  atorvastatin 80 milliGRAM(s) Oral at bedtime  bethanechol 25 milliGRAM(s) Oral two times a day  dextrose 50% Injectable 12.5 Gram(s) IV Push once  dextrose 50% Injectable 25 Gram(s) IV Push once  dextrose 50% Injectable 25 Gram(s) IV Push once  gabapentin 300 milliGRAM(s) Oral two times a day  glucagon  Injectable 1 milliGRAM(s) IntraMuscular once  insulin lispro (ADMELOG) corrective regimen sliding scale   SubCutaneous three times a day before meals  insulin lispro (ADMELOG) corrective regimen sliding scale   SubCutaneous at bedtime  lactated ringers. 1000 milliLiter(s) (100 mL/Hr) IV Continuous <Continuous>  lactobacillus acidophilus 1 Tablet(s) Oral two times a day with meals  midodrine. 10 milliGRAM(s) Oral three times a day  pantoprazole    Tablet 40 milliGRAM(s) Oral before breakfast  sodium chloride 0.9%. 1000 milliLiter(s) (50 mL/Hr) IV Continuous <Continuous>  tamsulosin 0.4 milliGRAM(s) Oral at bedtime  vancomycin  IVPB 500 milliGRAM(s) IV Intermittent every 12 hours    MEDICATIONS  (PRN):  acetaminophen     Tablet .. 650 milliGRAM(s) Oral every 6 hours PRN Temp greater or equal to 38C (100.4F), Moderate Pain (4 - 6)  dextrose Oral Gel 15 Gram(s) Oral once PRN Blood Glucose LESS THAN 70 milliGRAM(s)/deciliter  loperamide 2 milliGRAM(s) Oral three times a day PRN Diarrhea      ALLERGIES: No Known Allergies    Constitutional: well developed, normal appearance,  no acute distress.   Neck: normal jugular venous    Pulmonary: no respiratory distress, lungs were clear  Cardiovascular:  reg S1 and S2,  no murmur  Abdomen: soft, non-tender  Musculoskeletal: the gait could not be assessed..    Psychiatric:  the mood was normal and not feeling anxious.         PHYSICAL EXAMINATION:  -----------------------------  T(C): 37 (10-25-23 @ 05:24), Max: 37.2 (10-24-23 @ 20:43)  HR: 60 (10-25-23 @ 05:24) (60 - 65)  BP: 128/54 (10-25-23 @ 05:24) (123/63 - 131/65)  RR: 18 (10-25-23 @ 05:24) (17 - 18)  SpO2: 93% (10-25-23 @ 05:24) (93% - 99%)  Wt(kg): --      Weight (kg): 87.6 (10-25 @ 05:24)        LABS:   --------  10-25    141  |  106  |  22  ----------------------------<  199<H>  4.0   |  29  |  0.76    Ca    8.0<L>      25 Oct 2023 06:51                           7.9    7.88  )-----------( 272      ( 25 Oct 2023 06:51 )             25.6

## 2023-10-25 NOTE — BRIEF OPERATIVE NOTE - NSICDXBRIEFPREOP_GEN_ALL_CORE_FT
PRE-OP DIAGNOSIS:  Diabetic ulcer of left foot 19-Oct-2023 12:52:53  Lauren Urbano  Diabetic ulcer of right heel 19-Oct-2023 12:53:04  Lauren Urbano  
PRE-OP DIAGNOSIS:  Osteomyelitis of ankle or foot, acute 25-Oct-2023 16:49:45  Aiden Wang

## 2023-10-25 NOTE — PROGRESS NOTE ADULT - SUBJECTIVE AND OBJECTIVE BOX
Patient is a 78y old  Male who presents with a chief complaint of fever and weakness (25 Oct 2023 12:10)      INTERVAL HPI/OVERNIGHT EVENTS: stable, pt without any complaints, for or today    MEDICATIONS  (STANDING):  atorvastatin 80 milliGRAM(s) Oral at bedtime  bethanechol 25 milliGRAM(s) Oral two times a day  dextrose 50% Injectable 25 Gram(s) IV Push once  dextrose 50% Injectable 25 Gram(s) IV Push once  dextrose 50% Injectable 12.5 Gram(s) IV Push once  gabapentin 300 milliGRAM(s) Oral two times a day  glucagon  Injectable 1 milliGRAM(s) IntraMuscular once  insulin lispro (ADMELOG) corrective regimen sliding scale   SubCutaneous three times a day before meals  insulin lispro (ADMELOG) corrective regimen sliding scale   SubCutaneous at bedtime  lactated ringers. 1000 milliLiter(s) (100 mL/Hr) IV Continuous <Continuous>  lactobacillus acidophilus 1 Tablet(s) Oral two times a day with meals  midodrine. 10 milliGRAM(s) Oral three times a day  pantoprazole    Tablet 40 milliGRAM(s) Oral before breakfast  sodium chloride 0.9%. 1000 milliLiter(s) (50 mL/Hr) IV Continuous <Continuous>  tamsulosin 0.4 milliGRAM(s) Oral at bedtime  vancomycin  IVPB 500 milliGRAM(s) IV Intermittent every 12 hours    MEDICATIONS  (PRN):  acetaminophen     Tablet .. 650 milliGRAM(s) Oral every 6 hours PRN Temp greater or equal to 38C (100.4F), Moderate Pain (4 - 6)  dextrose Oral Gel 15 Gram(s) Oral once PRN Blood Glucose LESS THAN 70 milliGRAM(s)/deciliter  loperamide 2 milliGRAM(s) Oral three times a day PRN Diarrhea      Allergies    No Known Allergies    Intolerances        REVIEW OF SYSTEMS:  CONSTITUTIONAL: No fever, weight loss, or fatigue  EYES: No eye pain, visual disturbances  ENMT:  No difficulty hearing, tinnitus, vertigo; No sinus or throat pain  NECK: No pain or stiffness  RESPIRATORY: No cough, wheezing, chills or hemoptysis; No shortness of breath  CARDIOVASCULAR: No chest pain, palpitations, dizziness  GASTROINTESTINAL: No abdominal or epigastric pain. No nausea, vomiting, or hematemesis; No diarrhea or constipation. No melena or hematochezia.  GENITOURINARY: No dysuria, frequency, hematuria, or incontinence  NEUROLOGICAL: No headaches, memory loss, loss of strength, numbness, or tremors  SKIN: No itching, burning  LYMPH NODES: No enlarged glands  MUSCULOSKELETAL: No joint pain or swelling; No muscle, back, or extremity pain  PSYCHIATRIC: No depression, mood swings  HEME/LYMPH: No easy bruising, or bleeding gums  ALLERGY AND IMMUNOLOGIC: No hives    Vital Signs Last 24 Hrs  T(C): 37 (25 Oct 2023 05:24), Max: 37.2 (24 Oct 2023 20:43)  T(F): 98.6 (25 Oct 2023 05:24), Max: 99 (24 Oct 2023 20:43)  HR: 60 (25 Oct 2023 05:24) (60 - 65)  BP: 128/54 (25 Oct 2023 05:24) (128/54 - 131/65)  BP(mean): --  RR: 18 (25 Oct 2023 05:24) (18 - 18)  SpO2: 93% (25 Oct 2023 05:24) (93% - 99%)    Parameters below as of 25 Oct 2023 05:24  Patient On (Oxygen Delivery Method): room air        PHYSICAL EXAM:  GENERAL: NAD, well-groomed, well-developed  HEAD:  Atraumatic, Normocephalic  EYES: EOMI, PERRLA, conjunctiva and sclera clear  ENMT: No tonsillar erythema, exudates, or enlargement   NECK: Supple, No JVD  NERVOUS SYSTEM:  Alert & Oriented X3, Good concentration  CHEST/LUNG: Clear to auscultation bilaterally; No rales, rhonchi, wheezing  HEART: Regular rate and rhythm  ABDOMEN: Soft, Nontender, Nondistended; Bowel sounds present  EXTREMITIES:  2+ Peripheral Pulses, b/l feet in clean dressing, trace edema   LYMPH: No lymphadenopathy noted  SKIN: No rashes     LABS:                        7.9    7.88  )-----------( 272      ( 25 Oct 2023 06:51 )             25.6     25 Oct 2023 06:51    141    |  106    |  22     ----------------------------<  199    4.0     |  29     |  0.76     Ca    8.0        25 Oct 2023 06:51        Urinalysis Basic - ( 25 Oct 2023 06:51 )    Color: x / Appearance: x / SG: x / pH: x  Gluc: 199 mg/dL / Ketone: x  / Bili: x / Urobili: x   Blood: x / Protein: x / Nitrite: x   Leuk Esterase: x / RBC: x / WBC x   Sq Epi: x / Non Sq Epi: x / Bacteria: x      CAPILLARY BLOOD GLUCOSE      POCT Blood Glucose.: 209 mg/dL (25 Oct 2023 08:47)  POCT Blood Glucose.: 204 mg/dL (24 Oct 2023 22:23)  POCT Blood Glucose.: 205 mg/dL (24 Oct 2023 17:26)    blood culture --   Numerous Methicillin Resistant Staphylococcus aureus  Few Corynebacterium species "Susceptibilities not performed"   10-19 @ 11:35      urine culture --  10-19 @ 11:35  results   Numerous Methicillin Resistant Staphylococcus aureus  Few Corynebacterium species "Susceptibilities not performed" 10-19 @ 11:35    wound with gram statin --    10-19 @ 11:35  organism  Methicillin resistant Staphylococcus aureus   10-19 @ 11:35  specimen source .Tissue Other, RIGHT FOOT SOFT TISSUE CULTURE  10-19 @ 11:35      RADIOLOGY & ADDITIONAL TESTS:      Consultant(s) Notes Reviewed:  [x ] YES  [ ] NO    Care Discussed with Consultants/Other Providers [x ] YES  [ ] NO    Advanced care planning discussed with patient and family, advanced care planning forms reviewed, discussed, and completed.  20 minutes spent.

## 2023-10-25 NOTE — PROCEDURE NOTE - PROCEDURE FINDINGS AND DETAILS
45cm bard single lumen power picc inserted into patent right basilic vein under sterile conditions and image guidance.  Tip is in the SVC.  PICC can be accessed.
Normal

## 2023-10-25 NOTE — BRIEF OPERATIVE NOTE - COMMENTS
Patient tolerated rhe procedure well without any complications
Patient tolerated the procedure and anesthesia well without any complications

## 2023-10-26 LAB
ANION GAP SERPL CALC-SCNC: 3 MMOL/L — LOW (ref 5–17)
BUN SERPL-MCNC: 19 MG/DL — SIGNIFICANT CHANGE UP (ref 7–23)
CALCIUM SERPL-MCNC: 8.1 MG/DL — LOW (ref 8.5–10.1)
CHLORIDE SERPL-SCNC: 105 MMOL/L — SIGNIFICANT CHANGE UP (ref 96–108)
CO2 SERPL-SCNC: 31 MMOL/L — SIGNIFICANT CHANGE UP (ref 22–31)
CREAT SERPL-MCNC: 0.86 MG/DL — SIGNIFICANT CHANGE UP (ref 0.5–1.3)
EGFR: 89 ML/MIN/1.73M2 — SIGNIFICANT CHANGE UP
GLUCOSE BLDC GLUCOMTR-MCNC: 175 MG/DL — HIGH (ref 70–99)
GLUCOSE BLDC GLUCOMTR-MCNC: 193 MG/DL — HIGH (ref 70–99)
GLUCOSE BLDC GLUCOMTR-MCNC: 203 MG/DL — HIGH (ref 70–99)
GLUCOSE BLDC GLUCOMTR-MCNC: 207 MG/DL — HIGH (ref 70–99)
GLUCOSE SERPL-MCNC: 189 MG/DL — HIGH (ref 70–99)
GRAM STN FLD: ABNORMAL
GRAM STN FLD: SIGNIFICANT CHANGE UP
HCT VFR BLD CALC: 25.6 % — LOW (ref 39–50)
HGB BLD-MCNC: 7.7 G/DL — LOW (ref 13–17)
MCHC RBC-ENTMCNC: 25.2 PG — LOW (ref 27–34)
MCHC RBC-ENTMCNC: 30.1 GM/DL — LOW (ref 32–36)
MCV RBC AUTO: 83.9 FL — SIGNIFICANT CHANGE UP (ref 80–100)
NRBC # BLD: 0 /100 WBCS — SIGNIFICANT CHANGE UP (ref 0–0)
PLATELET # BLD AUTO: 263 K/UL — SIGNIFICANT CHANGE UP (ref 150–400)
POTASSIUM SERPL-MCNC: 4 MMOL/L — SIGNIFICANT CHANGE UP (ref 3.5–5.3)
POTASSIUM SERPL-SCNC: 4 MMOL/L — SIGNIFICANT CHANGE UP (ref 3.5–5.3)
RBC # BLD: 3.05 M/UL — LOW (ref 4.2–5.8)
RBC # FLD: 16.2 % — HIGH (ref 10.3–14.5)
SODIUM SERPL-SCNC: 139 MMOL/L — SIGNIFICANT CHANGE UP (ref 135–145)
SPECIMEN SOURCE: SIGNIFICANT CHANGE UP
WBC # BLD: 7.51 K/UL — SIGNIFICANT CHANGE UP (ref 3.8–10.5)
WBC # FLD AUTO: 7.51 K/UL — SIGNIFICANT CHANGE UP (ref 3.8–10.5)

## 2023-10-26 PROCEDURE — 99024 POSTOP FOLLOW-UP VISIT: CPT

## 2023-10-26 RX ORDER — ATORVASTATIN CALCIUM 80 MG/1
1 TABLET, FILM COATED ORAL
Qty: 0 | Refills: 0 | DISCHARGE
Start: 2023-10-26

## 2023-10-26 RX ORDER — GABAPENTIN 400 MG/1
1 CAPSULE ORAL
Qty: 0 | Refills: 0 | DISCHARGE
Start: 2023-10-26

## 2023-10-26 RX ORDER — LACTOBACILLUS ACIDOPHILUS 100MM CELL
1 CAPSULE ORAL
Qty: 0 | Refills: 0 | DISCHARGE
Start: 2023-10-26

## 2023-10-26 RX ORDER — VANCOMYCIN HCL 1 G
500 VIAL (EA) INTRAVENOUS
Qty: 0 | Refills: 0 | DISCHARGE
Start: 2023-10-26

## 2023-10-26 RX ORDER — PANTOPRAZOLE SODIUM 20 MG/1
1 TABLET, DELAYED RELEASE ORAL
Qty: 0 | Refills: 0 | DISCHARGE
Start: 2023-10-26

## 2023-10-26 RX ORDER — ASCORBIC ACID 60 MG
500 TABLET,CHEWABLE ORAL DAILY
Refills: 0 | Status: CANCELLED | OUTPATIENT
Start: 2023-10-26 | End: 2023-10-28

## 2023-10-26 RX ORDER — MIDODRINE HYDROCHLORIDE 2.5 MG/1
1 TABLET ORAL
Qty: 0 | Refills: 0 | DISCHARGE
Start: 2023-10-26

## 2023-10-26 RX ORDER — MULTIVIT-MIN/FERROUS GLUCONATE 9 MG/15 ML
1 LIQUID (ML) ORAL DAILY
Refills: 0 | Status: CANCELLED | OUTPATIENT
Start: 2023-10-26 | End: 2023-10-28

## 2023-10-26 RX ORDER — ACETAMINOPHEN 500 MG
2 TABLET ORAL
Qty: 0 | Refills: 0 | DISCHARGE
Start: 2023-10-26

## 2023-10-26 RX ADMIN — Medication 25 MILLIGRAM(S): at 18:10

## 2023-10-26 RX ADMIN — PANTOPRAZOLE SODIUM 40 MILLIGRAM(S): 20 TABLET, DELAYED RELEASE ORAL at 06:17

## 2023-10-26 RX ADMIN — GABAPENTIN 300 MILLIGRAM(S): 400 CAPSULE ORAL at 06:17

## 2023-10-26 RX ADMIN — MIDODRINE HYDROCHLORIDE 10 MILLIGRAM(S): 2.5 TABLET ORAL at 18:10

## 2023-10-26 RX ADMIN — Medication 1 TABLET(S): at 08:46

## 2023-10-26 RX ADMIN — Medication 1: at 12:10

## 2023-10-26 RX ADMIN — GABAPENTIN 300 MILLIGRAM(S): 400 CAPSULE ORAL at 18:11

## 2023-10-26 RX ADMIN — MIDODRINE HYDROCHLORIDE 10 MILLIGRAM(S): 2.5 TABLET ORAL at 06:17

## 2023-10-26 RX ADMIN — Medication 100 MILLIGRAM(S): at 15:03

## 2023-10-26 RX ADMIN — Medication 1 TABLET(S): at 18:10

## 2023-10-26 RX ADMIN — Medication 1: at 08:46

## 2023-10-26 RX ADMIN — ATORVASTATIN CALCIUM 80 MILLIGRAM(S): 80 TABLET, FILM COATED ORAL at 21:21

## 2023-10-26 RX ADMIN — MIDODRINE HYDROCHLORIDE 10 MILLIGRAM(S): 2.5 TABLET ORAL at 12:10

## 2023-10-26 RX ADMIN — TAMSULOSIN HYDROCHLORIDE 0.4 MILLIGRAM(S): 0.4 CAPSULE ORAL at 21:21

## 2023-10-26 RX ADMIN — Medication 2: at 18:07

## 2023-10-26 RX ADMIN — Medication 25 MILLIGRAM(S): at 06:17

## 2023-10-26 RX ADMIN — CHLORHEXIDINE GLUCONATE 1 APPLICATION(S): 213 SOLUTION TOPICAL at 06:17

## 2023-10-26 RX ADMIN — Medication 100 MILLIGRAM(S): at 02:30

## 2023-10-26 NOTE — PROGRESS NOTE ADULT - SUBJECTIVE AND OBJECTIVE BOX
78y year old Male seen at [facility] 3WES 365 D1 s/p left foot resection of 5th metatarsal base and partial cuboid for osteomyelitis DOS 10/25/23 and sharp excisional debridement of bilateral foot wounds with bone biopsy of the left lateral foot wound DOS 10/19/23. Patient relates no overnight events and states that they are doing well at this time.  Denies any fever, chills, nausea, vomiting, chest pain, shortness of breath, or calf pain at this time.      Allergies    No Known Allergies    Intolerances        MEDICATIONS  (STANDING):  atorvastatin 80 milliGRAM(s) Oral at bedtime  bethanechol 25 milliGRAM(s) Oral two times a day  chlorhexidine 4% Liquid 1 Application(s) Topical <User Schedule>  dextrose 50% Injectable 25 Gram(s) IV Push once  dextrose 50% Injectable 12.5 Gram(s) IV Push once  dextrose 50% Injectable 25 Gram(s) IV Push once  gabapentin 300 milliGRAM(s) Oral two times a day  glucagon  Injectable 1 milliGRAM(s) IntraMuscular once  insulin lispro (ADMELOG) corrective regimen sliding scale   SubCutaneous at bedtime  insulin lispro (ADMELOG) corrective regimen sliding scale   SubCutaneous three times a day before meals  lactobacillus acidophilus 1 Tablet(s) Oral two times a day with meals  midodrine. 10 milliGRAM(s) Oral three times a day  pantoprazole    Tablet 40 milliGRAM(s) Oral before breakfast  tamsulosin 0.4 milliGRAM(s) Oral at bedtime  vancomycin  IVPB 500 milliGRAM(s) IV Intermittent every 12 hours    MEDICATIONS  (PRN):  acetaminophen     Tablet .. 650 milliGRAM(s) Oral every 6 hours PRN Temp greater or equal to 38C (100.4F), Moderate Pain (4 - 6)  dextrose Oral Gel 15 Gram(s) Oral once PRN Blood Glucose LESS THAN 70 milliGRAM(s)/deciliter  loperamide 2 milliGRAM(s) Oral three times a day PRN Diarrhea  sodium chloride 0.9% lock flush 10 milliLiter(s) IV Push every 1 hour PRN Pre/post blood products, medications, blood draw, and to maintain line patency      Vital Signs Last 24 Hrs  T(C): 37 (26 Oct 2023 12:33), Max: 37 (26 Oct 2023 12:33)  T(F): 98.6 (26 Oct 2023 12:33), Max: 98.6 (26 Oct 2023 12:33)  HR: 69 (26 Oct 2023 12:33) (60 - 69)  BP: 122/55 (26 Oct 2023 12:33) (110/55 - 122/55)  BP(mean): --  RR: 17 (26 Oct 2023 12:33) (17 - 17)  SpO2: 98% (26 Oct 2023 12:33) (92% - 98%)    Parameters below as of 26 Oct 2023 12:33  Patient On (Oxygen Delivery Method): room air        PHYSICAL EXAM:  Vascular: DP & PT non  palpable bilaterally, Capillary refill delayed to the digits.  Digital hair absent bilaterally  Neurological: Light touch sensation diminished  bilaterally  Musculoskeletal: 2/5  strength in all quadrants bilaterally, s/p right partial calcanectomy   Dermatological: Right heel wound noted with granular tissue and measuring 5.5x 6.6 x down to bone and left lateral wound 5.5 x 4.5 x down to bone filled with antibiotic beads, no proximal streaking, no fluctuance, no malodor     CBC Full  -  ( 26 Oct 2023 07:30 )  WBC Count : 7.51 K/uL  RBC Count : 3.05 M/uL  Hemoglobin : 7.7 g/dL  Hematocrit : 25.6 %  Platelet Count - Automated : 263 K/uL  Mean Cell Volume : 83.9 fl  Mean Cell Hemoglobin : 25.2 pg  Mean Cell Hemoglobin Concentration : 30.1 gm/dL  Auto Neutrophil # : x  Auto Lymphocyte # : x  Auto Monocyte # : x  Auto Eosinophil # : x  Auto Basophil # : x  Auto Neutrophil % : x  Auto Lymphocyte % : x  Auto Monocyte % : x  Auto Eosinophil % : x  Auto Basophil % : x                            7.7    7.51  )-----------( 263      ( 26 Oct 2023 07:30 )             25.6     10-26    139  |  105  |  19  ----------------------------<  189<H>  4.0   |  31  |  0.86    Ca    8.1<L>      26 Oct 2023 07:30        Culture - Tissue with Gram Stain (collected 25 Oct 2023 15:50)  Source: .Tissue Other, LEFT FOOT CUBOID BONE CULTURE  Gram Stain (26 Oct 2023 01:33):    No polymorphonuclear cells seen per low power field    No organisms seen per oil power field    Culture - Tissue with Gram Stain (collected 25 Oct 2023 15:50)  Source: .Tissue Other, LEFT FOOT 5TH METATARSAL BONE  Gram Stain (26 Oct 2023 01:33):    No polymorphonuclear cells seen per low power field    No organisms seen per oil power field        Imaging: ----------     1 / 1 Jerald Lind              Report date: 10/25/2023      View Order      (Report matches exam selected in Patient History pane)                     ACC: [id number] EXAM: XR FOOT COMP MIN 3 VIEWS LT ORDERED BY: RAQUEL ISAACS    PROCEDURE DATE: 10/25/2023        INTERPRETATION: LEFT foot  Comparison: 10/19/2023 LEFT foot radiographs  CLINICAL INFORMATION: Postoperative LEFT foot radiographs    TECHNIQUE: AP and lateral views.    FINDINGS:  Status post surgical resection of the LEFT fifth metatarsal tarsal joint. Operative site filled with Calcium sulfate beads impregnated with antibiotics    Generalized bone demineralization.  Remaining osseous and joint structures radiographically intact.    IMPRESSION:    Postoperative changes as described.  Please see operative report    --- End of Report ---      JERALD LIND MD; Attending Radiologist  This document has been electronically signed. Oct 25 2023 5:37PM     78y year old Male seen at [facility] 3WES 365 D1 s/p left foot resection of 5th metatarsal base and partial cuboid for osteomyelitis DOS 10/25/23 and sharp excisional debridement of bilateral foot wounds with bone biopsy of the left lateral foot wound DOS 10/19/23. Patient relates no overnight events and states that they are doing well at this time.  Denies any fever, chills, nausea, vomiting, chest pain, shortness of breath, or calf pain at this time.      Allergies    No Known Allergies    Intolerances        MEDICATIONS  (STANDING):  atorvastatin 80 milliGRAM(s) Oral at bedtime  bethanechol 25 milliGRAM(s) Oral two times a day  chlorhexidine 4% Liquid 1 Application(s) Topical <User Schedule>  dextrose 50% Injectable 25 Gram(s) IV Push once  dextrose 50% Injectable 12.5 Gram(s) IV Push once  dextrose 50% Injectable 25 Gram(s) IV Push once  gabapentin 300 milliGRAM(s) Oral two times a day  glucagon  Injectable 1 milliGRAM(s) IntraMuscular once  insulin lispro (ADMELOG) corrective regimen sliding scale   SubCutaneous at bedtime  insulin lispro (ADMELOG) corrective regimen sliding scale   SubCutaneous three times a day before meals  lactobacillus acidophilus 1 Tablet(s) Oral two times a day with meals  midodrine. 10 milliGRAM(s) Oral three times a day  pantoprazole    Tablet 40 milliGRAM(s) Oral before breakfast  tamsulosin 0.4 milliGRAM(s) Oral at bedtime  vancomycin  IVPB 500 milliGRAM(s) IV Intermittent every 12 hours    MEDICATIONS  (PRN):  acetaminophen     Tablet .. 650 milliGRAM(s) Oral every 6 hours PRN Temp greater or equal to 38C (100.4F), Moderate Pain (4 - 6)  dextrose Oral Gel 15 Gram(s) Oral once PRN Blood Glucose LESS THAN 70 milliGRAM(s)/deciliter  loperamide 2 milliGRAM(s) Oral three times a day PRN Diarrhea  sodium chloride 0.9% lock flush 10 milliLiter(s) IV Push every 1 hour PRN Pre/post blood products, medications, blood draw, and to maintain line patency      Vital Signs Last 24 Hrs  T(C): 37 (26 Oct 2023 12:33), Max: 37 (26 Oct 2023 12:33)  T(F): 98.6 (26 Oct 2023 12:33), Max: 98.6 (26 Oct 2023 12:33)  HR: 69 (26 Oct 2023 12:33) (60 - 69)  BP: 122/55 (26 Oct 2023 12:33) (110/55 - 122/55)  BP(mean): --  RR: 17 (26 Oct 2023 12:33) (17 - 17)  SpO2: 98% (26 Oct 2023 12:33) (92% - 98%)    Parameters below as of 26 Oct 2023 12:33  Patient On (Oxygen Delivery Method): room air        PHYSICAL EXAM:  Vascular: DP & PT non  palpable bilaterally, Capillary refill delayed to the digits.  Digital hair absent bilaterally  Neurological: Light touch sensation diminished  bilaterally  Musculoskeletal: 2/5  strength in all quadrants bilaterally, s/p right partial calcanectomy   Dermatological: Right heel wound noted with granular tissue and measuring 5.5x 6.6 x down to bone and left lateral wound 5.5 x 4.5 x down to bone filled with antibiotic beads, no proximal streaking, no fluctuance, no malodor     CBC Full  -  ( 26 Oct 2023 07:30 )  WBC Count : 7.51 K/uL  RBC Count : 3.05 M/uL  Hemoglobin : 7.7 g/dL  Hematocrit : 25.6 %  Platelet Count - Automated : 263 K/uL  Mean Cell Volume : 83.9 fl  Mean Cell Hemoglobin : 25.2 pg  Mean Cell Hemoglobin Concentration : 30.1 gm/dL  Auto Neutrophil # : x  Auto Lymphocyte # : x  Auto Monocyte # : x  Auto Eosinophil # : x  Auto Basophil # : x  Auto Neutrophil % : x  Auto Lymphocyte % : x  Auto Monocyte % : x  Auto Eosinophil % : x  Auto Basophil % : x                            7.7    7.51  )-----------( 263      ( 26 Oct 2023 07:30 )             25.6     10-26    139  |  105  |  19  ----------------------------<  189<H>  4.0   |  31  |  0.86    Ca    8.1<L>      26 Oct 2023 07:30        Culture - Tissue with Gram Stain (collected 25 Oct 2023 15:50)  Source: .Tissue Other, LEFT FOOT CUBOID BONE CULTURE  Gram Stain (26 Oct 2023 01:33):    No polymorphonuclear cells seen per low power field    No organisms seen per oil power field    Culture - Tissue with Gram Stain (collected 25 Oct 2023 15:50)  Source: .Tissue Other, LEFT FOOT 5TH METATARSAL BONE  Gram Stain (26 Oct 2023 01:33):    No polymorphonuclear cells seen per low power field    No organisms seen per oil power field        Imaging: ----------     1 / 1 Jerald Lind              Report date: 10/25/2023      View Order      (Report matches exam selected in Patient History pane)                     ACC: [id number] EXAM: XR FOOT COMP MIN 3 VIEWS LT ORDERED BY: RAQUEL ISAACS    PROCEDURE DATE: 10/25/2023        INTERPRETATION: LEFT foot  Comparison: 10/19/2023 LEFT foot radiographs  CLINICAL INFORMATION: Postoperative LEFT foot radiographs    TECHNIQUE: AP and lateral views.    FINDINGS:  Status post surgical resection of the LEFT fifth metatarsal tarsal joint. Operative site filled with Calcium sulfate beads impregnated with antibiotics    Generalized bone demineralization.  Remaining osseous and joint structures radiographically intact.    IMPRESSION:    Postoperative changes as described.  Please see operative report    --- End of Report ---      JERLAD LIND MD; Attending Radiologist  This document has been electronically signed. Oct 25 2023 5:37PM     78y year old Male seen at [facility] 3WES 365 D1 s/p left foot resection of 5th metatarsal base and partial cuboid for osteomyelitis DOS 10/25/23 and sharp excisional debridement of bilateral foot wounds with bone biopsy of the left lateral foot wound DOS 10/19/23. Patient relates no overnight events and states that they are doing well at this time.  Denies any fever, chills, nausea, vomiting, chest pain, shortness of breath, or calf pain at this time.      Allergies    No Known Allergies    Intolerances        MEDICATIONS  (STANDING):  atorvastatin 80 milliGRAM(s) Oral at bedtime  bethanechol 25 milliGRAM(s) Oral two times a day  chlorhexidine 4% Liquid 1 Application(s) Topical <User Schedule>  dextrose 50% Injectable 25 Gram(s) IV Push once  dextrose 50% Injectable 12.5 Gram(s) IV Push once  dextrose 50% Injectable 25 Gram(s) IV Push once  gabapentin 300 milliGRAM(s) Oral two times a day  glucagon  Injectable 1 milliGRAM(s) IntraMuscular once  insulin lispro (ADMELOG) corrective regimen sliding scale   SubCutaneous at bedtime  insulin lispro (ADMELOG) corrective regimen sliding scale   SubCutaneous three times a day before meals  lactobacillus acidophilus 1 Tablet(s) Oral two times a day with meals  midodrine. 10 milliGRAM(s) Oral three times a day  pantoprazole    Tablet 40 milliGRAM(s) Oral before breakfast  tamsulosin 0.4 milliGRAM(s) Oral at bedtime  vancomycin  IVPB 500 milliGRAM(s) IV Intermittent every 12 hours    MEDICATIONS  (PRN):  acetaminophen     Tablet .. 650 milliGRAM(s) Oral every 6 hours PRN Temp greater or equal to 38C (100.4F), Moderate Pain (4 - 6)  dextrose Oral Gel 15 Gram(s) Oral once PRN Blood Glucose LESS THAN 70 milliGRAM(s)/deciliter  loperamide 2 milliGRAM(s) Oral three times a day PRN Diarrhea  sodium chloride 0.9% lock flush 10 milliLiter(s) IV Push every 1 hour PRN Pre/post blood products, medications, blood draw, and to maintain line patency      Vital Signs Last 24 Hrs  T(C): 37 (26 Oct 2023 12:33), Max: 37 (26 Oct 2023 12:33)  T(F): 98.6 (26 Oct 2023 12:33), Max: 98.6 (26 Oct 2023 12:33)  HR: 69 (26 Oct 2023 12:33) (60 - 69)  BP: 122/55 (26 Oct 2023 12:33) (110/55 - 122/55)  BP(mean): --  RR: 17 (26 Oct 2023 12:33) (17 - 17)  SpO2: 98% (26 Oct 2023 12:33) (92% - 98%)    Parameters below as of 26 Oct 2023 12:33  Patient On (Oxygen Delivery Method): room air        PHYSICAL EXAM:  Vascular: DP & PT non  palpable bilaterally, Capillary refill delayed to the digits.  Digital hair absent bilaterally  Neurological: Light touch sensation diminished  bilaterally  Musculoskeletal: 2/5  strength in all quadrants bilaterally, s/p right partial calcanectomy   Dermatological: Right heel wound noted with granular tissue and measuring 5.5x 6.6 x down to bone and left lateral wound 5.5 x 4.5 x down to bone filled with antibiotic beads, no proximal streaking, no fluctuance, no malodor     CBC Full  -  ( 26 Oct 2023 07:30 )  WBC Count : 7.51 K/uL  RBC Count : 3.05 M/uL  Hemoglobin : 7.7 g/dL  Hematocrit : 25.6 %  Platelet Count - Automated : 263 K/uL  Mean Cell Volume : 83.9 fl  Mean Cell Hemoglobin : 25.2 pg  Mean Cell Hemoglobin Concentration : 30.1 gm/dL  Auto Neutrophil # : x  Auto Lymphocyte # : x  Auto Monocyte # : x  Auto Eosinophil # : x  Auto Basophil # : x  Auto Neutrophil % : x  Auto Lymphocyte % : x  Auto Monocyte % : x  Auto Eosinophil % : x  Auto Basophil % : x                            7.7    7.51  )-----------( 263      ( 26 Oct 2023 07:30 )             25.6     10-26    139  |  105  |  19  ----------------------------<  189<H>  4.0   |  31  |  0.86    Ca    8.1<L>      26 Oct 2023 07:30        Culture - Tissue with Gram Stain (collected 25 Oct 2023 15:50)  Source: .Tissue Other, LEFT FOOT CUBOID BONE CULTURE  Gram Stain (26 Oct 2023 01:33):    No polymorphonuclear cells seen per low power field    No organisms seen per oil power field    Culture - Tissue with Gram Stain (collected 25 Oct 2023 15:50)  Source: .Tissue Other, LEFT FOOT 5TH METATARSAL BONE  Gram Stain (26 Oct 2023 01:33):    No polymorphonuclear cells seen per low power field    No organisms seen per oil power field        Imaging: ----------     1 / 1 Jerald Lind              Report date: 10/25/2023      View Order      (Report matches exam selected in Patient History pane)                     ACC: [id number] EXAM: XR FOOT COMP MIN 3 VIEWS LT ORDERED BY: RAQUEL ISAACS    PROCEDURE DATE: 10/25/2023        INTERPRETATION: LEFT foot  Comparison: 10/19/2023 LEFT foot radiographs  CLINICAL INFORMATION: Postoperative LEFT foot radiographs    TECHNIQUE: AP and lateral views.    FINDINGS:  Status post surgical resection of the LEFT fifth metatarsal tarsal joint. Operative site filled with Calcium sulfate beads impregnated with antibiotics    Generalized bone demineralization.  Remaining osseous and joint structures radiographically intact.    IMPRESSION:    Postoperative changes as described.  Please see operative report    --- End of Report ---      JERALD LIND MD; Attending Radiologist  This document has been electronically signed. Oct 25 2023 5:37PM

## 2023-10-26 NOTE — PROGRESS NOTE ADULT - PROBLEM SELECTOR PLAN 2
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

## 2023-10-26 NOTE — ED PROVIDER NOTE - ADDITIONAL HISTORY OBTAINED FROM [facility]
Anesthesia Post-op Note    Patient: Miriam Coker  Procedure(s) Performed: ENDOSCOPIC RETROGRADE CHOLANGIOPANCREATOGRAPHY  Anesthesia type: General    Vitals Value Taken Time   Temp 36.6 °C (97.8 °F) 10/26/23 1636   Pulse 61 10/26/23 1650   Resp 10 10/26/23 1650   SpO2 100 % 10/26/23 1650   /82 10/26/23 1650         Patient Location: PACU Phase 1  Level of Consciousness: awake and alert  Respiratory Status: face mask and spontaneous ventilation  Cardiovascular blood pressure returned to baseline  Hydration: euvolemic  Pain Management: well controlled  Handoff: Handoff to receiving clinician was performed and questions were answered  Vomiting: none  Nausea: None  Airway Patency:patent  Post-op Assessment: awake, alert, appropriately conversant, or baseline, no complications, patient tolerated procedure well and no evidence of recall      No notable events documented.   Family

## 2023-10-26 NOTE — PROGRESS NOTE ADULT - SUBJECTIVE AND OBJECTIVE BOX
[facility], Division of Infectious Diseases  GLYNN Oakley Y. Patel, S. Shah, G. [facility]  [phone]    Name: HEMA LAZCANO  Age: 78y  Gender: Male  MRN: [id number]    Interval History:  Patient seen and examined at bedside this morning  No acute overnight events.   Notes reviewed    Antibiotics:  vancomycin  IVPB 500 milliGRAM(s) IV Intermittent every 12 hours      Medications:  acetaminophen     Tablet .. 650 milliGRAM(s) Oral every 6 hours PRN  atorvastatin 80 milliGRAM(s) Oral at bedtime  bethanechol 25 milliGRAM(s) Oral two times a day  chlorhexidine 4% Liquid 1 Application(s) Topical <User Schedule>  dextrose 50% Injectable 12.5 Gram(s) IV Push once  dextrose 50% Injectable 25 Gram(s) IV Push once  dextrose 50% Injectable 25 Gram(s) IV Push once  dextrose Oral Gel 15 Gram(s) Oral once PRN  gabapentin 300 milliGRAM(s) Oral two times a day  glucagon  Injectable 1 milliGRAM(s) IntraMuscular once  insulin lispro (ADMELOG) corrective regimen sliding scale   SubCutaneous at bedtime  insulin lispro (ADMELOG) corrective regimen sliding scale   SubCutaneous three times a day before meals  lactobacillus acidophilus 1 Tablet(s) Oral two times a day with meals  loperamide 2 milliGRAM(s) Oral three times a day PRN  midodrine. 10 milliGRAM(s) Oral three times a day  pantoprazole    Tablet 40 milliGRAM(s) Oral before breakfast  sodium chloride 0.9% lock flush 10 milliLiter(s) IV Push every 1 hour PRN  tamsulosin 0.4 milliGRAM(s) Oral at bedtime  vancomycin  IVPB 500 milliGRAM(s) IV Intermittent every 12 hours      Review of Systems:  A 10-point review of systems was obtained.     Pertinent positives and negatives--  Constitutional: No fevers. No Chills. No Rigors.   Cardiovascular: No chest pain. No palpitations.  Respiratory: No shortness of breath. No cough.  Gastrointestinal: No nausea or vomiting. No diarrhea or constipation.   Psychiatric: Pleasant. Appropriate affect.    Review of systems otherwise negative except as previously noted.    Allergies: No Known Allergies    For details regarding the patient's past medical history, social history, family history, and other miscellaneous elements, please refer the initial infectious diseases consultation and/or the admitting history and physical examination for this admission.    Objective:  Vitals:   T(C): 37 (10-26-23 @ 12:33), Max: 37 (10-26-23 @ 12:33)  HR: 69 (10-26-23 @ 12:33) (60 - 69)  BP: 122/55 (10-26-23 @ 12:33) (92/40 - 134/55)  RR: 17 (10-26-23 @ 12:33) (12 - 18)  SpO2: 98% (10-26-23 @ 12:33) (92% - 98%)    Physical Examination:  General: no acute distress  HEENT: NC/AT, EOMI, anicteric, no oral lesions  Neck: supple, no palpable LAD  Cardio: S1, S2 heard, RRR, no murmurs  Resp: breath sounds heard bilaterally, no rales, wheezes or rhonchi  Abd: soft, NT, ND, + bowel sounds  Neuro: no obvious focal deficits  Ext: no edema or cyanosis  Skin: warm, dry, no visible rash      Laboratory Studies:  CBC:                       7.7    7.51  )-----------( 263      ( 26 Oct 2023 07:30 )             25.6     CMP: 10-26    139  |  105  |  19  ----------------------------<  189<H>  4.0   |  31  |  0.86    Ca    8.1<L>      26 Oct 2023 07:30        Urinalysis Basic - ( 26 Oct 2023 07:30 )    Color: x / Appearance: x / SG: x / pH: x  Gluc: 189 mg/dL / Ketone: x  / Bili: x / Urobili: x   Blood: x / Protein: x / Nitrite: x   Leuk Esterase: x / RBC: x / WBC x   Sq Epi: x / Non Sq Epi: x / Bacteria: x        Microbiology: reviewed    Culture - Tissue with Gram Stain (collected 10-25-23 @ 15:50)  Source: .Tissue Other, LEFT FOOT CUBOID BONE CULTURE  Gram Stain (10-26-23 @ 01:33):    No polymorphonuclear cells seen per low power field    No organisms seen per oil power field    Culture - Tissue with Gram Stain (collected 10-25-23 @ 15:50)  Source: .Tissue Other, LEFT FOOT 5TH METATARSAL BONE  Gram Stain (10-26-23 @ 01:33):    No polymorphonuclear cells seen per low power field    No organisms seen per oil power field    Culture - Tissue with Gram Stain (collected 10-19-23 @ 11:35)  Source: .Tissue Other, LEFT FOOT BONE CULTURE  Gram Stain (10-19-23 @ 23:02):    No polymorphonuclear cells seen per low power field    No organisms seen per oil power field  Final Report (10-24-23 @ 15:08):    Rare Methicillin Resistant Staphylococcus aureus  Organism: Methicillin resistant Staphylococcus aureus (10-24-23 @ 15:08)  Organism: Methicillin resistant Staphylococcus aureus (10-24-23 @ 15:08)      Method Type: KARTHIK      -  Ampicillin/Sulbactam: R <=8/4      -  Cefazolin: R <=4      -  Clindamycin: S <=0.25      -  Daptomycin: S 0.5      -  Erythromycin: R >4      -  Gentamicin: S <=1 Should not be used as monotherapy      -  Linezolid: S 2      -  Oxacillin: R >2      -  Penicillin: R 1      -  Rifampin: S <=1 Should not be used as monotherapy      -  Tetracycline: S <=1      -  Trimethoprim/Sulfamethoxazole: S <=0.5/9.5      -  Vancomycin: S 1    Culture - Tissue with Gram Stain (collected 10-19-23 @ 11:35)  Source: .Tissue Other, LEFT FOOT DEEP TISSUE CULTURE  Gram Stain (10-19-23 @ 23:29):    No polymorphonuclear cells seen per low power field    Moderate Gram positive cocci in pairs per oil power field  Final Report (10-24-23 @ 15:06):    Numerous Methicillin Resistant Staphylococcus aureus  Organism: Methicillin resistant Staphylococcus aureus (10-24-23 @ 15:06)  Organism: Methicillin resistant Staphylococcus aureus (10-24-23 @ 15:06)      Method Type: KARTHIK      -  Ampicillin/Sulbactam: R <=8/4      -  Cefazolin: R <=4      -  Clindamycin: S <=0.25      -  Daptomycin: S 0.5      -  Erythromycin: R >4      -  Gentamicin: S <=1 Should not be used as monotherapy      -  Linezolid: S 2      -  Oxacillin: R >2      -  Penicillin: R 1      -  Rifampin: S <=1 Should not be used as monotherapy      -  Tetracycline: S <=1      -  Trimethoprim/Sulfamethoxazole: S <=0.5/9.5      -  Vancomycin: S 1    Culture - Tissue with Gram Stain (collected 10-19-23 @ 11:35)  Source: .Tissue Other, RIGHT FOOT SOFT TISSUE CULTURE  Gram Stain (10-19-23 @ 23:27):    Few polymorphonuclear leukocytes per low power field    Numerous Gram positive cocci in pairs per oil power field    Moderate Gram Variable Rods per oil power field  Final Report (10-24-23 @ 15:05):    Numerous Methicillin Resistant Staphylococcus aureus    Few Corynebacterium species "Susceptibilities not performed"  Organism: Methicillin resistant Staphylococcus aureus (10-24-23 @ 15:05)  Organism: Methicillin resistant Staphylococcus aureus (10-24-23 @ 15:05)      Method Type: KARTHIK      -  Ampicillin/Sulbactam: R <=8/4      -  Cefazolin: R <=4      -  Clindamycin: S <=0.25      -  Daptomycin: S 0.5      -  Erythromycin: R >4      -  Gentamicin: S 2 Should not be used as monotherapy      -  Linezolid: S 2      -  Oxacillin: R >2      -  Penicillin: R >8      -  Rifampin: S <=1 Should not be used as monotherapy      -  Tetracycline: S <=1      -  Trimethoprim/Sulfamethoxazole: S <=0.5/9.5      -  Vancomycin: S 1          Radiology: reviewed       [facility], Division of Infectious Diseases  GLYNN Oakley Y. Patel, S. Shah, G. [facility]  [phone]    Name: HEMA LAZCANO  Age: 78y  Gender: Male  MRN: [id number]    Interval History:  Patient seen and examined at bedside this morning  No acute overnight events.   Notes reviewed    Antibiotics:  vancomycin  IVPB 500 milliGRAM(s) IV Intermittent every 12 hours      Medications:  acetaminophen     Tablet .. 650 milliGRAM(s) Oral every 6 hours PRN  atorvastatin 80 milliGRAM(s) Oral at bedtime  bethanechol 25 milliGRAM(s) Oral two times a day  chlorhexidine 4% Liquid 1 Application(s) Topical <User Schedule>  dextrose 50% Injectable 12.5 Gram(s) IV Push once  dextrose 50% Injectable 25 Gram(s) IV Push once  dextrose 50% Injectable 25 Gram(s) IV Push once  dextrose Oral Gel 15 Gram(s) Oral once PRN  gabapentin 300 milliGRAM(s) Oral two times a day  glucagon  Injectable 1 milliGRAM(s) IntraMuscular once  insulin lispro (ADMELOG) corrective regimen sliding scale   SubCutaneous at bedtime  insulin lispro (ADMELOG) corrective regimen sliding scale   SubCutaneous three times a day before meals  lactobacillus acidophilus 1 Tablet(s) Oral two times a day with meals  loperamide 2 milliGRAM(s) Oral three times a day PRN  midodrine. 10 milliGRAM(s) Oral three times a day  pantoprazole    Tablet 40 milliGRAM(s) Oral before breakfast  sodium chloride 0.9% lock flush 10 milliLiter(s) IV Push every 1 hour PRN  tamsulosin 0.4 milliGRAM(s) Oral at bedtime  vancomycin  IVPB 500 milliGRAM(s) IV Intermittent every 12 hours      Review of Systems:  A 10-point review of systems was obtained.     Pertinent positives and negatives--  Constitutional: No fevers. No Chills. No Rigors.   Cardiovascular: No chest pain. No palpitations.  Respiratory: No shortness of breath. No cough.  Gastrointestinal: No nausea or vomiting. No diarrhea or constipation.   Psychiatric: Pleasant. Appropriate affect.    Review of systems otherwise negative except as previously noted.    Allergies: No Known Allergies    For details regarding the patient's past medical history, social history, family history, and other miscellaneous elements, please refer the initial infectious diseases consultation and/or the admitting history and physical examination for this admission.    Objective:  Vitals:   T(C): 37 (10-26-23 @ 12:33), Max: 37 (10-26-23 @ 12:33)  HR: 69 (10-26-23 @ 12:33) (60 - 69)  BP: 122/55 (10-26-23 @ 12:33) (92/40 - 134/55)  RR: 17 (10-26-23 @ 12:33) (12 - 18)  SpO2: 98% (10-26-23 @ 12:33) (92% - 98%)    Physical Examination:  General: no acute distress  HEENT: NC/AT, EOMI, anicteric, no oral lesions  Neck: supple, no palpable LAD  Cardio: S1, S2 heard, RRR, no murmurs  Resp: breath sounds heard bilaterally, no rales, wheezes or rhonchi  Abd: soft, NT, ND, + bowel sounds  Neuro: no obvious focal deficits  Ext: no edema or cyanosis  Skin: warm, dry, no visible rash      Laboratory Studies:  CBC:                       7.7    7.51  )-----------( 263      ( 26 Oct 2023 07:30 )             25.6     CMP: 10-26    139  |  105  |  19  ----------------------------<  189<H>  4.0   |  31  |  0.86    Ca    8.1<L>      26 Oct 2023 07:30        Urinalysis Basic - ( 26 Oct 2023 07:30 )    Color: x / Appearance: x / SG: x / pH: x  Gluc: 189 mg/dL / Ketone: x  / Bili: x / Urobili: x   Blood: x / Protein: x / Nitrite: x   Leuk Esterase: x / RBC: x / WBC x   Sq Epi: x / Non Sq Epi: x / Bacteria: x        Microbiology: reviewed    Culture - Tissue with Gram Stain (collected 10-25-23 @ 15:50)  Source: .Tissue Other, LEFT FOOT CUBOID BONE CULTURE  Gram Stain (10-26-23 @ 01:33):    No polymorphonuclear cells seen per low power field    No organisms seen per oil power field    Culture - Tissue with Gram Stain (collected 10-25-23 @ 15:50)  Source: .Tissue Other, LEFT FOOT 5TH METATARSAL BONE  Gram Stain (10-26-23 @ 01:33):    No polymorphonuclear cells seen per low power field    No organisms seen per oil power field    Culture - Tissue with Gram Stain (collected 10-19-23 @ 11:35)  Source: .Tissue Other, LEFT FOOT BONE CULTURE  Gram Stain (10-19-23 @ 23:02):    No polymorphonuclear cells seen per low power field    No organisms seen per oil power field  Final Report (10-24-23 @ 15:08):    Rare Methicillin Resistant Staphylococcus aureus  Organism: Methicillin resistant Staphylococcus aureus (10-24-23 @ 15:08)  Organism: Methicillin resistant Staphylococcus aureus (10-24-23 @ 15:08)      Method Type: KARTHIK      -  Ampicillin/Sulbactam: R <=8/4      -  Cefazolin: R <=4      -  Clindamycin: S <=0.25      -  Daptomycin: S 0.5      -  Erythromycin: R >4      -  Gentamicin: S <=1 Should not be used as monotherapy      -  Linezolid: S 2      -  Oxacillin: R >2      -  Penicillin: R 1      -  Rifampin: S <=1 Should not be used as monotherapy      -  Tetracycline: S <=1      -  Trimethoprim/Sulfamethoxazole: S <=0.5/9.5      -  Vancomycin: S 1    Culture - Tissue with Gram Stain (collected 10-19-23 @ 11:35)  Source: .Tissue Other, LEFT FOOT DEEP TISSUE CULTURE  Gram Stain (10-19-23 @ 23:29):    No polymorphonuclear cells seen per low power field    Moderate Gram positive cocci in pairs per oil power field  Final Report (10-24-23 @ 15:06):    Numerous Methicillin Resistant Staphylococcus aureus  Organism: Methicillin resistant Staphylococcus aureus (10-24-23 @ 15:06)  Organism: Methicillin resistant Staphylococcus aureus (10-24-23 @ 15:06)      Method Type: KARTHIK      -  Ampicillin/Sulbactam: R <=8/4      -  Cefazolin: R <=4      -  Clindamycin: S <=0.25      -  Daptomycin: S 0.5      -  Erythromycin: R >4      -  Gentamicin: S <=1 Should not be used as monotherapy      -  Linezolid: S 2      -  Oxacillin: R >2      -  Penicillin: R 1      -  Rifampin: S <=1 Should not be used as monotherapy      -  Tetracycline: S <=1      -  Trimethoprim/Sulfamethoxazole: S <=0.5/9.5      -  Vancomycin: S 1    Culture - Tissue with Gram Stain (collected 10-19-23 @ 11:35)  Source: .Tissue Other, RIGHT FOOT SOFT TISSUE CULTURE  Gram Stain (10-19-23 @ 23:27):    Few polymorphonuclear leukocytes per low power field    Numerous Gram positive cocci in pairs per oil power field    Moderate Gram Variable Rods per oil power field  Final Report (10-24-23 @ 15:05):    Numerous Methicillin Resistant Staphylococcus aureus    Few Corynebacterium species "Susceptibilities not performed"  Organism: Methicillin resistant Staphylococcus aureus (10-24-23 @ 15:05)  Organism: Methicillin resistant Staphylococcus aureus (10-24-23 @ 15:05)      Method Type: KARTHIK      -  Ampicillin/Sulbactam: R <=8/4      -  Cefazolin: R <=4      -  Clindamycin: S <=0.25      -  Daptomycin: S 0.5      -  Erythromycin: R >4      -  Gentamicin: S 2 Should not be used as monotherapy      -  Linezolid: S 2      -  Oxacillin: R >2      -  Penicillin: R >8      -  Rifampin: S <=1 Should not be used as monotherapy      -  Tetracycline: S <=1      -  Trimethoprim/Sulfamethoxazole: S <=0.5/9.5      -  Vancomycin: S 1          Radiology: reviewed       [facility], Division of Infectious Diseases  GLYNN Oakley Y. Patel, S. Shah, G. [facility]  [phone]    Name: HEMA LAZCANO  Age: 78y  Gender: Male  MRN: [id number]    Interval History:  Patient seen and examined at bedside this morning  No acute overnight events.   Notes reviewed    Antibiotics:  vancomycin  IVPB 500 milliGRAM(s) IV Intermittent every 12 hours      Medications:  acetaminophen     Tablet .. 650 milliGRAM(s) Oral every 6 hours PRN  atorvastatin 80 milliGRAM(s) Oral at bedtime  bethanechol 25 milliGRAM(s) Oral two times a day  chlorhexidine 4% Liquid 1 Application(s) Topical <User Schedule>  dextrose 50% Injectable 12.5 Gram(s) IV Push once  dextrose 50% Injectable 25 Gram(s) IV Push once  dextrose 50% Injectable 25 Gram(s) IV Push once  dextrose Oral Gel 15 Gram(s) Oral once PRN  gabapentin 300 milliGRAM(s) Oral two times a day  glucagon  Injectable 1 milliGRAM(s) IntraMuscular once  insulin lispro (ADMELOG) corrective regimen sliding scale   SubCutaneous at bedtime  insulin lispro (ADMELOG) corrective regimen sliding scale   SubCutaneous three times a day before meals  lactobacillus acidophilus 1 Tablet(s) Oral two times a day with meals  loperamide 2 milliGRAM(s) Oral three times a day PRN  midodrine. 10 milliGRAM(s) Oral three times a day  pantoprazole    Tablet 40 milliGRAM(s) Oral before breakfast  sodium chloride 0.9% lock flush 10 milliLiter(s) IV Push every 1 hour PRN  tamsulosin 0.4 milliGRAM(s) Oral at bedtime  vancomycin  IVPB 500 milliGRAM(s) IV Intermittent every 12 hours      Review of Systems:  A 10-point review of systems was obtained.     Pertinent positives and negatives--  Constitutional: No fevers. No Chills. No Rigors.   Cardiovascular: No chest pain. No palpitations.  Respiratory: No shortness of breath. No cough.  Gastrointestinal: No nausea or vomiting. No diarrhea or constipation.   Psychiatric: Pleasant. Appropriate affect.    Review of systems otherwise negative except as previously noted.    Allergies: No Known Allergies    For details regarding the patient's past medical history, social history, family history, and other miscellaneous elements, please refer the initial infectious diseases consultation and/or the admitting history and physical examination for this admission.    Objective:  Vitals:   T(C): 37 (10-26-23 @ 12:33), Max: 37 (10-26-23 @ 12:33)  HR: 69 (10-26-23 @ 12:33) (60 - 69)  BP: 122/55 (10-26-23 @ 12:33) (92/40 - 134/55)  RR: 17 (10-26-23 @ 12:33) (12 - 18)  SpO2: 98% (10-26-23 @ 12:33) (92% - 98%)    Physical Examination:  General: no acute distress  HEENT: NC/AT, EOMI, anicteric, no oral lesions  Neck: supple, no palpable LAD  Cardio: S1, S2 heard, RRR, no murmurs  Resp: breath sounds heard bilaterally, no rales, wheezes or rhonchi  Abd: soft, NT, ND, + bowel sounds  Neuro: no obvious focal deficits  Ext: no edema or cyanosis  Skin: warm, dry, no visible rash      Laboratory Studies:  CBC:                       7.7    7.51  )-----------( 263      ( 26 Oct 2023 07:30 )             25.6     CMP: 10-26    139  |  105  |  19  ----------------------------<  189<H>  4.0   |  31  |  0.86    Ca    8.1<L>      26 Oct 2023 07:30        Urinalysis Basic - ( 26 Oct 2023 07:30 )    Color: x / Appearance: x / SG: x / pH: x  Gluc: 189 mg/dL / Ketone: x  / Bili: x / Urobili: x   Blood: x / Protein: x / Nitrite: x   Leuk Esterase: x / RBC: x / WBC x   Sq Epi: x / Non Sq Epi: x / Bacteria: x        Microbiology: reviewed    Culture - Tissue with Gram Stain (collected 10-25-23 @ 15:50)  Source: .Tissue Other, LEFT FOOT CUBOID BONE CULTURE  Gram Stain (10-26-23 @ 01:33):    No polymorphonuclear cells seen per low power field    No organisms seen per oil power field    Culture - Tissue with Gram Stain (collected 10-25-23 @ 15:50)  Source: .Tissue Other, LEFT FOOT 5TH METATARSAL BONE  Gram Stain (10-26-23 @ 01:33):    No polymorphonuclear cells seen per low power field    No organisms seen per oil power field    Culture - Tissue with Gram Stain (collected 10-19-23 @ 11:35)  Source: .Tissue Other, LEFT FOOT BONE CULTURE  Gram Stain (10-19-23 @ 23:02):    No polymorphonuclear cells seen per low power field    No organisms seen per oil power field  Final Report (10-24-23 @ 15:08):    Rare Methicillin Resistant Staphylococcus aureus  Organism: Methicillin resistant Staphylococcus aureus (10-24-23 @ 15:08)  Organism: Methicillin resistant Staphylococcus aureus (10-24-23 @ 15:08)      Method Type: KARTHIK      -  Ampicillin/Sulbactam: R <=8/4      -  Cefazolin: R <=4      -  Clindamycin: S <=0.25      -  Daptomycin: S 0.5      -  Erythromycin: R >4      -  Gentamicin: S <=1 Should not be used as monotherapy      -  Linezolid: S 2      -  Oxacillin: R >2      -  Penicillin: R 1      -  Rifampin: S <=1 Should not be used as monotherapy      -  Tetracycline: S <=1      -  Trimethoprim/Sulfamethoxazole: S <=0.5/9.5      -  Vancomycin: S 1    Culture - Tissue with Gram Stain (collected 10-19-23 @ 11:35)  Source: .Tissue Other, LEFT FOOT DEEP TISSUE CULTURE  Gram Stain (10-19-23 @ 23:29):    No polymorphonuclear cells seen per low power field    Moderate Gram positive cocci in pairs per oil power field  Final Report (10-24-23 @ 15:06):    Numerous Methicillin Resistant Staphylococcus aureus  Organism: Methicillin resistant Staphylococcus aureus (10-24-23 @ 15:06)  Organism: Methicillin resistant Staphylococcus aureus (10-24-23 @ 15:06)      Method Type: KARTHIK      -  Ampicillin/Sulbactam: R <=8/4      -  Cefazolin: R <=4      -  Clindamycin: S <=0.25      -  Daptomycin: S 0.5      -  Erythromycin: R >4      -  Gentamicin: S <=1 Should not be used as monotherapy      -  Linezolid: S 2      -  Oxacillin: R >2      -  Penicillin: R 1      -  Rifampin: S <=1 Should not be used as monotherapy      -  Tetracycline: S <=1      -  Trimethoprim/Sulfamethoxazole: S <=0.5/9.5      -  Vancomycin: S 1    Culture - Tissue with Gram Stain (collected 10-19-23 @ 11:35)  Source: .Tissue Other, RIGHT FOOT SOFT TISSUE CULTURE  Gram Stain (10-19-23 @ 23:27):    Few polymorphonuclear leukocytes per low power field    Numerous Gram positive cocci in pairs per oil power field    Moderate Gram Variable Rods per oil power field  Final Report (10-24-23 @ 15:05):    Numerous Methicillin Resistant Staphylococcus aureus    Few Corynebacterium species "Susceptibilities not performed"  Organism: Methicillin resistant Staphylococcus aureus (10-24-23 @ 15:05)  Organism: Methicillin resistant Staphylococcus aureus (10-24-23 @ 15:05)      Method Type: KARTHIK      -  Ampicillin/Sulbactam: R <=8/4      -  Cefazolin: R <=4      -  Clindamycin: S <=0.25      -  Daptomycin: S 0.5      -  Erythromycin: R >4      -  Gentamicin: S 2 Should not be used as monotherapy      -  Linezolid: S 2      -  Oxacillin: R >2      -  Penicillin: R >8      -  Rifampin: S <=1 Should not be used as monotherapy      -  Tetracycline: S <=1      -  Trimethoprim/Sulfamethoxazole: S <=0.5/9.5      -  Vancomycin: S 1          Radiology: reviewed       [facility], Division of Infectious Diseases  GLYNN Oakley Y. Patel, S. Shah, G. [facility]  [phone]    Name: HEMA LAZCANO  Age: 78y  Gender: Male  MRN: [id number]    Interval History:  Patient seen and examined at bedside this morning  No acute overnight events. Afebrile  Notes reviewed    Antibiotics:  vancomycin  IVPB 500 milliGRAM(s) IV Intermittent every 12 hours      Medications:  acetaminophen     Tablet .. 650 milliGRAM(s) Oral every 6 hours PRN  atorvastatin 80 milliGRAM(s) Oral at bedtime  bethanechol 25 milliGRAM(s) Oral two times a day  chlorhexidine 4% Liquid 1 Application(s) Topical <User Schedule>  dextrose 50% Injectable 12.5 Gram(s) IV Push once  dextrose 50% Injectable 25 Gram(s) IV Push once  dextrose 50% Injectable 25 Gram(s) IV Push once  dextrose Oral Gel 15 Gram(s) Oral once PRN  gabapentin 300 milliGRAM(s) Oral two times a day  glucagon  Injectable 1 milliGRAM(s) IntraMuscular once  insulin lispro (ADMELOG) corrective regimen sliding scale   SubCutaneous at bedtime  insulin lispro (ADMELOG) corrective regimen sliding scale   SubCutaneous three times a day before meals  lactobacillus acidophilus 1 Tablet(s) Oral two times a day with meals  loperamide 2 milliGRAM(s) Oral three times a day PRN  midodrine. 10 milliGRAM(s) Oral three times a day  pantoprazole    Tablet 40 milliGRAM(s) Oral before breakfast  sodium chloride 0.9% lock flush 10 milliLiter(s) IV Push every 1 hour PRN  tamsulosin 0.4 milliGRAM(s) Oral at bedtime  vancomycin  IVPB 500 milliGRAM(s) IV Intermittent every 12 hours      Review of Systems:  A 10-point review of systems was obtained.   Review of systems otherwise negative except as previously noted.    Allergies: No Known Allergies    For details regarding the patient's past medical history, social history, family history, and other miscellaneous elements, please refer the initial infectious diseases consultation and/or the admitting history and physical examination for this admission.    Objective:  Vitals:   T(C): 37 (10-26-23 @ 12:33), Max: 37 (10-26-23 @ 12:33)  HR: 69 (10-26-23 @ 12:33) (60 - 69)  BP: 122/55 (10-26-23 @ 12:33) (92/40 - 134/55)  RR: 17 (10-26-23 @ 12:33) (12 - 18)  SpO2: 98% (10-26-23 @ 12:33) (92% - 98%)    Physical Examination:  General: no acute distress  HEENT: NC/AT, EOMI,   Cardio: RRR  Resp: decreased breath sounds   Abd: soft, NT, ND  Ext: no edema or cyanosis, dressing b/l feet c/d/i  Skin: warm, dry, no visible rash    Laboratory Studies:  CBC:                       7.7    7.51  )-----------( 263      ( 26 Oct 2023 07:30 )             25.6     CMP: 10-26    139  |  105  |  19  ----------------------------<  189<H>  4.0   |  31  |  0.86    Ca    8.1<L>      26 Oct 2023 07:30        Urinalysis Basic - ( 26 Oct 2023 07:30 )    Color: x / Appearance: x / SG: x / pH: x  Gluc: 189 mg/dL / Ketone: x  / Bili: x / Urobili: x   Blood: x / Protein: x / Nitrite: x   Leuk Esterase: x / RBC: x / WBC x   Sq Epi: x / Non Sq Epi: x / Bacteria: x        Microbiology: reviewed    Culture - Tissue with Gram Stain (collected 10-25-23 @ 15:50)  Source: .Tissue Other, LEFT FOOT CUBOID BONE CULTURE  Gram Stain (10-26-23 @ 01:33):    No polymorphonuclear cells seen per low power field    No organisms seen per oil power field    Culture - Tissue with Gram Stain (collected 10-25-23 @ 15:50)  Source: .Tissue Other, LEFT FOOT 5TH METATARSAL BONE  Gram Stain (10-26-23 @ 01:33):    No polymorphonuclear cells seen per low power field    No organisms seen per oil power field    Culture - Tissue with Gram Stain (collected 10-19-23 @ 11:35)  Source: .Tissue Other, LEFT FOOT BONE CULTURE  Gram Stain (10-19-23 @ 23:02):    No polymorphonuclear cells seen per low power field    No organisms seen per oil power field  Final Report (10-24-23 @ 15:08):    Rare Methicillin Resistant Staphylococcus aureus  Organism: Methicillin resistant Staphylococcus aureus (10-24-23 @ 15:08)  Organism: Methicillin resistant Staphylococcus aureus (10-24-23 @ 15:08)      Method Type: KARTHIK      -  Ampicillin/Sulbactam: R <=8/4      -  Cefazolin: R <=4      -  Clindamycin: S <=0.25      -  Daptomycin: S 0.5      -  Erythromycin: R >4      -  Gentamicin: S <=1 Should not be used as monotherapy      -  Linezolid: S 2      -  Oxacillin: R >2      -  Penicillin: R 1      -  Rifampin: S <=1 Should not be used as monotherapy      -  Tetracycline: S <=1      -  Trimethoprim/Sulfamethoxazole: S <=0.5/9.5      -  Vancomycin: S 1    Culture - Tissue with Gram Stain (collected 10-19-23 @ 11:35)  Source: .Tissue Other, LEFT FOOT DEEP TISSUE CULTURE  Gram Stain (10-19-23 @ 23:29):    No polymorphonuclear cells seen per low power field    Moderate Gram positive cocci in pairs per oil power field  Final Report (10-24-23 @ 15:06):    Numerous Methicillin Resistant Staphylococcus aureus  Organism: Methicillin resistant Staphylococcus aureus (10-24-23 @ 15:06)  Organism: Methicillin resistant Staphylococcus aureus (10-24-23 @ 15:06)      Method Type: KARTHIK      -  Ampicillin/Sulbactam: R <=8/4      -  Cefazolin: R <=4      -  Clindamycin: S <=0.25      -  Daptomycin: S 0.5      -  Erythromycin: R >4      -  Gentamicin: S <=1 Should not be used as monotherapy      -  Linezolid: S 2      -  Oxacillin: R >2      -  Penicillin: R 1      -  Rifampin: S <=1 Should not be used as monotherapy      -  Tetracycline: S <=1      -  Trimethoprim/Sulfamethoxazole: S <=0.5/9.5      -  Vancomycin: S 1    Culture - Tissue with Gram Stain (collected 10-19-23 @ 11:35)  Source: .Tissue Other, RIGHT FOOT SOFT TISSUE CULTURE  Gram Stain (10-19-23 @ 23:27):    Few polymorphonuclear leukocytes per low power field    Numerous Gram positive cocci in pairs per oil power field    Moderate Gram Variable Rods per oil power field  Final Report (10-24-23 @ 15:05):    Numerous Methicillin Resistant Staphylococcus aureus    Few Corynebacterium species "Susceptibilities not performed"  Organism: Methicillin resistant Staphylococcus aureus (10-24-23 @ 15:05)  Organism: Methicillin resistant Staphylococcus aureus (10-24-23 @ 15:05)      Method Type: KARTHIK      -  Ampicillin/Sulbactam: R <=8/4      -  Cefazolin: R <=4      -  Clindamycin: S <=0.25      -  Daptomycin: S 0.5      -  Erythromycin: R >4      -  Gentamicin: S 2 Should not be used as monotherapy      -  Linezolid: S 2      -  Oxacillin: R >2      -  Penicillin: R >8      -  Rifampin: S <=1 Should not be used as monotherapy      -  Tetracycline: S <=1      -  Trimethoprim/Sulfamethoxazole: S <=0.5/9.5      -  Vancomycin: S 1          Radiology: reviewed       Kenyatta, Division of Infectious Diseases  GLYNN Oakley Y. Patel, S. Shah, G. [facility]  [phone]    Name: HEMA LAZCANO  Age: 78y  Gender: Male  MRN: [id number]    Interval History:  Patient seen and examined at bedside this morning  No acute overnight events. Afebrile  Notes reviewed    Antibiotics:  vancomycin  IVPB 500 milliGRAM(s) IV Intermittent every 12 hours      Medications:  acetaminophen     Tablet .. 650 milliGRAM(s) Oral every 6 hours PRN  atorvastatin 80 milliGRAM(s) Oral at bedtime  bethanechol 25 milliGRAM(s) Oral two times a day  chlorhexidine 4% Liquid 1 Application(s) Topical <User Schedule>  dextrose 50% Injectable 12.5 Gram(s) IV Push once  dextrose 50% Injectable 25 Gram(s) IV Push once  dextrose 50% Injectable 25 Gram(s) IV Push once  dextrose Oral Gel 15 Gram(s) Oral once PRN  gabapentin 300 milliGRAM(s) Oral two times a day  glucagon  Injectable 1 milliGRAM(s) IntraMuscular once  insulin lispro (ADMELOG) corrective regimen sliding scale   SubCutaneous at bedtime  insulin lispro (ADMELOG) corrective regimen sliding scale   SubCutaneous three times a day before meals  lactobacillus acidophilus 1 Tablet(s) Oral two times a day with meals  loperamide 2 milliGRAM(s) Oral three times a day PRN  midodrine. 10 milliGRAM(s) Oral three times a day  pantoprazole    Tablet 40 milliGRAM(s) Oral before breakfast  sodium chloride 0.9% lock flush 10 milliLiter(s) IV Push every 1 hour PRN  tamsulosin 0.4 milliGRAM(s) Oral at bedtime  vancomycin  IVPB 500 milliGRAM(s) IV Intermittent every 12 hours      Review of Systems:  A 10-point review of systems was obtained.   Review of systems otherwise negative except as previously noted.    Allergies: No Known Allergies    For details regarding the patient's past medical history, social history, family history, and other miscellaneous elements, please refer the initial infectious diseases consultation and/or the admitting history and physical examination for this admission.    Objective:  Vitals:   T(C): 37 (10-26-23 @ 12:33), Max: 37 (10-26-23 @ 12:33)  HR: 69 (10-26-23 @ 12:33) (60 - 69)  BP: 122/55 (10-26-23 @ 12:33) (92/40 - 134/55)  RR: 17 (10-26-23 @ 12:33) (12 - 18)  SpO2: 98% (10-26-23 @ 12:33) (92% - 98%)    Physical Examination:  General: no acute distress  HEENT: NC/AT, EOMI,   Cardio: RRR  Resp: decreased breath sounds   Abd: soft, NT, ND  Ext: no edema or cyanosis, dressing b/l feet c/d/i  Skin: warm, dry, no visible rash    Laboratory Studies:  CBC:                       7.7    7.51  )-----------( 263      ( 26 Oct 2023 07:30 )             25.6     CMP: 10-26    139  |  105  |  19  ----------------------------<  189<H>  4.0   |  31  |  0.86    Ca    8.1<L>      26 Oct 2023 07:30        Urinalysis Basic - ( 26 Oct 2023 07:30 )    Color: x / Appearance: x / SG: x / pH: x  Gluc: 189 mg/dL / Ketone: x  / Bili: x / Urobili: x   Blood: x / Protein: x / Nitrite: x   Leuk Esterase: x / RBC: x / WBC x   Sq Epi: x / Non Sq Epi: x / Bacteria: x        Microbiology: reviewed    Culture - Tissue with Gram Stain (collected 10-25-23 @ 15:50)  Source: .Tissue Other, LEFT FOOT CUBOID BONE CULTURE  Gram Stain (10-26-23 @ 01:33):    No polymorphonuclear cells seen per low power field    No organisms seen per oil power field    Culture - Tissue with Gram Stain (collected 10-25-23 @ 15:50)  Source: .Tissue Other, LEFT FOOT 5TH METATARSAL BONE  Gram Stain (10-26-23 @ 01:33):    No polymorphonuclear cells seen per low power field    No organisms seen per oil power field    Culture - Tissue with Gram Stain (collected 10-19-23 @ 11:35)  Source: .Tissue Other, LEFT FOOT BONE CULTURE  Gram Stain (10-19-23 @ 23:02):    No polymorphonuclear cells seen per low power field    No organisms seen per oil power field  Final Report (10-24-23 @ 15:08):    Rare Methicillin Resistant Staphylococcus aureus  Organism: Methicillin resistant Staphylococcus aureus (10-24-23 @ 15:08)  Organism: Methicillin resistant Staphylococcus aureus (10-24-23 @ 15:08)      Method Type: KARTHIK      -  Ampicillin/Sulbactam: R <=8/4      -  Cefazolin: R <=4      -  Clindamycin: S <=0.25      -  Daptomycin: S 0.5      -  Erythromycin: R >4      -  Gentamicin: S <=1 Should not be used as monotherapy      -  Linezolid: S 2      -  Oxacillin: R >2      -  Penicillin: R 1      -  Rifampin: S <=1 Should not be used as monotherapy      -  Tetracycline: S <=1      -  Trimethoprim/Sulfamethoxazole: S <=0.5/9.5      -  Vancomycin: S 1    Culture - Tissue with Gram Stain (collected 10-19-23 @ 11:35)  Source: .Tissue Other, LEFT FOOT DEEP TISSUE CULTURE  Gram Stain (10-19-23 @ 23:29):    No polymorphonuclear cells seen per low power field    Moderate Gram positive cocci in pairs per oil power field  Final Report (10-24-23 @ 15:06):    Numerous Methicillin Resistant Staphylococcus aureus  Organism: Methicillin resistant Staphylococcus aureus (10-24-23 @ 15:06)  Organism: Methicillin resistant Staphylococcus aureus (10-24-23 @ 15:06)      Method Type: KARTHIK      -  Ampicillin/Sulbactam: R <=8/4      -  Cefazolin: R <=4      -  Clindamycin: S <=0.25      -  Daptomycin: S 0.5      -  Erythromycin: R >4      -  Gentamicin: S <=1 Should not be used as monotherapy      -  Linezolid: S 2      -  Oxacillin: R >2      -  Penicillin: R 1      -  Rifampin: S <=1 Should not be used as monotherapy      -  Tetracycline: S <=1      -  Trimethoprim/Sulfamethoxazole: S <=0.5/9.5      -  Vancomycin: S 1    Culture - Tissue with Gram Stain (collected 10-19-23 @ 11:35)  Source: .Tissue Other, RIGHT FOOT SOFT TISSUE CULTURE  Gram Stain (10-19-23 @ 23:27):    Few polymorphonuclear leukocytes per low power field    Numerous Gram positive cocci in pairs per oil power field    Moderate Gram Variable Rods per oil power field  Final Report (10-24-23 @ 15:05):    Numerous Methicillin Resistant Staphylococcus aureus    Few Corynebacterium species "Susceptibilities not performed"  Organism: Methicillin resistant Staphylococcus aureus (10-24-23 @ 15:05)  Organism: Methicillin resistant Staphylococcus aureus (10-24-23 @ 15:05)      Method Type: KARTHIK      -  Ampicillin/Sulbactam: R <=8/4      -  Cefazolin: R <=4      -  Clindamycin: S <=0.25      -  Daptomycin: S 0.5      -  Erythromycin: R >4      -  Gentamicin: S 2 Should not be used as monotherapy      -  Linezolid: S 2      -  Oxacillin: R >2      -  Penicillin: R >8      -  Rifampin: S <=1 Should not be used as monotherapy      -  Tetracycline: S <=1      -  Trimethoprim/Sulfamethoxazole: S <=0.5/9.5      -  Vancomycin: S 1          Radiology: reviewed       Kenyatta, Division of Infectious Diseases  GLYNN Oakley Y. Patel, S. Shah, G. [facility]  [phone]    Name: HMEA LAZCANO  Age: 78y  Gender: Male  MRN: [id number]    Interval History:  Patient seen and examined at bedside this morning  No acute overnight events. Afebrile  Notes reviewed    Antibiotics:  vancomycin  IVPB 500 milliGRAM(s) IV Intermittent every 12 hours      Medications:  acetaminophen     Tablet .. 650 milliGRAM(s) Oral every 6 hours PRN  atorvastatin 80 milliGRAM(s) Oral at bedtime  bethanechol 25 milliGRAM(s) Oral two times a day  chlorhexidine 4% Liquid 1 Application(s) Topical <User Schedule>  dextrose 50% Injectable 12.5 Gram(s) IV Push once  dextrose 50% Injectable 25 Gram(s) IV Push once  dextrose 50% Injectable 25 Gram(s) IV Push once  dextrose Oral Gel 15 Gram(s) Oral once PRN  gabapentin 300 milliGRAM(s) Oral two times a day  glucagon  Injectable 1 milliGRAM(s) IntraMuscular once  insulin lispro (ADMELOG) corrective regimen sliding scale   SubCutaneous at bedtime  insulin lispro (ADMELOG) corrective regimen sliding scale   SubCutaneous three times a day before meals  lactobacillus acidophilus 1 Tablet(s) Oral two times a day with meals  loperamide 2 milliGRAM(s) Oral three times a day PRN  midodrine. 10 milliGRAM(s) Oral three times a day  pantoprazole    Tablet 40 milliGRAM(s) Oral before breakfast  sodium chloride 0.9% lock flush 10 milliLiter(s) IV Push every 1 hour PRN  tamsulosin 0.4 milliGRAM(s) Oral at bedtime  vancomycin  IVPB 500 milliGRAM(s) IV Intermittent every 12 hours      Review of Systems:  A 10-point review of systems was obtained.   Review of systems otherwise negative except as previously noted.    Allergies: No Known Allergies    For details regarding the patient's past medical history, social history, family history, and other miscellaneous elements, please refer the initial infectious diseases consultation and/or the admitting history and physical examination for this admission.    Objective:  Vitals:   T(C): 37 (10-26-23 @ 12:33), Max: 37 (10-26-23 @ 12:33)  HR: 69 (10-26-23 @ 12:33) (60 - 69)  BP: 122/55 (10-26-23 @ 12:33) (92/40 - 134/55)  RR: 17 (10-26-23 @ 12:33) (12 - 18)  SpO2: 98% (10-26-23 @ 12:33) (92% - 98%)    Physical Examination:  General: no acute distress  HEENT: NC/AT, EOMI,   Cardio: RRR  Resp: decreased breath sounds   Abd: soft, NT, ND  Ext: no edema or cyanosis, dressing b/l feet c/d/i  Skin: warm, dry, no visible rash    Laboratory Studies:  CBC:                       7.7    7.51  )-----------( 263      ( 26 Oct 2023 07:30 )             25.6     CMP: 10-26    139  |  105  |  19  ----------------------------<  189<H>  4.0   |  31  |  0.86    Ca    8.1<L>      26 Oct 2023 07:30        Urinalysis Basic - ( 26 Oct 2023 07:30 )    Color: x / Appearance: x / SG: x / pH: x  Gluc: 189 mg/dL / Ketone: x  / Bili: x / Urobili: x   Blood: x / Protein: x / Nitrite: x   Leuk Esterase: x / RBC: x / WBC x   Sq Epi: x / Non Sq Epi: x / Bacteria: x        Microbiology: reviewed    Culture - Tissue with Gram Stain (collected 10-25-23 @ 15:50)  Source: .Tissue Other, LEFT FOOT CUBOID BONE CULTURE  Gram Stain (10-26-23 @ 01:33):    No polymorphonuclear cells seen per low power field    No organisms seen per oil power field    Culture - Tissue with Gram Stain (collected 10-25-23 @ 15:50)  Source: .Tissue Other, LEFT FOOT 5TH METATARSAL BONE  Gram Stain (10-26-23 @ 01:33):    No polymorphonuclear cells seen per low power field    No organisms seen per oil power field    Culture - Tissue with Gram Stain (collected 10-19-23 @ 11:35)  Source: .Tissue Other, LEFT FOOT BONE CULTURE  Gram Stain (10-19-23 @ 23:02):    No polymorphonuclear cells seen per low power field    No organisms seen per oil power field  Final Report (10-24-23 @ 15:08):    Rare Methicillin Resistant Staphylococcus aureus  Organism: Methicillin resistant Staphylococcus aureus (10-24-23 @ 15:08)  Organism: Methicillin resistant Staphylococcus aureus (10-24-23 @ 15:08)      Method Type: KARTHIK      -  Ampicillin/Sulbactam: R <=8/4      -  Cefazolin: R <=4      -  Clindamycin: S <=0.25      -  Daptomycin: S 0.5      -  Erythromycin: R >4      -  Gentamicin: S <=1 Should not be used as monotherapy      -  Linezolid: S 2      -  Oxacillin: R >2      -  Penicillin: R 1      -  Rifampin: S <=1 Should not be used as monotherapy      -  Tetracycline: S <=1      -  Trimethoprim/Sulfamethoxazole: S <=0.5/9.5      -  Vancomycin: S 1    Culture - Tissue with Gram Stain (collected 10-19-23 @ 11:35)  Source: .Tissue Other, LEFT FOOT DEEP TISSUE CULTURE  Gram Stain (10-19-23 @ 23:29):    No polymorphonuclear cells seen per low power field    Moderate Gram positive cocci in pairs per oil power field  Final Report (10-24-23 @ 15:06):    Numerous Methicillin Resistant Staphylococcus aureus  Organism: Methicillin resistant Staphylococcus aureus (10-24-23 @ 15:06)  Organism: Methicillin resistant Staphylococcus aureus (10-24-23 @ 15:06)      Method Type: KARTHIK      -  Ampicillin/Sulbactam: R <=8/4      -  Cefazolin: R <=4      -  Clindamycin: S <=0.25      -  Daptomycin: S 0.5      -  Erythromycin: R >4      -  Gentamicin: S <=1 Should not be used as monotherapy      -  Linezolid: S 2      -  Oxacillin: R >2      -  Penicillin: R 1      -  Rifampin: S <=1 Should not be used as monotherapy      -  Tetracycline: S <=1      -  Trimethoprim/Sulfamethoxazole: S <=0.5/9.5      -  Vancomycin: S 1    Culture - Tissue with Gram Stain (collected 10-19-23 @ 11:35)  Source: .Tissue Other, RIGHT FOOT SOFT TISSUE CULTURE  Gram Stain (10-19-23 @ 23:27):    Few polymorphonuclear leukocytes per low power field    Numerous Gram positive cocci in pairs per oil power field    Moderate Gram Variable Rods per oil power field  Final Report (10-24-23 @ 15:05):    Numerous Methicillin Resistant Staphylococcus aureus    Few Corynebacterium species "Susceptibilities not performed"  Organism: Methicillin resistant Staphylococcus aureus (10-24-23 @ 15:05)  Organism: Methicillin resistant Staphylococcus aureus (10-24-23 @ 15:05)      Method Type: KARTHIK      -  Ampicillin/Sulbactam: R <=8/4      -  Cefazolin: R <=4      -  Clindamycin: S <=0.25      -  Daptomycin: S 0.5      -  Erythromycin: R >4      -  Gentamicin: S 2 Should not be used as monotherapy      -  Linezolid: S 2      -  Oxacillin: R >2      -  Penicillin: R >8      -  Rifampin: S <=1 Should not be used as monotherapy      -  Tetracycline: S <=1      -  Trimethoprim/Sulfamethoxazole: S <=0.5/9.5      -  Vancomycin: S 1          Radiology: reviewed

## 2023-10-26 NOTE — PROGRESS NOTE ADULT - SUBJECTIVE AND OBJECTIVE BOX
Patient is a 78y old  Male who presents with a chief complaint of fever and weakness (25 Oct 2023 12:37)      INTERVAL HPI/OVERNIGHT EVENTS: stable pod 1    MEDICATIONS  (STANDING):  atorvastatin 80 milliGRAM(s) Oral at bedtime  bethanechol 25 milliGRAM(s) Oral two times a day  chlorhexidine 4% Liquid 1 Application(s) Topical <User Schedule>  dextrose 50% Injectable 12.5 Gram(s) IV Push once  dextrose 50% Injectable 25 Gram(s) IV Push once  dextrose 50% Injectable 25 Gram(s) IV Push once  gabapentin 300 milliGRAM(s) Oral two times a day  glucagon  Injectable 1 milliGRAM(s) IntraMuscular once  insulin lispro (ADMELOG) corrective regimen sliding scale   SubCutaneous at bedtime  insulin lispro (ADMELOG) corrective regimen sliding scale   SubCutaneous three times a day before meals  lactobacillus acidophilus 1 Tablet(s) Oral two times a day with meals  midodrine. 10 milliGRAM(s) Oral three times a day  pantoprazole    Tablet 40 milliGRAM(s) Oral before breakfast  tamsulosin 0.4 milliGRAM(s) Oral at bedtime  vancomycin  IVPB 500 milliGRAM(s) IV Intermittent every 12 hours    MEDICATIONS  (PRN):  acetaminophen     Tablet .. 650 milliGRAM(s) Oral every 6 hours PRN Temp greater or equal to 38C (100.4F), Moderate Pain (4 - 6)  dextrose Oral Gel 15 Gram(s) Oral once PRN Blood Glucose LESS THAN 70 milliGRAM(s)/deciliter  loperamide 2 milliGRAM(s) Oral three times a day PRN Diarrhea  sodium chloride 0.9% lock flush 10 milliLiter(s) IV Push every 1 hour PRN Pre/post blood products, medications, blood draw, and to maintain line patency      Allergies    No Known Allergies    Intolerances        REVIEW OF SYSTEMS:  CONSTITUTIONAL: No fever, weight loss, or fatigue  EYES: No eye pain, visual disturbances  ENMT:  No difficulty hearing, tinnitus, vertigo; No sinus or throat pain  NECK: No pain or stiffness  RESPIRATORY: No cough, wheezing, chills or hemoptysis; No shortness of breath  CARDIOVASCULAR: No chest pain, palpitations, dizziness  GASTROINTESTINAL: No abdominal or epigastric pain. No nausea, vomiting, or hematemesis; No diarrhea or constipation. No melena or hematochezia.  GENITOURINARY: No dysuria, frequency, hematuria, or incontinence  NEUROLOGICAL: No headaches, memory loss, loss of strength, numbness, or tremors  SKIN: No itching, burning  LYMPH NODES: No enlarged glands  MUSCULOSKELETAL: No joint pain or swelling; No muscle, back, or extremity pain  PSYCHIATRIC: No depression, mood swings  HEME/LYMPH: No easy bruising, or bleeding gums  ALLERGY AND IMMUNOLOGIC: No hives    Vital Signs Last 24 Hrs  T(C): 36.8 (26 Oct 2023 04:44), Max: 37 (25 Oct 2023 12:15)  T(F): 98.2 (26 Oct 2023 04:44), Max: 98.6 (25 Oct 2023 12:15)  HR: 61 (26 Oct 2023 04:44) (60 - 61)  BP: 120/55 (26 Oct 2023 04:44) (92/40 - 134/55)  BP(mean): --  RR: 17 (26 Oct 2023 04:44) (12 - 18)  SpO2: 98% (26 Oct 2023 04:44) (92% - 98%)    Parameters below as of 26 Oct 2023 04:44  Patient On (Oxygen Delivery Method): room air        PHYSICAL EXAM:  GENERAL: NAD, well-groomed, well-developed  HEAD:  Atraumatic, Normocephalic  EYES: EOMI, PERRLA, conjunctiva and sclera clear  ENMT: No tonsillar erythema, exudates, or enlargement   NECK: Supple, No JVD  NERVOUS SYSTEM:  Alert & Oriented X3, Good concentration  CHEST/LUNG: Clear to auscultation bilaterally; No rales, rhonchi, wheezing  HEART: Regular rate and rhythm  ABDOMEN: Soft, Nontender, Nondistended; Bowel sounds present  EXTREMITIES:  2+ Peripheral Pulses   LYMPH: No lymphadenopathy noted  SKIN: No rashes     LABS:                        7.7    7.51  )-----------( 263      ( 26 Oct 2023 07:30 )             25.6     26 Oct 2023 07:30    139    |  105    |  19     ----------------------------<  189    4.0     |  31     |  0.86     Ca    8.1        26 Oct 2023 07:30        Urinalysis Basic - ( 26 Oct 2023 07:30 )    Color: x / Appearance: x / SG: x / pH: x  Gluc: 189 mg/dL / Ketone: x  / Bili: x / Urobili: x   Blood: x / Protein: x / Nitrite: x   Leuk Esterase: x / RBC: x / WBC x   Sq Epi: x / Non Sq Epi: x / Bacteria: x      CAPILLARY BLOOD GLUCOSE      POCT Blood Glucose.: 175 mg/dL (26 Oct 2023 08:27)  POCT Blood Glucose.: 164 mg/dL (25 Oct 2023 21:32)  POCT Blood Glucose.: 171 mg/dL (25 Oct 2023 16:53)  POCT Blood Glucose.: 179 mg/dL (25 Oct 2023 14:46)  POCT Blood Glucose.: 194 mg/dL (25 Oct 2023 12:39)    blood culture --   Numerous Methicillin Resistant Staphylococcus aureus  Few Corynebacterium species "Susceptibilities not performed"   10-19 @ 11:35      urine culture --  10-19 @ 11:35  results   Numerous Methicillin Resistant Staphylococcus aureus  Few Corynebacterium species "Susceptibilities not performed" 10-19 @ 11:35    wound with gram statin --    10-25 @ 15:50  organism  --   10-25 @ 15:50  specimen source .Tissue Other, LEFT FOOT 5TH METATARSAL BONE  10-25 @ 15:50  wound with gram statin --    10-19 @ 11:35  organism  Methicillin resistant Staphylococcus aureus   10-19 @ 11:35  specimen source .Tissue Other, RIGHT FOOT SOFT TISSUE CULTURE  10-19 @ 11:35      RADIOLOGY & ADDITIONAL TESTS:      Consultant(s) Notes Reviewed:  [ x] YES  [ ] NO    Care Discussed with Consultants/Other Providers [ x] YES  [ ] NO    Advanced care planning discussed with patient and family, advanced care planning forms reviewed, discussed, and completed.  20 minutes spent.

## 2023-10-26 NOTE — PATIENT CHOICE NOTE. - NSPTCHOICESTATE_GEN_ALL_CORE
I have met with the patient and/or caregiver to discuss discharge goals and treatment plan. Patient and/or caregiver also provided with instructions on accessing the CMS Compare websites for additional information related to Post Acute Provider quality and resource use measures to assist them in evaluation of the providers and in selecting their post-acute provider of choice. Patient and caregiver were informed of the facilities that are owned and/or operated by [facility]. I have discussed with the patient the availability of in-network facilities and providers. Patient and caregiver provided with a list of post-acute providers whose services are appropriate to the discharge plans and patient needs.     For patient requiring durable medical equipment, patient and/or caregiver were informed that they have the right to request who provides the required equipment. I have met with the patient and/or caregiver to discuss discharge goals and treatment plan. Patient and/or caregiver also provided with instructions on accessing the CMS Compare websites for additional information related to Post Acute Provider quality and resource use measures to assist them in evaluation of the providers and in selecting their post-acute provider of choice. Patient and caregiver were informed of the facilities that are owned and/or operated by [facility]. I have discussed with the patient the availability of in-network facilities and providers. Patient and caregiver provided with a list of post-acute providers whose services are appropriate to the discharge plans and patient needs.     For patient requiring durable medical equipment, patient and/or caregiver were informed that they have the right to request who provides the required equipment. I have met with the patient and/or caregiver to discuss discharge goals and treatment plan. Patient and/or caregiver also provided with instructions on accessing the CMS Compare websites for additional information related to Post Acute Provider quality and resource use measures to assist them in evaluation of the providers and in selecting their post-acute provider of choice. Patient and caregiver were informed of the facilities that are owned and/or operated by [facility]. I have discussed with the patient the availability of in-network facilities and providers. Patient and caregiver provided with a list of post-acute providers whose services are appropriate to the discharge plans and patient needs.     For patient requiring durable medical equipment, patient and/or caregiver were informed that they have the right to request who provides the required equipment.

## 2023-10-26 NOTE — CHART NOTE - NSCHARTNOTESELECT_GEN_ALL_CORE
Event Note
Nutrition Services
Vascular Surgery Note/Event Note
Vascular Surgery/Event Note
Event Note
Event Note
Nutrition Services
Event Note

## 2023-10-26 NOTE — CHART NOTE - NSCHARTNOTEFT_GEN_A_CORE
Nutrition follow up ( chart reviewed, events noted) Brief hx:      Factors impacting intake: [ ] none [ ] nausea  [ ] vomiting [ ] diarrhea [ ] constipation  [ ]chewing problems [ ] swallowing issues  [ ] other:     Diet Presciption: Diet, Regular:   Consistent Carbohydrate {No Snacks}  DASH/TLC {Sodium & Cholesterol Restricted} (10-25-23 @ 16:58)    Intake:     Current Weight: Weight (kg): 87.6 (10-25 @ 05:24)  % Weight Change    Pertinent Medications: MEDICATIONS  (STANDING):  atorvastatin 80 milliGRAM(s) Oral at bedtime  bethanechol 25 milliGRAM(s) Oral two times a day  chlorhexidine 4% Liquid 1 Application(s) Topical <User Schedule>  dextrose 50% Injectable 25 Gram(s) IV Push once  dextrose 50% Injectable 25 Gram(s) IV Push once  dextrose 50% Injectable 12.5 Gram(s) IV Push once  gabapentin 300 milliGRAM(s) Oral two times a day  glucagon  Injectable 1 milliGRAM(s) IntraMuscular once  insulin lispro (ADMELOG) corrective regimen sliding scale   SubCutaneous at bedtime  insulin lispro (ADMELOG) corrective regimen sliding scale   SubCutaneous three times a day before meals  lactobacillus acidophilus 1 Tablet(s) Oral two times a day with meals  midodrine. 10 milliGRAM(s) Oral three times a day  pantoprazole    Tablet 40 milliGRAM(s) Oral before breakfast  tamsulosin 0.4 milliGRAM(s) Oral at bedtime  vancomycin  IVPB 500 milliGRAM(s) IV Intermittent every 12 hours    MEDICATIONS  (PRN):  acetaminophen     Tablet .. 650 milliGRAM(s) Oral every 6 hours PRN Temp greater or equal to 38C (100.4F), Moderate Pain (4 - 6)  dextrose Oral Gel 15 Gram(s) Oral once PRN Blood Glucose LESS THAN 70 milliGRAM(s)/deciliter  loperamide 2 milliGRAM(s) Oral three times a day PRN Diarrhea  sodium chloride 0.9% lock flush 10 milliLiter(s) IV Push every 1 hour PRN Pre/post blood products, medications, blood draw, and to maintain line patency    Pertinent Labs: 10-26 Na139 mmol/L Glu 189 mg/dL<H> K+ 4.0 mmol/L Cr  0.86 mg/dL BUN 19 mg/dL 10-22 Phos 2.1 mg/dL<L>     CAPILLARY BLOOD GLUCOSE      POCT Blood Glucose.: 175 mg/dL (26 Oct 2023 08:27)  POCT Blood Glucose.: 164 mg/dL (25 Oct 2023 21:32)  POCT Blood Glucose.: 171 mg/dL (25 Oct 2023 16:53)  POCT Blood Glucose.: 179 mg/dL (25 Oct 2023 14:46)  POCT Blood Glucose.: 194 mg/dL (25 Oct 2023 12:39)    Skin:     Estimated Needs:   [ ] no change since previous assessment  [ ] recalculated:     Previous Nutrition Diagnosis:   [ ] Inadequate Energy Intake [ ]Inadequate Oral Intake [ ] Excessive Energy Intake   [ ] Underweight [ ] Increased Nutrient Needs [ ] Overweight/Obesity   [ ] Altered GI Function [ ] Unintended Weight Loss [ ] Food & Nutrition Related Knowledge Deficit [ ] Malnutrition     Nutrition Diagnosis is [ ] ongoing  [ ] resolved [ ] not applicable     New Nutrition Diagnosis: [ ] not applicable       Interventions:   Recommend  [ ] Change Diet To:  [ ] Nutrition Supplement  [ ] Nutrition Support  [ ] Other:     Monitoring and Evaluation:   [ ] PO intake [ x ] Tolerance to diet prescription [ x ] weights [ x ] labs[ x ] follow up per protocol  [ ] other: Nutrition follow up ( chart reviewed, events noted) Brief hx:   Problem: Gram positive bacterial infection.   ·  Plan: infection clearing  continue abx per ID.     Problem/Plan - 2:  ·  Problem: Diabetic foot ulcers.   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     Problem/Plan - 3:  ·  Problem: Anemia, unspecified.   ·  Plan: stable h/h- no signs of bleeding  trend cbc post op.     Problem/Plan - 4:  ·  Problem: Diabetes.   ·  Plan: riss.     Problem/Plan - 5:  ·  Problem: Benign essential HTN.   ·  Plan: improving  hold bp meds.  POD #1        Factors impacting intake: [ ] none [ ] nausea  [ ] vomiting [ ] diarrhea [ ] constipation  [ ]chewing problems [ ] swallowing issues  [ ] other:     Diet Presciption: Diet, Regular:   Consistent Carbohydrate {No Snacks}  DASH/TLC {Sodium & Cholesterol Restricted} (10-25-23 @ 16:58)    Intake:     Current Weight: Weight (kg): 87.6 (10-25 @ 05:24)  % Weight Change    Pertinent Medications: MEDICATIONS  (STANDING):  atorvastatin 80 milliGRAM(s) Oral at bedtime  bethanechol 25 milliGRAM(s) Oral two times a day  chlorhexidine 4% Liquid 1 Application(s) Topical <User Schedule>  dextrose 50% Injectable 25 Gram(s) IV Push once  dextrose 50% Injectable 25 Gram(s) IV Push once  dextrose 50% Injectable 12.5 Gram(s) IV Push once  gabapentin 300 milliGRAM(s) Oral two times a day  glucagon  Injectable 1 milliGRAM(s) IntraMuscular once  insulin lispro (ADMELOG) corrective regimen sliding scale   SubCutaneous at bedtime  insulin lispro (ADMELOG) corrective regimen sliding scale   SubCutaneous three times a day before meals  lactobacillus acidophilus 1 Tablet(s) Oral two times a day with meals  midodrine. 10 milliGRAM(s) Oral three times a day  pantoprazole    Tablet 40 milliGRAM(s) Oral before breakfast  tamsulosin 0.4 milliGRAM(s) Oral at bedtime  vancomycin  IVPB 500 milliGRAM(s) IV Intermittent every 12 hours    MEDICATIONS  (PRN):  acetaminophen     Tablet .. 650 milliGRAM(s) Oral every 6 hours PRN Temp greater or equal to 38C (100.4F), Moderate Pain (4 - 6)  dextrose Oral Gel 15 Gram(s) Oral once PRN Blood Glucose LESS THAN 70 milliGRAM(s)/deciliter  loperamide 2 milliGRAM(s) Oral three times a day PRN Diarrhea  sodium chloride 0.9% lock flush 10 milliLiter(s) IV Push every 1 hour PRN Pre/post blood products, medications, blood draw, and to maintain line patency    Pertinent Labs: 10-26 Na139 mmol/L Glu 189 mg/dL<H> K+ 4.0 mmol/L Cr  0.86 mg/dL BUN 19 mg/dL 10-22 Phos 2.1 mg/dL<L>     CAPILLARY BLOOD GLUCOSE      POCT Blood Glucose.: 175 mg/dL (26 Oct 2023 08:27)  POCT Blood Glucose.: 164 mg/dL (25 Oct 2023 21:32)  POCT Blood Glucose.: 171 mg/dL (25 Oct 2023 16:53)  POCT Blood Glucose.: 179 mg/dL (25 Oct 2023 14:46)  POCT Blood Glucose.: 194 mg/dL (25 Oct 2023 12:39)    Skin:     Estimated Needs:   [ ] no change since previous assessment  [ ] recalculated:     Previous Nutrition Diagnosis:   [ ] Inadequate Energy Intake [ ]Inadequate Oral Intake [ ] Excessive Energy Intake   [ ] Underweight [ ] Increased Nutrient Needs [ ] Overweight/Obesity   [ ] Altered GI Function [ ] Unintended Weight Loss [ ] Food & Nutrition Related Knowledge Deficit [ ] Malnutrition     Nutrition Diagnosis is [ ] ongoing  [ ] resolved [ ] not applicable     New Nutrition Diagnosis: [ ] not applicable       Interventions:   Recommend  [ ] Change Diet To:  [ ] Nutrition Supplement  [ ] Nutrition Support  [ ] Other:     Monitoring and Evaluation:   [ ] PO intake [ x ] Tolerance to diet prescription [ x ] weights [ x ] labs[ x ] follow up per protocol  [ ] other: Nutrition follow up ( chart reviewed, events noted) Brief hx:   Problem: Gram positive bacterial infection.   ·  Plan: infection clearing  continue abx per ID.     Problem/Plan - 2:  ·  Problem: Diabetic foot ulcers.   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     Problem/Plan - 3:  ·  Problem: Anemia, unspecified.   ·  Plan: stable h/h- no signs of bleeding  trend cbc post op.     Problem/Plan - 4:  ·  Problem: Diabetes.   ·  Plan: riss.     Problem/Plan - 5:  ·  Problem: Benign essential HTN.   ·  Plan: improving  hold bp meds.  POD #1        Factors impacting intake: [ ] none [ ] nausea  [ ] vomiting [ ] diarrhea [ ] constipation  [ ]chewing problems [ ] swallowing issues  [ ] other:     Diet Presciption: Diet, Regular:   Consistent Carbohydrate {No Snacks}  DASH/TLC {Sodium & Cholesterol Restricted} (10-25-23 @ 16:58)    Intake:     Current Weight: Weight (kg): 87.6 (10-25 @ 05:24)  % Weight Change    Pertinent Medications: MEDICATIONS  (STANDING):  atorvastatin 80 milliGRAM(s) Oral at bedtime  bethanechol 25 milliGRAM(s) Oral two times a day  chlorhexidine 4% Liquid 1 Application(s) Topical <User Schedule>  dextrose 50% Injectable 25 Gram(s) IV Push once  dextrose 50% Injectable 25 Gram(s) IV Push once  dextrose 50% Injectable 12.5 Gram(s) IV Push once  gabapentin 300 milliGRAM(s) Oral two times a day  glucagon  Injectable 1 milliGRAM(s) IntraMuscular once  insulin lispro (ADMELOG) corrective regimen sliding scale   SubCutaneous at bedtime  insulin lispro (ADMELOG) corrective regimen sliding scale   SubCutaneous three times a day before meals  lactobacillus acidophilus 1 Tablet(s) Oral two times a day with meals  midodrine. 10 milliGRAM(s) Oral three times a day  pantoprazole    Tablet 40 milliGRAM(s) Oral before breakfast  tamsulosin 0.4 milliGRAM(s) Oral at bedtime  vancomycin  IVPB 500 milliGRAM(s) IV Intermittent every 12 hours    MEDICATIONS  (PRN):  acetaminophen     Tablet .. 650 milliGRAM(s) Oral every 6 hours PRN Temp greater or equal to 38C (100.4F), Moderate Pain (4 - 6)  dextrose Oral Gel 15 Gram(s) Oral once PRN Blood Glucose LESS THAN 70 milliGRAM(s)/deciliter  loperamide 2 milliGRAM(s) Oral three times a day PRN Diarrhea  sodium chloride 0.9% lock flush 10 milliLiter(s) IV Push every 1 hour PRN Pre/post blood products, medications, blood draw, and to maintain line patency    Pertinent Labs: 10-26 Na139 mmol/L Glu 189 mg/dL<H> K+ 4.0 mmol/L Cr  0.86 mg/dL BUN 19 mg/dL 10-22 Phos 2.1 mg/dL<L>     CAPILLARY BLOOD GLUCOSE      POCT Blood Glucose.: 175 mg/dL (26 Oct 2023 08:27)  POCT Blood Glucose.: 164 mg/dL (25 Oct 2023 21:32)  POCT Blood Glucose.: 171 mg/dL (25 Oct 2023 16:53)  POCT Blood Glucose.: 179 mg/dL (25 Oct 2023 14:46)  POCT Blood Glucose.: 194 mg/dL (25 Oct 2023 12:39)    Skin:     Estimated Needs:   [ ] no change since previous assessment  [ ] recalculated:     Previous Nutrition Diagnosis:   [ ] Inadequate Energy Intake [ ]Inadequate Oral Intake [ ] Excessive Energy Intake   [ ] Underweight [ ] Increased Nutrient Needs [ ] Overweight/Obesity   [ ] Altered GI Function [ ] Unintended Weight Loss [ ] Food & Nutrition Related Knowledge Deficit [ ] Malnutrition     Nutrition Diagnosis is [ ] ongoing  [ ] resolved [ ] not applicable     New Nutrition Diagnosis: [ ] not applicable       Interventions:   Recommend  [ ] Change Diet To:  [ ] Nutrition Supplement  [ ] Nutrition Support  [ ] Other:     Monitoring and Evaluation:   [ ] PO intake [ x ] Tolerance to diet prescription [ x ] weights [ x ] labs[ x ] follow up per protocol  [ ] other: Nutrition follow up ( chart reviewed, events noted) Brief hx:   Problem: Gram positive bacterial infection.   ·  Plan: infection clearing  continue abx per ID.     Problem/Plan - 2:  ·  Problem: Diabetic foot ulcers.   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     Problem/Plan - 3:  ·  Problem: Anemia, unspecified.   ·  Plan: stable h/h- no signs of bleeding  trend cbc post op.     Problem/Plan - 4:  ·  Problem: Diabetes.   ·  Plan: riss.     Problem/Plan - 5:  ·  Problem: Benign essential HTN.   ·  Plan: improving  hold bp meds.  POD #1        Factors impacting intake: [ ] none [ ] nausea  [ ] vomiting [ ] diarrhea [ ] constipation  [ ]chewing problems [ ] swallowing issues  [ ] other:     Diet Presciption: Diet, Regular:   Consistent Carbohydrate {No Snacks}  DASH/TLC {Sodium & Cholesterol Restricted} (10-25-23 @ 16:58)    Intake:     Current Weight: Weight (kg): 87.6 (10-25 @ 05:24)  % Weight Change    Pertinent Medications: MEDICATIONS  (STANDING):  atorvastatin 80 milliGRAM(s) Oral at bedtime  bethanechol 25 milliGRAM(s) Oral two times a day  chlorhexidine 4% Liquid 1 Application(s) Topical <User Schedule>  dextrose 50% Injectable 25 Gram(s) IV Push once  dextrose 50% Injectable 25 Gram(s) IV Push once  dextrose 50% Injectable 12.5 Gram(s) IV Push once  gabapentin 300 milliGRAM(s) Oral two times a day  glucagon  Injectable 1 milliGRAM(s) IntraMuscular once  insulin lispro (ADMELOG) corrective regimen sliding scale   SubCutaneous at bedtime  insulin lispro (ADMELOG) corrective regimen sliding scale   SubCutaneous three times a day before meals  lactobacillus acidophilus 1 Tablet(s) Oral two times a day with meals  midodrine. 10 milliGRAM(s) Oral three times a day  pantoprazole    Tablet 40 milliGRAM(s) Oral before breakfast  tamsulosin 0.4 milliGRAM(s) Oral at bedtime  vancomycin  IVPB 500 milliGRAM(s) IV Intermittent every 12 hours    MEDICATIONS  (PRN):  acetaminophen     Tablet .. 650 milliGRAM(s) Oral every 6 hours PRN Temp greater or equal to 38C (100.4F), Moderate Pain (4 - 6)  dextrose Oral Gel 15 Gram(s) Oral once PRN Blood Glucose LESS THAN 70 milliGRAM(s)/deciliter  loperamide 2 milliGRAM(s) Oral three times a day PRN Diarrhea  sodium chloride 0.9% lock flush 10 milliLiter(s) IV Push every 1 hour PRN Pre/post blood products, medications, blood draw, and to maintain line patency    Pertinent Labs: 10-26 Na139 mmol/L Glu 189 mg/dL<H> K+ 4.0 mmol/L Cr  0.86 mg/dL BUN 19 mg/dL 10-22 Phos 2.1 mg/dL<L>     CAPILLARY BLOOD GLUCOSE      POCT Blood Glucose.: 175 mg/dL (26 Oct 2023 08:27)  POCT Blood Glucose.: 164 mg/dL (25 Oct 2023 21:32)  POCT Blood Glucose.: 171 mg/dL (25 Oct 2023 16:53)  POCT Blood Glucose.: 179 mg/dL (25 Oct 2023 14:46)  POCT Blood Glucose.: 194 mg/dL (25 Oct 2023 12:39)    Skin:     Estimated Needs:   [ ] no change since previous assessment  [ ] recalculated:     Previous Nutrition Diagnosis:   [ ] Inadequate Energy Intake [ ]Inadequate Oral Intake [ ] Excessive Energy Intake   [ ] Underweight [ ] Increased Nutrient Needs [ ] Overweight/Obesity   [ ] Altered GI Function [ ] Unintended Weight Loss [ ] Food & Nutrition Related Knowledge Deficit [ ] Malnutrition     Nutrition Diagnosis is [ ] ongoing  [ ] resolved [ ] not applicable     New Nutrition Diagnosis: [ ] not applicable       Interventions:   Recommend  [ ] Change Diet To:  [ ] Nutrition Supplement  [ ] Nutrition Support  [ ] Other:     Monitoring and Evaluation:   [ ] PO intake [ x ] Tolerance to diet prescription [ x ] weights [ x ] labs[ x ] follow up per protocol  [ ] other: Nutrition follow up ( chart reviewed, events noted) Brief hx:  78 TO M  with hx of HTN and DM continues to be treated for gram positive bacterial infection which is clearing. 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 DC planning now in progress, family reviewing [facility] choices.    At time of RD visit to pts bedside this am, pt states that he is eating poorly when he doesn't get something he likes and also states he needs more help with meals ' the nurses don't believe me'. He states he is also not yet getting Glucerna which he would like. Nsg made aware of pt c/o needing more help with meals. I ensured pt he will get Glucerna twice/day. I offered to review menu with pt so he can make alternate selections bc he says his vision is so poor that he cannot read. He declined offer and said: " its ok, if its something I like ill eat it, if its not, I will just drink the ensure'. When I told him Glucerna is not enough for a meal replacement he said that he is appreciative for my help but that the "food is not too good "and " im leaving for a home soon anyway"  Factors impacting intake: [ ] none [ ] nausea  [ ] vomiting [ ] diarrhea [ ] constipation  [ ]chewing problems [ ] swallowing issues  [ ] other:     Diet Prescription: Diet, Regular:   Consistent Carbohydrate {No Snacks}  DASH/TLC {Sodium & Cholesterol Restricted} (10-25-23 @ 16:58)    Intake: 25-75%, variable depending on the meal    Current Weight: Weight (kg): 87.6 (10-25 @ 05:24) 75.2 kg ( 10-18-23) 77.1 kg ( 10-17-23) 78.2 kg ( 10-11-23)   pt with improvement of edema, now 1+ bilat feet, was 2+ bilat legs ~ 2 weeks ago. accuracy of current wt is highly questionable    Pertinent Medications: MEDICATIONS  (STANDING):  atorvastatin 80 milliGRAM(s) Oral at bedtime  bethanechol 25 milliGRAM(s) Oral two times a day  chlorhexidine 4% Liquid 1 Application(s) Topical <User Schedule>  dextrose 50% Injectable 25 Gram(s) IV Push once  dextrose 50% Injectable 25 Gram(s) IV Push once  dextrose 50% Injectable 12.5 Gram(s) IV Push once  gabapentin 300 milliGRAM(s) Oral two times a day  glucagon  Injectable 1 milliGRAM(s) IntraMuscular once  insulin lispro (ADMELOG) corrective regimen sliding scale   SubCutaneous at bedtime  insulin lispro (ADMELOG) corrective regimen sliding scale   SubCutaneous three times a day before meals  lactobacillus acidophilus 1 Tablet(s) Oral two times a day with meals  midodrine. 10 milliGRAM(s) Oral three times a day  pantoprazole    Tablet 40 milliGRAM(s) Oral before breakfast  tamsulosin 0.4 milliGRAM(s) Oral at bedtime  vancomycin  IVPB 500 milliGRAM(s) IV Intermittent every 12 hours    MEDICATIONS  (PRN):  acetaminophen     Tablet .. 650 milliGRAM(s) Oral every 6 hours PRN Temp greater or equal to 38C (100.4F), Moderate Pain (4 - 6)  dextrose Oral Gel 15 Gram(s) Oral once PRN Blood Glucose LESS THAN 70 milliGRAM(s)/deciliter  loperamide 2 milliGRAM(s) Oral three times a day PRN Diarrhea  sodium chloride 0.9% lock flush 10 milliLiter(s) IV Push every 1 hour PRN Pre/post blood products, medications, blood draw, and to maintain line patency    Pertinent Labs: 10-26 Na139 mmol/L Glu 189 mg/dL<H> K+ 4.0 mmol/L Cr  0.86 mg/dL BUN 19 mg/dL 10-22 Phos 2.1 mg/dL<L>     CAPILLARY BLOOD GLUCOSE      POCT Blood Glucose.: 175 mg/dL (26 Oct 2023 08:27)  POCT Blood Glucose.: 164 mg/dL (25 Oct 2023 21:32)  POCT Blood Glucose.: 171 mg/dL (25 Oct 2023 16:53)  POCT Blood Glucose.: 179 mg/dL (25 Oct 2023 14:46)  POCT Blood Glucose.: 194 mg/dL (25 Oct 2023 12:39)    Skin: POD #1 s/p  debridement- 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          pressure injuries: stage II R heel, unstageable L outer foot and stage II to sacrum    Estimated Needs:   [X ] no change since previous assessment  [ ] recalculated:     Previous Nutrition Diagnosis:   [ ] Inadequate Energy Intake [ ]Inadequate Oral Intake [ ] Excessive Energy Intake   [ ] Underweight [ ] Increased Nutrient Needs [ ] Overweight/Obesity   [ ] Altered GI Function [ ] Unintended Weight Loss [ ] Food & Nutrition Related Knowledge Deficit [ X] Malnutrition , severe in context of chronic illness    Nutrition Diagnosis is [X ] ongoing  [ ] resolved [ ] not applicable     New Nutrition Diagnosis: [ ] not applicable       Interventions:   Recommend  [ ] Change Diet To:  [X ] Nutrition Supplement: accept rx now placed for Glucerna 8 oz po BID, mvi with minerals 1 tab po daily, vit C 500 mg po daily  [ ] Nutrition Support  [ ] Other:     Monitoring and Evaluation:   [X ] PO intake [ x ] Tolerance to diet prescription [ x ] weights [ x ] labs[ x ] follow up per protocol  [X ] other: skin integrity

## 2023-10-26 NOTE — PROGRESS NOTE ADULT - SUBJECTIVE AND OBJECTIVE BOX
[facility]  Les Mccray PA-C  [address] [place] [address]  [phone]      INTERVAL HPI/OVERNIGHT EVENTS:  Pt s/e  No new Gi events    MEDICATIONS  (STANDING):  atorvastatin 80 milliGRAM(s) Oral at bedtime  bethanechol 25 milliGRAM(s) Oral two times a day  chlorhexidine 4% Liquid 1 Application(s) Topical <User Schedule>  dextrose 50% Injectable 12.5 Gram(s) IV Push once  dextrose 50% Injectable 25 Gram(s) IV Push once  dextrose 50% Injectable 25 Gram(s) IV Push once  gabapentin 300 milliGRAM(s) Oral two times a day  glucagon  Injectable 1 milliGRAM(s) IntraMuscular once  insulin lispro (ADMELOG) corrective regimen sliding scale   SubCutaneous at bedtime  insulin lispro (ADMELOG) corrective regimen sliding scale   SubCutaneous three times a day before meals  lactobacillus acidophilus 1 Tablet(s) Oral two times a day with meals  midodrine. 10 milliGRAM(s) Oral three times a day  pantoprazole    Tablet 40 milliGRAM(s) Oral before breakfast  tamsulosin 0.4 milliGRAM(s) Oral at bedtime  vancomycin  IVPB 500 milliGRAM(s) IV Intermittent every 12 hours    MEDICATIONS  (PRN):  acetaminophen     Tablet .. 650 milliGRAM(s) Oral every 6 hours PRN Temp greater or equal to 38C (100.4F), Moderate Pain (4 - 6)  dextrose Oral Gel 15 Gram(s) Oral once PRN Blood Glucose LESS THAN 70 milliGRAM(s)/deciliter  loperamide 2 milliGRAM(s) Oral three times a day PRN Diarrhea  sodium chloride 0.9% lock flush 10 milliLiter(s) IV Push every 1 hour PRN Pre/post blood products, medications, blood draw, and to maintain line patency      Allergies    No Known Allergies    Intolerances        PHYSICAL EXAM:   Vital Signs:  Vital Signs Last 24 Hrs  T(C): 36.8 (26 Oct 2023 04:44), Max: 37 (25 Oct 2023 12:15)  T(F): 98.2 (26 Oct 2023 04:44), Max: 98.6 (25 Oct 2023 12:15)  HR: 61 (26 Oct 2023 04:44) (60 - 61)  BP: 120/55 (26 Oct 2023 04:44) (92/40 - 134/55)  BP(mean): --  RR: 17 (26 Oct 2023 04:44) (12 - 18)  SpO2: 98% (26 Oct 2023 04:44) (92% - 98%)    Parameters below as of 26 Oct 2023 04:44  Patient On (Oxygen Delivery Method): room air      GENERAL:  Appears stated age  HEENT:  NC/AT  CHEST:  Full & symmetric excursion  HEART:  Regular rhythm  ABDOMEN:  Soft, non-tender, non-distended  EXTEREMITIES:  no cyanosis  SKIN:  No rash  NEURO:  Alert      LABS:                        7.7    7.51  )-----------( 263      ( 26 Oct 2023 07:30 )             25.6     10-26    139  |  105  |  19  ----------------------------<  189<H>  4.0   |  31  |  0.86    Ca    8.1<L>      26 Oct 2023 07:30        Urinalysis Basic - ( 26 Oct 2023 07:30 )    Color: x / Appearance: x / SG: x / pH: x  Gluc: 189 mg/dL / Ketone: x  / Bili: x / Urobili: x   Blood: x / Protein: x / Nitrite: x   Leuk Esterase: x / RBC: x / WBC x   Sq Epi: x / Non Sq Epi: x / Bacteria: x   [facility]  Les Mccray PA-C  [address] [place] [address]  [phone]      INTERVAL HPI/OVERNIGHT EVENTS:  Pt s/e  No new Gi events    MEDICATIONS  (STANDING):  atorvastatin 80 milliGRAM(s) Oral at bedtime  bethanechol 25 milliGRAM(s) Oral two times a day  chlorhexidine 4% Liquid 1 Application(s) Topical <User Schedule>  dextrose 50% Injectable 12.5 Gram(s) IV Push once  dextrose 50% Injectable 25 Gram(s) IV Push once  dextrose 50% Injectable 25 Gram(s) IV Push once  gabapentin 300 milliGRAM(s) Oral two times a day  glucagon  Injectable 1 milliGRAM(s) IntraMuscular once  insulin lispro (ADMELOG) corrective regimen sliding scale   SubCutaneous at bedtime  insulin lispro (ADMELOG) corrective regimen sliding scale   SubCutaneous three times a day before meals  lactobacillus acidophilus 1 Tablet(s) Oral two times a day with meals  midodrine. 10 milliGRAM(s) Oral three times a day  pantoprazole    Tablet 40 milliGRAM(s) Oral before breakfast  tamsulosin 0.4 milliGRAM(s) Oral at bedtime  vancomycin  IVPB 500 milliGRAM(s) IV Intermittent every 12 hours    MEDICATIONS  (PRN):  acetaminophen     Tablet .. 650 milliGRAM(s) Oral every 6 hours PRN Temp greater or equal to 38C (100.4F), Moderate Pain (4 - 6)  dextrose Oral Gel 15 Gram(s) Oral once PRN Blood Glucose LESS THAN 70 milliGRAM(s)/deciliter  loperamide 2 milliGRAM(s) Oral three times a day PRN Diarrhea  sodium chloride 0.9% lock flush 10 milliLiter(s) IV Push every 1 hour PRN Pre/post blood products, medications, blood draw, and to maintain line patency      Allergies    No Known Allergies    Intolerances        PHYSICAL EXAM:   Vital Signs:  Vital Signs Last 24 Hrs  T(C): 36.8 (26 Oct 2023 04:44), Max: 37 (25 Oct 2023 12:15)  T(F): 98.2 (26 Oct 2023 04:44), Max: 98.6 (25 Oct 2023 12:15)  HR: 61 (26 Oct 2023 04:44) (60 - 61)  BP: 120/55 (26 Oct 2023 04:44) (92/40 - 134/55)  BP(mean): --  RR: 17 (26 Oct 2023 04:44) (12 - 18)  SpO2: 98% (26 Oct 2023 04:44) (92% - 98%)    Parameters below as of 26 Oct 2023 04:44  Patient On (Oxygen Delivery Method): room air      GENERAL:  Appears stated age  HEENT:  NC/AT  CHEST:  Full & symmetric excursion  HEART:  Regular rhythm  ABDOMEN:  Soft, non-tender, non-distended  EXTEREMITIES:  no cyanosis  SKIN:  No rash  NEURO:  Alert      LABS:                        7.7    7.51  )-----------( 263      ( 26 Oct 2023 07:30 )             25.6     10-26    139  |  105  |  19  ----------------------------<  189<H>  4.0   |  31  |  0.86    Ca    8.1<L>      26 Oct 2023 07:30        Urinalysis Basic - ( 26 Oct 2023 07:30 )    Color: x / Appearance: x / SG: x / pH: x  Gluc: 189 mg/dL / Ketone: x  / Bili: x / Urobili: x   Blood: x / Protein: x / Nitrite: x   Leuk Esterase: x / RBC: x / WBC x   Sq Epi: x / Non Sq Epi: x / Bacteria: x   [facility]  Les Mccray PA-C  [address] [place] [address]  [phone]      INTERVAL HPI/OVERNIGHT EVENTS:  Pt s/e  No new Gi events    MEDICATIONS  (STANDING):  atorvastatin 80 milliGRAM(s) Oral at bedtime  bethanechol 25 milliGRAM(s) Oral two times a day  chlorhexidine 4% Liquid 1 Application(s) Topical <User Schedule>  dextrose 50% Injectable 12.5 Gram(s) IV Push once  dextrose 50% Injectable 25 Gram(s) IV Push once  dextrose 50% Injectable 25 Gram(s) IV Push once  gabapentin 300 milliGRAM(s) Oral two times a day  glucagon  Injectable 1 milliGRAM(s) IntraMuscular once  insulin lispro (ADMELOG) corrective regimen sliding scale   SubCutaneous at bedtime  insulin lispro (ADMELOG) corrective regimen sliding scale   SubCutaneous three times a day before meals  lactobacillus acidophilus 1 Tablet(s) Oral two times a day with meals  midodrine. 10 milliGRAM(s) Oral three times a day  pantoprazole    Tablet 40 milliGRAM(s) Oral before breakfast  tamsulosin 0.4 milliGRAM(s) Oral at bedtime  vancomycin  IVPB 500 milliGRAM(s) IV Intermittent every 12 hours    MEDICATIONS  (PRN):  acetaminophen     Tablet .. 650 milliGRAM(s) Oral every 6 hours PRN Temp greater or equal to 38C (100.4F), Moderate Pain (4 - 6)  dextrose Oral Gel 15 Gram(s) Oral once PRN Blood Glucose LESS THAN 70 milliGRAM(s)/deciliter  loperamide 2 milliGRAM(s) Oral three times a day PRN Diarrhea  sodium chloride 0.9% lock flush 10 milliLiter(s) IV Push every 1 hour PRN Pre/post blood products, medications, blood draw, and to maintain line patency      Allergies    No Known Allergies    Intolerances        PHYSICAL EXAM:   Vital Signs:  Vital Signs Last 24 Hrs  T(C): 36.8 (26 Oct 2023 04:44), Max: 37 (25 Oct 2023 12:15)  T(F): 98.2 (26 Oct 2023 04:44), Max: 98.6 (25 Oct 2023 12:15)  HR: 61 (26 Oct 2023 04:44) (60 - 61)  BP: 120/55 (26 Oct 2023 04:44) (92/40 - 134/55)  BP(mean): --  RR: 17 (26 Oct 2023 04:44) (12 - 18)  SpO2: 98% (26 Oct 2023 04:44) (92% - 98%)    Parameters below as of 26 Oct 2023 04:44  Patient On (Oxygen Delivery Method): room air      GENERAL:  Appears stated age  HEENT:  NC/AT  CHEST:  Full & symmetric excursion  HEART:  Regular rhythm  ABDOMEN:  Soft, non-tender, non-distended  EXTEREMITIES:  no cyanosis  SKIN:  No rash  NEURO:  Alert      LABS:                        7.7    7.51  )-----------( 263      ( 26 Oct 2023 07:30 )             25.6     10-26    139  |  105  |  19  ----------------------------<  189<H>  4.0   |  31  |  0.86    Ca    8.1<L>      26 Oct 2023 07:30        Urinalysis Basic - ( 26 Oct 2023 07:30 )    Color: x / Appearance: x / SG: x / pH: x  Gluc: 189 mg/dL / Ketone: x  / Bili: x / Urobili: x   Blood: x / Protein: x / Nitrite: x   Leuk Esterase: x / RBC: x / WBC x   Sq Epi: x / Non Sq Epi: x / Bacteria: x

## 2023-10-26 NOTE — PROGRESS NOTE ADULT - PROBLEM SELECTOR PLAN 1
Patient examined and evaluated.  Surgical site dressed with silver alginate to the right and left foot wounds and dry, sterile dressing.  Surgical pathology and cultures pending at this time.  Antibiotics as per ID recommendations.  Patient is s/p PICC line placement   Will recommend wound vac to the left foot wound once the surgical site is stable   Will reassess tomorrow

## 2023-10-26 NOTE — PROGRESS NOTE ADULT - SUBJECTIVE AND OBJECTIVE BOX
[facility]    CHIEF COMPLAINT: Patient is a 78y old  Male who presents with a chief complaint of fever and weakness (26 Oct 2023 13:10)      HPI:  78-year-old male presents to the hospital by ambulance from home.  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 Pt does not go to doctor on regualar basis per son and does phone visits.   (11 Oct 2023 07:02)    PAST MEDICAL & SURGICAL HISTORY:  Diabetes      Benign essential HTN      Pacemaker      History of BPH      Diabetic foot ulcers        SOCIAL HISTORY: no tobacco  FAMILY HISTORY:   HTN  MEDICATIONS  (STANDING):  atorvastatin 80 milliGRAM(s) Oral at bedtime  bethanechol 25 milliGRAM(s) Oral two times a day  chlorhexidine 4% Liquid 1 Application(s) Topical <User Schedule>  dextrose 50% Injectable 25 Gram(s) IV Push once  dextrose 50% Injectable 12.5 Gram(s) IV Push once  dextrose 50% Injectable 25 Gram(s) IV Push once  gabapentin 300 milliGRAM(s) Oral two times a day  glucagon  Injectable 1 milliGRAM(s) IntraMuscular once  insulin lispro (ADMELOG) corrective regimen sliding scale   SubCutaneous at bedtime  insulin lispro (ADMELOG) corrective regimen sliding scale   SubCutaneous three times a day before meals  lactobacillus acidophilus 1 Tablet(s) Oral two times a day with meals  midodrine. 10 milliGRAM(s) Oral three times a day  pantoprazole    Tablet 40 milliGRAM(s) Oral before breakfast  tamsulosin 0.4 milliGRAM(s) Oral at bedtime  vancomycin  IVPB 500 milliGRAM(s) IV Intermittent every 12 hours    MEDICATIONS  (PRN):  acetaminophen     Tablet .. 650 milliGRAM(s) Oral every 6 hours PRN Temp greater or equal to 38C (100.4F), Moderate Pain (4 - 6)  dextrose Oral Gel 15 Gram(s) Oral once PRN Blood Glucose LESS THAN 70 milliGRAM(s)/deciliter  loperamide 2 milliGRAM(s) Oral three times a day PRN Diarrhea  sodium chloride 0.9% lock flush 10 milliLiter(s) IV Push every 1 hour PRN Pre/post blood products, medications, blood draw, and to maintain line patency    Allergies    No Known Allergies    Intolerances        REVIEW OF SYSTEMS:  CONSTITUTIONAL: No weakness, no fevers   EYES/ENT: No visual changes  NECK: No pain or stiffness  RESPIRATORY: No shortness of breath  CARDIOVASCULAR: No chest pain or palpitations  GASTROINTESTINAL: No abdominal pain  GENITOURINARY: No hematuria  NEUROLOGICAL: No weakness  SKIN: No rash  All other review of systems is negative unless indicated above    VITAL SIGNS:   Vital Signs Last 24 Hrs  T(C): 37 (26 Oct 2023 12:33), Max: 37 (26 Oct 2023 12:33)  T(F): 98.6 (26 Oct 2023 12:33), Max: 98.6 (26 Oct 2023 12:33)  HR: 69 (26 Oct 2023 12:33) (60 - 69)  BP: 122/55 (26 Oct 2023 12:33) (92/40 - 122/55)  BP(mean): --  RR: 17 (26 Oct 2023 12:33) (12 - 18)  SpO2: 98% (26 Oct 2023 12:33) (92% - 98%)    Parameters below as of 26 Oct 2023 12:33  Patient On (Oxygen Delivery Method): room air      I&O's Summary    25 Oct 2023 07:01  -  26 Oct 2023 07:00  --------------------------------------------------------  IN: 0 mL / OUT: 400 mL / NET: -400 mL      PHYSICAL EXAM:  Constitutional: NAD  Neurological: Alert and oriented  HEENT: EOMI, no JVD  Cardiovascular: S1 and S2, no murmur  Pulmonary: breath sounds bilaterally  Gastrointestinal: Bowel Sounds present, soft, nontender  Ext: bandages b/l  Skin: No rashes, No cyanosis.  Psych:  Mood calm    LABS: All Labs Reviewed:                        7.7    7.51  )-----------( 263      ( 26 Oct 2023 07:30 )             25.6     10-26    139  |  105  |  19  ----------------------------<  189<H>  4.0   |  31  |  0.86    Ca    8.1<L>      26 Oct 2023 07:30    · Assessment    78M with PMH HTN, PPM, DM, foot ulcers , presents to the hospital by ambulance from home.  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 Pt does not go to doctor on regular basis per son and does phone visits.  +Bacteremia.        S/p GISSELL: No vegetations    TTE: CONCLUSIONS:    1. Technically difficult image quality.   2. There is abnormal septal activation, likely from a paced rhythm. There is likely superimposed anterior and septal hypokinesis. Limited views and limited endocardial definition make further interpretation limited.   3. The right ventricle is not well visualized. Normal right ventricular cavity size and reduced systolic function.   4. The left atrium is mildly dilated in size.   5. Right atrium was not well visualized.   6. Mild calcification of the aortic valve leaflets.   7. Tricuspid valve was not well visualized.   8. Aortic valve was not well visualized.    1. Multi nonhealing wound  +NM suspicious for OM.  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     pt denies CP    2. HLD continue Statin  Pt has seen Dave Panchal [phone] and Curtis ferris [phone]  will follow prn                         [facility]    CHIEF COMPLAINT: Patient is a 78y old  Male who presents with a chief complaint of fever and weakness (26 Oct 2023 13:10)      HPI:  78-year-old male presents to the hospital by ambulance from home.  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 Pt does not go to doctor on regualar basis per son and does phone visits.   (11 Oct 2023 07:02)    PAST MEDICAL & SURGICAL HISTORY:  Diabetes      Benign essential HTN      Pacemaker      History of BPH      Diabetic foot ulcers        SOCIAL HISTORY: no tobacco  FAMILY HISTORY:   HTN  MEDICATIONS  (STANDING):  atorvastatin 80 milliGRAM(s) Oral at bedtime  bethanechol 25 milliGRAM(s) Oral two times a day  chlorhexidine 4% Liquid 1 Application(s) Topical <User Schedule>  dextrose 50% Injectable 25 Gram(s) IV Push once  dextrose 50% Injectable 12.5 Gram(s) IV Push once  dextrose 50% Injectable 25 Gram(s) IV Push once  gabapentin 300 milliGRAM(s) Oral two times a day  glucagon  Injectable 1 milliGRAM(s) IntraMuscular once  insulin lispro (ADMELOG) corrective regimen sliding scale   SubCutaneous at bedtime  insulin lispro (ADMELOG) corrective regimen sliding scale   SubCutaneous three times a day before meals  lactobacillus acidophilus 1 Tablet(s) Oral two times a day with meals  midodrine. 10 milliGRAM(s) Oral three times a day  pantoprazole    Tablet 40 milliGRAM(s) Oral before breakfast  tamsulosin 0.4 milliGRAM(s) Oral at bedtime  vancomycin  IVPB 500 milliGRAM(s) IV Intermittent every 12 hours    MEDICATIONS  (PRN):  acetaminophen     Tablet .. 650 milliGRAM(s) Oral every 6 hours PRN Temp greater or equal to 38C (100.4F), Moderate Pain (4 - 6)  dextrose Oral Gel 15 Gram(s) Oral once PRN Blood Glucose LESS THAN 70 milliGRAM(s)/deciliter  loperamide 2 milliGRAM(s) Oral three times a day PRN Diarrhea  sodium chloride 0.9% lock flush 10 milliLiter(s) IV Push every 1 hour PRN Pre/post blood products, medications, blood draw, and to maintain line patency    Allergies    No Known Allergies    Intolerances        REVIEW OF SYSTEMS:  CONSTITUTIONAL: No weakness, no fevers   EYES/ENT: No visual changes  NECK: No pain or stiffness  RESPIRATORY: No shortness of breath  CARDIOVASCULAR: No chest pain or palpitations  GASTROINTESTINAL: No abdominal pain  GENITOURINARY: No hematuria  NEUROLOGICAL: No weakness  SKIN: No rash  All other review of systems is negative unless indicated above    VITAL SIGNS:   Vital Signs Last 24 Hrs  T(C): 37 (26 Oct 2023 12:33), Max: 37 (26 Oct 2023 12:33)  T(F): 98.6 (26 Oct 2023 12:33), Max: 98.6 (26 Oct 2023 12:33)  HR: 69 (26 Oct 2023 12:33) (60 - 69)  BP: 122/55 (26 Oct 2023 12:33) (92/40 - 122/55)  BP(mean): --  RR: 17 (26 Oct 2023 12:33) (12 - 18)  SpO2: 98% (26 Oct 2023 12:33) (92% - 98%)    Parameters below as of 26 Oct 2023 12:33  Patient On (Oxygen Delivery Method): room air      I&O's Summary    25 Oct 2023 07:01  -  26 Oct 2023 07:00  --------------------------------------------------------  IN: 0 mL / OUT: 400 mL / NET: -400 mL      PHYSICAL EXAM:  Constitutional: NAD  Neurological: Alert and oriented  HEENT: EOMI, no JVD  Cardiovascular: S1 and S2, no murmur  Pulmonary: breath sounds bilaterally  Gastrointestinal: Bowel Sounds present, soft, nontender  Ext: bandages b/l  Skin: No rashes, No cyanosis.  Psych:  Mood calm    LABS: All Labs Reviewed:                        7.7    7.51  )-----------( 263      ( 26 Oct 2023 07:30 )             25.6     10-26    139  |  105  |  19  ----------------------------<  189<H>  4.0   |  31  |  0.86    Ca    8.1<L>      26 Oct 2023 07:30    · Assessment    78M with PMH HTN, PPM, DM, foot ulcers , presents to the hospital by ambulance from home.  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 Pt does not go to doctor on regular basis per son and does phone visits.  +Bacteremia.        S/p GISSELL: No vegetations    TTE: CONCLUSIONS:    1. Technically difficult image quality.   2. There is abnormal septal activation, likely from a paced rhythm. There is likely superimposed anterior and septal hypokinesis. Limited views and limited endocardial definition make further interpretation limited.   3. The right ventricle is not well visualized. Normal right ventricular cavity size and reduced systolic function.   4. The left atrium is mildly dilated in size.   5. Right atrium was not well visualized.   6. Mild calcification of the aortic valve leaflets.   7. Tricuspid valve was not well visualized.   8. Aortic valve was not well visualized.    1. Multi nonhealing wound  +NM suspicious for OM.  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     pt denies CP    2. HLD continue Statin  Pt has seen Dave Panchal [phone] and Curtis ferris [phone]  will follow prn                         [facility]    CHIEF COMPLAINT: Patient is a 78y old  Male who presents with a chief complaint of fever and weakness (26 Oct 2023 13:10)      HPI:  78-year-old male presents to the hospital by ambulance from home.  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 Pt does not go to doctor on regualar basis per son and does phone visits.   (11 Oct 2023 07:02)    PAST MEDICAL & SURGICAL HISTORY:  Diabetes      Benign essential HTN      Pacemaker      History of BPH      Diabetic foot ulcers        SOCIAL HISTORY: no tobacco  FAMILY HISTORY:   HTN  MEDICATIONS  (STANDING):  atorvastatin 80 milliGRAM(s) Oral at bedtime  bethanechol 25 milliGRAM(s) Oral two times a day  chlorhexidine 4% Liquid 1 Application(s) Topical <User Schedule>  dextrose 50% Injectable 25 Gram(s) IV Push once  dextrose 50% Injectable 12.5 Gram(s) IV Push once  dextrose 50% Injectable 25 Gram(s) IV Push once  gabapentin 300 milliGRAM(s) Oral two times a day  glucagon  Injectable 1 milliGRAM(s) IntraMuscular once  insulin lispro (ADMELOG) corrective regimen sliding scale   SubCutaneous at bedtime  insulin lispro (ADMELOG) corrective regimen sliding scale   SubCutaneous three times a day before meals  lactobacillus acidophilus 1 Tablet(s) Oral two times a day with meals  midodrine. 10 milliGRAM(s) Oral three times a day  pantoprazole    Tablet 40 milliGRAM(s) Oral before breakfast  tamsulosin 0.4 milliGRAM(s) Oral at bedtime  vancomycin  IVPB 500 milliGRAM(s) IV Intermittent every 12 hours    MEDICATIONS  (PRN):  acetaminophen     Tablet .. 650 milliGRAM(s) Oral every 6 hours PRN Temp greater or equal to 38C (100.4F), Moderate Pain (4 - 6)  dextrose Oral Gel 15 Gram(s) Oral once PRN Blood Glucose LESS THAN 70 milliGRAM(s)/deciliter  loperamide 2 milliGRAM(s) Oral three times a day PRN Diarrhea  sodium chloride 0.9% lock flush 10 milliLiter(s) IV Push every 1 hour PRN Pre/post blood products, medications, blood draw, and to maintain line patency    Allergies    No Known Allergies    Intolerances        REVIEW OF SYSTEMS:  CONSTITUTIONAL: No weakness, no fevers   EYES/ENT: No visual changes  NECK: No pain or stiffness  RESPIRATORY: No shortness of breath  CARDIOVASCULAR: No chest pain or palpitations  GASTROINTESTINAL: No abdominal pain  GENITOURINARY: No hematuria  NEUROLOGICAL: No weakness  SKIN: No rash  All other review of systems is negative unless indicated above    VITAL SIGNS:   Vital Signs Last 24 Hrs  T(C): 37 (26 Oct 2023 12:33), Max: 37 (26 Oct 2023 12:33)  T(F): 98.6 (26 Oct 2023 12:33), Max: 98.6 (26 Oct 2023 12:33)  HR: 69 (26 Oct 2023 12:33) (60 - 69)  BP: 122/55 (26 Oct 2023 12:33) (92/40 - 122/55)  BP(mean): --  RR: 17 (26 Oct 2023 12:33) (12 - 18)  SpO2: 98% (26 Oct 2023 12:33) (92% - 98%)    Parameters below as of 26 Oct 2023 12:33  Patient On (Oxygen Delivery Method): room air      I&O's Summary    25 Oct 2023 07:01  -  26 Oct 2023 07:00  --------------------------------------------------------  IN: 0 mL / OUT: 400 mL / NET: -400 mL      PHYSICAL EXAM:  Constitutional: NAD  Neurological: Alert and oriented  HEENT: EOMI, no JVD  Cardiovascular: S1 and S2, no murmur  Pulmonary: breath sounds bilaterally  Gastrointestinal: Bowel Sounds present, soft, nontender  Ext: bandages b/l  Skin: No rashes, No cyanosis.  Psych:  Mood calm    LABS: All Labs Reviewed:                        7.7    7.51  )-----------( 263      ( 26 Oct 2023 07:30 )             25.6     10-26    139  |  105  |  19  ----------------------------<  189<H>  4.0   |  31  |  0.86    Ca    8.1<L>      26 Oct 2023 07:30    · Assessment    78M with PMH HTN, PPM, DM, foot ulcers , presents to the hospital by ambulance from home.  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 Pt does not go to doctor on regular basis per son and does phone visits.  +Bacteremia.        S/p GISSELL: No vegetations    TTE: CONCLUSIONS:    1. Technically difficult image quality.   2. There is abnormal septal activation, likely from a paced rhythm. There is likely superimposed anterior and septal hypokinesis. Limited views and limited endocardial definition make further interpretation limited.   3. The right ventricle is not well visualized. Normal right ventricular cavity size and reduced systolic function.   4. The left atrium is mildly dilated in size.   5. Right atrium was not well visualized.   6. Mild calcification of the aortic valve leaflets.   7. Tricuspid valve was not well visualized.   8. Aortic valve was not well visualized.    1. Multi nonhealing wound  +NM suspicious for OM.  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     pt denies CP    2. HLD continue Statin  Pt has seen Dave Panchal [phone] and Curtis ferris [phone]  will follow prn

## 2023-10-26 NOTE — PATIENT CHOICE NOTE. - NSPTCHOICENOTES_GEN_ALL_CORE
Spoke with pts son Nico at present via phone.  He has a list of 5 choices for [place], 1- [facility]  2- [facility]   3- [facility]   4-  [facility]   5- [facility]   He is aware SW will send referrals at present and CM/ SW will contact him re: accepting facilities.  He verbalized understanding.  SW sent referrals at present, Dr. Harris made aware. Primary RN made aware Spoke with pts son Nico at present via phone.  He has a list of 5 choices for [place], 1- [facility]  2- [facility]   3- [facility]   4-  [facility]   5- [facility]   He is aware SW will send referrals at present and CM/ SW will contact him re: accepting facilities.  He verbalized understanding.  SW sent referrals at present, Dr. Harris made aware. Primary RN made aware Spoke with pts son Nico at present via phone.  He has a list of 5 choices for [place], 1- [facility]  2- [facility]   3- [facility]   4-  [facility]   5- [facility]   He is aware SW will send referrals at present and CM/ SW will contact him re: accepting facilities.  He verbalized understanding.  SW sent referrals at present, Dr. Harris made aware. Primary RN made aware

## 2023-10-26 NOTE — PROGRESS NOTE ADULT - ASSESSMENT
78-year-old male presents to the hospital by ambulance from home.  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 Pt does not go to doctor on regular basis per son and does phone visits. Son reports years of struggling with foot infections with black areas of gangrene and their struggling to keep his feet but now feeling that keeping him alive is more important.  Culture - Blood (10.10.23 @ 18:25)    -  Methicillin resistant Staphylococcus aureus (MRSA): Detec  Repeat 10/12 BCx NGTD (48h)  Wcx with MRSA, placed on contact isolation   UCx + Pseudomonas    RECOMMENDATIONS  1-MRSA bacteremia/Osteomyelitis   NM bone scan ordered and  Abnormal combined Indium-111 labeled leukocyte study and marrow scan. Increased uptake of both radiotracer is in the right heel and left midfoot, concerning for osteomyelitis.  Cardiology rec appreciated - sp GISSELL early to rule out IE or PPM   S/p Debridement of fascia 19-Oct-2023 12:51:29  Lauren Urbano, Open biopsy, bone, foot 19-Oct-2023 12:52:34  Lauren Urbano.  S/p Resection of left metatarsal-tarsal joint, open approach 25-Oct-2023 16:49:34  Aiden Wang.  Appreciate podiatry recs    C/w Vancomycin (10/11-) dosing per pharmacy protocol  Patient will ultimately need LT IV Abx x6 wks until 11/22/23--pt will need weekly CMP, CBC, ESR, CRP and vanc level faxed to [phone]    2-Pseudomonal UTI--S/p zosyn (10/11-10/12), changed to cefepime, completed on 10/18    Infectious Diseases will continue to follow. Please call with any questions.   Rosa Maria Wilkinson M.D.  [facility] Division of Infectious Diseases [phone]  For after 5 P.M. and weekends, please call [phone]       78-year-old male presents to the hospital by ambulance from home.  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 Pt does not go to doctor on regular basis per son and does phone visits. Son reports years of struggling with foot infections with black areas of gangrene and their struggling to keep his feet but now feeling that keeping him alive is more important.  Culture - Blood (10.10.23 @ 18:25)    -  Methicillin resistant Staphylococcus aureus (MRSA): Detec  Repeat 10/12 BCx NGTD (48h)  Wcx with MRSA, placed on contact isolation   UCx + Pseudomonas    RECOMMENDATIONS  1-MRSA bacteremia/Osteomyelitis   NM bone scan ordered and  Abnormal combined Indium-111 labeled leukocyte study and marrow scan. Increased uptake of both radiotracer is in the right heel and left midfoot, concerning for osteomyelitis.  Cardiology rec appreciated - sp GISSELL early to rule out IE or PPM   S/p Debridement of fascia 19-Oct-2023 12:51:29  Lauren Urbano, Open biopsy, bone, foot 19-Oct-2023 12:52:34  Lauren Urbano.  S/p Resection of left metatarsal-tarsal joint, open approach 25-Oct-2023 16:49:34  Aiden Wang.  Appreciate podiatry recs    C/w Vancomycin (10/11-) dosing per pharmacy protocol  Patient will ultimately need LT IV Abx x6 wks until 11/22/23--pt will need weekly CMP, CBC, ESR, CRP and vanc level faxed to [phone]    2-Pseudomonal UTI--S/p zosyn (10/11-10/12), changed to cefepime, completed on 10/18    Infectious Diseases will continue to follow. Please call with any questions.   Rosa Maria Wilkinson M.D.  [facility] Division of Infectious Diseases [phone]  For after 5 P.M. and weekends, please call [phone]       78-year-old male presents to the hospital by ambulance from home.  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 Pt does not go to doctor on regular basis per son and does phone visits. Son reports years of struggling with foot infections with black areas of gangrene and their struggling to keep his feet but now feeling that keeping him alive is more important.  Culture - Blood (10.10.23 @ 18:25)    -  Methicillin resistant Staphylococcus aureus (MRSA): Detec  Repeat 10/12 BCx NGTD (48h)  Wcx with MRSA, placed on contact isolation   UCx + Pseudomonas    RECOMMENDATIONS  1-MRSA bacteremia/Osteomyelitis   NM bone scan ordered and  Abnormal combined Indium-111 labeled leukocyte study and marrow scan. Increased uptake of both radiotracer is in the right heel and left midfoot, concerning for osteomyelitis.  Cardiology rec appreciated - sp GISSELL early to rule out IE or PPM   S/p Debridement of fascia 19-Oct-2023 12:51:29  Lauren Urbano, Open biopsy, bone, foot 19-Oct-2023 12:52:34  Lauren Urbano.  S/p Resection of left metatarsal-tarsal joint, open approach 25-Oct-2023 16:49:34  Aiden Wang.  Appreciate podiatry recs    C/w Vancomycin (10/11-) dosing per pharmacy protocol  Patient will ultimately need LT IV Abx x6 wks until 11/22/23--pt will need weekly CMP, CBC, ESR, CRP and vanc level faxed to [phone]    2-Pseudomonal UTI--S/p zosyn (10/11-10/12), changed to cefepime, completed on 10/18    Infectious Diseases will continue to follow. Please call with any questions.   Rosa Maria Wilkinson M.D.  [facility] Division of Infectious Diseases [phone]  For after 5 P.M. and weekends, please call [phone]       78-year-old male presents to the hospital by ambulance from home.  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 Pt does not go to doctor on regular basis per son and does phone visits. Son reports years of struggling with foot infections with black areas of gangrene and their struggling to keep his feet but now feeling that keeping him alive is more important.  Culture - Blood (10.10.23 @ 18:25)    -  Methicillin resistant Staphylococcus aureus (MRSA): Detec  Repeat 10/12 BCx NGTD (48h)  Wcx with MRSA, placed on contact isolation   UCx + Pseudomonas    RECOMMENDATIONS  1-MRSA bacteremia/Osteomyelitis   NM bone scan ordered and  Abnormal combined Indium-111 labeled leukocyte study and marrow scan. Increased uptake of both radiotracer is in the right heel and left midfoot, concerning for osteomyelitis.  Cardiology rec appreciated - sp GISSELL early to rule out IE or PPM involvement-Pacemaker wire is present in right atrium and right ventricle and intact and no vegetations on the pacemaker wire.  S/p Debridement of fascia 19-Oct-2023 12:51:29  Lauren Urbano, Open biopsy, bone, foot 19-Oct-2023 12:52:34  Lauren Urbano.  S/p Resection of left metatarsal-tarsal joint, open approach 25-Oct-2023 16:49:34  Aiden Wang.  Appreciate additional podiatry recs  C/w Vancomycin (10/11-) dosing per pharmacy protocol  Patient will ultimately need LT IV Abx x6 wks until 11/22/23, s/p PICC on 10/25, pt will need weekly CMP, CBC, ESR, CRP and vanc level faxed to [phone]    2-Pseudomonal UTI--S/p zosyn (10/11-10/12), changed to cefepime, completed on 10/18    Stable from ID standpoint  D/w planning per primary team    D/w Dr. Harris    Infectious Diseases will continue to follow. Please call with any questions.   Rosa Maria Wilkinson M.D.  [facility] Division of Infectious Diseases [phone]  For after 5 P.M. and weekends, please call [phone]       78-year-old male presents to the hospital by ambulance from home.  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 Pt does not go to doctor on regular basis per son and does phone visits. Son reports years of struggling with foot infections with black areas of gangrene and their struggling to keep his feet but now feeling that keeping him alive is more important.  Culture - Blood (10.10.23 @ 18:25)    -  Methicillin resistant Staphylococcus aureus (MRSA): Detec  Repeat 10/12 BCx NGTD (48h)  Wcx with MRSA, placed on contact isolation   UCx + Pseudomonas    RECOMMENDATIONS  1-MRSA bacteremia/Osteomyelitis   NM bone scan ordered and  Abnormal combined Indium-111 labeled leukocyte study and marrow scan. Increased uptake of both radiotracer is in the right heel and left midfoot, concerning for osteomyelitis.  Cardiology rec appreciated - sp GISSELL early to rule out IE or PPM involvement-Pacemaker wire is present in right atrium and right ventricle and intact and no vegetations on the pacemaker wire.  S/p Debridement of fascia 19-Oct-2023 12:51:29  Lauren Urbano, Open biopsy, bone, foot 19-Oct-2023 12:52:34  Lauren Urbano.  S/p Resection of left metatarsal-tarsal joint, open approach 25-Oct-2023 16:49:34  Aiden Wang.  Appreciate additional podiatry recs  C/w Vancomycin (10/11-) dosing per pharmacy protocol  Patient will ultimately need LT IV Abx x6 wks until 11/22/23, s/p PICC on 10/25, pt will need weekly CMP, CBC, ESR, CRP and vanc level faxed to [phone]    2-Pseudomonal UTI--S/p zosyn (10/11-10/12), changed to cefepime, completed on 10/18    Stable from ID standpoint  D/w planning per primary team    D/w Dr. Harris    Infectious Diseases will continue to follow. Please call with any questions.   Rosa Maria Wilkinson M.D.  [facility] Division of Infectious Diseases [phone]  For after 5 P.M. and weekends, please call [phone]       78-year-old male presents to the hospital by ambulance from home.  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 Pt does not go to doctor on regular basis per son and does phone visits. Son reports years of struggling with foot infections with black areas of gangrene and their struggling to keep his feet but now feeling that keeping him alive is more important.  Culture - Blood (10.10.23 @ 18:25)    -  Methicillin resistant Staphylococcus aureus (MRSA): Detec  Repeat 10/12 BCx NGTD (48h)  Wcx with MRSA, placed on contact isolation   UCx + Pseudomonas    RECOMMENDATIONS  1-MRSA bacteremia/Osteomyelitis   NM bone scan ordered and  Abnormal combined Indium-111 labeled leukocyte study and marrow scan. Increased uptake of both radiotracer is in the right heel and left midfoot, concerning for osteomyelitis.  Cardiology rec appreciated - sp GISSELL early to rule out IE or PPM involvement-Pacemaker wire is present in right atrium and right ventricle and intact and no vegetations on the pacemaker wire.  S/p Debridement of fascia 19-Oct-2023 12:51:29  Lauren Urbano, Open biopsy, bone, foot 19-Oct-2023 12:52:34  Lauren Urbano.  S/p Resection of left metatarsal-tarsal joint, open approach 25-Oct-2023 16:49:34  Aiden Wang.  Appreciate additional podiatry recs  C/w Vancomycin (10/11-) dosing per pharmacy protocol  Patient will ultimately need LT IV Abx x6 wks until 11/22/23, s/p PICC on 10/25, pt will need weekly CMP, CBC, ESR, CRP and vanc level faxed to [phone]    2-Pseudomonal UTI--S/p zosyn (10/11-10/12), changed to cefepime, completed on 10/18    Stable from ID standpoint  D/w planning per primary team    D/w Dr. Harris    Infectious Diseases will continue to follow. Please call with any questions.   Rosa Maria Wilkinson M.D.  [facility] Division of Infectious Diseases [phone]  For after 5 P.M. and weekends, please call [phone]

## 2023-10-26 NOTE — CAREGIVER ENGAGEMENT NOTE - CAREGIVER OUTREACH NOTES - FREE TEXT
PANKAJ met with Nico at bedside to explain role and transitions of care. All questions answered to the best of my abilities.  CM remains available throughout the hospital stay.  
Received return call from pts son Nico re: discharge planning.  He was made aware pt is medically ready for discharge today, is in agreement with EVERARDO and is aware SW will be making arrangements with facilities.  He states he does not have choices available at this time and is requesting we wait until Saturday.  He was again made aware of medical readiness, and as discussed on prior conversation he stated he would "contact SW Wednesday" with choices.  Reviewed IMM with him at present.  He is aware SW can also send referral to local facilities.  He states he will call back between 2-3 pm ( he is aware that it will have to be by then to send out this afternoon).  IMM reviewed again, he states he does not plan to appeal discharge, but he is aware that he has 24 hours to appeal and that pt will be sent to an accepting facility when a bed is available.  He verbalized understanding.

## 2023-10-27 ENCOUNTER — TRANSCRIPTION ENCOUNTER (OUTPATIENT)
Age: 78
End: 2023-10-27

## 2023-10-27 LAB
-  AMPICILLIN/SULBACTAM: SIGNIFICANT CHANGE UP
-  CEFAZOLIN: SIGNIFICANT CHANGE UP
-  CLINDAMYCIN: SIGNIFICANT CHANGE UP
-  DAPTOMYCIN: SIGNIFICANT CHANGE UP
-  ERYTHROMYCIN: SIGNIFICANT CHANGE UP
-  GENTAMICIN: SIGNIFICANT CHANGE UP
-  LINEZOLID: SIGNIFICANT CHANGE UP
-  OXACILLIN: SIGNIFICANT CHANGE UP
-  PENICILLIN: SIGNIFICANT CHANGE UP
-  RIFAMPIN: SIGNIFICANT CHANGE UP
-  TETRACYCLINE: SIGNIFICANT CHANGE UP
-  TRIMETHOPRIM/SULFAMETHOXAZOLE: SIGNIFICANT CHANGE UP
-  VANCOMYCIN: SIGNIFICANT CHANGE UP
GLUCOSE BLDC GLUCOMTR-MCNC: 193 MG/DL — HIGH (ref 70–99)
GLUCOSE BLDC GLUCOMTR-MCNC: 217 MG/DL — HIGH (ref 70–99)
GLUCOSE BLDC GLUCOMTR-MCNC: 219 MG/DL — HIGH (ref 70–99)
GLUCOSE BLDC GLUCOMTR-MCNC: 236 MG/DL — HIGH (ref 70–99)
HCT VFR BLD CALC: 23.7 % — LOW (ref 39–50)
HGB BLD-MCNC: 7.4 G/DL — LOW (ref 13–17)
MCHC RBC-ENTMCNC: 26.1 PG — LOW (ref 27–34)
MCHC RBC-ENTMCNC: 31.2 GM/DL — LOW (ref 32–36)
MCV RBC AUTO: 83.7 FL — SIGNIFICANT CHANGE UP (ref 80–100)
METHOD TYPE: SIGNIFICANT CHANGE UP
NRBC # BLD: 0 /100 WBCS — SIGNIFICANT CHANGE UP (ref 0–0)
PLATELET # BLD AUTO: 244 K/UL — SIGNIFICANT CHANGE UP (ref 150–400)
RBC # BLD: 2.83 M/UL — LOW (ref 4.2–5.8)
RBC # FLD: 15.9 % — HIGH (ref 10.3–14.5)
VANCOMYCIN TROUGH SERPL-MCNC: 17.4 UG/ML — SIGNIFICANT CHANGE UP (ref 10–20)
WBC # BLD: 6.5 K/UL — SIGNIFICANT CHANGE UP (ref 3.8–10.5)
WBC # FLD AUTO: 6.5 K/UL — SIGNIFICANT CHANGE UP (ref 3.8–10.5)

## 2023-10-27 PROCEDURE — 99024 POSTOP FOLLOW-UP VISIT: CPT

## 2023-10-27 RX ORDER — MULTIVIT-MIN/FERROUS GLUCONATE 9 MG/15 ML
1 LIQUID (ML) ORAL
Qty: 0 | Refills: 0 | DISCHARGE
Start: 2023-10-27

## 2023-10-27 RX ORDER — ASCORBIC ACID 60 MG
1 TABLET,CHEWABLE ORAL
Qty: 0 | Refills: 0 | DISCHARGE
Start: 2023-10-27

## 2023-10-27 RX ADMIN — Medication 100 MILLIGRAM(S): at 13:05

## 2023-10-27 RX ADMIN — PANTOPRAZOLE SODIUM 40 MILLIGRAM(S): 20 TABLET, DELAYED RELEASE ORAL at 05:48

## 2023-10-27 RX ADMIN — Medication 2: at 17:46

## 2023-10-27 RX ADMIN — GABAPENTIN 300 MILLIGRAM(S): 400 CAPSULE ORAL at 17:43

## 2023-10-27 RX ADMIN — ATORVASTATIN CALCIUM 80 MILLIGRAM(S): 80 TABLET, FILM COATED ORAL at 22:14

## 2023-10-27 RX ADMIN — Medication 1: at 08:51

## 2023-10-27 RX ADMIN — MIDODRINE HYDROCHLORIDE 10 MILLIGRAM(S): 2.5 TABLET ORAL at 12:52

## 2023-10-27 RX ADMIN — GABAPENTIN 300 MILLIGRAM(S): 400 CAPSULE ORAL at 05:48

## 2023-10-27 RX ADMIN — Medication 25 MILLIGRAM(S): at 05:48

## 2023-10-27 RX ADMIN — Medication 100 MILLIGRAM(S): at 03:28

## 2023-10-27 RX ADMIN — MIDODRINE HYDROCHLORIDE 10 MILLIGRAM(S): 2.5 TABLET ORAL at 05:48

## 2023-10-27 RX ADMIN — Medication 1 TABLET(S): at 17:39

## 2023-10-27 RX ADMIN — CHLORHEXIDINE GLUCONATE 1 APPLICATION(S): 213 SOLUTION TOPICAL at 05:49

## 2023-10-27 RX ADMIN — Medication 1 TABLET(S): at 08:51

## 2023-10-27 RX ADMIN — TAMSULOSIN HYDROCHLORIDE 0.4 MILLIGRAM(S): 0.4 CAPSULE ORAL at 22:14

## 2023-10-27 RX ADMIN — Medication 25 MILLIGRAM(S): at 17:39

## 2023-10-27 RX ADMIN — Medication 2: at 12:51

## 2023-10-27 NOTE — PROGRESS NOTE ADULT - PROBLEM SELECTOR PLAN 4
UPDATE    SW to pt's room for update. Pt presents as aaox3 with pleasant affect. Pt working with therapy at this time. Pt reports coping well with no needs and hopeful he can be discharged tomorrow. SW providing psychosocial and counseling support, education, resources, and d/c planning as needed. SW continuing to follow and remains available.    
riss
multiple chronic old heel ulcers likely from hx of dm  podiatry eval noted  vascular eval  id eval noted  continue local wound care  iv abx  vascular studies noted
iv abx
multiple chronic old heel ulcers likely from hx of dm  podiatry eval noted  vascular eval  id eval noted  continue local wound care  iv abx
guicac negative  stable h/h  heme eval noted
riss
riss
Stage 3: Additional Anesthesia Type: 1% lidocaine with epinephrine
iv abx
riss
riss
multiple chronic old heel ulcers likely from hx of dm  podiatry eval noted  vascular eval  id eval noted  continue local wound care  iv abx  for vascular studies
guicac negative  stable h/h  heme eval noted
multiple chronic old heel ulcers likely from hx of dm  podiatry eval noted  vascular eval  id eval noted  continue local wound care  iv abx  vascular studies noted
iv abx  id eval

## 2023-10-27 NOTE — DISCHARGE NOTE NURSING/CASE MANAGEMENT/SOCIAL WORK - SOCIAL WORKER'S NAME
Marybel Harkins, [place] [phone]   Marybel Harkins, [place] [phone]   Marybel Harkins, [place] [phone]

## 2023-10-27 NOTE — DISCHARGE NOTE NURSING/CASE MANAGEMENT/SOCIAL WORK - PATIENT PORTAL LINK FT
You can access the FollowMyHealth Patient Portal offered by [facility] by registering at the following website: [email or web address]. By joining [employer]’s FollowMyHealth portal, you will also be able to view your health information using other applications (apps) compatible with our system. You can access the FollowMyHealth Patient Portal offered by [facility] by registering at the following website: [email or web address]. By joining [employer]’s FollowMyHealth portal, you will also be able to view your health information using other applications (apps) compatible with our system. You can access the FollowMyHealth Patient Portal offered by [facility] by registering at the following website: [email or web address]. By joining [employer]’s FollowMyHealth portal, you will also be able to view your health information using other applications (apps) compatible with our system.

## 2023-10-27 NOTE — PROGRESS NOTE ADULT - PROBLEM SELECTOR PLAN 2
No multiple chronic old heel ulcers likely from hx of dm  pod 2 for further debridement of heels  continue local care  Long term abx at [facility]  may need wound vac per podiatry to be started at [facility]  podiatry clearance prior to dc multiple chronic old heel ulcers likely from hx of dm  pod 2 for further debridement of heels  continue local care  Long term abx at [facility]  may need wound vac per podiatry to be started at [facility]  podiatry clearance prior to dc multiple chronic old heel ulcers likely from hx of dm  pod 2 for further debridement of heels  continue local care  Long term abx at [facility]  may need wound vac per podiatry to be started at [facility]  podiatry clearance prior to dc

## 2023-10-27 NOTE — PROGRESS NOTE ADULT - SUBJECTIVE AND OBJECTIVE BOX
Patient is a 78y old  Male who presents with a chief complaint of fever and weakness (27 Oct 2023 09:49)      INTERVAL HPI/OVERNIGHT EVENTS: pt stable, no new events, pod 2    MEDICATIONS  (STANDING):  atorvastatin 80 milliGRAM(s) Oral at bedtime  bethanechol 25 milliGRAM(s) Oral two times a day  chlorhexidine 4% Liquid 1 Application(s) Topical <User Schedule>  dextrose 50% Injectable 12.5 Gram(s) IV Push once  dextrose 50% Injectable 25 Gram(s) IV Push once  dextrose 50% Injectable 25 Gram(s) IV Push once  gabapentin 300 milliGRAM(s) Oral two times a day  glucagon  Injectable 1 milliGRAM(s) IntraMuscular once  insulin lispro (ADMELOG) corrective regimen sliding scale   SubCutaneous at bedtime  insulin lispro (ADMELOG) corrective regimen sliding scale   SubCutaneous three times a day before meals  lactobacillus acidophilus 1 Tablet(s) Oral two times a day with meals  midodrine. 10 milliGRAM(s) Oral three times a day  pantoprazole    Tablet 40 milliGRAM(s) Oral before breakfast  tamsulosin 0.4 milliGRAM(s) Oral at bedtime  vancomycin  IVPB 500 milliGRAM(s) IV Intermittent every 12 hours    MEDICATIONS  (PRN):  acetaminophen     Tablet .. 650 milliGRAM(s) Oral every 6 hours PRN Temp greater or equal to 38C (100.4F), Moderate Pain (4 - 6)  dextrose Oral Gel 15 Gram(s) Oral once PRN Blood Glucose LESS THAN 70 milliGRAM(s)/deciliter  loperamide 2 milliGRAM(s) Oral three times a day PRN Diarrhea  sodium chloride 0.9% lock flush 10 milliLiter(s) IV Push every 1 hour PRN Pre/post blood products, medications, blood draw, and to maintain line patency      Allergies    No Known Allergies    Intolerances        REVIEW OF SYSTEMS:  CONSTITUTIONAL: No fever, weight loss, or fatigue  EYES: No eye pain, visual disturbances  ENMT:  No difficulty hearing, tinnitus, vertigo; No sinus or throat pain  NECK: No pain or stiffness  RESPIRATORY: No cough, wheezing, chills or hemoptysis; No shortness of breath  CARDIOVASCULAR: No chest pain, palpitations, dizziness  GASTROINTESTINAL: No abdominal or epigastric pain. No nausea, vomiting, or hematemesis; No diarrhea or constipation. No melena or hematochezia.  GENITOURINARY: No dysuria, frequency, hematuria, or incontinence  NEUROLOGICAL: No headaches, memory loss, loss of strength, numbness, or tremors  SKIN: No itching, burning  LYMPH NODES: No enlarged glands  MUSCULOSKELETAL: No joint pain or swelling; No muscle, back, or extremity pain  PSYCHIATRIC: No depression, mood swings  HEME/LYMPH: No easy bruising, or bleeding gums  ALLERGY AND IMMUNOLOGIC: No hives    Vital Signs Last 24 Hrs  T(C): 36.8 (27 Oct 2023 05:09), Max: 37 (26 Oct 2023 12:33)  T(F): 98.3 (27 Oct 2023 05:09), Max: 98.6 (26 Oct 2023 12:33)  HR: 59 (27 Oct 2023 07:57) (58 - 69)  BP: 125/58 (27 Oct 2023 05:09) (120/56 - 125/58)  BP(mean): --  RR: 17 (27 Oct 2023 05:09) (17 - 17)  SpO2: 93% (27 Oct 2023 05:09) (93% - 98%)    Parameters below as of 27 Oct 2023 05:09  Patient On (Oxygen Delivery Method): room air        PHYSICAL EXAM:  GENERAL: NAD, well-groomed, well-developed  HEAD:  Atraumatic, Normocephalic  EYES: EOMI, PERRLA, conjunctiva and sclera clear  ENMT: No tonsillar erythema, exudates, or enlargement   NECK: Supple, No JVD  NERVOUS SYSTEM:  Alert & Oriented X3, Good concentration  CHEST/LUNG: Clear to auscultation bilaterally; No rales, rhonchi, wheezing  HEART: Regular rate and rhythm  ABDOMEN: Soft, Nontender, Nondistended; Bowel sounds present  EXTREMITIES:  2+ Peripheral Pulses   LYMPH: No lymphadenopathy noted  SKIN: No rashes     LABS:      Ca    8.1        26 Oct 2023 07:30        Urinalysis Basic - ( 26 Oct 2023 07:30 )    Color: x / Appearance: x / SG: x / pH: x  Gluc: 189 mg/dL / Ketone: x  / Bili: x / Urobili: x   Blood: x / Protein: x / Nitrite: x   Leuk Esterase: x / RBC: x / WBC x   Sq Epi: x / Non Sq Epi: x / Bacteria: x      CAPILLARY BLOOD GLUCOSE      POCT Blood Glucose.: 193 mg/dL (27 Oct 2023 08:19)  POCT Blood Glucose.: 203 mg/dL (26 Oct 2023 21:26)  POCT Blood Glucose.: 207 mg/dL (26 Oct 2023 17:10)  POCT Blood Glucose.: 193 mg/dL (26 Oct 2023 11:55)    blood culture --   Rare Staphylococcus aureus   10-25 @ 15:50      urine culture --  10-25 @ 15:50  results   Rare Staphylococcus aureus 10-25 @ 15:50    wound with gram statin --    10-25 @ 15:50  organism  --   10-25 @ 15:50  specimen source .Tissue Other, LEFT FOOT 5TH METATARSAL BONE  10-25 @ 15:50      RADIOLOGY & ADDITIONAL TESTS:      Consultant(s) Notes Reviewed:  [ x] YES  [ ] NO    Care Discussed with Consultants/Other Providers [ x] YES  [ ] NO    Advanced care planning discussed with patient and family, advanced care planning forms reviewed, discussed, and completed.  20 minutes spent.

## 2023-10-27 NOTE — PROGRESS NOTE ADULT - ASSESSMENT
78-year-old male presents to the hospital by ambulance from home.  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 Pt does not go to doctor on regular basis per son and does phone visits. Son reports years of struggling with foot infections with black areas of gangrene and their struggling to keep his feet but now feeling that keeping him alive is more important.  Culture - Blood (10.10.23 @ 18:25)    -  Methicillin resistant Staphylococcus aureus (MRSA): Detec  Repeat 10/12 BCx NGTD (48h)  Wcx with MRSA, placed on contact isolation   OR cx + MRSA  UCx + Pseudomonas    RECOMMENDATIONS  1-MRSA bacteremia/Osteomyelitis   NM bone scan ordered and  Abnormal combined Indium-111 labeled leukocyte study and marrow scan. Increased uptake of both radiotracer is in the right heel and left midfoot, concerning for osteomyelitis.  Cardiology rec appreciated - sp GISSELL early to rule out IE or PPM involvement-Pacemaker wire is present in right atrium and right ventricle and intact and no vegetations on the pacemaker wire.  S/p Debridement of fascia 19-Oct-2023 12:51:29  Lauren Urbano, Open biopsy, bone, foot 19-Oct-2023 12:52:34  Lauren Urbano.  S/p Resection of left metatarsal-tarsal joint, open approach 25-Oct-2023 16:49:34  Aiden Wang.  Appreciate additional podiatry recs  C/w Vancomycin (10/11-) dosing per pharmacy protocol  Patient will ultimately need LT IV Abx x6 wks until 11/22/23  s/p PICC on 10/25, pt will need weekly CMP, CBC, ESR, CRP and vanc level faxed to [phone]    2-Pseudomonal UTI--S/p zosyn (10/11-10/12), changed to cefepime, completed on 10/18    Stable from ID standpoint  D/w planning per primary team    Over the weekend Dr. Hager will be covering for our group. If you have any questions, concerns or new micro data, please reach out to them at [phone].    Rosa Maria Wilkinson M.D.  [facility] Division of Infectious Diseases [phone]  For after 5 P.M. and weekends, please call [phone]         78-year-old male presents to the hospital by ambulance from home.  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 Pt does not go to doctor on regular basis per son and does phone visits. Son reports years of struggling with foot infections with black areas of gangrene and their struggling to keep his feet but now feeling that keeping him alive is more important.  Culture - Blood (10.10.23 @ 18:25)    -  Methicillin resistant Staphylococcus aureus (MRSA): Detec  Repeat 10/12 BCx NGTD (48h)  Wcx with MRSA, placed on contact isolation   OR cx + MRSA  UCx + Pseudomonas    RECOMMENDATIONS  1-MRSA bacteremia/Osteomyelitis   NM bone scan ordered and  Abnormal combined Indium-111 labeled leukocyte study and marrow scan. Increased uptake of both radiotracer is in the right heel and left midfoot, concerning for osteomyelitis.  Cardiology rec appreciated - sp GISSELL early to rule out IE or PPM involvement-Pacemaker wire is present in right atrium and right ventricle and intact and no vegetations on the pacemaker wire.  S/p Debridement of fascia 19-Oct-2023 12:51:29  Lauren Urbano, Open biopsy, bone, foot 19-Oct-2023 12:52:34  Lauren Urbano.  S/p Resection of left metatarsal-tarsal joint, open approach 25-Oct-2023 16:49:34  Aiden Wang.  Appreciate additional podiatry recs  C/w Vancomycin (10/11-) dosing per pharmacy protocol  Patient will ultimately need LT IV Abx x6 wks until 11/22/23  s/p PICC on 10/25, pt will need weekly CMP, CBC, ESR, CRP and vanc level faxed to [phone]    2-Pseudomonal UTI--S/p zosyn (10/11-10/12), changed to cefepime, completed on 10/18    Stable from ID standpoint  D/w planning per primary team    Over the weekend Dr. Hager will be covering for our group. If you have any questions, concerns or new micro data, please reach out to them at [phone].    Rosa Maria Wilkinson M.D.  [facility] Division of Infectious Diseases [phone]  For after 5 P.M. and weekends, please call [phone]         78-year-old male presents to the hospital by ambulance from home.  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 Pt does not go to doctor on regular basis per son and does phone visits. Son reports years of struggling with foot infections with black areas of gangrene and their struggling to keep his feet but now feeling that keeping him alive is more important.  Culture - Blood (10.10.23 @ 18:25)    -  Methicillin resistant Staphylococcus aureus (MRSA): Detec  Repeat 10/12 BCx NGTD (48h)  Wcx with MRSA, placed on contact isolation   OR cx + MRSA  UCx + Pseudomonas    RECOMMENDATIONS  1-MRSA bacteremia/Osteomyelitis   NM bone scan ordered and  Abnormal combined Indium-111 labeled leukocyte study and marrow scan. Increased uptake of both radiotracer is in the right heel and left midfoot, concerning for osteomyelitis.  Cardiology rec appreciated - sp GISSELL early to rule out IE or PPM involvement-Pacemaker wire is present in right atrium and right ventricle and intact and no vegetations on the pacemaker wire.  S/p Debridement of fascia 19-Oct-2023 12:51:29  Lauren Urbano, Open biopsy, bone, foot 19-Oct-2023 12:52:34  Lauren Urbano.  S/p Resection of left metatarsal-tarsal joint, open approach 25-Oct-2023 16:49:34  Aiden Wang.  Appreciate additional podiatry recs  C/w Vancomycin (10/11-) dosing per pharmacy protocol  Patient will ultimately need LT IV Abx x6 wks until 11/22/23  s/p PICC on 10/25, pt will need weekly CMP, CBC, ESR, CRP and vanc level faxed to [phone]    2-Pseudomonal UTI--S/p zosyn (10/11-10/12), changed to cefepime, completed on 10/18    Stable from ID standpoint  D/w planning per primary team    Over the weekend Dr. Hager will be covering for our group. If you have any questions, concerns or new micro data, please reach out to them at [phone].    Rosa Maria Wilkinson M.D.  [facility] Division of Infectious Diseases [phone]  For after 5 P.M. and weekends, please call [phone]

## 2023-10-27 NOTE — SOCIAL WORK PROGRESS NOTE - NSSWPROGRESSNOTE_GEN_ALL_CORE
SW spoke with this pt at bedside and spoke with pts son. Pt for DC Saturday 10/28. Pt accepted at , [facility], [facility] and [facility]-  does not have a bed but [facility] does and [facility]. Podiatry completed [employer] wound vac ppwk, sent to [facility]. PT approved for DC on Saturday 2pm  arranged by ambuln. Pt, family, team in agreement with dc. SW remains available.  SW spoke with this pt at bedside and spoke with pts son. Pt for DC Saturday 10/28. Pt accepted at , [facility], [facility] and [facility]-  does not have a bed but [facility] does and son is in agreement with dc to [facility]. Podiatry completed [employer] wound vac ppwk, sent to [facility]. PT approved for DC on Saturday 2pm  arranged by ambuln. Pt, family, team in agreement with dc. SW remains available.  SW spoke with this pt at bedside and spoke with pts son. Pt for DC Saturday 10/28. Pt accepted at , [facility], [facility] and [facility]-  does not have a bed but [facility] does and son is in agreement with dc to [facility]. Podiatry completed [employer] wound vac ppwk, sent to [facility]. PT approved for DC on Saturday 2pm  arranged by ambuln. Pt, family, team in agreement with dc. SW remains available.

## 2023-10-27 NOTE — PROGRESS NOTE ADULT - PROVIDER SPECIALTY LIST ADULT
Cardiology
Gastroenterology
Infectious Disease
Internal Medicine
Podiatry
Cardiology
Cardiology
Infectious Disease
Infectious Disease
Gastroenterology
Infectious Disease
Cardiology
Heme/Onc
Infectious Disease
Internal Medicine
Gastroenterology
Gastroenterology
Cardiology
Gastroenterology
Gastroenterology
Heme/Onc
Infectious Disease
Gastroenterology
Infectious Disease
Cardiology
Internal Medicine
Gastroenterology
Heme/Onc
Infectious Disease
Podiatry
Internal Medicine
Podiatry
Internal Medicine

## 2023-10-27 NOTE — PROVIDER CONTACT NOTE (CRITICAL VALUE NOTIFICATION) - NAME OF MD/NP/PA/DO NOTIFIED:
DR. Hope
dr kimble
Dr. Tran
Tomy Sarmiento
Dr. Wilkinson
FRANK Kent
 Sayed
Dr. Wilkinson ID paged at 15:31

## 2023-10-27 NOTE — PROGRESS NOTE ADULT - PROBLEM SELECTOR PLAN 5
improving  hold bp meds
guicac negative  stable h/h  heme eval noted
ua noted  fu cultures  iv abx  id eval
riss
riss
guicac negative  heme eval  gi eval
riss
guicac negative  heme eval  gi eval
improving  hold bp meds
improving  hold bp meds
improving- resume bp meds upon dc
improving- resume bp meds upon dc
riss
ua noted  fu cultures  iv abx  id eval

## 2023-10-27 NOTE — PROVIDER CONTACT NOTE (CRITICAL VALUE NOTIFICATION) - TEST AND RESULT REPORTED:
3 positive tissue cx - Final numerous MRSA few corny bacteria species susceptibilities not performed, final culture numerous MRSA, Final culture rare MRSA
Blood cultures drawn on 10/10/23: Positive Aerobic and Anaerobic bottle; MRSA.
Tissue culture positive from 25th Oct for rare staph aureus
Wound Culture collected on 10/11/23: positive MRSA. Urine culture collected on 10/10/23 positive pseudomonas aeruginosa.
troponin 1894.6
H&H 6.6/21.0
tissue culture from 10/19 left foot gram stain, no polymorphonuclear cells seen per low power field, moderate gram + cocci in pairs per oil field
Tissue culture of left foot from 10/19, positive for MRSA
tissue culture 10-19 right foot soft tissue, preliminary numerous MRSA/ few corynebacterium species susceptibilities not preformed

## 2023-10-27 NOTE — DISCHARGE NOTE NURSING/CASE MANAGEMENT/SOCIAL WORK - NSDCFUADDAPPT_GEN_ALL_CORE_FT
Please follow with the podiatrist at the wound care clinic.   [facility] Physician [facility]  Hyperbaric Wound [facility] Center  [address] [place], [place] [address] [phone]    pt will need weekly CMP, CBC, ESR, CRP and vanc level faxed to [phone] Please follow with the podiatrist at the wound care clinic.   [facility] Physician [facility]  Hyperbaric Wound Care Center  [address] [place], [place] [id number] [phone]    pt will need weekly CMP, CBC, ESR, CRP and vanc level faxed to [phone] Please follow with the podiatrist at the wound care clinic.   [facility] Physician [facility]  Hyperbaric Wound Care Center  [address] [place], [place] [address] [phone]    pt will need weekly CMP, CBC, ESR, CRP and vanc level faxed to [phone]

## 2023-10-27 NOTE — DISCHARGE NOTE NURSING/CASE MANAGEMENT/SOCIAL WORK - NSDCPEFALRISK_GEN_ALL_CORE
For information on Fall & Injury Prevention, visit: [email or web address]/[email or web address]-[email or web address]-[email or web address]-maintains-health-and-mobility OR  [email or web address]://www.[email or web address]/news/fall-prevention-tips-to-avoid-injury OR  https://www.cdc.gov/steadi/patient.html For information on Fall & Injury Prevention, visit: https://www.[facility]/news/fall-prevention-protects-and-maintains-health-and-mobility OR  https://www.[facility]/news/fall-prevention-tips-to-avoid-injury OR  https://www.cdc.gov/steadi/patient.html For information on Fall & Injury Prevention, visit: https://www.[facility]/news/fall-prevention-protects-and-maintains-health-and-mobility OR  https://www.[facility]/news/fall-prevention-tips-[email or web address]-injury OR  https://www.cdc.gov/steadi/patient.html

## 2023-10-27 NOTE — PROGRESS NOTE ADULT - SUBJECTIVE AND OBJECTIVE BOX
78y year old Male seen at [facility] 3WES 365 D1 s/p  left foot resection of 5th metatarsal base and partial cuboid for osteomyelitis DOS 10/25/23 and sharp excisional debridement of bilateral foot wounds with bone biopsy of the left lateral foot wound DOS 10/19/23. Patient relates no overnight events and states that they are doing well at this time.  Denies any fever, chills, nausea, vomiting, chest pain, shortness of breath, or calf pain at this time.    Allergies    No Known Allergies    Intolerances        MEDICATIONS  (STANDING):  atorvastatin 80 milliGRAM(s) Oral at bedtime  bethanechol 25 milliGRAM(s) Oral two times a day  chlorhexidine 4% Liquid 1 Application(s) Topical <User Schedule>  dextrose 50% Injectable 12.5 Gram(s) IV Push once  dextrose 50% Injectable 25 Gram(s) IV Push once  dextrose 50% Injectable 25 Gram(s) IV Push once  gabapentin 300 milliGRAM(s) Oral two times a day  glucagon  Injectable 1 milliGRAM(s) IntraMuscular once  insulin lispro (ADMELOG) corrective regimen sliding scale   SubCutaneous three times a day before meals  insulin lispro (ADMELOG) corrective regimen sliding scale   SubCutaneous at bedtime  lactobacillus acidophilus 1 Tablet(s) Oral two times a day with meals  midodrine. 10 milliGRAM(s) Oral three times a day  pantoprazole    Tablet 40 milliGRAM(s) Oral before breakfast  tamsulosin 0.4 milliGRAM(s) Oral at bedtime  vancomycin  IVPB 500 milliGRAM(s) IV Intermittent every 12 hours    MEDICATIONS  (PRN):  acetaminophen     Tablet .. 650 milliGRAM(s) Oral every 6 hours PRN Temp greater or equal to 38C (100.4F), Moderate Pain (4 - 6)  dextrose Oral Gel 15 Gram(s) Oral once PRN Blood Glucose LESS THAN 70 milliGRAM(s)/deciliter  loperamide 2 milliGRAM(s) Oral three times a day PRN Diarrhea  sodium chloride 0.9% lock flush 10 milliLiter(s) IV Push every 1 hour PRN Pre/post blood products, medications, blood draw, and to maintain line patency      Vital Signs Last 24 Hrs  T(C): 36.9 (27 Oct 2023 14:54), Max: 37.2 (27 Oct 2023 14:25)  T(F): 98.5 (27 Oct 2023 14:54), Max: 99 (27 Oct 2023 14:25)  HR: 60 (27 Oct 2023 14:54) (58 - 60)  BP: 130/59 (27 Oct 2023 14:54) (113/50 - 130/59)  BP(mean): --  RR: 17 (27 Oct 2023 14:54) (16 - 17)  SpO2: 96% (27 Oct 2023 12:02) (93% - 96%)    Parameters below as of 27 Oct 2023 12:02  Patient On (Oxygen Delivery Method): room air        PHYSICAL EXAM:  Vascular: DP & PT non  palpable bilaterally, Capillary refill delayed to the digits.  Digital hair absent bilaterally  Neurological: Light touch sensation diminished  bilaterally  Musculoskeletal: 2/5  strength in all quadrants bilaterally, s/p right partial calcanectomy   Dermatological: Right heel wound noted with granular tissue and measuring 5.5x 6.6 x down to bone and left lateral wound 5.5 x 4.5 x down to bone filled with antibiotic beads, no proximal streaking, no fluctuance, no malodor     CBC Full  -  ( 27 Oct 2023 11:47 )  WBC Count : 6.50 K/uL  RBC Count : 2.83 M/uL  Hemoglobin : 7.4 g/dL  Hematocrit : 23.7 %  Platelet Count - Automated : 244 K/uL  Mean Cell Volume : 83.7 fl  Mean Cell Hemoglobin : 26.1 pg  Mean Cell Hemoglobin Concentration : 31.2 gm/dL  Auto Neutrophil # : x  Auto Lymphocyte # : x  Auto Monocyte # : x  Auto Eosinophil # : x  Auto Basophil # : x  Auto Neutrophil % : x  Auto Lymphocyte % : x  Auto Monocyte % : x  Auto Eosinophil % : x  Auto Basophil % : x      10-26    139  |  105  |  19  ----------------------------<  189<H>  4.0   |  31  |  0.86    Ca    8.1<L>      26 Oct 2023 07:30          Culture - Tissue with Gram Stain (collected 25 Oct 2023 15:50)  Source: .Tissue Other, LEFT FOOT CUBOID BONE CULTURE  Gram Stain (26 Oct 2023 01:33):    No polymorphonuclear cells seen per low power field    No organisms seen per oil power field  Preliminary Report (26 Oct 2023 21:20):    No growth    Culture - Tissue with Gram Stain (collected 25 Oct 2023 15:50)  Source: .Tissue Other, LEFT FOOT 5TH METATARSAL BONE  Gram Stain (26 Oct 2023 22:11):    No polymorphonuclear cells seen per low power field    No organisms seen per oil power field  Preliminary Report (26 Oct 2023 22:11):    Rare Staphylococcus aureus  Organism: Methicillin resistant Staphylococcus aureus (27 Oct 2023 17:55)  Organism: Methicillin resistant Staphylococcus aureus (27 Oct 2023 17:55)        Imaging: ----------  ACC: [id number] EXAM: XR FOOT COMP MIN 3 VIEWS LT ORDERED BY: RAQUEL ISAACS    PROCEDURE DATE: 10/25/2023        INTERPRETATION: LEFT foot  Comparison: 10/19/2023 LEFT foot radiographs  CLINICAL INFORMATION: Postoperative LEFT foot radiographs    TECHNIQUE: AP and lateral views.    FINDINGS:  Status post surgical resection of the LEFT fifth metatarsal tarsal joint. Operative site filled with Calcium sulfate beads impregnated with antibiotics    Generalized bone demineralization.  Remaining osseous and joint structures radiographically intact.    IMPRESSION:    Postoperative changes as described.  Please see operative report    --- End of Report ---      CATHERINE ROBERSON MD; Attending Radiologist  This document has been electronically signed. Oct 25 2023 5:37PM   78y year old Male seen at [facility] 3WES 365 D1 s/p  left foot resection of 5th metatarsal base and partial cuboid for osteomyelitis DOS 10/25/23 and sharp excisional debridement of bilateral foot wounds with bone biopsy of the left lateral foot wound DOS 10/19/23. Patient relates no overnight events and states that they are doing well at this time.  Denies any fever, chills, nausea, vomiting, chest pain, shortness of breath, or calf pain at this time.    Allergies    No Known Allergies    Intolerances        MEDICATIONS  (STANDING):  atorvastatin 80 milliGRAM(s) Oral at bedtime  bethanechol 25 milliGRAM(s) Oral two times a day  chlorhexidine 4% Liquid 1 Application(s) Topical <User Schedule>  dextrose 50% Injectable 12.5 Gram(s) IV Push once  dextrose 50% Injectable 25 Gram(s) IV Push once  dextrose 50% Injectable 25 Gram(s) IV Push once  gabapentin 300 milliGRAM(s) Oral two times a day  glucagon  Injectable 1 milliGRAM(s) IntraMuscular once  insulin lispro (ADMELOG) corrective regimen sliding scale   SubCutaneous three times a day before meals  insulin lispro (ADMELOG) corrective regimen sliding scale   SubCutaneous at bedtime  lactobacillus acidophilus 1 Tablet(s) Oral two times a day with meals  midodrine. 10 milliGRAM(s) Oral three times a day  pantoprazole    Tablet 40 milliGRAM(s) Oral before breakfast  tamsulosin 0.4 milliGRAM(s) Oral at bedtime  vancomycin  IVPB 500 milliGRAM(s) IV Intermittent every 12 hours    MEDICATIONS  (PRN):  acetaminophen     Tablet .. 650 milliGRAM(s) Oral every 6 hours PRN Temp greater or equal to 38C (100.4F), Moderate Pain (4 - 6)  dextrose Oral Gel 15 Gram(s) Oral once PRN Blood Glucose LESS THAN 70 milliGRAM(s)/deciliter  loperamide 2 milliGRAM(s) Oral three times a day PRN Diarrhea  sodium chloride 0.9% lock flush 10 milliLiter(s) IV Push every 1 hour PRN Pre/post blood products, medications, blood draw, and to maintain line patency      Vital Signs Last 24 Hrs  T(C): 36.9 (27 Oct 2023 14:54), Max: 37.2 (27 Oct 2023 14:25)  T(F): 98.5 (27 Oct 2023 14:54), Max: 99 (27 Oct 2023 14:25)  HR: 60 (27 Oct 2023 14:54) (58 - 60)  BP: 130/59 (27 Oct 2023 14:54) (113/50 - 130/59)  BP(mean): --  RR: 17 (27 Oct 2023 14:54) (16 - 17)  SpO2: 96% (27 Oct 2023 12:02) (93% - 96%)    Parameters below as of 27 Oct 2023 12:02  Patient On (Oxygen Delivery Method): room air        PHYSICAL EXAM:  Vascular: DP & PT non  palpable bilaterally, Capillary refill delayed to the digits.  Digital hair absent bilaterally  Neurological: Light touch sensation diminished  bilaterally  Musculoskeletal: 2/5  strength in all quadrants bilaterally, s/p right partial calcanectomy   Dermatological: Right heel wound noted with granular tissue and measuring 5.5x 6.6 x down to bone and left lateral wound 5.5 x 4.5 x down to bone filled with antibiotic beads, no proximal streaking, no fluctuance, no malodor     CBC Full  -  ( 27 Oct 2023 11:47 )  WBC Count : 6.50 K/uL  RBC Count : 2.83 M/uL  Hemoglobin : 7.4 g/dL  Hematocrit : 23.7 %  Platelet Count - Automated : 244 K/uL  Mean Cell Volume : 83.7 fl  Mean Cell Hemoglobin : 26.1 pg  Mean Cell Hemoglobin Concentration : 31.2 gm/dL  Auto Neutrophil # : x  Auto Lymphocyte # : x  Auto Monocyte # : x  Auto Eosinophil # : x  Auto Basophil # : x  Auto Neutrophil % : x  Auto Lymphocyte % : x  Auto Monocyte % : x  Auto Eosinophil % : x  Auto Basophil % : x      10-26    139  |  105  |  19  ----------------------------<  189<H>  4.0   |  31  |  0.86    Ca    8.1<L>      26 Oct 2023 07:30          Culture - Tissue with Gram Stain (collected 25 Oct 2023 15:50)  Source: .Tissue Other, LEFT FOOT CUBOID BONE CULTURE  Gram Stain (26 Oct 2023 01:33):    No polymorphonuclear cells seen per low power field    No organisms seen per oil power field  Preliminary Report (26 Oct 2023 21:20):    No growth    Culture - Tissue with Gram Stain (collected 25 Oct 2023 15:50)  Source: .Tissue Other, LEFT FOOT 5TH METATARSAL BONE  Gram Stain (26 Oct 2023 22:11):    No polymorphonuclear cells seen per low power field    No organisms seen per oil power field  Preliminary Report (26 Oct 2023 22:11):    Rare Staphylococcus aureus  Organism: Methicillin resistant Staphylococcus aureus (27 Oct 2023 17:55)  Organism: Methicillin resistant Staphylococcus aureus (27 Oct 2023 17:55)        Imaging: ----------  ACC: [id number] EXAM: XR FOOT COMP MIN 3 VIEWS LT ORDERED BY: RAQUEL ISAACS    PROCEDURE DATE: 10/25/2023        INTERPRETATION: LEFT foot  Comparison: 10/19/2023 LEFT foot radiographs  CLINICAL INFORMATION: Postoperative LEFT foot radiographs    TECHNIQUE: AP and lateral views.    FINDINGS:  Status post surgical resection of the LEFT fifth metatarsal tarsal joint. Operative site filled with Calcium sulfate beads impregnated with antibiotics    Generalized bone demineralization.  Remaining osseous and joint structures radiographically intact.    IMPRESSION:    Postoperative changes as described.  Please see operative report    --- End of Report ---      CATHERINE ROBERSON MD; Attending Radiologist  This document has been electronically signed. Oct 25 2023 5:37PM   78y year old Male [facility] 3WES 365 D1 s/p  left foot resection of 5th metatarsal base and partial cuboid for osteomyelitis DOS 10/25/23 and sharp excisional debridement of bilateral foot wounds with bone biopsy of the left lateral foot wound DOS 10/19/23. Patient relates no overnight events and states that they are doing well at this time.  Denies any fever, chills, nausea, vomiting, chest pain, shortness of breath, or calf pain at this time.    Allergies    No Known Allergies    Intolerances        MEDICATIONS  (STANDING):  atorvastatin 80 milliGRAM(s) Oral at bedtime  bethanechol 25 milliGRAM(s) Oral two times a day  chlorhexidine 4% Liquid 1 Application(s) Topical <User Schedule>  dextrose 50% Injectable 12.5 Gram(s) IV Push once  dextrose 50% Injectable 25 Gram(s) IV Push once  dextrose 50% Injectable 25 Gram(s) IV Push once  gabapentin 300 milliGRAM(s) Oral two times a day  glucagon  Injectable 1 milliGRAM(s) IntraMuscular once  insulin lispro (ADMELOG) corrective regimen sliding scale   SubCutaneous three times a day before meals  insulin lispro (ADMELOG) corrective regimen sliding scale   SubCutaneous at bedtime  lactobacillus acidophilus 1 Tablet(s) Oral two times a day with meals  midodrine. 10 milliGRAM(s) Oral three times a day  pantoprazole    Tablet 40 milliGRAM(s) Oral before breakfast  tamsulosin 0.4 milliGRAM(s) Oral at bedtime  vancomycin  IVPB 500 milliGRAM(s) IV Intermittent every 12 hours    MEDICATIONS  (PRN):  acetaminophen     Tablet .. 650 milliGRAM(s) Oral every 6 hours PRN Temp greater or equal to 38C (100.4F), Moderate Pain (4 - 6)  dextrose Oral Gel 15 Gram(s) Oral once PRN Blood Glucose LESS THAN 70 milliGRAM(s)/deciliter  loperamide 2 milliGRAM(s) Oral three times a day PRN Diarrhea  sodium chloride 0.9% lock flush 10 milliLiter(s) IV Push every 1 hour PRN Pre/post blood products, medications, blood draw, and to maintain line patency      Vital Signs Last 24 Hrs  T(C): 36.9 (27 Oct 2023 14:54), Max: 37.2 (27 Oct 2023 14:25)  T(F): 98.5 (27 Oct 2023 14:54), Max: 99 (27 Oct 2023 14:25)  HR: 60 (27 Oct 2023 14:54) (58 - 60)  BP: 130/59 (27 Oct 2023 14:54) (113/50 - 130/59)  BP(mean): --  RR: 17 (27 Oct 2023 14:54) (16 - 17)  SpO2: 96% (27 Oct 2023 12:02) (93% - 96%)    Parameters below as of 27 Oct 2023 12:02  Patient On (Oxygen Delivery Method): room air        PHYSICAL EXAM:  Vascular: DP & PT non  palpable bilaterally, Capillary refill delayed to the digits.  Digital hair absent bilaterally  Neurological: Light touch sensation diminished  bilaterally  Musculoskeletal: 2/5  strength in all quadrants bilaterally, s/p right partial calcanectomy   Dermatological: Right heel wound noted with granular tissue and measuring 5.5x 6.6 x down to bone and left lateral wound 5.5 x 4.5 x down to bone filled with antibiotic beads, no proximal streaking, no fluctuance, no malodor     CBC Full  -  ( 27 Oct 2023 11:47 )  WBC Count : 6.50 K/uL  RBC Count : 2.83 M/uL  Hemoglobin : 7.4 g/dL  Hematocrit : 23.7 %  Platelet Count - Automated : 244 K/uL  Mean Cell Volume : 83.7 fl  Mean Cell Hemoglobin : 26.1 pg  Mean Cell Hemoglobin Concentration : 31.2 gm/dL  Auto Neutrophil # : x  Auto Lymphocyte # : x  Auto Monocyte # : x  Auto Eosinophil # : x  Auto Basophil # : x  Auto Neutrophil % : x  Auto Lymphocyte % : x  Auto Monocyte % : x  Auto Eosinophil % : x  Auto Basophil % : x      10-26    139  |  105  |  19  ----------------------------<  189<H>  4.0   |  31  |  0.86    Ca    8.1<L>      26 Oct 2023 07:30          Culture - Tissue with Gram Stain (collected 25 Oct 2023 15:50)  Source: .Tissue Other, LEFT FOOT CUBOID BONE CULTURE  Gram Stain (26 Oct 2023 01:33):    No polymorphonuclear cells seen per low power field    No organisms seen per oil power field  Preliminary Report (26 Oct 2023 21:20):    No growth    Culture - Tissue with Gram Stain (collected 25 Oct 2023 15:50)  Source: .Tissue Other, LEFT FOOT 5TH METATARSAL BONE  Gram Stain (26 Oct 2023 22:11):    No polymorphonuclear cells seen per low power field    No organisms seen per oil power field  Preliminary Report (26 Oct 2023 22:11):    Rare Staphylococcus aureus  Organism: Methicillin resistant Staphylococcus aureus (27 Oct 2023 17:55)  Organism: Methicillin resistant Staphylococcus aureus (27 Oct 2023 17:55)        Imaging: ----------  ACC: [id number] EXAM: XR FOOT COMP MIN 3 VIEWS LT ORDERED BY: RAQUEL ISAACS    PROCEDURE DATE: 10/25/2023        INTERPRETATION: LEFT foot  Comparison: 10/19/2023 LEFT foot radiographs  CLINICAL INFORMATION: Postoperative LEFT foot radiographs    TECHNIQUE: AP and lateral views.    FINDINGS:  Status post surgical resection of the LEFT fifth metatarsal tarsal joint. Operative site filled with Calcium sulfate beads impregnated with antibiotics    Generalized bone demineralization.  Remaining osseous and joint structures radiographically intact.    IMPRESSION:    Postoperative changes as described.  Please see operative report    --- End of Report ---      CATHERINE ROBERSON MD; Attending Radiologist  This document has been electronically signed. Oct 25 2023 5:37PM

## 2023-10-27 NOTE — PROGRESS NOTE ADULT - NUTRITIONAL ASSESSMENT
This patient has been assessed with a concern for Malnutrition and has been determined to have a diagnosis/diagnoses of Severe protein-calorie malnutrition.    This patient is being managed with:   Diet Soft and Bite Sized-  Consistent Carbohydrate {Evening Snack}  Low Sodium  Supplement Feeding Modality:  Oral  Glucerna Shake Cans or Servings Per Day:  1       Frequency:  Two Times a day  Entered: Oct 11 2023  2:35PM    Diet Soft and Bite Sized-  Consistent Carbohydrate {No Snacks}  DASH/TLC {Sodium & Cholesterol Restricted}  Entered: Oct 10 2023 10:43PM    The following pending diet order is being considered for treatment of Severe protein-calorie malnutrition:null
This patient has been assessed with a concern for Malnutrition and has been determined to have a diagnosis/diagnoses of Severe protein-calorie malnutrition.    This patient is being managed with:   Diet NPO after Midnight-     NPO Start Date: 16-Oct-2023   NPO Start Time: 23:59  Entered: Oct 16 2023  9:47PM    Diet Regular-  Consistent Carbohydrate {No Snacks}  DASH/TLC {Sodium & Cholesterol Restricted}  Entered: Oct 16 2023 10:29AM  
This patient has been assessed with a concern for Malnutrition and has been determined to have a diagnosis/diagnoses of Severe protein-calorie malnutrition.    This patient is being managed with:   Diet Regular-  Consistent Carbohydrate {No Snacks}  DASH/TLC {Sodium & Cholesterol Restricted}  Entered: Oct 25 2023  3:31PM    The following pending diet order is being considered for treatment of Severe protein-calorie malnutrition:  Diet Consistent Carbohydrate w/Evening Snack-  DASH/TLC {Sodium & Cholesterol Restricted}  Supplement Feeding Modality:  Oral  Glucerna Shake Cans or Servings Per Day:  1       Frequency:  Two Times a day  Entered: Oct 26 2023 10:28AM  
This patient has been assessed with a concern for Malnutrition and has been determined to have a diagnosis/diagnoses of Severe protein-calorie malnutrition.    This patient is being managed with:   Diet Soft and Bite Sized-  Consistent Carbohydrate {Evening Snack}  Low Sodium  Supplement Feeding Modality:  Oral  Glucerna Shake Cans or Servings Per Day:  1       Frequency:  Two Times a day  Entered: Oct 11 2023  2:35PM  
This patient has been assessed with a concern for Malnutrition and has been determined to have a diagnosis/diagnoses of Severe protein-calorie malnutrition.    This patient is being managed with:   Diet Regular-  Consistent Carbohydrate {No Snacks}  DASH/TLC {Sodium & Cholesterol Restricted}  Entered: Oct 25 2023  3:31PM    The following pending diet order is being considered for treatment of Severe protein-calorie malnutrition:  Diet Consistent Carbohydrate w/Evening Snack-  DASH/TLC {Sodium & Cholesterol Restricted}  Supplement Feeding Modality:  Oral  Glucerna Shake Cans or Servings Per Day:  1       Frequency:  Two Times a day  Entered: Oct 26 2023 10:28AM  
This patient has been assessed with a concern for Malnutrition and has been determined to have a diagnosis/diagnoses of Severe protein-calorie malnutrition.    This patient is being managed with:   Diet NPO after Midnight-     NPO Start Date: 24-Oct-2023   NPO Start Time: 23:59  Entered: Oct 24 2023  7:05PM    Diet Regular-  Consistent Carbohydrate {No Snacks}  DASH/TLC {Sodium & Cholesterol Restricted}  Entered: Oct 19 2023 10:49AM    The following pending diet order is being considered for treatment of Severe protein-calorie malnutrition:  Diet Consistent Carbohydrate/No Snacks-  Low Sodium  Supplement Feeding Modality:  Oral  Glucerna Shake Cans or Servings Per Day:  1       Frequency:  Two Times a day  Entered: Oct 23 2023  1:39PM  
This patient has been assessed with a concern for Malnutrition and has been determined to have a diagnosis/diagnoses of Severe protein-calorie malnutrition.    This patient is being managed with:   Diet Soft and Bite Sized-  Consistent Carbohydrate {Evening Snack}  Low Sodium  Supplement Feeding Modality:  Oral  Glucerna Shake Cans or Servings Per Day:  1       Frequency:  Two Times a day  Entered: Oct 11 2023  2:35PM  
This patient has been assessed with a concern for Malnutrition and has been determined to have a diagnosis/diagnoses of Severe protein-calorie malnutrition.  
This patient has been assessed with a concern for Malnutrition and has been determined to have a diagnosis/diagnoses of Severe protein-calorie malnutrition.    This patient is being managed with:   Diet Regular-  Consistent Carbohydrate {No Snacks}  DASH/TLC {Sodium & Cholesterol Restricted}  Entered: Oct 19 2023 10:49AM  
This patient has been assessed with a concern for Malnutrition and has been determined to have a diagnosis/diagnoses of Severe protein-calorie malnutrition.    This patient is being managed with:   Diet Regular-  Consistent Carbohydrate {No Snacks}  DASH/TLC {Sodium & Cholesterol Restricted}  Entered: Oct 19 2023 10:49AM  
This patient has been assessed with a concern for Malnutrition and has been determined to have a diagnosis/diagnoses of Severe protein-calorie malnutrition.    This patient is being managed with:   Diet Regular-  Consistent Carbohydrate {No Snacks}  DASH/TLC {Sodium & Cholesterol Restricted}  Entered: Oct 19 2023 10:49AM    The following pending diet order is being considered for treatment of Severe protein-calorie malnutrition:  Diet Consistent Carbohydrate/No Snacks-  Low Sodium  Supplement Feeding Modality:  Oral  Glucerna Shake Cans or Servings Per Day:  1       Frequency:  Two Times a day  Entered: Oct 23 2023  1:39PM  
This patient has been assessed with a concern for Malnutrition and has been determined to have a diagnosis/diagnoses of Severe protein-calorie malnutrition.    This patient is being managed with:   Diet Regular-  Consistent Carbohydrate {No Snacks}  DASH/TLC {Sodium & Cholesterol Restricted}  Entered: Oct 16 2023 10:29AM  
This patient has been assessed with a concern for Malnutrition and has been determined to have a diagnosis/diagnoses of Severe protein-calorie malnutrition.    This patient is being managed with:   Diet Regular-  Consistent Carbohydrate {No Snacks}  DASH/TLC {Sodium & Cholesterol Restricted}  Entered: Oct 19 2023 10:49AM  
This patient has been assessed with a concern for Malnutrition and has been determined to have a diagnosis/diagnoses of Severe protein-calorie malnutrition.    This patient is being managed with:   Diet Regular-  Consistent Carbohydrate {No Snacks}  DASH/TLC {Sodium & Cholesterol Restricted}  Entered: Oct 19 2023 10:49AM  
This patient has been assessed with a concern for Malnutrition and has been determined to have a diagnosis/diagnoses of Severe protein-calorie malnutrition.    This patient is being managed with:   Diet Regular-  Consistent Carbohydrate {No Snacks}  DASH/TLC {Sodium & Cholesterol Restricted}  Entered: Oct 16 2023 10:29AM  
This patient has been assessed with a concern for Malnutrition and has been determined to have a diagnosis/diagnoses of Severe protein-calorie malnutrition.    This patient is being managed with:   Diet Soft and Bite Sized-  Consistent Carbohydrate {Evening Snack}  Low Sodium  Supplement Feeding Modality:  Oral  Glucerna Shake Cans or Servings Per Day:  1       Frequency:  Two Times a day  Entered: Oct 11 2023  2:35PM

## 2023-10-27 NOTE — PROGRESS NOTE ADULT - SUBJECTIVE AND OBJECTIVE BOX
[place], Division of Infectious Diseases  GLYNN Oakley Y. Patel, S. Shah, G. [facility]  [phone]    Name: HEMA LAZCANO  Age: 78y  Gender: Male  MRN: [id number]    Interval History:  Patient seen and examined at bedside   No acute overnight events. Afebrile  No complaints  Notes reviewed    Antibiotics:  vancomycin  IVPB 500 milliGRAM(s) IV Intermittent every 12 hours      Medications:  acetaminophen     Tablet .. 650 milliGRAM(s) Oral every 6 hours PRN  atorvastatin 80 milliGRAM(s) Oral at bedtime  bethanechol 25 milliGRAM(s) Oral two times a day  chlorhexidine 4% Liquid 1 Application(s) Topical <User Schedule>  dextrose 50% Injectable 25 Gram(s) IV Push once  dextrose 50% Injectable 25 Gram(s) IV Push once  dextrose 50% Injectable 12.5 Gram(s) IV Push once  dextrose Oral Gel 15 Gram(s) Oral once PRN  gabapentin 300 milliGRAM(s) Oral two times a day  glucagon  Injectable 1 milliGRAM(s) IntraMuscular once  insulin lispro (ADMELOG) corrective regimen sliding scale   SubCutaneous three times a day before meals  insulin lispro (ADMELOG) corrective regimen sliding scale   SubCutaneous at bedtime  lactobacillus acidophilus 1 Tablet(s) Oral two times a day with meals  loperamide 2 milliGRAM(s) Oral three times a day PRN  midodrine. 10 milliGRAM(s) Oral three times a day  pantoprazole    Tablet 40 milliGRAM(s) Oral before breakfast  sodium chloride 0.9% lock flush 10 milliLiter(s) IV Push every 1 hour PRN  tamsulosin 0.4 milliGRAM(s) Oral at bedtime  vancomycin  IVPB 500 milliGRAM(s) IV Intermittent every 12 hours      Review of Systems:  A 10-point review of systems was obtained.   Review of systems otherwise negative except as previously noted.    Allergies: No Known Allergies    For details regarding the patient's past medical history, social history, family history, and other miscellaneous elements, please refer the initial infectious diseases consultation and/or the admitting history and physical examination for this admission.    Objective:  Vitals:   T(C): 36.8 (10-27-23 @ 05:09), Max: 37 (10-26-23 @ 12:33)  HR: 59 (10-27-23 @ 07:57) (58 - 69)  BP: 125/58 (10-27-23 @ 05:09) (120/56 - 125/58)  RR: 17 (10-27-23 @ 05:09) (17 - 17)  SpO2: 93% (10-27-23 @ 05:09) (93% - 98%)    Physical Examination:  General: no acute distress  HEENT: NC/AT, EOMI,   Cardio: RRR  Resp: decreased breath sounds   Abd: soft, NT, ND  Ext: no edema or cyanosis, dressing b/l feet c/d/i  Skin: warm, dry, no visible rash        Laboratory Studies:  CBC:                       7.7    7.51  )-----------( 263      ( 26 Oct 2023 07:30 )             25.6     CMP: 10-26    139  |  105  |  19  ----------------------------<  189<H>  4.0   |  31  |  0.86    Ca    8.1<L>      26 Oct 2023 07:30        Urinalysis Basic - ( 26 Oct 2023 07:30 )    Color: x / Appearance: x / SG: x / pH: x  Gluc: 189 mg/dL / Ketone: x  / Bili: x / Urobili: x   Blood: x / Protein: x / Nitrite: x   Leuk Esterase: x / RBC: x / WBC x   Sq Epi: x / Non Sq Epi: x / Bacteria: x        Microbiology: reviewed    Culture - Tissue with Gram Stain (collected 10-25-23 @ 15:50)  Source: .Tissue Other, LEFT FOOT CUBOID BONE CULTURE  Gram Stain (10-26-23 @ 01:33):    No polymorphonuclear cells seen per low power field    No organisms seen per oil power field  Preliminary Report (10-26-23 @ 21:20):    No growth    Culture - Tissue with Gram Stain (collected 10-25-23 @ 15:50)  Source: .Tissue Other, LEFT FOOT 5TH METATARSAL BONE  Gram Stain (10-26-23 @ 22:11):    No polymorphonuclear cells seen per low power field    No organisms seen per oil power field  Preliminary Report (10-26-23 @ 22:11):    Rare Staphylococcus aureus    Culture - Tissue with Gram Stain (collected 10-19-23 @ 11:35)  Source: .Tissue Other, LEFT FOOT BONE CULTURE  Gram Stain (10-19-23 @ 23:02):    No polymorphonuclear cells seen per low power field    No organisms seen per oil power field  Final Report (10-24-23 @ 15:08):    Rare Methicillin Resistant Staphylococcus aureus  Organism: Methicillin resistant Staphylococcus aureus (10-24-23 @ 15:08)  Organism: Methicillin resistant Staphylococcus aureus (10-24-23 @ 15:08)      Method Type: KARTHIK      -  Ampicillin/Sulbactam: R <=8/4      -  Cefazolin: R <=4      -  Clindamycin: S <=0.25      -  Daptomycin: S 0.5      -  Erythromycin: R >4      -  Gentamicin: S <=1 Should not be used as monotherapy      -  Linezolid: S 2      -  Oxacillin: R >2      -  Penicillin: R 1      -  Rifampin: S <=1 Should not be used as monotherapy      -  Tetracycline: S <=1      -  Trimethoprim/Sulfamethoxazole: S <=0.5/9.5      -  Vancomycin: S 1    Culture - Tissue with Gram Stain (collected 10-19-23 @ 11:35)  Source: .Tissue Other, LEFT FOOT DEEP TISSUE CULTURE  Gram Stain (10-19-23 @ 23:29):    No polymorphonuclear cells seen per low power field    Moderate Gram positive cocci in pairs per oil power field  Final Report (10-24-23 @ 15:06):    Numerous Methicillin Resistant Staphylococcus aureus  Organism: Methicillin resistant Staphylococcus aureus (10-24-23 @ 15:06)  Organism: Methicillin resistant Staphylococcus aureus (10-24-23 @ 15:06)      Method Type: KARTHIK      -  Ampicillin/Sulbactam: R <=8/4      -  Cefazolin: R <=4      -  Clindamycin: S <=0.25      -  Daptomycin: S 0.5      -  Erythromycin: R >4      -  Gentamicin: S <=1 Should not be used as monotherapy      -  Linezolid: S 2      -  Oxacillin: R >2      -  Penicillin: R 1      -  Rifampin: S <=1 Should not be used as monotherapy      -  Tetracycline: S <=1      -  Trimethoprim/Sulfamethoxazole: S <=0.5/9.5      -  Vancomycin: S 1    Culture - Tissue with Gram Stain (collected 10-19-23 @ 11:35)  Source: .Tissue Other, RIGHT FOOT SOFT TISSUE CULTURE  Gram Stain (10-19-23 @ 23:27):    Few polymorphonuclear leukocytes per low power field    Numerous Gram positive cocci in pairs per oil power field    Moderate Gram Variable Rods per oil power field  Final Report (10-24-23 @ 15:05):    Numerous Methicillin Resistant Staphylococcus aureus    Few Corynebacterium species "Susceptibilities not performed"  Organism: Methicillin resistant Staphylococcus aureus (10-24-23 @ 15:05)  Organism: Methicillin resistant Staphylococcus aureus (10-24-23 @ 15:05)      Method Type: KARTHIK      -  Ampicillin/Sulbactam: R <=8/4      -  Cefazolin: R <=4      -  Clindamycin: S <=0.25      -  Daptomycin: S 0.5      -  Erythromycin: R >4      -  Gentamicin: S 2 Should not be used as monotherapy      -  Linezolid: S 2      -  Oxacillin: R >2      -  Penicillin: R >8      -  Rifampin: S <=1 Should not be used as monotherapy      -  Tetracycline: S <=1      -  Trimethoprim/Sulfamethoxazole: S <=0.5/9.5      -  Vancomycin: S 1          Radiology: reviewed       [place], Division of Infectious Diseases  GLYNN Oakley Y. Patel, S. Shah, G. [facility]  [phone]    Name: HEMA LAZCANO  Age: 78y  Gender: Male  MRN: [id number]    Interval History:  Patient seen and examined at bedside   No acute overnight events. Afebrile  No complaints  Notes reviewed    Antibiotics:  vancomycin  IVPB 500 milliGRAM(s) IV Intermittent every 12 hours      Medications:  acetaminophen     Tablet .. 650 milliGRAM(s) Oral every 6 hours PRN  atorvastatin 80 milliGRAM(s) Oral at bedtime  bethanechol 25 milliGRAM(s) Oral two times a day  chlorhexidine 4% Liquid 1 Application(s) Topical <User Schedule>  dextrose 50% Injectable 25 Gram(s) IV Push once  dextrose 50% Injectable 25 Gram(s) IV Push once  dextrose 50% Injectable 12.5 Gram(s) IV Push once  dextrose Oral Gel 15 Gram(s) Oral once PRN  gabapentin 300 milliGRAM(s) Oral two times a day  glucagon  Injectable 1 milliGRAM(s) IntraMuscular once  insulin lispro (ADMELOG) corrective regimen sliding scale   SubCutaneous three times a day before meals  insulin lispro (ADMELOG) corrective regimen sliding scale   SubCutaneous at bedtime  lactobacillus acidophilus 1 Tablet(s) Oral two times a day with meals  loperamide 2 milliGRAM(s) Oral three times a day PRN  midodrine. 10 milliGRAM(s) Oral three times a day  pantoprazole    Tablet 40 milliGRAM(s) Oral before breakfast  sodium chloride 0.9% lock flush 10 milliLiter(s) IV Push every 1 hour PRN  tamsulosin 0.4 milliGRAM(s) Oral at bedtime  vancomycin  IVPB 500 milliGRAM(s) IV Intermittent every 12 hours      Review of Systems:  A 10-point review of systems was obtained.   Review of systems otherwise negative except as previously noted.    Allergies: No Known Allergies    For details regarding the patient's past medical history, social history, family history, and other miscellaneous elements, please refer the initial infectious diseases consultation and/or the admitting history and physical examination for this admission.    Objective:  Vitals:   T(C): 36.8 (10-27-23 @ 05:09), Max: 37 (10-26-23 @ 12:33)  HR: 59 (10-27-23 @ 07:57) (58 - 69)  BP: 125/58 (10-27-23 @ 05:09) (120/56 - 125/58)  RR: 17 (10-27-23 @ 05:09) (17 - 17)  SpO2: 93% (10-27-23 @ 05:09) (93% - 98%)    Physical Examination:  General: no acute distress  HEENT: NC/AT, EOMI,   Cardio: RRR  Resp: decreased breath sounds   Abd: soft, NT, ND  Ext: no edema or cyanosis, dressing b/l feet c/d/i  Skin: warm, dry, no visible rash        Laboratory Studies:  CBC:                       7.7    7.51  )-----------( 263      ( 26 Oct 2023 07:30 )             25.6     CMP: 10-26    139  |  105  |  19  ----------------------------<  189<H>  4.0   |  31  |  0.86    Ca    8.1<L>      26 Oct 2023 07:30        Urinalysis Basic - ( 26 Oct 2023 07:30 )    Color: x / Appearance: x / SG: x / pH: x  Gluc: 189 mg/dL / Ketone: x  / Bili: x / Urobili: x   Blood: x / Protein: x / Nitrite: x   Leuk Esterase: x / RBC: x / WBC x   Sq Epi: x / Non Sq Epi: x / Bacteria: x        Microbiology: reviewed    Culture - Tissue with Gram Stain (collected 10-25-23 @ 15:50)  Source: .Tissue Other, LEFT FOOT CUBOID BONE CULTURE  Gram Stain (10-26-23 @ 01:33):    No polymorphonuclear cells seen per low power field    No organisms seen per oil power field  Preliminary Report (10-26-23 @ 21:20):    No growth    Culture - Tissue with Gram Stain (collected 10-25-23 @ 15:50)  Source: .Tissue Other, LEFT FOOT 5TH METATARSAL BONE  Gram Stain (10-26-23 @ 22:11):    No polymorphonuclear cells seen per low power field    No organisms seen per oil power field  Preliminary Report (10-26-23 @ 22:11):    Rare Staphylococcus aureus    Culture - Tissue with Gram Stain (collected 10-19-23 @ 11:35)  Source: .Tissue Other, LEFT FOOT BONE CULTURE  Gram Stain (10-19-23 @ 23:02):    No polymorphonuclear cells seen per low power field    No organisms seen per oil power field  Final Report (10-24-23 @ 15:08):    Rare Methicillin Resistant Staphylococcus aureus  Organism: Methicillin resistant Staphylococcus aureus (10-24-23 @ 15:08)  Organism: Methicillin resistant Staphylococcus aureus (10-24-23 @ 15:08)      Method Type: KARTHIK      -  Ampicillin/Sulbactam: R <=8/4      -  Cefazolin: R <=4      -  Clindamycin: S <=0.25      -  Daptomycin: S 0.5      -  Erythromycin: R >4      -  Gentamicin: S <=1 Should not be used as monotherapy      -  Linezolid: S 2      -  Oxacillin: R >2      -  Penicillin: R 1      -  Rifampin: S <=1 Should not be used as monotherapy      -  Tetracycline: S <=1      -  Trimethoprim/Sulfamethoxazole: S <=0.5/9.5      -  Vancomycin: S 1    Culture - Tissue with Gram Stain (collected 10-19-23 @ 11:35)  Source: .Tissue Other, LEFT FOOT DEEP TISSUE CULTURE  Gram Stain (10-19-23 @ 23:29):    No polymorphonuclear cells seen per low power field    Moderate Gram positive cocci in pairs per oil power field  Final Report (10-24-23 @ 15:06):    Numerous Methicillin Resistant Staphylococcus aureus  Organism: Methicillin resistant Staphylococcus aureus (10-24-23 @ 15:06)  Organism: Methicillin resistant Staphylococcus aureus (10-24-23 @ 15:06)      Method Type: KARTHIK      -  Ampicillin/Sulbactam: R <=8/4      -  Cefazolin: R <=4      -  Clindamycin: S <=0.25      -  Daptomycin: S 0.5      -  Erythromycin: R >4      -  Gentamicin: S <=1 Should not be used as monotherapy      -  Linezolid: S 2      -  Oxacillin: R >2      -  Penicillin: R 1      -  Rifampin: S <=1 Should not be used as monotherapy      -  Tetracycline: S <=1      -  Trimethoprim/Sulfamethoxazole: S <=0.5/9.5      -  Vancomycin: S 1    Culture - Tissue with Gram Stain (collected 10-19-23 @ 11:35)  Source: .Tissue Other, RIGHT FOOT SOFT TISSUE CULTURE  Gram Stain (10-19-23 @ 23:27):    Few polymorphonuclear leukocytes per low power field    Numerous Gram positive cocci in pairs per oil power field    Moderate Gram Variable Rods per oil power field  Final Report (10-24-23 @ 15:05):    Numerous Methicillin Resistant Staphylococcus aureus    Few Corynebacterium species "Susceptibilities not performed"  Organism: Methicillin resistant Staphylococcus aureus (10-24-23 @ 15:05)  Organism: Methicillin resistant Staphylococcus aureus (10-24-23 @ 15:05)      Method Type: KARTHIK      -  Ampicillin/Sulbactam: R <=8/4      -  Cefazolin: R <=4      -  Clindamycin: S <=0.25      -  Daptomycin: S 0.5      -  Erythromycin: R >4      -  Gentamicin: S 2 Should not be used as monotherapy      -  Linezolid: S 2      -  Oxacillin: R >2      -  Penicillin: R >8      -  Rifampin: S <=1 Should not be used as monotherapy      -  Tetracycline: S <=1      -  Trimethoprim/Sulfamethoxazole: S <=0.5/9.5      -  Vancomycin: S 1          Radiology: reviewed       [place], Division of Infectious Diseases  GLYNN Oakley Y. Patel, S. Shah, G. [facility]  [phone]    Name: HEMA LAZCANO  Age: 78y  Gender: Male  MRN: [id number]    Interval History:  Patient seen and examined at bedside   No acute overnight events. Afebrile  No complaints  Notes reviewed    Antibiotics:  vancomycin  IVPB 500 milliGRAM(s) IV Intermittent every 12 hours      Medications:  acetaminophen     Tablet .. 650 milliGRAM(s) Oral every 6 hours PRN  atorvastatin 80 milliGRAM(s) Oral at bedtime  bethanechol 25 milliGRAM(s) Oral two times a day  chlorhexidine 4% Liquid 1 Application(s) Topical <User Schedule>  dextrose 50% Injectable 25 Gram(s) IV Push once  dextrose 50% Injectable 25 Gram(s) IV Push once  dextrose 50% Injectable 12.5 Gram(s) IV Push once  dextrose Oral Gel 15 Gram(s) Oral once PRN  gabapentin 300 milliGRAM(s) Oral two times a day  glucagon  Injectable 1 milliGRAM(s) IntraMuscular once  insulin lispro (ADMELOG) corrective regimen sliding scale   SubCutaneous three times a day before meals  insulin lispro (ADMELOG) corrective regimen sliding scale   SubCutaneous at bedtime  lactobacillus acidophilus 1 Tablet(s) Oral two times a day with meals  loperamide 2 milliGRAM(s) Oral three times a day PRN  midodrine. 10 milliGRAM(s) Oral three times a day  pantoprazole    Tablet 40 milliGRAM(s) Oral before breakfast  sodium chloride 0.9% lock flush 10 milliLiter(s) IV Push every 1 hour PRN  tamsulosin 0.4 milliGRAM(s) Oral at bedtime  vancomycin  IVPB 500 milliGRAM(s) IV Intermittent every 12 hours      Review of Systems:  A 10-point review of systems was obtained.   Review of systems otherwise negative except as previously noted.    Allergies: No Known Allergies    For details regarding the patient's past medical history, social history, family history, and other miscellaneous elements, please refer the initial infectious diseases consultation and/or the admitting history and physical examination for this admission.    Objective:  Vitals:   T(C): 36.8 (10-27-23 @ 05:09), Max: 37 (10-26-23 @ 12:33)  HR: 59 (10-27-23 @ 07:57) (58 - 69)  BP: 125/58 (10-27-23 @ 05:09) (120/56 - 125/58)  RR: 17 (10-27-23 @ 05:09) (17 - 17)  SpO2: 93% (10-27-23 @ 05:09) (93% - 98%)    Physical Examination:  General: no acute distress  HEENT: NC/AT, EOMI,   Cardio: RRR  Resp: decreased breath sounds   Abd: soft, NT, ND  Ext: no edema or cyanosis, dressing b/l feet c/d/i  Skin: warm, dry, no visible rash        Laboratory Studies:  CBC:                       7.7    7.51  )-----------( 263      ( 26 Oct 2023 07:30 )             25.6     CMP: 10-26    139  |  105  |  19  ----------------------------<  189<H>  4.0   |  31  |  0.86    Ca    8.1<L>      26 Oct 2023 07:30        Urinalysis Basic - ( 26 Oct 2023 07:30 )    Color: x / Appearance: x / SG: x / pH: x  Gluc: 189 mg/dL / Ketone: x  / Bili: x / Urobili: x   Blood: x / Protein: x / Nitrite: x   Leuk Esterase: x / RBC: x / WBC x   Sq Epi: x / Non Sq Epi: x / Bacteria: x        Microbiology: reviewed    Culture - Tissue with Gram Stain (collected 10-25-23 @ 15:50)  Source: .Tissue Other, LEFT FOOT CUBOID BONE CULTURE  Gram Stain (10-26-23 @ 01:33):    No polymorphonuclear cells seen per low power field    No organisms seen per oil power field  Preliminary Report (10-26-23 @ 21:20):    No growth    Culture - Tissue with Gram Stain (collected 10-25-23 @ 15:50)  Source: .Tissue Other, LEFT FOOT 5TH METATARSAL BONE  Gram Stain (10-26-23 @ 22:11):    No polymorphonuclear cells seen per low power field    No organisms seen per oil power field  Preliminary Report (10-26-23 @ 22:11):    Rare Staphylococcus aureus    Culture - Tissue with Gram Stain (collected 10-19-23 @ 11:35)  Source: .Tissue Other, LEFT FOOT BONE CULTURE  Gram Stain (10-19-23 @ 23:02):    No polymorphonuclear cells seen per low power field    No organisms seen per oil power field  Final Report (10-24-23 @ 15:08):    Rare Methicillin Resistant Staphylococcus aureus  Organism: Methicillin resistant Staphylococcus aureus (10-24-23 @ 15:08)  Organism: Methicillin resistant Staphylococcus aureus (10-24-23 @ 15:08)      Method Type: KARTHIK      -  Ampicillin/Sulbactam: R <=8/4      -  Cefazolin: R <=4      -  Clindamycin: S <=0.25      -  Daptomycin: S 0.5      -  Erythromycin: R >4      -  Gentamicin: S <=1 Should not be used as monotherapy      -  Linezolid: S 2      -  Oxacillin: R >2      -  Penicillin: R 1      -  Rifampin: S <=1 Should not be used as monotherapy      -  Tetracycline: S <=1      -  Trimethoprim/Sulfamethoxazole: S <=0.5/9.5      -  Vancomycin: S 1    Culture - Tissue with Gram Stain (collected 10-19-23 @ 11:35)  Source: .Tissue Other, LEFT FOOT DEEP TISSUE CULTURE  Gram Stain (10-19-23 @ 23:29):    No polymorphonuclear cells seen per low power field    Moderate Gram positive cocci in pairs per oil power field  Final Report (10-24-23 @ 15:06):    Numerous Methicillin Resistant Staphylococcus aureus  Organism: Methicillin resistant Staphylococcus aureus (10-24-23 @ 15:06)  Organism: Methicillin resistant Staphylococcus aureus (10-24-23 @ 15:06)      Method Type: KARTIHK      -  Ampicillin/Sulbactam: R <=8/4      -  Cefazolin: R <=4      -  Clindamycin: S <=0.25      -  Daptomycin: S 0.5      -  Erythromycin: R >4      -  Gentamicin: S <=1 Should not be used as monotherapy      -  Linezolid: S 2      -  Oxacillin: R >2      -  Penicillin: R 1      -  Rifampin: S <=1 Should not be used as monotherapy      -  Tetracycline: S <=1      -  Trimethoprim/Sulfamethoxazole: S <=0.5/9.5      -  Vancomycin: S 1    Culture - Tissue with Gram Stain (collected 10-19-23 @ 11:35)  Source: .Tissue Other, RIGHT FOOT SOFT TISSUE CULTURE  Gram Stain (10-19-23 @ 23:27):    Few polymorphonuclear leukocytes per low power field    Numerous Gram positive cocci in pairs per oil power field    Moderate Gram Variable Rods per oil power field  Final Report (10-24-23 @ 15:05):    Numerous Methicillin Resistant Staphylococcus aureus    Few Corynebacterium species "Susceptibilities not performed"  Organism: Methicillin resistant Staphylococcus aureus (10-24-23 @ 15:05)  Organism: Methicillin resistant Staphylococcus aureus (10-24-23 @ 15:05)      Method Type: KARTHIK      -  Ampicillin/Sulbactam: R <=8/4      -  Cefazolin: R <=4      -  Clindamycin: S <=0.25      -  Daptomycin: S 0.5      -  Erythromycin: R >4      -  Gentamicin: S 2 Should not be used as monotherapy      -  Linezolid: S 2      -  Oxacillin: R >2      -  Penicillin: R >8      -  Rifampin: S <=1 Should not be used as monotherapy      -  Tetracycline: S <=1      -  Trimethoprim/Sulfamethoxazole: S <=0.5/9.5      -  Vancomycin: S 1          Radiology: reviewed

## 2023-10-27 NOTE — PROVIDER CONTACT NOTE (CRITICAL VALUE NOTIFICATION) - SITUATION
Tissue culture of left foot from 10/19, positive for MRSA
Positive tissue culture from 10/25/23
made aware of critical value
already aware

## 2023-10-27 NOTE — PHARMACOTHERAPY INTERVENTION NOTE - INTERVENTION CATEGORIES
ASP
Therapy
ASP
- patient and daughter made aware and advised to follow up as outpatient for monitoring
- patient and daughter made aware and advised to follow up as outpatient for monitoring

## 2023-10-27 NOTE — PROGRESS NOTE ADULT - PROBLEM SELECTOR PLAN 2
WOUND CARE INSTRUCTIONS:  1) Keep wound VAC settings at 125mmHg on continuous basis.  2) Wound vac to be changed every 3 days.   3) If the wound vac is off or not functioning for more than 2 hours, remove the vac and apply wet to dry sterile dressing to the wound.     Changing wound vac:   1) Remove previous Wound VAC foam, tubing and adhesive strips.  2) Gently cleanse the wound base with normal saline. Pat dry.  3) If periwound is noted to be macerated, you can apply Cavillon to the wound edges to help dry skin.  4) Apply adaptic to the wound base first   5) Cut a piece of black foam with sterile scissors to the size of the wound and apply to the wound base.  5)  Secure foam with wound VAC adhesive tape (recommend cutting it into strips for easier and more successful application).  6) Cut a small piece of the foam out and apply the wound VAC tubing.  7) Turn on the wound VAC and check for leakages; reinforce dressing with additional adhesive strips if needed.  8). Apply DSD (4x4 gauze, abdominal pad, kerlix) over the right foot. Apply ACE wrap loosely as an outer layer.

## 2023-10-27 NOTE — PHARMACOTHERAPY INTERVENTION NOTE - COMMENTS
Pt is a 77 yo male currently on vancomycin 500 mg Q12H for MRSA bacteremia/osteomyelitis. AUCs have been within goal of 400-600. Recommended getting another trough on Monday 10/30 prior to AM dose for continuous monitoring. Discussed with ID Dr. Rosa Maria Wilkinson and recommendation accepted, trough order was entered.

## 2023-10-27 NOTE — PROGRESS NOTE ADULT - PROBLEM SELECTOR PROBLEM 5
Anemia, unspecified
Acute UTI
Acute UTI
Benign essential HTN
Acute UTI
Diabetes
Diabetes
Benign essential HTN
Acute UTI
Diabetes
Diabetes
Benign essential HTN
Anemia, unspecified
Anemia, unspecified

## 2023-10-27 NOTE — PROGRESS NOTE ADULT - PROBLEM SELECTOR PROBLEM 1
Diabetic foot ulcers
Sepsis with hypotension
Diabetic ulcer of left foot
Sepsis with hypotension
Gram positive bacterial infection
Sepsis with hypotension
Diabetic ulcer of left foot
Gram positive bacterial infection
Sepsis with hypotension
Diabetic ulcer of left foot
Gram positive bacterial infection
Sepsis with hypotension
Diabetic ulcer of left foot
Diabetic ulcer of left foot
Gram positive bacterial infection
Gram positive bacterial infection
Sepsis with hypotension
Sepsis with hypotension

## 2023-10-27 NOTE — PROGRESS NOTE ADULT - PROBLEM SELECTOR PLAN 1
Patient examined and evaluated.  Surgical site dressed with adaptic, sterile gauze , abd pad and tremaine to the left and silver alginate to left foot wounds and dry, sterile dressing.  Surgical pathology from 10/25/23 pending and cultures positive  for MRSA   Antibiotics as per ID recommendations.  Patient is s/p PICC line placement   Recommend wound vac to the left foot wound  Apply adaptic to the wound base and then apply the black foam  and the wound vac as instructed  Recommend to follow up at the wound care center within 7 days of discharge from the hospital

## 2023-10-27 NOTE — PROGRESS NOTE ADULT - PROBLEM SELECTOR PROBLEM 3
Anemia, unspecified
Anemia, unspecified
Foot osteomyelitis, left
Non-healing wound of right heel
Anemia, unspecified
Elevated troponin
Anemia, unspecified
Anemia, unspecified
Diabetic foot ulcers
Elevated troponin
Diabetic foot ulcers
Foot osteomyelitis, left
Diabetic foot ulcers
Foot osteomyelitis, left
Foot osteomyelitis, left
Diabetic foot ulcers
Diabetic foot ulcers
Elevated troponin
Elevated troponin
Diabetic foot ulcers
Diabetic foot ulcers

## 2023-10-27 NOTE — PHARMACOTHERAPY INTERVENTION NOTE - NSPHARMCOMMASP
ASP - Duration of therapy
ASP - Lab/ test recommended
ASP - Dose optimization/Non-Renal dose adjustment
ASP - Drug-Bug mismatch
ASP - Renal dose adjustment
ASP - Lab/ test recommended
ASP - Dose optimization/Non-Renal dose adjustment
ASP - Lab/ test recommended

## 2023-10-27 NOTE — PROGRESS NOTE ADULT - PROBLEM SELECTOR PROBLEM 4
Acute UTI
Diabetes
Diabetic foot ulcers
Acute UTI
Anemia, unspecified
Diabetic foot ulcers
Diabetes
Acute UTI
Diabetes
Anemia, unspecified
Diabetes
Anemia, unspecified
Diabetic foot ulcers
Anemia, unspecified
Diabetes
Diabetic foot ulcers

## 2023-10-27 NOTE — PROGRESS NOTE ADULT - PROBLEM SELECTOR PLAN 1
infection clearing  continue abx per ID until 11/22/23  s/p picc line  for dc once cleared by podiatry

## 2023-10-27 NOTE — PROGRESS NOTE ADULT - SUBJECTIVE AND OBJECTIVE BOX
[facility]  Les Mccray PA-C  [address] [place] [address]  [phone]      INTERVAL HPI/OVERNIGHT EVENTS:  Pt s/e  No new GI events    MEDICATIONS  (STANDING):  atorvastatin 80 milliGRAM(s) Oral at bedtime  bethanechol 25 milliGRAM(s) Oral two times a day  chlorhexidine 4% Liquid 1 Application(s) Topical <User Schedule>  dextrose 50% Injectable 25 Gram(s) IV Push once  dextrose 50% Injectable 25 Gram(s) IV Push once  dextrose 50% Injectable 12.5 Gram(s) IV Push once  gabapentin 300 milliGRAM(s) Oral two times a day  glucagon  Injectable 1 milliGRAM(s) IntraMuscular once  insulin lispro (ADMELOG) corrective regimen sliding scale   SubCutaneous at bedtime  insulin lispro (ADMELOG) corrective regimen sliding scale   SubCutaneous three times a day before meals  lactobacillus acidophilus 1 Tablet(s) Oral two times a day with meals  midodrine. 10 milliGRAM(s) Oral three times a day  pantoprazole    Tablet 40 milliGRAM(s) Oral before breakfast  tamsulosin 0.4 milliGRAM(s) Oral at bedtime  vancomycin  IVPB 500 milliGRAM(s) IV Intermittent every 12 hours    MEDICATIONS  (PRN):  acetaminophen     Tablet .. 650 milliGRAM(s) Oral every 6 hours PRN Temp greater or equal to 38C (100.4F), Moderate Pain (4 - 6)  dextrose Oral Gel 15 Gram(s) Oral once PRN Blood Glucose LESS THAN 70 milliGRAM(s)/deciliter  loperamide 2 milliGRAM(s) Oral three times a day PRN Diarrhea  sodium chloride 0.9% lock flush 10 milliLiter(s) IV Push every 1 hour PRN Pre/post blood products, medications, blood draw, and to maintain line patency      Allergies    No Known Allergies    PHYSICAL EXAM:   Vital Signs:  Vital Signs Last 24 Hrs  T(C): 36.8 (27 Oct 2023 05:09), Max: 37 (26 Oct 2023 12:33)  T(F): 98.3 (27 Oct 2023 05:09), Max: 98.6 (26 Oct 2023 12:33)  HR: 59 (27 Oct 2023 07:57) (58 - 69)  BP: 125/58 (27 Oct 2023 05:09) (120/56 - 125/58)  BP(mean): --  RR: 17 (27 Oct 2023 05:09) (17 - 17)  SpO2: 93% (27 Oct 2023 05:09) (93% - 98%)    Parameters below as of 27 Oct 2023 05:09  Patient On (Oxygen Delivery Method): room air    GENERAL:  Appears stated age  HEENT:  NC/AT  CHEST:  Full & symmetric excursion  HEART:  Regular rhythm  ABDOMEN:  Soft, non-tender, non-distended  EXTEREMITIES:  no cyanosis  SKIN:  No rash  NEURO:  Alert      LABS:                        7.7    7.51  )-----------( 263      ( 26 Oct 2023 07:30 )             25.6     10-26    139  |  105  |  19  ----------------------------<  189<H>  4.0   |  31  |  0.86    Ca    8.1<L>      26 Oct 2023 07:30        Urinalysis Basic - ( 26 Oct 2023 07:30 )    Color: x / Appearance: x / SG: x / pH: x  Gluc: 189 mg/dL / Ketone: x  / Bili: x / Urobili: x   Blood: x / Protein: x / Nitrite: x   Leuk Esterase: x / RBC: x / WBC x   Sq Epi: x / Non Sq Epi: x / Bacteria: x   [facility]  Les Mccray PA-C  [address] [place] [address]  [phone]      INTERVAL HPI/OVERNIGHT EVENTS:  Pt s/e  No new GI events    MEDICATIONS  (STANDING):  atorvastatin 80 milliGRAM(s) Oral at bedtime  bethanechol 25 milliGRAM(s) Oral two times a day  chlorhexidine 4% Liquid 1 Application(s) Topical <User Schedule>  dextrose 50% Injectable 25 Gram(s) IV Push once  dextrose 50% Injectable 25 Gram(s) IV Push once  dextrose 50% Injectable 12.5 Gram(s) IV Push once  gabapentin 300 milliGRAM(s) Oral two times a day  glucagon  Injectable 1 milliGRAM(s) IntraMuscular once  insulin lispro (ADMELOG) corrective regimen sliding scale   SubCutaneous at bedtime  insulin lispro (ADMELOG) corrective regimen sliding scale   SubCutaneous three times a day before meals  lactobacillus acidophilus 1 Tablet(s) Oral two times a day with meals  midodrine. 10 milliGRAM(s) Oral three times a day  pantoprazole    Tablet 40 milliGRAM(s) Oral before breakfast  tamsulosin 0.4 milliGRAM(s) Oral at bedtime  vancomycin  IVPB 500 milliGRAM(s) IV Intermittent every 12 hours    MEDICATIONS  (PRN):  acetaminophen     Tablet .. 650 milliGRAM(s) Oral every 6 hours PRN Temp greater or equal to 38C (100.4F), Moderate Pain (4 - 6)  dextrose Oral Gel 15 Gram(s) Oral once PRN Blood Glucose LESS THAN 70 milliGRAM(s)/deciliter  loperamide 2 milliGRAM(s) Oral three times a day PRN Diarrhea  sodium chloride 0.9% lock flush 10 milliLiter(s) IV Push every 1 hour PRN Pre/post blood products, medications, blood draw, and to maintain line patency      Allergies    No Known Allergies    PHYSICAL EXAM:   Vital Signs:  Vital Signs Last 24 Hrs  T(C): 36.8 (27 Oct 2023 05:09), Max: 37 (26 Oct 2023 12:33)  T(F): 98.3 (27 Oct 2023 05:09), Max: 98.6 (26 Oct 2023 12:33)  HR: 59 (27 Oct 2023 07:57) (58 - 69)  BP: 125/58 (27 Oct 2023 05:09) (120/56 - 125/58)  BP(mean): --  RR: 17 (27 Oct 2023 05:09) (17 - 17)  SpO2: 93% (27 Oct 2023 05:09) (93% - 98%)    Parameters below as of 27 Oct 2023 05:09  Patient On (Oxygen Delivery Method): room air    GENERAL:  Appears stated age  HEENT:  NC/AT  CHEST:  Full & symmetric excursion  HEART:  Regular rhythm  ABDOMEN:  Soft, non-tender, non-distended  EXTEREMITIES:  no cyanosis  SKIN:  No rash  NEURO:  Alert      LABS:                        7.7    7.51  )-----------( 263      ( 26 Oct 2023 07:30 )             25.6     10-26    139  |  105  |  19  ----------------------------<  189<H>  4.0   |  31  |  0.86    Ca    8.1<L>      26 Oct 2023 07:30        Urinalysis Basic - ( 26 Oct 2023 07:30 )    Color: x / Appearance: x / SG: x / pH: x  Gluc: 189 mg/dL / Ketone: x  / Bili: x / Urobili: x   Blood: x / Protein: x / Nitrite: x   Leuk Esterase: x / RBC: x / WBC x   Sq Epi: x / Non Sq Epi: x / Bacteria: x   [facility]  Les Mccray PA-C  [address] [place] [address]  [phone]      INTERVAL HPI/OVERNIGHT EVENTS:  Pt s/e  No new GI events    MEDICATIONS  (STANDING):  atorvastatin 80 milliGRAM(s) Oral at bedtime  bethanechol 25 milliGRAM(s) Oral two times a day  chlorhexidine 4% Liquid 1 Application(s) Topical <User Schedule>  dextrose 50% Injectable 25 Gram(s) IV Push once  dextrose 50% Injectable 25 Gram(s) IV Push once  dextrose 50% Injectable 12.5 Gram(s) IV Push once  gabapentin 300 milliGRAM(s) Oral two times a day  glucagon  Injectable 1 milliGRAM(s) IntraMuscular once  insulin lispro (ADMELOG) corrective regimen sliding scale   SubCutaneous at bedtime  insulin lispro (ADMELOG) corrective regimen sliding scale   SubCutaneous three times a day before meals  lactobacillus acidophilus 1 Tablet(s) Oral two times a day with meals  midodrine. 10 milliGRAM(s) Oral three times a day  pantoprazole    Tablet 40 milliGRAM(s) Oral before breakfast  tamsulosin 0.4 milliGRAM(s) Oral at bedtime  vancomycin  IVPB 500 milliGRAM(s) IV Intermittent every 12 hours    MEDICATIONS  (PRN):  acetaminophen     Tablet .. 650 milliGRAM(s) Oral every 6 hours PRN Temp greater or equal to 38C (100.4F), Moderate Pain (4 - 6)  dextrose Oral Gel 15 Gram(s) Oral once PRN Blood Glucose LESS THAN 70 milliGRAM(s)/deciliter  loperamide 2 milliGRAM(s) Oral three times a day PRN Diarrhea  sodium chloride 0.9% lock flush 10 milliLiter(s) IV Push every 1 hour PRN Pre/post blood products, medications, blood draw, and to maintain line patency      Allergies    No Known Allergies    PHYSICAL EXAM:   Vital Signs:  Vital Signs Last 24 Hrs  T(C): 36.8 (27 Oct 2023 05:09), Max: 37 (26 Oct 2023 12:33)  T(F): 98.3 (27 Oct 2023 05:09), Max: 98.6 (26 Oct 2023 12:33)  HR: 59 (27 Oct 2023 07:57) (58 - 69)  BP: 125/58 (27 Oct 2023 05:09) (120/56 - 125/58)  BP(mean): --  RR: 17 (27 Oct 2023 05:09) (17 - 17)  SpO2: 93% (27 Oct 2023 05:09) (93% - 98%)    Parameters below as of 27 Oct 2023 05:09  Patient On (Oxygen Delivery Method): room air    GENERAL:  Appears stated age  HEENT:  NC/AT  CHEST:  Full & symmetric excursion  HEART:  Regular rhythm  ABDOMEN:  Soft, non-tender, non-distended  EXTEREMITIES:  no cyanosis  SKIN:  No rash  NEURO:  Alert      LABS:                        7.7    7.51  )-----------( 263      ( 26 Oct 2023 07:30 )             25.6     10-26    139  |  105  |  19  ----------------------------<  189<H>  4.0   |  31  |  0.86    Ca    8.1<L>      26 Oct 2023 07:30        Urinalysis Basic - ( 26 Oct 2023 07:30 )    Color: x / Appearance: x / SG: x / pH: x  Gluc: 189 mg/dL / Ketone: x  / Bili: x / Urobili: x   Blood: x / Protein: x / Nitrite: x   Leuk Esterase: x / RBC: x / WBC x   Sq Epi: x / Non Sq Epi: x / Bacteria: x

## 2023-10-27 NOTE — PROVIDER CONTACT NOTE (CRITICAL VALUE NOTIFICATION) - PERSON GIVING RESULT:
Dong Kellogg/[facility] Dong Kellogg/[facility] Dong Kellogg/[facility]

## 2023-10-27 NOTE — PROGRESS NOTE ADULT - REASON FOR ADMISSION
fever and weakness

## 2023-10-27 NOTE — PROGRESS NOTE ADULT - PROBLEM SELECTOR PROBLEM 2
Gram positive bacterial infection
Non-healing wound of right heel
Non-healing wound of right heel
Diabetic foot ulcers
Gram positive bacterial infection
Non-healing wound of right heel
Gram positive bacterial infection
Gram positive bacterial infection
Diabetic foot ulcers
Non-healing wound of right heel
Gram positive bacterial infection
Diabetic foot ulcers
Non-healing wound of right heel
Diabetic ulcer of left foot
Gram positive bacterial infection
Gram positive bacterial infection
Diabetic foot ulcers
Diabetic foot ulcers
Gram positive bacterial infection

## 2023-10-27 NOTE — PROGRESS NOTE ADULT - PROBLEM SELECTOR PLAN 3
multiple chronic old heel ulcers likely from hx of dm  podiatry eval noted- s/p  debridement- may require further intervention of left foot  vascular eval noted - no further intevention  id eval noted  continue local care  vascular studies noted  fu inrtraopereative cultures and path for abx recs upon dc - for picc line
multiple chronic old heel ulcers likely from hx of dm  podiatry eval noted- for debridement  vascular eval noted - no further intevention  id eval noted  continue local wound care  iv abx  vascular studies noted  Pt medically cleared for debridement in OR
stable h/h- no signs of bleeding  trend cbc post op
likely from sepsis  cardio eval noted  ivf  on downtrend  pt denies cp
multiple chronic old heel ulcers likely from hx of dm  podiatry eval noted  vascular eval  id eval noted  continue local wound care  iv abx  vascular studies noted
multiple chronic old heel ulcers likely from hx of dm  podiatry eval noted- for debridement  vascular eval noted - no further intevention  id eval noted  continue local wound care  iv abx  vascular studies noted  fu inrtraopereative cultures and path for abx recs upon dc  dc planning
likely from sepsis  cardio eval noted  ivf  on downtrend  pt denies cp
stable h/h- no signs of bleeding  trend cbc post op
multiple chronic old heel ulcers likely from hx of dm  podiatry eval noted- for debridement  vascular eval noted - no further intevention  id eval noted  continue local care  vascular studies noted  fu inrtraopereative cultures and path for abx recs upon dc - for picc line  dc planning
stable h/h
stable h/h- no signs of bleeding  trend cbc post op
guicac negative  stable h/h  heme eval noted
multiple chronic old heel ulcers likely from hx of dm  podiatry eval noted- s/p  debridement- may require further intervention of left foot  id eval noted  continue local care  fu inrtraopereative cultures and path for abx recs upon dc
multiple chronic old heel ulcers likely from hx of dm  podiatry eval noted  vascular eval noted - no further intevention  id eval noted  continue local wound care  iv abx  vascular studies noted
likely from sepsis  fu echo  cardio eval noted  ivf  on downtrend  pt denies cp
likely from sepsis  fu echo  cardio eval noted  ivf  on downtrend  pt denies cp

## 2023-10-27 NOTE — SOCIAL WORK PROGRESS NOTE - NSSWPROGRESSNOTE_GEN_ALL_CORE
ARISTIDES spoke with MD- pt now needs a wound vac at [facility], explained need ppwk to send over to the rehab prior to DC. Rehab will not order wound vac without a note. ARISTIDES to follow.  ARISTIDES spoke with MD- pt now needs a wound vac at [facility], explained need ppwk to send over to the rehab prior to DC. Rehab will not order wound vac without a note. ARISTIDES to follow.  ARISTIDES spoke with MD- pt now needs a wound vac at [facility], explained need ppwk to send over to the rehab prior to DC. Rehab will not order wound vac without a note. ARISTIDES to follow.

## 2023-10-28 VITALS
HEART RATE: 60 BPM | SYSTOLIC BLOOD PRESSURE: 128 MMHG | OXYGEN SATURATION: 94 % | RESPIRATION RATE: 17 BRPM | TEMPERATURE: 98 F | DIASTOLIC BLOOD PRESSURE: 46 MMHG

## 2023-10-28 LAB
GLUCOSE BLDC GLUCOMTR-MCNC: 212 MG/DL — HIGH (ref 70–99)
GLUCOSE BLDC GLUCOMTR-MCNC: 220 MG/DL — HIGH (ref 70–99)
HCT VFR BLD CALC: 27.3 % — LOW (ref 39–50)
HGB BLD-MCNC: 8.4 G/DL — LOW (ref 13–17)
MCHC RBC-ENTMCNC: 25.5 PG — LOW (ref 27–34)
MCHC RBC-ENTMCNC: 30.8 GM/DL — LOW (ref 32–36)
MCV RBC AUTO: 83 FL — SIGNIFICANT CHANGE UP (ref 80–100)
NRBC # BLD: 0 /100 WBCS — SIGNIFICANT CHANGE UP (ref 0–0)
PLATELET # BLD AUTO: 227 K/UL — SIGNIFICANT CHANGE UP (ref 150–400)
RBC # BLD: 3.29 M/UL — LOW (ref 4.2–5.8)
RBC # FLD: 16.5 % — HIGH (ref 10.3–14.5)
WBC # BLD: 6.72 K/UL — SIGNIFICANT CHANGE UP (ref 3.8–10.5)
WBC # FLD AUTO: 6.72 K/UL — SIGNIFICANT CHANGE UP (ref 3.8–10.5)

## 2023-10-28 PROCEDURE — 86901 BLOOD TYPING SEROLOGIC RH(D): CPT

## 2023-10-28 PROCEDURE — 85045 AUTOMATED RETICULOCYTE COUNT: CPT

## 2023-10-28 PROCEDURE — 85014 HEMATOCRIT: CPT

## 2023-10-28 PROCEDURE — 87077 CULTURE AEROBIC IDENTIFY: CPT

## 2023-10-28 PROCEDURE — 93306 TTE W/DOPPLER COMPLETE: CPT

## 2023-10-28 PROCEDURE — 85652 RBC SED RATE AUTOMATED: CPT

## 2023-10-28 PROCEDURE — 88304 TISSUE EXAM BY PATHOLOGIST: CPT

## 2023-10-28 PROCEDURE — 87075 CULTR BACTERIA EXCEPT BLOOD: CPT

## 2023-10-28 PROCEDURE — 85025 COMPLETE CBC W/AUTO DIFF WBC: CPT

## 2023-10-28 PROCEDURE — 83615 LACTATE (LD) (LDH) ENZYME: CPT

## 2023-10-28 PROCEDURE — 70450 CT HEAD/BRAIN W/O DYE: CPT | Mod: MA

## 2023-10-28 PROCEDURE — 36573 INSJ PICC RS&I 5 YR+: CPT

## 2023-10-28 PROCEDURE — 84100 ASSAY OF PHOSPHORUS: CPT

## 2023-10-28 PROCEDURE — 84443 ASSAY THYROID STIM HORMONE: CPT

## 2023-10-28 PROCEDURE — 99285 EMERGENCY DEPT VISIT HI MDM: CPT | Mod: 25

## 2023-10-28 PROCEDURE — 86140 C-REACTIVE PROTEIN: CPT

## 2023-10-28 PROCEDURE — 80053 COMPREHEN METABOLIC PANEL: CPT

## 2023-10-28 PROCEDURE — 73630 X-RAY EXAM OF FOOT: CPT

## 2023-10-28 PROCEDURE — 83690 ASSAY OF LIPASE: CPT

## 2023-10-28 PROCEDURE — 87086 URINE CULTURE/COLONY COUNT: CPT

## 2023-10-28 PROCEDURE — 78800 RP LOCLZJ TUM 1 AREA 1 D IMG: CPT

## 2023-10-28 PROCEDURE — 74176 CT ABD & PELVIS W/O CONTRAST: CPT | Mod: MA

## 2023-10-28 PROCEDURE — 82550 ASSAY OF CK (CPK): CPT

## 2023-10-28 PROCEDURE — 86803 HEPATITIS C AB TEST: CPT

## 2023-10-28 PROCEDURE — 82746 ASSAY OF FOLIC ACID SERUM: CPT

## 2023-10-28 PROCEDURE — 82607 VITAMIN B-12: CPT

## 2023-10-28 PROCEDURE — 86900 BLOOD TYPING SEROLOGIC ABO: CPT

## 2023-10-28 PROCEDURE — 87150 DNA/RNA AMPLIFIED PROBE: CPT

## 2023-10-28 PROCEDURE — 86850 RBC ANTIBODY SCREEN: CPT

## 2023-10-28 PROCEDURE — 86923 COMPATIBILITY TEST ELECTRIC: CPT

## 2023-10-28 PROCEDURE — 86078 PHYS BLOOD BANK SERV REACTJ: CPT

## 2023-10-28 PROCEDURE — 85018 HEMOGLOBIN: CPT

## 2023-10-28 PROCEDURE — 93320 DOPPLER ECHO COMPLETE: CPT

## 2023-10-28 PROCEDURE — 93005 ELECTROCARDIOGRAM TRACING: CPT

## 2023-10-28 PROCEDURE — 83605 ASSAY OF LACTIC ACID: CPT

## 2023-10-28 PROCEDURE — 88311 DECALCIFY TISSUE: CPT

## 2023-10-28 PROCEDURE — 83540 ASSAY OF IRON: CPT

## 2023-10-28 PROCEDURE — 87637 SARSCOV2&INF A&B&RSV AMP PRB: CPT

## 2023-10-28 PROCEDURE — 71250 CT THORAX DX C-: CPT | Mod: MA

## 2023-10-28 PROCEDURE — P9016: CPT

## 2023-10-28 PROCEDURE — 81001 URINALYSIS AUTO W/SCOPE: CPT

## 2023-10-28 PROCEDURE — P9045: CPT

## 2023-10-28 PROCEDURE — A9541: CPT

## 2023-10-28 PROCEDURE — 80048 BASIC METABOLIC PNL TOTAL CA: CPT

## 2023-10-28 PROCEDURE — 83880 ASSAY OF NATRIURETIC PEPTIDE: CPT

## 2023-10-28 PROCEDURE — 76937 US GUIDE VASCULAR ACCESS: CPT

## 2023-10-28 PROCEDURE — 85027 COMPLETE CBC AUTOMATED: CPT

## 2023-10-28 PROCEDURE — 82962 GLUCOSE BLOOD TEST: CPT

## 2023-10-28 PROCEDURE — 82272 OCCULT BLD FECES 1-3 TESTS: CPT

## 2023-10-28 PROCEDURE — 83550 IRON BINDING TEST: CPT

## 2023-10-28 PROCEDURE — 71045 X-RAY EXAM CHEST 1 VIEW: CPT

## 2023-10-28 PROCEDURE — 78102 BONE MARROW IMAGING LTD: CPT

## 2023-10-28 PROCEDURE — 83735 ASSAY OF MAGNESIUM: CPT

## 2023-10-28 PROCEDURE — 36415 COLL VENOUS BLD VENIPUNCTURE: CPT

## 2023-10-28 PROCEDURE — 93312 ECHO TRANSESOPHAGEAL: CPT

## 2023-10-28 PROCEDURE — 82728 ASSAY OF FERRITIN: CPT

## 2023-10-28 PROCEDURE — 82553 CREATINE MB FRACTION: CPT

## 2023-10-28 PROCEDURE — 87040 BLOOD CULTURE FOR BACTERIA: CPT

## 2023-10-28 PROCEDURE — 85730 THROMBOPLASTIN TIME PARTIAL: CPT

## 2023-10-28 PROCEDURE — 85610 PROTHROMBIN TIME: CPT

## 2023-10-28 PROCEDURE — 84484 ASSAY OF TROPONIN QUANT: CPT

## 2023-10-28 PROCEDURE — 36430 TRANSFUSION BLD/BLD COMPNT: CPT

## 2023-10-28 PROCEDURE — 80202 ASSAY OF VANCOMYCIN: CPT

## 2023-10-28 PROCEDURE — A9570: CPT

## 2023-10-28 PROCEDURE — C1889: CPT

## 2023-10-28 PROCEDURE — 93923 UPR/LXTR ART STDY 3+ LVLS: CPT

## 2023-10-28 PROCEDURE — 87186 SC STD MICRODIL/AGAR DIL: CPT

## 2023-10-28 PROCEDURE — G0103: CPT

## 2023-10-28 PROCEDURE — 87070 CULTURE OTHR SPECIMN AEROBIC: CPT

## 2023-10-28 PROCEDURE — 96374 THER/PROPH/DIAG INJ IV PUSH: CPT

## 2023-10-28 PROCEDURE — 93325 DOPPLER ECHO COLOR FLOW MAPG: CPT

## 2023-10-28 PROCEDURE — C1713: CPT

## 2023-10-28 PROCEDURE — P9040: CPT

## 2023-10-28 PROCEDURE — 83036 HEMOGLOBIN GLYCOSYLATED A1C: CPT

## 2023-10-28 RX ADMIN — Medication 25 MILLIGRAM(S): at 05:11

## 2023-10-28 RX ADMIN — Medication 2: at 08:37

## 2023-10-28 RX ADMIN — Medication 100 MILLIGRAM(S): at 13:35

## 2023-10-28 RX ADMIN — GABAPENTIN 300 MILLIGRAM(S): 400 CAPSULE ORAL at 05:11

## 2023-10-28 RX ADMIN — PANTOPRAZOLE SODIUM 40 MILLIGRAM(S): 20 TABLET, DELAYED RELEASE ORAL at 05:12

## 2023-10-28 RX ADMIN — Medication 1 TABLET(S): at 08:37

## 2023-10-28 RX ADMIN — CHLORHEXIDINE GLUCONATE 1 APPLICATION(S): 213 SOLUTION TOPICAL at 05:11

## 2023-10-28 RX ADMIN — MIDODRINE HYDROCHLORIDE 10 MILLIGRAM(S): 2.5 TABLET ORAL at 05:11

## 2023-10-28 RX ADMIN — MIDODRINE HYDROCHLORIDE 10 MILLIGRAM(S): 2.5 TABLET ORAL at 11:56

## 2023-10-28 RX ADMIN — Medication 100 MILLIGRAM(S): at 02:11

## 2023-10-28 RX ADMIN — Medication 2: at 13:14

## 2023-10-30 LAB
CULTURE RESULTS: ABNORMAL
CULTURE RESULTS: SIGNIFICANT CHANGE UP
ORGANISM # SPEC MICROSCOPIC CNT: ABNORMAL
ORGANISM # SPEC MICROSCOPIC CNT: SIGNIFICANT CHANGE UP
SPECIMEN SOURCE: SIGNIFICANT CHANGE UP
SURGICAL PATHOLOGY STUDY: SIGNIFICANT CHANGE UP

## 2023-11-08 PROBLEM — Z00.00 ENCOUNTER FOR PREVENTIVE HEALTH EXAMINATION: Status: ACTIVE | Noted: 2023-11-08

## 2023-11-09 ENCOUNTER — OUTPATIENT (OUTPATIENT)
Dept: OUTPATIENT SERVICES | Facility: HOSPITAL | Age: 78
LOS: 1 days | Discharge: HOME | End: 2023-11-09
Payer: MEDICARE

## 2023-11-09 ENCOUNTER — APPOINTMENT (OUTPATIENT)
Dept: WOUND CARE | Facility: HOSPITAL | Age: 78
End: 2023-11-09
Payer: MEDICARE

## 2023-11-09 VITALS
DIASTOLIC BLOOD PRESSURE: 65 MMHG | WEIGHT: 180 LBS | BODY MASS INDEX: 21.92 KG/M2 | HEIGHT: 76 IN | OXYGEN SATURATION: 96 % | TEMPERATURE: 98.4 F | SYSTOLIC BLOOD PRESSURE: 127 MMHG | HEART RATE: 60 BPM | RESPIRATION RATE: 12 BRPM

## 2023-11-09 DIAGNOSIS — I25.2 OLD MYOCARDIAL INFARCTION: ICD-10-CM

## 2023-11-09 DIAGNOSIS — Z79.4 LONG TERM (CURRENT) USE OF INSULIN: ICD-10-CM

## 2023-11-09 DIAGNOSIS — E11.51 TYPE 2 DIABETES MELLITUS WITH DIABETIC PERIPHERAL ANGIOPATHY WITHOUT GANGRENE: ICD-10-CM

## 2023-11-09 DIAGNOSIS — B96.5 PSEUDOMONAS (AERUGINOSA) (MALLEI) (PSEUDOMALLEI) AS THE CAUSE OF DISEASES CLASSIFIED ELSEWHERE: ICD-10-CM

## 2023-11-09 DIAGNOSIS — M81.0 AGE-RELATED OSTEOPOROSIS W/OUT CURRENT PATHOLOGICAL FRACTURE: ICD-10-CM

## 2023-11-09 DIAGNOSIS — I12.9 HYPERTENSIVE CHRONIC KIDNEY DISEASE WITH STAGE 1 THROUGH STAGE 4 CHRONIC KIDNEY DISEASE, OR UNSPECIFIED CHRONIC KIDNEY DISEASE: ICD-10-CM

## 2023-11-09 DIAGNOSIS — E11.22 TYPE 2 DIABETES MELLITUS WITH DIABETIC CHRONIC KIDNEY DISEASE: ICD-10-CM

## 2023-11-09 DIAGNOSIS — M05.20 RHEUMATOID VASCULITIS WITH RHEUMATOID ARTHRITIS OF UNSPECIFIED SITE: ICD-10-CM

## 2023-11-09 DIAGNOSIS — E43 UNSPECIFIED SEVERE PROTEIN-CALORIE MALNUTRITION: ICD-10-CM

## 2023-11-09 DIAGNOSIS — I48.91 UNSPECIFIED ATRIAL FIBRILLATION: ICD-10-CM

## 2023-11-09 DIAGNOSIS — Z78.9 OTHER SPECIFIED HEALTH STATUS: ICD-10-CM

## 2023-11-09 DIAGNOSIS — A41.02 SEPSIS DUE TO METHICILLIN RESISTANT STAPHYLOCOCCUS AUREUS: ICD-10-CM

## 2023-11-09 DIAGNOSIS — R19.7 DIARRHEA, UNSPECIFIED: ICD-10-CM

## 2023-11-09 DIAGNOSIS — N39.0 URINARY TRACT INFECTION, SITE NOT SPECIFIED: ICD-10-CM

## 2023-11-09 DIAGNOSIS — N28.9 DISORDER OF KIDNEY AND URETER, UNSPECIFIED: ICD-10-CM

## 2023-11-09 DIAGNOSIS — I34.0 NONRHEUMATIC MITRAL (VALVE) INSUFFICIENCY: ICD-10-CM

## 2023-11-09 DIAGNOSIS — Z09 ENCOUNTER FOR FOLLOW-UP EXAMINATION AFTER COMPLETED TREATMENT FOR CONDITIONS OTHER THAN MALIGNANT NEOPLASM: ICD-10-CM

## 2023-11-09 DIAGNOSIS — L97.428 NON-PRESSURE CHRONIC ULCER OF LEFT HEEL AND MIDFOOT WITH OTHER SPECIFIED SEVERITY: ICD-10-CM

## 2023-11-09 DIAGNOSIS — Z91.198 PATIENT'S NONCOMPLIANCE WITH OTHER MEDICAL TREATMENT AND REGIMEN FOR OTHER REASON: ICD-10-CM

## 2023-11-09 DIAGNOSIS — E11.65 TYPE 2 DIABETES MELLITUS WITH HYPERGLYCEMIA: ICD-10-CM

## 2023-11-09 DIAGNOSIS — I50.9 HEART FAILURE, UNSPECIFIED: ICD-10-CM

## 2023-11-09 DIAGNOSIS — M86.8X7 OTHER OSTEOMYELITIS, ANKLE AND FOOT: ICD-10-CM

## 2023-11-09 DIAGNOSIS — E11.621 TYPE 2 DIABETES MELLITUS WITH FOOT ULCER: ICD-10-CM

## 2023-11-09 DIAGNOSIS — I96 GANGRENE, NOT ELSEWHERE CLASSIFIED: ICD-10-CM

## 2023-11-09 DIAGNOSIS — L97.416 NON-PRESSURE CHRONIC ULCER OF RIGHT HEEL AND MIDFOOT WITH BONE INVOLVEMENT WITHOUT EVIDENCE OF NECROSIS: ICD-10-CM

## 2023-11-09 DIAGNOSIS — H33.21 SEROUS RETINAL DETACHMENT, RIGHT EYE: ICD-10-CM

## 2023-11-09 DIAGNOSIS — R62.7 ADULT FAILURE TO THRIVE: ICD-10-CM

## 2023-11-09 DIAGNOSIS — N40.0 BENIGN PROSTATIC HYPERPLASIA WITHOUT LOWER URINARY TRACT SYMPTOMS: ICD-10-CM

## 2023-11-09 DIAGNOSIS — H40.9 UNSPECIFIED GLAUCOMA: ICD-10-CM

## 2023-11-09 DIAGNOSIS — N18.9 CHRONIC KIDNEY DISEASE, UNSPECIFIED: ICD-10-CM

## 2023-11-09 DIAGNOSIS — Z87.898 PERSONAL HISTORY OF OTHER SPECIFIED CONDITIONS: ICD-10-CM

## 2023-11-09 DIAGNOSIS — Z79.82 LONG TERM (CURRENT) USE OF ASPIRIN: ICD-10-CM

## 2023-11-09 DIAGNOSIS — R65.21 SEVERE SEPSIS WITH SEPTIC SHOCK: ICD-10-CM

## 2023-11-09 DIAGNOSIS — D63.1 ANEMIA IN CHRONIC KIDNEY DISEASE: ICD-10-CM

## 2023-11-09 DIAGNOSIS — F32.A DEPRESSION, UNSPECIFIED: ICD-10-CM

## 2023-11-09 DIAGNOSIS — Z87.891 PERSONAL HISTORY OF NICOTINE DEPENDENCE: ICD-10-CM

## 2023-11-09 DIAGNOSIS — E11.9 TYPE 2 DIABETES MELLITUS W/OUT COMPLICATIONS: ICD-10-CM

## 2023-11-09 DIAGNOSIS — Z74.01 BED CONFINEMENT STATUS: ICD-10-CM

## 2023-11-09 DIAGNOSIS — I24.89 OTHER FORMS OF ACUTE ISCHEMIC HEART DISEASE: ICD-10-CM

## 2023-11-09 DIAGNOSIS — N17.9 ACUTE KIDNEY FAILURE, UNSPECIFIED: ICD-10-CM

## 2023-11-09 DIAGNOSIS — L97.528 NON-PRESSURE CHRONIC ULCER OF OTHER PART OF LEFT FOOT WITH OTHER SPECIFIED SEVERITY: ICD-10-CM

## 2023-11-09 DIAGNOSIS — E78.5 HYPERLIPIDEMIA, UNSPECIFIED: ICD-10-CM

## 2023-11-09 DIAGNOSIS — Z79.899 OTHER LONG TERM (CURRENT) DRUG THERAPY: ICD-10-CM

## 2023-11-09 PROCEDURE — G0463: CPT

## 2023-11-09 PROCEDURE — 99213 OFFICE O/P EST LOW 20 MIN: CPT

## 2023-11-09 RX ORDER — INSULIN GLARGINE 100 [IU]/ML
100 INJECTION, SOLUTION SUBCUTANEOUS
Refills: 0 | Status: ACTIVE | COMMUNITY

## 2023-11-09 RX ORDER — TAMSULOSIN HYDROCHLORIDE 0.4 MG/1
0.4 CAPSULE ORAL
Refills: 0 | Status: ACTIVE | COMMUNITY

## 2023-11-09 RX ORDER — VANCOMYCIN 500 MG/100ML
500 INJECTION, SOLUTION INTRAVENOUS
Refills: 0 | Status: ACTIVE | COMMUNITY

## 2023-11-09 RX ORDER — CARVEDILOL 3.12 MG/1
3.12 TABLET, FILM COATED ORAL
Refills: 0 | Status: ACTIVE | COMMUNITY

## 2023-11-09 RX ORDER — INSULIN LISPRO 100 [IU]/ML
100 INJECTION, SOLUTION SUBCUTANEOUS
Refills: 0 | Status: ACTIVE | COMMUNITY

## 2023-11-09 RX ORDER — ZINC SULFATE 50(220)MG
220 (50 ZN) CAPSULE ORAL
Refills: 0 | Status: ACTIVE | COMMUNITY

## 2023-11-09 RX ORDER — ASPIRIN 81 MG/1
81 TABLET ORAL
Refills: 0 | Status: ACTIVE | COMMUNITY

## 2023-11-09 RX ORDER — ENEMA 19; 7 G/133ML; G/133ML
7-19 ENEMA RECTAL
Refills: 0 | Status: ACTIVE | COMMUNITY

## 2023-11-09 RX ORDER — RAMIPRIL 2.5 MG/1
2.5 CAPSULE ORAL
Refills: 0 | Status: ACTIVE | COMMUNITY

## 2023-11-09 RX ORDER — MIDODRINE HYDROCHLORIDE 10 MG/1
10 TABLET ORAL
Refills: 0 | Status: ACTIVE | COMMUNITY

## 2023-11-09 RX ORDER — PANTOPRAZOLE 40 MG/1
40 TABLET, DELAYED RELEASE ORAL
Refills: 0 | Status: ACTIVE | COMMUNITY

## 2023-11-09 RX ORDER — BETHANECHOL CHLORIDE 25 MG/1
25 TABLET ORAL
Refills: 0 | Status: ACTIVE | COMMUNITY

## 2023-11-09 RX ORDER — ACETAMINOPHEN 325 MG/1
325 TABLET ORAL
Refills: 0 | Status: ACTIVE | COMMUNITY

## 2023-11-09 RX ORDER — CHROMIUM 200 MCG
TABLET ORAL
Refills: 0 | Status: ACTIVE | COMMUNITY

## 2023-11-09 RX ORDER — BIOTIN 10 MG
TABLET ORAL
Refills: 0 | Status: ACTIVE | COMMUNITY

## 2023-11-09 RX ORDER — GABAPENTIN 300 MG/1
300 CAPSULE ORAL
Refills: 0 | Status: ACTIVE | COMMUNITY

## 2023-11-09 RX ORDER — MAGNESIUM HYDROXIDE 400 MG/5ML
400 SUSPENSION ORAL
Refills: 0 | Status: ACTIVE | COMMUNITY

## 2023-11-09 RX ORDER — ATORVASTATIN CALCIUM 80 MG/1
80 TABLET, FILM COATED ORAL
Refills: 0 | Status: ACTIVE | COMMUNITY

## 2023-11-09 RX ORDER — YOHIMBE BARK 500 MG
100 CAPSULE ORAL
Refills: 0 | Status: ACTIVE | COMMUNITY

## 2023-11-12 DIAGNOSIS — Z98.890 OTHER SPECIFIED POSTPROCEDURAL STATES: ICD-10-CM

## 2023-11-12 DIAGNOSIS — L97.412 NON-PRESSURE CHRONIC ULCER OF RIGHT HEEL AND MIDFOOT WITH FAT LAYER EXPOSED: ICD-10-CM

## 2023-11-12 DIAGNOSIS — E11.59 TYPE 2 DIABETES MELLITUS WITH OTHER CIRCULATORY COMPLICATIONS: ICD-10-CM

## 2023-11-12 DIAGNOSIS — T81.89XD OTHER COMPLICATIONS OF PROCEDURES, NOT ELSEWHERE CLASSIFIED, SUBSEQUENT ENCOUNTER: ICD-10-CM

## 2023-11-12 DIAGNOSIS — L97.424 NON-PRESSURE CHRONIC ULCER OF LEFT HEEL AND MIDFOOT WITH NECROSIS OF BONE: ICD-10-CM

## 2023-11-12 DIAGNOSIS — Z95.0 PRESENCE OF CARDIAC PACEMAKER: ICD-10-CM

## 2023-11-12 DIAGNOSIS — I50.9 HEART FAILURE, UNSPECIFIED: ICD-10-CM

## 2023-11-12 DIAGNOSIS — I48.91 UNSPECIFIED ATRIAL FIBRILLATION: ICD-10-CM
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
## 2023-11-12 DIAGNOSIS — E11.69 TYPE 2 DIABETES MELLITUS WITH OTHER SPECIFIED COMPLICATION: ICD-10-CM
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
## 2023-11-12 DIAGNOSIS — M86.672 OTHER CHRONIC OSTEOMYELITIS, LEFT ANKLE AND FOOT: ICD-10-CM

## 2023-11-28 ENCOUNTER — OUTPATIENT (OUTPATIENT)
Dept: OUTPATIENT SERVICES | Facility: HOSPITAL | Age: 78
LOS: 1 days | Discharge: HOME | End: 2023-11-28
Payer: MEDICARE

## 2023-11-28 ENCOUNTER — APPOINTMENT (OUTPATIENT)
Dept: WOUND CARE | Facility: HOSPITAL | Age: 78
End: 2023-11-28
Payer: MEDICARE

## 2023-11-28 VITALS
BODY MASS INDEX: 21.92 KG/M2 | SYSTOLIC BLOOD PRESSURE: 155 MMHG | WEIGHT: 180 LBS | RESPIRATION RATE: 12 BRPM | HEART RATE: 60 BPM | DIASTOLIC BLOOD PRESSURE: 62 MMHG | HEIGHT: 76 IN | OXYGEN SATURATION: 95 % | TEMPERATURE: 98.9 F

## 2023-11-28 DIAGNOSIS — B96.5 PSEUDOMONAS (AERUGINOSA) (MALLEI) (PSEUDOMALLEI) AS THE CAUSE OF DISEASES CLASSIFIED ELSEWHERE: ICD-10-CM

## 2023-11-28 DIAGNOSIS — L97.416 NON-PRESSURE CHRONIC ULCER OF RIGHT HEEL AND MIDFOOT WITH BONE INVOLVEMENT WITHOUT EVIDENCE OF NECROSIS: ICD-10-CM

## 2023-11-28 DIAGNOSIS — N40.0 BENIGN PROSTATIC HYPERPLASIA WITHOUT LOWER URINARY TRACT SYMPTOMS: ICD-10-CM

## 2023-11-28 DIAGNOSIS — R65.21 SEVERE SEPSIS WITH SEPTIC SHOCK: ICD-10-CM

## 2023-11-28 DIAGNOSIS — L97.428 NON-PRESSURE CHRONIC ULCER OF LEFT HEEL AND MIDFOOT WITH OTHER SPECIFIED SEVERITY: ICD-10-CM

## 2023-11-28 DIAGNOSIS — Z87.891 PERSONAL HISTORY OF NICOTINE DEPENDENCE: ICD-10-CM

## 2023-11-28 DIAGNOSIS — L97.528 NON-PRESSURE CHRONIC ULCER OF OTHER PART OF LEFT FOOT WITH OTHER SPECIFIED SEVERITY: ICD-10-CM

## 2023-11-28 DIAGNOSIS — I12.9 HYPERTENSIVE CHRONIC KIDNEY DISEASE WITH STAGE 1 THROUGH STAGE 4 CHRONIC KIDNEY DISEASE, OR UNSPECIFIED CHRONIC KIDNEY DISEASE: ICD-10-CM

## 2023-11-28 DIAGNOSIS — E78.5 HYPERLIPIDEMIA, UNSPECIFIED: ICD-10-CM

## 2023-11-28 DIAGNOSIS — D63.1 ANEMIA IN CHRONIC KIDNEY DISEASE: ICD-10-CM

## 2023-11-28 DIAGNOSIS — N17.9 ACUTE KIDNEY FAILURE, UNSPECIFIED: ICD-10-CM

## 2023-11-28 DIAGNOSIS — M86.8X7 OTHER OSTEOMYELITIS, ANKLE AND FOOT: ICD-10-CM

## 2023-11-28 DIAGNOSIS — Z79.82 LONG TERM (CURRENT) USE OF ASPIRIN: ICD-10-CM

## 2023-11-28 DIAGNOSIS — E11.65 TYPE 2 DIABETES MELLITUS WITH HYPERGLYCEMIA: ICD-10-CM

## 2023-11-28 DIAGNOSIS — N39.0 URINARY TRACT INFECTION, SITE NOT SPECIFIED: ICD-10-CM

## 2023-11-28 DIAGNOSIS — I34.0 NONRHEUMATIC MITRAL (VALVE) INSUFFICIENCY: ICD-10-CM

## 2023-11-28 DIAGNOSIS — Z91.198 PATIENT'S NONCOMPLIANCE WITH OTHER MEDICAL TREATMENT AND REGIMEN FOR OTHER REASON: ICD-10-CM

## 2023-11-28 DIAGNOSIS — N18.9 CHRONIC KIDNEY DISEASE, UNSPECIFIED: ICD-10-CM

## 2023-11-28 DIAGNOSIS — I24.89 OTHER FORMS OF ACUTE ISCHEMIC HEART DISEASE: ICD-10-CM

## 2023-11-28 DIAGNOSIS — E43 UNSPECIFIED SEVERE PROTEIN-CALORIE MALNUTRITION: ICD-10-CM

## 2023-11-28 DIAGNOSIS — E11.59 TYPE 2 DIABETES MELLITUS WITH OTHER CIRCULATORY COMPLICATIONS: ICD-10-CM

## 2023-11-28 DIAGNOSIS — R62.7 ADULT FAILURE TO THRIVE: ICD-10-CM

## 2023-11-28 DIAGNOSIS — E11.51 TYPE 2 DIABETES MELLITUS WITH DIABETIC PERIPHERAL ANGIOPATHY WITHOUT GANGRENE: ICD-10-CM

## 2023-11-28 DIAGNOSIS — R19.7 DIARRHEA, UNSPECIFIED: ICD-10-CM

## 2023-11-28 DIAGNOSIS — Z79.899 OTHER LONG TERM (CURRENT) DRUG THERAPY: ICD-10-CM

## 2023-11-28 DIAGNOSIS — A41.02 SEPSIS DUE TO METHICILLIN RESISTANT STAPHYLOCOCCUS AUREUS: ICD-10-CM

## 2023-11-28 DIAGNOSIS — I25.2 OLD MYOCARDIAL INFARCTION: ICD-10-CM

## 2023-11-28 DIAGNOSIS — T81.89XD OTHER COMPLICATIONS OF PROCEDURES, NOT ELSEWHERE CLASSIFIED, SUBSEQUENT ENCOUNTER: ICD-10-CM

## 2023-11-28 DIAGNOSIS — Z74.01 BED CONFINEMENT STATUS: ICD-10-CM

## 2023-11-28 DIAGNOSIS — E11.621 TYPE 2 DIABETES MELLITUS WITH FOOT ULCER: ICD-10-CM

## 2023-11-28 DIAGNOSIS — E11.22 TYPE 2 DIABETES MELLITUS WITH DIABETIC CHRONIC KIDNEY DISEASE: ICD-10-CM

## 2023-11-28 DIAGNOSIS — Z79.4 LONG TERM (CURRENT) USE OF INSULIN: ICD-10-CM

## 2023-11-28 PROCEDURE — G0463: CPT

## 2023-11-28 PROCEDURE — 99213 OFFICE O/P EST LOW 20 MIN: CPT

## 2023-11-30 DIAGNOSIS — Z95.0 PRESENCE OF CARDIAC PACEMAKER: ICD-10-CM

## 2023-11-30 DIAGNOSIS — M86.672 OTHER CHRONIC OSTEOMYELITIS, LEFT ANKLE AND FOOT: ICD-10-CM
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
## 2023-11-30 DIAGNOSIS — L97.424 NON-PRESSURE CHRONIC ULCER OF LEFT HEEL AND MIDFOOT WITH NECROSIS OF BONE: ICD-10-CM

## 2023-11-30 DIAGNOSIS — Z98.890 OTHER SPECIFIED POSTPROCEDURAL STATES: ICD-10-CM

## 2023-11-30 DIAGNOSIS — E11.69 TYPE 2 DIABETES MELLITUS WITH OTHER SPECIFIED COMPLICATION: ICD-10-CM

## 2023-11-30 DIAGNOSIS — I50.9 HEART FAILURE, UNSPECIFIED: ICD-10-CM

## 2023-11-30 DIAGNOSIS — I48.91 UNSPECIFIED ATRIAL FIBRILLATION: ICD-10-CM

## 2023-11-30 DIAGNOSIS — E11.59 TYPE 2 DIABETES MELLITUS WITH OTHER CIRCULATORY COMPLICATIONS: ICD-10-CM

## 2023-11-30 DIAGNOSIS — L97.412 NON-PRESSURE CHRONIC ULCER OF RIGHT HEEL AND MIDFOOT WITH FAT LAYER EXPOSED: ICD-10-CM
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
## 2023-11-30 DIAGNOSIS — T81.89XD OTHER COMPLICATIONS OF PROCEDURES, NOT ELSEWHERE CLASSIFIED, SUBSEQUENT ENCOUNTER: ICD-10-CM

## 2023-12-31 RX ORDER — TAMSULOSIN HYDROCHLORIDE 0.4 MG/1
1 CAPSULE ORAL
Refills: 0 | DISCHARGE

## 2023-12-31 RX ORDER — ASPIRIN/CALCIUM CARB/MAGNESIUM 324 MG
1 TABLET ORAL
Refills: 0 | DISCHARGE

## 2023-12-31 RX ORDER — RAMIPRIL 5 MG
1 CAPSULE ORAL
Refills: 0 | DISCHARGE

## 2023-12-31 RX ORDER — GABAPENTIN 400 MG/1
1 CAPSULE ORAL
Refills: 0 | DISCHARGE

## 2023-12-31 RX ORDER — CARVEDILOL PHOSPHATE 80 MG/1
1 CAPSULE, EXTENDED RELEASE ORAL
Refills: 0 | DISCHARGE

## 2023-12-31 RX ORDER — BETHANECHOL CHLORIDE 25 MG
1 TABLET ORAL
Refills: 0 | DISCHARGE

## 2023-12-31 RX ORDER — INSULIN GLARGINE 100 [IU]/ML
18 INJECTION, SOLUTION SUBCUTANEOUS
Refills: 0 | DISCHARGE

## 2024-01-10 PROBLEM — Z95.0 PRESENCE OF CARDIAC PACEMAKER: Chronic | Status: ACTIVE | Noted: 2023-10-11

## 2024-01-16 PROBLEM — E11.9 TYPE 2 DIABETES MELLITUS WITHOUT COMPLICATIONS: Chronic | Status: ACTIVE | Noted: 2023-10-11

## 2024-01-16 PROBLEM — E11.621 TYPE 2 DIABETES MELLITUS WITH FOOT ULCER: Chronic | Status: ACTIVE | Noted: 2023-10-11

## 2024-01-16 PROBLEM — Z87.438 PERSONAL HISTORY OF OTHER DISEASES OF MALE GENITAL ORGANS: Chronic | Status: ACTIVE | Noted: 2023-10-11

## 2024-01-16 PROBLEM — I10 ESSENTIAL (PRIMARY) HYPERTENSION: Chronic | Status: ACTIVE | Noted: 2023-10-11

## 2024-02-01 ENCOUNTER — APPOINTMENT (OUTPATIENT)
Dept: WOUND CARE | Facility: HOSPITAL | Age: 79
End: 2024-02-01
Payer: MEDICARE

## 2024-02-01 ENCOUNTER — OUTPATIENT (OUTPATIENT)
Dept: OUTPATIENT SERVICES | Facility: HOSPITAL | Age: 79
LOS: 1 days | Discharge: HOME | End: 2024-02-01
Payer: MEDICARE

## 2024-02-01 VITALS
BODY MASS INDEX: 21.92 KG/M2 | SYSTOLIC BLOOD PRESSURE: 106 MMHG | WEIGHT: 180 LBS | OXYGEN SATURATION: 99 % | HEIGHT: 76 IN | RESPIRATION RATE: 18 BRPM | HEART RATE: 74 BPM | DIASTOLIC BLOOD PRESSURE: 64 MMHG | TEMPERATURE: 97.9 F

## 2024-02-01 DIAGNOSIS — S91.302A UNSPECIFIED OPEN WOUND, LEFT FOOT, INITIAL ENCOUNTER: ICD-10-CM

## 2024-02-01 DIAGNOSIS — N40.0 BENIGN PROSTATIC HYPERPLASIA WITHOUT LOWER URINARY TRACT SYMPTOMS: ICD-10-CM

## 2024-02-01 DIAGNOSIS — B96.5 PSEUDOMONAS (AERUGINOSA) (MALLEI) (PSEUDOMALLEI) AS THE CAUSE OF DISEASES CLASSIFIED ELSEWHERE: ICD-10-CM

## 2024-02-01 DIAGNOSIS — Z79.899 OTHER LONG TERM (CURRENT) DRUG THERAPY: ICD-10-CM

## 2024-02-01 DIAGNOSIS — L97.428 NON-PRESSURE CHRONIC ULCER OF LEFT HEEL AND MIDFOOT WITH OTHER SPECIFIED SEVERITY: ICD-10-CM

## 2024-02-01 DIAGNOSIS — E11.69 TYPE 2 DIABETES MELLITUS WITH OTHER SPECIFIED COMPLICATION: ICD-10-CM

## 2024-02-01 DIAGNOSIS — E11.22 TYPE 2 DIABETES MELLITUS WITH DIABETIC CHRONIC KIDNEY DISEASE: ICD-10-CM

## 2024-02-01 DIAGNOSIS — Z95.0 PRESENCE OF CARDIAC PACEMAKER: ICD-10-CM

## 2024-02-01 DIAGNOSIS — Z79.82 LONG TERM (CURRENT) USE OF ASPIRIN: ICD-10-CM

## 2024-02-01 DIAGNOSIS — Z74.01 BED CONFINEMENT STATUS: ICD-10-CM

## 2024-02-01 DIAGNOSIS — T81.89XD OTHER COMPLICATIONS OF PROCEDURES, NOT ELSEWHERE CLASSIFIED, SUBSEQUENT ENCOUNTER: ICD-10-CM

## 2024-02-01 DIAGNOSIS — R62.7 ADULT FAILURE TO THRIVE: ICD-10-CM

## 2024-02-01 DIAGNOSIS — I24.89 OTHER FORMS OF ACUTE ISCHEMIC HEART DISEASE: ICD-10-CM

## 2024-02-01 DIAGNOSIS — N39.0 URINARY TRACT INFECTION, SITE NOT SPECIFIED: ICD-10-CM

## 2024-02-01 DIAGNOSIS — D63.1 ANEMIA IN CHRONIC KIDNEY DISEASE: ICD-10-CM

## 2024-02-01 DIAGNOSIS — M86.8X7 OTHER OSTEOMYELITIS, ANKLE AND FOOT: ICD-10-CM

## 2024-02-01 DIAGNOSIS — R65.21 SEVERE SEPSIS WITH SEPTIC SHOCK: ICD-10-CM

## 2024-02-01 DIAGNOSIS — R19.7 DIARRHEA, UNSPECIFIED: ICD-10-CM

## 2024-02-01 DIAGNOSIS — Z91.198 PATIENT'S NONCOMPLIANCE WITH OTHER MEDICAL TREATMENT AND REGIMEN FOR OTHER REASON: ICD-10-CM

## 2024-02-01 DIAGNOSIS — E11.621 TYPE 2 DIABETES MELLITUS WITH FOOT ULCER: ICD-10-CM

## 2024-02-01 DIAGNOSIS — N17.9 ACUTE KIDNEY FAILURE, UNSPECIFIED: ICD-10-CM

## 2024-02-01 DIAGNOSIS — E78.5 HYPERLIPIDEMIA, UNSPECIFIED: ICD-10-CM

## 2024-02-01 DIAGNOSIS — E11.51 TYPE 2 DIABETES MELLITUS WITH DIABETIC PERIPHERAL ANGIOPATHY WITHOUT GANGRENE: ICD-10-CM

## 2024-02-01 DIAGNOSIS — N18.9 CHRONIC KIDNEY DISEASE, UNSPECIFIED: ICD-10-CM

## 2024-02-01 DIAGNOSIS — I12.9 HYPERTENSIVE CHRONIC KIDNEY DISEASE WITH STAGE 1 THROUGH STAGE 4 CHRONIC KIDNEY DISEASE, OR UNSPECIFIED CHRONIC KIDNEY DISEASE: ICD-10-CM

## 2024-02-01 DIAGNOSIS — E43 UNSPECIFIED SEVERE PROTEIN-CALORIE MALNUTRITION: ICD-10-CM

## 2024-02-01 DIAGNOSIS — L97.416 NON-PRESSURE CHRONIC ULCER OF RIGHT HEEL AND MIDFOOT WITH BONE INVOLVEMENT WITHOUT EVIDENCE OF NECROSIS: ICD-10-CM

## 2024-02-01 DIAGNOSIS — I25.2 OLD MYOCARDIAL INFARCTION: ICD-10-CM

## 2024-02-01 DIAGNOSIS — I34.0 NONRHEUMATIC MITRAL (VALVE) INSUFFICIENCY: ICD-10-CM

## 2024-02-01 DIAGNOSIS — Z79.4 LONG TERM (CURRENT) USE OF INSULIN: ICD-10-CM

## 2024-02-01 DIAGNOSIS — A41.02 SEPSIS DUE TO METHICILLIN RESISTANT STAPHYLOCOCCUS AUREUS: ICD-10-CM

## 2024-02-01 DIAGNOSIS — E11.65 TYPE 2 DIABETES MELLITUS WITH HYPERGLYCEMIA: ICD-10-CM

## 2024-02-01 DIAGNOSIS — L97.528 NON-PRESSURE CHRONIC ULCER OF OTHER PART OF LEFT FOOT WITH OTHER SPECIFIED SEVERITY: ICD-10-CM

## 2024-02-01 DIAGNOSIS — Z87.891 PERSONAL HISTORY OF NICOTINE DEPENDENCE: ICD-10-CM

## 2024-02-01 PROCEDURE — 99203 OFFICE O/P NEW LOW 30 MIN: CPT

## 2024-02-01 PROCEDURE — 99213 OFFICE O/P EST LOW 20 MIN: CPT

## 2024-02-01 PROCEDURE — G0463: CPT

## 2024-02-02 DIAGNOSIS — M86.672 OTHER CHRONIC OSTEOMYELITIS, LEFT ANKLE AND FOOT: ICD-10-CM

## 2024-02-02 DIAGNOSIS — L97.424 NON-PRESSURE CHRONIC ULCER OF LEFT HEEL AND MIDFOOT WITH NECROSIS OF BONE: ICD-10-CM

## 2024-02-02 DIAGNOSIS — Z98.890 OTHER SPECIFIED POSTPROCEDURAL STATES: ICD-10-CM

## 2024-02-02 DIAGNOSIS — L97.412 NON-PRESSURE CHRONIC ULCER OF RIGHT HEEL AND MIDFOOT WITH FAT LAYER EXPOSED: ICD-10-CM

## 2024-02-02 DIAGNOSIS — I50.9 HEART FAILURE, UNSPECIFIED: ICD-10-CM

## 2024-02-02 DIAGNOSIS — E11.59 TYPE 2 DIABETES MELLITUS WITH OTHER CIRCULATORY COMPLICATIONS: ICD-10-CM
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
## 2024-02-02 DIAGNOSIS — I48.91 UNSPECIFIED ATRIAL FIBRILLATION: ICD-10-CM
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
## 2024-02-02 DIAGNOSIS — T81.89XD OTHER COMPLICATIONS OF PROCEDURES, NOT ELSEWHERE CLASSIFIED, SUBSEQUENT ENCOUNTER: ICD-10-CM

## 2024-02-02 PROBLEM — S91.302A WOUND OF LEFT FOOT: Status: ACTIVE | Noted: 2024-02-01

## 2024-02-02 NOTE — ASSESSMENT
[Verbal] : Verbal [Written] : Written [Demo] : Demo [Patient] : Patient [Spouse] : Spouse [Family member] : Family member [Good - alert, interested, motivated] : Good - alert, interested, motivated [Verbalizes knowledge/Understanding] : Verbalizes knowledge/understanding [Dressing changes] : dressing changes [Foot Care] : foot care [Skin Care] : skin care [Pressure relief] : pressure relief [Signs and symptoms of infection] : sign and symptoms of infection [Nutrition] : nutrition [How and When to Call] : how and when to call [Patient responsibility to plan of care] : patient responsibility to plan of care [Glycemic Control] : glycemic control [Stable] : stable [Stretcher] : Stretcher [Not Applicable - Long Term Care/Home Health Agency] : Long Term Care/Home Health Agency: Not Applicable [Home] : Home [] : Yes [FreeTextEntry2] : Infection Prevention Wound care and Dressing changes Promote and Restore optimal skin integrity Nutrition and Wound Healing  Offloading and Pressure relief Protect and Guard wound site  Maintain acceptable levels of pain. Compliance R/T Elevation of Lower Extremities [FreeTextEntry4] : DPM assessed both wounds. Right heel has 4 scabs and just requires a dry dressing. Left heel has healed well, wound vac no longer necessary. DPM advised to start Trice w/ Adaptic Touch & Dry Dressing. F/U 1 month [FreeTextEntry1] : [facility]

## 2024-02-02 NOTE — PHYSICAL EXAM
[4 x 4] : 4 x 4  [0] : left 0 [1+] : left 1+ [Alert] : alert [Oriented to Person] : oriented to person [Oriented to Place] : oriented to place [de-identified] : AOX3  [de-identified] : 3/5muscle strength to the right foot and 2/5 to the left foot  [de-identified] : Left foot wound down to bone of the cuboid and 5th metatarsal and wound with antibiotic beads and periwound granular tissue, right plantar posterior heel wound with granular wound base  [FreeTextEntry1] : Right Heel (4 healed & scabbed over wounds within measurement) [FreeTextEntry2] : 3.0 [FreeTextEntry3] : 2.0 [FreeTextEntry4] : 0.1 [de-identified] : serosanguinous [de-identified] : (Foot ulcer, debrided in OR on 10/19/23) [de-identified] : Edema [de-identified] : No Product [de-identified] : Mechanically cleansed with sterile gauze and 0.9% normal saline. Dry Dressing Stockingette [FreeTextEntry7] : Left Lateral Foot [FreeTextEntry8] : 3.0 [FreeTextEntry9] : 3.4 [de-identified] : 0.2 [de-identified] : Sanguinous  [de-identified] : 0.9 cm @ 8-9 o'clock [de-identified] : (Surgical debridement 10/19/23) [de-identified] : Edema [de-identified] : 97% [de-identified] : 3% [de-identified] : Trice [de-identified] : Mechanically cleansed with 0.9% Normal Saline Kerlix White Stockinette [TWNoteComboBox4] : Moderate [TWNoteComboBox5] : No [TWNoteComboBox6] : Diabetic [de-identified] : No [de-identified] : None [de-identified] : None [de-identified] : 100% [de-identified] : No [de-identified] : Secondary Dressing [de-identified] : Small [de-identified] : Yes [de-identified] : Surgical [de-identified] : No [de-identified] : None [de-identified] : None [de-identified] : 3x Weekly [de-identified] : Primary Dressing

## 2024-02-02 NOTE — REVIEW OF SYSTEMS
[Fever] : no fever [Chills] : no chills [Eye Pain] : no eye pain [Red Eyes] : eyes not red [Earache] : no earache [Loss Of Hearing] : no hearing loss [Chest Pain] : no chest pain [Shortness Of Breath] : no shortness of breath [Cough] : no cough [Abdominal Pain] : no abdominal pain [Vomiting] : no vomiting [Joint Swelling] : no joint swelling [Joint Stiffness] : joint stiffness [Skin Wound] : skin wound [FreeTextEntry5] : CHF, Afib [FreeTextEntry9] : had  [de-identified] : Right heel down to fat and left foot down to bone

## 2024-02-02 NOTE — PLAN
[FreeTextEntry1] : Patient seen and evaluated. Discussed etiology and treatment plan. Patient verbalized understanding. Discussed with patient and son that will c/w local wound care and offloading. Patient is still high risk of more proximal amputation, sepsis, loss of limb and loss of life. Spent 20 minutes for patient care and medical decision making.

## 2024-02-02 NOTE — HISTORY OF PRESENT ILLNESS
[FreeTextEntry1] : Patient is 78 year old male presenting for follow up s/p right heel wound debridement and left foot resection of base of 5th metatarsal and partial cuboid with (+) OM. Patient is non ambulatory from previous surgery of the right foot with partial calcanectomy from outside provider. Patient is accompanied by his son, Patient denies any pain or tenderness to the foot wounds.

## 2024-03-18 ENCOUNTER — NON-APPOINTMENT (OUTPATIENT)
Age: 79
End: 2024-03-18

## 2024-03-26 ENCOUNTER — NON-APPOINTMENT (OUTPATIENT)
Age: 79
End: 2024-03-26

## 2024-04-02 ENCOUNTER — APPOINTMENT (OUTPATIENT)
Dept: WOUND CARE | Facility: HOSPITAL | Age: 79
End: 2024-04-02
Payer: MEDICARE

## 2024-04-02 ENCOUNTER — OUTPATIENT (OUTPATIENT)
Dept: OUTPATIENT SERVICES | Facility: HOSPITAL | Age: 79
LOS: 1 days | Discharge: HOME | End: 2024-04-02
Payer: MEDICARE

## 2024-04-02 VITALS
HEART RATE: 65 BPM | SYSTOLIC BLOOD PRESSURE: 125 MMHG | BODY MASS INDEX: 21.92 KG/M2 | OXYGEN SATURATION: 97 % | DIASTOLIC BLOOD PRESSURE: 73 MMHG | TEMPERATURE: 98.1 F | RESPIRATION RATE: 18 BRPM | WEIGHT: 180 LBS | HEIGHT: 76 IN

## 2024-04-02 DIAGNOSIS — L97.412 NON-PRESSURE CHRONIC ULCER OF RIGHT HEEL AND MIDFOOT WITH FAT LAYER EXPOSED: ICD-10-CM

## 2024-04-02 DIAGNOSIS — M86.672 OTHER CHRONIC OSTEOMYELITIS, LEFT ANKLE AND FOOT: ICD-10-CM

## 2024-04-02 DIAGNOSIS — E88.9 TYPE 2 DIABETES MELLITUS WITH OTHER SPECIFIED COMPLICATION: ICD-10-CM

## 2024-04-02 DIAGNOSIS — L97.424 NON-PRESSURE CHRONIC ULCER OF LEFT HEEL AND MIDFOOT WITH NECROSIS OF BONE: ICD-10-CM

## 2024-04-02 DIAGNOSIS — L89.152 PRESSURE ULCER OF SACRAL REGION, STAGE 2: ICD-10-CM

## 2024-04-02 DIAGNOSIS — T81.89XD OTHER COMPLICATIONS OF PROCEDURES, NOT ELSEWHERE CLASSIFIED, SUBSEQUENT ENCOUNTER: ICD-10-CM

## 2024-04-02 DIAGNOSIS — E11.69 TYPE 2 DIABETES MELLITUS WITH OTHER SPECIFIED COMPLICATION: ICD-10-CM

## 2024-04-02 PROCEDURE — 99203 OFFICE O/P NEW LOW 30 MIN: CPT

## 2024-04-02 PROCEDURE — 99213 OFFICE O/P EST LOW 20 MIN: CPT

## 2024-04-02 PROCEDURE — G0463: CPT

## 2024-04-02 NOTE — VITALS
[Pain related to present condition?] : The patient's  pain is related to present condition. [Tender] : tender [Occasional] : occasional [] : No [de-identified] : 5/10  [FreeTextEntry3] : left upper buttocks  [FreeTextEntry1] : Padded dressing  [FreeTextEntry2] : Direct contact  [FreeTextEntry4] : Allevyn pad

## 2024-04-02 NOTE — REVIEW OF SYSTEMS
[Fever] : no fever [Chills] : no chills [Red Eyes] : eyes not red [Eye Pain] : no eye pain [Loss Of Hearing] : no hearing loss [Earache] : no earache [Shortness Of Breath] : no shortness of breath [Chest Pain] : no chest pain [Cough] : no cough [Abdominal Pain] : no abdominal pain [Joint Swelling] : no joint swelling [Vomiting] : no vomiting [Skin Wound] : skin wound [Joint Stiffness] : joint stiffness [FreeTextEntry9] : had  [FreeTextEntry5] : CHF, Afib [de-identified] : Right heel down to fat and left foot down to bone

## 2024-04-02 NOTE — PHYSICAL EXAM
[0] : left 0 [1+] : left 1+ [Petechiae] : no petechiae [Skin Ulcer] : ulcer [Purpura] : no purpura  [Skin Induration] : induration [Alert] : alert [Oriented to Person] : oriented to person [Oriented to Place] : oriented to place [Calm] : calm [de-identified] : HTN, HLD [de-identified] : AOX3  [de-identified] : 3/5muscle strength to the right foot and 2/5 to the left foot  [de-identified] : Left foot wound down to bone of the cuboid and 5th metatarsal and wound with antibiotic beads and periwound granular tissue, right plantar posterior heel wound with granular wound base  [FreeTextEntry1] : Right heel  [FreeTextEntry2] : 2.4 [FreeTextEntry3] : 1.0 [de-identified] : Serous/sanguinous [FreeTextEntry4] : 0.1 [de-identified] : 15% [de-identified] : Trice, Adaptic touch [de-identified] : Mechanically cleansed with sterile gauze and normal saline. Kerlix  [FreeTextEntry8] : 1.6 [FreeTextEntry9] : 3.0 [FreeTextEntry7] : Left lateral foot  [de-identified] : Serous/sanguinous [de-identified] : 0.1 [de-identified] : Trice, Adaptic touch  [TWNoteComboBox4] : Small [TWNoteComboBox6] : Diabetic [de-identified] : Mechanically cleansed with sterile gauze and normal saline. Kerlix   * AgNo3 used by JO  [de-identified] : None [de-identified] : Normal [de-identified] : None [de-identified] : Yes [de-identified] : >75% [de-identified] : Every other day [de-identified] : Primary Dressing [de-identified] : Moderate [de-identified] : Surgical [de-identified] : Normal [de-identified] : None [de-identified] : None [de-identified] : No [de-identified] : 100% [de-identified] : Every other day [de-identified] : Primary Dressing

## 2024-04-02 NOTE — HISTORY OF PRESENT ILLNESS
[FreeTextEntry1] : s/p calcanectomy of the right heel , small granular still present , no soi . The left lateral foot has a granular ulcer also , associate with diabetes and fixed positional pressure to the right leg . Continue wound care and improve off loading for both feet Spent 20 minutes for patient care and medical decision making.

## 2024-04-02 NOTE — ASSESSMENT
[FreeTextEntry2] : Infection prevention Localized wound care  Goal remaining pain free regarding wounds Offloading  Promote optimal skin integrity and nutrition   [FreeTextEntry4] : Patient to continue offloading and using heel booties.  Patients son and wife preform dressing changes. Small amount of supplies provided for patient. Supply order submitted.  Follow up in 2 weeks

## 2024-04-02 NOTE — PHYSICAL EXAM
[4 x 4] : 4 x 4  [Abdominal Pad] : Abdominal Pad [Normal Thyroid] : the thyroid was normal [JVD] : no jugular venous distention  [Normal Heart Sounds] : normal heart sounds [Normal Breath Sounds] : Normal breath sounds [Normal Rate and Rhythm] : normal rate and rhythm [Abdomen Masses] : No abdominal massess [Tender] : nontender [Abdomen Tenderness] : ~T ~M No abdominal tenderness [Alert] : alert [Enlarged] : not enlarged [Oriented to Person] : oriented to person [Oriented to Place] : oriented to place [Oriented to Time] : oriented to time [Calm] : calm [de-identified] : elderly WM, NAD, alert, Ox3. [FreeTextEntry1] : left upper buttocks  [FreeTextEntry2] : 0.4 [FreeTextEntry3] : 0.4 [FreeTextEntry4] : 0.1 [de-identified] : Serous/sanguinous [de-identified] : Allevyn pad  [de-identified] : 15% [de-identified] : Mechanically cleansed with sterile gauze and normal saline. cloth tape  [TWNoteComboBox4] : Moderate [TWNoteComboBox6] : Pressure [de-identified] : Normal [de-identified] : None [de-identified] : None [de-identified] : >75% [de-identified] : Yes [de-identified] : 3x Weekly [de-identified] : Secondary Dressing

## 2024-04-02 NOTE — PLAN
[FreeTextEntry1] : offload; frequent rotation/change of position allevyn qod f/u 2 wks  time spent 30 mins.

## 2024-04-02 NOTE — HISTORY OF PRESENT ILLNESS
[FreeTextEntry1] : 79 yo WM, here for f/u for LE and heel ulcers and seen by podiatrist. Asked to see pt for a sacral coccyx press.ulcer. Pt is bedridden . Son present. Discussed how these ulcers come about and the importance of offload/frequent change of postion/rotation throughout the day/night.

## 2024-04-02 NOTE — ASSESSMENT
[Needs reinforcement] : needs reinforcement [Foot Care] : foot care [Off-loading] : off-loading [Glycemic Control] : glycemic control [Stretcher] : Stretcher [Verbal] : Verbal [Written] : Written [Demo] : Demo [Patient] : Patient [Family member] : Family member [Good - alert, interested, motivated] : Good - alert, interested, motivated [Dressing changes] : dressing changes [Verbalizes knowledge/Understanding] : Verbalizes knowledge/understanding [Skin Care] : skin care [Pressure relief] : pressure relief [Signs and symptoms of infection] : sign and symptoms of infection [How and When to Call] : how and when to call [Nutrition] : nutrition [Pain Management] : pain management [Patient responsibility to plan of care] : patient responsibility to plan of care [] : Yes [Stable] : stable [Home] : Home [Wheelchair] : Wheelchair [Not Applicable - Long Term Care/Home Health Agency] : Long Term Care/Home Health Agency: Not Applicable [FreeTextEntry2] : Infection prevention Localized wound care  Goal remaining pain free regarding wounds frequent turning and repositioning  promote optimal skin integrity  [FreeTextEntry4] : patient and patients son explain that wound on left upper buttocks has been opening and closing for years.  stressed the importance of pressure relief and frequent turning and repositioning at least every 2 hours.  Small amount of supplies provided for patient. supply order placed. Patients son and wife preform dressing changes.  follow up in 2 weeks

## 2024-04-02 NOTE — PLAN
[FreeTextEntry1] : continue wound care and off loading Patient was told if there are signs of infection ( fever, chills, nausea, vomiting ) or changes in the condition of the wound ( pain , cellulitis , purulent drainage or odor ) they should proceed to the ER as soon as possible  patient high risk for limb loss and life threatening sepsis Spent 20 minutes for patient care and medical decision making.

## 2024-04-02 NOTE — REVIEW OF SYSTEMS
[Fever] : no fever [Chills] : no chills [Eye Pain] : no eye pain [Red Eyes] : eyes not red [Earache] : no earache [Loss Of Hearing] : no hearing loss [Chest Pain] : no chest pain [Shortness Of Breath] : no shortness of breath [Cough] : no cough [Abdominal Pain] : no abdominal pain [Vomiting] : no vomiting [Joint Swelling] : no joint swelling [Anxiety] : no anxiety [Easy Bleeding] : no tendency for easy bleeding [FreeTextEntry5] : CHF, Afib [FreeTextEntry9] : had  [de-identified] : Right heel down to fat and left foot down to bone  ,DFU 2 lateral right foot clean and granular  [de-identified] : IDDM with neuropathy  [de-identified] : YOLIS

## 2024-04-03 DIAGNOSIS — M86.672 OTHER CHRONIC OSTEOMYELITIS, LEFT ANKLE AND FOOT: ICD-10-CM
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
## 2024-04-03 DIAGNOSIS — L89.152 PRESSURE ULCER OF SACRAL REGION, STAGE 2: ICD-10-CM
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
## 2024-04-03 DIAGNOSIS — Z98.890 OTHER SPECIFIED POSTPROCEDURAL STATES: ICD-10-CM

## 2024-04-03 DIAGNOSIS — Z95.0 PRESENCE OF CARDIAC PACEMAKER: ICD-10-CM

## 2024-04-03 DIAGNOSIS — L97.412 NON-PRESSURE CHRONIC ULCER OF RIGHT HEEL AND MIDFOOT WITH FAT LAYER EXPOSED: ICD-10-CM

## 2024-04-03 DIAGNOSIS — F32.A DEPRESSION, UNSPECIFIED: ICD-10-CM

## 2024-04-03 DIAGNOSIS — L97.424 NON-PRESSURE CHRONIC ULCER OF LEFT HEEL AND MIDFOOT WITH NECROSIS OF BONE: ICD-10-CM

## 2024-04-03 DIAGNOSIS — M05.20 RHEUMATOID VASCULITIS WITH RHEUMATOID ARTHRITIS OF UNSPECIFIED SITE: ICD-10-CM

## 2024-04-03 DIAGNOSIS — E11.59 TYPE 2 DIABETES MELLITUS WITH OTHER CIRCULATORY COMPLICATIONS: ICD-10-CM

## 2024-04-03 DIAGNOSIS — I48.91 UNSPECIFIED ATRIAL FIBRILLATION: ICD-10-CM

## 2024-04-03 DIAGNOSIS — T81.89XD OTHER COMPLICATIONS OF PROCEDURES, NOT ELSEWHERE CLASSIFIED, SUBSEQUENT ENCOUNTER: ICD-10-CM

## 2024-04-03 DIAGNOSIS — I50.9 HEART FAILURE, UNSPECIFIED: ICD-10-CM

## 2024-04-03 DIAGNOSIS — E11.69 TYPE 2 DIABETES MELLITUS WITH OTHER SPECIFIED COMPLICATION: ICD-10-CM

## 2024-04-03 DIAGNOSIS — M81.0 AGE-RELATED OSTEOPOROSIS WITHOUT CURRENT PATHOLOGICAL FRACTURE: ICD-10-CM

## 2024-04-04 ENCOUNTER — NON-APPOINTMENT (OUTPATIENT)
Age: 79
End: 2024-04-04

## 2024-04-24 ENCOUNTER — NON-APPOINTMENT (OUTPATIENT)
Age: 79
End: 2024-04-24

## 2024-07-07 ENCOUNTER — NON-APPOINTMENT (OUTPATIENT)
Age: 79
End: 2024-07-07

## 2024-07-08 ENCOUNTER — APPOINTMENT (OUTPATIENT)
Dept: WOUND CARE | Facility: HOSPITAL | Age: 79
End: 2024-07-08
Payer: MEDICARE

## 2024-07-08 ENCOUNTER — OUTPATIENT (OUTPATIENT)
Dept: OUTPATIENT SERVICES | Facility: HOSPITAL | Age: 79
LOS: 1 days | Discharge: ANOTHER HOSPITAL | End: 2024-07-08

## 2024-07-08 VITALS — DIASTOLIC BLOOD PRESSURE: 45 MMHG | SYSTOLIC BLOOD PRESSURE: 89 MMHG

## 2024-07-08 VITALS
RESPIRATION RATE: 18 BRPM | HEIGHT: 76 IN | WEIGHT: 175 LBS | HEART RATE: 63 BPM | TEMPERATURE: 99.9 F | BODY MASS INDEX: 21.31 KG/M2 | DIASTOLIC BLOOD PRESSURE: 41 MMHG | OXYGEN SATURATION: 96 % | SYSTOLIC BLOOD PRESSURE: 73 MMHG

## 2024-07-08 DIAGNOSIS — E11.40 TYPE 2 DIABETES MELLITUS WITH DIABETIC NEUROPATHY, UNSPECIFIED: ICD-10-CM

## 2024-07-08 DIAGNOSIS — E11.621 TYPE 2 DIABETES MELLITUS WITH FOOT ULCER: ICD-10-CM

## 2024-07-08 DIAGNOSIS — L97.422 NON-PRESSURE CHRONIC ULCER OF LEFT HEEL AND MIDFOOT WITH FAT LAYER EXPOSED: ICD-10-CM

## 2024-07-08 DIAGNOSIS — E11.622 TYPE 2 DIABETES MELLITUS WITH OTHER SKIN ULCER: ICD-10-CM

## 2024-07-08 DIAGNOSIS — L97.509 TYPE 2 DIABETES MELLITUS WITH FOOT ULCER: ICD-10-CM

## 2024-07-08 DIAGNOSIS — L97.316: ICD-10-CM

## 2024-07-08 DIAGNOSIS — L97.325: ICD-10-CM

## 2024-07-08 DIAGNOSIS — E11.59 TYPE 2 DIABETES MELLITUS WITH OTHER CIRCULATORY COMPLICATIONS: ICD-10-CM

## 2024-07-08 PROCEDURE — 99214 OFFICE O/P EST MOD 30 MIN: CPT

## 2024-07-10 ENCOUNTER — TRANSCRIPTION ENCOUNTER (OUTPATIENT)
Age: 79
End: 2024-07-10

## 2024-07-10 DIAGNOSIS — I50.9 HEART FAILURE, UNSPECIFIED: ICD-10-CM

## 2024-07-10 DIAGNOSIS — F32.A DEPRESSION, UNSPECIFIED: ICD-10-CM

## 2024-07-10 DIAGNOSIS — L97.316 NON-PRESSURE CHRONIC ULCER OF RIGHT ANKLE WITH BONE INVOLVEMENT WITHOUT EVIDENCE OF NECROSIS: ICD-10-CM

## 2024-07-10 DIAGNOSIS — M81.0 AGE-RELATED OSTEOPOROSIS WITHOUT CURRENT PATHOLOGICAL FRACTURE: ICD-10-CM

## 2024-07-10 DIAGNOSIS — M05.20 RHEUMATOID VASCULITIS WITH RHEUMATOID ARTHRITIS OF UNSPECIFIED SITE: ICD-10-CM

## 2024-07-10 DIAGNOSIS — E11.40 TYPE 2 DIABETES MELLITUS WITH DIABETIC NEUROPATHY, UNSPECIFIED: ICD-10-CM

## 2024-07-10 DIAGNOSIS — L97.422 NON-PRESSURE CHRONIC ULCER OF LEFT HEEL AND MIDFOOT WITH FAT LAYER EXPOSED: ICD-10-CM

## 2024-07-10 DIAGNOSIS — Z98.890 OTHER SPECIFIED POSTPROCEDURAL STATES: ICD-10-CM

## 2024-07-10 DIAGNOSIS — E11.621 TYPE 2 DIABETES MELLITUS WITH FOOT ULCER: ICD-10-CM

## 2024-07-10 DIAGNOSIS — Z95.0 PRESENCE OF CARDIAC PACEMAKER: ICD-10-CM

## 2024-07-10 DIAGNOSIS — E11.622 TYPE 2 DIABETES MELLITUS WITH OTHER SKIN ULCER: ICD-10-CM

## 2024-07-10 DIAGNOSIS — L97.325 NON-PRESSURE CHRONIC ULCER OF LEFT ANKLE WITH MUSCLE INVOLVEMENT WITHOUT EVIDENCE OF NECROSIS: ICD-10-CM

## 2024-07-10 DIAGNOSIS — E11.59 TYPE 2 DIABETES MELLITUS WITH OTHER CIRCULATORY COMPLICATIONS: ICD-10-CM

## 2024-07-10 DIAGNOSIS — I48.91 UNSPECIFIED ATRIAL FIBRILLATION: ICD-10-CM

## 2024-07-10 LAB
-  CLINDAMYCIN: SIGNIFICANT CHANGE UP
-  CLINDAMYCIN: SIGNIFICANT CHANGE UP
-  DAPTOMYCIN: SIGNIFICANT CHANGE UP
-  DAPTOMYCIN: SIGNIFICANT CHANGE UP
-  ERYTHROMYCIN: SIGNIFICANT CHANGE UP
-  ERYTHROMYCIN: SIGNIFICANT CHANGE UP
-  GENTAMICIN: SIGNIFICANT CHANGE UP
-  GENTAMICIN: SIGNIFICANT CHANGE UP
-  LINEZOLID: SIGNIFICANT CHANGE UP
-  LINEZOLID: SIGNIFICANT CHANGE UP
-  OXACILLIN: SIGNIFICANT CHANGE UP
-  OXACILLIN: SIGNIFICANT CHANGE UP
-  PENICILLIN: SIGNIFICANT CHANGE UP
-  PENICILLIN: SIGNIFICANT CHANGE UP
-  RIFAMPIN: SIGNIFICANT CHANGE UP
-  RIFAMPIN: SIGNIFICANT CHANGE UP
-  TETRACYCLINE: SIGNIFICANT CHANGE UP
-  TETRACYCLINE: SIGNIFICANT CHANGE UP
-  TRIMETHOPRIM/SULFAMETHOXAZOLE: SIGNIFICANT CHANGE UP
-  TRIMETHOPRIM/SULFAMETHOXAZOLE: SIGNIFICANT CHANGE UP
-  VANCOMYCIN: SIGNIFICANT CHANGE UP
-  VANCOMYCIN: SIGNIFICANT CHANGE UP
CULTURE RESULTS: ABNORMAL
CULTURE RESULTS: ABNORMAL
METHOD TYPE: SIGNIFICANT CHANGE UP
METHOD TYPE: SIGNIFICANT CHANGE UP
ORGANISM # SPEC MICROSCOPIC CNT: ABNORMAL
ORGANISM # SPEC MICROSCOPIC CNT: ABNORMAL
ORGANISM # SPEC MICROSCOPIC CNT: SIGNIFICANT CHANGE UP
ORGANISM # SPEC MICROSCOPIC CNT: SIGNIFICANT CHANGE UP
SPECIMEN SOURCE: SIGNIFICANT CHANGE UP
SPECIMEN SOURCE: SIGNIFICANT CHANGE UP

## 2024-07-13 ENCOUNTER — TRANSCRIPTION ENCOUNTER (OUTPATIENT)
Age: 79
End: 2024-07-13

## 2024-07-23 ENCOUNTER — TRANSCRIPTION ENCOUNTER (OUTPATIENT)
Age: 79
End: 2024-07-23

## 2024-08-08 ENCOUNTER — OUTPATIENT (OUTPATIENT)
Dept: OUTPATIENT SERVICES | Facility: HOSPITAL | Age: 79
LOS: 1 days | Discharge: HOME | End: 2024-08-08
Payer: MEDICARE

## 2024-08-08 ENCOUNTER — APPOINTMENT (OUTPATIENT)
Dept: WOUND CARE | Facility: HOSPITAL | Age: 79
End: 2024-08-08

## 2024-08-08 DIAGNOSIS — E11.622 TYPE 2 DIABETES MELLITUS WITH OTHER SKIN ULCER: ICD-10-CM

## 2024-08-08 PROCEDURE — G0463: CPT

## 2024-08-08 PROCEDURE — 99213 OFFICE O/P EST LOW 20 MIN: CPT | Mod: 24

## 2024-08-09 NOTE — ASSESSMENT
[] : Yes [Verbal] : Verbal [Written] : Written [Demo] : Demo [Patient] : Patient [Family member] : Family member [Good - alert, interested, motivated] : Good - alert, interested, motivated [Demonstrates independently] : demonstrates independently [Dressing changes] : dressing changes [Foot Care] : foot care [Skin Care] : skin care [Signs and symptoms of infection] : sign and symptoms of infection [Nutrition] : nutrition [How and When to Call] : how and when to call [Off-loading] : off-loading [Patient responsibility to plan of care] : patient responsibility to plan of care [Stable] : stable [Other: ____] : [unfilled] [Stretcher] : Stretcher [FreeTextEntry2] : Infection Prevention Foot and nail care Nutrition and wound healing Pt Demonstrates use of both nonpharmacological and pharmacological pain relief strategies. [FreeTextEntry3] : S/P Hospital discharge 7/8/24 - 7/24/24 [FreeTextEntry4] : Patient resides at [facility] Occupational therapy as indicated PT as indicated Patient provided with Rx for bilateral offloading boots F/U 1 WEEK

## 2024-08-09 NOTE — ASSESSMENT
[] : Yes [Verbal] : Verbal [Written] : Written [Demo] : Demo [Patient] : Patient [Family member] : Family member [Good - alert, interested, motivated] : Good - alert, interested, motivated [Demonstrates independently] : demonstrates independently [Dressing changes] : dressing changes [Foot Care] : foot care [Skin Care] : skin care [Signs and symptoms of infection] : sign and symptoms of infection [Nutrition] : nutrition [How and When to Call] : how and when to call [Off-loading] : off-loading [Patient responsibility to plan of care] : patient responsibility to plan of care [Stable] : stable [Other: ____] : [unfilled] [Stretcher] : Stretcher [FreeTextEntry2] : Infection Prevention Foot and nail care Nutrition and wound healing Pt Demonstrates use of both nonpharmacological and pharmacological pain relief strategies. [FreeTextEntry3] : S/P Hospital discharge 7/8/24 - 7/24/24 [FreeTextEntry4] : Patient resides at [facility] Occupational therapy as indicated PT as indicated Patient provided with Rx for bilateral offloading boots F/U 1 WEEK

## 2024-08-09 NOTE — PLAN
[FreeTextEntry1] : Patient examined and evaluated at this time. Continue with local wound care and offloading. Patient remains at high risk for infection, sepsis, limb loss, death. All questions answered to satisfaction of patient and son verbalized understanding. Spent 20 minutes for patient care and medical decision making.

## 2024-08-09 NOTE — HISTORY OF PRESENT ILLNESS
[FreeTextEntry1] : 78-year-old male seen for multiple bilateral lower extremity wounds.  Right lateral malleolus wound down to skin, subcutaneous tissue, fat, bone. Left anterior ankle wound down to skin, subcutaneous tissue, fat, fascia. Left lateral midfoot wound down to skin, subcutaneous tissue, fat.  Patient son has been caring for the wounds but relates that they have gradually worsened.  Patient's son relates that his father appears to have been more lethargic recently.  8/8/24 Pt seen for right lateral malleoli are ulcer down skin, subcutaneous tissue, fat, bone (positive for osteomyelitis).  Patient also seen for right medial lower leg wound that has epithelialized, left anterior ankle wound the skin, subcutaneous tissue, fat, fascia, healing well with granular tissue.  Patient also seen for left lateral midfoot wound that has epithelialized at this time.

## 2024-08-09 NOTE — PHYSICAL EXAM
[Abdominal Pad] : Abdominal Pad [4 x 4] : 4 x 4  [1+] : left 1+ [Ankle Swelling (On Exam)] : present [Ankle Swelling Bilaterally] : bilaterally  [Ankle Swelling On The Right] : mild [Varicose Veins Of Lower Extremities] : not present [] : not present [Skin Ulcer] : ulcer [de-identified] : calm [de-identified] : 1 out of 5 strength in all quadrants bilaterally [de-identified] : Multiple bilateral lower extremity wounds, improving [de-identified] : Loss of light to sensation bilaterally [FreeTextEntry2] : 2.4 [FreeTextEntry1] : Right lateral malleolus [FreeTextEntry3] : 2.4 [FreeTextEntry4] : 0.4 [de-identified] : moderate serosanguineous [de-identified] : none [de-identified] : none [de-identified] : surgical [de-identified] : intact [de-identified] : non [de-identified] : none [de-identified] : 100% [de-identified] : none [de-identified] : none [de-identified] : Alginate Ag [de-identified] : Mechanically cleansed with sterile gauze and normal saline 0.9% Dry Dressing [FreeTextEntry7] : Left Foot, Dorsal [FreeTextEntry8] : 2.1 [FreeTextEntry9] : 0.6 [de-identified] : 0.4 [de-identified] : small serous [de-identified] : none [de-identified] : other [de-identified] : none [de-identified] : intact [de-identified] : none [de-identified] : none [de-identified] : none [de-identified] : 100% [de-identified] : tendon [de-identified] : none [FreeTextEntry6] : none [de-identified] : Alginate Ag [de-identified] : Left Lateral Foot [de-identified] : Mechanically cleansed with sterile gauze and normal saline 0.9% Dry Dressing [de-identified] : none [de-identified] : pressure [de-identified] : none [de-identified] : none [de-identified] : 100% dry intact scabs [de-identified] : Dry Dressing [de-identified] : Mechanically cleansed with sterile gauze and normal saline 0.9% Dry Dressing [de-identified] : Indurated [de-identified] : 3x Weekly [de-identified] : Primary Dressing [de-identified] : 3x Weekly [de-identified] : Primary Dressing [de-identified] : 3x Weekly [de-identified] : Primary Dressing

## 2024-08-09 NOTE — PHYSICAL EXAM
[Abdominal Pad] : Abdominal Pad [4 x 4] : 4 x 4  [1+] : left 1+ [Ankle Swelling (On Exam)] : present [Ankle Swelling Bilaterally] : bilaterally  [Ankle Swelling On The Right] : mild [Varicose Veins Of Lower Extremities] : not present [Skin Ulcer] : ulcer [] : not present [de-identified] : calm [de-identified] : 1 out of 5 strength in all quadrants bilaterally [de-identified] : Multiple bilateral lower extremity wounds, improving [de-identified] : Loss of light to sensation bilaterally [FreeTextEntry2] : 2.4 [FreeTextEntry1] : Right lateral malleolus [FreeTextEntry3] : 2.4 [FreeTextEntry4] : 0.4 [de-identified] : moderate serosanguineous [de-identified] : none [de-identified] : surgical [de-identified] : none [de-identified] : intact [de-identified] : non [de-identified] : none [de-identified] : 100% [de-identified] : none [de-identified] : none [de-identified] : Alginate Ag [de-identified] : Mechanically cleansed with sterile gauze and normal saline 0.9% Dry Dressing [FreeTextEntry8] : 2.1 [FreeTextEntry7] : Left Foot, Dorsal [FreeTextEntry9] : 0.6 [de-identified] : 0.4 [de-identified] : small serous [de-identified] : none [de-identified] : other [de-identified] : none [de-identified] : intact [de-identified] : none [de-identified] : none [de-identified] : 100% [de-identified] : none [de-identified] : tendon [de-identified] : none [FreeTextEntry6] : none [de-identified] : Alginate Ag [de-identified] : Left Lateral Foot [de-identified] : Mechanically cleansed with sterile gauze and normal saline 0.9% Dry Dressing [de-identified] : none [de-identified] : pressure [de-identified] : none [de-identified] : 100% dry intact scabs [de-identified] : none [de-identified] : Dry Dressing [de-identified] : Mechanically cleansed with sterile gauze and normal saline 0.9% Dry Dressing [de-identified] : Indurated [de-identified] : 3x Weekly [de-identified] : Primary Dressing [de-identified] : 3x Weekly [de-identified] : Primary Dressing [de-identified] : 3x Weekly [de-identified] : Primary Dressing

## 2024-08-10 DIAGNOSIS — I48.91 UNSPECIFIED ATRIAL FIBRILLATION: ICD-10-CM

## 2024-08-10 DIAGNOSIS — M05.20 RHEUMATOID VASCULITIS WITH RHEUMATOID ARTHRITIS OF UNSPECIFIED SITE: ICD-10-CM

## 2024-08-10 DIAGNOSIS — Z95.0 PRESENCE OF CARDIAC PACEMAKER: ICD-10-CM

## 2024-08-10 DIAGNOSIS — E11.621 TYPE 2 DIABETES MELLITUS WITH FOOT ULCER: ICD-10-CM

## 2024-08-10 DIAGNOSIS — I50.9 HEART FAILURE, UNSPECIFIED: ICD-10-CM

## 2024-08-10 DIAGNOSIS — E11.40 TYPE 2 DIABETES MELLITUS WITH DIABETIC NEUROPATHY, UNSPECIFIED: ICD-10-CM

## 2024-08-10 DIAGNOSIS — Z98.890 OTHER SPECIFIED POSTPROCEDURAL STATES: ICD-10-CM

## 2024-08-10 DIAGNOSIS — E11.622 TYPE 2 DIABETES MELLITUS WITH OTHER SKIN ULCER: ICD-10-CM

## 2024-08-10 DIAGNOSIS — F32.A DEPRESSION, UNSPECIFIED: ICD-10-CM

## 2024-08-10 DIAGNOSIS — L97.325 NON-PRESSURE CHRONIC ULCER OF LEFT ANKLE WITH MUSCLE INVOLVEMENT WITHOUT EVIDENCE OF NECROSIS: ICD-10-CM

## 2024-08-10 DIAGNOSIS — L97.316 NON-PRESSURE CHRONIC ULCER OF RIGHT ANKLE WITH BONE INVOLVEMENT WITHOUT EVIDENCE OF NECROSIS: ICD-10-CM

## 2024-08-10 DIAGNOSIS — M81.0 AGE-RELATED OSTEOPOROSIS WITHOUT CURRENT PATHOLOGICAL FRACTURE: ICD-10-CM

## 2024-08-10 DIAGNOSIS — L97.422 NON-PRESSURE CHRONIC ULCER OF LEFT HEEL AND MIDFOOT WITH FAT LAYER EXPOSED: ICD-10-CM

## 2024-08-10 DIAGNOSIS — E11.59 TYPE 2 DIABETES MELLITUS WITH OTHER CIRCULATORY COMPLICATIONS: ICD-10-CM

## 2024-08-26 ENCOUNTER — OUTPATIENT (OUTPATIENT)
Dept: OUTPATIENT SERVICES | Facility: HOSPITAL | Age: 79
LOS: 1 days | Discharge: HOME | End: 2024-08-26
Payer: MEDICARE

## 2024-08-26 ENCOUNTER — APPOINTMENT (OUTPATIENT)
Dept: WOUND CARE | Facility: HOSPITAL | Age: 79
End: 2024-08-26
Payer: MEDICARE

## 2024-08-26 VITALS
HEART RATE: 70 BPM | DIASTOLIC BLOOD PRESSURE: 72 MMHG | WEIGHT: 175 LBS | OXYGEN SATURATION: 95 % | RESPIRATION RATE: 18 BRPM | TEMPERATURE: 98 F | SYSTOLIC BLOOD PRESSURE: 142 MMHG | HEIGHT: 76 IN | BODY MASS INDEX: 21.31 KG/M2

## 2024-08-26 DIAGNOSIS — E11.622 TYPE 2 DIABETES MELLITUS WITH OTHER SKIN ULCER: ICD-10-CM

## 2024-08-26 DIAGNOSIS — L97.509 TYPE 2 DIABETES MELLITUS WITH FOOT ULCER: ICD-10-CM

## 2024-08-26 DIAGNOSIS — L97.422 NON-PRESSURE CHRONIC ULCER OF LEFT HEEL AND MIDFOOT WITH FAT LAYER EXPOSED: ICD-10-CM

## 2024-08-26 DIAGNOSIS — E11.621 TYPE 2 DIABETES MELLITUS WITH FOOT ULCER: ICD-10-CM

## 2024-08-26 DIAGNOSIS — L97.316: ICD-10-CM

## 2024-08-26 DIAGNOSIS — L97.325: ICD-10-CM

## 2024-08-26 PROCEDURE — G0463: CPT

## 2024-08-26 PROCEDURE — 99213 OFFICE O/P EST LOW 20 MIN: CPT | Mod: 24

## 2024-08-29 NOTE — HISTORY OF PRESENT ILLNESS
[FreeTextEntry1] : 78-year-old male seen for multiple bilateral lower extremity wounds.  Right lateral malleolus wound down to skin, subcutaneous tissue, fat, bone. Left anterior ankle wound down to skin, subcutaneous tissue, fat, fascia. Left lateral midfoot wound down to skin, subcutaneous tissue, fat.  Patient son has been caring for the wounds but relates that they have gradually worsened.  Patient's son relates that his father appears to have been more lethargic recently.  8/29/24 Pt seen for right lateral malleoli are ulcer down skin, subcutaneous tissue, fat, bone (positive for osteomyelitis).  Patient also seen for right medial lower leg wound that has epithelialized, left anterior ankle wound the skin, subcutaneous tissue, fat, fascia, healing well with granular tissue.  Patient also seen for left lateral midfoot wound that has epithelialized at this time.

## 2024-08-29 NOTE — PHYSICAL EXAM
[Abdominal Pad] : Abdominal Pad [4 x 4] : 4 x 4  [1+] : left 1+ [Ankle Swelling (On Exam)] : present [Ankle Swelling Bilaterally] : bilaterally  [Ankle Swelling On The Right] : mild [Varicose Veins Of Lower Extremities] : not present [] : not present [Skin Ulcer] : ulcer [de-identified] : calm [de-identified] : 1 out of 5 strength in all quadrants bilaterally [de-identified] : Multiple bilateral lower extremity wounds, improving [de-identified] : Loss of light to sensation bilaterally [FreeTextEntry1] : Right lateral malleolus [FreeTextEntry2] : 1.6 [FreeTextEntry3] : 1.9 [FreeTextEntry4] : 0.2 Hypergranular [de-identified] : moderate serosanguineous [de-identified] : none [de-identified] : surgical [de-identified] : none [de-identified] : intact [de-identified] : non [de-identified] : none [de-identified] : 100% [de-identified] : none [de-identified] : Alginate Ag [de-identified] : none [de-identified] : Mechanically cleansed with sterile gauze and normal saline 0.9% Dry Dressing [FreeTextEntry7] : Left Foot, Dorsal [FreeTextEntry8] : 2.9 [FreeTextEntry9] : 0.9 [de-identified] : 0.3 [de-identified] : small serous [de-identified] : none [de-identified] : other [de-identified] : none [de-identified] : intact [de-identified] : none [de-identified] : none [de-identified] : none [de-identified] : 100% [de-identified] : tendon [de-identified] : none [FreeTextEntry6] : none [de-identified] : Alginate Ag [de-identified] : Mechanically cleansed with sterile gauze and normal saline 0.9% Dry Dressing [de-identified] : Left Lateral Foot [de-identified] : 1.6 [de-identified] : 0.9 [de-identified] : 0.1 [de-identified] : small serosanguineous [de-identified] : none [de-identified] : pressure [de-identified] : none [de-identified] : none [de-identified] : 100% [de-identified] : Alginate Ag [de-identified] : Mechanically cleansed with sterile gauze and normal saline 0.9% Dry Dressing [de-identified] : Indurated [de-identified] : 3x Weekly [de-identified] : Primary Dressing [de-identified] : 3x Weekly [de-identified] : Primary Dressing [de-identified] : 3x Weekly

## 2024-08-29 NOTE — ASSESSMENT
[Verbal] : Verbal [Written] : Written [Demo] : Demo [Patient] : Patient [Family member] : Family member [Good - alert, interested, motivated] : Good - alert, interested, motivated [Demonstrates independently] : demonstrates independently [Dressing changes] : dressing changes [Foot Care] : foot care [Skin Care] : skin care [Signs and symptoms of infection] : sign and symptoms of infection [Nutrition] : nutrition [How and When to Call] : how and when to call [Off-loading] : off-loading [Patient responsibility to plan of care] : patient responsibility to plan of care [] : Yes [Stable] : stable [Home] : Home [Stretcher] : Stretcher [Not Applicable - Long Term Care/Home Health Agency] : Long Term Care/Home Health Agency: Not Applicable [FreeTextEntry2] : Infection Prevention Foot and nail care Nutrition and wound healing Pt Demonstrates use of both nonpharmacological and pharmacological pain relief strategies. [FreeTextEntry4] : Since last visit, patient has been discharged from Rehab and now resides at home Supplies submitted Patient's son will be performing Rx dressing changes Orthotist consult submitted - To patient's residence F/U 2 Months - Patient unable to come to wound center sooner due to cost of transportation.

## 2024-08-29 NOTE — PHYSICAL EXAM
[Abdominal Pad] : Abdominal Pad [4 x 4] : 4 x 4  [1+] : left 1+ [Ankle Swelling (On Exam)] : present [Ankle Swelling Bilaterally] : bilaterally  [Ankle Swelling On The Right] : mild [Varicose Veins Of Lower Extremities] : not present [] : not present [Skin Ulcer] : ulcer [de-identified] : calm [de-identified] : 1 out of 5 strength in all quadrants bilaterally [de-identified] : Multiple bilateral lower extremity wounds, improving [de-identified] : Loss of light to sensation bilaterally [FreeTextEntry1] : Right lateral malleolus [FreeTextEntry2] : 1.6 [FreeTextEntry3] : 1.9 [FreeTextEntry4] : 0.2 Hypergranular [de-identified] : moderate serosanguineous [de-identified] : none [de-identified] : surgical [de-identified] : none [de-identified] : intact [de-identified] : non [de-identified] : none [de-identified] : 100% [de-identified] : none [de-identified] : Alginate Ag [de-identified] : none [de-identified] : Mechanically cleansed with sterile gauze and normal saline 0.9% Dry Dressing [FreeTextEntry7] : Left Foot, Dorsal [FreeTextEntry8] : 2.9 [FreeTextEntry9] : 0.9 [de-identified] : 0.3 [de-identified] : small serous [de-identified] : none [de-identified] : other [de-identified] : none [de-identified] : intact [de-identified] : none [de-identified] : none [de-identified] : none [de-identified] : 100% [de-identified] : tendon [de-identified] : none [FreeTextEntry6] : none [de-identified] : Alginate Ag [de-identified] : Mechanically cleansed with sterile gauze and normal saline 0.9% Dry Dressing [de-identified] : Left Lateral Foot [de-identified] : 1.6 [de-identified] : 0.9 [de-identified] : 0.1 [de-identified] : small serosanguineous [de-identified] : none [de-identified] : pressure [de-identified] : none [de-identified] : none [de-identified] : 100% [de-identified] : Alginate Ag [de-identified] : Mechanically cleansed with sterile gauze and normal saline 0.9% Dry Dressing [de-identified] : Indurated [de-identified] : 3x Weekly [de-identified] : Primary Dressing [de-identified] : 3x Weekly [de-identified] : Primary Dressing [de-identified] : 3x Weekly

## 2024-08-30 DIAGNOSIS — M81.0 AGE-RELATED OSTEOPOROSIS WITHOUT CURRENT PATHOLOGICAL FRACTURE: ICD-10-CM

## 2024-08-30 DIAGNOSIS — I50.9 HEART FAILURE, UNSPECIFIED: ICD-10-CM

## 2024-08-30 DIAGNOSIS — M05.20 RHEUMATOID VASCULITIS WITH RHEUMATOID ARTHRITIS OF UNSPECIFIED SITE: ICD-10-CM

## 2024-08-30 DIAGNOSIS — E11.40 TYPE 2 DIABETES MELLITUS WITH DIABETIC NEUROPATHY, UNSPECIFIED: ICD-10-CM

## 2024-08-30 DIAGNOSIS — E11.622 TYPE 2 DIABETES MELLITUS WITH OTHER SKIN ULCER: ICD-10-CM

## 2024-08-30 DIAGNOSIS — Z98.890 OTHER SPECIFIED POSTPROCEDURAL STATES: ICD-10-CM

## 2024-08-30 DIAGNOSIS — L97.422 NON-PRESSURE CHRONIC ULCER OF LEFT HEEL AND MIDFOOT WITH FAT LAYER EXPOSED: ICD-10-CM

## 2024-08-30 DIAGNOSIS — E11.59 TYPE 2 DIABETES MELLITUS WITH OTHER CIRCULATORY COMPLICATIONS: ICD-10-CM

## 2024-08-30 DIAGNOSIS — Z95.0 PRESENCE OF CARDIAC PACEMAKER: ICD-10-CM

## 2024-08-30 DIAGNOSIS — E11.621 TYPE 2 DIABETES MELLITUS WITH FOOT ULCER: ICD-10-CM

## 2024-08-30 DIAGNOSIS — L97.325 NON-PRESSURE CHRONIC ULCER OF LEFT ANKLE WITH MUSCLE INVOLVEMENT WITHOUT EVIDENCE OF NECROSIS: ICD-10-CM

## 2024-08-30 DIAGNOSIS — I48.91 UNSPECIFIED ATRIAL FIBRILLATION: ICD-10-CM

## 2024-08-30 DIAGNOSIS — F32.A DEPRESSION, UNSPECIFIED: ICD-10-CM

## 2024-08-30 DIAGNOSIS — L97.316 NON-PRESSURE CHRONIC ULCER OF RIGHT ANKLE WITH BONE INVOLVEMENT WITHOUT EVIDENCE OF NECROSIS: ICD-10-CM

## 2024-09-05 ENCOUNTER — NON-APPOINTMENT (OUTPATIENT)
Age: 79
End: 2024-09-05

## 2024-10-07 ENCOUNTER — APPOINTMENT (OUTPATIENT)
Dept: WOUND CARE | Facility: HOSPITAL | Age: 79
End: 2024-10-07

## 2024-10-23 ENCOUNTER — OUTPATIENT (OUTPATIENT)
Dept: OUTPATIENT SERVICES | Facility: HOSPITAL | Age: 79
LOS: 1 days | Discharge: HOME | End: 2024-10-23
Payer: MEDICARE

## 2024-10-23 ENCOUNTER — APPOINTMENT (OUTPATIENT)
Dept: WOUND CARE | Facility: HOSPITAL | Age: 79
End: 2024-10-23
Payer: MEDICARE

## 2024-10-23 VITALS
DIASTOLIC BLOOD PRESSURE: 68 MMHG | SYSTOLIC BLOOD PRESSURE: 111 MMHG | WEIGHT: 175 LBS | TEMPERATURE: 97.7 F | HEART RATE: 60 BPM | OXYGEN SATURATION: 99 % | BODY MASS INDEX: 21.31 KG/M2 | HEIGHT: 76 IN | RESPIRATION RATE: 18 BRPM

## 2024-10-23 DIAGNOSIS — E11.621 TYPE 2 DIABETES MELLITUS WITH FOOT ULCER: ICD-10-CM

## 2024-10-23 DIAGNOSIS — L97.509 TYPE 2 DIABETES MELLITUS WITH FOOT ULCER: ICD-10-CM

## 2024-10-23 DIAGNOSIS — L97.422 NON-PRESSURE CHRONIC ULCER OF LEFT HEEL AND MIDFOOT WITH FAT LAYER EXPOSED: ICD-10-CM

## 2024-10-23 DIAGNOSIS — L97.325: ICD-10-CM

## 2024-10-23 DIAGNOSIS — L97.316: ICD-10-CM

## 2024-10-23 DIAGNOSIS — E11.622 TYPE 2 DIABETES MELLITUS WITH OTHER SKIN ULCER: ICD-10-CM

## 2024-10-23 PROCEDURE — 99213 OFFICE O/P EST LOW 20 MIN: CPT

## 2024-10-25 DIAGNOSIS — F32.A DEPRESSION, UNSPECIFIED: ICD-10-CM

## 2024-10-25 DIAGNOSIS — M86.672 OTHER CHRONIC OSTEOMYELITIS, LEFT ANKLE AND FOOT: ICD-10-CM

## 2024-10-25 DIAGNOSIS — M05.20 RHEUMATOID VASCULITIS WITH RHEUMATOID ARTHRITIS OF UNSPECIFIED SITE: ICD-10-CM

## 2024-10-25 DIAGNOSIS — T81.89XD OTHER COMPLICATIONS OF PROCEDURES, NOT ELSEWHERE CLASSIFIED, SUBSEQUENT ENCOUNTER: ICD-10-CM

## 2024-10-25 DIAGNOSIS — L97.412 NON-PRESSURE CHRONIC ULCER OF RIGHT HEEL AND MIDFOOT WITH FAT LAYER EXPOSED: ICD-10-CM
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
## 2024-10-25 DIAGNOSIS — I48.91 UNSPECIFIED ATRIAL FIBRILLATION: ICD-10-CM
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
## 2024-10-25 DIAGNOSIS — E11.69 TYPE 2 DIABETES MELLITUS WITH OTHER SPECIFIED COMPLICATION: ICD-10-CM

## 2024-10-25 DIAGNOSIS — Z98.890 OTHER SPECIFIED POSTPROCEDURAL STATES: ICD-10-CM

## 2024-10-25 DIAGNOSIS — I50.9 HEART FAILURE, UNSPECIFIED: ICD-10-CM

## 2024-10-25 DIAGNOSIS — Z95.0 PRESENCE OF CARDIAC PACEMAKER: ICD-10-CM

## 2024-10-25 DIAGNOSIS — E11.40 TYPE 2 DIABETES MELLITUS WITH DIABETIC NEUROPATHY, UNSPECIFIED: ICD-10-CM

## 2024-10-25 DIAGNOSIS — E11.59 TYPE 2 DIABETES MELLITUS WITH OTHER CIRCULATORY COMPLICATIONS: ICD-10-CM

## 2024-10-25 DIAGNOSIS — L97.424 NON-PRESSURE CHRONIC ULCER OF LEFT HEEL AND MIDFOOT WITH NECROSIS OF BONE: ICD-10-CM

## 2024-10-25 DIAGNOSIS — M81.0 AGE-RELATED OSTEOPOROSIS WITHOUT CURRENT PATHOLOGICAL FRACTURE: ICD-10-CM

## 2024-11-20 PROCEDURE — G0463: CPT

## 2025-01-17 ENCOUNTER — APPOINTMENT (OUTPATIENT)
Dept: WOUND CARE | Facility: HOSPITAL | Age: 80
End: 2025-01-17

## 2025-01-17 ENCOUNTER — INPATIENT (INPATIENT)
Facility: HOSPITAL | Age: 80
LOS: 9 days | Discharge: HOME | DRG: 638 | End: 2025-01-27
Attending: HOSPITALIST | Admitting: STUDENT IN AN ORGANIZED HEALTH CARE EDUCATION/TRAINING PROGRAM
Payer: MEDICARE

## 2025-01-17 ENCOUNTER — OUTPATIENT (OUTPATIENT)
Dept: OUTPATIENT SERVICES | Facility: HOSPITAL | Age: 80
LOS: 1 days | Discharge: HOME | End: 2025-01-17

## 2025-01-17 VITALS
HEIGHT: 76 IN | BODY MASS INDEX: 20.7 KG/M2 | OXYGEN SATURATION: 97 % | RESPIRATION RATE: 18 BRPM | SYSTOLIC BLOOD PRESSURE: 114 MMHG | HEART RATE: 76 BPM | TEMPERATURE: 98.9 F | DIASTOLIC BLOOD PRESSURE: 73 MMHG | WEIGHT: 170 LBS

## 2025-01-17 VITALS
SYSTOLIC BLOOD PRESSURE: 124 MMHG | HEART RATE: 81 BPM | OXYGEN SATURATION: 95 % | WEIGHT: 119.93 LBS | RESPIRATION RATE: 14 BRPM | DIASTOLIC BLOOD PRESSURE: 74 MMHG | TEMPERATURE: 99 F

## 2025-01-17 DIAGNOSIS — L97.509 TYPE 2 DIABETES MELLITUS WITH FOOT ULCER: ICD-10-CM

## 2025-01-17 DIAGNOSIS — S81.811A LACERATION W/OUT FOREIGN BODY, RIGHT LOWER LEG, INITIAL ENCOUNTER: ICD-10-CM

## 2025-01-17 DIAGNOSIS — L89.152 PRESSURE ULCER OF SACRAL REGION, STAGE 2: ICD-10-CM

## 2025-01-17 DIAGNOSIS — L89.613 PRESSURE ULCER OF RIGHT HEEL, STAGE 3: ICD-10-CM

## 2025-01-17 DIAGNOSIS — L97.422 NON-PRESSURE CHRONIC ULCER OF LEFT HEEL AND MIDFOOT WITH FAT LAYER EXPOSED: ICD-10-CM

## 2025-01-17 DIAGNOSIS — L89.890 PRESSURE ULCER OF OTHER SITE, UNSTAGEABLE: ICD-10-CM

## 2025-01-17 DIAGNOSIS — L89.210 PRESSURE ULCER OF RIGHT HIP, UNSTAGEABLE: ICD-10-CM

## 2025-01-17 DIAGNOSIS — T81.89XD OTHER COMPLICATIONS OF PROCEDURES, NOT ELSEWHERE CLASSIFIED, SUBSEQUENT ENCOUNTER: ICD-10-CM

## 2025-01-17 DIAGNOSIS — L89.612 PRESSURE ULCER OF RIGHT HEEL, STAGE 2: ICD-10-CM

## 2025-01-17 DIAGNOSIS — L97.325: ICD-10-CM

## 2025-01-17 DIAGNOSIS — E11.621 TYPE 2 DIABETES MELLITUS WITH FOOT ULCER: ICD-10-CM

## 2025-01-17 DIAGNOSIS — E11.622 TYPE 2 DIABETES MELLITUS WITH OTHER SKIN ULCER: ICD-10-CM

## 2025-01-17 DIAGNOSIS — L97.316: ICD-10-CM

## 2025-01-17 LAB
ALBUMIN SERPL ELPH-MCNC: 2.4 G/DL — LOW (ref 3.3–5)
ALP SERPL-CCNC: 106 U/L — SIGNIFICANT CHANGE UP (ref 40–120)
ALT FLD-CCNC: 9 U/L — LOW (ref 12–78)
ANION GAP SERPL CALC-SCNC: 4 MMOL/L — LOW (ref 5–17)
APTT BLD: 33.9 SEC — SIGNIFICANT CHANGE UP (ref 24.5–35.6)
AST SERPL-CCNC: 15 U/L — SIGNIFICANT CHANGE UP (ref 15–37)
BASOPHILS # BLD AUTO: 0.03 K/UL — SIGNIFICANT CHANGE UP (ref 0–0.2)
BASOPHILS NFR BLD AUTO: 0.4 % — SIGNIFICANT CHANGE UP (ref 0–2)
BILIRUB SERPL-MCNC: 0.4 MG/DL — SIGNIFICANT CHANGE UP (ref 0.2–1.2)
BUN SERPL-MCNC: 18 MG/DL — SIGNIFICANT CHANGE UP (ref 7–23)
CALCIUM SERPL-MCNC: 8.8 MG/DL — SIGNIFICANT CHANGE UP (ref 8.5–10.1)
CHLORIDE SERPL-SCNC: 107 MMOL/L — SIGNIFICANT CHANGE UP (ref 96–108)
CO2 SERPL-SCNC: 27 MMOL/L — SIGNIFICANT CHANGE UP (ref 22–31)
CREAT SERPL-MCNC: 0.73 MG/DL — SIGNIFICANT CHANGE UP (ref 0.5–1.3)
EGFR: 93 ML/MIN/1.73M2 — SIGNIFICANT CHANGE UP
EOSINOPHIL # BLD AUTO: 0.18 K/UL — SIGNIFICANT CHANGE UP (ref 0–0.5)
EOSINOPHIL NFR BLD AUTO: 2.5 % — SIGNIFICANT CHANGE UP (ref 0–6)
ERYTHROCYTE [SEDIMENTATION RATE] IN BLOOD: 51 MM/HR — HIGH (ref 0–20)
GLUCOSE SERPL-MCNC: 144 MG/DL — HIGH (ref 70–99)
HCT VFR BLD CALC: 26.5 % — LOW (ref 39–50)
HGB BLD-MCNC: 8 G/DL — LOW (ref 13–17)
IMM GRANULOCYTES NFR BLD AUTO: 0.3 % — SIGNIFICANT CHANGE UP (ref 0–0.9)
INR BLD: 1.53 RATIO — HIGH (ref 0.85–1.16)
LYMPHOCYTES # BLD AUTO: 0.74 K/UL — LOW (ref 1–3.3)
LYMPHOCYTES # BLD AUTO: 10.5 % — LOW (ref 13–44)
MCHC RBC-ENTMCNC: 24.5 PG — LOW (ref 27–34)
MCHC RBC-ENTMCNC: 30.2 G/DL — LOW (ref 32–36)
MCV RBC AUTO: 81.3 FL — SIGNIFICANT CHANGE UP (ref 80–100)
MONOCYTES # BLD AUTO: 0.52 K/UL — SIGNIFICANT CHANGE UP (ref 0–0.9)
MONOCYTES NFR BLD AUTO: 7.4 % — SIGNIFICANT CHANGE UP (ref 2–14)
NEUTROPHILS # BLD AUTO: 5.58 K/UL — SIGNIFICANT CHANGE UP (ref 1.8–7.4)
NEUTROPHILS NFR BLD AUTO: 78.9 % — HIGH (ref 43–77)
NRBC # BLD: 0 /100 WBCS — SIGNIFICANT CHANGE UP (ref 0–0)
NRBC BLD-RTO: 0 /100 WBCS — SIGNIFICANT CHANGE UP (ref 0–0)
PLATELET # BLD AUTO: 291 K/UL — SIGNIFICANT CHANGE UP (ref 150–400)
POTASSIUM SERPL-MCNC: 4.3 MMOL/L — SIGNIFICANT CHANGE UP (ref 3.5–5.3)
POTASSIUM SERPL-SCNC: 4.3 MMOL/L — SIGNIFICANT CHANGE UP (ref 3.5–5.3)
PROT SERPL-MCNC: 6.2 G/DL — SIGNIFICANT CHANGE UP (ref 6–8.3)
PROTHROM AB SERPL-ACNC: 17.8 SEC — HIGH (ref 9.9–13.4)
RBC # BLD: 3.26 M/UL — LOW (ref 4.2–5.8)
RBC # FLD: 15.8 % — HIGH (ref 10.3–14.5)
SODIUM SERPL-SCNC: 138 MMOL/L — SIGNIFICANT CHANGE UP (ref 135–145)
WBC # BLD: 7.07 K/UL — SIGNIFICANT CHANGE UP (ref 3.8–10.5)
WBC # FLD AUTO: 7.07 K/UL — SIGNIFICANT CHANGE UP (ref 3.8–10.5)

## 2025-01-17 PROCEDURE — 99204 OFFICE O/P NEW MOD 45 MIN: CPT

## 2025-01-17 PROCEDURE — 71045 X-RAY EXAM CHEST 1 VIEW: CPT | Mod: 26

## 2025-01-17 PROCEDURE — 99285 EMERGENCY DEPT VISIT HI MDM: CPT

## 2025-01-17 PROCEDURE — 73610 X-RAY EXAM OF ANKLE: CPT | Mod: 26,50

## 2025-01-17 PROCEDURE — 73630 X-RAY EXAM OF FOOT: CPT | Mod: 26,50

## 2025-01-17 RX ORDER — VANCOMYCIN HYDROCHLORIDE 50 MG/ML
1000 KIT ORAL ONCE
Refills: 0 | Status: COMPLETED | OUTPATIENT
Start: 2025-01-17 | End: 2025-01-17

## 2025-01-17 RX ORDER — PIPERACILLIN SODIUM AND TAZOBACTAM SODIUM 2; 250 G/50ML; MG/50ML
3.38 INJECTION, POWDER, FOR SOLUTION INTRAVENOUS ONCE
Refills: 0 | Status: COMPLETED | OUTPATIENT
Start: 2025-01-17 | End: 2025-01-17

## 2025-01-17 RX ORDER — BACTERIOSTATIC SODIUM CHLORIDE 0.9 %
1000 VIAL (ML) INJECTION ONCE
Refills: 0 | Status: COMPLETED | OUTPATIENT
Start: 2025-01-17 | End: 2025-01-17

## 2025-01-17 NOTE — ED ADULT NURSE NOTE - NSFALLRISKINTERV_ED_ALL_ED

## 2025-01-17 NOTE — ED ADULT TRIAGE NOTE - ARRIVAL FROM
Alexandro Lee need form dated line 32 where Dr. Marisol Maguire signature she will send again wound care

## 2025-01-17 NOTE — ED ADULT NURSE NOTE - OBJECTIVE STATEMENT
pt presents from wound care center for further eval of wounds. Pt BL feet wrapped with gauze and tape. Pt states he lives in a long term hotel room with son and wife. Wife does daily dressings changes 5x per day. Pt states he is unable to walk or use a wheelchair. Hx of DM2, uses insulin for management. Unsure of what treatment was given for wounds, unsure of ABT therapy if any. pt presents from wound care center for further eval of wounds. Pt BL feet wrapped with gauze and tape, dressings located on L and R sacral areas. Pt details he has wounds on BL sacrum and BL lower legs from feet to shins. Off loading in place for sacrum  Pt states he lives in a long term hotel room with son and wife. Wife does daily dressings changes 5x per day. Pt states he is unable to walk or use a wheelchair. Hx of DM2, uses insulin for management. Unsure of what treatment was given for wounds, unsure of ABT therapy if any. pt presents from wound care center for further eval of wounds. Pt BL feet wrapped with gauze and tape, dressings located on L and R sacral areas. Pt details he has wounds on BL sacrum and BL lower legs from feet to shins. Off loading in place for sacrum, Pt also states he is partially blind.  Pt states he lives in a long term hotel room with son and wife. Wife does daily dressings changes 5x per day. Pt states he is unable to walk or use a wheelchair. Hx of DM2, uses insulin for management. Unsure of what treatment was given for wounds, unsure of ABT therapy if any.

## 2025-01-17 NOTE — ED ADULT NURSE NOTE - CHIEF COMPLAINT QUOTE
Pt brought over from wound care for treatment of possible osteomyelitis of left lower extremity. Pt has known wounds all over his body, wound care reported possibly 4.

## 2025-01-17 NOTE — ED ADULT TRIAGE NOTE - CHIEF COMPLAINT QUOTE
Pt brought over from wound care for treatment of possible osteomyelitis of left lower extremity. Pt has known wounds all over his body, wound care reported possibly 4. 2021

## 2025-01-17 NOTE — ED PROVIDER NOTE - CLINICAL SUMMARY MEDICAL DECISION MAKING FREE TEXT BOX
Madelyn ATTG   79-year-old male no primary care provider chief complaint of chronic wounds sent in from wound care for surgery.  Patient has a history of acute on chronic wounds follows with Dr. Conklin who sent in him to the hospital for debridement.  Son at bedside states that he has been also declining at home not eating not drinking as much is bedbound and has ulcers on his back as well.  Other past medical history diabetic foot ulcers diabetes hypertension otherwise no systemic symptoms such as fever that the patient is endorsing.    GENERAL: Cachectic appearing  HEENT: NC/AT, moist mucous membranes, PERRL, EOMI  LUNGS: Nonlabored breathing  CARDIAC: RRR, no m/r/g  ABDOMEN: Soft, , non tender, non distended, no rebound, no guarding  EXT Acute on chronic bilateral ulcers/wounds no active bleeding  NEURO:  moving all extremities   PSYCH: Normal affect.    Given history and physical exam patient sent in from wound care for admission for probable surgery from across the street with Dr. Santiago plan for admission no pcp

## 2025-01-17 NOTE — ED ADULT NURSE NOTE - NS ED NURSE RECORD ANOTHER VITAL SIGN
Trace nuclear sclerosis of lens of both eyes.  Early peripheral cortical cataract in both eyes.  No need for cataract surgery in either eye.  No evidence of diabetic retinopathy in either eye.  Otherwise, good ocular health in both eyes.  Myopia in each eye, with very satisfactory correctable VA in each eye.  Presbyopia consistent with age.  New spectacle lens Rx issued for use as desired.  Recheck one year.    Yes

## 2025-01-17 NOTE — ED ADULT NURSE NOTE - NSFALLASSESSNEED_ED_ALL_ED
ORTHOPAEDIC PROGRESS NOTE    Chief Complaint   Patient presents with    Follow-up     Left proximal humerus Fx; doi 1/17/22. HPI   2/14/22  FU left proximal humerus fracture  No major issues  Using sling, doing HEP  Pain rated 4/10    1/31/22   Follow-up inpatient consult for left proximal humerus fracture  She is at home now  Using her sling and doing exercises as directed  Pain is improved, rated 3-4 out of 10  Swelling and bruising migrating distally  No numbness or tingling    1/17/22  The patient is a 80 y.o. female who presents with left shoulder pain after a fall in her home today. She has poor recall of events. Daughter at bedside. . Pain is described in left shoulder and with the intensity of moderate. Pain is described as aching. Discomfort is persistent. She is alert and oriented. NO other complaints. 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 Allergies   Allergen Reactions    Hydrogen Peroxide Itching    Lisinopril Other (See Comments)     Other reaction(s): Cough      Neomycin-Bacitracin Zn-Polymyx Itching       Current Outpatient Medications   Medication Instructions    acetaminophen (ACETAMINOPHEN 8 HOUR) 650 mg, Oral, DAILY    albuterol sulfate (PROAIR RESPICLICK) 434 (90 Base) MCG/ACT aerosol powder inhalation 2 puffs, Inhalation, 4 TIMES DAILY PRN    Calcium Carb-Cholecalciferol 600-500 MG-UNIT CAPS Oral    docusate sodium (COLACE) 100 mg, Oral, 2 TIMES DAILY    fluticasone-salmeterol (ADVAIR) 250-50 MCG/DOSE AEPB 1 puff, Inhalation, 2 TIMES DAILY    folic acid (FOLVITE) 9,452 mcg, Oral, DAILY    hydroCHLOROthiazide (MICROZIDE) 12.5 mg, Oral, DAILY    methotrexate (RHEUMATREX) 17.5 mg, Oral, WEEKLY, Take 7 tablets by mouth once a week.     predniSONE (DELTASONE) 2.5 mg, Oral, EVERY OTHER DAY    vitamin D (CHOLECALCIFEROL) 2,000 Units, Oral, DAILY       Vitals:    02/14/22 1035   Weight: 155 lb (70.3 kg)   Height: 5' 3\" (1.6 m)       Physical Exam:  Body mass index is 27.46 kg/m². NAD, here with daughter  Left shoulder - skin clear   TTP proximal humerus    Shoulder ROM not formally tested today   Active elbow flexion/extension, supination/pronation preserved  LUE SILT M/U/R/A/LABC nerve distributions; AIN/PIN/IO intact   Rad pulse intact      Imaging:  Images were personally reviewed by myself and discussed with the patient  Left shoulder 4 views performed 2/14/22 - comminuted proximal humerus fracture through the surgical neck is again seen. Stable mild to moderate anteromedial displacement of the humeral shaft, however good bony apposition remains of the main fracture fragments. The glenohumeral joint remains reduced on orthogonal imaging. Compared to radiographs from 2 weeks ago, the fracture alignment is unchanged. Assessment & Plan:  80 y.o. female following up for   Diagnosis Orders   1. Traumatic closed displaced fracture of proximal end of left humerus, with routine healing, subsequent encounter  XR SHOULDER LEFT (MIN 2 VIEWS)       No orders of the defined types were placed in this encounter. Fracture alignment remains the same and acceptable  Continue conservative treatment  Left upper extremity nonweightbearing  Sling for immobilization    BID home exercise program (10 reps each time):   1) passive ER with stick to neutral   2) passive FE using pulley or opposite hand to shoulder level only   3) shoulder shrugs, scapular protraction and retraction exercises   4) continue with elbow, forearm, wrist, finger ROM     Continue with ice and tylenol. Okay to resume NSAIDs as well as needed.   Follow-up in 2 weeks with repeat left shoulder x-rays    Nuris Rice MD yes

## 2025-01-18 DIAGNOSIS — E11.9 TYPE 2 DIABETES MELLITUS WITHOUT COMPLICATIONS: ICD-10-CM

## 2025-01-18 DIAGNOSIS — E78.5 HYPERLIPIDEMIA, UNSPECIFIED: ICD-10-CM

## 2025-01-18 DIAGNOSIS — L03.90 CELLULITIS, UNSPECIFIED: ICD-10-CM

## 2025-01-18 DIAGNOSIS — I10 ESSENTIAL (PRIMARY) HYPERTENSION: ICD-10-CM

## 2025-01-18 DIAGNOSIS — G62.9 POLYNEUROPATHY, UNSPECIFIED: ICD-10-CM

## 2025-01-18 DIAGNOSIS — Z29.9 ENCOUNTER FOR PROPHYLACTIC MEASURES, UNSPECIFIED: ICD-10-CM

## 2025-01-18 DIAGNOSIS — D64.9 ANEMIA, UNSPECIFIED: ICD-10-CM

## 2025-01-18 DIAGNOSIS — N40.0 BENIGN PROSTATIC HYPERPLASIA WITHOUT LOWER URINARY TRACT SYMPTOMS: ICD-10-CM

## 2025-01-18 DIAGNOSIS — R62.7 ADULT FAILURE TO THRIVE: ICD-10-CM

## 2025-01-18 DIAGNOSIS — E11.622 TYPE 2 DIABETES MELLITUS WITH OTHER SKIN ULCER: ICD-10-CM

## 2025-01-18 LAB
ALBUMIN SERPL ELPH-MCNC: 2.3 G/DL — LOW (ref 3.3–5)
ALP SERPL-CCNC: 92 U/L — SIGNIFICANT CHANGE UP (ref 40–120)
ALT FLD-CCNC: 7 U/L — LOW (ref 12–78)
ANION GAP SERPL CALC-SCNC: 5 MMOL/L — SIGNIFICANT CHANGE UP (ref 5–17)
APPEARANCE UR: CLEAR — SIGNIFICANT CHANGE UP
AST SERPL-CCNC: 9 U/L — LOW (ref 15–37)
BACTERIA # UR AUTO: ABNORMAL /HPF
BASOPHILS # BLD AUTO: 0.02 K/UL — SIGNIFICANT CHANGE UP (ref 0–0.2)
BASOPHILS NFR BLD AUTO: 0.3 % — SIGNIFICANT CHANGE UP (ref 0–2)
BILIRUB SERPL-MCNC: 0.4 MG/DL — SIGNIFICANT CHANGE UP (ref 0.2–1.2)
BILIRUB UR-MCNC: NEGATIVE — SIGNIFICANT CHANGE UP
BUN SERPL-MCNC: 17 MG/DL — SIGNIFICANT CHANGE UP (ref 7–23)
CALCIUM SERPL-MCNC: 8.3 MG/DL — LOW (ref 8.5–10.1)
CHLORIDE SERPL-SCNC: 108 MMOL/L — SIGNIFICANT CHANGE UP (ref 96–108)
CO2 SERPL-SCNC: 26 MMOL/L — SIGNIFICANT CHANGE UP (ref 22–31)
COLOR SPEC: YELLOW — SIGNIFICANT CHANGE UP
CREAT SERPL-MCNC: 0.7 MG/DL — SIGNIFICANT CHANGE UP (ref 0.5–1.3)
CRP SERPL-MCNC: 29 MG/L — HIGH
DIFF PNL FLD: ABNORMAL
EGFR: 94 ML/MIN/1.73M2 — SIGNIFICANT CHANGE UP
EOSINOPHIL # BLD AUTO: 0.15 K/UL — SIGNIFICANT CHANGE UP (ref 0–0.5)
EOSINOPHIL NFR BLD AUTO: 2.1 % — SIGNIFICANT CHANGE UP (ref 0–6)
EPI CELLS # UR: PRESENT
GLUCOSE SERPL-MCNC: 172 MG/DL — HIGH (ref 70–99)
GLUCOSE UR QL: NEGATIVE MG/DL — SIGNIFICANT CHANGE UP
HCT VFR BLD CALC: 24.6 % — LOW (ref 39–50)
HGB BLD-MCNC: 7.6 G/DL — LOW (ref 13–17)
IMM GRANULOCYTES NFR BLD AUTO: 0.4 % — SIGNIFICANT CHANGE UP (ref 0–0.9)
KETONES UR-MCNC: NEGATIVE MG/DL — SIGNIFICANT CHANGE UP
LEUKOCYTE ESTERASE UR-ACNC: ABNORMAL
LYMPHOCYTES # BLD AUTO: 0.62 K/UL — LOW (ref 1–3.3)
LYMPHOCYTES # BLD AUTO: 8.8 % — LOW (ref 13–44)
MCHC RBC-ENTMCNC: 24.8 PG — LOW (ref 27–34)
MCHC RBC-ENTMCNC: 30.9 G/DL — LOW (ref 32–36)
MCV RBC AUTO: 80.4 FL — SIGNIFICANT CHANGE UP (ref 80–100)
MONOCYTES # BLD AUTO: 0.62 K/UL — SIGNIFICANT CHANGE UP (ref 0–0.9)
MONOCYTES NFR BLD AUTO: 8.8 % — SIGNIFICANT CHANGE UP (ref 2–14)
NEUTROPHILS # BLD AUTO: 5.64 K/UL — SIGNIFICANT CHANGE UP (ref 1.8–7.4)
NEUTROPHILS NFR BLD AUTO: 79.6 % — HIGH (ref 43–77)
NITRITE UR-MCNC: NEGATIVE — SIGNIFICANT CHANGE UP
NRBC # BLD: 0 /100 WBCS — SIGNIFICANT CHANGE UP (ref 0–0)
NRBC BLD-RTO: 0 /100 WBCS — SIGNIFICANT CHANGE UP (ref 0–0)
PH UR: 5.5 — SIGNIFICANT CHANGE UP (ref 5–8)
PLATELET # BLD AUTO: 255 K/UL — SIGNIFICANT CHANGE UP (ref 150–400)
POTASSIUM SERPL-MCNC: 4 MMOL/L — SIGNIFICANT CHANGE UP (ref 3.5–5.3)
POTASSIUM SERPL-SCNC: 4 MMOL/L — SIGNIFICANT CHANGE UP (ref 3.5–5.3)
PROT SERPL-MCNC: 5.7 G/DL — LOW (ref 6–8.3)
PROT UR-MCNC: SIGNIFICANT CHANGE UP MG/DL
RBC # BLD: 3.06 M/UL — LOW (ref 4.2–5.8)
RBC # FLD: 15.6 % — HIGH (ref 10.3–14.5)
RBC CASTS # UR COMP ASSIST: 7 /HPF — HIGH (ref 0–4)
SODIUM SERPL-SCNC: 139 MMOL/L — SIGNIFICANT CHANGE UP (ref 135–145)
SP GR SPEC: 1.02 — SIGNIFICANT CHANGE UP (ref 1–1.03)
UROBILINOGEN FLD QL: 0.2 MG/DL — SIGNIFICANT CHANGE UP (ref 0.2–1)
WBC # BLD: 7.08 K/UL — SIGNIFICANT CHANGE UP (ref 3.8–10.5)
WBC # FLD AUTO: 7.08 K/UL — SIGNIFICANT CHANGE UP (ref 3.8–10.5)
WBC UR QL: 5 /HPF — SIGNIFICANT CHANGE UP (ref 0–5)

## 2025-01-18 PROCEDURE — 99221 1ST HOSP IP/OBS SF/LOW 40: CPT

## 2025-01-18 PROCEDURE — 99233 SBSQ HOSP IP/OBS HIGH 50: CPT

## 2025-01-18 PROCEDURE — 93925 LOWER EXTREMITY STUDY: CPT | Mod: 26

## 2025-01-18 PROCEDURE — 99223 1ST HOSP IP/OBS HIGH 75: CPT

## 2025-01-18 RX ORDER — BETHANECHOL CHLORIDE 5 MG
25 TABLET ORAL
Refills: 0 | Status: DISCONTINUED | OUTPATIENT
Start: 2025-01-18 | End: 2025-01-27

## 2025-01-18 RX ORDER — POLYETHYLENE GLYCOL 3350 17 G/17G
17 POWDER, FOR SOLUTION ORAL DAILY
Refills: 0 | Status: DISCONTINUED | OUTPATIENT
Start: 2025-01-18 | End: 2025-01-27

## 2025-01-18 RX ORDER — L.ACID,CASEI/B.ANIMAL/S.THERMO 16B CELL
1 CAPSULE ORAL DAILY
Refills: 0 | Status: DISCONTINUED | OUTPATIENT
Start: 2025-01-18 | End: 2025-01-27

## 2025-01-18 RX ORDER — INSULIN LISPRO 100/ML
VIAL (ML) SUBCUTANEOUS AT BEDTIME
Refills: 0 | Status: DISCONTINUED | OUTPATIENT
Start: 2025-01-18 | End: 2025-01-27

## 2025-01-18 RX ORDER — VANCOMYCIN HYDROCHLORIDE 50 MG/ML
750 KIT ORAL EVERY 12 HOURS
Refills: 0 | Status: DISCONTINUED | OUTPATIENT
Start: 2025-01-18 | End: 2025-01-21

## 2025-01-18 RX ORDER — AMPICILLIN SODIUM AND SULBACTAM SODIUM 2; 1 G/1; G/1
3 INJECTION, POWDER, FOR SOLUTION INTRAMUSCULAR; INTRAVENOUS EVERY 6 HOURS
Refills: 0 | Status: DISCONTINUED | OUTPATIENT
Start: 2025-01-18 | End: 2025-01-25

## 2025-01-18 RX ORDER — TAMSULOSIN HYDROCHLORIDE 0.4 MG/1
0.4 CAPSULE ORAL AT BEDTIME
Refills: 0 | Status: DISCONTINUED | OUTPATIENT
Start: 2025-01-18 | End: 2025-01-27

## 2025-01-18 RX ORDER — DM/PSEUDOEPHED/ACETAMINOPHEN 10-30-250
25 CAPSULE ORAL ONCE
Refills: 0 | Status: DISCONTINUED | OUTPATIENT
Start: 2025-01-18 | End: 2025-01-27

## 2025-01-18 RX ORDER — GLUCAGON 3 MG/1
1 POWDER NASAL ONCE
Refills: 0 | Status: DISCONTINUED | OUTPATIENT
Start: 2025-01-18 | End: 2025-01-27

## 2025-01-18 RX ORDER — SENNOSIDES 8.6 MG
2 TABLET ORAL AT BEDTIME
Refills: 0 | Status: DISCONTINUED | OUTPATIENT
Start: 2025-01-18 | End: 2025-01-27

## 2025-01-18 RX ORDER — INSULIN GLARGINE-YFGN 100 [IU]/ML
2 INJECTION, SOLUTION SUBCUTANEOUS AT BEDTIME
Refills: 0 | Status: DISCONTINUED | OUTPATIENT
Start: 2025-01-18 | End: 2025-01-18

## 2025-01-18 RX ORDER — MORPHINE SULFATE 60 MG/1
2 TABLET, FILM COATED, EXTENDED RELEASE ORAL EVERY 6 HOURS
Refills: 0 | Status: DISCONTINUED | OUTPATIENT
Start: 2025-01-18 | End: 2025-01-25

## 2025-01-18 RX ORDER — GABAPENTIN 800 MG/1
1 TABLET ORAL
Refills: 0 | DISCHARGE

## 2025-01-18 RX ORDER — SODIUM CHLORIDE 9 G/ML
1000 INJECTION, SOLUTION INTRAVENOUS
Refills: 0 | Status: DISCONTINUED | OUTPATIENT
Start: 2025-01-18 | End: 2025-01-27

## 2025-01-18 RX ORDER — TRAMADOL HYDROCHLORIDE 100 MG/1
25 TABLET, EXTENDED RELEASE ORAL EVERY 6 HOURS
Refills: 0 | Status: DISCONTINUED | OUTPATIENT
Start: 2025-01-18 | End: 2025-01-25

## 2025-01-18 RX ORDER — CARVEDILOL 6.25 MG
3.12 TABLET ORAL EVERY 12 HOURS
Refills: 0 | Status: DISCONTINUED | OUTPATIENT
Start: 2025-01-18 | End: 2025-01-27

## 2025-01-18 RX ORDER — NALOXONE HYDROCHLORIDE 3 MG/.1ML
0.4 SPRAY NASAL ONCE
Refills: 0 | Status: DISCONTINUED | OUTPATIENT
Start: 2025-01-18 | End: 2025-01-27

## 2025-01-18 RX ORDER — INSULIN GLARGINE-YFGN 100 [IU]/ML
5 INJECTION, SOLUTION SUBCUTANEOUS
Refills: 0 | DISCHARGE

## 2025-01-18 RX ORDER — DM/PSEUDOEPHED/ACETAMINOPHEN 10-30-250
12.5 CAPSULE ORAL ONCE
Refills: 0 | Status: DISCONTINUED | OUTPATIENT
Start: 2025-01-18 | End: 2025-01-27

## 2025-01-18 RX ORDER — FERROUS SULFATE 325(65) MG
325 TABLET ORAL DAILY
Refills: 0 | Status: DISCONTINUED | OUTPATIENT
Start: 2025-01-18 | End: 2025-01-27

## 2025-01-18 RX ORDER — ACETAMINOPHEN 160 MG/5ML
650 SUSPENSION ORAL EVERY 6 HOURS
Refills: 0 | Status: DISCONTINUED | OUTPATIENT
Start: 2025-01-18 | End: 2025-01-18

## 2025-01-18 RX ORDER — DM/PSEUDOEPHED/ACETAMINOPHEN 10-30-250
15 CAPSULE ORAL ONCE
Refills: 0 | Status: DISCONTINUED | OUTPATIENT
Start: 2025-01-18 | End: 2025-01-27

## 2025-01-18 RX ORDER — GABAPENTIN 800 MG/1
600 TABLET ORAL
Refills: 0 | Status: DISCONTINUED | OUTPATIENT
Start: 2025-01-18 | End: 2025-01-27

## 2025-01-18 RX ORDER — ACETAMINOPHEN 160 MG/5ML
650 SUSPENSION ORAL EVERY 8 HOURS
Refills: 0 | Status: DISCONTINUED | OUTPATIENT
Start: 2025-01-18 | End: 2025-01-27

## 2025-01-18 RX ORDER — INSULIN LISPRO 100/ML
VIAL (ML) SUBCUTANEOUS
Refills: 0 | Status: DISCONTINUED | OUTPATIENT
Start: 2025-01-18 | End: 2025-01-27

## 2025-01-18 RX ORDER — ASPIRIN 81 MG/1
81 TABLET, COATED ORAL DAILY
Refills: 0 | Status: DISCONTINUED | OUTPATIENT
Start: 2025-01-18 | End: 2025-01-27

## 2025-01-18 RX ORDER — ENOXAPARIN SODIUM 100 MG/ML
40 INJECTION SUBCUTANEOUS EVERY 24 HOURS
Refills: 0 | Status: DISCONTINUED | OUTPATIENT
Start: 2025-01-18 | End: 2025-01-27

## 2025-01-18 RX ADMIN — Medication 25 MILLIGRAM(S): at 06:55

## 2025-01-18 RX ADMIN — Medication 325 MILLIGRAM(S): at 12:21

## 2025-01-18 RX ADMIN — Medication 1: at 12:20

## 2025-01-18 RX ADMIN — AMPICILLIN SODIUM AND SULBACTAM SODIUM 200 GRAM(S): 2; 1 INJECTION, POWDER, FOR SOLUTION INTRAMUSCULAR; INTRAVENOUS at 20:28

## 2025-01-18 RX ADMIN — ACETAMINOPHEN 650 MILLIGRAM(S): 160 SUSPENSION ORAL at 15:02

## 2025-01-18 RX ADMIN — ACETAMINOPHEN 650 MILLIGRAM(S): 160 SUSPENSION ORAL at 06:54

## 2025-01-18 RX ADMIN — Medication 1 TABLET(S): at 12:21

## 2025-01-18 RX ADMIN — AMPICILLIN SODIUM AND SULBACTAM SODIUM 200 GRAM(S): 2; 1 INJECTION, POWDER, FOR SOLUTION INTRAMUSCULAR; INTRAVENOUS at 06:55

## 2025-01-18 RX ADMIN — VANCOMYCIN HYDROCHLORIDE 250 MILLIGRAM(S): KIT at 00:27

## 2025-01-18 RX ADMIN — PIPERACILLIN SODIUM AND TAZOBACTAM SODIUM 200 GRAM(S): 2; 250 INJECTION, POWDER, FOR SOLUTION INTRAVENOUS at 00:27

## 2025-01-18 RX ADMIN — VANCOMYCIN HYDROCHLORIDE 250 MILLIGRAM(S): KIT at 21:50

## 2025-01-18 RX ADMIN — ASPIRIN 81 MILLIGRAM(S): 81 TABLET, COATED ORAL at 12:21

## 2025-01-18 RX ADMIN — ACETAMINOPHEN 650 MILLIGRAM(S): 160 SUSPENSION ORAL at 16:02

## 2025-01-18 RX ADMIN — Medication 1000 MILLILITER(S): at 00:27

## 2025-01-18 RX ADMIN — VANCOMYCIN HYDROCHLORIDE 250 MILLIGRAM(S): KIT at 14:02

## 2025-01-18 RX ADMIN — Medication 1: at 17:18

## 2025-01-18 RX ADMIN — AMPICILLIN SODIUM AND SULBACTAM SODIUM 200 GRAM(S): 2; 1 INJECTION, POWDER, FOR SOLUTION INTRAMUSCULAR; INTRAVENOUS at 15:02

## 2025-01-18 RX ADMIN — ENOXAPARIN SODIUM 40 MILLIGRAM(S): 100 INJECTION SUBCUTANEOUS at 06:55

## 2025-01-18 RX ADMIN — GABAPENTIN 600 MILLIGRAM(S): 800 TABLET ORAL at 06:54

## 2025-01-18 RX ADMIN — Medication 3.12 MILLIGRAM(S): at 06:55

## 2025-01-18 RX ADMIN — GABAPENTIN 600 MILLIGRAM(S): 800 TABLET ORAL at 17:18

## 2025-01-18 RX ADMIN — Medication 1: at 08:23

## 2025-01-18 RX ADMIN — Medication 3.12 MILLIGRAM(S): at 17:18

## 2025-01-18 RX ADMIN — Medication 25 MILLIGRAM(S): at 18:16

## 2025-01-18 NOTE — H&P ADULT - ATTENDING COMMENTS
79M Hx of HTN, DM, BPH, PPM, bedbound, anemia presents with multiple chronic gangrenous wounds on b/l lower extremities sent in from wound care for IV antibiotics and possible debridement. B/l lower extremities wrapped. xray b/l lower extremities with no osteomyelitis noted but not formally read, ESR 51, chronic anemia. Pt received vancomycin and zosyn. Prior admission, pt was treated with vancomycin (hx of MRSA) and ceftriaxone, will continue with this. ID consult, podiatry consult. Also pt with decreased appetite and failure to thrive per family, will need to discuss goals of care with family (palliative care consult). DM2, home home glargine as pt is at goal glucose levels, ISS  and POC glucose.

## 2025-01-18 NOTE — H&P ADULT - ASSESSMENT
79-year-old male with PMHx of HTN, DM, BPH, PPM, bedbound, anemia presents with multiple wounds on b/l lower extremities sent in from wound care for IV antibiotics and admission for cellulitis. 79-year-old male with PMHx of HTN, DM, BPH, PPM, bedbound, anemia presents with multiple chronic gangrenous wounds on b/l lower extremities sent in from wound care for IV antibiotics, debridement and admission. Admitted for cellulitis. 79-year-old male with PMHx of HTN, DM, BPH, PPM, bedbound, anemia presents with multiple chronic gangrenous wounds on b/l lower extremities sent in from wound care for IV antibiotics, debridement and admission. Admitted for diabetic LE ulcers and cellulitis.

## 2025-01-18 NOTE — PATIENT PROFILE ADULT - TOBACCO USE
NP at bedside. Placed on 2LNC. Correct potassium per orders. As per NP she will contact cardiology and palliative. Will continue to monitor pt. Former smoker

## 2025-01-18 NOTE — H&P ADULT - NSHPPHYSICALEXAM_GEN_ALL_CORE
T(C): 37.1 (01-17-25 @ 18:55), Max: 37.1 (01-17-25 @ 18:55)  HR: 81 (01-17-25 @ 18:55) (81 - 81)  BP: 124/74 (01-17-25 @ 18:55) (124/74 - 124/74)  RR: 14 (01-17-25 @ 18:55) (14 - 14)  SpO2: 95% (01-17-25 @ 18:55) (95% - 95%)    gen: appears stated age, NAD  heent: nc/at, eomi, perrla, mmm  neck: supple  resp: cta b/l, no wheezes, rales or rhonchi  cardiac: +s1, s2, RRR, no murmurs appreciated  abd: soft, nt, nd, no rebound/guarding  mskt: no clubbing, cyanosis or edema of LE extremities  vasc: 2+ radial pulses  skin: warm and dry  neuro: a&ox3, no appreciable focal deficits   psych: normal mood, affect. normal behavior T(C): 37.1 (01-17-25 @ 18:55), Max: 37.1 (01-17-25 @ 18:55)  HR: 81 (01-17-25 @ 18:55) (81 - 81)  BP: 124/74 (01-17-25 @ 18:55) (124/74 - 124/74)  RR: 14 (01-17-25 @ 18:55) (14 - 14)  SpO2: 95% (01-17-25 @ 18:55) (95% - 95%)    Exam limited by pts somnolence and unable to participate in exam    gen: cachetic, somnolent  heent: nc/at, eomi, perrla, mmm  neck: supple  resp: cta b/l, no wheezes, rales or rhonchi  cardiac: +s1, s2, RRR, no murmurs appreciated  abd: soft, nt, nd, no rebound/guarding  extremities: acute on chronic severe gangrenous b/l LE wounds, wrapped.  skin: decubitus ulcers over sacrum and right hip  neuro: somnolent

## 2025-01-18 NOTE — H&P ADULT - REASON FOR ADMISSION
cellulitis, adult failure to thrive diabetic LE ulcers and  diabetcellulitis, adult failure to thrive

## 2025-01-18 NOTE — H&P ADULT - PROBLEM SELECTOR PLAN 4
- On insulin  - Pt uses humalog insulin sliding scale with meals and take lantus 5 units at bedtime  - Will continue ISS  - Consistent carb diet  - Hypoglycemic protocol  -f/u A1c - On insulin  - Pt uses humalog insulin sliding scale with meals and take lantus 5 units at bedtime  -Continue Lantus 2units qhs  -Low dose ISS  - Consistent carb diet  - Hypoglycemic protocol  -f/u A1c - On insulin  - Pt uses humalog insulin sliding scale with meals and takes lantus 5 units at bedtime -- will HOLD  -Glucose 144 on admission, goal glucose 140-180 while hospitalized  -Low dose ISS  - Consistent carb diet  - Hypoglycemic protocol  -f/u A1c

## 2025-01-18 NOTE — PROGRESS NOTE ADULT - ASSESSMENT
79-year-old male with PMHx of HTN, DM, BPH, PPM, bedbound, anemia presents with multiple chronic gangrenous wounds on b/l lower extremities sent in from wound care for IV antibiotics, debridement and admission. Admitted for diabetic LE ulcers and cellulitis.

## 2025-01-18 NOTE — H&P ADULT - PROBLEM SELECTOR PLAN 1
Patient presenting with b/l foot wounds, ESR 51, sent in by wound care clinic for IV antibiotics  -Hx MRSA  -s/p vanco and zosyn in ED  - continue vanco  - Pain control  - Monitor WBC and fever  - Wound care, leg elevation  - F/u blood cultures  - ID (Dr. Schulte) consulted, f/u recs  - Day team to consult podiatry in AM. Patient presenting with chronic gangrenous b/l foot wounds, ESR 51, sent in by wound care clinic for IV antibiotics and debridement  -Hx MRSA  -s/p vanco and zosyn in ED  - continue vanco  - Pain control  - Monitor WBC and fever  - Wound care, leg elevation  - F/u blood cultures  - ID (Dr. Schulte) consulted, f/u recs  - Podiatry consulted (Dr. Conklin) Patient presenting with chronic gangrenous b/l foot wounds, ESR 51, sent in by wound care clinic for IV antibiotics and debridement  -Hx MRSA  -s/p vanco and zosyn in ED  - continue vanco and unasyn  - Pain control and bowel regimen  - F/u Arterial doppler of LE  - Wound care, leg elevation  - F/u blood cultures  - ID (Dr. Schulte) consulted, f/u recs  - Podiatry consulted (Dr. Conklin)

## 2025-01-18 NOTE — H&P ADULT - HISTORY OF PRESENT ILLNESS
79-year-old male with PMHx of HTN, DM, BPH, PPM, bedbound, anemia presents with multiple wounds on b/l lower extremities sent in from wound care for IV antibiotics and admission.      IN THE ED:  Temp  98.8 F , HR 81  , BP  124/74  ,RR 14 , SpO2 95% on RA  S/P vanco, zosyn, 1L NS bolus  EKG: pending  Labs significant for: ESR 51, Hgb 8.0  Imaging:  CXR -   Xray b/l foot and ankle -  79-year-old male with PMHx of HTN, DM, BPH, PPM, bedbound, anemia presents with multiple chronic gangrenous wounds on b/l lower extremities sent in from wound care for IV antibiotics, debridement and admission. Pt follows with Dr. Conklin. Unable to obtain any history from patient. Spoke with patients son over the phone. Pt also has been rapidly declining over the past year according to son, with multiple hospital admissions, weight loss, decreased po intake, urine/fecal incontinence, needs assistance with feeding, and increased overall pain.      IN THE ED:  Temp  98.8 F , HR 81  , BP  124/74  ,RR 14 , SpO2 95% on RA  S/P vanco, zosyn, 1L NS bolus  EKG: pending  Labs significant for: ESR 51, Hgb 8.0  Imaging:  CXR - pending  Xray b/l foot and ankle - pending

## 2025-01-18 NOTE — H&P ADULT - PROBLEM SELECTOR PLAN 2
Bedbound, weight loss, decreased po intake, lethargy  -Nutrition consult  -SW/CM   -Palliative consult

## 2025-01-18 NOTE — PATIENT PROFILE ADULT - ...
I have resent diflucan to the pharmacy. If not improving recommend seeing GYN if she has one.   
Patient informed and verbalized understanding.   
Pt calling, states yeast infection has not went away. Saw Rebekah on 7/31 for this issue. Pt said she still has the itching & burning. Would like more medication, pt uses [facility] on [address].   
Would you like to see patient again for ongoing symptoms? Thanks (:   
18-Jan-2025 14:52:46

## 2025-01-18 NOTE — PATIENT PROFILE ADULT - FUNCTIONAL ASSESSMENT - DAILY ACTIVITY SCORE.
14 Bactrim Counseling:  I discussed with the patient the risks of sulfa antibiotics including but not limited to GI upset, allergic reaction, drug rash, diarrhea, dizziness, photosensitivity, and yeast infections.  Rarely, more serious reactions can occur including but not limited to aplastic anemia, agranulocytosis, methemoglobinemia, blood dyscrasias, liver or kidney failure, lung infiltrates or desquamative/blistering drug rashes.

## 2025-01-18 NOTE — H&P ADULT - PROBLEM SELECTOR PLAN 3
Hgb 8.0 on admission  -Upon chart review, Hgb typically around 8 Hgb 8.0 on admission  -Upon chart review, Hgb typically around 8  -previous iron studies indicate iron deficiency anemia  -iron 325mg daily

## 2025-01-19 LAB
A1C WITH ESTIMATED AVERAGE GLUCOSE RESULT: 5.6 % — SIGNIFICANT CHANGE UP (ref 4–5.6)
ANION GAP SERPL CALC-SCNC: 4 MMOL/L — LOW (ref 5–17)
BUN SERPL-MCNC: 17 MG/DL — SIGNIFICANT CHANGE UP (ref 7–23)
CALCIUM SERPL-MCNC: 8.4 MG/DL — LOW (ref 8.5–10.1)
CHLORIDE SERPL-SCNC: 109 MMOL/L — HIGH (ref 96–108)
CO2 SERPL-SCNC: 27 MMOL/L — SIGNIFICANT CHANGE UP (ref 22–31)
CREAT SERPL-MCNC: 0.73 MG/DL — SIGNIFICANT CHANGE UP (ref 0.5–1.3)
EGFR: 93 ML/MIN/1.73M2 — SIGNIFICANT CHANGE UP
ESTIMATED AVERAGE GLUCOSE: 114 MG/DL — SIGNIFICANT CHANGE UP (ref 68–114)
GLUCOSE SERPL-MCNC: 194 MG/DL — HIGH (ref 70–99)
HCT VFR BLD CALC: 22 % — LOW (ref 39–50)
HGB BLD-MCNC: 6.8 G/DL — CRITICAL LOW (ref 13–17)
MCHC RBC-ENTMCNC: 25 PG — LOW (ref 27–34)
MCHC RBC-ENTMCNC: 30.9 G/DL — LOW (ref 32–36)
MCV RBC AUTO: 80.9 FL — SIGNIFICANT CHANGE UP (ref 80–100)
NRBC # BLD: 0 /100 WBCS — SIGNIFICANT CHANGE UP (ref 0–0)
NRBC BLD-RTO: 0 /100 WBCS — SIGNIFICANT CHANGE UP (ref 0–0)
PLATELET # BLD AUTO: 220 K/UL — SIGNIFICANT CHANGE UP (ref 150–400)
POTASSIUM SERPL-MCNC: 3.6 MMOL/L — SIGNIFICANT CHANGE UP (ref 3.5–5.3)
POTASSIUM SERPL-SCNC: 3.6 MMOL/L — SIGNIFICANT CHANGE UP (ref 3.5–5.3)
RBC # BLD: 2.72 M/UL — LOW (ref 4.2–5.8)
RBC # FLD: 15.9 % — HIGH (ref 10.3–14.5)
SODIUM SERPL-SCNC: 140 MMOL/L — SIGNIFICANT CHANGE UP (ref 135–145)
VANCOMYCIN TROUGH SERPL-MCNC: 10.1 UG/ML — SIGNIFICANT CHANGE UP (ref 10–20)
VANCOMYCIN TROUGH SERPL-MCNC: 10.9 UG/ML — SIGNIFICANT CHANGE UP (ref 10–20)
WBC # BLD: 5.9 K/UL — SIGNIFICANT CHANGE UP (ref 3.8–10.5)
WBC # FLD AUTO: 5.9 K/UL — SIGNIFICANT CHANGE UP (ref 3.8–10.5)

## 2025-01-19 PROCEDURE — 99232 SBSQ HOSP IP/OBS MODERATE 35: CPT

## 2025-01-19 RX ADMIN — Medication 1 TABLET(S): at 11:19

## 2025-01-19 RX ADMIN — ACETAMINOPHEN 650 MILLIGRAM(S): 160 SUSPENSION ORAL at 05:42

## 2025-01-19 RX ADMIN — Medication 25 MILLIGRAM(S): at 17:06

## 2025-01-19 RX ADMIN — AMPICILLIN SODIUM AND SULBACTAM SODIUM 200 GRAM(S): 2; 1 INJECTION, POWDER, FOR SOLUTION INTRAMUSCULAR; INTRAVENOUS at 02:53

## 2025-01-19 RX ADMIN — AMPICILLIN SODIUM AND SULBACTAM SODIUM 200 GRAM(S): 2; 1 INJECTION, POWDER, FOR SOLUTION INTRAMUSCULAR; INTRAVENOUS at 15:51

## 2025-01-19 RX ADMIN — VANCOMYCIN HYDROCHLORIDE 250 MILLIGRAM(S): KIT at 22:56

## 2025-01-19 RX ADMIN — TAMSULOSIN HYDROCHLORIDE 0.4 MILLIGRAM(S): 0.4 CAPSULE ORAL at 21:50

## 2025-01-19 RX ADMIN — GABAPENTIN 600 MILLIGRAM(S): 800 TABLET ORAL at 17:05

## 2025-01-19 RX ADMIN — AMPICILLIN SODIUM AND SULBACTAM SODIUM 200 GRAM(S): 2; 1 INJECTION, POWDER, FOR SOLUTION INTRAMUSCULAR; INTRAVENOUS at 08:37

## 2025-01-19 RX ADMIN — ASPIRIN 81 MILLIGRAM(S): 81 TABLET, COATED ORAL at 11:19

## 2025-01-19 RX ADMIN — VANCOMYCIN HYDROCHLORIDE 250 MILLIGRAM(S): KIT at 09:57

## 2025-01-19 RX ADMIN — Medication 1: at 08:19

## 2025-01-19 RX ADMIN — Medication 3.12 MILLIGRAM(S): at 17:05

## 2025-01-19 RX ADMIN — POLYETHYLENE GLYCOL 3350 17 GRAM(S): 17 POWDER, FOR SOLUTION ORAL at 11:20

## 2025-01-19 RX ADMIN — Medication 2 TABLET(S): at 21:51

## 2025-01-19 RX ADMIN — Medication 1: at 17:05

## 2025-01-19 RX ADMIN — ACETAMINOPHEN 650 MILLIGRAM(S): 160 SUSPENSION ORAL at 05:14

## 2025-01-19 RX ADMIN — ACETAMINOPHEN 650 MILLIGRAM(S): 160 SUSPENSION ORAL at 15:15

## 2025-01-19 RX ADMIN — Medication 25 MILLIGRAM(S): at 05:14

## 2025-01-19 RX ADMIN — ACETAMINOPHEN 650 MILLIGRAM(S): 160 SUSPENSION ORAL at 21:50

## 2025-01-19 RX ADMIN — Medication 2: at 12:12

## 2025-01-19 RX ADMIN — ENOXAPARIN SODIUM 40 MILLIGRAM(S): 100 INJECTION SUBCUTANEOUS at 05:38

## 2025-01-19 RX ADMIN — GABAPENTIN 600 MILLIGRAM(S): 800 TABLET ORAL at 05:15

## 2025-01-19 RX ADMIN — Medication 325 MILLIGRAM(S): at 11:19

## 2025-01-19 RX ADMIN — AMPICILLIN SODIUM AND SULBACTAM SODIUM 200 GRAM(S): 2; 1 INJECTION, POWDER, FOR SOLUTION INTRAMUSCULAR; INTRAVENOUS at 21:50

## 2025-01-19 RX ADMIN — Medication 3.12 MILLIGRAM(S): at 05:15

## 2025-01-19 RX ADMIN — ACETAMINOPHEN 650 MILLIGRAM(S): 160 SUSPENSION ORAL at 16:45

## 2025-01-19 NOTE — PROVIDER CONTACT NOTE (CRITICAL VALUE NOTIFICATION) - ACTION/TREATMENT ORDERED:
Dr. Luis aware. Dr. Luis aware and at bedside. Dr. Luis aware and at bedside. 1 unit of blood ordered

## 2025-01-19 NOTE — PROGRESS NOTE ADULT - ASSESSMENT
79-year-old male with HTN, DM, BPH, PPM, bedbound, dementia, anemia presents with multiple chronic gangrenous wounds on b/l lower extremities sent in from wound care for IV antibiotics, debridement and admission. Pt follows with Dr. Conklin. Pt rapidly declining over the past year according to son, with multiple hospital admissions, weight loss, decreased po intake, urine/fecal incontinence, needs assistance with feeding, and increased overall pain.  prior micro with MRSA    RECOMMENDATIONS  Infected wounds, cellulitis  with prior MRSA agree with   Vanco with dosing per pharmacy protocol  Unasyn started and may adjust based on any new micro  all interventions in keeping with GOC    Thank you for consulting us and involving us in the management of this most interesting and challenging case.  In addition to reviewing history, imaging, documents, labs, microbiology, took into account antibiotic stewardship, local antibiogram and infection control strategies and potential transmission issues.    We will follow along in the care of this patient. Please contact me by texting me directly on my cell# at [phone] using TEAMS or call our answering service at [phone] with any concerns.

## 2025-01-20 LAB
ANION GAP SERPL CALC-SCNC: 2 MMOL/L — LOW (ref 5–17)
BUN SERPL-MCNC: 14 MG/DL — SIGNIFICANT CHANGE UP (ref 7–23)
CALCIUM SERPL-MCNC: 8.4 MG/DL — LOW (ref 8.5–10.1)
CHLORIDE SERPL-SCNC: 109 MMOL/L — HIGH (ref 96–108)
CO2 SERPL-SCNC: 30 MMOL/L — SIGNIFICANT CHANGE UP (ref 22–31)
CREAT SERPL-MCNC: 0.77 MG/DL — SIGNIFICANT CHANGE UP (ref 0.5–1.3)
EGFR: 91 ML/MIN/1.73M2 — SIGNIFICANT CHANGE UP
GLUCOSE SERPL-MCNC: 173 MG/DL — HIGH (ref 70–99)
HCT VFR BLD CALC: 26.9 % — LOW (ref 39–50)
HGB BLD-MCNC: 8.4 G/DL — LOW (ref 13–17)
MCHC RBC-ENTMCNC: 25.5 PG — LOW (ref 27–34)
MCHC RBC-ENTMCNC: 31.2 G/DL — LOW (ref 32–36)
MCV RBC AUTO: 81.5 FL — SIGNIFICANT CHANGE UP (ref 80–100)
NRBC # BLD: 0 /100 WBCS — SIGNIFICANT CHANGE UP (ref 0–0)
NRBC BLD-RTO: 0 /100 WBCS — SIGNIFICANT CHANGE UP (ref 0–0)
PLATELET # BLD AUTO: 265 K/UL — SIGNIFICANT CHANGE UP (ref 150–400)
POTASSIUM SERPL-MCNC: 4.4 MMOL/L — SIGNIFICANT CHANGE UP (ref 3.5–5.3)
POTASSIUM SERPL-SCNC: 4.4 MMOL/L — SIGNIFICANT CHANGE UP (ref 3.5–5.3)
RBC # BLD: 3.3 M/UL — LOW (ref 4.2–5.8)
RBC # FLD: 15.2 % — HIGH (ref 10.3–14.5)
SODIUM SERPL-SCNC: 141 MMOL/L — SIGNIFICANT CHANGE UP (ref 135–145)
WBC # BLD: 6.59 K/UL — SIGNIFICANT CHANGE UP (ref 3.8–10.5)
WBC # FLD AUTO: 6.59 K/UL — SIGNIFICANT CHANGE UP (ref 3.8–10.5)

## 2025-01-20 PROCEDURE — 99232 SBSQ HOSP IP/OBS MODERATE 35: CPT

## 2025-01-20 RX ORDER — IRON/FOLIC ACID/C/B6/B12/ZINC 150-1.25MG
15 TABLET ORAL DAILY
Refills: 0 | Status: DISCONTINUED | OUTPATIENT
Start: 2025-01-20 | End: 2025-01-27

## 2025-01-20 RX ORDER — ASCORBIC ACID 500 MG/ML
500 VIAL (ML) INJECTION
Refills: 0 | Status: DISCONTINUED | OUTPATIENT
Start: 2025-01-20 | End: 2025-01-27

## 2025-01-20 RX ORDER — ZINC SULFATE 220 MG
220 CAPSULE ORAL DAILY
Refills: 0 | Status: DISCONTINUED | OUTPATIENT
Start: 2025-01-20 | End: 2025-01-27

## 2025-01-20 RX ADMIN — AMPICILLIN SODIUM AND SULBACTAM SODIUM 200 GRAM(S): 2; 1 INJECTION, POWDER, FOR SOLUTION INTRAMUSCULAR; INTRAVENOUS at 14:25

## 2025-01-20 RX ADMIN — ACETAMINOPHEN 650 MILLIGRAM(S): 160 SUSPENSION ORAL at 14:46

## 2025-01-20 RX ADMIN — ASPIRIN 81 MILLIGRAM(S): 81 TABLET, COATED ORAL at 11:09

## 2025-01-20 RX ADMIN — AMPICILLIN SODIUM AND SULBACTAM SODIUM 200 GRAM(S): 2; 1 INJECTION, POWDER, FOR SOLUTION INTRAMUSCULAR; INTRAVENOUS at 02:53

## 2025-01-20 RX ADMIN — Medication 25 MILLIGRAM(S): at 17:22

## 2025-01-20 RX ADMIN — Medication 1: at 17:21

## 2025-01-20 RX ADMIN — VANCOMYCIN HYDROCHLORIDE 250 MILLIGRAM(S): KIT at 10:43

## 2025-01-20 RX ADMIN — VANCOMYCIN HYDROCHLORIDE 250 MILLIGRAM(S): KIT at 21:20

## 2025-01-20 RX ADMIN — TAMSULOSIN HYDROCHLORIDE 0.4 MILLIGRAM(S): 0.4 CAPSULE ORAL at 21:21

## 2025-01-20 RX ADMIN — GABAPENTIN 600 MILLIGRAM(S): 800 TABLET ORAL at 06:13

## 2025-01-20 RX ADMIN — ENOXAPARIN SODIUM 40 MILLIGRAM(S): 100 INJECTION SUBCUTANEOUS at 06:14

## 2025-01-20 RX ADMIN — Medication 325 MILLIGRAM(S): at 11:10

## 2025-01-20 RX ADMIN — GABAPENTIN 600 MILLIGRAM(S): 800 TABLET ORAL at 17:22

## 2025-01-20 RX ADMIN — ACETAMINOPHEN 650 MILLIGRAM(S): 160 SUSPENSION ORAL at 06:14

## 2025-01-20 RX ADMIN — Medication 1: at 07:46

## 2025-01-20 RX ADMIN — Medication 1 TABLET(S): at 11:09

## 2025-01-20 RX ADMIN — Medication 500 MILLIGRAM(S): at 17:22

## 2025-01-20 RX ADMIN — POLYETHYLENE GLYCOL 3350 17 GRAM(S): 17 POWDER, FOR SOLUTION ORAL at 11:10

## 2025-01-20 RX ADMIN — Medication 2 TABLET(S): at 21:20

## 2025-01-20 RX ADMIN — Medication 3.12 MILLIGRAM(S): at 06:13

## 2025-01-20 RX ADMIN — Medication 25 MILLIGRAM(S): at 06:13

## 2025-01-20 RX ADMIN — ACETAMINOPHEN 650 MILLIGRAM(S): 160 SUSPENSION ORAL at 14:25

## 2025-01-20 RX ADMIN — AMPICILLIN SODIUM AND SULBACTAM SODIUM 200 GRAM(S): 2; 1 INJECTION, POWDER, FOR SOLUTION INTRAMUSCULAR; INTRAVENOUS at 09:38

## 2025-01-20 RX ADMIN — Medication 3.12 MILLIGRAM(S): at 17:22

## 2025-01-20 RX ADMIN — ACETAMINOPHEN 650 MILLIGRAM(S): 160 SUSPENSION ORAL at 21:20

## 2025-01-20 RX ADMIN — Medication 1: at 11:31

## 2025-01-20 NOTE — DIETITIAN INITIAL EVALUATION ADULT - ETIOLOGY
related to inadequate protein energy intake with increased nutrient needs  related to increased needs in setting of wound healing

## 2025-01-20 NOTE — DIETITIAN INITIAL EVALUATION ADULT - PROBLEM SELECTOR PLAN 1
Patient presenting with chronic gangrenous b/l foot wounds, ESR 51, sent in by wound care clinic for IV antibiotics and debridement  -Hx MRSA  -s/p vanco and zosyn in ED  - continue vanco and unasyn  - Pain control and bowel regimen  - F/u Arterial doppler of LE  - Wound care, leg elevation  - F/u blood cultures  - ID (Dr. Schulte) consulted, f/u recs  - Podiatry consulted (Dr. Conklin)

## 2025-01-20 NOTE — DIETITIAN INITIAL EVALUATION ADULT - NUTRITIONGOAL OUTCOME2
Pt to consume >75% meals/snacks/supplements to meet est energy/protein needs to aid in wound healing.

## 2025-01-20 NOTE — CARE COORDINATION ASSESSMENT. - TRANSPORTATION UTILIZED PRIOR TO ADMISSION
family/friend provides transportation Admission Reconciliation is Completed  Discharge Reconciliation is Completed

## 2025-01-20 NOTE — DIETITIAN INITIAL EVALUATION ADULT - NSFNSPHYEXAMSKINFT_GEN_A_CORE
Pressure Injury 1: Right:, hip, Unstageable  Pressure Injury 2: sacrum, Stage II  Pressure Injury 3: Right:, heel, Suspected deep tissue injury

## 2025-01-20 NOTE — PROGRESS NOTE ADULT - ASSESSMENT
79-year-old male with HTN, DM, BPH, PPM, bedbound, dementia, anemia presents with multiple chronic gangrenous wounds on b/l lower extremities sent in from wound care for IV antibiotics, debridement and admission. Pt follows with Dr. Conklin. Pt rapidly declining over the past year according to son, with multiple hospital admissions, weight loss, decreased po intake, urine/fecal incontinence, needs assistance with feeding, and increased overall pain.  prior micro with MRSA    RECOMMENDATIONS  Infected wounds, cellulitis  with prior MRSA    Vanco with dosing per pharmacy protocol  Unasyn started and may adjust based on any new micro  all interventions in keeping with GOC  abx started 1/18 so rec x 7 days with last day 1/24 -Friday    Thank you for consulting us and involving us in the management of this most interesting and challenging case.  In addition to reviewing history, imaging, documents, labs, microbiology, took into account antibiotic stewardship, local antibiogram and infection control strategies and potential transmission issues.    We will follow along in the care of this patient. Please contact me by texting me directly on my cell# at [phone] using TEAMS or call our answering service at [phone] with any concerns.

## 2025-01-20 NOTE — DIETITIAN INITIAL EVALUATION ADULT - ADD RECOMMEND
Glucerna 8oz po TID. Recommend MVI with minerals daily, 500mg Vit C BID, Zn sulfate 220mg daily x 10days. Recommend SLP evaluation per MD discretion.  Glucerna 8oz po TID, sierra 1 packet BID. Recommend MVI with minerals daily, 500mg Vit C BID, Zn sulfate 220mg daily x 10days. Recommend SLP evaluation per MD discretion.

## 2025-01-20 NOTE — CARE COORDINATION ASSESSMENT. - PRO ARRIVE FROM
CM met w pt. Pt a/ox4. Pt provided verbal consent to speak with son Nico [phone]. Per pt lives with [facility] at [facility] apartment with 0 stairs to enter home, 0 steps inside. Pt states his wife and son who lives with him to assist him with wound care, w ADL,  and med management prior to admission. Pt states he is bedbound and also has a wheelchair at home. Patient states he did have homecare services a few years ago but is unable to remember which agency. Pt states he does not want home care services regardless of recommendation as his wife and son take care of him. Pt has not identified a PCP in the community. PCP list has been provided and CM discussed importance of establishing care with PCP. Pt verbalized understanding of importance of following up with a PCP. Pharmacy: [facility] in [place] or [facility]. : stated yes.  Patient states he uses an ambulance for transportation. CM to remain available throughout hospitalization./home

## 2025-01-20 NOTE — DIETITIAN INITIAL EVALUATION ADULT - NUTRITIONGOAL OUTCOME1
Pt to consume >75% meals/snacks/supplements daily to meet est energy/pro needs & prevent further wt loss.

## 2025-01-20 NOTE — DIETITIAN INITIAL EVALUATION ADULT - NS FNS DIET ORDER
Diet, Soft and Bite Sized:   Consistent Carbohydrate {Evening Snack}  DASH/TLC {Sodium & Cholesterol Restricted} (01-18-25 @ 02:23) [Active]

## 2025-01-20 NOTE — DIETITIAN NUTRITION RISK NOTIFICATION - TREATMENT: THE FOLLOWING DIET HAS BEEN RECOMMENDED
Diet, Consistent Carbohydrate w/Evening Snack:   Low Sodium  Alo(7 Gm Arginine/7 Gm Glut/1.2 Gm HMB     Qty per Day:  1 packet BID  Supplement Feeding Modality:  Oral  Glucerna Shake Cans or Servings Per Day:  1       Frequency:  Three Times a day (01-20-25 @ 15:44) [Pending Verification By Attending]  Diet, Soft and Bite Sized:   Consistent Carbohydrate {Evening Snack}  DASH/TLC {Sodium & Cholesterol Restricted} (01-18-25 @ 02:23) [Active]

## 2025-01-20 NOTE — DIETITIAN INITIAL EVALUATION ADULT - SIGNS/SYMPTOMS
as evidenced by right hip unstageable ulcer, sacrum II, right heel S.DTI, multiple shin/heel wounds as evidenced by ~50lb(20%) weight loss x 1 year, Mild to severe muscle and fat depletion per [facility].

## 2025-01-20 NOTE — DIETITIAN INITIAL EVALUATION ADULT - OTHER INFO
79-year-old male with PMHx of HTN, DM, BPH, PPM, bedbound, anemia presents with multiple chronic gangrenous wounds on b/l lower extremities sent in from wound care for IV antibiotics, debridement and admission. Admitted for diabetic LE ulcers and cellulitis.    Pt A+Ox4 at visit. Consistent Carbohydrate, Dash/TLC, soft and bite sized diet rx. Pt reports fair appetite/po intake at present though states he needs full assistance with meals. Shakey hands and ill fitting dentures affecting ability to eat by himself. Pta reports regular diet texture pta, just avoids real tough meats. Consumes products like fruit fruit, bread. No difficulty swallowing reported. Recommend upgrading diet to regular texture (will avoid tough meat/vegetables). Pt report gradual decline in appetite/po intake with 40-50lb unintentional weight loss over past year. Reports height 6"4", -250lbs. Believes current weight ~200lbs. Bedscale 120lbs? Request current weight for accuracy. +ill fitting dentures- c/w significant weight loss. Recommend SLP evaluation per MD discretion. Drinks glucerna supplement on occasion. Agreeable to having while in hospital. Typical diet recall: Breakfast - krispy cereal with milk plus 2 HB eggs, Lunch ham and cheese sandwich on white bread and dinner varies - wife cooks Italian meals, pasta, meat on occasion. Does not add any salt to foods. Hx DM, Hgba1c 5.6%. Per pt takes 5 units Lantus q hs plus ss humalog covg TID (usually 4-5 units with meals). No reports of hypoglycemia. Significant pressure ulcers/wounds (see below). Recommend changing diet to Consistent Carbohydrate, Low Na w/ hs snack (no tough meats/vegetables) with glucerna supplement 8oz po BID, sierra 1 packet BID. Food preferences/meal alternatives obtained/honored. Declined diet education.

## 2025-01-20 NOTE — PROGRESS NOTE ADULT - TIME BILLING
Reviewing medical record including consultant recommendations, labs, vitals, medications, orders, imaging, and discussion of plan of care with patient, family, consultants, and nursing.

## 2025-01-20 NOTE — DIETITIAN INITIAL EVALUATION ADULT - PROBLEM SELECTOR PLAN 4
- On insulin  - Pt uses humalog insulin sliding scale with meals and takes lantus 5 units at bedtime -- will HOLD  -Glucose 144 on admission, goal glucose 140-180 while hospitalized  -Low dose ISS  - Consistent carb diet  - Hypoglycemic protocol  -f/u A1c

## 2025-01-20 NOTE — DIETITIAN INITIAL EVALUATION ADULT - PERTINENT LABORATORY DATA
01-20    141  |  109[H]  |  14  ----------------------------<  173[H]  4.4   |  30  |  0.77    Ca    8.4[L]      20 Jan 2025 06:15    POCT Blood Glucose.: 188 mg/dL (01-20-25 @ 11:26)  A1C with Estimated Average Glucose Result: 5.6 % (01-19-25 @ 07:00)  A1C with Estimated Average Glucose Result: 5.0 % (07-09-24 @ 04:27)

## 2025-01-20 NOTE — DIETITIAN INITIAL EVALUATION ADULT - PERTINENT MEDS FT
MEDICATIONS  (STANDING):  acetaminophen     Tablet .. 650 milliGRAM(s) Oral every 8 hours  ampicillin/sulbactam  IVPB 3 Gram(s) IV Intermittent every 6 hours  aspirin enteric coated 81 milliGRAM(s) Oral daily  bethanechol 25 milliGRAM(s) Oral two times a day  carvedilol 3.125 milliGRAM(s) Oral every 12 hours  dextrose 5%. 1000 milliLiter(s) (100 mL/Hr) IV Continuous <Continuous>  dextrose 5%. 1000 milliLiter(s) (50 mL/Hr) IV Continuous <Continuous>  dextrose 50% Injectable 25 Gram(s) IV Push once  dextrose 50% Injectable 12.5 Gram(s) IV Push once  dextrose 50% Injectable 25 Gram(s) IV Push once  enoxaparin Injectable 40 milliGRAM(s) SubCutaneous every 24 hours  ferrous    sulfate 325 milliGRAM(s) Oral daily  gabapentin 600 milliGRAM(s) Oral two times a day  glucagon  Injectable 1 milliGRAM(s) IntraMuscular once  insulin lispro (ADMELOG) corrective regimen sliding scale   SubCutaneous three times a day before meals  insulin lispro (ADMELOG) corrective regimen sliding scale   SubCutaneous at bedtime  lactobacillus acidophilus 1 Tablet(s) Oral daily  naloxone Injectable 0.4 milliGRAM(s) IV Push once  polyethylene glycol 3350 17 Gram(s) Oral daily  senna 2 Tablet(s) Oral at bedtime  tamsulosin 0.4 milliGRAM(s) Oral at bedtime  vancomycin  IVPB 750 milliGRAM(s) IV Intermittent every 12 hours    MEDICATIONS  (PRN):  dextrose Oral Gel 15 Gram(s) Oral once PRN Blood Glucose LESS THAN 70 milliGRAM(s)/deciliter  morphine  - Injectable 2 milliGRAM(s) IV Push every 6 hours PRN Severe Pain (7 - 10)  traMADol 25 milliGRAM(s) Oral every 6 hours PRN Moderate Pain (4 - 6)

## 2025-01-20 NOTE — CARE COORDINATION ASSESSMENT. - NSCAREPROVIDERS_GEN_ALL_CORE_FT
CARE PROVIDERS:  Accepting Physician: Millicent Arteaga  Access Services: Óscar Hogan  Administration: Shravan Stratton  Admitting: Millicent Arteaga  Attending: Toby Gaffney  Case Management: Piotr Mena  Consultant: Cecilio Burnett  Consultant: James Conklin ED Attending: Yuri Joshi  ED Nurse: Shay Guevara  HIM/Billing & Coding: Steph Giles  Ordered: Erika Murillo  Ordered: Doctor, Unknown  Outpatient Provider: Earlene Cruz  Outpatient Provider: Shay Guillen  Override: Zoya Garnica  Override: Daniela See  PCA/Nursing Assistant: Lyssa Shipley  PCA/Nursing Assistant: Megan Sorenson  Primary Team: Natalya Bhatia  Primary Team: Toby Gaffney  Primary Team: Maximino Keenan  Registered Dietitian: Jessica Calderon  Registered Dietitian: Jenifer Hinojosa  : Steph Montague  Team: [facility], Team  Team: [facility], Team  UR// Supp. Assoc.: Sola Whaley

## 2025-01-20 NOTE — DIETITIAN INITIAL EVALUATION ADULT - PROBLEM SELECTOR PLAN 3
Hgb 8.0 on admission  -Upon chart review, Hgb typically around 8  -previous iron studies indicate iron deficiency anemia  -iron 325mg daily

## 2025-01-21 DIAGNOSIS — M81.0 AGE-RELATED OSTEOPOROSIS WITHOUT CURRENT PATHOLOGICAL FRACTURE: ICD-10-CM

## 2025-01-21 DIAGNOSIS — L97.422 NON-PRESSURE CHRONIC ULCER OF LEFT HEEL AND MIDFOOT WITH FAT LAYER EXPOSED: ICD-10-CM

## 2025-01-21 DIAGNOSIS — L89.152 PRESSURE ULCER OF SACRAL REGION, STAGE 2: ICD-10-CM

## 2025-01-21 DIAGNOSIS — I48.91 UNSPECIFIED ATRIAL FIBRILLATION: ICD-10-CM

## 2025-01-21 DIAGNOSIS — E11.40 TYPE 2 DIABETES MELLITUS WITH DIABETIC NEUROPATHY, UNSPECIFIED: ICD-10-CM

## 2025-01-21 DIAGNOSIS — I50.9 HEART FAILURE, UNSPECIFIED: ICD-10-CM

## 2025-01-21 DIAGNOSIS — L97.316 NON-PRESSURE CHRONIC ULCER OF RIGHT ANKLE WITH BONE INVOLVEMENT WITHOUT EVIDENCE OF NECROSIS: ICD-10-CM

## 2025-01-21 DIAGNOSIS — L89.210 PRESSURE ULCER OF RIGHT HIP, UNSTAGEABLE: ICD-10-CM

## 2025-01-21 DIAGNOSIS — Z98.890 OTHER SPECIFIED POSTPROCEDURAL STATES: ICD-10-CM

## 2025-01-21 DIAGNOSIS — L97.325 NON-PRESSURE CHRONIC ULCER OF LEFT ANKLE WITH MUSCLE INVOLVEMENT WITHOUT EVIDENCE OF NECROSIS: ICD-10-CM

## 2025-01-21 DIAGNOSIS — E11.59 TYPE 2 DIABETES MELLITUS WITH OTHER CIRCULATORY COMPLICATIONS: ICD-10-CM

## 2025-01-21 DIAGNOSIS — L89.212 PRESSURE ULCER OF RIGHT HIP, STAGE 2: ICD-10-CM

## 2025-01-21 DIAGNOSIS — E11.622 TYPE 2 DIABETES MELLITUS WITH OTHER SKIN ULCER: ICD-10-CM

## 2025-01-21 DIAGNOSIS — F32.A DEPRESSION, UNSPECIFIED: ICD-10-CM

## 2025-01-21 DIAGNOSIS — Z95.0 PRESENCE OF CARDIAC PACEMAKER: ICD-10-CM

## 2025-01-21 DIAGNOSIS — L89.613 PRESSURE ULCER OF RIGHT HEEL, STAGE 3: ICD-10-CM

## 2025-01-21 DIAGNOSIS — L89.890 PRESSURE ULCER OF OTHER SITE, UNSTAGEABLE: ICD-10-CM

## 2025-01-21 DIAGNOSIS — E11.621 TYPE 2 DIABETES MELLITUS WITH FOOT ULCER: ICD-10-CM

## 2025-01-21 DIAGNOSIS — M05.20 RHEUMATOID VASCULITIS WITH RHEUMATOID ARTHRITIS OF UNSPECIFIED SITE: ICD-10-CM

## 2025-01-21 PROBLEM — S81.811A LACERATION OF RIGHT LOWER LEG, INITIAL ENCOUNTER: Status: ACTIVE | Noted: 2025-01-21

## 2025-01-21 PROBLEM — L89.612 PRESSURE ULCER OF RIGHT HEEL, STAGE 2: Status: ACTIVE | Noted: 2025-01-21

## 2025-01-21 LAB
ANION GAP SERPL CALC-SCNC: 9 MMOL/L — SIGNIFICANT CHANGE UP (ref 5–17)
BUN SERPL-MCNC: 20 MG/DL — SIGNIFICANT CHANGE UP (ref 7–23)
CALCIUM SERPL-MCNC: 8.6 MG/DL — SIGNIFICANT CHANGE UP (ref 8.5–10.1)
CHLORIDE SERPL-SCNC: 107 MMOL/L — SIGNIFICANT CHANGE UP (ref 96–108)
CO2 SERPL-SCNC: 26 MMOL/L — SIGNIFICANT CHANGE UP (ref 22–31)
CREAT SERPL-MCNC: 0.78 MG/DL — SIGNIFICANT CHANGE UP (ref 0.5–1.3)
EGFR: 91 ML/MIN/1.73M2 — SIGNIFICANT CHANGE UP
GLUCOSE SERPL-MCNC: 201 MG/DL — HIGH (ref 70–99)
HCT VFR BLD CALC: 28.2 % — LOW (ref 39–50)
HGB BLD-MCNC: 8.7 G/DL — LOW (ref 13–17)
MCHC RBC-ENTMCNC: 25.1 PG — LOW (ref 27–34)
MCHC RBC-ENTMCNC: 30.9 G/DL — LOW (ref 32–36)
MCV RBC AUTO: 81.3 FL — SIGNIFICANT CHANGE UP (ref 80–100)
NRBC # BLD: 0 /100 WBCS — SIGNIFICANT CHANGE UP (ref 0–0)
NRBC BLD-RTO: 0 /100 WBCS — SIGNIFICANT CHANGE UP (ref 0–0)
PLATELET # BLD AUTO: 277 K/UL — SIGNIFICANT CHANGE UP (ref 150–400)
POTASSIUM SERPL-MCNC: 4 MMOL/L — SIGNIFICANT CHANGE UP (ref 3.5–5.3)
POTASSIUM SERPL-SCNC: 4 MMOL/L — SIGNIFICANT CHANGE UP (ref 3.5–5.3)
RBC # BLD: 3.47 M/UL — LOW (ref 4.2–5.8)
RBC # FLD: 15.8 % — HIGH (ref 10.3–14.5)
SODIUM SERPL-SCNC: 142 MMOL/L — SIGNIFICANT CHANGE UP (ref 135–145)
VANCOMYCIN TROUGH SERPL-MCNC: 28.2 UG/ML — CRITICAL HIGH (ref 10–20)
WBC # BLD: 8.72 K/UL — SIGNIFICANT CHANGE UP (ref 3.8–10.5)
WBC # FLD AUTO: 8.72 K/UL — SIGNIFICANT CHANGE UP (ref 3.8–10.5)

## 2025-01-21 PROCEDURE — 93971 EXTREMITY STUDY: CPT | Mod: 26,RT

## 2025-01-21 PROCEDURE — 99232 SBSQ HOSP IP/OBS MODERATE 35: CPT

## 2025-01-21 PROCEDURE — 99222 1ST HOSP IP/OBS MODERATE 55: CPT

## 2025-01-21 RX ADMIN — GABAPENTIN 600 MILLIGRAM(S): 800 TABLET ORAL at 17:01

## 2025-01-21 RX ADMIN — AMPICILLIN SODIUM AND SULBACTAM SODIUM 200 GRAM(S): 2; 1 INJECTION, POWDER, FOR SOLUTION INTRAMUSCULAR; INTRAVENOUS at 17:03

## 2025-01-21 RX ADMIN — ACETAMINOPHEN 650 MILLIGRAM(S): 160 SUSPENSION ORAL at 22:30

## 2025-01-21 RX ADMIN — Medication 3.12 MILLIGRAM(S): at 05:46

## 2025-01-21 RX ADMIN — Medication 3.12 MILLIGRAM(S): at 17:01

## 2025-01-21 RX ADMIN — ACETAMINOPHEN 650 MILLIGRAM(S): 160 SUSPENSION ORAL at 13:33

## 2025-01-21 RX ADMIN — VANCOMYCIN HYDROCHLORIDE 250 MILLIGRAM(S): KIT at 12:20

## 2025-01-21 RX ADMIN — GABAPENTIN 600 MILLIGRAM(S): 800 TABLET ORAL at 05:46

## 2025-01-21 RX ADMIN — Medication 325 MILLIGRAM(S): at 11:36

## 2025-01-21 RX ADMIN — Medication 500 MILLIGRAM(S): at 05:46

## 2025-01-21 RX ADMIN — AMPICILLIN SODIUM AND SULBACTAM SODIUM 200 GRAM(S): 2; 1 INJECTION, POWDER, FOR SOLUTION INTRAMUSCULAR; INTRAVENOUS at 11:36

## 2025-01-21 RX ADMIN — ASPIRIN 81 MILLIGRAM(S): 81 TABLET, COATED ORAL at 11:36

## 2025-01-21 RX ADMIN — Medication 1: at 12:21

## 2025-01-21 RX ADMIN — AMPICILLIN SODIUM AND SULBACTAM SODIUM 200 GRAM(S): 2; 1 INJECTION, POWDER, FOR SOLUTION INTRAMUSCULAR; INTRAVENOUS at 05:45

## 2025-01-21 RX ADMIN — Medication 2: at 07:42

## 2025-01-21 RX ADMIN — Medication 25 MILLIGRAM(S): at 17:01

## 2025-01-21 RX ADMIN — TAMSULOSIN HYDROCHLORIDE 0.4 MILLIGRAM(S): 0.4 CAPSULE ORAL at 21:52

## 2025-01-21 RX ADMIN — ACETAMINOPHEN 650 MILLIGRAM(S): 160 SUSPENSION ORAL at 05:45

## 2025-01-21 RX ADMIN — Medication 1: at 17:02

## 2025-01-21 RX ADMIN — ENOXAPARIN SODIUM 40 MILLIGRAM(S): 100 INJECTION SUBCUTANEOUS at 05:45

## 2025-01-21 RX ADMIN — AMPICILLIN SODIUM AND SULBACTAM SODIUM 200 GRAM(S): 2; 1 INJECTION, POWDER, FOR SOLUTION INTRAMUSCULAR; INTRAVENOUS at 00:04

## 2025-01-21 RX ADMIN — Medication 25 MILLIGRAM(S): at 05:46

## 2025-01-21 RX ADMIN — Medication 15 MILLILITER(S): at 11:36

## 2025-01-21 RX ADMIN — Medication 500 MILLIGRAM(S): at 17:01

## 2025-01-21 RX ADMIN — ACETAMINOPHEN 650 MILLIGRAM(S): 160 SUSPENSION ORAL at 14:06

## 2025-01-21 RX ADMIN — ACETAMINOPHEN 650 MILLIGRAM(S): 160 SUSPENSION ORAL at 21:53

## 2025-01-21 RX ADMIN — Medication 1 TABLET(S): at 11:36

## 2025-01-21 RX ADMIN — Medication 220 MILLIGRAM(S): at 11:36

## 2025-01-21 NOTE — CONSULT NOTE ADULT - REASON FOR ADMISSION
diabetic LE ulcers and  diabetic cellulitis, adult failure to thrive
diabetic LE ulcers and  diabetcellulitis, adult failure to thrive
diabetic LE ulcers and  diabetcellulitis, adult failure to thrive

## 2025-01-21 NOTE — CONSULT NOTE ADULT - ASSESSMENT
79-year-old male with HTN, DM, BPH, PPM, bedbound, dementia, anemia presents with multiple chronic gangrenous wounds on b/l lower extremities sent in from wound care for IV antibiotics, debridement and admission. Pt follows with Dr. Conklin. Pt rapidly declining over the past year according to son, with multiple hospital admissions, weight loss, decreased po intake, urine/fecal incontinence, needs assistance with feeding, and increased overall pain.  prior micro with MRSA    RECOMMENDATIONS  Infected wounds, cellulitis  with prior MRSA agree with   Vanco with doing per pharmacy protocol  Unasyn started and may adjust based on any new micro  all interventions in keeping with GOC    Thank you for consulting us and involving us in the management of this most interesting and challenging case.  In addition to reviewing history, imaging, documents, labs, microbiology, took into account antibiotic stewardship, local antibiogram and infection control strategies and potential transmission issues.    We will follow along in the care of this patient. Please contact me by texting me directly on my cell# at [phone] using TEAMS or call our answering service at [phone] with any concerns.  
Multiple bilateral lower extremity wounds down to skin, subcutaneous tissue, fat
Sacral and left hip pressure ulcers stage II with no obvious infection.

## 2025-01-21 NOTE — PROGRESS NOTE ADULT - ASSESSMENT
79-year-old male with HTN, DM, BPH, PPM, bedbound, dementia, anemia presents with multiple chronic gangrenous wounds on b/l lower extremities sent in from wound care for IV antibiotics, debridement and admission. Pt follows with Dr. Conklin. Pt rapidly declining over the past year according to son, with multiple hospital admissions, weight loss, decreased po intake, urine/fecal incontinence, needs assistance with feeding, and increased overall pain.  prior micro with MRSA    RECOMMENDATIONS  Infected wounds, cellulitis  with prior MRSA    Vanco with dosing per pharmacy protocol  Unasyn -continue for now  all interventions in keeping with GOC  abx started 1/18 so rec x 7 days with last day 1/24 -Friday    Thank you for consulting us and involving us in the management of this most interesting and challenging case.  In addition to reviewing history, imaging, documents, labs, microbiology, took into account antibiotic stewardship, local antibiogram and infection control strategies and potential transmission issues.    We will follow along in the care of this patient. Please contact me by texting me directly on my cell# at [phone] using TEAMS or call our answering service at [phone] with any concerns.

## 2025-01-21 NOTE — PROGRESS NOTE ADULT - PROBLEM SELECTOR PLAN 5
/72 on admission  -Continue home Coreg with hold parameters  -monitor routine hemodynamics

## 2025-01-21 NOTE — GOALS OF CARE CONVERSATION - ADVANCED CARE PLANNING - CONVERSATION DETAILS
Palliative care SW met with patient at bedside. Reviewed patient's medical and social history as well as events leading to patient's hospitalization. Writer discussed patient's current diagnosis (cellulitis, multiple chronic gangrenous wounds of B/L LE, HTN, DM, PPM, BPH, bedbound, Anemia), medical condition and management. Inquired about advanced directives. Patient states he does not have any in place. Writer recommended completion of a HCP however patient declined. He did however identify his son Nico as the person who helps him make medical decisions but did not want to officially complete a HCP.  Inquired about patient's wishes regarding extent of medical care to be provided including thoughts regarding cardiopulmonary resuscitation and mechanical ventilation/intubation. Patient states he would want CPR attempted however would not want to live on machines long term. He states his son is aware of his wishes. Patient reports he lives in an [place] with his son and wife. He states his wife assists with his ADL's and meal prep as he is bedbound. He would like to return to the hotel upon discharge.  All questions answered. Patient remains a full code. Psychosocial support provided.

## 2025-01-21 NOTE — PROGRESS NOTE ADULT - PROBLEM SELECTOR PLAN 6
-Continue home flomax and bethanecol

## 2025-01-21 NOTE — PROGRESS NOTE ADULT - PROBLEM SELECTOR PLAN 7
-Continue home gabapentin

## 2025-01-21 NOTE — PROGRESS NOTE ADULT - PROBLEM SELECTOR PLAN 3
Hgb 8.0 on admission.  - Hb 6.8 today. will transfuse one unit of pRBCs and monitor H/H.  -Upon chart review, Hgb typically around 8  -previous iron studies indicate iron deficiency anemia  -iron 325mg daily
Hgb 8.0 on admission.  - Hb 8.4 today. post one unit of pRBCs and monitor H/H.  -Upon chart review, Hgb typically around 8  -previous iron studies indicate iron deficiency anemia  -iron 325mg daily
Hgb 8.0 on admission.  -Hb 8.7 today. post one unit of pRBCs and monitor H/H.  -Upon chart review, Hgb typically around 8  -previous iron studies indicate iron deficiency anemia  -iron 325mg daily
Hgb 8.0 on admission  -Upon chart review, Hgb typically around 8  -previous iron studies indicate iron deficiency anemia  -iron 325mg daily

## 2025-01-21 NOTE — PROGRESS NOTE ADULT - PROBLEM SELECTOR PLAN 1
Patient presenting with chronic gangrenous b/l foot wounds, sent in by wound care clinic for IV antibiotics and debridement  - Hx MRSA  - continue vanco and unasyn  - Pain control and bowel regimen  - F/u Arterial doppler of LE  - Wound care, leg elevation  - F/u blood cultures  - ID consult noted,   - Podiatry consult noted. advised for wound care and antibx as per ID. no plan for surgical procedure.
Patient presenting with chronic gangrenous b/l foot wounds, sent in by wound care clinic for IV antibiotics and debridement  -Hx MRSA  - continue vanco and unasyn  - Pain control and bowel regimen  - F/u Arterial doppler of LE  - Wound care, leg elevation  - F/u blood cultures  - ID (Dr. Schulte) consulted, f/u recs  - Podiatry consult noted. advised for wound care and antibx as per ID. no plan for surgical procedure.
Patient presenting with chronic gangrenous b/l foot wounds, sent in by wound care clinic for IV antibiotics and debridement  - Hx MRSA  - continue unasyn, repeat vanc level in am and dose accordingly. ID recs appreciated    - Pain control and bowel regimen  - arterial doppler: No clear evidence of hemodynamically significant right or left femoropopliteal arterial stenosis.  - Wound care, leg elevation  - F/u blood cultures  - Podiatry consult noted, no plan for surgical procedure.
Patient presenting with chronic gangrenous b/l foot wounds, sent in by wound care clinic for IV antibiotics and debridement  - Hx MRSA  - continue vanco and unasyn  - Pain control and bowel regimen  - F/u Arterial doppler of LE  - Wound care, leg elevation  - F/u blood cultures  - ID consult noted,   - Podiatry consult noted. advised for wound care and antibx as per ID. no plan for surgical procedure.
no

## 2025-01-21 NOTE — CASE MANAGEMENT PROGRESS NOTE - NSCMPROGRESSNOTE_GEN_ALL_CORE
Discussed pt on rounds, pt remains acute, IV Vanco, IV Unasyn, pending palliative, GOC - adult FTT, wound care, pending ID recommendations. CM will continue to collaborate with interdisciplinary team and remain available to assist.

## 2025-01-21 NOTE — CONSULT NOTE ADULT - SUBJECTIVE AND OBJECTIVE BOX
[facility]  Cecilio Burnett MD PhD, Gloria Hager MD, Rosa Maria Wilkinson MD, Tomy Larsen MD, Anshu Kilpatrick MD  and providing coverage with Amandeep Landaverde MD  Providing Infectious Disease Consultations at [facility], [facility], [facility], [facility]'s    Office# [phone] to schedule follow up appointments  Answering Service for urgent calls or New Consults [phone]  Cell# to text for urgent issues Cecilio Burnett [phone]     infectious diseases consult note:    HEMA LAZCANO is a 79y y. o. Male patient     HPI:  79-year-old male with HTN, DM, BPH, PPM, bedbound, dementia, anemia presents with multiple chronic gangrenous wounds on b/l lower extremities sent in from wound care for IV antibiotics, debridement and admission. Pt follows with Dr. Conklin. Pt rapidly declining over the past year according to son, with multiple hospital admissions, weight loss, decreased po intake, urine/fecal incontinence, needs assistance with feeding, and increased overall pain.      IN THE ED:  Temp  98.8 F , HR 81  , BP  124/74  ,RR 14 , SpO2 95% on RA  S/P vanco, zosyn, 1L NS bolus  EKG: pending  Labs significant for: ESR 51, Hgb 8.0  Imaging:  CXR - pending  Xray b/l foot and ankle - pending (18 Jan 2025 00:53)      PAST MEDICAL & SURGICAL HISTORY:  Diabetes      Benign essential HTN      Pacemaker      History of BPH      Diabetic foot ulcers          Allergies    No Known Allergies    Intolerances        ANTIBIOTICS/RELEVANT:  antimicrobials  ampicillin/sulbactam  IVPB 3 Gram(s) IV Intermittent every 6 hours  vancomycin  IVPB 750 milliGRAM(s) IV Intermittent every 12 hours    immunologic:    OTHER:  acetaminophen     Tablet .. 650 milliGRAM(s) Oral every 8 hours  aspirin enteric coated 81 milliGRAM(s) Oral daily  bethanechol 25 milliGRAM(s) Oral two times a day  carvedilol 3.125 milliGRAM(s) Oral every 12 hours  dextrose 5%. 1000 milliLiter(s) IV Continuous <Continuous>  dextrose 5%. 1000 milliLiter(s) IV Continuous <Continuous>  dextrose 50% Injectable 25 Gram(s) IV Push once  dextrose 50% Injectable 12.5 Gram(s) IV Push once  dextrose 50% Injectable 25 Gram(s) IV Push once  dextrose Oral Gel 15 Gram(s) Oral once PRN  enoxaparin Injectable 40 milliGRAM(s) SubCutaneous every 24 hours  ferrous    sulfate 325 milliGRAM(s) Oral daily  gabapentin 600 milliGRAM(s) Oral two times a day  glucagon  Injectable 1 milliGRAM(s) IntraMuscular once  insulin lispro (ADMELOG) corrective regimen sliding scale   SubCutaneous three times a day before meals  insulin lispro (ADMELOG) corrective regimen sliding scale   SubCutaneous at bedtime  lactobacillus acidophilus 1 Tablet(s) Oral daily  morphine  - Injectable 2 milliGRAM(s) IV Push every 6 hours PRN  naloxone Injectable 0.4 milliGRAM(s) IV Push once  polyethylene glycol 3350 17 Gram(s) Oral daily  senna 2 Tablet(s) Oral at bedtime  tamsulosin 0.4 milliGRAM(s) Oral at bedtime  traMADol 25 milliGRAM(s) Oral every 6 hours PRN      Social History:  Social History:  Former smoker- quit 40 years ago  Lives with wife at home  Dependent for ADLs - bedbound (18 Jan 2025 00:53)      Family History:  noncontrib        ROS:  limited by dementia    Objective:  Last 24-Vital Signs Last 24 Hrs  T(C): 37.1 (18 Jan 2025 12:19), Max: 37.1 (17 Jan 2025 18:55)  T(F): 98.8 (18 Jan 2025 12:19), Max: 98.8 (17 Jan 2025 18:55)  HR: 63 (18 Jan 2025 17:10) (63 - 81)  BP: 129/62 (18 Jan 2025 17:10) (115/57 - 133/69)  BP(mean): --  RR: 18 (18 Jan 2025 17:10) (14 - 18)  SpO2: 94% (18 Jan 2025 17:10) (94% - 100%)    Parameters below as of 18 Jan 2025 17:10  Patient On (Oxygen Delivery Method): room air        T(C): 37.1 (01-18-25 @ 12:19), Max: 37.1 (01-17-25 @ 18:55)  T(F): 98.8 (01-18-25 @ 12:19), Max: 98.8 (01-17-25 @ 18:55)  T(C): 37.1 (01-18-25 @ 12:19), Max: 37.1 (01-17-25 @ 18:55)  T(F): 98.8 (01-18-25 @ 12:19), Max: 98.8 (01-17-25 @ 18:55)  T(C): 37.1 (01-18-25 @ 12:19), Max: 37.1 (01-17-25 @ 18:55)  T(F): 98.8 (01-18-25 @ 12:19), Max: 98.8 (01-17-25 @ 18:55)    PHYSICAL EXAM:  Constitutional: NAD  Eyes: PERRLA, EOMI  Ear/Nose/Throat: oropharynx normal	  Neck: no JVD, no lymphadenopathy, supple  Respiratory: no accessory muscle use, lung fields bilaterally clear  Cardiovascular: distant  Gastrointestinal: soft, NT, no HSM, BS-normal  Extremities: no clubbing, no cyanosis, mult dressed wounds  Neuro: patient limited        LABS:                        7.6    7.08  )-----------( 255      ( 18 Jan 2025 07:35 )             24.6       WBC 7.08  01-18 @ 07:35  WBC 7.07  01-17 @ 23:00      01-18    139  |  108  |  17  ----------------------------<  172[H]  4.0   |  26  |  0.70    Ca    8.3[L]      18 Jan 2025 07:35    TPro  5.7[L]  /  Alb  2.3[L]  /  TBili  0.4  /  DBili  x   /  AST  9[L]  /  ALT  7[L]  /  AlkPhos  92  01-18      Creatinine: 0.70 mg/dL (01-18-25 @ 07:35)  Creatinine: 0.73 mg/dL (01-17-25 @ 23:00)      PT/INR - ( 17 Jan 2025 23:00 )   PT: 17.8 sec;   INR: 1.53 ratio         PTT - ( 17 Jan 2025 23:00 )  PTT:33.9 sec  Urinalysis Basic - ( 18 Jan 2025 07:35 )    Color: x / Appearance: x / SG: x / pH: x  Gluc: 172 mg/dL / Ketone: x  / Bili: x / Urobili: x   Blood: x / Protein: x / Nitrite: x   Leuk Esterase: x / RBC: x / WBC x   Sq Epi: x / Non Sq Epi: x / Bacteria: x            MICROBIOLOGY:              RADIOLOGY & ADDITIONAL STUDIES:  
HPI:  79y year old Male seen at [facility] 02 for multiple bilateral lower extremity wounds. Pt seen by Dr. Stephen at the [facility], sent in for admission.  Denies any fever, chills, nausea, vomiting, chest pain, shortness of breath, or calf pain at this time.    PAST MEDICAL & SURGICAL HISTORY:  Diabetes      Benign essential HTN      Pacemaker      History of BPH      Diabetic foot ulcers          Allergies    No Known Allergies    Intolerances        MEDICATIONS  (STANDING):  acetaminophen     Tablet .. 650 milliGRAM(s) Oral every 8 hours  ampicillin/sulbactam  IVPB 3 Gram(s) IV Intermittent every 6 hours  aspirin enteric coated 81 milliGRAM(s) Oral daily  bethanechol 25 milliGRAM(s) Oral two times a day  carvedilol 3.125 milliGRAM(s) Oral every 12 hours  dextrose 5%. 1000 milliLiter(s) (100 mL/Hr) IV Continuous <Continuous>  dextrose 5%. 1000 milliLiter(s) (50 mL/Hr) IV Continuous <Continuous>  dextrose 50% Injectable 25 Gram(s) IV Push once  dextrose 50% Injectable 12.5 Gram(s) IV Push once  dextrose 50% Injectable 25 Gram(s) IV Push once  enoxaparin Injectable 40 milliGRAM(s) SubCutaneous every 24 hours  ferrous    sulfate 325 milliGRAM(s) Oral daily  gabapentin 600 milliGRAM(s) Oral two times a day  glucagon  Injectable 1 milliGRAM(s) IntraMuscular once  insulin lispro (ADMELOG) corrective regimen sliding scale   SubCutaneous three times a day before meals  insulin lispro (ADMELOG) corrective regimen sliding scale   SubCutaneous at bedtime  lactobacillus acidophilus 1 Tablet(s) Oral daily  naloxone Injectable 0.4 milliGRAM(s) IV Push once  polyethylene glycol 3350 17 Gram(s) Oral daily  senna 2 Tablet(s) Oral at bedtime  tamsulosin 0.4 milliGRAM(s) Oral at bedtime  vancomycin  IVPB 750 milliGRAM(s) IV Intermittent every 12 hours    MEDICATIONS  (PRN):  dextrose Oral Gel 15 Gram(s) Oral once PRN Blood Glucose LESS THAN 70 milliGRAM(s)/deciliter  morphine  - Injectable 2 milliGRAM(s) IV Push every 6 hours PRN Severe Pain (7 - 10)  traMADol 25 milliGRAM(s) Oral every 6 hours PRN Moderate Pain (4 - 6)      Social History:  Former smoker- quit 40 years ago  Lives with wife at home  Dependent for ADLs - bedbound (18 Jan 2025 00:53)      FAMILY HISTORY:      Vital Signs Last 24 Hrs  T(C): 36.7 (18 Jan 2025 05:20), Max: 37.1 (17 Jan 2025 18:55)  T(F): 98 (18 Jan 2025 05:20), Max: 98.8 (17 Jan 2025 18:55)  HR: 71 (18 Jan 2025 07:03) (66 - 81)  BP: 133/69 (18 Jan 2025 07:03) (115/57 - 133/69)  BP(mean): --  RR: 18 (18 Jan 2025 07:03) (14 - 18)  SpO2: 98% (18 Jan 2025 07:03) (95% - 100%)    Parameters below as of 18 Jan 2025 07:03  Patient On (Oxygen Delivery Method): room air        PHYSICAL EXAM:  Vascular: DP & PT palpable bilaterally, Capillary refill 3 seconds  Neurological: Light touch sensation not intact bilaterally  Musculoskeletal: 3/5 strength in all quadrants bilaterally, AJ & STJ ROM intact  Dermatological: Multiple bilateral lower extremity wounds down to skin, subcutaneous tissue, fat, no probe to bone, no periwound erythema, no fluctuance, no malodor, no proximal streaking                          7.6    7.08  )-----------( 255      ( 18 Jan 2025 07:35 )             24.6       01-18    139  |  108  |  17  ----------------------------<  172[H]  4.0   |  26  |  0.70    Ca    8.3[L]      18 Jan 2025 07:35    TPro  5.7[L]  /  Alb  2.3[L]  /  TBili  0.4  /  DBili  x   /  AST  9[L]  /  ALT  7[L]  /  AlkPhos  92  01-18      PT/INR - ( 17 Jan 2025 23:00 )   PT: 17.8 sec;   INR: 1.53 ratio         PTT - ( 17 Jan 2025 23:00 )  PTT:33.9 sec          
Chief Complaint: Sacral and left hip pressure ulcers.    HPI: Patient is known to have pressure ulcers admitted with cellulitis of feet, has had sacral and left hip ulcers.     PAST MEDICAL & SURGICAL HISTORY:  Diabetes      Benign essential HTN      Pacemaker      History of BPH      Diabetic foot ulcers          Allergies    No Known Allergies    Intolerances        MEDICATIONS  (STANDING):  acetaminophen     Tablet .. 650 milliGRAM(s) Oral every 8 hours  ampicillin/sulbactam  IVPB 3 Gram(s) IV Intermittent every 6 hours  ascorbic acid 500 milliGRAM(s) Oral two times a day  aspirin enteric coated 81 milliGRAM(s) Oral daily  bethanechol 25 milliGRAM(s) Oral two times a day  carvedilol 3.125 milliGRAM(s) Oral every 12 hours  dextrose 5%. 1000 milliLiter(s) (100 mL/Hr) IV Continuous <Continuous>  dextrose 5%. 1000 milliLiter(s) (50 mL/Hr) IV Continuous <Continuous>  dextrose 50% Injectable 25 Gram(s) IV Push once  dextrose 50% Injectable 12.5 Gram(s) IV Push once  dextrose 50% Injectable 25 Gram(s) IV Push once  enoxaparin Injectable 40 milliGRAM(s) SubCutaneous every 24 hours  ferrous    sulfate 325 milliGRAM(s) Oral daily  gabapentin 600 milliGRAM(s) Oral two times a day  glucagon  Injectable 1 milliGRAM(s) IntraMuscular once  insulin lispro (ADMELOG) corrective regimen sliding scale   SubCutaneous three times a day before meals  insulin lispro (ADMELOG) corrective regimen sliding scale   SubCutaneous at bedtime  lactobacillus acidophilus 1 Tablet(s) Oral daily  multivitamin/minerals/iron Oral Solution (CENTRUM) 15 milliLiter(s) Oral daily  naloxone Injectable 0.4 milliGRAM(s) IV Push once  polyethylene glycol 3350 17 Gram(s) Oral daily  senna 2 Tablet(s) Oral at bedtime  tamsulosin 0.4 milliGRAM(s) Oral at bedtime  zinc sulfate 220 milliGRAM(s) Oral daily    MEDICATIONS  (PRN):  dextrose Oral Gel 15 Gram(s) Oral once PRN Blood Glucose LESS THAN 70 milliGRAM(s)/deciliter  morphine  - Injectable 2 milliGRAM(s) IV Push every 6 hours PRN Severe Pain (7 - 10)  traMADol 25 milliGRAM(s) Oral every 6 hours PRN Moderate Pain (4 - 6)      FAMILY HISTORY:          ROS:  CONSTITUTIONAL: No fever, weight loss, or fatigue  EYES: No eye pain, visual disturbances, or discharge  ENMT:  No difficulty hearing, tinnitus, vertigo; No sinus or throat pain  NECK: No pain or stiffness  BREASTS: No pain, masses, or nipple discharge  RESPIRATORY: No cough, wheezing, chills or hemoptysis; No shortness of breath  CARDIOVASCULAR: No chest pain, palpitations, dizziness, or leg swelling  GASTROINTESTINAL: No abdominal or epigastric pain. No nausea, vomiting, or hematemesis; No diarrhea or constipation. No melena or hematochezia.  GENITOURINARY: No dysuria, frequency, hematuria, or incontinence  NEUROLOGICAL: No headaches, memory loss, loss of strength, numbness, or tremors  SKIN: No itching, burning, rashes, or lesions   LYMPH NODES: No enlarged glands  ENDOCRINE: No heat or cold intolerance; No hair loss  MUSCULOSKELETAL: No joint pain or swelling; No muscle, back, or extremity pain  PSYCHIATRIC: No depression, anxiety, mood swings, or difficulty sleeping  HEME/LYMPH: No easy bruising, or bleeding gums  ALLERGY AND IMMUNOLOGIC: No hives or eczema    PHYSICAL EXAM-      Vital Signs Last 24 Hrs  T(C): 36.4 (21 Jan 2025 12:12), Max: 36.9 (20 Jan 2025 20:04)  T(F): 97.5 (21 Jan 2025 12:12), Max: 98.4 (20 Jan 2025 20:04)  HR: 81 (21 Jan 2025 12:12) (60 - 81)  BP: 122/73 (21 Jan 2025 12:12) (122/73 - 152/62)  BP(mean): 64 (20 Jan 2025 20:04) (64 - 64)  RR: 17 (21 Jan 2025 12:12) (17 - 18)  SpO2: 95% (21 Jan 2025 12:12) (95% - 98%)    Parameters below as of 21 Jan 2025 12:12  Patient On (Oxygen Delivery Method): room air        Constitutional: well developed, well nourished, no apparent distress, alert, oriented x 3.  Neck: Supple   Pulmonary: no respiratory distress, normal respiratory rhythm and effort, lungs are clear to auscultation/percussion. No CVA tenderness.  Cardiovascular: heart rate normal, normal sinus rhythm; no murmurs, gallops, rubs, heaves or thrills   Abdomen: soft, non-tender, +BS, no guarding/rebound/rigidity.  Vascular: Lower extremities are well perfused.   Extremities: Disuse atrophy  Skin: Sacral and left hip stage II pressure ulcers bases are mostly slough, no infection, no drainage.                             8.7    8.72  )-----------( 277      ( 21 Jan 2025 06:17 )             28.2     01-21    142  |  107  |  20  ----------------------------<  201[H]  4.0   |  26  |  0.78    Ca    8.6      21 Jan 2025 06:17        Radiology:

## 2025-01-21 NOTE — CONSULT NOTE ADULT - PROBLEM SELECTOR RECOMMENDATION 9
Patient examined and evaluated at this time. Continue with local wound care and offloading as per nursing dressing change order at this time. No plans for surgical intervention from podiatry's standpoint. Antibiotics as per infectious disease recommendations.
Allevyn Border

## 2025-01-21 NOTE — PROGRESS NOTE ADULT - PROBLEM SELECTOR PLAN 4
- On insulin  - Pt uses humalog insulin sliding scale with meals and takes lantus 5 units at bedtime -- will HOLD  -Glucose 144 on admission, goal glucose 140-180 while hospitalized  -Low dose ISS  - Consistent carb diet  - Hypoglycemic protocol  -f/u A1c
- On insulin  - Pt uses humalog insulin sliding scale with meals and takes lantus 5 units at bedtime -- will HOLD  -Glucose 144 on admission, goal glucose 140-180 while hospitalized  -Low dose ISS  - Consistent carb diet  - Hypoglycemic protocol  -f/u A1c
- On insulin  - Pt uses humalog insulin sliding scale with meals and takes lantus 5 units at bedtime -- will HOLD  - Glucose 144 on admission, goal glucose 140-180 while hospitalized  - Low dose ISS  - Consistent carb diet  - Hypoglycemic protocol  - A1c 5.6
- On insulin  - Pt uses humalog insulin sliding scale with meals and takes lantus 5 units at bedtime -- will HOLD  -Glucose 144 on admission, goal glucose 140-180 while hospitalized  -Low dose ISS  - Consistent carb diet  - Hypoglycemic protocol  -f/u A1c

## 2025-01-21 NOTE — PROGRESS NOTE ADULT - PROBLEM SELECTOR PROBLEM 1
Diabetic ulcer of both lower extremities

## 2025-01-21 NOTE — PROGRESS NOTE ADULT - PROBLEM SELECTOR PLAN 8
-Lovenox 40 daily for DVT ppx

## 2025-01-22 LAB
ANION GAP SERPL CALC-SCNC: 3 MMOL/L — LOW (ref 5–17)
BUN SERPL-MCNC: 25 MG/DL — HIGH (ref 7–23)
CALCIUM SERPL-MCNC: 8.7 MG/DL — SIGNIFICANT CHANGE UP (ref 8.5–10.1)
CHLORIDE SERPL-SCNC: 109 MMOL/L — HIGH (ref 96–108)
CO2 SERPL-SCNC: 30 MMOL/L — SIGNIFICANT CHANGE UP (ref 22–31)
CREAT SERPL-MCNC: 0.7 MG/DL — SIGNIFICANT CHANGE UP (ref 0.5–1.3)
EGFR: 94 ML/MIN/1.73M2 — SIGNIFICANT CHANGE UP
GLUCOSE SERPL-MCNC: 168 MG/DL — HIGH (ref 70–99)
HCT VFR BLD CALC: 27.3 % — LOW (ref 39–50)
HGB BLD-MCNC: 8.4 G/DL — LOW (ref 13–17)
MAGNESIUM SERPL-MCNC: 1.7 MG/DL — SIGNIFICANT CHANGE UP (ref 1.6–2.6)
MCHC RBC-ENTMCNC: 25.2 PG — LOW (ref 27–34)
MCHC RBC-ENTMCNC: 30.8 G/DL — LOW (ref 32–36)
MCV RBC AUTO: 82 FL — SIGNIFICANT CHANGE UP (ref 80–100)
NRBC # BLD: 0 /100 WBCS — SIGNIFICANT CHANGE UP (ref 0–0)
NRBC BLD-RTO: 0 /100 WBCS — SIGNIFICANT CHANGE UP (ref 0–0)
PHOSPHATE SERPL-MCNC: 2.9 MG/DL — SIGNIFICANT CHANGE UP (ref 2.5–4.5)
PLATELET # BLD AUTO: 273 K/UL — SIGNIFICANT CHANGE UP (ref 150–400)
POTASSIUM SERPL-MCNC: 4 MMOL/L — SIGNIFICANT CHANGE UP (ref 3.5–5.3)
POTASSIUM SERPL-SCNC: 4 MMOL/L — SIGNIFICANT CHANGE UP (ref 3.5–5.3)
RBC # BLD: 3.33 M/UL — LOW (ref 4.2–5.8)
RBC # FLD: 16 % — HIGH (ref 10.3–14.5)
SODIUM SERPL-SCNC: 142 MMOL/L — SIGNIFICANT CHANGE UP (ref 135–145)
VANCOMYCIN TROUGH SERPL-MCNC: 11.3 UG/ML — SIGNIFICANT CHANGE UP (ref 10–20)
WBC # BLD: 7.12 K/UL — SIGNIFICANT CHANGE UP (ref 3.8–10.5)
WBC # FLD AUTO: 7.12 K/UL — SIGNIFICANT CHANGE UP (ref 3.8–10.5)

## 2025-01-22 PROCEDURE — 99232 SBSQ HOSP IP/OBS MODERATE 35: CPT

## 2025-01-22 RX ORDER — VANCOMYCIN HYDROCHLORIDE 50 MG/ML
500 KIT ORAL ONCE
Refills: 0 | Status: COMPLETED | OUTPATIENT
Start: 2025-01-22 | End: 2025-01-22

## 2025-01-22 RX ORDER — VANCOMYCIN HYDROCHLORIDE 50 MG/ML
500 KIT ORAL EVERY 12 HOURS
Refills: 0 | Status: COMPLETED | OUTPATIENT
Start: 2025-01-22 | End: 2025-01-24

## 2025-01-22 RX ORDER — VANCOMYCIN HYDROCHLORIDE 50 MG/ML
KIT ORAL
Refills: 0 | Status: COMPLETED | OUTPATIENT
Start: 2025-01-22 | End: 2025-01-24

## 2025-01-22 RX ADMIN — Medication 2 TABLET(S): at 21:50

## 2025-01-22 RX ADMIN — ACETAMINOPHEN 650 MILLIGRAM(S): 160 SUSPENSION ORAL at 21:51

## 2025-01-22 RX ADMIN — AMPICILLIN SODIUM AND SULBACTAM SODIUM 200 GRAM(S): 2; 1 INJECTION, POWDER, FOR SOLUTION INTRAMUSCULAR; INTRAVENOUS at 11:40

## 2025-01-22 RX ADMIN — ENOXAPARIN SODIUM 40 MILLIGRAM(S): 100 INJECTION SUBCUTANEOUS at 07:24

## 2025-01-22 RX ADMIN — VANCOMYCIN HYDROCHLORIDE 100 MILLIGRAM(S): KIT at 12:42

## 2025-01-22 RX ADMIN — AMPICILLIN SODIUM AND SULBACTAM SODIUM 200 GRAM(S): 2; 1 INJECTION, POWDER, FOR SOLUTION INTRAMUSCULAR; INTRAVENOUS at 05:15

## 2025-01-22 RX ADMIN — Medication 500 MILLIGRAM(S): at 17:30

## 2025-01-22 RX ADMIN — ACETAMINOPHEN 650 MILLIGRAM(S): 160 SUSPENSION ORAL at 05:15

## 2025-01-22 RX ADMIN — Medication 25 MILLIGRAM(S): at 05:15

## 2025-01-22 RX ADMIN — Medication 2: at 11:53

## 2025-01-22 RX ADMIN — Medication 220 MILLIGRAM(S): at 13:54

## 2025-01-22 RX ADMIN — AMPICILLIN SODIUM AND SULBACTAM SODIUM 200 GRAM(S): 2; 1 INJECTION, POWDER, FOR SOLUTION INTRAMUSCULAR; INTRAVENOUS at 00:38

## 2025-01-22 RX ADMIN — Medication 3.12 MILLIGRAM(S): at 05:15

## 2025-01-22 RX ADMIN — Medication 500 MILLIGRAM(S): at 05:15

## 2025-01-22 RX ADMIN — ACETAMINOPHEN 650 MILLIGRAM(S): 160 SUSPENSION ORAL at 13:56

## 2025-01-22 RX ADMIN — VANCOMYCIN HYDROCHLORIDE 100 MILLIGRAM(S): KIT at 18:22

## 2025-01-22 RX ADMIN — Medication 25 MILLIGRAM(S): at 17:28

## 2025-01-22 RX ADMIN — GABAPENTIN 600 MILLIGRAM(S): 800 TABLET ORAL at 05:15

## 2025-01-22 RX ADMIN — ACETAMINOPHEN 650 MILLIGRAM(S): 160 SUSPENSION ORAL at 14:56

## 2025-01-22 RX ADMIN — Medication 15 MILLILITER(S): at 11:43

## 2025-01-22 RX ADMIN — Medication 1: at 17:27

## 2025-01-22 RX ADMIN — Medication 1 TABLET(S): at 11:40

## 2025-01-22 RX ADMIN — ASPIRIN 81 MILLIGRAM(S): 81 TABLET, COATED ORAL at 11:40

## 2025-01-22 RX ADMIN — AMPICILLIN SODIUM AND SULBACTAM SODIUM 200 GRAM(S): 2; 1 INJECTION, POWDER, FOR SOLUTION INTRAMUSCULAR; INTRAVENOUS at 17:30

## 2025-01-22 RX ADMIN — ACETAMINOPHEN 650 MILLIGRAM(S): 160 SUSPENSION ORAL at 23:50

## 2025-01-22 RX ADMIN — TAMSULOSIN HYDROCHLORIDE 0.4 MILLIGRAM(S): 0.4 CAPSULE ORAL at 21:50

## 2025-01-22 RX ADMIN — Medication 1: at 08:00

## 2025-01-22 RX ADMIN — Medication 325 MILLIGRAM(S): at 11:40

## 2025-01-22 RX ADMIN — GABAPENTIN 600 MILLIGRAM(S): 800 TABLET ORAL at 17:27

## 2025-01-22 NOTE — CASE MANAGEMENT PROGRESS NOTE - NSCMPROGRESSNOTE_GEN_ALL_CORE
Discussed pt on rounds, pt remains acute, IV Vanco, IV Unasyn to 1/24/25, wound care, pending ID recommendations. CM will continue to collaborate with interdisciplinary team and remain available to assist.

## 2025-01-22 NOTE — PROGRESS NOTE ADULT - ASSESSMENT
79-year-old male with PMHx of HTN, DM, BPH, PPM, bedbound, anemia presents with multiple chronic gangrenous wounds on b/l lower extremities sent in from wound care for IV antibiotics, debridement and admission. Admitted for diabetic LE ulcers and cellulitis.    Problem/Plan - 1:  ·  Problem: Diabetic ulcer of both lower extremities.   ·  Plan: Patient presenting with chronic gangrenous b/l foot wounds, sent in by wound care clinic for IV antibiotics and debridement  - Hx MRSA  - continue unasyn, repeat vanc level in am and dose accordingly. ID recs appreciated    - Pain control and bowel regimen  - arterial doppler: No clear evidence of hemodynamically significant right or left femoropopliteal arterial stenosis.  - Wound care, leg elevation  - blood cultures NGTD  - Podiatry consult noted, no plan for surgical procedure.     Problem/Plan - 2:  ·  Problem: Adult failure to thrive.   ·  Plan: Bedbound, weight loss, decreased po intake, lethargy  -Nutrition consult  -SW/CM   -Palliative consult.     Problem/Plan - 3:  ·  Problem: Anemia.   ·  Plan: Hgb 8.0 on admission.  -Hb 8.7 today. post one unit of pRBCs and monitor H/H.  -Upon chart review, Hgb typically around 8  -previous iron studies indicate iron deficiency anemia  -iron 325mg daily.     Problem/Plan - 4:  ·  Problem: T2DM (type 2 diabetes mellitus).   ·  Plan: - On insulin  - Pt uses humalog insulin sliding scale with meals and takes lantus 5 units at bedtime -- will HOLD  - Glucose 144 on admission, goal glucose 140-180 while hospitalized  - Low dose ISS  - Consistent carb diet  - Hypoglycemic protocol  - A1c 5.6.     Problem/Plan - 5:  ·  Problem: HTN (hypertension).   ·  Plan: /72 on admission  -Continue home Coreg with hold parameters  -monitor routine hemodynamics.     Problem/Plan - 6:  ·  Problem: BPH (benign prostatic hyperplasia).   ·  Plan: -Continue home flomax and bethanecol.     Problem/Plan - 7:  ·  Problem: Neuropathy.   ·  Plan: -Continue home gabapentin.     Problem/Plan - 8:  ·  Problem: Need for prophylactic measure.   ·  Plan: -Lovenox 40 daily for DVT ppx.

## 2025-01-22 NOTE — PROGRESS NOTE ADULT - ASSESSMENT
79-year-old male with HTN, DM, BPH, PPM, bedbound, dementia, anemia presents with multiple chronic gangrenous wounds on b/l lower extremities sent in from wound care for IV antibiotics, debridement and admission. Pt follows with Dr. Conklin. Pt rapidly declining over the past year according to son, with multiple hospital admissions, weight loss, decreased po intake, urine/fecal incontinence, needs assistance with feeding, and increased overall pain.  prior micro with MRSA    RECOMMENDATIONS  Infected wounds, cellulitis  with prior MRSA    Vanco with dosing per pharmacy protocol  Unasyn -continue for now  all interventions in keeping with GOC  abx started 1/18 so rec x 7 days with last day 1/24 -Friday    Thank you for consulting us and involving us in the management of this most interesting and challenging case.  In addition to reviewing history, imaging, documents, labs, microbiology, took into account antibiotic stewardship, local antibiogram and infection control strategies and potential transmission issues.    We will follow along in the care of this patient. Please contact me by texting me directly on my cell# at [phone] using TEAMS or call our answering service at [phone] with any concerns.

## 2025-01-23 ENCOUNTER — TRANSCRIPTION ENCOUNTER (OUTPATIENT)
Age: 80
End: 2025-01-23

## 2025-01-23 LAB
CULTURE RESULTS: SIGNIFICANT CHANGE UP
CULTURE RESULTS: SIGNIFICANT CHANGE UP
SPECIMEN SOURCE: SIGNIFICANT CHANGE UP
SPECIMEN SOURCE: SIGNIFICANT CHANGE UP
VANCOMYCIN TROUGH SERPL-MCNC: 9.6 UG/ML — LOW (ref 10–20)

## 2025-01-23 PROCEDURE — 99232 SBSQ HOSP IP/OBS MODERATE 35: CPT

## 2025-01-23 RX ADMIN — AMPICILLIN SODIUM AND SULBACTAM SODIUM 200 GRAM(S): 2; 1 INJECTION, POWDER, FOR SOLUTION INTRAMUSCULAR; INTRAVENOUS at 00:01

## 2025-01-23 RX ADMIN — VANCOMYCIN HYDROCHLORIDE 100 MILLIGRAM(S): KIT at 07:01

## 2025-01-23 RX ADMIN — Medication 2: at 08:04

## 2025-01-23 RX ADMIN — ASPIRIN 81 MILLIGRAM(S): 81 TABLET, COATED ORAL at 12:05

## 2025-01-23 RX ADMIN — Medication 15 MILLILITER(S): at 12:04

## 2025-01-23 RX ADMIN — GABAPENTIN 600 MILLIGRAM(S): 800 TABLET ORAL at 06:06

## 2025-01-23 RX ADMIN — Medication 1: at 12:01

## 2025-01-23 RX ADMIN — Medication 1 TABLET(S): at 12:05

## 2025-01-23 RX ADMIN — AMPICILLIN SODIUM AND SULBACTAM SODIUM 200 GRAM(S): 2; 1 INJECTION, POWDER, FOR SOLUTION INTRAMUSCULAR; INTRAVENOUS at 06:05

## 2025-01-23 RX ADMIN — Medication 500 MILLIGRAM(S): at 06:06

## 2025-01-23 RX ADMIN — ACETAMINOPHEN 650 MILLIGRAM(S): 160 SUSPENSION ORAL at 21:52

## 2025-01-23 RX ADMIN — Medication 325 MILLIGRAM(S): at 12:05

## 2025-01-23 RX ADMIN — ENOXAPARIN SODIUM 40 MILLIGRAM(S): 100 INJECTION SUBCUTANEOUS at 06:06

## 2025-01-23 RX ADMIN — Medication 3.12 MILLIGRAM(S): at 06:06

## 2025-01-23 RX ADMIN — Medication 25 MILLIGRAM(S): at 06:06

## 2025-01-23 RX ADMIN — GABAPENTIN 600 MILLIGRAM(S): 800 TABLET ORAL at 17:57

## 2025-01-23 RX ADMIN — AMPICILLIN SODIUM AND SULBACTAM SODIUM 200 GRAM(S): 2; 1 INJECTION, POWDER, FOR SOLUTION INTRAMUSCULAR; INTRAVENOUS at 17:55

## 2025-01-23 RX ADMIN — Medication 3.12 MILLIGRAM(S): at 17:57

## 2025-01-23 RX ADMIN — AMPICILLIN SODIUM AND SULBACTAM SODIUM 200 GRAM(S): 2; 1 INJECTION, POWDER, FOR SOLUTION INTRAMUSCULAR; INTRAVENOUS at 23:12

## 2025-01-23 RX ADMIN — Medication 25 MILLIGRAM(S): at 17:57

## 2025-01-23 RX ADMIN — VANCOMYCIN HYDROCHLORIDE 100 MILLIGRAM(S): KIT at 18:38

## 2025-01-23 RX ADMIN — POLYETHYLENE GLYCOL 3350 17 GRAM(S): 17 POWDER, FOR SOLUTION ORAL at 12:04

## 2025-01-23 RX ADMIN — AMPICILLIN SODIUM AND SULBACTAM SODIUM 200 GRAM(S): 2; 1 INJECTION, POWDER, FOR SOLUTION INTRAMUSCULAR; INTRAVENOUS at 12:03

## 2025-01-23 RX ADMIN — TAMSULOSIN HYDROCHLORIDE 0.4 MILLIGRAM(S): 0.4 CAPSULE ORAL at 21:53

## 2025-01-23 RX ADMIN — Medication 2 TABLET(S): at 21:52

## 2025-01-23 RX ADMIN — Medication 500 MILLIGRAM(S): at 17:57

## 2025-01-23 RX ADMIN — ACETAMINOPHEN 650 MILLIGRAM(S): 160 SUSPENSION ORAL at 06:07

## 2025-01-23 RX ADMIN — ACETAMINOPHEN 650 MILLIGRAM(S): 160 SUSPENSION ORAL at 14:41

## 2025-01-23 RX ADMIN — Medication 220 MILLIGRAM(S): at 12:05

## 2025-01-23 NOTE — PHARMACOTHERAPY INTERVENTION NOTE - COMMENTS
79y M presenting with multiple chronic gangrenous wounds on b/l lower extremities (prior hx of MRSA) sent in from wound care for IV antibiotics, debridement, and admission. Was on vanco 750mg q12 s/p 1x dose of 1g as well as Unasyn 3g q6.    Trough today was 9.6, Will continue 500mg q12 until stop date 01/24    Dr. Burnett requesting Vancomycin Dosing Per Pharmacy    1. Indication for the vancomycin: multiple chronic gangrenous wounds on b/l lower extremities  2. Requesting Provider: Lex  3. Current plan and dosing regimen: 500mg q12  4. Day of therapy: 6  5. Cultures:  -01/17 blood negative  -Hx of MRSA in tissue/abscess cx in July 6. Target trough or AUC/KARTHIK: 400-600 hr*ug/mL  7. Day and time level is due: no repeat trough scheduled  8. Previous levels:   -01/23 06:02 9.6 ug/mL  -01/22 08:07 11.3 ug/mL  -01/21 09:55 28.2 ug/mL  -01/19 21:57 10.1 ug/mL  -01/19 09:00 10.9 ug/mL   ug/mL  9. Expected 24-hour AUC: 424 hr*ug/mL
Patient is a 79y Male presenting with SSTI being treated with vancomycin 750 mg IV q12h + Unasyn 3g IV q6h. Vancomycin level 1/19 @ 09:00 = 10.9 ug/mL, current AUC/KARTHIK = 444 hr*ug/mL which is within the therapeutic range of 400-600 hr*ug/mL. Recommend trough 1/21 @ 09:00 for continued pharmacokinetic monitoring.    Angelica Mahoney PharmD  Clinical Pharmacy Specialist   [phone] or Teams
79y M presenting with multiple chronic gangrenous wounds on b/l lower extremities (prior hx of MRSA) sent in from wound care for IV antibiotics, debridement, and admission. Was on vanco 750mg q12 s/p 1x dose of 1g as well as Unasyn 3g q6.    Trough today was 28.2, vancomycin order discontinued and level ordered for tomorrow 01/22 with am labs.    Dr. Burnett requesting Vancomycin Dosing Per Pharmacy    1. Indication for the vancomycin: multiple chronic gangrenous wounds on b/l lower extremities  2. Requesting Provider: Lex  3. Current plan and dosing regimen: currently vancomycin held for supratherapeutic trough  4. Day of therapy: 4  5. Cultures:  -01/17 blood negative  -Hx of MRSA in tissue/abscess cx in July  6. Target trough or AUC/KARTHIK: 400-600 hr*ug/mL  7. Day and time level is due: 01/22 5 am  8. Previous levels:   -01/21 09:55 28.2 ug/mL  -01/19 21:57 10.1 ug/mL  -01/19 09:00 10.9 ug/mL   ug/mL  9. Expected 24-hour AUC: 591 hr*ug/mL  
79y M presenting with multiple chronic gangrenous wounds on b/l lower extremities (prior hx of MRSA) sent in from wound care for IV antibiotics, debridement, and admission. Was on vanco 750mg q12 s/p 1x dose of 1g as well as Unasyn 3g q6.    Trough today was 11.3, ordered for 500mg q12 with pre-3rd trough    Dr. Burnett requesting Vancomycin Dosing Per Pharmacy    1. Indication for the vancomycin: multiple chronic gangrenous wounds on b/l lower extremities  2. Requesting Provider: Lex  3. Current plan and dosing regimen: currently vancomycin held for supratherapeutic trough  4. Day of therapy: 5  5. Cultures:  -01/17 blood negative  -Hx of MRSA in tissue/abscess cx in July  6. Target trough or AUC/KARTHIK: 400-600 hr*ug/mL  7. Day and time level is due: 01/23 _12:00  8. Previous levels:   -01/22 08:07 11.3 ug/mL  -01/21 09:55 28.2 ug/mL  -01/19 21:57 10.1 ug/mL  -01/19 09:00 10.9 ug/mL   ug/mL  9. Expected 24-hour AUC: 444 hr*ug/mL

## 2025-01-23 NOTE — DISCHARGE NOTE PROVIDER - HOSPITAL COURSE
79-year-old male with PMHx of HTN, DM, BPH, PPM, bedbound, anemia presents with multiple chronic gangrenous wounds on b/l lower extremities sent in from wound care for IV antibiotics, debridement and admission. Pt follows with Dr. Conklin. Unable to obtain any history from patient. Spoke with patients son over the phone. Pt also has been rapidly declining over the past year according to son, with multiple hospital admissions, weight loss, decreased po intake, urine/fecal incontinence, needs assistance with feeding, and increased overall pain.      IN THE ED:  Temp  98.8 F , HR 81  , BP  124/74  ,RR 14 , SpO2 95% on RA  S/P vanco, zosyn, 1L NS bolus  Labs significant for: ESR 51, Hgb 8.0  Imaging:  CXR - no acute infiltrate  Xray b/l foot and ankle - No significant interval change since the prior radiographs about 6 months ago. Given the extensive heterogeneity of bone and history of skin   ulcers, osteomyelitis cannot be ruled out on the basis of these films. Given the high suspicion, MRI or bone scan would be helpful for further evaluation.    Pt. was admitted with Podiatry and ID consultation.  Bilateral LE arterial dopplers showed bilateral lower extremity arterial plaque. No clear evidence of hemodynamically significant right or left femoropopliteal arterial stenosis. Severely limited evaluation of the trifurcation arteries due to bandages.   Pt. was continued on IV antibiotics.  Blood cultures showed no growth.  Pt. noted to be anemic with hbg 6.8 and received 1 unit PRBC transfusion with increase hbg to 8.4.  He was also provided wound care for sacral and left hip pressure ulcers.     79-year-old male with PMHx of HTN, DM, BPH, PPM, bedbound, anemia presents with multiple chronic gangrenous wounds on b/l lower extremities sent in from wound care for IV antibiotics, debridement and admission. Pt follows with Dr. Conklin. Unable to obtain any history from patient. Spoke with patients son over the phone. Pt also has been rapidly declining over the past year according to son, with multiple hospital admissions, weight loss, decreased po intake, urine/fecal incontinence, needs assistance with feeding, and increased overall pain.      IN THE ED:  Temp  98.8 F , HR 81  , BP  124/74  ,RR 14 , SpO2 95% on RA  S/P vanco, zosyn, 1L NS bolus  Labs significant for: ESR 51, Hgb 8.0  Imaging:  CXR - no acute infiltrate  Xray b/l foot and ankle - No significant interval change since the prior radiographs about 6 months ago. Given the extensive heterogeneity of bone and history of skin   ulcers, osteomyelitis cannot be ruled out on the basis of these films. Given the high suspicion, MRI or bone scan would be helpful for further evaluation.    Pt. was admitted with Podiatry and ID consultation.  Bilateral LE arterial dopplers showed bilateral lower extremity arterial plaque. No clear evidence of hemodynamically significant right or left femoropopliteal arterial stenosis. Severely limited evaluation of the trifurcation arteries due to bandages.   Pt. was continued on IV antibiotics.  Blood cultures showed no growth.  Pt. noted to be anemic with hbg 6.8 and received 1 unit PRBC transfusion with increase hbg to 8.4.  Pt. started on ferrous sulfate.  He was also provided wound care for sacral and left hip pressure ulcers.  Pt. has completed course of IV antibiotics.      On day of discharge patient is in no distress.  Denies having any pain or SOB.  Afebrile and hemodynamically stable.

## 2025-01-23 NOTE — PROGRESS NOTE ADULT - ASSESSMENT
79-year-old male with HTN, DM, BPH, PPM, bedbound, dementia, anemia presents with multiple chronic gangrenous wounds on b/l lower extremities sent in from wound care for IV antibiotics, debridement and admission. Pt follows with Dr. Conklin. Pt rapidly declining over the past year according to son, with multiple hospital admissions, weight loss, decreased po intake, urine/fecal incontinence, needs assistance with feeding, and increased overall pain.  prior micro with MRSA    RECOMMENDATIONS  Infected wounds, cellulitis  with prior MRSA    Vanco with dosing per pharmacy protocol  Unasyn -continue for now  all interventions in keeping with GOC  abx started 1/18 so rec x 7 days with last day 1/24 -Friday then dc    Thank you for consulting us and involving us in the management of this most interesting and challenging case.  In addition to reviewing history, imaging, documents, labs, microbiology, took into account antibiotic stewardship, local antibiogram and infection control strategies and potential transmission issues.    We will follow along in the care of this patient. Please contact me by texting me directly on my cell# at [phone] using TEAMS or call our answering service at [phone] with any concerns.

## 2025-01-23 NOTE — PROGRESS NOTE ADULT - ASSESSMENT
79-year-old male with PMHx of HTN, DM, BPH, PPM, bedbound, anemia presents with multiple chronic gangrenous wounds on b/l lower extremities sent in from wound care for IV antibiotics, debridement and admission. Admitted for diabetic LE ulcers and cellulitis.    Problem/Plan - 1:  ·  Problem: Diabetic ulcer of both lower extremities.   ·  Plan: Patient presenting with chronic gangrenous b/l foot wounds, sent in by wound care clinic for IV antibiotics and debridement  - Hx MRSA  - continue unasyn and vancomycin. ID recs appreciated    - Pain control and bowel regimen  - arterial doppler: No clear evidence of hemodynamically significant right or left femoropopliteal arterial stenosis.  - Wound care, leg elevation  - blood cultures NGTD  - Podiatry consult noted, no plan for surgical procedure.     Problem/Plan - 2:  ·  Problem: Adult failure to thrive.   ·  Plan: Bedbound, weight loss, decreased po intake, lethargy  -Nutrition consult  -SW/CM   -Palliative consult.     Problem/Plan - 3:  ·  Problem: Anemia.   ·  Plan: Hgb 8.0 on admission.  -Hb 8.7 today. post one unit of pRBCs and monitor H/H.  -Upon chart review, Hgb typically around 8  -previous iron studies indicate iron deficiency anemia  -iron 325mg daily.     Problem/Plan - 4:  ·  Problem: T2DM (type 2 diabetes mellitus).   ·  Plan: - On insulin  - Pt uses humalog insulin sliding scale with meals and takes lantus 5 units at bedtime -- will HOLD  - Glucose 144 on admission, goal glucose 140-180 while hospitalized  - Low dose ISS  - Consistent carb diet  - Hypoglycemic protocol  - A1c 5.6.     Problem/Plan - 5:  ·  Problem: HTN (hypertension).   ·  Plan: /72 on admission  -Continue home Coreg with hold parameters  -monitor routine hemodynamics.     Problem/Plan - 6:  ·  Problem: BPH (benign prostatic hyperplasia).   ·  Plan: -Continue home flomax and bethanecol.     Problem/Plan - 7:  ·  Problem: Neuropathy.   ·  Plan: -Continue home gabapentin.     Problem/Plan - 8:  ·  Problem: Need for prophylactic measure.   ·  Plan: -Lovenox 40 daily for DVT ppx.

## 2025-01-23 NOTE — DISCHARGE NOTE PROVIDER - CARE PROVIDER_API CALL
James ConklinFranco  Podiatric Medicine  [address] [place] [id number]  Phone: [phone]  Fax: [phone]  Follow Up Time: 1 week

## 2025-01-23 NOTE — DISCHARGE NOTE PROVIDER - ATTENDING DISCHARGE PHYSICAL EXAMINATION:
Vitals: T: 97.9  P: 65  BP: 168/75  RR: 18  SpO2: 97%RA  GENERAL: NAD  HEENT: EOMI, hearing normal, conjunctiva and sclera clear  Chest: CTA bilaterally, no wheezing  CV: S1S2, RRR,   GI: soft, +BS, NT/ND  Musculoskeletal: clean and dry dressings over bilateral LE  Psychiatric: affect nL, mood nL  Skin: warm and dry   Vitals: T: 98.2  P: 60  BP: 134/79  RR: 18  SpO2: 94%RA  GENERAL: NAD  HEENT: EOMI, hearing normal, conjunctiva and sclera clear  Chest: CTA bilaterally, no wheezing  CV: S1S2, RRR,   GI: soft, +BS, NT/ND  Musculoskeletal: clean and dry dressings over bilateral LE, right hip and sacrum  Psychiatric: affect nL, mood nL  Skin: warm and dry

## 2025-01-23 NOTE — DISCHARGE NOTE PROVIDER - NSDCMRMEDTOKEN_GEN_ALL_CORE_FT
acetaminophen 325 mg oral tablet: 2 tab(s) orally every 6 hours As needed Temp greater or equal to 38C (100.4F), Moderate Pain (4 - 6)  Aspir 81 oral delayed release tablet: 1 tab(s) orally once a day  bethanechol 25 mg oral tablet: 1 tab(s) orally 2 times a day  Coreg 3.125 mg oral tablet: 1 tab(s) orally 2 times a day  Flomax 0.4 mg oral capsule: 1 cap(s) orally once a day (at bedtime)  gabapentin 600 mg oral tablet: 1 tab(s) orally 2 times a day  HumaLOG 100 units/mL injectable solution: injectable ISS  lactobacillus acidophilus oral capsule: 1 cap(s) orally 2 times a day  Lantus 100 units/mL subcutaneous solution: 5 unit(s) subcutaneous once a day (at bedtime)   acetaminophen 325 mg oral tablet: 2 tab(s) orally every 6 hours as needed for  mild pain  Aspir 81 oral delayed release tablet: 1 tab(s) orally once a day  bethanechol 25 mg oral tablet: 1 tab(s) orally 2 times a day  Coreg 3.125 mg oral tablet: 1 tab(s) orally 2 times a day  Flomax 0.4 mg oral capsule: 1 cap(s) orally once a day (at bedtime)  gabapentin 600 mg oral tablet: 1 tab(s) orally 2 times a day  HumaLOG 100 units/mL injectable solution: injectable ISS  lactobacillus acidophilus oral capsule: 1 cap(s) orally 2 times a day  Lantus 100 units/mL subcutaneous solution: 5 unit(s) subcutaneous once a day (at bedtime)  Multiple Vitamins with Minerals oral liquid: 1 tab(s) orally once a day  polyethylene glycol 3350 oral powder for reconstitution: 17 gram(s) orally once a day  senna leaf extract oral tablet: 2 tab(s) orally once a day (at bedtime)  Vitamin C 500 mg oral tablet: 1 tab(s) orally 2 times a day   acetaminophen 325 mg oral tablet: 2 tab(s) orally every 6 hours as needed for  mild pain  Aspir 81 oral delayed release tablet: 1 tab(s) orally once a day  bethanechol 25 mg oral tablet: 1 tab(s) orally 2 times a day  Coreg 3.125 mg oral tablet: 1 tab(s) orally 2 times a day  ferrous sulfate 325 mg (65 mg elemental iron) oral tablet: 1 tab(s) orally once a day  Flomax 0.4 mg oral capsule: 1 cap(s) orally once a day (at bedtime)  gabapentin 600 mg oral tablet: 1 tab(s) orally 2 times a day  HumaLOG 100 units/mL injectable solution: injectable ISS  lactobacillus acidophilus oral capsule: 1 cap(s) orally 2 times a day  Lantus 100 units/mL subcutaneous solution: 5 unit(s) subcutaneous once a day (at bedtime)  Multiple Vitamins with Minerals oral liquid: 1 tab(s) orally once a day  polyethylene glycol 3350 oral powder for reconstitution: 17 gram(s) orally once a day  senna leaf extract oral tablet: 2 tab(s) orally once a day (at bedtime)  Ultram 50 mg oral tablet: 0.5 tab(s) orally every 6 hours as needed for Moderate Pain (4 - 6) MDD: 4  Vitamin C 500 mg oral tablet: 1 tab(s) orally 2 times a day

## 2025-01-23 NOTE — PHARMACOTHERAPY INTERVENTION NOTE - NSPHARMCOMMASP
ASP - Dose optimization/Non-Renal dose adjustment
ASP - Dose optimization/Non-Renal dose adjustment
ASP - Lab/ test recommended
ASP - Dose optimization/Non-Renal dose adjustment

## 2025-01-23 NOTE — DISCHARGE NOTE PROVIDER - NSDCCPCAREPLAN_GEN_ALL_CORE_FT
PRINCIPAL DISCHARGE DIAGNOSIS  Diagnosis: Wound cellulitis  Assessment and Plan of Treatment: You were given wound care and completed course of IV antibiotics.  Please follow up with Podiatry Dr. Conklin within 1 week of discharge at the wound care center.  Follow up with your PMD in 1 week.      SECONDARY DISCHARGE DIAGNOSES  Diagnosis: Pressure ulcer of sacral region, stage 2  Assessment and Plan of Treatment: Continue wound care and follow up at wound care center.    Diagnosis: Pressure ulcer of right hip, stage 2  Assessment and Plan of Treatment: Continue wound care and follow up at wound care center.    Diagnosis: Anemia  Assessment and Plan of Treatment: You recieved 1 unit packed red blood cells transfusion and was started on ferrous sulfate.    Diagnosis: T2DM (type 2 diabetes mellitus)  Assessment and Plan of Treatment: Continue your home insulin regimen

## 2025-01-23 NOTE — DISCHARGE NOTE PROVIDER - NSDCFUADDINST_GEN_ALL_CORE_FT
wound care: Please apply Allevyn Border to sacral and left hip pressure ulcers every 3 days.    Lower extremity wound care:  1. Remove bilateral lower extremity dressings.  2. Dress wounds with adaptic, aquacel Ag, and dry, sterile dressing.  3. Change dressings daily and monitor for any signs of infection.    Patient is to follow up in [facility] within 1 week upon discharge. wound care: Please apply Allevyn Border to sacral and right hip pressure ulcers every 3 days.    Lower extremity wound care:  1. Remove bilateral lower extremity dressings.  2. Dress wounds with adaptic, aquacel Ag, and dry, sterile dressing.  3. Change dressings daily and monitor for any signs of infection.    Patient is to follow up in the [facility] within 1 week upon discharge. Sacral and right hip wound care: Please apply Allevyn Border to sacral and right hip pressure ulcers every 3 days.    Lower extremity wound care:  1. Remove bilateral lower extremity dressings.  2. Dress wounds with adaptic, aquacel Ag, and dry, sterile dressing.  3. Change dressings daily and monitor for any signs of infection.    Patient is to follow up in the [facility] within 1 week upon discharge.

## 2025-01-24 LAB
ANION GAP SERPL CALC-SCNC: 1 MMOL/L — LOW (ref 5–17)
BUN SERPL-MCNC: 22 MG/DL — SIGNIFICANT CHANGE UP (ref 7–23)
CALCIUM SERPL-MCNC: 8.7 MG/DL — SIGNIFICANT CHANGE UP (ref 8.5–10.1)
CHLORIDE SERPL-SCNC: 108 MMOL/L — SIGNIFICANT CHANGE UP (ref 96–108)
CO2 SERPL-SCNC: 31 MMOL/L — SIGNIFICANT CHANGE UP (ref 22–31)
CREAT SERPL-MCNC: 0.68 MG/DL — SIGNIFICANT CHANGE UP (ref 0.5–1.3)
EGFR: 95 ML/MIN/1.73M2 — SIGNIFICANT CHANGE UP
GLUCOSE SERPL-MCNC: 205 MG/DL — HIGH (ref 70–99)
HCT VFR BLD CALC: 26.4 % — LOW (ref 39–50)
HGB BLD-MCNC: 8.1 G/DL — LOW (ref 13–17)
MCHC RBC-ENTMCNC: 25.3 PG — LOW (ref 27–34)
MCHC RBC-ENTMCNC: 30.7 G/DL — LOW (ref 32–36)
MCV RBC AUTO: 82.5 FL — SIGNIFICANT CHANGE UP (ref 80–100)
NRBC # BLD: 0 /100 WBCS — SIGNIFICANT CHANGE UP (ref 0–0)
NRBC BLD-RTO: 0 /100 WBCS — SIGNIFICANT CHANGE UP (ref 0–0)
PLATELET # BLD AUTO: 240 K/UL — SIGNIFICANT CHANGE UP (ref 150–400)
POTASSIUM SERPL-MCNC: 4.5 MMOL/L — SIGNIFICANT CHANGE UP (ref 3.5–5.3)
POTASSIUM SERPL-SCNC: 4.5 MMOL/L — SIGNIFICANT CHANGE UP (ref 3.5–5.3)
RBC # BLD: 3.2 M/UL — LOW (ref 4.2–5.8)
RBC # FLD: 17 % — HIGH (ref 10.3–14.5)
SODIUM SERPL-SCNC: 140 MMOL/L — SIGNIFICANT CHANGE UP (ref 135–145)
WBC # BLD: 6.11 K/UL — SIGNIFICANT CHANGE UP (ref 3.8–10.5)
WBC # FLD AUTO: 6.11 K/UL — SIGNIFICANT CHANGE UP (ref 3.8–10.5)

## 2025-01-24 PROCEDURE — 99232 SBSQ HOSP IP/OBS MODERATE 35: CPT

## 2025-01-24 RX ORDER — POLYETHYLENE GLYCOL 3350 17 G/17G
17 POWDER, FOR SOLUTION ORAL
Qty: 0 | Refills: 0 | DISCHARGE
Start: 2025-01-24

## 2025-01-24 RX ORDER — ASCORBIC ACID 500 MG/ML
1 VIAL (ML) INJECTION
Qty: 0 | Refills: 0 | DISCHARGE
Start: 2025-01-24

## 2025-01-24 RX ORDER — IRON/FOLIC ACID/C/B6/B12/ZINC 150-1.25MG
1 TABLET ORAL
Qty: 0 | Refills: 0 | DISCHARGE
Start: 2025-01-24

## 2025-01-24 RX ORDER — SENNOSIDES 8.6 MG
2 TABLET ORAL
Qty: 0 | Refills: 0 | DISCHARGE
Start: 2025-01-24

## 2025-01-24 RX ADMIN — Medication 500 MILLIGRAM(S): at 05:19

## 2025-01-24 RX ADMIN — Medication 25 MILLIGRAM(S): at 05:19

## 2025-01-24 RX ADMIN — Medication 500 MILLIGRAM(S): at 17:49

## 2025-01-24 RX ADMIN — AMPICILLIN SODIUM AND SULBACTAM SODIUM 200 GRAM(S): 2; 1 INJECTION, POWDER, FOR SOLUTION INTRAMUSCULAR; INTRAVENOUS at 05:18

## 2025-01-24 RX ADMIN — Medication 325 MILLIGRAM(S): at 12:49

## 2025-01-24 RX ADMIN — Medication 15 MILLILITER(S): at 12:48

## 2025-01-24 RX ADMIN — AMPICILLIN SODIUM AND SULBACTAM SODIUM 200 GRAM(S): 2; 1 INJECTION, POWDER, FOR SOLUTION INTRAMUSCULAR; INTRAVENOUS at 12:49

## 2025-01-24 RX ADMIN — GABAPENTIN 600 MILLIGRAM(S): 800 TABLET ORAL at 05:18

## 2025-01-24 RX ADMIN — ASPIRIN 81 MILLIGRAM(S): 81 TABLET, COATED ORAL at 12:49

## 2025-01-24 RX ADMIN — Medication 2: at 12:20

## 2025-01-24 RX ADMIN — Medication 2 TABLET(S): at 21:39

## 2025-01-24 RX ADMIN — Medication 3.12 MILLIGRAM(S): at 05:19

## 2025-01-24 RX ADMIN — ENOXAPARIN SODIUM 40 MILLIGRAM(S): 100 INJECTION SUBCUTANEOUS at 06:22

## 2025-01-24 RX ADMIN — VANCOMYCIN HYDROCHLORIDE 100 MILLIGRAM(S): KIT at 18:51

## 2025-01-24 RX ADMIN — Medication 220 MILLIGRAM(S): at 12:49

## 2025-01-24 RX ADMIN — ACETAMINOPHEN 650 MILLIGRAM(S): 160 SUSPENSION ORAL at 13:28

## 2025-01-24 RX ADMIN — ACETAMINOPHEN 650 MILLIGRAM(S): 160 SUSPENSION ORAL at 06:20

## 2025-01-24 RX ADMIN — Medication 25 MILLIGRAM(S): at 17:49

## 2025-01-24 RX ADMIN — VANCOMYCIN HYDROCHLORIDE 100 MILLIGRAM(S): KIT at 06:21

## 2025-01-24 RX ADMIN — POLYETHYLENE GLYCOL 3350 17 GRAM(S): 17 POWDER, FOR SOLUTION ORAL at 12:48

## 2025-01-24 RX ADMIN — GABAPENTIN 600 MILLIGRAM(S): 800 TABLET ORAL at 17:48

## 2025-01-24 RX ADMIN — Medication 2: at 08:16

## 2025-01-24 RX ADMIN — ACETAMINOPHEN 650 MILLIGRAM(S): 160 SUSPENSION ORAL at 05:18

## 2025-01-24 RX ADMIN — TAMSULOSIN HYDROCHLORIDE 0.4 MILLIGRAM(S): 0.4 CAPSULE ORAL at 21:39

## 2025-01-24 RX ADMIN — ACETAMINOPHEN 650 MILLIGRAM(S): 160 SUSPENSION ORAL at 21:39

## 2025-01-24 RX ADMIN — Medication 1 TABLET(S): at 12:49

## 2025-01-24 RX ADMIN — AMPICILLIN SODIUM AND SULBACTAM SODIUM 200 GRAM(S): 2; 1 INJECTION, POWDER, FOR SOLUTION INTRAMUSCULAR; INTRAVENOUS at 17:50

## 2025-01-24 NOTE — CASE MANAGEMENT PROGRESS NOTE - NSCMPROGRESSNOTE_GEN_ALL_CORE
CM met with pt to discuss transition of care, Pt deferred conversation to pt son Nico Venegas, [phone]. Per MD pt is for possible discharge tomorrow 1/25/25. Pt is to be transitioned to home with no formal home care services, pt offered home care services, pt declined home care services. Pt son in agreement with assuming care of pt post discharge. CM explained home care services, expectations, process, insurance provisions and home safety with pt verbalizing understanding.  Pt son states family will assist post discharge / assume care of pt. Pt son requested pt receive hospital bed, DME referral sent to [facility] [phone], pending authorization. Pt son stated he does not want to wait for bed to be delivered for pt dc. Pt son aware of plan and in agreement / verbalizes understanding. IMM discussed / given. Pt son verbalized understanding. Confirmed pharmacy is [facility]. community MD was Dr. Baez who has retired. Per son, he will make pt follow up appointments and is in the process of establishing a PCP.  DMEs in home include walker and wheelchair. Pt son stated pt will require ambulette for transport pt home and they use [facility]. CM spoke with Lyle S. at [phone] and arranged trip for 1 pm 1/25/25 as per pt son request. Pt son informed insurance coverage for transportation may vary based on insurance plan, which may lead to out of pocket costs and pt may be billed by transportation provider. Pt son stated he would make appropriate payments. CM confirmed with Lyle KELLER family has arranged payment for the transport however there was no reference number provided to this CM. Per  All questions answered. CM discussed plan with MD and RN. CM to remain available through hospitalization.

## 2025-01-24 NOTE — PROGRESS NOTE ADULT - ASSESSMENT
79-year-old male with HTN, DM, BPH, PPM, bedbound, dementia, anemia presents with multiple chronic gangrenous wounds on b/l lower extremities sent in from wound care for IV antibiotics, debridement and admission. Pt follows with Dr. Conklin. Pt rapidly declining over the past year according to son, with multiple hospital admissions, weight loss, decreased po intake, urine/fecal incontinence, needs assistance with feeding, and increased overall pain.  prior micro with MRSA    RECOMMENDATIONS  Infected wounds, cellulitis  with prior MRSA    Vanco with dosing per pharmacy protocol being adjusted  Unasyn -continue for now  all interventions in keeping with GOC  abx started 1/18 so rec x 7 days with last day 1/24-TODAY -Friday then dc    Thank you for consulting us and involving us in the management of this most interesting and challenging case.  In addition to reviewing history, imaging, documents, labs, microbiology, took into account antibiotic stewardship, local antibiogram and infection control strategies and potential transmission issues.    We will follow along in the care of this patient. Please contact me by texting me directly on my cell# at [phone] using TEAMS or call our answering service at [phone] with any concerns.

## 2025-01-24 NOTE — CASE MANAGEMENT PROGRESS NOTE - NSCMPROGRESSNOTE_GEN_ALL_CORE
CM consult for health literacy, FTT, dispo noted and reviewed. CM will continue to collaborate with interdisciplinary team, remain available to assist and monitor for home care needs.

## 2025-01-24 NOTE — PROGRESS NOTE ADULT - ASSESSMENT
79-year-old male with PMHx of HTN, DM, BPH, PPM, bedbound, anemia presents with multiple chronic gangrenous wounds on b/l lower extremities sent in from wound care for IV antibiotics, debridement and admission. Admitted for diabetic LE ulcers and cellulitis.    Problem/Plan - 1:  ·  Problem: Diabetic ulcer of both lower extremities.   ·  Plan: Patient presenting with chronic gangrenous b/l foot wounds, sent in by wound care clinic for IV antibiotics and debridement  - Hx MRSA  - continue unasyn and vancomycin. ID recs appreciated    - Pain control and bowel regimen  - arterial doppler: No clear evidence of hemodynamically significant right or left femoropopliteal arterial stenosis.  - Wound care, leg elevation  - blood cultures NG  - Podiatry consult noted, no plan for surgical procedure.     Problem/Plan - 2:  ·  Problem: Adult failure to thrive.   ·  Plan: Bedbound, weight loss, decreased po intake, lethargy  -Nutrition consult  -SW/CM   -Palliative consult.     Problem/Plan - 3:  ·  Problem: Anemia.   ·  Plan: Hgb 8.0 on admission.  -Hb 8.7 today. post one unit of pRBCs and monitor H/H.  -Upon chart review, Hgb typically around 8  -previous iron studies indicate iron deficiency anemia  -iron 325mg daily.     Problem/Plan - 4:  ·  Problem: T2DM (type 2 diabetes mellitus).   ·  Plan: - On insulin  - Pt uses humalog insulin sliding scale with meals and takes lantus 5 units at bedtime -- will HOLD  - Glucose 144 on admission, goal glucose 140-180 while hospitalized  - Low dose ISS  - Consistent carb diet  - Hypoglycemic protocol  - A1c 5.6.     Problem/Plan - 5:  ·  Problem: HTN (hypertension).   ·  Plan: /72 on admission  -Continue home Coreg with hold parameters  -monitor routine hemodynamics.     Problem/Plan - 6:  ·  Problem: BPH (benign prostatic hyperplasia).   ·  Plan: -Continue home flomax and bethanecol.     Problem/Plan - 7:  ·  Problem: Neuropathy.   ·  Plan: -Continue home gabapentin.     Problem/Plan - 8:  ·  Problem: Need for prophylactic measure.   ·  Plan: -Lovenox 40 daily for DVT ppx.       79-year-old male with PMHx of HTN, DM, BPH, PPM, bedbound, anemia presents with multiple chronic gangrenous wounds on b/l lower extremities sent in from wound care for IV antibiotics, debridement and admission. Admitted for diabetic LE ulcers and cellulitis.    Problem/Plan - 1:  ·  Problem: Diabetic ulcer of both lower extremities.   ·  Plan: Patient presenting with chronic gangrenous b/l foot wounds, sent in by wound care clinic for IV antibiotics and debridement  - Hx MRSA  - continue unasyn and vancomycin. ID recs appreciated    - Pain control and bowel regimen  - arterial doppler: No clear evidence of hemodynamically significant right or left femoropopliteal arterial stenosis.  - Wound care, leg elevation  - blood cultures NG  - Podiatry consult noted, no plan for surgical procedure.  - Patient will require hospital bed due to deconditioning and multiple wounds.  Patient requires the head of the bed elevated greater than what pillows will allow.  Patient requires positioning of the ordinary bed.  Patient requires frequent positioning in order to relieve pain.  Patient can independently affect the adjustments of the bed by operating controls.  Patient requires a gel overlay due to mobility and circulatory status.       Problem/Plan - 2:  ·  Problem: Adult failure to thrive.   ·  Plan: Bedbound, weight loss, decreased po intake, lethargy  -Nutrition consult  -SW/CM   -Palliative consult.     Problem/Plan - 3:  ·  Problem: Anemia.   ·  Plan: Hgb 8.0 on admission.  -Hb 8.7 today. post one unit of pRBCs and monitor H/H.  -Upon chart review, Hgb typically around 8  -previous iron studies indicate iron deficiency anemia  -iron 325mg daily.     Problem/Plan - 4:  ·  Problem: T2DM (type 2 diabetes mellitus).   ·  Plan: - On insulin  - Pt uses humalog insulin sliding scale with meals and takes lantus 5 units at bedtime -- will HOLD  - Glucose 144 on admission, goal glucose 140-180 while hospitalized  - Low dose ISS  - Consistent carb diet  - Hypoglycemic protocol  - A1c 5.6.     Problem/Plan - 5:  ·  Problem: HTN (hypertension).   ·  Plan: /72 on admission  -Continue home Coreg with hold parameters  -monitor routine hemodynamics.     Problem/Plan - 6:  ·  Problem: BPH (benign prostatic hyperplasia).   ·  Plan: -Continue home flomax and bethanecol.     Problem/Plan - 7:  ·  Problem: Neuropathy.   ·  Plan: -Continue home gabapentin.     Problem/Plan - 8:  ·  Problem: Need for prophylactic measure.   ·  Plan: -Lovenox 40 daily for DVT ppx.

## 2025-01-24 NOTE — CHART NOTE - NSCHARTNOTEFT_GEN_A_CORE
Assessment:   Pt seen for nutrition follow up.  Chart reviewed, hospital course noted.     Brief History:   "79-year-old male with PMHx of HTN, DM, BPH, PPM, bedbound, dementia, anemia presents with multiple chronic gangrenous wounds on b/l lower extremities sent in from wound care for IV antibiotics, debridement and admission. Admitted for diabetic LE ulcers and cellulitis."    Pt seen at bedside, reports eats pretty well if someone helps him.  Reports drinking Glucerna shakes and tried orange Alo. Would like to try other flavor. Some food preferences obtained.  Pt reports used to weigh 240-250#.     Factors impacting intake: [ ] none [ ] nausea  [ ] vomiting [ ] diarrhea [ ] constipation  [ ]chewing problems [ ] swallowing issues  [x] other: needs assistance to eat    Diet Prescription: Diet, Consistent Carbohydrate w/Evening Snack:   Low Sodium  Alo(7 Gm Arginine/7 Gm Glut/1.2 Gm HMB     Qty per Day:  1 packet BID  Supplement Feeding Modality:  Oral  Glucerna Shake Cans or Servings Per Day:  1       Frequency:  Three Times a day (01-20-25 @ 15:44)    Intake:  fair to good with assist    Current Weight: 1/20 119.9#, 1/23 151.2#/210.1#, 1/24 196# (this writer obtained 1/24 wt on bedscale, appears more accurate)      Pertinent Medications: MEDICATIONS  (STANDING):  acetaminophen     Tablet .. 650 milliGRAM(s) Oral every 8 hours  ampicillin/sulbactam  IVPB 3 Gram(s) IV Intermittent every 6 hours  ascorbic acid 500 milliGRAM(s) Oral two times a day  aspirin enteric coated 81 milliGRAM(s) Oral daily  bethanechol 25 milliGRAM(s) Oral two times a day  carvedilol 3.125 milliGRAM(s) Oral every 12 hours  dextrose 5%. 1000 milliLiter(s) (100 mL/Hr) IV Continuous <Continuous>  dextrose 5%. 1000 milliLiter(s) (50 mL/Hr) IV Continuous <Continuous>  dextrose 50% Injectable 25 Gram(s) IV Push once  dextrose 50% Injectable 12.5 Gram(s) IV Push once  dextrose 50% Injectable 25 Gram(s) IV Push once  enoxaparin Injectable 40 milliGRAM(s) SubCutaneous every 24 hours  ferrous    sulfate 325 milliGRAM(s) Oral daily  gabapentin 600 milliGRAM(s) Oral two times a day  glucagon  Injectable 1 milliGRAM(s) IntraMuscular once  insulin lispro (ADMELOG) corrective regimen sliding scale   SubCutaneous three times a day before meals  insulin lispro (ADMELOG) corrective regimen sliding scale   SubCutaneous at bedtime  lactobacillus acidophilus 1 Tablet(s) Oral daily  multivitamin/minerals/iron Oral Solution (CENTRUM) 15 milliLiter(s) Oral daily  naloxone Injectable 0.4 milliGRAM(s) IV Push once  polyethylene glycol 3350 17 Gram(s) Oral daily  senna 2 Tablet(s) Oral at bedtime  tamsulosin 0.4 milliGRAM(s) Oral at bedtime  vancomycin  IVPB      vancomycin  IVPB 500 milliGRAM(s) IV Intermittent every 12 hours  zinc sulfate 220 milliGRAM(s) Oral daily    MEDICATIONS  (PRN):  dextrose Oral Gel 15 Gram(s) Oral once PRN Blood Glucose LESS THAN 70 milliGRAM(s)/deciliter  morphine  - Injectable 2 milliGRAM(s) IV Push every 6 hours PRN Severe Pain (7 - 10)  traMADol 25 milliGRAM(s) Oral every 6 hours PRN Moderate Pain (4 - 6)    Pertinent Labs: 01-24 Na140 mmol/L Glu 205 mg/dL[H] K+ 4.5 mmol/L Cr  0.68 mg/dL BUN 22 mg/dL 01-22 Phos 2.9 mg/dL 01-18 Alb 2.3 g/dL[L]     CAPILLARY BLOOD GLUCOSE      POCT Blood Glucose.: 215 mg/dL (24 Jan 2025 07:56)  POCT Blood Glucose.: 235 mg/dL (23 Jan 2025 21:39)  POCT Blood Glucose.: 149 mg/dL (23 Jan 2025 16:48)  POCT Blood Glucose.: 171 mg/dL (23 Jan 2025 11:31)      Skin: R/L shin wounds x 4, R hip unstaged, sacral II, R heel DTI  Edema: 1+ dep    Estimated Needs:   [x] no change since previous assessment on: 1/20 based on #  [ ] recalculated: based on   kcal/day: 7868-9005 diane  gms protein/day: 138-173gms    Previous Nutrition Diagnosis:     [x] Increased Nutrient Needs (protein, energy, micronutrients)    [x]  Malnutrition     Nutrition Diagnosis is [x] ongoing  [ ] resolved [ ] not applicable     New Nutrition Diagnosis: [x] not applicable       Interventions:   Recommend  [x] Continue current diet  [ ] Change Diet To:  [ ] Nutrition Supplement  [ ] Nutrition Support  [x] Other: assist with meals, encourage/maximize intake of meals and ONS, provide food pref    Monitoring and Evaluation:   [x] PO intake [ x ] Tolerance to diet prescription [ x ] weights [ x ] labs [ x ] follow up per protocol  [x] other: s/s GI distress, bowel function, skin integrity/ edema Assessment:   Pt seen for nutrition follow up.  Chart reviewed, hospital course noted.     Brief History:   "79-year-old male with PMHx of HTN, DM, BPH, PPM, bedbound, dementia, anemia presents with multiple chronic gangrenous wounds on b/l lower extremities sent in from wound care for IV antibiotics, debridement and admission. Admitted for diabetic LE ulcers and cellulitis."    Pt seen at bedside, reports eats pretty well if someone helps him.  Reports drinking Glucerna shakes and tried orange Alo. Would like to try other flavor. Some food preferences obtained.  Pt reports used to weigh 240-250#.  BM 1/21, Miralax and Senna ordered.      Factors impacting intake: [ ] none [ ] nausea  [ ] vomiting [ ] diarrhea [ ] constipation  [ ]chewing problems [ ] swallowing issues  [x] other: needs assistance to eat    Diet Prescription: Diet, Consistent Carbohydrate w/Evening Snack:   Low Sodium  Alo(7 Gm Arginine/7 Gm Glut/1.2 Gm HMB     Qty per Day:  1 packet BID  Supplement Feeding Modality:  Oral  Glucerna Shake Cans or Servings Per Day:  1       Frequency:  Three Times a day (01-20-25 @ 15:44)    Intake:  fair to good with assist    Current Weight: 1/20 119.9#, 1/23 151.2#/210.1#, 1/24 196# (this writer obtained 1/24 wt on bedscale, appears more accurate)      Pertinent Medications: MEDICATIONS  (STANDING):  acetaminophen     Tablet .. 650 milliGRAM(s) Oral every 8 hours  ampicillin/sulbactam  IVPB 3 Gram(s) IV Intermittent every 6 hours  ascorbic acid 500 milliGRAM(s) Oral two times a day  aspirin enteric coated 81 milliGRAM(s) Oral daily  bethanechol 25 milliGRAM(s) Oral two times a day  carvedilol 3.125 milliGRAM(s) Oral every 12 hours  dextrose 5%. 1000 milliLiter(s) (100 mL/Hr) IV Continuous <Continuous>  dextrose 5%. 1000 milliLiter(s) (50 mL/Hr) IV Continuous <Continuous>  dextrose 50% Injectable 25 Gram(s) IV Push once  dextrose 50% Injectable 12.5 Gram(s) IV Push once  dextrose 50% Injectable 25 Gram(s) IV Push once  enoxaparin Injectable 40 milliGRAM(s) SubCutaneous every 24 hours  ferrous    sulfate 325 milliGRAM(s) Oral daily  gabapentin 600 milliGRAM(s) Oral two times a day  glucagon  Injectable 1 milliGRAM(s) IntraMuscular once  insulin lispro (ADMELOG) corrective regimen sliding scale   SubCutaneous three times a day before meals  insulin lispro (ADMELOG) corrective regimen sliding scale   SubCutaneous at bedtime  lactobacillus acidophilus 1 Tablet(s) Oral daily  multivitamin/minerals/iron Oral Solution (CENTRUM) 15 milliLiter(s) Oral daily  naloxone Injectable 0.4 milliGRAM(s) IV Push once  polyethylene glycol 3350 17 Gram(s) Oral daily  senna 2 Tablet(s) Oral at bedtime  tamsulosin 0.4 milliGRAM(s) Oral at bedtime  vancomycin  IVPB      vancomycin  IVPB 500 milliGRAM(s) IV Intermittent every 12 hours  zinc sulfate 220 milliGRAM(s) Oral daily    MEDICATIONS  (PRN):  dextrose Oral Gel 15 Gram(s) Oral once PRN Blood Glucose LESS THAN 70 milliGRAM(s)/deciliter  morphine  - Injectable 2 milliGRAM(s) IV Push every 6 hours PRN Severe Pain (7 - 10)  traMADol 25 milliGRAM(s) Oral every 6 hours PRN Moderate Pain (4 - 6)    Pertinent Labs: 01-24 Na140 mmol/L Glu 205 mg/dL[H] K+ 4.5 mmol/L Cr  0.68 mg/dL BUN 22 mg/dL 01-22 Phos 2.9 mg/dL 01-18 Alb 2.3 g/dL[L]     CAPILLARY BLOOD GLUCOSE      POCT Blood Glucose.: 215 mg/dL (24 Jan 2025 07:56)  POCT Blood Glucose.: 235 mg/dL (23 Jan 2025 21:39)  POCT Blood Glucose.: 149 mg/dL (23 Jan 2025 16:48)  POCT Blood Glucose.: 171 mg/dL (23 Jan 2025 11:31)      Skin: R/L shin wounds x 4, R hip unstaged, sacral II, R heel DTI  Edema: 1+ dep    Estimated Needs:   [x] no change since previous assessment on: 1/20 based on #  [ ] recalculated: based on   kcal/day: 7270-9350 diane  gms protein/day: 138-173gms    Previous Nutrition Diagnosis:     [x] Increased Nutrient Needs (protein, energy, micronutrients)    [x]  Malnutrition     Nutrition Diagnosis is [x] ongoing  [ ] resolved [ ] not applicable     New Nutrition Diagnosis: [x] not applicable       Interventions:   Recommend  [x] Continue current diet  [ ] Change Diet To:  [ ] Nutrition Supplement  [ ] Nutrition Support  [x] Other: assist with meals, encourage/maximize intake of meals and ONS, provide food pref    Monitoring and Evaluation:   [x] PO intake [ x ] Tolerance to diet prescription [ x ] weights [ x ] labs [ x ] follow up per protocol  [x] other: s/s GI distress, bowel function, skin integrity/ edema

## 2025-01-25 LAB
ANION GAP SERPL CALC-SCNC: 2 MMOL/L — LOW (ref 5–17)
BASOPHILS # BLD AUTO: 0.04 K/UL — SIGNIFICANT CHANGE UP (ref 0–0.2)
BASOPHILS NFR BLD AUTO: 0.5 % — SIGNIFICANT CHANGE UP (ref 0–2)
BUN SERPL-MCNC: 29 MG/DL — HIGH (ref 7–23)
CALCIUM SERPL-MCNC: 8.8 MG/DL — SIGNIFICANT CHANGE UP (ref 8.5–10.1)
CHLORIDE SERPL-SCNC: 106 MMOL/L — SIGNIFICANT CHANGE UP (ref 96–108)
CO2 SERPL-SCNC: 32 MMOL/L — HIGH (ref 22–31)
CREAT SERPL-MCNC: 0.75 MG/DL — SIGNIFICANT CHANGE UP (ref 0.5–1.3)
EGFR: 92 ML/MIN/1.73M2 — SIGNIFICANT CHANGE UP
EOSINOPHIL # BLD AUTO: 0.33 K/UL — SIGNIFICANT CHANGE UP (ref 0–0.5)
EOSINOPHIL NFR BLD AUTO: 4.3 % — SIGNIFICANT CHANGE UP (ref 0–6)
GLUCOSE SERPL-MCNC: 177 MG/DL — HIGH (ref 70–99)
HCT VFR BLD CALC: 27 % — LOW (ref 39–50)
HGB BLD-MCNC: 8.4 G/DL — LOW (ref 13–17)
IMM GRANULOCYTES NFR BLD AUTO: 0.5 % — SIGNIFICANT CHANGE UP (ref 0–0.9)
LYMPHOCYTES # BLD AUTO: 0.84 K/UL — LOW (ref 1–3.3)
LYMPHOCYTES # BLD AUTO: 10.9 % — LOW (ref 13–44)
MCHC RBC-ENTMCNC: 25.9 PG — LOW (ref 27–34)
MCHC RBC-ENTMCNC: 31.1 G/DL — LOW (ref 32–36)
MCV RBC AUTO: 83.3 FL — SIGNIFICANT CHANGE UP (ref 80–100)
MONOCYTES # BLD AUTO: 0.46 K/UL — SIGNIFICANT CHANGE UP (ref 0–0.9)
MONOCYTES NFR BLD AUTO: 6 % — SIGNIFICANT CHANGE UP (ref 2–14)
NEUTROPHILS # BLD AUTO: 6.02 K/UL — SIGNIFICANT CHANGE UP (ref 1.8–7.4)
NEUTROPHILS NFR BLD AUTO: 77.8 % — HIGH (ref 43–77)
NRBC # BLD: 0 /100 WBCS — SIGNIFICANT CHANGE UP (ref 0–0)
NRBC BLD-RTO: 0 /100 WBCS — SIGNIFICANT CHANGE UP (ref 0–0)
PLATELET # BLD AUTO: 240 K/UL — SIGNIFICANT CHANGE UP (ref 150–400)
POTASSIUM SERPL-MCNC: 4.4 MMOL/L — SIGNIFICANT CHANGE UP (ref 3.5–5.3)
POTASSIUM SERPL-SCNC: 4.4 MMOL/L — SIGNIFICANT CHANGE UP (ref 3.5–5.3)
RBC # BLD: 3.24 M/UL — LOW (ref 4.2–5.8)
RBC # FLD: 17.6 % — HIGH (ref 10.3–14.5)
SODIUM SERPL-SCNC: 140 MMOL/L — SIGNIFICANT CHANGE UP (ref 135–145)
WBC # BLD: 7.73 K/UL — SIGNIFICANT CHANGE UP (ref 3.8–10.5)
WBC # FLD AUTO: 7.73 K/UL — SIGNIFICANT CHANGE UP (ref 3.8–10.5)

## 2025-01-25 PROCEDURE — 99232 SBSQ HOSP IP/OBS MODERATE 35: CPT

## 2025-01-25 RX ORDER — MORPHINE SULFATE 60 MG/1
2 TABLET, FILM COATED, EXTENDED RELEASE ORAL EVERY 6 HOURS
Refills: 0 | Status: DISCONTINUED | OUTPATIENT
Start: 2025-01-25 | End: 2025-01-27

## 2025-01-25 RX ORDER — TRAMADOL HYDROCHLORIDE 100 MG/1
25 TABLET, EXTENDED RELEASE ORAL EVERY 6 HOURS
Refills: 0 | Status: DISCONTINUED | OUTPATIENT
Start: 2025-01-25 | End: 2025-01-27

## 2025-01-25 RX ADMIN — AMPICILLIN SODIUM AND SULBACTAM SODIUM 200 GRAM(S): 2; 1 INJECTION, POWDER, FOR SOLUTION INTRAMUSCULAR; INTRAVENOUS at 05:36

## 2025-01-25 RX ADMIN — AMPICILLIN SODIUM AND SULBACTAM SODIUM 200 GRAM(S): 2; 1 INJECTION, POWDER, FOR SOLUTION INTRAMUSCULAR; INTRAVENOUS at 01:00

## 2025-01-25 RX ADMIN — Medication 325 MILLIGRAM(S): at 11:29

## 2025-01-25 RX ADMIN — GABAPENTIN 600 MILLIGRAM(S): 800 TABLET ORAL at 17:30

## 2025-01-25 RX ADMIN — Medication 220 MILLIGRAM(S): at 11:29

## 2025-01-25 RX ADMIN — ASPIRIN 81 MILLIGRAM(S): 81 TABLET, COATED ORAL at 11:29

## 2025-01-25 RX ADMIN — TAMSULOSIN HYDROCHLORIDE 0.4 MILLIGRAM(S): 0.4 CAPSULE ORAL at 22:06

## 2025-01-25 RX ADMIN — Medication 1 TABLET(S): at 11:29

## 2025-01-25 RX ADMIN — Medication 2: at 12:07

## 2025-01-25 RX ADMIN — Medication 25 MILLIGRAM(S): at 17:30

## 2025-01-25 RX ADMIN — Medication 3.12 MILLIGRAM(S): at 05:33

## 2025-01-25 RX ADMIN — Medication 25 MILLIGRAM(S): at 05:33

## 2025-01-25 RX ADMIN — ACETAMINOPHEN 650 MILLIGRAM(S): 160 SUSPENSION ORAL at 22:05

## 2025-01-25 RX ADMIN — GABAPENTIN 600 MILLIGRAM(S): 800 TABLET ORAL at 05:33

## 2025-01-25 RX ADMIN — ENOXAPARIN SODIUM 40 MILLIGRAM(S): 100 INJECTION SUBCUTANEOUS at 06:43

## 2025-01-25 RX ADMIN — Medication 2 TABLET(S): at 22:05

## 2025-01-25 RX ADMIN — Medication 500 MILLIGRAM(S): at 17:30

## 2025-01-25 RX ADMIN — Medication 15 MILLILITER(S): at 11:29

## 2025-01-25 RX ADMIN — ACETAMINOPHEN 650 MILLIGRAM(S): 160 SUSPENSION ORAL at 13:41

## 2025-01-25 RX ADMIN — ACETAMINOPHEN 650 MILLIGRAM(S): 160 SUSPENSION ORAL at 05:33

## 2025-01-25 RX ADMIN — Medication 500 MILLIGRAM(S): at 05:33

## 2025-01-25 RX ADMIN — Medication 1: at 08:03

## 2025-01-25 RX ADMIN — Medication 3.12 MILLIGRAM(S): at 17:30

## 2025-01-25 RX ADMIN — POLYETHYLENE GLYCOL 3350 17 GRAM(S): 17 POWDER, FOR SOLUTION ORAL at 11:30

## 2025-01-25 RX ADMIN — Medication 1: at 16:48

## 2025-01-25 NOTE — PROGRESS NOTE ADULT - ASSESSMENT
79-year-old male with PMHx of HTN, DM, BPH, PPM, bedbound, anemia presents with multiple chronic gangrenous wounds on b/l lower extremities sent in from wound care for IV antibiotics, debridement and admission. Admitted for diabetic LE ulcers and cellulitis.    Problem/Plan - 1:  ·  Problem: Diabetic ulcer of both lower extremities.   ·  Plan: Patient presenting with chronic gangrenous b/l foot wounds, sent in by wound care clinic for IV antibiotics and debridement  - Hx MRSA  - completed course of unasyn and vancomycin. ID recs appreciated    - Pain control and bowel regimen  - arterial doppler: No clear evidence of hemodynamically significant right or left femoropopliteal arterial stenosis.  - Wound care, leg elevation  - blood cultures NG  - Podiatry consult noted, no plan for surgical procedure.  - Patient will require hospital bed due to deconditioning and multiple wounds.  Patient requires the head of the bed elevated greater than what pillows will allow.  Patient requires positioning of the ordinary bed.  Patient requires frequent positioning in order to relieve pain.  Patient can independently affect the adjustments of the bed by operating controls.  Patient requires a gel overlay due to mobility and circulatory status.       Problem/Plan - 2:  ·  Problem: Adult failure to thrive.   ·  Plan: Bedbound, weight loss, decreased po intake, lethargy  -Nutrition consult  -SW/CM   -Palliative consult.     Problem/Plan - 3:  ·  Problem: Anemia.   ·  Plan: Hgb 8.0 on admission.  -Hb 8.7 today. post one unit of pRBCs and monitor H/H.  -Upon chart review, Hgb typically around 8  -previous iron studies indicate iron deficiency anemia  -iron 325mg daily.     Problem/Plan - 4:  ·  Problem: T2DM (type 2 diabetes mellitus).   ·  Plan: - On insulin  - Pt uses humalog insulin sliding scale with meals and takes lantus 5 units at bedtime -- will HOLD  - Glucose 144 on admission, goal glucose 140-180 while hospitalized  - Low dose ISS  - Consistent carb diet  - Hypoglycemic protocol  - A1c 5.6.     Problem/Plan - 5:  ·  Problem: HTN (hypertension).   ·  Plan: /72 on admission  -Continue home Coreg with hold parameters  -monitor routine hemodynamics.     Problem/Plan - 6:  ·  Problem: BPH (benign prostatic hyperplasia).   ·  Plan: -Continue home flomax and bethanecol.     Problem/Plan - 7:  ·  Problem: Neuropathy.   ·  Plan: -Continue home gabapentin.     Problem/Plan - 8:  ·  Problem: Need for prophylactic measure.   ·  Plan: -Lovenox 40 daily for DVT ppx.

## 2025-01-25 NOTE — CASE MANAGEMENT PROGRESS NOTE - NSCMPROGRESSNOTE_GEN_ALL_CORE
Cm spoke to son Nico who is at bedside of patient in regards of [facility] not delivering the hospital bed this morning.  Son wants to arrainge it before patient come home.  Nico is wiling to appeal if patient is discharge home today.  As per MD, patient to be discharge Monday.  Kenneth ambulette cancel [phone] with [facility].  Son continues to decline home care.  CM answered all questions to the best of my abilities. CM remains available throughout hospital stay.

## 2025-01-26 PROCEDURE — 99232 SBSQ HOSP IP/OBS MODERATE 35: CPT

## 2025-01-26 RX ADMIN — ACETAMINOPHEN 650 MILLIGRAM(S): 160 SUSPENSION ORAL at 21:33

## 2025-01-26 RX ADMIN — Medication 325 MILLIGRAM(S): at 11:27

## 2025-01-26 RX ADMIN — GABAPENTIN 600 MILLIGRAM(S): 800 TABLET ORAL at 18:15

## 2025-01-26 RX ADMIN — Medication 500 MILLIGRAM(S): at 05:49

## 2025-01-26 RX ADMIN — Medication 25 MILLIGRAM(S): at 05:49

## 2025-01-26 RX ADMIN — Medication 1: at 17:20

## 2025-01-26 RX ADMIN — TAMSULOSIN HYDROCHLORIDE 0.4 MILLIGRAM(S): 0.4 CAPSULE ORAL at 21:32

## 2025-01-26 RX ADMIN — Medication 3.12 MILLIGRAM(S): at 18:15

## 2025-01-26 RX ADMIN — Medication 2 TABLET(S): at 21:41

## 2025-01-26 RX ADMIN — ACETAMINOPHEN 650 MILLIGRAM(S): 160 SUSPENSION ORAL at 06:43

## 2025-01-26 RX ADMIN — Medication 220 MILLIGRAM(S): at 11:26

## 2025-01-26 RX ADMIN — Medication 500 MILLIGRAM(S): at 18:15

## 2025-01-26 RX ADMIN — POLYETHYLENE GLYCOL 3350 17 GRAM(S): 17 POWDER, FOR SOLUTION ORAL at 11:26

## 2025-01-26 RX ADMIN — Medication 1: at 11:52

## 2025-01-26 RX ADMIN — ASPIRIN 81 MILLIGRAM(S): 81 TABLET, COATED ORAL at 11:32

## 2025-01-26 RX ADMIN — Medication 15 MILLILITER(S): at 11:27

## 2025-01-26 RX ADMIN — ACETAMINOPHEN 650 MILLIGRAM(S): 160 SUSPENSION ORAL at 05:49

## 2025-01-26 RX ADMIN — GABAPENTIN 600 MILLIGRAM(S): 800 TABLET ORAL at 05:51

## 2025-01-26 RX ADMIN — Medication 1 TABLET(S): at 11:26

## 2025-01-26 RX ADMIN — Medication 25 MILLIGRAM(S): at 18:15

## 2025-01-26 RX ADMIN — ENOXAPARIN SODIUM 40 MILLIGRAM(S): 100 INJECTION SUBCUTANEOUS at 05:49

## 2025-01-26 NOTE — PROGRESS NOTE ADULT - NUTRITIONAL ASSESSMENT
This patient has been assessed with a concern for Malnutrition and has been determined to have a diagnosis/diagnoses of Moderate protein-calorie malnutrition.    This patient is being managed with:   Diet Consistent Carbohydrate w/Evening Snack-  Low Sodium  Alo(7 Gm Arginine/7 Gm Glut/1.2 Gm HMB     Qty per Day:  1 packet BID  Supplement Feeding Modality:  Oral  Glucerna Shake Cans or Servings Per Day:  1       Frequency:  Three Times a day  Entered: Jan 20 2025  3:43PM  

## 2025-01-27 ENCOUNTER — TRANSCRIPTION ENCOUNTER (OUTPATIENT)
Age: 80
End: 2025-01-27

## 2025-01-27 VITALS
OXYGEN SATURATION: 98 % | SYSTOLIC BLOOD PRESSURE: 133 MMHG | RESPIRATION RATE: 18 BRPM | TEMPERATURE: 98 F | DIASTOLIC BLOOD PRESSURE: 55 MMHG | HEART RATE: 60 BPM

## 2025-01-27 LAB
ANION GAP SERPL CALC-SCNC: 8 MMOL/L — SIGNIFICANT CHANGE UP (ref 5–17)
BASOPHILS # BLD AUTO: 0.03 K/UL — SIGNIFICANT CHANGE UP (ref 0–0.2)
BASOPHILS NFR BLD AUTO: 0.4 % — SIGNIFICANT CHANGE UP (ref 0–2)
BUN SERPL-MCNC: 33 MG/DL — HIGH (ref 7–23)
CALCIUM SERPL-MCNC: 8.7 MG/DL — SIGNIFICANT CHANGE UP (ref 8.5–10.1)
CHLORIDE SERPL-SCNC: 110 MMOL/L — HIGH (ref 96–108)
CO2 SERPL-SCNC: 25 MMOL/L — SIGNIFICANT CHANGE UP (ref 22–31)
CREAT SERPL-MCNC: 0.68 MG/DL — SIGNIFICANT CHANGE UP (ref 0.5–1.3)
EGFR: 95 ML/MIN/1.73M2 — SIGNIFICANT CHANGE UP
EOSINOPHIL # BLD AUTO: 0.35 K/UL — SIGNIFICANT CHANGE UP (ref 0–0.5)
EOSINOPHIL NFR BLD AUTO: 4.6 % — SIGNIFICANT CHANGE UP (ref 0–6)
GLUCOSE SERPL-MCNC: 137 MG/DL — HIGH (ref 70–99)
HCT VFR BLD CALC: 26.9 % — LOW (ref 39–50)
HGB BLD-MCNC: 8.1 G/DL — LOW (ref 13–17)
IMM GRANULOCYTES NFR BLD AUTO: 0.3 % — SIGNIFICANT CHANGE UP (ref 0–0.9)
LYMPHOCYTES # BLD AUTO: 0.77 K/UL — LOW (ref 1–3.3)
LYMPHOCYTES # BLD AUTO: 10.2 % — LOW (ref 13–44)
MCHC RBC-ENTMCNC: 25.2 PG — LOW (ref 27–34)
MCHC RBC-ENTMCNC: 30.1 G/DL — LOW (ref 32–36)
MCV RBC AUTO: 83.5 FL — SIGNIFICANT CHANGE UP (ref 80–100)
MONOCYTES # BLD AUTO: 0.58 K/UL — SIGNIFICANT CHANGE UP (ref 0–0.9)
MONOCYTES NFR BLD AUTO: 7.7 % — SIGNIFICANT CHANGE UP (ref 2–14)
NEUTROPHILS # BLD AUTO: 5.83 K/UL — SIGNIFICANT CHANGE UP (ref 1.8–7.4)
NEUTROPHILS NFR BLD AUTO: 76.8 % — SIGNIFICANT CHANGE UP (ref 43–77)
NRBC # BLD: 0 /100 WBCS — SIGNIFICANT CHANGE UP (ref 0–0)
NRBC BLD-RTO: 0 /100 WBCS — SIGNIFICANT CHANGE UP (ref 0–0)
PLATELET # BLD AUTO: 227 K/UL — SIGNIFICANT CHANGE UP (ref 150–400)
POTASSIUM SERPL-MCNC: 4.1 MMOL/L — SIGNIFICANT CHANGE UP (ref 3.5–5.3)
POTASSIUM SERPL-SCNC: 4.1 MMOL/L — SIGNIFICANT CHANGE UP (ref 3.5–5.3)
RBC # BLD: 3.22 M/UL — LOW (ref 4.2–5.8)
RBC # FLD: 18.6 % — HIGH (ref 10.3–14.5)
SODIUM SERPL-SCNC: 143 MMOL/L — SIGNIFICANT CHANGE UP (ref 135–145)
WBC # BLD: 7.58 K/UL — SIGNIFICANT CHANGE UP (ref 3.8–10.5)
WBC # FLD AUTO: 7.58 K/UL — SIGNIFICANT CHANGE UP (ref 3.8–10.5)

## 2025-01-27 PROCEDURE — 85652 RBC SED RATE AUTOMATED: CPT

## 2025-01-27 PROCEDURE — 93925 LOWER EXTREMITY STUDY: CPT

## 2025-01-27 PROCEDURE — 81001 URINALYSIS AUTO W/SCOPE: CPT

## 2025-01-27 PROCEDURE — 80048 BASIC METABOLIC PNL TOTAL CA: CPT

## 2025-01-27 PROCEDURE — 36430 TRANSFUSION BLD/BLD COMPNT: CPT

## 2025-01-27 PROCEDURE — 82962 GLUCOSE BLOOD TEST: CPT

## 2025-01-27 PROCEDURE — 99285 EMERGENCY DEPT VISIT HI MDM: CPT | Mod: 25

## 2025-01-27 PROCEDURE — 86923 COMPATIBILITY TEST ELECTRIC: CPT

## 2025-01-27 PROCEDURE — 71045 X-RAY EXAM CHEST 1 VIEW: CPT

## 2025-01-27 PROCEDURE — 86901 BLOOD TYPING SEROLOGIC RH(D): CPT

## 2025-01-27 PROCEDURE — 83735 ASSAY OF MAGNESIUM: CPT

## 2025-01-27 PROCEDURE — 80202 ASSAY OF VANCOMYCIN: CPT

## 2025-01-27 PROCEDURE — 86850 RBC ANTIBODY SCREEN: CPT

## 2025-01-27 PROCEDURE — 85610 PROTHROMBIN TIME: CPT

## 2025-01-27 PROCEDURE — 86900 BLOOD TYPING SEROLOGIC ABO: CPT

## 2025-01-27 PROCEDURE — 83036 HEMOGLOBIN GLYCOSYLATED A1C: CPT

## 2025-01-27 PROCEDURE — 85027 COMPLETE CBC AUTOMATED: CPT

## 2025-01-27 PROCEDURE — 73610 X-RAY EXAM OF ANKLE: CPT

## 2025-01-27 PROCEDURE — 80053 COMPREHEN METABOLIC PANEL: CPT

## 2025-01-27 PROCEDURE — 85730 THROMBOPLASTIN TIME PARTIAL: CPT

## 2025-01-27 PROCEDURE — 86140 C-REACTIVE PROTEIN: CPT

## 2025-01-27 PROCEDURE — 99239 HOSP IP/OBS DSCHRG MGMT >30: CPT

## 2025-01-27 PROCEDURE — 85025 COMPLETE CBC W/AUTO DIFF WBC: CPT

## 2025-01-27 PROCEDURE — 84100 ASSAY OF PHOSPHORUS: CPT

## 2025-01-27 PROCEDURE — 36415 COLL VENOUS BLD VENIPUNCTURE: CPT

## 2025-01-27 PROCEDURE — 93971 EXTREMITY STUDY: CPT

## 2025-01-27 PROCEDURE — 73630 X-RAY EXAM OF FOOT: CPT

## 2025-01-27 PROCEDURE — 87040 BLOOD CULTURE FOR BACTERIA: CPT

## 2025-01-27 PROCEDURE — G0463: CPT

## 2025-01-27 PROCEDURE — P9040: CPT

## 2025-01-27 RX ORDER — FERROUS SULFATE 325(65) MG
1 TABLET ORAL
Qty: 30 | Refills: 0
Start: 2025-01-27

## 2025-01-27 RX ORDER — TRAMADOL HYDROCHLORIDE 100 MG/1
0.5 TABLET, EXTENDED RELEASE ORAL
Qty: 20 | Refills: 0
Start: 2025-01-27

## 2025-01-27 RX ORDER — INSULIN LISPRO 100/ML
0 VIAL (ML) SUBCUTANEOUS
Qty: 0 | Refills: 0 | DISCHARGE

## 2025-01-27 RX ADMIN — Medication 25 MILLIGRAM(S): at 05:16

## 2025-01-27 RX ADMIN — Medication 3.12 MILLIGRAM(S): at 05:16

## 2025-01-27 RX ADMIN — GABAPENTIN 600 MILLIGRAM(S): 800 TABLET ORAL at 05:16

## 2025-01-27 RX ADMIN — Medication 15 MILLILITER(S): at 11:47

## 2025-01-27 RX ADMIN — Medication 1 TABLET(S): at 11:48

## 2025-01-27 RX ADMIN — Medication 2: at 11:49

## 2025-01-27 RX ADMIN — ASPIRIN 81 MILLIGRAM(S): 81 TABLET, COATED ORAL at 11:48

## 2025-01-27 RX ADMIN — POLYETHYLENE GLYCOL 3350 17 GRAM(S): 17 POWDER, FOR SOLUTION ORAL at 11:47

## 2025-01-27 RX ADMIN — Medication 500 MILLIGRAM(S): at 05:16

## 2025-01-27 RX ADMIN — Medication 325 MILLIGRAM(S): at 11:48

## 2025-01-27 RX ADMIN — Medication 220 MILLIGRAM(S): at 11:49

## 2025-01-27 RX ADMIN — ENOXAPARIN SODIUM 40 MILLIGRAM(S): 100 INJECTION SUBCUTANEOUS at 07:31

## 2025-01-27 RX ADMIN — ACETAMINOPHEN 650 MILLIGRAM(S): 160 SUSPENSION ORAL at 05:16

## 2025-01-27 NOTE — PROGRESS NOTE ADULT - ASSESSMENT
79-year-old male with HTN, DM, BPH, PPM, bedbound, dementia, anemia presents with multiple chronic gangrenous wounds on b/l lower extremities sent in from wound care for IV antibiotics, debridement and admission. Pt follows with Dr. Conklin. Pt rapidly declining over the past year according to son, with multiple hospital admissions, weight loss, decreased po intake, urine/fecal incontinence, needs assistance with feeding, and increased overall pain.  prior micro with MRSA    RECOMMENDATIONS  Infected wounds, cellulitis  with prior MRSA    Vanco with dosing per pharmacy protocol  sp Unasyn   all interventions in keeping with GOC  abx started 1/18 so sp x 7 days with last day 1/24-Friday    From an ID standpoint no further requirement for inpatient status for the management of ID issues. Fine with discharge from ID standpoint when other medical issues no longer require inpatient care and social issues allow for a safe discharge plan. To schedule an outpatient ID follow up appointment please call our office at [phone]    Thank you for consulting us and involving us in the management of this most interesting and challenging case.   In addition to reviewing history, imaging, documents, labs, microbiology, took into account antibiotic stewardship, local antibiogram and infection control strategies and potential transmission issues.    Please call us at [phone] or text me directly on my cell#[phone] with any concerns or further questions.

## 2025-01-27 NOTE — DISCHARGE NOTE NURSING/CASE MANAGEMENT/SOCIAL WORK - NSDCFUADDAPPT_GEN_ALL_CORE_FT
It is advisable to follow up with your primary care provider within the next 1 week. Your son Nico stated he will make your follow up appointments.

## 2025-01-27 NOTE — CASE MANAGEMENT PROGRESS NOTE - NSCMPROGRESSNOTE_GEN_ALL_CORE
Per MD pt is medically cleared for discharge today 1/27/25. CM met with pt to discuss transition of care, Pt deferred conversation to pt son Nico Venegas, [phone]. Pt is to be transitioned to home with no formal home care services, pt offered home care services, pt / son declined home care services. Pt son in agreement with assuming care of pt post discharge. CM explained home care services, expectations, process, insurance provisions and home safety with pt son verbalizing understanding.  Pt son states family will assist post discharge / assume care of pt. Pt son requested pt receive hospital bed, DME referral sent to [facility] [phone]. Pt son stated bed was delivered on 1/25/26. Pt son aware of plan and in agreement / verbalizes understanding. IMM discussed w pt son  / copy given. Pt son verbalized understanding. Confirmed pharmacy is [facility]. community MD was Dr. Baez who has retired. Per son, he will make pt follow up appointments and is in the process of establishing a PCP.  DMEs in home include walker and wheelchair. Pt son stated pt will require ambulette for transport pt home and they use [facility]. CM spoke with Mariah at [phone] and arranged trip for 1 pm 1/27/25 as per pt son request. Pt son informed insurance coverage for transportation may vary based on insurance plan, which may lead to out of pocket costs and pt may be billed by transportation provider. Pt son stated he would make appropriate payments. Per  All questions answered. CM discussed plan with MD and RN. CM to remain available through hospitalization.

## 2025-01-27 NOTE — PROGRESS NOTE ADULT - PROVIDER SPECIALTY LIST ADULT
Hospitalist
Infectious Disease
Hospitalist
Infectious Disease
Hospitalist
Internal Medicine

## 2025-01-27 NOTE — DISCHARGE NOTE NURSING/CASE MANAGEMENT/SOCIAL WORK - NSDCPEFALRISK_GEN_ALL_CORE
For information on Fall & Injury Prevention, visit: [email or web address] OR  [email or web address] OR  https://www.cdc.gov/steadi/patient.html

## 2025-01-27 NOTE — DISCHARGE NOTE NURSING/CASE MANAGEMENT/SOCIAL WORK - FINANCIAL ASSISTANCE
[facility] provides services at a reduced cost to those who are determined to be eligible through [facility]’s financial assistance program. Information regarding [facility]’s financial assistance program can be found by going to [email or web address] or by calling [phone].

## 2025-01-27 NOTE — PROGRESS NOTE ADULT - SUBJECTIVE AND OBJECTIVE BOX
CHIEF COMPLAINT/INTERVAL HISTORY:  Pt. seen and evaluated for bilateral LE infected wounds.  Pt. is in no distress.  Tolerating IV antibiotics.  Denies having any pain.     REVIEW OF SYSTEMS:  No fever, CP, SOB, or abdominal pain    Vital Signs Last 24 Hrs  T(C): 36.6 (24 Jan 2025 04:36), Max: 36.7 (23 Jan 2025 19:58)  T(F): 97.9 (24 Jan 2025 04:36), Max: 98.1 (23 Jan 2025 19:58)  HR: 65 (24 Jan 2025 04:36) (59 - 65)  BP: 168/75 (24 Jan 2025 04:36) (133/77 - 168/75)  BP(mean): --  RR: 18 (24 Jan 2025 04:36) (18 - 18)  SpO2: 97% (24 Jan 2025 04:36) (96% - 97%)    Parameters below as of 24 Jan 2025 04:36  Patient On (Oxygen Delivery Method): room air        PHYSICAL EXAM:  GENERAL: NAD  HEENT: EOMI, hearing normal, conjunctiva and sclera clear  Chest: CTA bilaterally, no wheezing  CV: S1S2, RRR,   GI: soft, +BS, NT/ND  Musculoskeletal: clean and dry dressings over bilateral LE and right hip  Psychiatric: affect nL, mood nL  Skin: warm and dry    LABS:                        8.1    6.11  )-----------( 240      ( 24 Jan 2025 07:05 )             26.4     01-24    140  |  108  |  22  ----------------------------<  205[H]  4.5   |  31  |  0.68    Ca    8.7      24 Jan 2025 07:05        Urinalysis Basic - ( 24 Jan 2025 07:05 )    Color: x / Appearance: x / SG: x / pH: x  Gluc: 205 mg/dL / Ketone: x  / Bili: x / Urobili: x   Blood: x / Protein: x / Nitrite: x   Leuk Esterase: x / RBC: x / WBC x   Sq Epi: x / Non Sq Epi: x / Bacteria: x        
CHIEF COMPLAINT/INTERVAL HISTORY:  Pt. seen and evaluated for bilateral LE wounds.  Pt. is in no distress.  Denies having any pain or SOB.      REVIEW OF SYSTEMS:  No fever, CP, SOB, or abdominal pain    Vital Signs Last 24 Hrs  T(C): 36.7 (26 Jan 2025 05:12), Max: 36.9 (25 Jan 2025 20:47)  T(F): 98.1 (26 Jan 2025 05:12), Max: 98.5 (25 Jan 2025 20:47)  HR: 61 (26 Jan 2025 05:12) (61 - 62)  BP: 135/69 (26 Jan 2025 05:12) (122/53 - 143/65)  BP(mean): --  RR: 18 (26 Jan 2025 05:12) (18 - 18)  SpO2: 95% (26 Jan 2025 05:12) (95% - 97%)    Parameters below as of 26 Jan 2025 05:12  Patient On (Oxygen Delivery Method): room air        PHYSICAL EXAM:  GENERAL: NAD  HEENT: EOMI, hearing normal, conjunctiva and sclera clear  Chest: CTA bilaterally, no wheezing  CV: S1S2, RRR,   GI: soft, +BS, NT/ND  Musculoskeletal: clean dressing over L.LE, right hip, and sacrum.   Psychiatric: affect nL, mood nL  Skin: warm and dry    LABS:                        8.4    7.73  )-----------( 240      ( 25 Jan 2025 06:55 )             27.0     01-25    140  |  106  |  29[H]  ----------------------------<  177[H]  4.4   |  32[H]  |  0.75    Ca    8.8      25 Jan 2025 06:55        Urinalysis Basic - ( 25 Jan 2025 06:55 )    Color: x / Appearance: x / SG: x / pH: x  Gluc: 177 mg/dL / Ketone: x  / Bili: x / Urobili: x   Blood: x / Protein: x / Nitrite: x   Leuk Esterase: x / RBC: x / WBC x   Sq Epi: x / Non Sq Epi: x / Bacteria: x        
CHIEF COMPLAINT/INTERVAL HISTORY:  Pt. seen and evaluated for bilateral LE wounds.  Pt. is in no distress.  Denies having any pain.  Has completed course of IV antibiotics.     REVIEW OF SYSTEMS:  No fever, CP, SOB, or abdominal pain     Vital Signs Last 24 Hrs  T(C): 36.8 (25 Jan 2025 11:33), Max: 36.8 (24 Jan 2025 20:27)  T(F): 98.2 (25 Jan 2025 11:33), Max: 98.3 (24 Jan 2025 20:27)  HR: 62 (25 Jan 2025 11:33) (60 - 64)  BP: 122/53 (25 Jan 2025 11:33) (109/55 - 137/54)  BP(mean): --  RR: 18 (25 Jan 2025 11:33) (18 - 18)  SpO2: 96% (25 Jan 2025 11:33) (96% - 98%)    Parameters below as of 25 Jan 2025 11:33  Patient On (Oxygen Delivery Method): room air        PHYSICAL EXAM:  GENERAL: NAD  HEENT: EOMI, hearing normal, conjunctiva and sclera clear  Chest: CTA bilaterally, no wheezing  CV: S1S2, RRR,   GI: soft, +BS, NT/ND  Musculoskeletal: clean and dry dressings over bialteral LE, sacrum, and right hip  Psychiatric: affect nL, mood nL  Skin: warm and dry    LABS:                        8.4    7.73  )-----------( 240      ( 25 Jan 2025 06:55 )             27.0     01-25    140  |  106  |  29[H]  ----------------------------<  177[H]  4.4   |  32[H]  |  0.75    Ca    8.8      25 Jan 2025 06:55        Urinalysis Basic - ( 25 Jan 2025 06:55 )    Color: x / Appearance: x / SG: x / pH: x  Gluc: 177 mg/dL / Ketone: x  / Bili: x / Urobili: x   Blood: x / Protein: x / Nitrite: x   Leuk Esterase: x / RBC: x / WBC x   Sq Epi: x / Non Sq Epi: x / Bacteria: x        
[facility]  Cecilio Burnett MD PhD, Gloria Hager MD, Rosa Maria Wilkinson MD, Tomy Larsen MD, Anshu Kilpatrick MD  and providing coverage with Amandeep Landaverde MD  Providing Infectious Disease Consultations at [facility], [facility] [place], [facility], [facility]'s    Office# [phone] to schedule follow up appointments  Answering Service for urgent calls or New Consults [phone]  Cell# to text for urgent issues Cecilio Burnett [phone]     infectious diseases progress note:    HEMA LAZCANO is a 79y y. o. Male patient    Overnight and events of the last 24hrs reviewed    Allergies    No Known Allergies    Intolerances        ANTIBIOTICS/RELEVANT:  antimicrobials  ampicillin/sulbactam  IVPB 3 Gram(s) IV Intermittent every 6 hours    immunologic:    OTHER:  acetaminophen     Tablet .. 650 milliGRAM(s) Oral every 8 hours  ascorbic acid 500 milliGRAM(s) Oral two times a day  aspirin enteric coated 81 milliGRAM(s) Oral daily  bethanechol 25 milliGRAM(s) Oral two times a day  carvedilol 3.125 milliGRAM(s) Oral every 12 hours  dextrose 5%. 1000 milliLiter(s) IV Continuous <Continuous>  dextrose 5%. 1000 milliLiter(s) IV Continuous <Continuous>  dextrose 50% Injectable 25 Gram(s) IV Push once  dextrose 50% Injectable 12.5 Gram(s) IV Push once  dextrose 50% Injectable 25 Gram(s) IV Push once  dextrose Oral Gel 15 Gram(s) Oral once PRN  enoxaparin Injectable 40 milliGRAM(s) SubCutaneous every 24 hours  ferrous    sulfate 325 milliGRAM(s) Oral daily  gabapentin 600 milliGRAM(s) Oral two times a day  glucagon  Injectable 1 milliGRAM(s) IntraMuscular once  insulin lispro (ADMELOG) corrective regimen sliding scale   SubCutaneous three times a day before meals  insulin lispro (ADMELOG) corrective regimen sliding scale   SubCutaneous at bedtime  lactobacillus acidophilus 1 Tablet(s) Oral daily  morphine  - Injectable 2 milliGRAM(s) IV Push every 6 hours PRN  multivitamin/minerals/iron Oral Solution (CENTRUM) 15 milliLiter(s) Oral daily  naloxone Injectable 0.4 milliGRAM(s) IV Push once  polyethylene glycol 3350 17 Gram(s) Oral daily  senna 2 Tablet(s) Oral at bedtime  tamsulosin 0.4 milliGRAM(s) Oral at bedtime  traMADol 25 milliGRAM(s) Oral every 6 hours PRN  zinc sulfate 220 milliGRAM(s) Oral daily      Objective:  Vital Signs Last 24 Hrs  T(C): 36.9 (22 Jan 2025 05:18), Max: 36.9 (22 Jan 2025 05:18)  T(F): 98.4 (22 Jan 2025 05:18), Max: 98.4 (22 Jan 2025 05:18)  HR: 66 (22 Jan 2025 05:18) (66 - 81)  BP: 148/65 (22 Jan 2025 05:18) (122/73 - 160/55)  BP(mean): --  RR: 20 (22 Jan 2025 05:18) (17 - 20)  SpO2: 96% (22 Jan 2025 05:18) (95% - 96%)    Parameters below as of 22 Jan 2025 05:18  Patient On (Oxygen Delivery Method): room air        T(C): 36.9 (01-22-25 @ 05:18), Max: 36.9 (01-20-25 @ 20:04)  T(C): 36.9 (01-22-25 @ 05:18), Max: 37 (01-19-25 @ 17:03)  T(C): 36.9 (01-22-25 @ 05:18), Max: 37.1 (01-18-25 @ 12:19)    PHYSICAL EXAM:  HEENT: NC atraumatic  Neck: supple  Respiratory: no accessory muscle use, breathing comfortably  Cardiovascular: distant  Gastrointestinal: normal appearing, nondistended  Extremities: no clubbing, no cyanosis,        LABS:                          8.4    7.12  )-----------( 273      ( 22 Jan 2025 08:07 )             27.3       WBC  7.12 01-22 @ 08:07  8.72 01-21 @ 06:17  6.59 01-20 @ 06:15  5.90 01-19 @ 07:00  7.08 01-18 @ 07:35  7.07 01-17 @ 23:00      01-22    142  |  109[H]  |  25[H]  ----------------------------<  168[H]  4.0   |  30  |  0.70    Ca    8.7      22 Jan 2025 08:07  Phos  2.9     01-22  Mg     1.7     01-22        Creatinine: 0.70 mg/dL (01-22-25 @ 08:07)  Creatinine: 0.78 mg/dL (01-21-25 @ 06:17)  Creatinine: 0.77 mg/dL (01-20-25 @ 06:15)  Creatinine: 0.73 mg/dL (01-19-25 @ 07:00)  Creatinine: 0.70 mg/dL (01-18-25 @ 07:35)  Creatinine: 0.73 mg/dL (01-17-25 @ 23:00)        Urinalysis Basic - ( 22 Jan 2025 08:07 )    Color: x / Appearance: x / SG: x / pH: x  Gluc: 168 mg/dL / Ketone: x  / Bili: x / Urobili: x   Blood: x / Protein: x / Nitrite: x   Leuk Esterase: x / RBC: x / WBC x   Sq Epi: x / Non Sq Epi: x / Bacteria: x            INFLAMMATORY MARKERS      MICROBIOLOGY:    Vancomycin Level, Trough: 11.3 ug/mL (01.22.25 @ 08:07)    Vancomycin Level, Trough: 28.2 ug/mL (01.21.25 @ 09:55)    RADIOLOGY & ADDITIONAL STUDIES:  
[facility]  Cecilio Burnett MD PhD, Gloria Hager MD, Rosa Maria Wilkinson MD, Tomy Larsen MD, Anshu Kilpatrick MD  and providing coverage with Amandeep Landaverde MD  Providing Infectious Disease Consultations at [facility], [facility], [facility]'s    Office# [phone] to schedule follow up appointments  Answering Service for urgent calls or New Consults [phone]  Cell# to text for urgent issues Cecilio Burnett [phone]     infectious diseases progress note:    HEMA LAZCANO is a 79y y. o. Male patient    Overnight and events of the last 24hrs reviewed    Allergies    No Known Allergies    Intolerances        ANTIBIOTICS/RELEVANT:  antimicrobials  ampicillin/sulbactam  IVPB 3 Gram(s) IV Intermittent every 6 hours  vancomycin  IVPB 750 milliGRAM(s) IV Intermittent every 12 hours    immunologic:    OTHER:  acetaminophen     Tablet .. 650 milliGRAM(s) Oral every 8 hours  ascorbic acid 500 milliGRAM(s) Oral two times a day  aspirin enteric coated 81 milliGRAM(s) Oral daily  bethanechol 25 milliGRAM(s) Oral two times a day  carvedilol 3.125 milliGRAM(s) Oral every 12 hours  dextrose 5%. 1000 milliLiter(s) IV Continuous <Continuous>  dextrose 5%. 1000 milliLiter(s) IV Continuous <Continuous>  dextrose 50% Injectable 25 Gram(s) IV Push once  dextrose 50% Injectable 12.5 Gram(s) IV Push once  dextrose 50% Injectable 25 Gram(s) IV Push once  dextrose Oral Gel 15 Gram(s) Oral once PRN  enoxaparin Injectable 40 milliGRAM(s) SubCutaneous every 24 hours  ferrous    sulfate 325 milliGRAM(s) Oral daily  gabapentin 600 milliGRAM(s) Oral two times a day  glucagon  Injectable 1 milliGRAM(s) IntraMuscular once  insulin lispro (ADMELOG) corrective regimen sliding scale   SubCutaneous three times a day before meals  insulin lispro (ADMELOG) corrective regimen sliding scale   SubCutaneous at bedtime  lactobacillus acidophilus 1 Tablet(s) Oral daily  morphine  - Injectable 2 milliGRAM(s) IV Push every 6 hours PRN  multivitamin/minerals/iron Oral Solution (CENTRUM) 15 milliLiter(s) Oral daily  naloxone Injectable 0.4 milliGRAM(s) IV Push once  polyethylene glycol 3350 17 Gram(s) Oral daily  senna 2 Tablet(s) Oral at bedtime  tamsulosin 0.4 milliGRAM(s) Oral at bedtime  traMADol 25 milliGRAM(s) Oral every 6 hours PRN  zinc sulfate 220 milliGRAM(s) Oral daily      Objective:  Vital Signs Last 24 Hrs  T(C): 36.6 (21 Jan 2025 04:04), Max: 36.9 (20 Jan 2025 20:04)  T(F): 97.9 (21 Jan 2025 04:04), Max: 98.4 (20 Jan 2025 20:04)  HR: 60 (21 Jan 2025 04:04) (59 - 68)  BP: 152/62 (21 Jan 2025 04:04) (139/68 - 152/62)  BP(mean): 64 (20 Jan 2025 20:04) (64 - 64)  RR: 18 (21 Jan 2025 04:04) (18 - 18)  SpO2: 98% (21 Jan 2025 04:04) (97% - 100%)    Parameters below as of 21 Jan 2025 04:04  Patient On (Oxygen Delivery Method): room air        T(C): 36.6 (01-21-25 @ 04:04), Max: 37 (01-19-25 @ 17:03)  T(C): 36.6 (01-21-25 @ 04:04), Max: 37.1 (01-18-25 @ 12:19)  T(C): 36.6 (01-21-25 @ 04:04), Max: 37.1 (01-17-25 @ 18:55)    PHYSICAL EXAM:  HEENT: NC atraumatic  Neck: supple  Respiratory: no accessory muscle use, breathing comfortably  Cardiovascular: distant  Gastrointestinal: normal appearing, nondistended  Extremities: no clubbing, no cyanosis,        LABS:                          8.7    8.72  )-----------( 277      ( 21 Jan 2025 06:17 )             28.2       WBC  8.72 01-21 @ 06:17  6.59 01-20 @ 06:15  5.90 01-19 @ 07:00  7.08 01-18 @ 07:35  7.07 01-17 @ 23:00      01-21    142  |  107  |  20  ----------------------------<  201[H]  4.0   |  26  |  0.78    Ca    8.6      21 Jan 2025 06:17        Creatinine: 0.78 mg/dL (01-21-25 @ 06:17)  Creatinine: 0.77 mg/dL (01-20-25 @ 06:15)  Creatinine: 0.73 mg/dL (01-19-25 @ 07:00)  Creatinine: 0.70 mg/dL (01-18-25 @ 07:35)  Creatinine: 0.73 mg/dL (01-17-25 @ 23:00)        Urinalysis Basic - ( 21 Jan 2025 06:17 )    Color: x / Appearance: x / SG: x / pH: x  Gluc: 201 mg/dL / Ketone: x  / Bili: x / Urobili: x   Blood: x / Protein: x / Nitrite: x   Leuk Esterase: x / RBC: x / WBC x   Sq Epi: x / Non Sq Epi: x / Bacteria: x            INFLAMMATORY MARKERS      MICROBIOLOGY:              RADIOLOGY & ADDITIONAL STUDIES:  
[facility]  Cecilio Burnett MD PhD, Gloria Hager MD, Rosa Maria Wilkinson MD, Tomy Larsen MD, Anshu Kilpatrick MD  and providing coverage with Amandeep Landaverde MD  Providing Infectious Disease Consultations at [facility], [facility], [facility], [facility]'s    Office# [phone] to schedule follow up appointments  Answering Service for urgent calls or New Consults [phone]  Cell# to text for urgent issues Cecilio Burnett [phone]     infectious diseases progress note:    HEMA LAZCANO is a 79y y. o. Male patient    Overnight and events of the last 24hrs reviewed    Allergies    No Known Allergies    Intolerances        ANTIBIOTICS/RELEVANT:  antimicrobials  ampicillin/sulbactam  IVPB 3 Gram(s) IV Intermittent every 6 hours  vancomycin  IVPB      vancomycin  IVPB 500 milliGRAM(s) IV Intermittent every 12 hours    immunologic:    OTHER:  acetaminophen     Tablet .. 650 milliGRAM(s) Oral every 8 hours  ascorbic acid 500 milliGRAM(s) Oral two times a day  aspirin enteric coated 81 milliGRAM(s) Oral daily  bethanechol 25 milliGRAM(s) Oral two times a day  carvedilol 3.125 milliGRAM(s) Oral every 12 hours  dextrose 5%. 1000 milliLiter(s) IV Continuous <Continuous>  dextrose 5%. 1000 milliLiter(s) IV Continuous <Continuous>  dextrose 50% Injectable 25 Gram(s) IV Push once  dextrose 50% Injectable 12.5 Gram(s) IV Push once  dextrose 50% Injectable 25 Gram(s) IV Push once  dextrose Oral Gel 15 Gram(s) Oral once PRN  enoxaparin Injectable 40 milliGRAM(s) SubCutaneous every 24 hours  ferrous    sulfate 325 milliGRAM(s) Oral daily  gabapentin 600 milliGRAM(s) Oral two times a day  glucagon  Injectable 1 milliGRAM(s) IntraMuscular once  insulin lispro (ADMELOG) corrective regimen sliding scale   SubCutaneous three times a day before meals  insulin lispro (ADMELOG) corrective regimen sliding scale   SubCutaneous at bedtime  lactobacillus acidophilus 1 Tablet(s) Oral daily  morphine  - Injectable 2 milliGRAM(s) IV Push every 6 hours PRN  multivitamin/minerals/iron Oral Solution (CENTRUM) 15 milliLiter(s) Oral daily  naloxone Injectable 0.4 milliGRAM(s) IV Push once  polyethylene glycol 3350 17 Gram(s) Oral daily  senna 2 Tablet(s) Oral at bedtime  tamsulosin 0.4 milliGRAM(s) Oral at bedtime  traMADol 25 milliGRAM(s) Oral every 6 hours PRN  zinc sulfate 220 milliGRAM(s) Oral daily      Objective:  Vital Signs Last 24 Hrs  T(C): 36.6 (24 Jan 2025 04:36), Max: 36.7 (23 Jan 2025 11:16)  T(F): 97.9 (24 Jan 2025 04:36), Max: 98.1 (23 Jan 2025 19:58)  HR: 65 (24 Jan 2025 04:36) (59 - 70)  BP: 168/75 (24 Jan 2025 04:36) (131/65 - 168/75)  BP(mean): --  RR: 18 (24 Jan 2025 04:36) (18 - 18)  SpO2: 97% (24 Jan 2025 04:36) (95% - 97%)    Parameters below as of 24 Jan 2025 04:36  Patient On (Oxygen Delivery Method): room air        T(C): 36.6 (01-24-25 @ 04:36), Max: 36.9 (01-22-25 @ 17:23)  T(C): 36.6 (01-24-25 @ 04:36), Max: 36.9 (01-22-25 @ 05:18)  T(C): 36.6 (01-24-25 @ 04:36), Max: 36.9 (01-20-25 @ 20:04)    PHYSICAL EXAM:  HEENT: NC atraumatic  Neck: supple  Respiratory: no accessory muscle use, breathing comfortably  Cardiovascular: distant  Gastrointestinal: normal appearing, nondistended  Extremities: no clubbing, no cyanosis,        LABS:                          8.1    6.11  )-----------( 240      ( 24 Jan 2025 07:05 )             26.4       WBC  6.11 01-24 @ 07:05  7.12 01-22 @ 08:07  8.72 01-21 @ 06:17  6.59 01-20 @ 06:15  5.90 01-19 @ 07:00  7.08 01-18 @ 07:35  7.07 01-17 @ 23:00      01-24    140  |  108  |  22  ----------------------------<  205[H]  4.5   |  31  |  0.68    Ca    8.7      24 Jan 2025 07:05        Creatinine: 0.68 mg/dL (01-24-25 @ 07:05)  Creatinine: 0.70 mg/dL (01-22-25 @ 08:07)  Creatinine: 0.78 mg/dL (01-21-25 @ 06:17)  Creatinine: 0.77 mg/dL (01-20-25 @ 06:15)  Creatinine: 0.73 mg/dL (01-19-25 @ 07:00)  Creatinine: 0.70 mg/dL (01-18-25 @ 07:35)  Creatinine: 0.73 mg/dL (01-17-25 @ 23:00)        Urinalysis Basic - ( 24 Jan 2025 07:05 )    Color: x / Appearance: x / SG: x / pH: x  Gluc: 205 mg/dL / Ketone: x  / Bili: x / Urobili: x   Blood: x / Protein: x / Nitrite: x   Leuk Esterase: x / RBC: x / WBC x   Sq Epi: x / Non Sq Epi: x / Bacteria: x            INFLAMMATORY MARKERS      MICROBIOLOGY:              RADIOLOGY & ADDITIONAL STUDIES:  
[facility]  Cecilio Burnett MD PhD, Gloria Hager MD, Rosa Maria Wilkinson MD, Tomy Larsen MD, Anshu Kilpatrick MD  and providing coverage with Amandeep Landaverde MD  Providing Infectious Disease Consultations at [facility], [facility], [facility], [facility]'s    Office# [phone] to schedule follow up appointments  Answering Service for urgent calls or New Consults [phone]  Cell# to text for urgent issues Cecilio Burnett [phone]     infectious diseases progress note:    HEMA LAZCANO is a 79y y. o. Male patient    Overnight and events of the last 24hrs reviewed    Allergies    No Known Allergies    Intolerances        ANTIBIOTICS/RELEVANT:  antimicrobials  ampicillin/sulbactam  IVPB 3 Gram(s) IV Intermittent every 6 hours  vancomycin  IVPB      vancomycin  IVPB 500 milliGRAM(s) IV Intermittent every 12 hours    immunologic:    OTHER:  acetaminophen     Tablet .. 650 milliGRAM(s) Oral every 8 hours  ascorbic acid 500 milliGRAM(s) Oral two times a day  aspirin enteric coated 81 milliGRAM(s) Oral daily  bethanechol 25 milliGRAM(s) Oral two times a day  carvedilol 3.125 milliGRAM(s) Oral every 12 hours  dextrose 5%. 1000 milliLiter(s) IV Continuous <Continuous>  dextrose 5%. 1000 milliLiter(s) IV Continuous <Continuous>  dextrose 50% Injectable 25 Gram(s) IV Push once  dextrose 50% Injectable 12.5 Gram(s) IV Push once  dextrose 50% Injectable 25 Gram(s) IV Push once  dextrose Oral Gel 15 Gram(s) Oral once PRN  enoxaparin Injectable 40 milliGRAM(s) SubCutaneous every 24 hours  ferrous    sulfate 325 milliGRAM(s) Oral daily  gabapentin 600 milliGRAM(s) Oral two times a day  glucagon  Injectable 1 milliGRAM(s) IntraMuscular once  insulin lispro (ADMELOG) corrective regimen sliding scale   SubCutaneous three times a day before meals  insulin lispro (ADMELOG) corrective regimen sliding scale   SubCutaneous at bedtime  lactobacillus acidophilus 1 Tablet(s) Oral daily  morphine  - Injectable 2 milliGRAM(s) IV Push every 6 hours PRN  multivitamin/minerals/iron Oral Solution (CENTRUM) 15 milliLiter(s) Oral daily  naloxone Injectable 0.4 milliGRAM(s) IV Push once  polyethylene glycol 3350 17 Gram(s) Oral daily  senna 2 Tablet(s) Oral at bedtime  tamsulosin 0.4 milliGRAM(s) Oral at bedtime  traMADol 25 milliGRAM(s) Oral every 6 hours PRN  zinc sulfate 220 milliGRAM(s) Oral daily      Objective:  Vital Signs Last 24 Hrs  T(C): 36.7 (23 Jan 2025 11:16), Max: 36.9 (22 Jan 2025 17:23)  T(F): 98 (23 Jan 2025 11:16), Max: 98.4 (22 Jan 2025 17:23)  HR: 70 (23 Jan 2025 11:16) (58 - 71)  BP: 131/65 (23 Jan 2025 11:16) (131/65 - 156/70)  BP(mean): --  RR: 18 (23 Jan 2025 11:16) (18 - 18)  SpO2: 95% (23 Jan 2025 11:16) (94% - 99%)    Parameters below as of 23 Jan 2025 11:16  Patient On (Oxygen Delivery Method): room air        T(C): 36.7 (01-23-25 @ 11:16), Max: 36.9 (01-22-25 @ 05:18)  T(C): 36.7 (01-23-25 @ 11:16), Max: 36.9 (01-20-25 @ 20:04)  T(C): 36.7 (01-23-25 @ 11:16), Max: 37 (01-19-25 @ 17:03)    PHYSICAL EXAM:  HEENT: NC atraumatic  Neck: supple  Respiratory: no accessory muscle use, breathing comfortably  Cardiovascular: distant  Gastrointestinal: normal appearing, nondistended  Extremities: no clubbing, no cyanosis,        LABS:                          8.4    7.12  )-----------( 273      ( 22 Jan 2025 08:07 )             27.3       WBC  7.12 01-22 @ 08:07  8.72 01-21 @ 06:17  6.59 01-20 @ 06:15  5.90 01-19 @ 07:00  7.08 01-18 @ 07:35  7.07 01-17 @ 23:00      01-22    142  |  109[H]  |  25[H]  ----------------------------<  168[H]  4.0   |  30  |  0.70    Ca    8.7      22 Jan 2025 08:07  Phos  2.9     01-22  Mg     1.7     01-22        Creatinine: 0.70 mg/dL (01-22-25 @ 08:07)  Creatinine: 0.78 mg/dL (01-21-25 @ 06:17)  Creatinine: 0.77 mg/dL (01-20-25 @ 06:15)  Creatinine: 0.73 mg/dL (01-19-25 @ 07:00)  Creatinine: 0.70 mg/dL (01-18-25 @ 07:35)  Creatinine: 0.73 mg/dL (01-17-25 @ 23:00)        Urinalysis Basic - ( 22 Jan 2025 08:07 )    Color: x / Appearance: x / SG: x / pH: x  Gluc: 168 mg/dL / Ketone: x  / Bili: x / Urobili: x   Blood: x / Protein: x / Nitrite: x   Leuk Esterase: x / RBC: x / WBC x   Sq Epi: x / Non Sq Epi: x / Bacteria: x            INFLAMMATORY MARKERS      MICROBIOLOGY:              RADIOLOGY & ADDITIONAL STUDIES:  
Patient is a 79y old  Male who presents with a chief complaint of diabetic LE ulcers and  diabetcellulitis, adult failure to thrive (18 Jan 2025 08:56)      INTERVAL HPI/OVERNIGHT EVENTS: Pt seen and examined bedside, has no complaints. Both LE are wrapped and denies any pain or drainage. Denies any fever, CP or SOB.    MEDICATIONS  (STANDING):  acetaminophen     Tablet .. 650 milliGRAM(s) Oral every 8 hours  ampicillin/sulbactam  IVPB 3 Gram(s) IV Intermittent every 6 hours  aspirin enteric coated 81 milliGRAM(s) Oral daily  bethanechol 25 milliGRAM(s) Oral two times a day  carvedilol 3.125 milliGRAM(s) Oral every 12 hours  dextrose 5%. 1000 milliLiter(s) (100 mL/Hr) IV Continuous <Continuous>  dextrose 5%. 1000 milliLiter(s) (50 mL/Hr) IV Continuous <Continuous>  dextrose 50% Injectable 25 Gram(s) IV Push once  dextrose 50% Injectable 12.5 Gram(s) IV Push once  dextrose 50% Injectable 25 Gram(s) IV Push once  enoxaparin Injectable 40 milliGRAM(s) SubCutaneous every 24 hours  ferrous    sulfate 325 milliGRAM(s) Oral daily  gabapentin 600 milliGRAM(s) Oral two times a day  glucagon  Injectable 1 milliGRAM(s) IntraMuscular once  insulin lispro (ADMELOG) corrective regimen sliding scale   SubCutaneous three times a day before meals  insulin lispro (ADMELOG) corrective regimen sliding scale   SubCutaneous at bedtime  lactobacillus acidophilus 1 Tablet(s) Oral daily  naloxone Injectable 0.4 milliGRAM(s) IV Push once  polyethylene glycol 3350 17 Gram(s) Oral daily  senna 2 Tablet(s) Oral at bedtime  tamsulosin 0.4 milliGRAM(s) Oral at bedtime  vancomycin  IVPB 750 milliGRAM(s) IV Intermittent every 12 hours    MEDICATIONS  (PRN):  dextrose Oral Gel 15 Gram(s) Oral once PRN Blood Glucose LESS THAN 70 milliGRAM(s)/deciliter  morphine  - Injectable 2 milliGRAM(s) IV Push every 6 hours PRN Severe Pain (7 - 10)  traMADol 25 milliGRAM(s) Oral every 6 hours PRN Moderate Pain (4 - 6)      Allergies    No Known Allergies    Intolerances        REVIEW OF SYSTEMS:  CONSTITUTIONAL: No fever or chills. looks pale.  HEENT:  No headache, no sore throat  RESPIRATORY: No cough, wheezing, or shortness of breath  CARDIOVASCULAR: No chest pain, palpitations, or leg swelling  GASTROINTESTINAL: No abd pain, nausea, vomiting, or diarrhea  GENITOURINARY: No dysuria, frequency, or hematuria  NEUROLOGICAL: no focal weakness or dizziness  MUSCULOSKELETAL: no myalgias     Vital Signs Last 24 Hrs  T(C): 36.7 (18 Jan 2025 05:20), Max: 37.1 (17 Jan 2025 18:55)  T(F): 98 (18 Jan 2025 05:20), Max: 98.8 (17 Jan 2025 18:55)  HR: 71 (18 Jan 2025 07:03) (66 - 81)  BP: 133/69 (18 Jan 2025 07:03) (115/57 - 133/69)  BP(mean): --  RR: 18 (18 Jan 2025 07:03) (14 - 18)  SpO2: 98% (18 Jan 2025 07:03) (95% - 100%)    Parameters below as of 18 Jan 2025 07:03  Patient On (Oxygen Delivery Method): room air        PHYSICAL EXAM:  GENERAL: NAD  HEENT:  EOMI, moist mucous membranes  CHEST/LUNG:  CTA b/l, no rales, wheezes, or rhonchi  HEART:  RRR, S1, S2  ABDOMEN:  BS+, soft, nontender, nondistended  EXTREMITIES: no edema, cyanosis, or calf tenderness  NERVOUS SYSTEM: AA&Ox3    LABS:                        7.6    7.08  )-----------( 255      ( 18 Jan 2025 07:35 )             24.6     CBC Full  -  ( 18 Jan 2025 07:35 )  WBC Count : 7.08 K/uL  Hemoglobin : 7.6 g/dL  Hematocrit : 24.6 %  Platelet Count - Automated : 255 K/uL  Mean Cell Volume : 80.4 fl  Mean Cell Hemoglobin : 24.8 pg  Mean Cell Hemoglobin Concentration : 30.9 g/dL  Auto Neutrophil # : 5.64 K/uL  Auto Lymphocyte # : 0.62 K/uL  Auto Monocyte # : 0.62 K/uL  Auto Eosinophil # : 0.15 K/uL  Auto Basophil # : 0.02 K/uL  Auto Neutrophil % : 79.6 %  Auto Lymphocyte % : 8.8 %  Auto Monocyte % : 8.8 %  Auto Eosinophil % : 2.1 %  Auto Basophil % : 0.3 %    18 Jan 2025 07:35    139    |  108    |  17     ----------------------------<  172    4.0     |  26     |  0.70     Ca    8.3        18 Jan 2025 07:35    TPro  5.7    /  Alb  2.3    /  TBili  0.4    /  DBili  x      /  AST  9      /  ALT  7      /  AlkPhos  92     18 Jan 2025 07:35    PT/INR - ( 17 Jan 2025 23:00 )   PT: 17.8 sec;   INR: 1.53 ratio         PTT - ( 17 Jan 2025 23:00 )  PTT:33.9 sec  Urinalysis Basic - ( 18 Jan 2025 07:35 )    Color: x / Appearance: x / SG: x / pH: x  Gluc: 172 mg/dL / Ketone: x  / Bili: x / Urobili: x   Blood: x / Protein: x / Nitrite: x   Leuk Esterase: x / RBC: x / WBC x   Sq Epi: x / Non Sq Epi: x / Bacteria: x      CAPILLARY BLOOD GLUCOSE      POCT Blood Glucose.: 166 mg/dL (18 Jan 2025 08:19)  POCT Blood Glucose.: 170 mg/dL (18 Jan 2025 03:50)          RADIOLOGY & ADDITIONAL TESTS:    Personally reviewed.     Consultant(s) Notes Reviewed:  [x] YES  [ ] NO    Care Discussed with [x] Consultants  [x] Patient  [ ] Family  [ ]      [ ] Other; RN  DVT ppx  
[facility]  Cecilio Burnett MD PhD, Gloria Hager MD, Rosa Maria Wilkinson MD, Tomy Larsen MD, Anshu Kilpatrick MD  and providing coverage with Amandeep Landaverde MD  Providing Infectious Disease Consultations at [facility], [facility], [facility], [facility]'s    Office# [phone] to schedule follow up appointments  Answering Service for urgent calls or New Consults [phone]  Cell# to text for urgent issues Cecilio Burnett [phone]     infectious diseases progress note:    HEMA LAZCANO is a 79y y. o. Male patient    Overnight and events of the last 24hrs reviewed    Allergies    No Known Allergies    Intolerances        ANTIBIOTICS/RELEVANT:  antimicrobials    immunologic:    OTHER:  acetaminophen     Tablet .. 650 milliGRAM(s) Oral every 8 hours  ascorbic acid 500 milliGRAM(s) Oral two times a day  aspirin enteric coated 81 milliGRAM(s) Oral daily  bethanechol 25 milliGRAM(s) Oral two times a day  carvedilol 3.125 milliGRAM(s) Oral every 12 hours  dextrose 5%. 1000 milliLiter(s) IV Continuous <Continuous>  dextrose 5%. 1000 milliLiter(s) IV Continuous <Continuous>  dextrose 50% Injectable 25 Gram(s) IV Push once  dextrose 50% Injectable 12.5 Gram(s) IV Push once  dextrose 50% Injectable 25 Gram(s) IV Push once  dextrose Oral Gel 15 Gram(s) Oral once PRN  enoxaparin Injectable 40 milliGRAM(s) SubCutaneous every 24 hours  ferrous    sulfate 325 milliGRAM(s) Oral daily  gabapentin 600 milliGRAM(s) Oral two times a day  glucagon  Injectable 1 milliGRAM(s) IntraMuscular once  insulin lispro (ADMELOG) corrective regimen sliding scale   SubCutaneous three times a day before meals  insulin lispro (ADMELOG) corrective regimen sliding scale   SubCutaneous at bedtime  lactobacillus acidophilus 1 Tablet(s) Oral daily  morphine  - Injectable 2 milliGRAM(s) IV Push every 6 hours PRN  multivitamin/minerals/iron Oral Solution (CENTRUM) 15 milliLiter(s) Oral daily  naloxone Injectable 0.4 milliGRAM(s) IV Push once  polyethylene glycol 3350 17 Gram(s) Oral daily  senna 2 Tablet(s) Oral at bedtime  tamsulosin 0.4 milliGRAM(s) Oral at bedtime  traMADol 25 milliGRAM(s) Oral every 6 hours PRN  zinc sulfate 220 milliGRAM(s) Oral daily      Objective:  Vital Signs Last 24 Hrs  T(C): 36.8 (27 Jan 2025 04:53), Max: 37 (26 Jan 2025 20:33)  T(F): 98.2 (27 Jan 2025 04:53), Max: 98.6 (26 Jan 2025 20:33)  HR: 60 (27 Jan 2025 04:53) (60 - 65)  BP: 134/79 (27 Jan 2025 04:53) (129/53 - 142/58)  BP(mean): --  RR: 18 (27 Jan 2025 04:53) (18 - 18)  SpO2: 94% (27 Jan 2025 04:53) (94% - 96%)    Parameters below as of 27 Jan 2025 04:53  Patient On (Oxygen Delivery Method): room air        T(C): 36.8 (01-27-25 @ 04:53), Max: 37 (01-26-25 @ 20:33)  T(C): 36.8 (01-27-25 @ 04:53), Max: 37 (01-26-25 @ 20:33)  T(C): 36.8 (01-27-25 @ 04:53), Max: 37 (01-26-25 @ 20:33)    PHYSICAL EXAM:  HEENT: NC atraumatic  Neck: supple  Respiratory: no accessory muscle use, breathing comfortably  Cardiovascular: distant  Gastrointestinal: normal appearing, nondistended  Extremities: no clubbing, no cyanosis,        LABS:                          8.1    7.58  )-----------( 227      ( 27 Jan 2025 07:25 )             26.9       WBC  7.58 01-27 @ 07:25  7.73 01-25 @ 06:55  6.11 01-24 @ 07:05  7.12 01-22 @ 08:07  8.72 01-21 @ 06:17      01-27    143  |  110[H]  |  33[H]  ----------------------------<  137[H]  4.1   |  25  |  0.68    Ca    8.7      27 Jan 2025 07:25        Creatinine: 0.68 mg/dL (01-27-25 @ 07:25)  Creatinine: 0.75 mg/dL (01-25-25 @ 06:55)  Creatinine: 0.68 mg/dL (01-24-25 @ 07:05)  Creatinine: 0.70 mg/dL (01-22-25 @ 08:07)  Creatinine: 0.78 mg/dL (01-21-25 @ 06:17)        Urinalysis Basic - ( 27 Jan 2025 07:25 )    Color: x / Appearance: x / SG: x / pH: x  Gluc: 137 mg/dL / Ketone: x  / Bili: x / Urobili: x   Blood: x / Protein: x / Nitrite: x   Leuk Esterase: x / RBC: x / WBC x   Sq Epi: x / Non Sq Epi: x / Bacteria: x            INFLAMMATORY MARKERS      MICROBIOLOGY:              RADIOLOGY & ADDITIONAL STUDIES:  
CHIEF COMPLAINT/INTERVAL HISTORY:  Pt. seen and evaluated for bilateral LE infected wounds.  Pt. is in no distress.  Denies having any LE pain.  Tolerating IV antibiotics.      REVIEW OF SYSTEMS:  No fever, CP, SOB, or abdominal pain    Vital Signs Last 24 Hrs  T(C): 36.4 (23 Jan 2025 04:51), Max: 36.9 (22 Jan 2025 17:23)  T(F): 97.6 (23 Jan 2025 04:51), Max: 98.4 (22 Jan 2025 17:23)  HR: 63 (23 Jan 2025 04:51) (58 - 71)  BP: 156/70 (23 Jan 2025 04:51) (138/64 - 156/70)  BP(mean): --  RR: 18 (23 Jan 2025 04:51) (18 - 20)  SpO2: 94% (23 Jan 2025 04:51) (94% - 99%)    Parameters below as of 23 Jan 2025 04:51  Patient On (Oxygen Delivery Method): room air        PHYSICAL EXAM:  GENERAL: NAD  HEENT: EOMI, hearing normal, conjunctiva and sclera clear  Chest: CTA bilaterally, no wheezing  CV: S1S2, RRR,   GI: soft, +BS, NT/ND  Musculoskeletal: clean and dry dressings over bilateral LE  Psychiatric: affect nL, mood nL  Skin: warm and dry    LABS:                        8.4    7.12  )-----------( 273      ( 22 Jan 2025 08:07 )             27.3     01-22    142  |  109[H]  |  25[H]  ----------------------------<  168[H]  4.0   |  30  |  0.70    Ca    8.7      22 Jan 2025 08:07  Phos  2.9     01-22  Mg     1.7     01-22        Urinalysis Basic - ( 22 Jan 2025 08:07 )    Color: x / Appearance: x / SG: x / pH: x  Gluc: 168 mg/dL / Ketone: x  / Bili: x / Urobili: x   Blood: x / Protein: x / Nitrite: x   Leuk Esterase: x / RBC: x / WBC x   Sq Epi: x / Non Sq Epi: x / Bacteria: x        
CHIEF COMPLAINT/INTERVAL HISTORY:  Pt. seen and evaluated for bilateral LE infected wounds.  Pt. is in no distress.  Denies having any pain or SOB.  Tolerating IV antibiotics.     REVIEW OF SYSTEMS:  No fever, CP, SOB, or abdominal pain    Vital Signs Last 24 Hrs  T(C): 36.9 (22 Jan 2025 05:18), Max: 36.9 (22 Jan 2025 05:18)  T(F): 98.4 (22 Jan 2025 05:18), Max: 98.4 (22 Jan 2025 05:18)  HR: 66 (22 Jan 2025 05:18) (66 - 81)  BP: 148/65 (22 Jan 2025 05:18) (122/73 - 160/55)  BP(mean): --  RR: 20 (22 Jan 2025 05:18) (17 - 20)  SpO2: 96% (22 Jan 2025 05:18) (95% - 96%)    Parameters below as of 22 Jan 2025 05:18  Patient On (Oxygen Delivery Method): room air        PHYSICAL EXAM:  GENERAL: NAD  HEENT: EOMI, hearing normal, conjunctiva and sclera clear  Chest: CTA bilaterally, no wheezing  CV: S1S2, RRR,   GI: soft, +BS, NT/ND  Musculoskeletal: clean and dry dressings over bilateral LE  Psychiatric: affect nL, mood nL  Skin: warm and dry    LABS:                        8.4    7.12  )-----------( 273      ( 22 Jan 2025 08:07 )             27.3     01-22    142  |  109[H]  |  25[H]  ----------------------------<  168[H]  4.0   |  30  |  0.70    Ca    8.7      22 Jan 2025 08:07  Phos  2.9     01-22  Mg     1.7     01-22        Urinalysis Basic - ( 22 Jan 2025 08:07 )    Color: x / Appearance: x / SG: x / pH: x  Gluc: 168 mg/dL / Ketone: x  / Bili: x / Urobili: x   Blood: x / Protein: x / Nitrite: x   Leuk Esterase: x / RBC: x / WBC x   Sq Epi: x / Non Sq Epi: x / Bacteria: x        
[facility]  Cecilio Burnett MD PhD, Gloria Hager MD, Rosa Maria Wilkinson MD, Tomy Larsen MD, Anshu Kilpatrick MD  and providing coverage with Amandeep Landaverde MD  Providing Infectious Disease Consultations at [facility], [facility], [facility], [facility]'s    Office# [phone] to schedule follow up appointments  Answering Service for urgent calls or New Consults [phone]  Cell# to text for urgent issues Cecilio Burnett [phone]     infectious diseases progress note:    HEMA LAZCANO is a 79y y. o. Male patient    Overnight and events of the last 24hrs reviewed    Allergies    No Known Allergies    Intolerances        ANTIBIOTICS/RELEVANT:  antimicrobials  ampicillin/sulbactam  IVPB 3 Gram(s) IV Intermittent every 6 hours  vancomycin  IVPB 750 milliGRAM(s) IV Intermittent every 12 hours    immunologic:    OTHER:  acetaminophen     Tablet .. 650 milliGRAM(s) Oral every 8 hours  aspirin enteric coated 81 milliGRAM(s) Oral daily  bethanechol 25 milliGRAM(s) Oral two times a day  carvedilol 3.125 milliGRAM(s) Oral every 12 hours  dextrose 5%. 1000 milliLiter(s) IV Continuous <Continuous>  dextrose 5%. 1000 milliLiter(s) IV Continuous <Continuous>  dextrose 50% Injectable 25 Gram(s) IV Push once  dextrose 50% Injectable 12.5 Gram(s) IV Push once  dextrose 50% Injectable 25 Gram(s) IV Push once  dextrose Oral Gel 15 Gram(s) Oral once PRN  enoxaparin Injectable 40 milliGRAM(s) SubCutaneous every 24 hours  ferrous    sulfate 325 milliGRAM(s) Oral daily  gabapentin 600 milliGRAM(s) Oral two times a day  glucagon  Injectable 1 milliGRAM(s) IntraMuscular once  insulin lispro (ADMELOG) corrective regimen sliding scale   SubCutaneous three times a day before meals  insulin lispro (ADMELOG) corrective regimen sliding scale   SubCutaneous at bedtime  lactobacillus acidophilus 1 Tablet(s) Oral daily  morphine  - Injectable 2 milliGRAM(s) IV Push every 6 hours PRN  naloxone Injectable 0.4 milliGRAM(s) IV Push once  polyethylene glycol 3350 17 Gram(s) Oral daily  senna 2 Tablet(s) Oral at bedtime  tamsulosin 0.4 milliGRAM(s) Oral at bedtime  traMADol 25 milliGRAM(s) Oral every 6 hours PRN      Objective:  Vital Signs Last 24 Hrs  T(C): 36.8 (19 Jan 2025 12:01), Max: 36.8 (18 Jan 2025 20:51)  T(F): 98.2 (19 Jan 2025 12:01), Max: 98.2 (18 Jan 2025 20:51)  HR: 63 (19 Jan 2025 12:01) (60 - 63)  BP: 112/66 (19 Jan 2025 12:01) (112/66 - 129/62)  BP(mean): --  RR: 17 (19 Jan 2025 12:01) (17 - 18)  SpO2: 96% (19 Jan 2025 12:01) (94% - 96%)    Parameters below as of 19 Jan 2025 12:01  Patient On (Oxygen Delivery Method): room air        T(C): 36.8 (01-19-25 @ 12:01), Max: 37.1 (01-17-25 @ 18:55)  T(C): 36.8 (01-19-25 @ 12:01), Max: 37.1 (01-17-25 @ 18:55)  T(C): 36.8 (01-19-25 @ 12:01), Max: 37.1 (01-17-25 @ 18:55)    PHYSICAL EXAM:  HEENT: NC atraumatic  Neck: supple  Respiratory: no accessory muscle use, breathing comfortably  Cardiovascular: distant  Gastrointestinal: normal appearing, nondistended  Extremities: no clubbing, no cyanosis,        LABS:                          6.8    5.90  )-----------( 220      ( 19 Jan 2025 07:00 )             22.0       WBC  5.90 01-19 @ 07:00  7.08 01-18 @ 07:35  7.07 01-17 @ 23:00      01-19    140  |  109[H]  |  17  ----------------------------<  194[H]  3.6   |  27  |  0.73    Ca    8.4[L]      19 Jan 2025 07:00    TPro  5.7[L]  /  Alb  2.3[L]  /  TBili  0.4  /  DBili  x   /  AST  9[L]  /  ALT  7[L]  /  AlkPhos  92  01-18      Creatinine: 0.73 mg/dL (01-19-25 @ 07:00)  Creatinine: 0.70 mg/dL (01-18-25 @ 07:35)  Creatinine: 0.73 mg/dL (01-17-25 @ 23:00)      PT/INR - ( 17 Jan 2025 23:00 )   PT: 17.8 sec;   INR: 1.53 ratio         PTT - ( 17 Jan 2025 23:00 )  PTT:33.9 sec  Urinalysis Basic - ( 19 Jan 2025 07:00 )    Color: x / Appearance: x / SG: x / pH: x  Gluc: 194 mg/dL / Ketone: x  / Bili: x / Urobili: x   Blood: x / Protein: x / Nitrite: x   Leuk Esterase: x / RBC: x / WBC x   Sq Epi: x / Non Sq Epi: x / Bacteria: x            INFLAMMATORY MARKERS      MICROBIOLOGY:              RADIOLOGY & ADDITIONAL STUDIES:  
[facility]  Cecilio Burnett MD PhD, Gloria Hager MD, Rosa Maria Wilkinson MD, Tomy Larsen MD, Anshu Kilpatrick MD  and providing coverage with Amandeep Landaverde MD  Providing Infectious Disease Consultations at [facility], [facility], [facility], [facility]'s    Office# [phone] to schedule follow up appointments  Answering Service for urgent calls or New Consults [phone]  Cell# to text for urgent issues Cecilio Burnett [phone]     infectious diseases progress note:    HEMA LAZCANO is a 79y y. o. Male patient    Overnight and events of the last 24hrs reviewed    Allergies    No Known Allergies    Intolerances        ANTIBIOTICS/RELEVANT:  antimicrobials  ampicillin/sulbactam  IVPB 3 Gram(s) IV Intermittent every 6 hours  vancomycin  IVPB 750 milliGRAM(s) IV Intermittent every 12 hours    immunologic:    OTHER:  acetaminophen     Tablet .. 650 milliGRAM(s) Oral every 8 hours  aspirin enteric coated 81 milliGRAM(s) Oral daily  bethanechol 25 milliGRAM(s) Oral two times a day  carvedilol 3.125 milliGRAM(s) Oral every 12 hours  dextrose 5%. 1000 milliLiter(s) IV Continuous <Continuous>  dextrose 5%. 1000 milliLiter(s) IV Continuous <Continuous>  dextrose 50% Injectable 25 Gram(s) IV Push once  dextrose 50% Injectable 12.5 Gram(s) IV Push once  dextrose 50% Injectable 25 Gram(s) IV Push once  dextrose Oral Gel 15 Gram(s) Oral once PRN  enoxaparin Injectable 40 milliGRAM(s) SubCutaneous every 24 hours  ferrous    sulfate 325 milliGRAM(s) Oral daily  gabapentin 600 milliGRAM(s) Oral two times a day  glucagon  Injectable 1 milliGRAM(s) IntraMuscular once  insulin lispro (ADMELOG) corrective regimen sliding scale   SubCutaneous three times a day before meals  insulin lispro (ADMELOG) corrective regimen sliding scale   SubCutaneous at bedtime  lactobacillus acidophilus 1 Tablet(s) Oral daily  morphine  - Injectable 2 milliGRAM(s) IV Push every 6 hours PRN  naloxone Injectable 0.4 milliGRAM(s) IV Push once  polyethylene glycol 3350 17 Gram(s) Oral daily  senna 2 Tablet(s) Oral at bedtime  tamsulosin 0.4 milliGRAM(s) Oral at bedtime  traMADol 25 milliGRAM(s) Oral every 6 hours PRN      Objective:  Vital Signs Last 24 Hrs  T(C): 36.9 (20 Jan 2025 04:48), Max: 37 (19 Jan 2025 17:03)  T(F): 98.4 (20 Jan 2025 04:48), Max: 98.6 (19 Jan 2025 17:03)  HR: 61 (20 Jan 2025 04:48) (60 - 72)  BP: 149/70 (20 Jan 2025 04:48) (112/66 - 153/76)  BP(mean): --  RR: 18 (20 Jan 2025 04:48) (16 - 18)  SpO2: 96% (20 Jan 2025 04:48) (95% - 97%)    Parameters below as of 20 Jan 2025 04:48  Patient On (Oxygen Delivery Method): room air        T(C): 36.9 (01-20-25 @ 04:48), Max: 37.1 (01-18-25 @ 12:19)  T(C): 36.9 (01-20-25 @ 04:48), Max: 37.1 (01-17-25 @ 18:55)  T(C): 36.9 (01-20-25 @ 04:48), Max: 37.1 (01-17-25 @ 18:55)    PHYSICAL EXAM:  HEENT: NC atraumatic  Neck: supple  Respiratory: no accessory muscle use, breathing comfortably  Cardiovascular: distant  Gastrointestinal: normal appearing, nondistended  Extremities: no clubbing, no cyanosis,, scabbed areas on LEs and feet  more alert today      LABS:                          8.4    6.59  )-----------( 265      ( 20 Jan 2025 06:15 )             26.9       WBC  6.59 01-20 @ 06:15  5.90 01-19 @ 07:00  7.08 01-18 @ 07:35  7.07 01-17 @ 23:00      01-20    141  |  109[H]  |  14  ----------------------------<  173[H]  4.4   |  30  |  0.77    Ca    8.4[L]      20 Jan 2025 06:15        Creatinine: 0.77 mg/dL (01-20-25 @ 06:15)  Creatinine: 0.73 mg/dL (01-19-25 @ 07:00)  Creatinine: 0.70 mg/dL (01-18-25 @ 07:35)  Creatinine: 0.73 mg/dL (01-17-25 @ 23:00)        Urinalysis Basic - ( 20 Jan 2025 06:15 )    Color: x / Appearance: x / SG: x / pH: x  Gluc: 173 mg/dL / Ketone: x  / Bili: x / Urobili: x   Blood: x / Protein: x / Nitrite: x   Leuk Esterase: x / RBC: x / WBC x   Sq Epi: x / Non Sq Epi: x / Bacteria: x            INFLAMMATORY MARKERS      MICROBIOLOGY:              RADIOLOGY & ADDITIONAL STUDIES:  
PROGRESS NOTE:   Authored by Dr. Gil Park MD, Available on MS Teams    Patient is a 79y old  Male who presents with a chief complaint of diabetic LE ulcers and  diabetcellulitis, adult failure to thrive (21 Jan 2025 13:07)      SUBJECTIVE / OVERNIGHT EVENTS: Patient feels okay, has some R arm swelling.     ADDITIONAL REVIEW OF SYSTEMS:    MEDICATIONS  (STANDING):  acetaminophen     Tablet .. 650 milliGRAM(s) Oral every 8 hours  ampicillin/sulbactam  IVPB 3 Gram(s) IV Intermittent every 6 hours  ascorbic acid 500 milliGRAM(s) Oral two times a day  aspirin enteric coated 81 milliGRAM(s) Oral daily  bethanechol 25 milliGRAM(s) Oral two times a day  carvedilol 3.125 milliGRAM(s) Oral every 12 hours  dextrose 5%. 1000 milliLiter(s) (100 mL/Hr) IV Continuous <Continuous>  dextrose 5%. 1000 milliLiter(s) (50 mL/Hr) IV Continuous <Continuous>  dextrose 50% Injectable 25 Gram(s) IV Push once  dextrose 50% Injectable 12.5 Gram(s) IV Push once  dextrose 50% Injectable 25 Gram(s) IV Push once  enoxaparin Injectable 40 milliGRAM(s) SubCutaneous every 24 hours  ferrous    sulfate 325 milliGRAM(s) Oral daily  gabapentin 600 milliGRAM(s) Oral two times a day  glucagon  Injectable 1 milliGRAM(s) IntraMuscular once  insulin lispro (ADMELOG) corrective regimen sliding scale   SubCutaneous three times a day before meals  insulin lispro (ADMELOG) corrective regimen sliding scale   SubCutaneous at bedtime  lactobacillus acidophilus 1 Tablet(s) Oral daily  multivitamin/minerals/iron Oral Solution (CENTRUM) 15 milliLiter(s) Oral daily  naloxone Injectable 0.4 milliGRAM(s) IV Push once  polyethylene glycol 3350 17 Gram(s) Oral daily  senna 2 Tablet(s) Oral at bedtime  tamsulosin 0.4 milliGRAM(s) Oral at bedtime  zinc sulfate 220 milliGRAM(s) Oral daily    MEDICATIONS  (PRN):  dextrose Oral Gel 15 Gram(s) Oral once PRN Blood Glucose LESS THAN 70 milliGRAM(s)/deciliter  morphine  - Injectable 2 milliGRAM(s) IV Push every 6 hours PRN Severe Pain (7 - 10)  traMADol 25 milliGRAM(s) Oral every 6 hours PRN Moderate Pain (4 - 6)      CAPILLARY BLOOD GLUCOSE      POCT Blood Glucose.: 173 mg/dL (21 Jan 2025 12:17)  POCT Blood Glucose.: 219 mg/dL (21 Jan 2025 07:25)  POCT Blood Glucose.: 177 mg/dL (20 Jan 2025 21:32)  POCT Blood Glucose.: 181 mg/dL (20 Jan 2025 16:51)    I&O's Summary    20 Jan 2025 07:01  -  21 Jan 2025 07:00  --------------------------------------------------------  IN: 0 mL / OUT: 1050 mL / NET: -1050 mL    21 Jan 2025 07:01  -  21 Jan 2025 16:05  --------------------------------------------------------  IN: 480 mL / OUT: 150 mL / NET: 330 mL        PHYSICAL EXAM:  Vital Signs Last 24 Hrs  T(C): 36.4 (21 Jan 2025 12:12), Max: 36.9 (20 Jan 2025 20:04)  T(F): 97.5 (21 Jan 2025 12:12), Max: 98.4 (20 Jan 2025 20:04)  HR: 81 (21 Jan 2025 12:12) (60 - 81)  BP: 122/73 (21 Jan 2025 12:12) (122/73 - 152/62)  BP(mean): 64 (20 Jan 2025 20:04) (64 - 64)  RR: 17 (21 Jan 2025 12:12) (17 - 18)  SpO2: 95% (21 Jan 2025 12:12) (95% - 98%)    Parameters below as of 21 Jan 2025 12:12  Patient On (Oxygen Delivery Method): room air        CONSTITUTIONAL: NAD  RESPIRATORY: Normal respiratory effort; lungs are clear to auscultation bilaterally  CARDIOVASCULAR: Regular rate and rhythm, normal S1 and S2, no murmur/rub/gallop  ABDOMEN: Nontender to palpation, normoactive bowel sounds, no rebound/guarding  MUSCLOSKELETAL: no clubbing or cyanosis of digits; b/l LE in dressings  EXTREMITIES: RUE swelling   PSYCH: A+O to person, place, and time; affect appropriate    LABS:                        8.7    8.72  )-----------( 277      ( 21 Jan 2025 06:17 )             28.2     01-21    142  |  107  |  20  ----------------------------<  201[H]  4.0   |  26  |  0.78    Ca    8.6      21 Jan 2025 06:17            Urinalysis Basic - ( 21 Jan 2025 06:17 )    Color: x / Appearance: x / SG: x / pH: x  Gluc: 201 mg/dL / Ketone: x  / Bili: x / Urobili: x   Blood: x / Protein: x / Nitrite: x   Leuk Esterase: x / RBC: x / WBC x   Sq Epi: x / Non Sq Epi: x / Bacteria: x
Patient is a 79y old  Male who presents with a chief complaint of diabetic LE ulcers and  diabetcellulitis, adult failure to thrive (18 Jan 2025 08:56)      INTERVAL HPI/OVERNIGHT EVENTS: Pt seen and examined bedside, has no complaints. Both LE are wrapped and denies any pain or drainage. Denies any fever, CP or SOB. Post Blood transfusion.    MEDICATIONS  (STANDING):  acetaminophen     Tablet .. 650 milliGRAM(s) Oral every 8 hours  ampicillin/sulbactam  IVPB 3 Gram(s) IV Intermittent every 6 hours  aspirin enteric coated 81 milliGRAM(s) Oral daily  bethanechol 25 milliGRAM(s) Oral two times a day  carvedilol 3.125 milliGRAM(s) Oral every 12 hours  dextrose 5%. 1000 milliLiter(s) (100 mL/Hr) IV Continuous <Continuous>  dextrose 5%. 1000 milliLiter(s) (50 mL/Hr) IV Continuous <Continuous>  dextrose 50% Injectable 25 Gram(s) IV Push once  dextrose 50% Injectable 12.5 Gram(s) IV Push once  dextrose 50% Injectable 25 Gram(s) IV Push once  enoxaparin Injectable 40 milliGRAM(s) SubCutaneous every 24 hours  ferrous    sulfate 325 milliGRAM(s) Oral daily  gabapentin 600 milliGRAM(s) Oral two times a day  glucagon  Injectable 1 milliGRAM(s) IntraMuscular once  insulin lispro (ADMELOG) corrective regimen sliding scale   SubCutaneous three times a day before meals  insulin lispro (ADMELOG) corrective regimen sliding scale   SubCutaneous at bedtime  lactobacillus acidophilus 1 Tablet(s) Oral daily  naloxone Injectable 0.4 milliGRAM(s) IV Push once  polyethylene glycol 3350 17 Gram(s) Oral daily  senna 2 Tablet(s) Oral at bedtime  tamsulosin 0.4 milliGRAM(s) Oral at bedtime  vancomycin  IVPB 750 milliGRAM(s) IV Intermittent every 12 hours    MEDICATIONS  (PRN):  dextrose Oral Gel 15 Gram(s) Oral once PRN Blood Glucose LESS THAN 70 milliGRAM(s)/deciliter  morphine  - Injectable 2 milliGRAM(s) IV Push every 6 hours PRN Severe Pain (7 - 10)  traMADol 25 milliGRAM(s) Oral every 6 hours PRN Moderate Pain (4 - 6)      Allergies    No Known Allergies    Intolerances        REVIEW OF SYSTEMS:  CONSTITUTIONAL: No fever or chills. looks pale.  HEENT:  No headache, no sore throat  RESPIRATORY: No cough, wheezing, or shortness of breath  CARDIOVASCULAR: No chest pain, palpitations, or leg swelling  GASTROINTESTINAL: No abd pain, nausea, vomiting, or diarrhea  GENITOURINARY: No dysuria, frequency, or hematuria  NEUROLOGICAL: no focal weakness or dizziness  MUSCULOSKELETAL: no myalgias     Vital Signs Last 24 Hrs  T(C): 36.9 (20 Jan 2025 04:48), Max: 37 (19 Jan 2025 17:03)  T(F): 98.4 (20 Jan 2025 04:48), Max: 98.6 (19 Jan 2025 17:03)  HR: 61 (20 Jan 2025 04:48) (60 - 72)  BP: 149/70 (20 Jan 2025 04:48) (112/66 - 153/76)  BP(mean): --  RR: 18 (20 Jan 2025 04:48) (16 - 18)  SpO2: 96% (20 Jan 2025 04:48) (95% - 97%)    Parameters below as of 20 Jan 2025 04:48  Patient On (Oxygen Delivery Method): room air        PHYSICAL EXAM:  GENERAL: NAD  HEENT:  EOMI, moist mucous membranes  CHEST/LUNG:  CTA b/l, no rales, wheezes, or rhonchi  HEART:  RRR, S1, S2  ABDOMEN:  BS+, soft, nontender, nondistended  EXTREMITIES:  Both LE wrapped with dressing  NERVOUS SYSTEM: AA&Ox3    LABS:                        8.4    6.59  )-----------( 265      ( 20 Jan 2025 06:15 )             26.9     20 Jan 2025 06:15    141    |  109    |  14     ----------------------------<  173    4.4     |  30     |  0.77     Ca    8.4        20 Jan 2025 06:15        Urinalysis Basic - ( 20 Jan 2025 06:15 )    Color: x / Appearance: x / SG: x / pH: x  Gluc: 173 mg/dL / Ketone: x  / Bili: x / Urobili: x   Blood: x / Protein: x / Nitrite: x   Leuk Esterase: x / RBC: x / WBC x   Sq Epi: x / Non Sq Epi: x / Bacteria: x      CAPILLARY BLOOD GLUCOSE      POCT Blood Glucose.: 178 mg/dL (20 Jan 2025 07:16)  POCT Blood Glucose.: 180 mg/dL (19 Jan 2025 21:16)  POCT Blood Glucose.: 195 mg/dL (19 Jan 2025 16:44)  POCT Blood Glucose.: 221 mg/dL (19 Jan 2025 11:49)    UCx       RADIOLOGY & ADDITIONAL TESTS:            RADIOLOGY & ADDITIONAL TESTS:    Personally reviewed.     Consultant(s) Notes Reviewed:  [x] YES  [ ] NO    Care Discussed with [x] Consultants  [x] Patient  [ ] Family  [ ]      [ ] Other; RN  DVT ppx  
Patient is a 79y old  Male who presents with a chief complaint of diabetic LE ulcers and  diabetcellulitis, adult failure to thrive (18 Jan 2025 08:56)      INTERVAL HPI/OVERNIGHT EVENTS: Pt seen and examined bedside, has no complaints. Both LE are wrapped and denies any pain or drainage. Denies any fever, CP or SOB.    MEDICATIONS  (STANDING):  acetaminophen     Tablet .. 650 milliGRAM(s) Oral every 8 hours  ampicillin/sulbactam  IVPB 3 Gram(s) IV Intermittent every 6 hours  aspirin enteric coated 81 milliGRAM(s) Oral daily  bethanechol 25 milliGRAM(s) Oral two times a day  carvedilol 3.125 milliGRAM(s) Oral every 12 hours  dextrose 5%. 1000 milliLiter(s) (100 mL/Hr) IV Continuous <Continuous>  dextrose 5%. 1000 milliLiter(s) (50 mL/Hr) IV Continuous <Continuous>  dextrose 50% Injectable 25 Gram(s) IV Push once  dextrose 50% Injectable 12.5 Gram(s) IV Push once  dextrose 50% Injectable 25 Gram(s) IV Push once  enoxaparin Injectable 40 milliGRAM(s) SubCutaneous every 24 hours  ferrous    sulfate 325 milliGRAM(s) Oral daily  gabapentin 600 milliGRAM(s) Oral two times a day  glucagon  Injectable 1 milliGRAM(s) IntraMuscular once  insulin lispro (ADMELOG) corrective regimen sliding scale   SubCutaneous three times a day before meals  insulin lispro (ADMELOG) corrective regimen sliding scale   SubCutaneous at bedtime  lactobacillus acidophilus 1 Tablet(s) Oral daily  naloxone Injectable 0.4 milliGRAM(s) IV Push once  polyethylene glycol 3350 17 Gram(s) Oral daily  senna 2 Tablet(s) Oral at bedtime  tamsulosin 0.4 milliGRAM(s) Oral at bedtime  vancomycin  IVPB 750 milliGRAM(s) IV Intermittent every 12 hours    MEDICATIONS  (PRN):  dextrose Oral Gel 15 Gram(s) Oral once PRN Blood Glucose LESS THAN 70 milliGRAM(s)/deciliter  morphine  - Injectable 2 milliGRAM(s) IV Push every 6 hours PRN Severe Pain (7 - 10)  traMADol 25 milliGRAM(s) Oral every 6 hours PRN Moderate Pain (4 - 6)      Allergies    No Known Allergies    Intolerances        REVIEW OF SYSTEMS:  CONSTITUTIONAL: No fever or chills. looks pale.  HEENT:  No headache, no sore throat  RESPIRATORY: No cough, wheezing, or shortness of breath  CARDIOVASCULAR: No chest pain, palpitations, or leg swelling  GASTROINTESTINAL: No abd pain, nausea, vomiting, or diarrhea  GENITOURINARY: No dysuria, frequency, or hematuria  NEUROLOGICAL: no focal weakness or dizziness  MUSCULOSKELETAL: no myalgias     Vital Signs Last 24 Hrs  T(C): 36.3 (19 Jan 2025 04:46), Max: 37.1 (18 Jan 2025 12:19)  T(F): 97.4 (19 Jan 2025 04:46), Max: 98.8 (18 Jan 2025 12:19)  HR: 62 (19 Jan 2025 04:46) (60 - 68)  BP: 127/62 (19 Jan 2025 04:46) (116/60 - 129/62)  BP(mean): --  RR: 18 (19 Jan 2025 04:46) (18 - 18)  SpO2: 96% (19 Jan 2025 04:46) (94% - 97%)    Parameters below as of 19 Jan 2025 04:46  Patient On (Oxygen Delivery Method): room air          PHYSICAL EXAM:  GENERAL: NAD  HEENT:  EOMI, moist mucous membranes  CHEST/LUNG:  CTA b/l, no rales, wheezes, or rhonchi  HEART:  RRR, S1, S2  ABDOMEN:  BS+, soft, nontender, nondistended  EXTREMITIES: no edema, cyanosis, or calf tenderness  NERVOUS SYSTEM: AA&Ox3    LABS:                        6.8    5.90  )-----------( 220      ( 19 Jan 2025 07:00 )             22.0     19 Jan 2025 07:00    140    |  109    |  17     ----------------------------<  194    3.6     |  27     |  0.73     Ca    8.4        19 Jan 2025 07:00      PT/INR - ( 17 Jan 2025 23:00 )   PT: 17.8 sec;   INR: 1.53 ratio         PTT - ( 17 Jan 2025 23:00 )  PTT:33.9 sec  Urinalysis Basic - ( 19 Jan 2025 07:00 )    Color: x / Appearance: x / SG: x / pH: x  Gluc: 194 mg/dL / Ketone: x  / Bili: x / Urobili: x   Blood: x / Protein: x / Nitrite: x   Leuk Esterase: x / RBC: x / WBC x   Sq Epi: x / Non Sq Epi: x / Bacteria: x      CAPILLARY BLOOD GLUCOSE      POCT Blood Glucose.: 190 mg/dL (19 Jan 2025 08:14)  POCT Blood Glucose.: 184 mg/dL (18 Jan 2025 21:29)  POCT Blood Glucose.: 170 mg/dL (18 Jan 2025 16:46)  POCT Blood Glucose.: 171 mg/dL (18 Jan 2025 11:49)    UCx       RADIOLOGY & ADDITIONAL TESTS:          RADIOLOGY & ADDITIONAL TESTS:    Personally reviewed.     Consultant(s) Notes Reviewed:  [x] YES  [ ] NO    Care Discussed with [x] Consultants  [x] Patient  [ ] Family  [ ]      [ ] Other; RN  DVT ppx

## 2025-01-27 NOTE — DISCHARGE NOTE NURSING/CASE MANAGEMENT/SOCIAL WORK - PATIENT PORTAL LINK FT
You can access the FollowMyHealth Patient Portal offered by [facility] by registering at the following website: [email or web address]. By joining [employer]s FollowMyHealth portal, you will also be able to view your health information using other applications (apps) compatible with our system.

## 2025-01-27 NOTE — PROGRESS NOTE ADULT - REASON FOR ADMISSION
diabetic LE ulcers and  diabetcellulitis, adult failure to thrive
diabetic LE ulcers and  diabetes, cellulitis, adult failure to thrive
diabetic LE ulcers and  diabetes, cellulitis, adult failure to thrive
diabetic LE ulcers and  diabetcellulitis, adult failure to thrive
diabetic LE ulcers and  diabetes, cellulitis, adult failure to thrive
diabetic LE ulcers and  diabetcellulitis, adult failure to thrive
diabetic LE ulcers and  diabetcellulitis, adult failure to thrive
diabetic LE ulcers and  diabetes, cellulitis, adult failure to thrive
diabetic LE ulcers and  diabetes, cellulitis, adult failure to thrive
diabetic LE ulcers and  diabetcellulitis, adult failure to thrive

## 2025-04-17 ENCOUNTER — OUTPATIENT (OUTPATIENT)
Dept: OUTPATIENT SERVICES | Facility: HOSPITAL | Age: 80
LOS: 1 days | End: 2025-04-17
Payer: MEDICARE

## 2025-04-17 ENCOUNTER — APPOINTMENT (OUTPATIENT)
Dept: WOUND CARE | Facility: HOSPITAL | Age: 80
End: 2025-04-17
Payer: MEDICARE

## 2025-04-17 VITALS
HEART RATE: 66 BPM | DIASTOLIC BLOOD PRESSURE: 48 MMHG | TEMPERATURE: 97.2 F | HEIGHT: 76 IN | BODY MASS INDEX: 20.7 KG/M2 | WEIGHT: 170 LBS | OXYGEN SATURATION: 95 % | SYSTOLIC BLOOD PRESSURE: 99 MMHG | RESPIRATION RATE: 18 BRPM

## 2025-04-17 DIAGNOSIS — L89.210 PRESSURE ULCER OF RIGHT HIP, UNSTAGEABLE: ICD-10-CM

## 2025-04-17 DIAGNOSIS — L97.509 TYPE 2 DIABETES MELLITUS WITH FOOT ULCER: ICD-10-CM

## 2025-04-17 DIAGNOSIS — L89.152 PRESSURE ULCER OF SACRAL REGION, STAGE 2: ICD-10-CM

## 2025-04-17 DIAGNOSIS — E11.622 TYPE 2 DIABETES MELLITUS WITH OTHER SKIN ULCER: ICD-10-CM

## 2025-04-17 DIAGNOSIS — E11.621 TYPE 2 DIABETES MELLITUS WITH FOOT ULCER: ICD-10-CM

## 2025-04-17 DIAGNOSIS — L97.316: ICD-10-CM

## 2025-04-17 DIAGNOSIS — L89.890 PRESSURE ULCER OF OTHER SITE, UNSTAGEABLE: ICD-10-CM

## 2025-04-17 DIAGNOSIS — L97.325: ICD-10-CM

## 2025-04-17 DIAGNOSIS — L97.422 NON-PRESSURE CHRONIC ULCER OF LEFT HEEL AND MIDFOOT WITH FAT LAYER EXPOSED: ICD-10-CM

## 2025-04-17 PROCEDURE — 99213 OFFICE O/P EST LOW 20 MIN: CPT

## 2025-04-17 PROCEDURE — G0463: CPT

## 2025-04-17 RX ORDER — TRAMADOL HYDROCHLORIDE AND ACETAMINOPHEN 37.5; 325 MG/1; MG/1
37.5-325 TABLET, FILM COATED ORAL 3 TIMES DAILY
Qty: 30 | Refills: 0 | Status: ACTIVE | COMMUNITY
Start: 2025-04-17 | End: 1900-01-01

## 2025-04-17 RX ORDER — TRAMADOL HYDROCHLORIDE 25 MG/1
25 TABLET, COATED ORAL
Refills: 0 | Status: ACTIVE | COMMUNITY

## 2025-04-18 DIAGNOSIS — M81.0 AGE-RELATED OSTEOPOROSIS WITHOUT CURRENT PATHOLOGICAL FRACTURE: ICD-10-CM

## 2025-04-18 DIAGNOSIS — I50.9 HEART FAILURE, UNSPECIFIED: ICD-10-CM

## 2025-04-18 DIAGNOSIS — E11.622 TYPE 2 DIABETES MELLITUS WITH OTHER SKIN ULCER: ICD-10-CM

## 2025-04-18 DIAGNOSIS — L89.210 PRESSURE ULCER OF RIGHT HIP, UNSTAGEABLE: ICD-10-CM

## 2025-04-18 DIAGNOSIS — L89.152 PRESSURE ULCER OF SACRAL REGION, STAGE 2: ICD-10-CM

## 2025-04-18 DIAGNOSIS — L89.890 PRESSURE ULCER OF OTHER SITE, UNSTAGEABLE: ICD-10-CM

## 2025-04-18 DIAGNOSIS — E11.59 TYPE 2 DIABETES MELLITUS WITH OTHER CIRCULATORY COMPLICATIONS: ICD-10-CM

## 2025-04-18 DIAGNOSIS — L97.325 NON-PRESSURE CHRONIC ULCER OF LEFT ANKLE WITH MUSCLE INVOLVEMENT WITHOUT EVIDENCE OF NECROSIS: ICD-10-CM

## 2025-04-18 DIAGNOSIS — E11.621 TYPE 2 DIABETES MELLITUS WITH FOOT ULCER: ICD-10-CM

## 2025-04-18 DIAGNOSIS — M05.20 RHEUMATOID VASCULITIS WITH RHEUMATOID ARTHRITIS OF UNSPECIFIED SITE: ICD-10-CM

## 2025-04-18 DIAGNOSIS — I48.91 UNSPECIFIED ATRIAL FIBRILLATION: ICD-10-CM

## 2025-04-18 DIAGNOSIS — E11.40 TYPE 2 DIABETES MELLITUS WITH DIABETIC NEUROPATHY, UNSPECIFIED: ICD-10-CM

## 2025-04-18 DIAGNOSIS — L97.316 NON-PRESSURE CHRONIC ULCER OF RIGHT ANKLE WITH BONE INVOLVEMENT WITHOUT EVIDENCE OF NECROSIS: ICD-10-CM

## 2025-04-18 DIAGNOSIS — Z95.0 PRESENCE OF CARDIAC PACEMAKER: ICD-10-CM

## 2025-04-18 DIAGNOSIS — F32.A DEPRESSION, UNSPECIFIED: ICD-10-CM

## 2025-04-18 DIAGNOSIS — L97.422 NON-PRESSURE CHRONIC ULCER OF LEFT HEEL AND MIDFOOT WITH FAT LAYER EXPOSED: ICD-10-CM

## 2025-04-18 DIAGNOSIS — Z98.890 OTHER SPECIFIED POSTPROCEDURAL STATES: ICD-10-CM

## 2025-04-21 ENCOUNTER — NON-APPOINTMENT (OUTPATIENT)
Age: 80
End: 2025-04-21

## 2025-05-01 ENCOUNTER — NON-APPOINTMENT (OUTPATIENT)
Age: 80
End: 2025-05-01

## 2025-05-08 ENCOUNTER — APPOINTMENT (OUTPATIENT)
Dept: WOUND CARE | Facility: HOSPITAL | Age: 80
End: 2025-05-08
Payer: MEDICARE

## 2025-05-08 ENCOUNTER — OUTPATIENT (OUTPATIENT)
Dept: OUTPATIENT SERVICES | Facility: HOSPITAL | Age: 80
LOS: 1 days | End: 2025-05-08
Payer: MEDICARE

## 2025-05-08 VITALS
DIASTOLIC BLOOD PRESSURE: 62 MMHG | RESPIRATION RATE: 18 BRPM | OXYGEN SATURATION: 94 % | HEART RATE: 76 BPM | SYSTOLIC BLOOD PRESSURE: 103 MMHG | WEIGHT: 170 LBS | HEIGHT: 76 IN | TEMPERATURE: 98.2 F | BODY MASS INDEX: 20.7 KG/M2

## 2025-05-08 DIAGNOSIS — E11.622 TYPE 2 DIABETES MELLITUS WITH OTHER SKIN ULCER: ICD-10-CM

## 2025-05-08 DIAGNOSIS — L89.212 PRESSURE ULCER OF RIGHT HIP, STAGE 2: ICD-10-CM

## 2025-05-08 DIAGNOSIS — L97.316: ICD-10-CM

## 2025-05-08 DIAGNOSIS — L97.325: ICD-10-CM

## 2025-05-08 DIAGNOSIS — L97.422 NON-PRESSURE CHRONIC ULCER OF LEFT HEEL AND MIDFOOT WITH FAT LAYER EXPOSED: ICD-10-CM

## 2025-05-08 DIAGNOSIS — L89.153 PRESSURE ULCER OF SACRAL REGION, STAGE 3: ICD-10-CM

## 2025-05-08 DIAGNOSIS — E11.621 TYPE 2 DIABETES MELLITUS WITH FOOT ULCER: ICD-10-CM

## 2025-05-08 DIAGNOSIS — L97.509 TYPE 2 DIABETES MELLITUS WITH FOOT ULCER: ICD-10-CM

## 2025-05-08 PROCEDURE — 99213 OFFICE O/P EST LOW 20 MIN: CPT

## 2025-05-08 PROCEDURE — G0463: CPT

## 2025-05-12 ENCOUNTER — NON-APPOINTMENT (OUTPATIENT)
Age: 80
End: 2025-05-12

## 2025-05-12 DIAGNOSIS — L97.422 NON-PRESSURE CHRONIC ULCER OF LEFT HEEL AND MIDFOOT WITH FAT LAYER EXPOSED: ICD-10-CM

## 2025-05-12 DIAGNOSIS — Z98.890 OTHER SPECIFIED POSTPROCEDURAL STATES: ICD-10-CM

## 2025-05-12 DIAGNOSIS — F32.A DEPRESSION, UNSPECIFIED: ICD-10-CM

## 2025-05-12 DIAGNOSIS — M05.20 RHEUMATOID VASCULITIS WITH RHEUMATOID ARTHRITIS OF UNSPECIFIED SITE: ICD-10-CM

## 2025-05-12 DIAGNOSIS — I50.9 HEART FAILURE, UNSPECIFIED: ICD-10-CM

## 2025-05-12 DIAGNOSIS — E11.622 TYPE 2 DIABETES MELLITUS WITH OTHER SKIN ULCER: ICD-10-CM

## 2025-05-12 DIAGNOSIS — I48.91 UNSPECIFIED ATRIAL FIBRILLATION: ICD-10-CM

## 2025-05-12 DIAGNOSIS — L89.153 PRESSURE ULCER OF SACRAL REGION, STAGE 3: ICD-10-CM

## 2025-05-12 DIAGNOSIS — L97.316 NON-PRESSURE CHRONIC ULCER OF RIGHT ANKLE WITH BONE INVOLVEMENT WITHOUT EVIDENCE OF NECROSIS: ICD-10-CM

## 2025-05-12 DIAGNOSIS — E11.40 TYPE 2 DIABETES MELLITUS WITH DIABETIC NEUROPATHY, UNSPECIFIED: ICD-10-CM

## 2025-05-12 DIAGNOSIS — Z95.0 PRESENCE OF CARDIAC PACEMAKER: ICD-10-CM

## 2025-05-12 DIAGNOSIS — L97.325 NON-PRESSURE CHRONIC ULCER OF LEFT ANKLE WITH MUSCLE INVOLVEMENT WITHOUT EVIDENCE OF NECROSIS: ICD-10-CM

## 2025-05-12 DIAGNOSIS — L89.212 PRESSURE ULCER OF RIGHT HIP, STAGE 2: ICD-10-CM

## 2025-05-12 DIAGNOSIS — M81.0 AGE-RELATED OSTEOPOROSIS WITHOUT CURRENT PATHOLOGICAL FRACTURE: ICD-10-CM

## 2025-05-12 DIAGNOSIS — E11.621 TYPE 2 DIABETES MELLITUS WITH FOOT ULCER: ICD-10-CM

## 2025-05-12 DIAGNOSIS — E11.59 TYPE 2 DIABETES MELLITUS WITH OTHER CIRCULATORY COMPLICATIONS: ICD-10-CM

## 2025-06-12 ENCOUNTER — APPOINTMENT (OUTPATIENT)
Dept: WOUND CARE | Facility: HOSPITAL | Age: 80
End: 2025-06-12

## (undated) DEVICE — VENODYNE/SCD SLEEVE CALF LARGE

## (undated) DEVICE — STAPLER SKIN PROXIMATE

## (undated) DEVICE — BLADE SCALPEL SAFETYLOCK #10

## (undated) DEVICE — SUT POLYSORB 2-0 30" GS-22 UNDYED

## (undated) DEVICE — GLV 6.5 PROTEXIS (BLUE)

## (undated) DEVICE — DRAIN RESERVOIR FOR JACKSON PRATT 100CC CARDINAL

## (undated) DEVICE — DRAIN JACKSON PRATT 7MM FLAT FULL NO TROCAR

## (undated) DEVICE — WARMING BLANKET UPPER ADULT

## (undated) DEVICE — NDL HYPO SAFE 22G X 1.5" (BLACK)

## (undated) DEVICE — S&N VERSAJET II EXACT HANDPIECE 8MM X 45 DEGREE

## (undated) DEVICE — DRSG TELFA 3 X 8

## (undated) DEVICE — PREP BETADINE KIT

## (undated) DEVICE — DRSG KERLIX ROLL 4.5"

## (undated) DEVICE — DRAPE C ARM UNIVERSAL

## (undated) DEVICE — SOL IRR BAG NS 0.9% 3000ML

## (undated) DEVICE — TOURNIQUET CUFF 18" DUAL PORT SINGLE BLADDER LUER LOCK (BLACK)

## (undated) DEVICE — SUT MONOSOF 3-0 18" P-14

## (undated) DEVICE — VENODYNE/SCD SLEEVE CALF MEDIUM

## (undated) DEVICE — SAW BLADE LINVATEC SAGITTAL 19.5X41.0X..4MM COARSE

## (undated) DEVICE — DRSG XEROFORM 1 X 8"

## (undated) DEVICE — SUT MONOSOF 2-0 18" C-15

## (undated) DEVICE — NDL HYPO SAFE 18G X 1.5" (PINK)

## (undated) DEVICE — SYR LUER LOK 10CC

## (undated) DEVICE — PLV-SCD MACHINE: Type: DURABLE MEDICAL EQUIPMENT

## (undated) DEVICE — DRSG ACE BANDAGE 6"

## (undated) DEVICE — DRSG KLING 4"

## (undated) DEVICE — PACK LOWER EXTREMITY NS PLAINVI

## (undated) DEVICE — SPECIMEN CONTAINER 4OZ

## (undated) DEVICE — BLADE SCALPEL SAFETYLOCK #15

## (undated) DEVICE — FLOORMAT SURGISAFE ABSORBANT WHITE 36" X 72"

## (undated) DEVICE — PACKING GAUZE IODOFORM 0.5"

## (undated) DEVICE — DRSG COMBINE 5X9"

## (undated) DEVICE — DRAPE 3/4 SHEET W REINFORCEMENT 56X77"

## (undated) DEVICE — DRSG CURITY GAUZE SPONGE 4 X 4" 12-PLY

## (undated) DEVICE — PREP ALCOHOL PAD MED

## (undated) DEVICE — BUR MICROAIRE CARBIDE OVAL 4MM 8 FLUTE

## (undated) DEVICE — SUT POLYSORB 4-0 18" P-12 UNDYED

## (undated) DEVICE — SOL IRR POUR H2O 1000ML

## (undated) DEVICE — GLV 7 PROTEXIS (WHITE)

## (undated) DEVICE — SAW BLADE LINVATEC SAGITTAL MIC 9.5X25.5X0.4MM

## (undated) DEVICE — SOL IRR POUR NS 0.9% 1000ML